# Patient Record
Sex: MALE | Race: WHITE | Employment: FULL TIME | URBAN - METROPOLITAN AREA
[De-identification: names, ages, dates, MRNs, and addresses within clinical notes are randomized per-mention and may not be internally consistent; named-entity substitution may affect disease eponyms.]

---

## 2018-07-26 ENCOUNTER — APPOINTMENT (OUTPATIENT)
Dept: CT IMAGING | Age: 48
End: 2018-07-26
Payer: COMMERCIAL

## 2018-07-26 ENCOUNTER — HOSPITAL ENCOUNTER (EMERGENCY)
Age: 48
Discharge: HOME | End: 2018-07-26
Attending: FAMILY MEDICINE
Payer: COMMERCIAL

## 2018-07-26 VITALS
SYSTOLIC BLOOD PRESSURE: 100 MMHG | HEART RATE: 65 BPM | DIASTOLIC BLOOD PRESSURE: 62 MMHG | OXYGEN SATURATION: 97 % | TEMPERATURE: 98 F | RESPIRATION RATE: 14 BRPM

## 2018-07-26 DIAGNOSIS — N20.0 KIDNEY STONE: Primary | ICD-10-CM

## 2018-07-26 LAB
ALBUMIN SERPL-MCNC: 4.5 G/DL (ref 3.5–5.2)
ALP BLD-CCNC: 69 U/L (ref 40–129)
ALT SERPL-CCNC: 6 U/L (ref 0–40)
ANION GAP SERPL CALCULATED.3IONS-SCNC: 17 MMOL/L (ref 7–16)
AST SERPL-CCNC: 16 U/L (ref 0–39)
BACTERIA: ABNORMAL /HPF
BASOPHILS ABSOLUTE: 0.06 E9/L (ref 0–0.2)
BASOPHILS RELATIVE PERCENT: 0.7 % (ref 0–2)
BILIRUB SERPL-MCNC: 1.5 MG/DL (ref 0–1.2)
BILIRUBIN URINE: NEGATIVE
BLOOD, URINE: ABNORMAL
BUN BLDV-MCNC: 20 MG/DL (ref 6–20)
CALCIUM SERPL-MCNC: 9.5 MG/DL (ref 8.6–10.2)
CHLORIDE BLD-SCNC: 106 MMOL/L (ref 98–107)
CLARITY: ABNORMAL
CO2: 18 MMOL/L (ref 22–29)
COLOR: ABNORMAL
CREAT SERPL-MCNC: 1.3 MG/DL (ref 0.7–1.2)
EOSINOPHILS ABSOLUTE: 0.15 E9/L (ref 0.05–0.5)
EOSINOPHILS RELATIVE PERCENT: 1.7 % (ref 0–6)
EPITHELIAL CELLS, UA: ABNORMAL /HPF
GFR AFRICAN AMERICAN: >60
GFR NON-AFRICAN AMERICAN: 59 ML/MIN/1.73
GLUCOSE BLD-MCNC: 145 MG/DL (ref 74–109)
GLUCOSE URINE: NEGATIVE MG/DL
HCT VFR BLD CALC: 43.8 % (ref 37–54)
HEMOGLOBIN: 15.4 G/DL (ref 12.5–16.5)
IMMATURE GRANULOCYTES #: 0.03 E9/L
IMMATURE GRANULOCYTES %: 0.3 % (ref 0–5)
KETONES, URINE: NEGATIVE MG/DL
LACTIC ACID: 2.6 MMOL/L (ref 0.5–2.2)
LEUKOCYTE ESTERASE, URINE: NEGATIVE
LIPASE: 40 U/L (ref 13–60)
LYMPHOCYTES ABSOLUTE: 2.55 E9/L (ref 1.5–4)
LYMPHOCYTES RELATIVE PERCENT: 28.2 % (ref 20–42)
MCH RBC QN AUTO: 30.9 PG (ref 26–35)
MCHC RBC AUTO-ENTMCNC: 35.2 % (ref 32–34.5)
MCV RBC AUTO: 88 FL (ref 80–99.9)
MONOCYTES ABSOLUTE: 0.8 E9/L (ref 0.1–0.95)
MONOCYTES RELATIVE PERCENT: 8.9 % (ref 2–12)
MUCUS: PRESENT
NEUTROPHILS ABSOLUTE: 5.44 E9/L (ref 1.8–7.3)
NEUTROPHILS RELATIVE PERCENT: 60.2 % (ref 43–80)
NITRITE, URINE: NEGATIVE
PDW BLD-RTO: 13 FL (ref 11.5–15)
PH UA: 6 (ref 5–9)
PLATELET # BLD: 222 E9/L (ref 130–450)
PMV BLD AUTO: 10.5 FL (ref 7–12)
POTASSIUM REFLEX MAGNESIUM: 4.3 MMOL/L (ref 3.5–5)
PROTEIN UA: ABNORMAL MG/DL
RBC # BLD: 4.98 E12/L (ref 3.8–5.8)
RBC UA: >20 /HPF (ref 0–2)
RENAL EPITHELIAL, UA: ABNORMAL /HPF
SODIUM BLD-SCNC: 141 MMOL/L (ref 132–146)
SPECIFIC GRAVITY UA: 1.02 (ref 1–1.03)
TOTAL PROTEIN: 7.2 G/DL (ref 6.4–8.3)
UROBILINOGEN, URINE: 0.2 E.U./DL
WBC # BLD: 9 E9/L (ref 4.5–11.5)
WBC UA: ABNORMAL /HPF (ref 0–5)

## 2018-07-26 PROCEDURE — 85025 COMPLETE CBC W/AUTO DIFF WBC: CPT

## 2018-07-26 PROCEDURE — 80053 COMPREHEN METABOLIC PANEL: CPT

## 2018-07-26 PROCEDURE — 83690 ASSAY OF LIPASE: CPT

## 2018-07-26 PROCEDURE — 81001 URINALYSIS AUTO W/SCOPE: CPT

## 2018-07-26 PROCEDURE — 96374 THER/PROPH/DIAG INJ IV PUSH: CPT

## 2018-07-26 PROCEDURE — 2580000003 HC RX 258: Performed by: FAMILY MEDICINE

## 2018-07-26 PROCEDURE — 99284 EMERGENCY DEPT VISIT MOD MDM: CPT

## 2018-07-26 PROCEDURE — 96375 TX/PRO/DX INJ NEW DRUG ADDON: CPT

## 2018-07-26 PROCEDURE — 36415 COLL VENOUS BLD VENIPUNCTURE: CPT

## 2018-07-26 PROCEDURE — 6360000002 HC RX W HCPCS: Performed by: FAMILY MEDICINE

## 2018-07-26 PROCEDURE — 74176 CT ABD & PELVIS W/O CONTRAST: CPT

## 2018-07-26 PROCEDURE — 83605 ASSAY OF LACTIC ACID: CPT

## 2018-07-26 RX ORDER — ONDANSETRON 4 MG/1
4 TABLET, ORALLY DISINTEGRATING ORAL EVERY 8 HOURS PRN
Qty: 10 TABLET | Refills: 0 | Status: SHIPPED | OUTPATIENT
Start: 2018-07-26 | End: 2019-07-26

## 2018-07-26 RX ORDER — 0.9 % SODIUM CHLORIDE 0.9 %
1000 INTRAVENOUS SOLUTION INTRAVENOUS ONCE
Status: COMPLETED | OUTPATIENT
Start: 2018-07-26 | End: 2018-07-26

## 2018-07-26 RX ORDER — KETOROLAC TROMETHAMINE 30 MG/ML
30 INJECTION, SOLUTION INTRAMUSCULAR; INTRAVENOUS ONCE
Status: COMPLETED | OUTPATIENT
Start: 2018-07-26 | End: 2018-07-26

## 2018-07-26 RX ORDER — KETOROLAC TROMETHAMINE 10 MG/1
10 TABLET, FILM COATED ORAL EVERY 6 HOURS PRN
Qty: 12 TABLET | Refills: 0 | Status: SHIPPED | OUTPATIENT
Start: 2018-07-26 | End: 2020-10-15

## 2018-07-26 RX ORDER — TAMSULOSIN HYDROCHLORIDE 0.4 MG/1
0.4 CAPSULE ORAL DAILY
Qty: 30 CAPSULE | Refills: 0 | Status: SHIPPED | OUTPATIENT
Start: 2018-07-26 | End: 2020-10-15

## 2018-07-26 RX ORDER — DIPHENHYDRAMINE HYDROCHLORIDE 50 MG/ML
25 INJECTION INTRAMUSCULAR; INTRAVENOUS ONCE
Status: COMPLETED | OUTPATIENT
Start: 2018-07-26 | End: 2018-07-26

## 2018-07-26 RX ADMIN — DIPHENHYDRAMINE HYDROCHLORIDE 25 MG: 50 INJECTION, SOLUTION INTRAMUSCULAR; INTRAVENOUS at 10:23

## 2018-07-26 RX ADMIN — SODIUM CHLORIDE 1000 ML: 9 INJECTION, SOLUTION INTRAVENOUS at 10:22

## 2018-07-26 RX ADMIN — KETOROLAC TROMETHAMINE 30 MG: 30 INJECTION, SOLUTION INTRAMUSCULAR at 10:22

## 2018-07-26 RX ADMIN — PROCHLORPERAZINE EDISYLATE 10 MG: 5 INJECTION INTRAMUSCULAR; INTRAVENOUS at 10:24

## 2018-07-26 ASSESSMENT — PAIN DESCRIPTION - PAIN TYPE
TYPE: ACUTE PAIN
TYPE: ACUTE PAIN

## 2018-07-26 ASSESSMENT — PAIN DESCRIPTION - LOCATION
LOCATION: ABDOMEN;FLANK
LOCATION: ABDOMEN;FLANK;GROIN

## 2018-07-26 ASSESSMENT — PAIN DESCRIPTION - PROGRESSION: CLINICAL_PROGRESSION: GRADUALLY IMPROVING

## 2018-07-26 ASSESSMENT — PAIN DESCRIPTION - ORIENTATION
ORIENTATION: RIGHT;LOWER
ORIENTATION: RIGHT;LOWER

## 2018-07-26 ASSESSMENT — PAIN DESCRIPTION - DESCRIPTORS
DESCRIPTORS: SORE;SHARP
DESCRIPTORS: SHARP

## 2018-07-26 ASSESSMENT — PAIN DESCRIPTION - FREQUENCY
FREQUENCY: CONTINUOUS
FREQUENCY: CONTINUOUS

## 2018-07-26 ASSESSMENT — PAIN SCALES - GENERAL
PAINLEVEL_OUTOF10: 10
PAINLEVEL_OUTOF10: 5
PAINLEVEL_OUTOF10: 10

## 2018-07-26 NOTE — ED PROVIDER NOTES
sounds. Distension:  None. Tenderness: mild tenderness is present in the RLQ. Liver: non-tender. Spleen:  non-tender. Pulsatile Mass: absent. Hernia:  no inguinal or femoral hernias noted. · Back: CVA Tenderness: No.  · Integument:  Normal turgor. Warm, dry, without visible rash, unless noted elsewhere. · Neurological:  Orientation age-appropriate. Motor functions intact.     Lab / Imaging Results   (All laboratory and radiology results have been personally reviewed by myself)  Labs:  Results for orders placed or performed during the hospital encounter of 07/26/18   CBC auto differential   Result Value Ref Range    WBC 9.0 4.5 - 11.5 E9/L    RBC 4.98 3.80 - 5.80 E12/L    Hemoglobin 15.4 12.5 - 16.5 g/dL    Hematocrit 43.8 37.0 - 54.0 %    MCV 88.0 80.0 - 99.9 fL    MCH 30.9 26.0 - 35.0 pg    MCHC 35.2 (H) 32.0 - 34.5 %    RDW 13.0 11.5 - 15.0 fL    Platelets 037 080 - 447 E9/L    MPV 10.5 7.0 - 12.0 fL    Neutrophils % 60.2 43.0 - 80.0 %    Immature Granulocytes % 0.3 0.0 - 5.0 %    Lymphocytes % 28.2 20.0 - 42.0 %    Monocytes % 8.9 2.0 - 12.0 %    Eosinophils % 1.7 0.0 - 6.0 %    Basophils % 0.7 0.0 - 2.0 %    Neutrophils # 5.44 1.80 - 7.30 E9/L    Immature Granulocytes # 0.03 E9/L    Lymphocytes # 2.55 1.50 - 4.00 E9/L    Monocytes # 0.80 0.10 - 0.95 E9/L    Eosinophils # 0.15 0.05 - 0.50 E9/L    Basophils # 0.06 0.00 - 0.20 E9/L   Comprehensive Metabolic Panel w/ Reflex to MG   Result Value Ref Range    Sodium 141 132 - 146 mmol/L    Potassium reflex Magnesium 4.3 3.5 - 5.0 mmol/L    Chloride 106 98 - 107 mmol/L    CO2 18 (L) 22 - 29 mmol/L    Anion Gap 17 (H) 7 - 16 mmol/L    Glucose 145 (H) 74 - 109 mg/dL    BUN 20 6 - 20 mg/dL    CREATININE 1.3 (H) 0.7 - 1.2 mg/dL    GFR Non-African American 59 >=60 mL/min/1.73    GFR African American >60     Calcium 9.5 8.6 - 10.2 mg/dL    Total Protein 7.2 6.4 - 8.3 g/dL    Alb 4.5 3.5 - 5.2 g/dL    Total Bilirubin 1.5 (H) 0.0 - 1.2 mg/dL    Alkaline Phosphatase 69 40 - 129 U/L    ALT 6 0 - 40 U/L    AST 16 0 - 39 U/L   Lipase   Result Value Ref Range    Lipase 40 13 - 60 U/L   Lactic acid, plasma   Result Value Ref Range    Lactic Acid 2.6 (H) 0.5 - 2.2 mmol/L     Imaging: All Radiology results interpreted by Radiologist unless otherwise noted. CT ABDOMEN PELVIS WO CONTRAST Additional Contrast? None   Final Result      1. No inflammatory changes in the abdomen or pelvis. 2. Tiny recently passed stone in the urinary bladder lumen adjacent to   right UVJ. 3. several stones in the right renal lower pole. 4. Moderate to severe right hydronephrosis and extrarenal pelves with   suggestion of UPJ narrowing or obstruction, unchanged. 5. sigmoid diverticulosis. 6. No bowel obstruction or ileus. ED Course / Medical Decision Making     Medications   0.9 % sodium chloride bolus (1,000 mLs Intravenous New Bag 7/26/18 1022)   ketorolac (TORADOL) injection 30 mg (30 mg Intravenous Given 7/26/18 1022)   diphenhydrAMINE (BENADRYL) injection 25 mg (25 mg Intravenous Given 7/26/18 1023)   prochlorperazine (COMPAZINE) injection 10 mg (10 mg Intravenous Given 7/26/18 1024)        Re-Evaluations:  7/26/18      Time: 11:55    Patients symptoms are improving. Possible passing of the stone as noted in the urine    Consultations:             None    Procedures:   none    MDM:  Patient much improved may have passed a stone as noted on the urine cup he did receive a liter of fluid. Counseling:   I have spoken with the patient and discussed todays results, in addition to providing specific details for the plan of care and counseling regarding the diagnosis and prognosis and are agreeable with the plan. Assessment      1.  Kidney stone      This patient's ED course included: a personal history and physicial examination, re-evaluation prior to disposition, multiple bedside re-evaluations, IV

## 2019-08-12 ENCOUNTER — HOSPITAL ENCOUNTER (OUTPATIENT)
Age: 49
Discharge: HOME | End: 2019-08-14

## 2019-08-12 PROCEDURE — 88342 IMHCHEM/IMCYTCHM 1ST ANTB: CPT

## 2019-08-12 PROCEDURE — 88305 TISSUE EXAM BY PATHOLOGIST: CPT

## 2020-02-17 ENCOUNTER — HOSPITAL ENCOUNTER (EMERGENCY)
Age: 50
Discharge: HOME | End: 2020-02-17
Payer: COMMERCIAL

## 2020-02-17 ENCOUNTER — APPOINTMENT (OUTPATIENT)
Dept: CT IMAGING | Age: 50
End: 2020-02-17
Payer: COMMERCIAL

## 2020-02-17 VITALS
SYSTOLIC BLOOD PRESSURE: 126 MMHG | HEIGHT: 76 IN | BODY MASS INDEX: 29.83 KG/M2 | OXYGEN SATURATION: 97 % | HEART RATE: 55 BPM | WEIGHT: 245 LBS | DIASTOLIC BLOOD PRESSURE: 80 MMHG | TEMPERATURE: 98.4 F | RESPIRATION RATE: 18 BRPM

## 2020-02-17 LAB
ALBUMIN SERPL-MCNC: 4.6 G/DL (ref 3.5–5.2)
ALP BLD-CCNC: 65 U/L (ref 40–129)
ALT SERPL-CCNC: 18 U/L (ref 0–40)
ANION GAP SERPL CALCULATED.3IONS-SCNC: 12 MMOL/L (ref 7–16)
AST SERPL-CCNC: 17 U/L (ref 0–39)
BASOPHILS ABSOLUTE: 0.04 E9/L (ref 0–0.2)
BASOPHILS RELATIVE PERCENT: 0.5 % (ref 0–2)
BILIRUB SERPL-MCNC: 1.3 MG/DL (ref 0–1.2)
BILIRUBIN URINE: NEGATIVE
BLOOD, URINE: NEGATIVE
BUN BLDV-MCNC: 11 MG/DL (ref 6–20)
CALCIUM SERPL-MCNC: 9.7 MG/DL (ref 8.6–10.2)
CHLORIDE BLD-SCNC: 102 MMOL/L (ref 98–107)
CLARITY: CLEAR
CO2: 24 MMOL/L (ref 22–29)
COLOR: YELLOW
CREAT SERPL-MCNC: 1.2 MG/DL (ref 0.7–1.2)
EOSINOPHILS ABSOLUTE: 0.17 E9/L (ref 0.05–0.5)
EOSINOPHILS RELATIVE PERCENT: 1.9 % (ref 0–6)
GFR AFRICAN AMERICAN: >60
GFR NON-AFRICAN AMERICAN: >60 ML/MIN/1.73
GLUCOSE BLD-MCNC: 100 MG/DL (ref 74–99)
GLUCOSE URINE: NEGATIVE MG/DL
HCT VFR BLD CALC: 49.5 % (ref 37–54)
HEMOGLOBIN: 16.4 G/DL (ref 12.5–16.5)
IMMATURE GRANULOCYTES #: 0.04 E9/L
IMMATURE GRANULOCYTES %: 0.5 % (ref 0–5)
KETONES, URINE: NEGATIVE MG/DL
LACTIC ACID: 0.9 MMOL/L (ref 0.5–2.2)
LEUKOCYTE ESTERASE, URINE: NEGATIVE
LIPASE: 235 U/L (ref 13–60)
LYMPHOCYTES ABSOLUTE: 2 E9/L (ref 1.5–4)
LYMPHOCYTES RELATIVE PERCENT: 22.7 % (ref 20–42)
MAGNESIUM: 2.2 MG/DL (ref 1.6–2.6)
MCH RBC QN AUTO: 30.4 PG (ref 26–35)
MCHC RBC AUTO-ENTMCNC: 33.1 % (ref 32–34.5)
MCV RBC AUTO: 91.7 FL (ref 80–99.9)
MONOCYTES ABSOLUTE: 0.69 E9/L (ref 0.1–0.95)
MONOCYTES RELATIVE PERCENT: 7.8 % (ref 2–12)
NEUTROPHILS ABSOLUTE: 5.88 E9/L (ref 1.8–7.3)
NEUTROPHILS RELATIVE PERCENT: 66.6 % (ref 43–80)
NITRITE, URINE: NEGATIVE
PDW BLD-RTO: 13.1 FL (ref 11.5–15)
PH UA: 6 (ref 5–9)
PLATELET # BLD: 223 E9/L (ref 130–450)
PMV BLD AUTO: 10.4 FL (ref 7–12)
POTASSIUM REFLEX MAGNESIUM: 3.5 MMOL/L (ref 3.5–5)
PROTEIN UA: NEGATIVE MG/DL
RBC # BLD: 5.4 E12/L (ref 3.8–5.8)
SODIUM BLD-SCNC: 138 MMOL/L (ref 132–146)
SPECIFIC GRAVITY UA: 1.02 (ref 1–1.03)
TOTAL PROTEIN: 6.8 G/DL (ref 6.4–8.3)
UROBILINOGEN, URINE: 0.2 E.U./DL
WBC # BLD: 8.8 E9/L (ref 4.5–11.5)

## 2020-02-17 PROCEDURE — 96374 THER/PROPH/DIAG INJ IV PUSH: CPT

## 2020-02-17 PROCEDURE — 96375 TX/PRO/DX INJ NEW DRUG ADDON: CPT

## 2020-02-17 PROCEDURE — 6360000002 HC RX W HCPCS: Performed by: EMERGENCY MEDICINE

## 2020-02-17 PROCEDURE — 85025 COMPLETE CBC W/AUTO DIFF WBC: CPT

## 2020-02-17 PROCEDURE — 2580000003 HC RX 258: Performed by: RADIOLOGY

## 2020-02-17 PROCEDURE — 83735 ASSAY OF MAGNESIUM: CPT

## 2020-02-17 PROCEDURE — 81003 URINALYSIS AUTO W/O SCOPE: CPT

## 2020-02-17 PROCEDURE — 6360000002 HC RX W HCPCS: Performed by: PHYSICIAN ASSISTANT

## 2020-02-17 PROCEDURE — 6360000004 HC RX CONTRAST MEDICATION: Performed by: RADIOLOGY

## 2020-02-17 PROCEDURE — 80053 COMPREHEN METABOLIC PANEL: CPT

## 2020-02-17 PROCEDURE — 83605 ASSAY OF LACTIC ACID: CPT

## 2020-02-17 PROCEDURE — 2580000003 HC RX 258: Performed by: EMERGENCY MEDICINE

## 2020-02-17 PROCEDURE — 99284 EMERGENCY DEPT VISIT MOD MDM: CPT

## 2020-02-17 PROCEDURE — 74177 CT ABD & PELVIS W/CONTRAST: CPT

## 2020-02-17 PROCEDURE — C9113 INJ PANTOPRAZOLE SODIUM, VIA: HCPCS | Performed by: EMERGENCY MEDICINE

## 2020-02-17 PROCEDURE — 83690 ASSAY OF LIPASE: CPT

## 2020-02-17 RX ORDER — SODIUM CHLORIDE 0.9 % (FLUSH) 0.9 %
10 SYRINGE (ML) INJECTION PRN
Status: COMPLETED | OUTPATIENT
Start: 2020-02-17 | End: 2020-02-17

## 2020-02-17 RX ORDER — PANTOPRAZOLE SODIUM 40 MG/10ML
40 INJECTION, POWDER, LYOPHILIZED, FOR SOLUTION INTRAVENOUS ONCE
Status: COMPLETED | OUTPATIENT
Start: 2020-02-17 | End: 2020-02-17

## 2020-02-17 RX ORDER — METOCLOPRAMIDE HYDROCHLORIDE 5 MG/ML
10 INJECTION INTRAMUSCULAR; INTRAVENOUS ONCE
Status: COMPLETED | OUTPATIENT
Start: 2020-02-17 | End: 2020-02-17

## 2020-02-17 RX ORDER — DICYCLOMINE HYDROCHLORIDE 10 MG/1
10 CAPSULE ORAL 4 TIMES DAILY
Qty: 20 CAPSULE | Refills: 0 | Status: SHIPPED | OUTPATIENT
Start: 2020-02-17 | End: 2020-10-15

## 2020-02-17 RX ORDER — METOCLOPRAMIDE 10 MG/1
10 TABLET ORAL 4 TIMES DAILY
Qty: 30 TABLET | Refills: 0 | Status: SHIPPED | OUTPATIENT
Start: 2020-02-17 | End: 2020-10-15

## 2020-02-17 RX ORDER — 0.9 % SODIUM CHLORIDE 0.9 %
1000 INTRAVENOUS SOLUTION INTRAVENOUS ONCE
Status: COMPLETED | OUTPATIENT
Start: 2020-02-17 | End: 2020-02-17

## 2020-02-17 RX ORDER — ONDANSETRON 2 MG/ML
4 INJECTION INTRAMUSCULAR; INTRAVENOUS ONCE
Status: COMPLETED | OUTPATIENT
Start: 2020-02-17 | End: 2020-02-17

## 2020-02-17 RX ADMIN — METOCLOPRAMIDE 10 MG: 5 INJECTION, SOLUTION INTRAMUSCULAR; INTRAVENOUS at 21:29

## 2020-02-17 RX ADMIN — ONDANSETRON 4 MG: 2 INJECTION INTRAMUSCULAR; INTRAVENOUS at 19:21

## 2020-02-17 RX ADMIN — IOPAMIDOL 110 ML: 755 INJECTION, SOLUTION INTRAVENOUS at 20:33

## 2020-02-17 RX ADMIN — Medication 10 ML: at 20:32

## 2020-02-17 RX ADMIN — PANTOPRAZOLE SODIUM 40 MG: 40 INJECTION, POWDER, FOR SOLUTION INTRAVENOUS at 19:21

## 2020-02-17 RX ADMIN — SODIUM CHLORIDE 1000 ML: 9 INJECTION, SOLUTION INTRAVENOUS at 19:21

## 2020-02-17 ASSESSMENT — PAIN SCALES - GENERAL: PAINLEVEL_OUTOF10: 8

## 2020-02-17 ASSESSMENT — PAIN DESCRIPTION - LOCATION: LOCATION: ABDOMEN

## 2020-02-17 ASSESSMENT — PAIN DESCRIPTION - PAIN TYPE: TYPE: ACUTE PAIN

## 2020-02-18 NOTE — ED PROVIDER NOTES
Immature Granulocytes % 0.5 0.0 - 5.0 %    Lymphocytes % 22.7 20.0 - 42.0 %    Monocytes % 7.8 2.0 - 12.0 %    Eosinophils % 1.9 0.0 - 6.0 %    Basophils % 0.5 0.0 - 2.0 %    Neutrophils Absolute 5.88 1.80 - 7.30 E9/L    Immature Granulocytes # 0.04 E9/L    Lymphocytes Absolute 2.00 1.50 - 4.00 E9/L    Monocytes Absolute 0.69 0.10 - 0.95 E9/L    Eosinophils Absolute 0.17 0.05 - 0.50 E9/L    Basophils Absolute 0.04 0.00 - 0.20 E9/L   Comprehensive Metabolic Panel w/ Reflex to MG   Result Value Ref Range    Sodium 138 132 - 146 mmol/L    Potassium reflex Magnesium 3.5 3.5 - 5.0 mmol/L    Chloride 102 98 - 107 mmol/L    CO2 24 22 - 29 mmol/L    Anion Gap 12 7 - 16 mmol/L    Glucose 100 (H) 74 - 99 mg/dL    BUN 11 6 - 20 mg/dL    CREATININE 1.2 0.7 - 1.2 mg/dL    GFR Non-African American >60 >=60 mL/min/1.73    GFR African American >60     Calcium 9.7 8.6 - 10.2 mg/dL    Total Protein 6.8 6.4 - 8.3 g/dL    Alb 4.6 3.5 - 5.2 g/dL    Total Bilirubin 1.3 (H) 0.0 - 1.2 mg/dL    Alkaline Phosphatase 65 40 - 129 U/L    ALT 18 0 - 40 U/L    AST 17 0 - 39 U/L   Lipase   Result Value Ref Range    Lipase 235 (H) 13 - 60 U/L   Lactic Acid, Plasma   Result Value Ref Range    Lactic Acid 0.9 0.5 - 2.2 mmol/L   Urinalysis, reflex to microscopic   Result Value Ref Range    Color, UA Yellow Straw/Yellow    Clarity, UA Clear Clear    Glucose, Ur Negative Negative mg/dL    Bilirubin Urine Negative Negative    Ketones, Urine Negative Negative mg/dL    Specific Gravity, UA 1.025 1.005 - 1.030    Blood, Urine Negative Negative    pH, UA 6.0 5.0 - 9.0    Protein, UA Negative Negative mg/dL    Urobilinogen, Urine 0.2 <2.0 E.U./dL    Nitrite, Urine Negative Negative    Leukocyte Esterase, Urine Negative Negative   Magnesium   Result Value Ref Range    Magnesium 2.2 1.6 - 2.6 mg/dL       RADIOLOGY:  Interpreted by Radiologist.  CT ABDOMEN PELVIS W IV CONTRAST Additional Contrast? None   Final Result      No evidence for acute process on CT of abdomen and pelvis. Diverticulosis coli. No evidence of diverticulitis. Nephrolithiasis without evidence of obstructive uropathy. Appendix not visualized. No secondary signs of appendicitis. 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 ------------------------- NURSING NOTES AND VITALS REVIEWED ---------------------------   The nursing notes within the ED encounter and vital signs as below have been reviewed. /80   Pulse 55   Temp 98.4 °F (36.9 °C) (Oral)   Resp 18   Ht 6' 4\" (1.93 m)   Wt 245 lb (111.1 kg)   SpO2 97%   BMI 29.82 kg/m²   Oxygen Saturation Interpretation: Normal      ---------------------------------------------------PHYSICAL EXAM--------------------------------------      Constitutional/General: Alert and oriented x3, well appearing, non toxic in NAD  Head: Normocephalic and atraumatic  Eyes: PERRL, EOMI  Mouth: Oropharynx clear, handling secretions, no trismus  Neck: Supple, full ROM,   Pulmonary: Lungs clear to auscultation bilaterally, no wheezes, rales, or rhonchi. Not in respiratory distress  Cardiovascular:  Regular rate and rhythm, no murmurs, gallops, or rubs. 2+ distal pulses  Abdomen: Soft,  non distended, RUQ TTP  Extremities: Moves all extremities x 4.  Warm and well perfused  Skin: warm and dry without rash  Neurologic: GCS 15,  Psych: Normal Affect      ------------------------------ ED COURSE/MEDICAL DECISION MAKING----------------------  Medications   metoclopramide (REGLAN) injection 10 mg (has no administration in time range)   0.9 % sodium chloride bolus (0 mLs Intravenous Stopped 2/17/20 2018)   ondansetron (ZOFRAN) injection 4 mg (4 mg Intravenous Given 2/17/20 1921)   pantoprazole (PROTONIX) injection 40 mg (40 mg Intravenous Given 2/17/20 1921)   iopamidol (ISOVUE-370) 76 % injection 110 mL (110 mLs Intravenous Given 2/17/20 2033)   sodium chloride flush 0.9 % injection 10 mL (10 mLs Intravenous Given 2/17/20

## 2020-10-15 ENCOUNTER — HOSPITAL ENCOUNTER (EMERGENCY)
Age: 50
Discharge: HOME | End: 2020-10-15
Attending: FAMILY MEDICINE
Payer: COMMERCIAL

## 2020-10-15 ENCOUNTER — APPOINTMENT (OUTPATIENT)
Dept: GENERAL RADIOLOGY | Age: 50
End: 2020-10-15
Payer: COMMERCIAL

## 2020-10-15 VITALS
OXYGEN SATURATION: 98 % | TEMPERATURE: 98.3 F | HEART RATE: 70 BPM | BODY MASS INDEX: 28.62 KG/M2 | HEIGHT: 76 IN | RESPIRATION RATE: 16 BRPM | WEIGHT: 235 LBS | DIASTOLIC BLOOD PRESSURE: 78 MMHG | SYSTOLIC BLOOD PRESSURE: 134 MMHG

## 2020-10-15 PROCEDURE — 99283 EMERGENCY DEPT VISIT LOW MDM: CPT

## 2020-10-15 PROCEDURE — 6370000000 HC RX 637 (ALT 250 FOR IP): Performed by: FAMILY MEDICINE

## 2020-10-15 PROCEDURE — 73560 X-RAY EXAM OF KNEE 1 OR 2: CPT

## 2020-10-15 PROCEDURE — 99284 EMERGENCY DEPT VISIT MOD MDM: CPT

## 2020-10-15 RX ORDER — IBUPROFEN 400 MG/1
400 TABLET ORAL EVERY 8 HOURS PRN
Qty: 12 TABLET | Refills: 0 | Status: SHIPPED | OUTPATIENT
Start: 2020-10-15 | End: 2022-04-04 | Stop reason: ALTCHOICE

## 2020-10-15 RX ORDER — ACETAMINOPHEN 500 MG
1000 TABLET ORAL EVERY 8 HOURS PRN
Qty: 50 TABLET | Refills: 0 | Status: SHIPPED | OUTPATIENT
Start: 2020-10-15

## 2020-10-15 RX ORDER — OMEPRAZOLE MAGNESIUM 10 MG/1
GRANULE, DELAYED RELEASE ORAL
COMMUNITY
End: 2022-04-04 | Stop reason: ALTCHOICE

## 2020-10-15 RX ORDER — ACETAMINOPHEN 500 MG
1000 TABLET ORAL ONCE
Status: COMPLETED | OUTPATIENT
Start: 2020-10-15 | End: 2020-10-15

## 2020-10-15 RX ORDER — IBUPROFEN 600 MG/1
600 TABLET ORAL ONCE
Status: COMPLETED | OUTPATIENT
Start: 2020-10-15 | End: 2020-10-15

## 2020-10-15 RX ADMIN — IBUPROFEN 600 MG: 600 TABLET, FILM COATED ORAL at 09:16

## 2020-10-15 RX ADMIN — ACETAMINOPHEN 1000 MG: 500 TABLET ORAL at 09:16

## 2020-10-15 ASSESSMENT — PAIN DESCRIPTION - FREQUENCY: FREQUENCY: CONTINUOUS

## 2020-10-15 ASSESSMENT — PAIN DESCRIPTION - ONSET: ONSET: SUDDEN

## 2020-10-15 ASSESSMENT — PAIN DESCRIPTION - DESCRIPTORS: DESCRIPTORS: RADIATING;SHARP

## 2020-10-15 ASSESSMENT — PAIN SCALES - GENERAL
PAINLEVEL_OUTOF10: 8
PAINLEVEL_OUTOF10: 8

## 2020-10-15 ASSESSMENT — PAIN DESCRIPTION - LOCATION: LOCATION: LEG;KNEE

## 2020-10-15 ASSESSMENT — PAIN DESCRIPTION - ORIENTATION: ORIENTATION: RIGHT

## 2020-10-15 ASSESSMENT — PAIN DESCRIPTION - PAIN TYPE: TYPE: ACUTE PAIN

## 2020-10-15 ASSESSMENT — PAIN DESCRIPTION - PROGRESSION: CLINICAL_PROGRESSION: NOT CHANGED

## 2020-10-15 NOTE — ED PROVIDER NOTES
Department of Emergency Medicine   ED  Provider Note  Admit Date/RoomTime: 10/15/2020  8:10 AM  ED Room: 12/12  Chief Complaint   Leg Pain (While at work, pushing a barrel, floor uneven, barrel tipped and he twisted injuring right leg/knee pain.  )    History of Present Illness   Source of history provided by:  patient. History/Exam Limitations: none. Gildardo Cedeño is a 52 y.o. old male who has a past medical history of:   Past Medical History:   Diagnosis Date    Acid reflux     presents to the emergency department by ambulance where the patient received see Ambulance Run Sheet prior to arrival., for pain located anterior to right knee  which occured 1 hour(s) prior to arrival.  The complaint occurred as a result of injury twisting  while at work. There has been a history of previous surgery of arthroscopic right knee many years ago. Since onset the symptoms have been moderate in degree. His pain is aggraveated by movement, use and palpation and relieved by ice and rest. Tetanus Status: within past 5 years. His weight bearing ability:  weak. ROS    Pertinent positives and negatives are stated within HPI, all other systems reviewed and are negative. Past Surgical History:   Procedure Laterality Date    APPENDECTOMY      CHOLECYSTECTOMY      ELBOW SURGERY     Social History:  reports that he has quit smoking. He has never used smokeless tobacco. He reports current alcohol use. He reports that he does not use drugs. Family History: family history is not on file. Allergies: Patient has no known allergies. Physical Exam          ED Triage Vitals [10/15/20 0801]   BP Temp Temp Source Pulse Resp SpO2 Height Weight   134/78 98.3 °F (36.8 °C) Tympanic 70 16 98 % 6' 4\" (1.93 m) 235 lb (106.6 kg)       Oxygen Saturation Interpretation: Normal.    Constitutional:  Alert, development consistent with age. Neck:  Normal ROM. Supple.   Knee:  Right medial, lateral, anterior:             Tenderness: todays results, in addition to providing specific details for the plan of care and counseling regarding the diagnosis and prognosis. Questions are answered at this time and they are agreeable with the plan. Assessment      1. Sprain of right knee, unspecified ligament, initial encounter      Plan   Discharge to home  Patient condition is good    New Medications     New Prescriptions    ACETAMINOPHEN (TYLENOL) 500 MG TABLET    Take 2 tablets by mouth every 8 hours as needed for Pain    IBUPROFEN (IBU) 400 MG TABLET    Take 1 tablet by mouth every 8 hours as needed for Pain Take with full glass of water     Electronically signed by Paige Nam MD   DD: 10/15/20  **This report was transcribed using voice recognition software. Every effort was made to ensure accuracy; however, inadvertent computerized transcription errors may be present.   END OF ED PROVIDER NOTE        Paige Nam MD  10/15/20 3478

## 2020-11-02 ENCOUNTER — HOSPITAL ENCOUNTER (OUTPATIENT)
Dept: PHYSICAL THERAPY | Age: 50
Setting detail: THERAPIES SERIES
Discharge: HOME | End: 2020-11-02
Payer: COMMERCIAL

## 2020-11-02 PROCEDURE — 97161 PT EVAL LOW COMPLEX 20 MIN: CPT

## 2020-11-02 NOTE — PROGRESS NOTES
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 In the course of his normal job duties client was puhing a 55 gallon drum of oil on a 4 wheeled cartt. A wheel of the cart caught in a rut causing the liquid to shift. Client attempted to stop the drum from falling resulting in a twist injury of the right knee PMHX no prior hx of right knee injury Has had xrays whcih were negative  Family / Caregiver Present: No  Referring Practitioner:  [employer]  Referral Date : 10/29/20  Diagnosis: Right knee Injury  PT Visit Information  Onset Date: 10/15/20  PT Insurance Information: Industrial  Total # of Visits Approved: 12  Plan of Care/Certification Expiration Date: 20  Subjective  Subjective: Client reports symptomatic improvement over time Reductions in pain and edema Still limits weight bearing Working light duty       Vision/Hearing  Vision  Vision: Within Functional Limits  Hearing  Hearing: Within functional limits    Orientation  Orientation  Overall Orientation Status: Within Functional Limits    Social/Functional History  Social/Functional History  Lives With: Family  Type of Home: House  Home Layout: Multi-level; Work area in [employer] From: [employer] Responsibilities: Yes  Active : Yes  Mode of Transportation: Car  Occupation: Full time employment; Workers comp    Objective     Observation/Palpation  Posture: Fair  Palpation: Pain wiht palpation lateral joint limt Right knee Postero-lateral joint region rightknee  Observation: No acute pain behavior or guarding noted  Edema: Modest right knee    AROM RLE (degrees)  RLE AROM: WFL  AROM LLE (degrees)  LLE AROM : WFL    Strength RLE  Comment: 4- to 4/5  Strength LLE  Comment: 4/5  Tone RLE  RLE Tone: Normotonic  Tone LLE  LLE Tone: Normotonic  Motor Control  Gross Motor?: WFL  Additional Measures  Special Tests: Anterior drawer negating Varus/Valgus stress Negative for instability     Bed mobility  Bridging: Independent  Rolling to Left: Independent  Rolling to Right: Independent  Supine to Sit: Independent  Sit to Supine: Independent  Transfers  Sit to Stand: Independent  Stand to sit: Independent       Ambulation  Ambulation?: Yes  Ambulation 1  Surface: level tile  Device: No Device  Assistance: Independent  Gait Deviations: None                            Assessment   Conditions Requiring Skilled Therapeutic Intervention  Body structures, Functions, Activity limitations: Decreased functional mobility ; Increased pain;Decreased strength;Decreased endurance  Prognosis: Good  Decision Making: Low Complexity  REQUIRES PT FOLLOW UP: Yes         Plan   Plan  Times per week: 2-3  Plan weeks: 4-5  Current Treatment Recommendations: Strengthening, Home Exercise Program, ROM, Endurance Training, Patient/Caregiver Education & Training, Functional Mobility Training, Modalities    G-Code       OutComes Score                                                  AM-PAC Score             Goals          Therapy Time   Individual Concurrent Group Co-treatment   Time In 1500         Time Out 1535         Minutes 35         Timed Code Treatment Minutes: 27 Minutes       Florentino Turk, PT

## 2020-11-04 ENCOUNTER — HOSPITAL ENCOUNTER (OUTPATIENT)
Dept: PHYSICAL THERAPY | Age: 50
Setting detail: THERAPIES SERIES
Discharge: HOME | End: 2020-11-04
Payer: COMMERCIAL

## 2020-11-04 PROCEDURE — 97110 THERAPEUTIC EXERCISES: CPT

## 2020-11-04 PROCEDURE — G0283 ELEC STIM OTHER THAN WOUND: HCPCS

## 2020-11-04 NOTE — PROGRESS NOTES
[facility]  Phone: [phone] Fax: [phone]       Physical Therapy Daily Treatment Note  Date:  2020    Patient Name:  Feliz Fernandes    :  1970  MRN: [id number]    Restrictions/Precautions:    Diagnosis:  Right Knee Injury   Treatment Diagnosis:    Insurance/Certification information:  [employer] 12 session thru 2020  Referring Physician:  Dr Katy Palmer of care signed (Y/N):    Visit# / total visits:  /  Pain level: /10  Time In: 1525       Time Out:   1610       Subjective:      Exercises:  Exercise/Equipment Resistance/Repetitions Other comments     Bike 10 min              Standing Calf str 10 reps       Hip flex/HS Str at step  Right LE 10 reps                                                                                                                   Other Therapeutic Activities:      Home Exercise Program:      Manual Treatments:      Modalities:  Interferential Estim Right lateral knee 20 min with ice    Timed Code Treatment Minutes:  30    Total Treatment Minutes:  40    Treatment/Activity Tolerance:  [x] Patient tolerated treatment well [] Patient limited by fatigue  [] Patient limited by pain  [] Patient limited by other medical complications  [] Other:     Plan:   [x] Continue per plan of care [] Alter current plan (see comments)  [] Plan of care initiated [] Hold pending MD visit [] Discharge  Plan for Next Session:         Treatment Charges: Mins Units   Initial Evaluation     Re-Evaluation     Ther Exercise         TE 15 1   Manual Therapy     MT     Ther Activities        TA     Gait Training          GT     Neuro Re-education NR     Modalities 20 2   Non-Billable Service Time     Other     Total Time/Units 35 2     Electronically signed by:  Jo Patel, [address] [place] [address]

## 2020-11-09 ENCOUNTER — HOSPITAL ENCOUNTER (OUTPATIENT)
Dept: PHYSICAL THERAPY | Age: 50
Setting detail: THERAPIES SERIES
Discharge: HOME | End: 2020-11-09
Payer: COMMERCIAL

## 2020-11-09 PROCEDURE — G0283 ELEC STIM OTHER THAN WOUND: HCPCS

## 2020-11-09 PROCEDURE — 97110 THERAPEUTIC EXERCISES: CPT

## 2020-11-09 NOTE — PROGRESS NOTES
[facility]  Phone: [phone] Fax: [phone]       Physical Therapy Daily Treatment Note  Date:  2020    Patient Name:  Arthur Young    :  1970  MRN: [id number]    Restrictions/Precautions:    Diagnosis:  Right Knee Injury   Treatment Diagnosis:    Insurance/Certification information:  [employer] 12 session thru 2020  Referring Physician:  Dr Rita Antoine of care signed (Y/N):    Visit# / total visits:  3/12  Pain level: /10  Time In: 1525       Time Out:   1610       Subjective:  Client reports some modest soreness after initial exercise session and that he is sore after working today     Exercises:  Exercise/Equipment Resistance/Repetitions Other comments     Bike 10 min              Standing Calf str 10 reps       Hip flex/HS Str at step  Right LE 10 reps                                                                                                                   Other Therapeutic Activities:      Home Exercise Program:      Manual Treatments:      Modalities:  Interferential Estim Right lateral knee 20 min with ice    Timed Code Treatment Minutes:  30    Total Treatment Minutes:  40    Treatment/Activity Tolerance:  [x] Patient tolerated treatment well [] Patient limited by fatigue  [] Patient limited by pain  [] Patient limited by other medical complications  [] Other:     Plan:   [x] Continue per plan of care [] Alter current plan (see comments)  [] Plan of care initiated [] Hold pending MD visit [] Discharge  Plan for Next Session:         Treatment Charges: Mins Units   Initial Evaluation     Re-Evaluation     Ther Exercise         TE 15 1   Manual Therapy     MT     Ther Activities        TA     Gait Training          GT     Neuro Re-education NR     Modalities 20 2   Non-Billable Service Time     Other     Total Time/Units 35 2     Electronically signed by:  [employer], [address] [facility]

## 2020-11-11 ENCOUNTER — HOSPITAL ENCOUNTER (OUTPATIENT)
Dept: PHYSICAL THERAPY | Age: 50
Setting detail: THERAPIES SERIES
Discharge: HOME | End: 2020-11-11
Payer: COMMERCIAL

## 2020-11-11 PROCEDURE — G0283 ELEC STIM OTHER THAN WOUND: HCPCS

## 2020-11-11 PROCEDURE — 97110 THERAPEUTIC EXERCISES: CPT

## 2020-11-11 NOTE — PROGRESS NOTES
[facility]  Phone: [phone] Fax: [phone]       Physical Therapy Daily Treatment Note  Date:  2020    Patient Name:  Vincenzo Jalloh    :  1970  MRN: [id number]    Restrictions/Precautions:    Diagnosis:  Right Knee Injury   Treatment Diagnosis:    Insurance/Certification information:  [employer] 12 session thru 2020  Referring Physician:  Dr Nandini Gibbs of care signed (Y/N):    Visit# / total visits:    Pain level: /10  Time In: 1525       Time Out:   1610       Subjective:      Exercises:  Exercise/Equipment Resistance/Repetitions Other comments     Bike 10 min              Standing Calf str 10 reps       Hip flex/HS Str at step  Right LE 10 reps                                                                                                                   Other Therapeutic Activities:      Home Exercise Program:      Manual Treatments:      Modalities:  Interferential Estim Right lateral knee 20 min with ice    Timed Code Treatment Minutes:  30    Total Treatment Minutes:  40    Treatment/Activity Tolerance:  [x] Patient tolerated treatment well [] Patient limited by fatigue  [] Patient limited by pain  [] Patient limited by other medical complications  [] Other:     Plan:   [x] Continue per plan of care [] Alter current plan (see comments)  [] Plan of care initiated [] Hold pending MD visit [] Discharge  Plan for Next Session:         Treatment Charges: Mins Units   Initial Evaluation     Re-Evaluation     Ther Exercise         TE 15 1   Manual Therapy     MT     Ther Activities        TA     Gait Training          GT     Neuro Re-education NR     Modalities 20 2   Non-Billable Service Time     Other     Total Time/Units 35 2     Electronically signed by:  Kiran Winn, [address] [facility]

## 2020-11-12 NOTE — FLOWSHEET NOTE
[facility]  Phone: [phone] Fax: [phone]     Industrial Rehabilitation Initial Evaluation      Client Name:Andrew Enciso Number:NA  Evaluation date:11/02/2020                                     Job Title : Oiler/Jeffrey   Diagnosis:Right Knee Injury S83.401A                         Normal Work Hrs:  40/wk  Referring Physician: Dr Winsome Toney                                     Present work status:   ________  First date of Lost time:10/15/2020                          * Not working  ___  Date of Injury: 10/15/2020                                       Georga Bright Duty    X  Employer:[employer]                                     * Reg.  Duty     ____    Authorized Services:   __X_ Physical Therapy Exercises  ___ Work Conditioning  ___ Aquatics  X___ Manual therapy  __X_ Electrical Stimulation, Traction, Ultrasound  _X__ Education/Body mechanics  ___ Ergonomic Assessment/FCE    Authorized Visits: ________  Styles ___12____Visits  By 11/30/2020   From ___11/02/2020__ to_11/30/2020    Vocational Status:  Employer: [employer]  Job Title: Oiler/Jeffrey    Rehabilitation Problems/Impairments:  [x]Pain  3-4/10 Medial aspect Right knee  []Decreased ROM:________________________________________  [x]Joint Hypomobility:_Right knee   []Joint Hypermobility:______________________________________  []Muscle Spasms:_________________________________________  []Gait: __________________________________________________  [x]Decreased strength:Right Quad/HS  []Unsafe body mechanics related to work/home activities  [x]Subjective reports of pain limiting work/home activities  []Inability to control onset symptoms  []Load handling strength levels below employable levels  []Work endurances less than required for employment levels  []Other:__________________________      Short Term Rehabilitation Goals:                    [x] Decrease pain _310:or less                     [] Increase Physician: ____________________  Therapist: Ricardo Mason, [address]  : ____________________

## 2022-04-04 ENCOUNTER — APPOINTMENT (OUTPATIENT)
Dept: GENERAL RADIOLOGY | Age: 52
End: 2022-04-04
Payer: COMMERCIAL

## 2022-04-04 ENCOUNTER — HOSPITAL ENCOUNTER (EMERGENCY)
Age: 52
Discharge: HOME | End: 2022-04-04
Attending: EMERGENCY MEDICINE
Payer: COMMERCIAL

## 2022-04-04 VITALS
RESPIRATION RATE: 18 BRPM | OXYGEN SATURATION: 96 % | TEMPERATURE: 97.3 F | DIASTOLIC BLOOD PRESSURE: 70 MMHG | SYSTOLIC BLOOD PRESSURE: 120 MMHG | BODY MASS INDEX: 25.68 KG/M2 | WEIGHT: 211 LBS | HEART RATE: 60 BPM

## 2022-04-04 DIAGNOSIS — R07.9 CHEST PAIN, UNSPECIFIED TYPE: Primary | ICD-10-CM

## 2022-04-04 LAB
ALBUMIN SERPL-MCNC: 4.2 G/DL (ref 3.5–5.2)
ALP BLD-CCNC: 80 U/L (ref 40–129)
ALT SERPL-CCNC: 21 U/L (ref 0–40)
ANION GAP SERPL CALCULATED.3IONS-SCNC: 10 MMOL/L (ref 7–16)
AST SERPL-CCNC: 19 U/L (ref 0–39)
BASOPHILS ABSOLUTE: 0.05 E9/L (ref 0–0.2)
BASOPHILS RELATIVE PERCENT: 0.7 % (ref 0–2)
BILIRUB SERPL-MCNC: 0.5 MG/DL (ref 0–1.2)
BUN BLDV-MCNC: 10 MG/DL (ref 6–20)
CALCIUM SERPL-MCNC: 9 MG/DL (ref 8.6–10.2)
CHLORIDE BLD-SCNC: 104 MMOL/L (ref 98–107)
CO2: 24 MMOL/L (ref 22–29)
CREAT SERPL-MCNC: 1.1 MG/DL (ref 0.7–1.2)
D DIMER: <200 NG/ML DDU
EOSINOPHILS ABSOLUTE: 0.13 E9/L (ref 0.05–0.5)
EOSINOPHILS RELATIVE PERCENT: 1.8 % (ref 0–6)
GFR AFRICAN AMERICAN: >60
GFR NON-AFRICAN AMERICAN: >60 ML/MIN/1.73
GLUCOSE BLD-MCNC: 113 MG/DL (ref 74–99)
HCT VFR BLD CALC: 42.7 % (ref 37–54)
HEMOGLOBIN: 14.5 G/DL (ref 12.5–16.5)
IMMATURE GRANULOCYTES #: 0.03 E9/L
IMMATURE GRANULOCYTES %: 0.4 % (ref 0–5)
LYMPHOCYTES ABSOLUTE: 1.11 E9/L (ref 1.5–4)
LYMPHOCYTES RELATIVE PERCENT: 15.3 % (ref 20–42)
MCH RBC QN AUTO: 30.9 PG (ref 26–35)
MCHC RBC AUTO-ENTMCNC: 34 % (ref 32–34.5)
MCV RBC AUTO: 90.9 FL (ref 80–99.9)
MONOCYTES ABSOLUTE: 0.5 E9/L (ref 0.1–0.95)
MONOCYTES RELATIVE PERCENT: 6.9 % (ref 2–12)
NEUTROPHILS ABSOLUTE: 5.45 E9/L (ref 1.8–7.3)
NEUTROPHILS RELATIVE PERCENT: 74.9 % (ref 43–80)
PDW BLD-RTO: 12.8 FL (ref 11.5–15)
PLATELET # BLD: 217 E9/L (ref 130–450)
PMV BLD AUTO: 10.4 FL (ref 7–12)
POTASSIUM SERPL-SCNC: 3.5 MMOL/L (ref 3.5–5)
PRO-BNP: 32 PG/ML (ref 0–125)
RBC # BLD: 4.7 E12/L (ref 3.8–5.8)
SARS-COV-2, NAAT: NOT DETECTED
SODIUM BLD-SCNC: 138 MMOL/L (ref 132–146)
TOTAL PROTEIN: 6.4 G/DL (ref 6.4–8.3)
TROPONIN, HIGH SENSITIVITY: 8 NG/L (ref 0–11)
TROPONIN, HIGH SENSITIVITY: 8 NG/L (ref 0–11)
WBC # BLD: 7.3 E9/L (ref 4.5–11.5)

## 2022-04-04 PROCEDURE — 71045 X-RAY EXAM CHEST 1 VIEW: CPT

## 2022-04-04 PROCEDURE — 85025 COMPLETE CBC W/AUTO DIFF WBC: CPT

## 2022-04-04 PROCEDURE — 84484 ASSAY OF TROPONIN QUANT: CPT

## 2022-04-04 PROCEDURE — 87635 SARS-COV-2 COVID-19 AMP PRB: CPT

## 2022-04-04 PROCEDURE — 80053 COMPREHEN METABOLIC PANEL: CPT

## 2022-04-04 PROCEDURE — 85378 FIBRIN DEGRADE SEMIQUANT: CPT

## 2022-04-04 PROCEDURE — 83880 ASSAY OF NATRIURETIC PEPTIDE: CPT

## 2022-04-04 PROCEDURE — 99285 EMERGENCY DEPT VISIT HI MDM: CPT

## 2022-04-04 PROCEDURE — 93005 ELECTROCARDIOGRAM TRACING: CPT | Performed by: EMERGENCY MEDICINE

## 2022-04-04 PROCEDURE — 6370000000 HC RX 637 (ALT 250 FOR IP): Performed by: EMERGENCY MEDICINE

## 2022-04-04 RX ORDER — IBUPROFEN 200 MG
200 TABLET ORAL EVERY 6 HOURS PRN
COMMUNITY

## 2022-04-04 RX ORDER — CALCIUM CARBONATE 200(500)MG
1500 TABLET,CHEWABLE ORAL
COMMUNITY

## 2022-04-04 RX ORDER — ASPIRIN 81 MG/1
324 TABLET, CHEWABLE ORAL ONCE
Status: COMPLETED | OUTPATIENT
Start: 2022-04-04 | End: 2022-04-04

## 2022-04-04 RX ADMIN — ASPIRIN 324 MG: 81 TABLET, CHEWABLE ORAL at 09:16

## 2022-04-04 NOTE — ED PROVIDER NOTES
Department of Emergency Medicine   ED  Provider Note  Admit Date/RoomTime: 4/4/2022  7:52 AM  ED Room: 26/26          History of Present Illness:  4/4/22, Time: 8:03 AM EDT  Chief Complaint   Patient presents with    Chest Pain     see assessment as documented by this rn                 Salena Spear is a 54 y.o. male presenting to the ED for chest pain and sob, beginning 45 min PTA. The complaint has been persistent, moderate in severity, and worsened by nothing. Patient presents with chest pain shortness of breath. Works night shift, got home from work and approximate 45 minutes ago had pain between her shoulder blades midsternal chest pain as well. States it did not radiate anywhere besides his back. Denies nausea vomiting diaphoresis. He is a cigarette smoker but denies drug or alcohol use. He is unsure of family history as he is adopted. Not had pain like this before. Denies any recent traumas falls or injuries,    Review of Systems:   Pertinent positives and negatives are stated within HPI, all other systems reviewed and are negative.        --------------------------------------------- PAST HISTORY ---------------------------------------------  Past Medical History:  has a past medical history of Acid reflux. Past Surgical History:  has a past surgical history that includes Appendectomy; Cholecystectomy; and Elbow surgery. Social History:  reports that he has quit smoking. He has never used smokeless tobacco. He reports current alcohol use. He reports that he does not use drugs. Family History: family history is not on file. . Unless otherwise noted, family history is non contributory    The patients home medications have been reviewed. Allergies: Patient has no known allergies.         ---------------------------------------------------PHYSICAL EXAM--------------------------------------    Constitutional/General: Alert and oriented x3  Head: Normocephalic and atraumatic  Eyes: PERRL, EOMI, sclera non icteric  Mouth: Oropharynx clear, handling secretions, no trismus, no asymmetry of the posterior oropharynx or uvular edema  Neck: Supple, full ROM, no stridor, no meningeal signs  Respiratory: Lungs clear to auscultation bilaterally,Not in respiratory distress  Cardiovascular:  Regular rate. Regular rhythm. 2+ distal pulses. Equal extremity pulses. Chest: No chest wall tenderness  GI:  Abdomen Soft, Non tender, Non distended. No rebound, guarding, or rigidity. Musculoskeletal: Moves all extremities x 4. Warm and well perfused, no clubbing, cyanosis, or edema. Capillary refill <3 seconds  Integument: skin warm and dry. No rashes. Neurologic: GCS 15, no focal deficits, symmetric strength 5/5 in the upper and lower extremities bilaterally  Psychiatric: Normal Affect      EKG: Interpreted by emergency department physician, Dr. Gardenia Vega   This EKG is signed and interpreted by me. Rate: 67  Rhythm: Sinus  Interpretation: Sinus rhythm, normal axis, OH is 180, QRS is 94, QTc is 412, no other acute findings no prior for comparison  Comparison: no previous EKG available      -------------------------------------------------- RESULTS -------------------------------------------------  I have personally reviewed all laboratory and imaging results for this patient. Results are listed below.      LABS: (Lab results interpreted by me)  Results for orders placed or performed during the hospital encounter of 04/04/22   COVID-19, Rapid    Specimen: Nares   Result Value Ref Range    SARS-CoV-2, NAAT Not Detected Not Detected   Troponin   Result Value Ref Range    Troponin, High Sensitivity 8 0 - 11 ng/L   CBC with Auto Differential   Result Value Ref Range    WBC 7.3 4.5 - 11.5 E9/L    RBC 4.70 3.80 - 5.80 E12/L    Hemoglobin 14.5 12.5 - 16.5 g/dL    Hematocrit 42.7 37.0 - 54.0 %    MCV 90.9 80.0 - 99.9 fL    MCH 30.9 26.0 - 35.0 pg    MCHC 34.0 32.0 - 34.5 %    RDW 12.8 11.5 - 15.0 fL    Platelets 217 130 - 450 E9/L    MPV 10.4 7.0 - 12.0 fL    Neutrophils % 74.9 43.0 - 80.0 %    Immature Granulocytes % 0.4 0.0 - 5.0 %    Lymphocytes % 15.3 (L) 20.0 - 42.0 %    Monocytes % 6.9 2.0 - 12.0 %    Eosinophils % 1.8 0.0 - 6.0 %    Basophils % 0.7 0.0 - 2.0 %    Neutrophils Absolute 5.45 1.80 - 7.30 E9/L    Immature Granulocytes # 0.03 E9/L    Lymphocytes Absolute 1.11 (L) 1.50 - 4.00 E9/L    Monocytes Absolute 0.50 0.10 - 0.95 E9/L    Eosinophils Absolute 0.13 0.05 - 0.50 E9/L    Basophils Absolute 0.05 0.00 - 0.20 E9/L   Comprehensive Metabolic Panel   Result Value Ref Range    Sodium 138 132 - 146 mmol/L    Potassium 3.5 3.5 - 5.0 mmol/L    Chloride 104 98 - 107 mmol/L    CO2 24 22 - 29 mmol/L    Anion Gap 10 7 - 16 mmol/L    Glucose 113 (H) 74 - 99 mg/dL    BUN 10 6 - 20 mg/dL    CREATININE 1.1 0.7 - 1.2 mg/dL    GFR Non-African American >60 >=60 mL/min/1.73    GFR African American >60     Calcium 9.0 8.6 - 10.2 mg/dL    Total Protein 6.4 6.4 - 8.3 g/dL    Albumin 4.2 3.5 - 5.2 g/dL    Total Bilirubin 0.5 0.0 - 1.2 mg/dL    Alkaline Phosphatase 80 40 - 129 U/L    ALT 21 0 - 40 U/L    AST 19 0 - 39 U/L   Brain Natriuretic Peptide   Result Value Ref Range    Pro-BNP 32 0 - 125 pg/mL   D-Dimer, Quantitative   Result Value Ref Range    D-Dimer, Quant <200 ng/mL DDU   Troponin   Result Value Ref Range    Troponin, High Sensitivity 8 0 - 11 ng/L   EKG 12 Lead   Result Value Ref Range    Ventricular Rate 67 BPM    Atrial Rate 67 BPM    P-R Interval 180 ms    QRS Duration 94 ms    Q-T Interval 390 ms    QTc Calculation (Bazett) 412 ms    P Axis 68 degrees    R Axis 73 degrees    T Axis 56 degrees   ,       RADIOLOGY:  Interpreted by Radiologist unless otherwise specified  XR CHEST PORTABLE   Final Result   No acute cardiopulmonary process.                           ------------------------- NURSING NOTES AND VITALS REVIEWED ---------------------------   The nursing notes within the ED encounter and vital signs as below have been reviewed by myself  /60   Pulse 61   Temp 97.3 °F (36.3 °C)   Resp 18   Wt 211 lb (95.7 kg)   SpO2 96%   BMI 25.68 kg/m²     Oxygen Saturation Interpretation: Normal    The cardiac monitor revealed NSR with a heart rate in the 70s as interpreted by me. The cardiac monitor was ordered secondary to the patient's heart rate and to monitor the patient for dysrhythmia. CPT 59037    The patients available past medical records and past encounters were reviewed. ------------------------------ ED COURSE/MEDICAL DECISION MAKING----------------------  Medications   aspirin chewable tablet 324 mg (324 mg Oral Given 4/4/22 0916)                    Medical Decision Making:     I, Dr. Bhanu Gresham am the primary provider of record    Chest Pain, resolved upon arrival here, work-up undertaken without acute process identified, he was held for delta troponin remains unchanged. Spoke about the importance of pot follow-up with primary care, as he has an unknown family history was referred to cardiology may need outpatient stress test.  Will discharge home with outpatient follow-up and return for worsening signs or symptoms. Re-Evaluations:          Re-evaluation. Patients symptoms are improving  Repeat physical examination is improved        This patient's ED course included: a personal history and physicial examination, re-evaluation prior to disposition, IV medications, cardiac monitoring, continuous pulse oximetry and complex medical decision making and emergency management    This patient has remained hemodynamically stable during their ED course. Counseling: The emergency provider has spoken with the patient and discussed todays results, in addition to providing specific details for the plan of care and counseling regarding the diagnosis and prognosis.   Questions are answered at this time and they are agreeable with the plan.       --------------------------------- IMPRESSION AND DISPOSITION ---------------------------------    IMPRESSION  1. Chest pain, unspecified type        DISPOSITION  Disposition: Discharge to home  Patient condition is stable        NOTE: This report was transcribed using voice recognition software.  Every effort was made to ensure accuracy; however, inadvertent computerized transcription errors may be present       Munira Chester DO  04/04/22 1131

## 2022-04-06 LAB
EKG ATRIAL RATE: 67 BPM
EKG P AXIS: 68 DEGREES
EKG P-R INTERVAL: 180 MS
EKG Q-T INTERVAL: 390 MS
EKG QRS DURATION: 94 MS
EKG QTC CALCULATION (BAZETT): 412 MS
EKG R AXIS: 73 DEGREES
EKG T AXIS: 56 DEGREES
EKG VENTRICULAR RATE: 67 BPM

## 2022-04-06 PROCEDURE — 93010 ELECTROCARDIOGRAM REPORT: CPT | Performed by: INTERNAL MEDICINE

## 2022-04-08 ENCOUNTER — OFFICE VISIT (OUTPATIENT)
Dept: FAMILY MEDICINE CLINIC | Age: 52
End: 2022-04-08
Payer: COMMERCIAL

## 2022-04-08 VITALS
HEART RATE: 54 BPM | WEIGHT: 233 LBS | TEMPERATURE: 97 F | OXYGEN SATURATION: 97 % | HEIGHT: 76 IN | SYSTOLIC BLOOD PRESSURE: 130 MMHG | BODY MASS INDEX: 28.37 KG/M2 | DIASTOLIC BLOOD PRESSURE: 80 MMHG

## 2022-04-08 DIAGNOSIS — S69.92XA INJURY OF LEFT WRIST, INITIAL ENCOUNTER: Primary | ICD-10-CM

## 2022-04-08 PROCEDURE — 99203 OFFICE O/P NEW LOW 30 MIN: CPT | Performed by: PHYSICIAN ASSISTANT

## 2022-04-08 NOTE — PROGRESS NOTES
22  Debi Martinez : 1970 Sex: male  Age 54 y.o. Subjective:  Chief Complaint   Patient presents with    Fall     Started few days ago.  Wrist Pain     Left wrist. Painful. Ibuprofen little relief         HPI:   Debi Martinez , 54 y.o. male presents to [facility] care for evaluation of left wrist pain    HPI  22-year-old male presents to [facility] for evaluation of left wrist pain. The patient started with this left wrist pain couple of days ago after falling. The patient fell on outstretched left hand. The patient is noted to the medial aspect of his right elbow but really not having much pain to this area. The patient is mostly having pain to the ulnar aspect of the left elbow. He is right-hand dominant. He did not hit his head. This was a mechanical fall. ROS:   Unless otherwise stated in this report the patient's positive and negative responses for review of systems for constitutional, eyes, ENT, cardiovascular, respiratory, gastrointestinal, neurological, , musculoskeletal, and integument systems and related systems to the presenting problem are either stated in the history of present illness or were not pertinent or were negative for the symptoms and/or complaints related to the presenting medical problem. Positives and pertinent negatives as per HPI. All others reviewed and are negative. PMH:     Past Medical History:   Diagnosis Date    Acid reflux        Past Surgical History:   Procedure Laterality Date    APPENDECTOMY      CHOLECYSTECTOMY      ELBOW SURGERY         History reviewed. No pertinent family history.     Medications:     Current Outpatient Medications:     ibuprofen (ADVIL;MOTRIN) 200 MG tablet, Take 200 mg by mouth every 6 hours as needed for Pain, Disp: , Rfl:     Omeprazole 20 MG TBDD, Take 20 mg by mouth daily, Disp: , Rfl:     calcium carbonate (TUMS) 500 MG chewable tablet, Take 1,500 mg by mouth every 4-6 hours as needed for Heartburn, Disp: , Rfl:     acetaminophen (TYLENOL) 500 MG tablet, Take 2 tablets by mouth every 8 hours as needed for Pain, Disp: 50 tablet, Rfl: 0    Allergies:   No Known Allergies    Social History:     Social History     Tobacco Use    Smoking status: Former Smoker    Smokeless tobacco: Never Used    Tobacco comment: quit smoking cigarettes in 2017   Substance Use Topics    Alcohol use: Yes    Drug use: No       Patient lives at home. Physical Exam:     Vitals:    04/08/22 1517   BP: 130/80   Pulse: 54   Temp: 97 °F (36.1 °C)   SpO2: 97%   Weight: 233 lb (105.7 kg)   Height: 6' 4\" (1.93 m)       Exam:  Physical Exam  Vital signs reviewed and nurse's notes. The patient is not hypoxic. General: Alert, no acute distress, patient resting comfortably   Skin: warm, intact, no pallor noted   Head: Normocephalic, atraumatic   Eye: Normal conjunctiva   Respiratory: No acute distress   Musculoskeletal: No obvious deformity noted to the left wrist or to the left upper extremity. The patient has diffuse tenderness noted to the ulnar aspect of the wrist.  The patient does have pain with pronation and supination. The patient had no pain noted to the left elbow or the left shoulder. Pulses intact at radius 2+. Normal equal  strength. The patient had no significant swelling to the hand. No ecchymosis noted. Neurological: alert and orient x4, normal sensory and motor observed. Psychiatric: Cooperative      Testing:           Medical Decision Making:     Vital signs reviewed    Past medical history reviewed. Allergies reviewed. Medications reviewed. Patient on arrival does not appear to be in any apparent distress or discomfort. The patient has been seen and evaluated. The patient does not appear to be toxic or lethargic. The patient did not want evaluation of the right elbow the left wrist was the only thing that seemed to bother him.   The patient was sent for an x-ray of the left wrist.  X-rays were reviewed and did not see any evidence of acute fracture, dislocation. The patient was placed in a Velcro wrist point. The patient did not want any further medications for home. The patient is neurovascular intact. The patient is to ice. If not improving would recommend further imaging for occult fracture. The patient is to return to express care or go directly to the emergency department should any of the signs or symptoms worsen. The patient is to followup with primary care physician in 2-3 days for repeat evaluation. The patient has no other questions or concerns at this time the patient will be discharged home. Clinical Impression:   Jaden Hester was seen today for fall and wrist pain. Diagnoses and all orders for this visit:    Injury of left wrist, initial encounter  -     XR WRIST LEFT (MIN 3 VIEWS); Future        The patient is to call for any concerns or return if any of the signs or symptoms worsen. The patient is to follow-up with PCP in the next 2-3 days for repeat evaluation repeat assessment or go directly to the emergency department.      SIGNATURE: Jeremías Chacon III, PA-C

## 2022-05-16 ENCOUNTER — HOSPITAL ENCOUNTER (OUTPATIENT)
Dept: NEUROLOGY | Age: 52
Discharge: HOME | End: 2022-05-16
Payer: COMMERCIAL

## 2022-05-16 VITALS — HEIGHT: 76 IN | BODY MASS INDEX: 28.01 KG/M2 | WEIGHT: 230 LBS

## 2022-05-16 PROCEDURE — 95911 NRV CNDJ TEST 9-10 STUDIES: CPT

## 2022-05-16 PROCEDURE — 95886 MUSC TEST DONE W/N TEST COMP: CPT | Performed by: PSYCHIATRY & NEUROLOGY

## 2022-05-16 PROCEDURE — 95909 NRV CNDJ TST 5-6 STUDIES: CPT | Performed by: PSYCHIATRY & NEUROLOGY

## 2022-05-16 PROCEDURE — 95886 MUSC TEST DONE W/N TEST COMP: CPT

## 2022-05-16 NOTE — PROCEDURES
[facility]   Electrodiagnostic Laboratory  Jann        Full Name: Riya Beavers Gender: Male  MRN: [id number] YOB: 1970  Location[de-identified] Outpt. Visit Date: 5/16/2022 10:51  Age: 54 Years 6 Months Old  Examining Physician: Dr. Devin Winn  Referring Physician: Dr. Elmer Mccormick  Technician: Eleno Mcgarry   Height: 6 feet 4 inch  Weight: 230 lbs  BMI: 28  Notes: Paresthesia Left upper limb R20.2        Motor NCS      Nerve / Sites Lat. Amplitude Distance Lat Diff Velocity Temp. Amp. 1-2    ms mV cm ms m/s °C %   L Median - APB      Wrist 3.65 11.8 8   32 100      Elbow 7.81 10.8 21 4.17 50 32 91.7   L Ulnar - ADM      Wrist 2.76 8.2 8   32.2 100      B. Elbow 6.56 7.7 21 3.80 55 32.2 94.4      A. Elbow 8.18 7.5 10 1.61 62 32.2 91.9       Sensory NCS      Nerve / Sites Onset Lat Peak Lat PP Amp Distance Velocity Temp.    ms ms µV cm m/s °C   L Median - Digit II (Antidromic)      Mid Palm 1.30 1.88 62.1 7 54 32      Wrist 2.71 3.39 47.3 14 52 32   L Ulnar - Digit V (Antidromic)      Wrist 2.60 3.28 26.8 14 54 32   L Radial - Anatomical snuff box (Forearm)      Forearm 1.67 2.29 26.2 10 60 32.2       Combined Sensory Index      Nerve / Sites Rec. Site Peak Lat NP Amp PP Amp Segments Peak Diff Temp. ms µV µV  ms °C   L Median - CSI      Median Thumb 3.28 11.2 18.6 Median - Radial 0.94 32      Radial Thumb 2.34 9.5 12.7 Median - Ulnar 0.36 32      Median Ring 3.49 16.4 26.3 Median palm - Ulnar palm 0.05 32.1      Ulnar Ring 3.13 7.2 11.3         Median palm Wrist 1.82 48.0 50.7         Ulnar palm Wrist 1.77 22.4 19.6         CSI     CSI 1.35          F  Wave      Nerve F Lat M Lat F-M Lat    ms ms ms   L Median - APB 29.2 4.0 25.3   L Ulnar - ADM 29.9 2.3 27.7         EMG         EMG Summary Table     Spontaneous MUAP Recruitment   Muscle IA Fib PSW Fasc H.F. Amp Dur. PPP Pattern   L. First dorsal interosseous N None None None None N N N N   L.  Abductor pollicis brevis N None None None None N N N N   L. Flexor pollicis longus N None None None None N N N N   L. Extensor indicis proprius N None None None None N N N N   L. Flexor digitorum profundus (Ulnar) N None None None None N N N N   L. Brachioradialis N None None None None N N N N   L. Biceps brachii N None None None None N N N N   L. Triceps brachii N None None None None N N N N   L. Deltoid N None None None None N N N N   L. Cervical paraspinals (low) N None None None None N N N N   L. Cervical paraspinals (mid) N None None None None N N N N         Nerve conduction studies in the left arm minimally increased CSI values in the median nerve. These findings were compared to the referential values in this laboratory, available upon request.    Monopolar needle examination of the left arm and paraspinals was unremarkable. Electrodiagnostic examination of the left arm disclosed evidence diagnostic of a left median neuropathy at/or distal to the wrist, of minimal severity. These findings were consistent with carpal tunnel syndrome    There were no other peripheral neuropathies. There were no motor radiculopathies or intracanalicular lesions. Sensory radiculopathies cannot be evaluated electrodiagnostic    Clinically, the patient recently fell, injuring his left wrist.  On brief neurological examination, there was tenderness about the left wrist, but no Tinel's sign or motor or sensory compromise. His difficulties are related to the soft tissue injury of the wrist.  His carpal tunnel syndrome was minimal, but may be contributing to contributing to his discomforts. Clinical correlation was highly advised.

## 2022-11-26 ENCOUNTER — HOSPITAL ENCOUNTER (EMERGENCY)
Age: 52
Discharge: HOME | End: 2022-11-26
Payer: COMMERCIAL

## 2022-11-26 ENCOUNTER — APPOINTMENT (OUTPATIENT)
Dept: CT IMAGING | Age: 52
End: 2022-11-26
Payer: COMMERCIAL

## 2022-11-26 VITALS
OXYGEN SATURATION: 100 % | WEIGHT: 225 LBS | DIASTOLIC BLOOD PRESSURE: 91 MMHG | HEART RATE: 58 BPM | TEMPERATURE: 97.4 F | RESPIRATION RATE: 16 BRPM | HEIGHT: 75 IN | BODY MASS INDEX: 27.98 KG/M2 | SYSTOLIC BLOOD PRESSURE: 152 MMHG

## 2022-11-26 DIAGNOSIS — J34.0 NASAL ABSCESS: ICD-10-CM

## 2022-11-26 DIAGNOSIS — J34.89 NASAL PAIN: Primary | ICD-10-CM

## 2022-11-26 PROCEDURE — 99284 EMERGENCY DEPT VISIT MOD MDM: CPT

## 2022-11-26 PROCEDURE — 70486 CT MAXILLOFACIAL W/O DYE: CPT

## 2022-11-26 RX ORDER — AMOXICILLIN AND CLAVULANATE POTASSIUM 875; 125 MG/1; MG/1
1 TABLET, FILM COATED ORAL 2 TIMES DAILY
Qty: 20 TABLET | Refills: 0 | Status: SHIPPED | OUTPATIENT
Start: 2022-11-26 | End: 2022-12-06
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
## 2022-11-27 NOTE — ED NOTES
Department of Emergency Medicine  FIRST PROVIDER TRIAGE NOTE             Independent MLP           11/26/22  8:30 PM EST    Date of Encounter: 11/26/22   MRN: [id number]      HPI: Dilma Rubio is a 50 y.o. male who presents to the ED for No chief complaint on file. Pt presenting with nose pain x 1 month     ROS: Negative for cp or sob. PE: Gen Appearance/Constitutional: alert  HEENT: NC/NT. PERRLA,  Airway patent. Initial Plan of Care: All treatment areas with department are currently occupied. Plan to order/Initiate the following while awaiting opening in ED: imaging studies.   Initiate Treatment-Testing, Proceed toTreatment Area When Bed Available for ED Attending/MLP to Continue Care    Electronically signed by Mark Fritz PA-C   DD: 11/26/22      Mark Fritz PA-C  11/26/22 2030

## 2023-01-13 ENCOUNTER — HOSPITAL ENCOUNTER (OUTPATIENT)
Dept: CT IMAGING | Age: 53
Discharge: HOME | End: 2023-01-13
Payer: COMMERCIAL

## 2023-01-13 DIAGNOSIS — J34.0 CARBUNCLE OF NOSE: ICD-10-CM

## 2023-01-13 DIAGNOSIS — R42 DIZZINESS AND GIDDINESS: ICD-10-CM

## 2023-01-13 PROCEDURE — 2580000003 HC RX 258: Performed by: RADIOLOGY

## 2023-01-13 PROCEDURE — 70470 CT HEAD/BRAIN W/O & W/DYE: CPT

## 2023-01-13 PROCEDURE — 6360000004 HC RX CONTRAST MEDICATION: Performed by: RADIOLOGY

## 2023-01-13 RX ORDER — SODIUM CHLORIDE 0.9 % (FLUSH) 0.9 %
10 SYRINGE (ML) INJECTION PRN
Status: DISCONTINUED | OUTPATIENT
Start: 2023-01-13 | End: 2023-01-16 | Stop reason: HOSPADM

## 2023-01-13 RX ADMIN — SODIUM CHLORIDE, PRESERVATIVE FREE 10 ML: 5 INJECTION INTRAVENOUS at 10:30

## 2023-01-13 RX ADMIN — IOPAMIDOL 50 ML: 755 INJECTION, SOLUTION INTRAVENOUS at 10:31

## 2023-02-20 ENCOUNTER — HOSPITAL ENCOUNTER (INPATIENT)
Age: 53
LOS: 4 days | Discharge: HOME | DRG: 155 | End: 2023-02-24
Attending: EMERGENCY MEDICINE | Admitting: INTERNAL MEDICINE
Payer: COMMERCIAL

## 2023-02-20 ENCOUNTER — APPOINTMENT (OUTPATIENT)
Dept: CT IMAGING | Age: 53
DRG: 155 | End: 2023-02-20
Payer: COMMERCIAL

## 2023-02-20 DIAGNOSIS — S00.31XA: Primary | ICD-10-CM

## 2023-02-20 DIAGNOSIS — L08.9: Primary | ICD-10-CM

## 2023-02-20 PROBLEM — S01.20XA: Status: ACTIVE | Noted: 2023-02-20

## 2023-02-20 LAB
ALBUMIN SERPL-MCNC: 4.4 G/DL (ref 3.5–5.2)
ALP BLD-CCNC: 99 U/L (ref 40–129)
ALT SERPL-CCNC: 14 U/L (ref 0–40)
ANION GAP SERPL CALCULATED.3IONS-SCNC: 10 MMOL/L (ref 7–16)
AST SERPL-CCNC: 13 U/L (ref 0–39)
BASOPHILS ABSOLUTE: 0.05 E9/L (ref 0–0.2)
BASOPHILS RELATIVE PERCENT: 0.8 % (ref 0–2)
BILIRUB SERPL-MCNC: 0.8 MG/DL (ref 0–1.2)
BUN BLDV-MCNC: 15 MG/DL (ref 6–20)
C-REACTIVE PROTEIN: <0.3 MG/DL (ref 0–0.4)
CALCIUM SERPL-MCNC: 9.8 MG/DL (ref 8.6–10.2)
CHLORIDE BLD-SCNC: 101 MMOL/L (ref 98–107)
CO2: 27 MMOL/L (ref 22–29)
CREAT SERPL-MCNC: 1.1 MG/DL (ref 0.7–1.2)
EOSINOPHILS ABSOLUTE: 0.19 E9/L (ref 0.05–0.5)
EOSINOPHILS RELATIVE PERCENT: 2.9 % (ref 0–6)
GFR SERPL CREATININE-BSD FRML MDRD: >60 ML/MIN/1.73
GLUCOSE BLD-MCNC: 82 MG/DL (ref 74–99)
HCT VFR BLD CALC: 48.1 % (ref 37–54)
HEMOGLOBIN: 16.2 G/DL (ref 12.5–16.5)
IMMATURE GRANULOCYTES #: 0.02 E9/L
IMMATURE GRANULOCYTES %: 0.3 % (ref 0–5)
LACTIC ACID: 0.8 MMOL/L (ref 0.5–2.2)
LYMPHOCYTES ABSOLUTE: 1.31 E9/L (ref 1.5–4)
LYMPHOCYTES RELATIVE PERCENT: 19.9 % (ref 20–42)
MCH RBC QN AUTO: 30.5 PG (ref 26–35)
MCHC RBC AUTO-ENTMCNC: 33.7 % (ref 32–34.5)
MCV RBC AUTO: 90.6 FL (ref 80–99.9)
MONOCYTES ABSOLUTE: 0.48 E9/L (ref 0.1–0.95)
MONOCYTES RELATIVE PERCENT: 7.3 % (ref 2–12)
NEUTROPHILS ABSOLUTE: 4.52 E9/L (ref 1.8–7.3)
NEUTROPHILS RELATIVE PERCENT: 68.8 % (ref 43–80)
PDW BLD-RTO: 12.9 FL (ref 11.5–15)
PLATELET # BLD: 227 E9/L (ref 130–450)
PMV BLD AUTO: 10.3 FL (ref 7–12)
POTASSIUM SERPL-SCNC: 4 MMOL/L (ref 3.5–5)
PROCALCITONIN: 0.04 NG/ML (ref 0–0.08)
RBC # BLD: 5.31 E12/L (ref 3.8–5.8)
SEDIMENTATION RATE, ERYTHROCYTE: 13 MM/HR (ref 0–15)
SODIUM BLD-SCNC: 138 MMOL/L (ref 132–146)
TOTAL PROTEIN: 7.7 G/DL (ref 6.4–8.3)
WBC # BLD: 6.6 E9/L (ref 4.5–11.5)

## 2023-02-20 PROCEDURE — 36415 COLL VENOUS BLD VENIPUNCTURE: CPT

## 2023-02-20 PROCEDURE — 87186 SC STD MICRODIL/AGAR DIL: CPT

## 2023-02-20 PROCEDURE — 85651 RBC SED RATE NONAUTOMATED: CPT

## 2023-02-20 PROCEDURE — 83605 ASSAY OF LACTIC ACID: CPT

## 2023-02-20 PROCEDURE — 2580000003 HC RX 258: Performed by: NURSE PRACTITIONER

## 2023-02-20 PROCEDURE — 80053 COMPREHEN METABOLIC PANEL: CPT

## 2023-02-20 PROCEDURE — 6360000002 HC RX W HCPCS: Performed by: EMERGENCY MEDICINE

## 2023-02-20 PROCEDURE — 87075 CULTR BACTERIA EXCEPT BLOOD: CPT

## 2023-02-20 PROCEDURE — 86140 C-REACTIVE PROTEIN: CPT

## 2023-02-20 PROCEDURE — 87205 SMEAR GRAM STAIN: CPT

## 2023-02-20 PROCEDURE — 87040 BLOOD CULTURE FOR BACTERIA: CPT

## 2023-02-20 PROCEDURE — 1200000000 HC SEMI PRIVATE

## 2023-02-20 PROCEDURE — 6370000000 HC RX 637 (ALT 250 FOR IP): Performed by: NURSE PRACTITIONER

## 2023-02-20 PROCEDURE — 84145 PROCALCITONIN (PCT): CPT

## 2023-02-20 PROCEDURE — 85025 COMPLETE CBC W/AUTO DIFF WBC: CPT

## 2023-02-20 PROCEDURE — 2580000003 HC RX 258: Performed by: EMERGENCY MEDICINE

## 2023-02-20 PROCEDURE — 70491 CT SOFT TISSUE NECK W/DYE: CPT

## 2023-02-20 PROCEDURE — 6370000000 HC RX 637 (ALT 250 FOR IP): Performed by: EMERGENCY MEDICINE

## 2023-02-20 PROCEDURE — 87070 CULTURE OTHR SPECIMN AEROBIC: CPT

## 2023-02-20 PROCEDURE — 87185 SC STD ENZYME DETCJ PER NZM: CPT

## 2023-02-20 PROCEDURE — 87077 CULTURE AEROBIC IDENTIFY: CPT

## 2023-02-20 PROCEDURE — 87102 FUNGUS ISOLATION CULTURE: CPT

## 2023-02-20 PROCEDURE — 99285 EMERGENCY DEPT VISIT HI MDM: CPT

## 2023-02-20 PROCEDURE — 099M7ZZ DRAINAGE OF NASAL SEPTUM, VIA NATURAL OR ARTIFICIAL OPENING: ICD-10-PCS | Performed by: RADIOLOGY

## 2023-02-20 PROCEDURE — 6360000004 HC RX CONTRAST MEDICATION: Performed by: RADIOLOGY

## 2023-02-20 PROCEDURE — 87076 CULTURE ANAEROBE IDENT EACH: CPT

## 2023-02-20 RX ORDER — ENOXAPARIN SODIUM 100 MG/ML
40 INJECTION SUBCUTANEOUS DAILY
Status: DISCONTINUED | OUTPATIENT
Start: 2023-02-20 | End: 2023-02-24 | Stop reason: HOSPADM

## 2023-02-20 RX ORDER — SODIUM CHLORIDE 9 MG/ML
INJECTION, SOLUTION INTRAVENOUS PRN
Status: DISCONTINUED | OUTPATIENT
Start: 2023-02-20 | End: 2023-02-24 | Stop reason: HOSPADM

## 2023-02-20 RX ORDER — SODIUM CHLORIDE 0.9 % (FLUSH) 0.9 %
5-40 SYRINGE (ML) INJECTION PRN
Status: DISCONTINUED | OUTPATIENT
Start: 2023-02-20 | End: 2023-02-24 | Stop reason: HOSPADM

## 2023-02-20 RX ORDER — ACETAMINOPHEN 650 MG/1
650 SUPPOSITORY RECTAL EVERY 6 HOURS PRN
Status: DISCONTINUED | OUTPATIENT
Start: 2023-02-20 | End: 2023-02-24 | Stop reason: HOSPADM

## 2023-02-20 RX ORDER — HYDROCODONE BITARTRATE AND ACETAMINOPHEN 5; 325 MG/1; MG/1
1 TABLET ORAL EVERY 6 HOURS PRN
Status: DISCONTINUED | OUTPATIENT
Start: 2023-02-20 | End: 2023-02-24 | Stop reason: HOSPADM

## 2023-02-20 RX ORDER — ACETAMINOPHEN 325 MG/1
650 TABLET ORAL EVERY 6 HOURS PRN
Status: DISCONTINUED | OUTPATIENT
Start: 2023-02-20 | End: 2023-02-24 | Stop reason: HOSPADM

## 2023-02-20 RX ORDER — HYDROCODONE BITARTRATE AND ACETAMINOPHEN 5; 325 MG/1; MG/1
1 TABLET ORAL ONCE
Status: COMPLETED | OUTPATIENT
Start: 2023-02-20 | End: 2023-02-20

## 2023-02-20 RX ORDER — POLYETHYLENE GLYCOL 3350 17 G/17G
17 POWDER, FOR SOLUTION ORAL DAILY PRN
Status: DISCONTINUED | OUTPATIENT
Start: 2023-02-20 | End: 2023-02-24 | Stop reason: HOSPADM

## 2023-02-20 RX ORDER — SODIUM CHLORIDE 0.9 % (FLUSH) 0.9 %
5-40 SYRINGE (ML) INJECTION EVERY 12 HOURS SCHEDULED
Status: DISCONTINUED | OUTPATIENT
Start: 2023-02-20 | End: 2023-02-24 | Stop reason: HOSPADM

## 2023-02-20 RX ADMIN — Medication 10 ML: at 21:07

## 2023-02-20 RX ADMIN — HYDROCODONE BITARTRATE AND ACETAMINOPHEN 1 TABLET: 5; 325 TABLET ORAL at 21:06

## 2023-02-20 RX ADMIN — HYDROCODONE BITARTRATE AND ACETAMINOPHEN 1 TABLET: 5; 325 TABLET ORAL at 17:01

## 2023-02-20 RX ADMIN — VANCOMYCIN HYDROCHLORIDE 2000 MG: 10 INJECTION, POWDER, LYOPHILIZED, FOR SOLUTION INTRAVENOUS at 19:29

## 2023-02-20 RX ADMIN — IOPAMIDOL 75 ML: 755 INJECTION, SOLUTION INTRAVENOUS at 14:59

## 2023-02-20 ASSESSMENT — PAIN DESCRIPTION - DESCRIPTORS
DESCRIPTORS: STABBING
DESCRIPTORS: SHARP;SHOOTING
DESCRIPTORS: STABBING

## 2023-02-20 ASSESSMENT — PAIN SCALES - GENERAL
PAINLEVEL_OUTOF10: 10
PAINLEVEL_OUTOF10: 7
PAINLEVEL_OUTOF10: 9

## 2023-02-20 ASSESSMENT — ENCOUNTER SYMPTOMS
SINUS PAIN: 0
SINUS PRESSURE: 0
CHOKING: 0
WHEEZING: 0
RHINORRHEA: 0
GASTROINTESTINAL NEGATIVE: 1
COUGH: 0
STRIDOR: 0
TROUBLE SWALLOWING: 0
COLOR CHANGE: 0
VOICE CHANGE: 0
SORE THROAT: 0

## 2023-02-20 ASSESSMENT — PAIN DESCRIPTION - ORIENTATION
ORIENTATION: LEFT

## 2023-02-20 ASSESSMENT — PAIN DESCRIPTION - LOCATION
LOCATION: NOSE

## 2023-02-20 ASSESSMENT — PAIN DESCRIPTION - PAIN TYPE: TYPE: ACUTE PAIN

## 2023-02-20 ASSESSMENT — PAIN - FUNCTIONAL ASSESSMENT
PAIN_FUNCTIONAL_ASSESSMENT: 0-10
PAIN_FUNCTIONAL_ASSESSMENT: 0-10
PAIN_FUNCTIONAL_ASSESSMENT: ACTIVITIES ARE NOT PREVENTED

## 2023-02-20 ASSESSMENT — PAIN DESCRIPTION - ONSET: ONSET: ON-GOING

## 2023-02-20 ASSESSMENT — PAIN DESCRIPTION - FREQUENCY: FREQUENCY: CONTINUOUS

## 2023-02-20 NOTE — PROGRESS NOTES
Anticipated admission: Database initiated pharmacy and medications verified with the patient. He is A&O independent from home with wife. States he uses no assistive devices and is RA at baseline.

## 2023-02-20 NOTE — ED NOTES
Went to see Dr. Gavin Rebolledo today and he sent this patient to the ED.      Estiven Santos RN  02/20/23 9204

## 2023-02-20 NOTE — LETTER
[facility]  [facility] [address] MED SURG  [address] [facility] [address]  Dept: [phone]  Loc: [phone]    Edwina wiley [place] [address]  2/24/2023    To whom it may concern:    Cameron Nation was admitted to [facility] on 2/20/2023 and discharged on 2/24/2023. He has a peripherally inserted central catheter and is not able to lift more than ten pounds. He will follow up with his doctor in one week and at that time will get instructions as to when he will be able to return to work.

## 2023-02-20 NOTE — CONSULTS
OTOLARYNGOLOGY  CONSULT NOTE  2/20/2023    Physician Consulted: Dr. Rishabh Arellano  Reason for Consult: Nasal lesion  Referring Physician: Dr. Gabby Chavez is a 50 y.o. male who ENT was consulted for evaluation of nasal lesion. Pt reports lesion for the last almost 6 months. Associated with pain and nasal drainage. Has been on 4 rounds of oral antibiotics without relief. Saw Dr. Marilynn Doyle with ENT who recommended bactroban ointment as well as oral abx. Lesion persisted, saw infectious disease today who recommended visit to ED for further imaging. CT scan performed in ED was unremarkable. WBC WNL, afebrile. Review of Systems   Constitutional:  Negative for activity change, fatigue and fever. HENT:  Negative for congestion, ear discharge, ear pain, hearing loss, nosebleeds, postnasal drip, rhinorrhea, sinus pressure, sinus pain, sore throat, tinnitus, trouble swallowing and voice change. Nasal lesion   Respiratory:  Negative for cough, choking, wheezing and stridor. Cardiovascular:  Negative for chest pain. Gastrointestinal: Negative. Genitourinary: Negative. Skin:  Negative for color change and rash. Neurological:  Negative for speech difficulty, light-headedness, numbness and headaches. Hematological:  Negative for adenopathy. Psychiatric/Behavioral:  Negative for behavioral problems. Past Medical History:   Diagnosis Date    Acid reflux        Past Surgical History:   Procedure Laterality Date    APPENDECTOMY      CHOLECYSTECTOMY      ELBOW SURGERY         Medications Prior to Admission:    Prior to Admission medications    Medication Sig Start Date End Date Taking?  Authorizing Provider   ibuprofen (ADVIL;MOTRIN) 200 MG tablet Take 200 mg by mouth every 6 hours as needed for Pain    Historical Provider, MD   Omeprazole 20 MG TBDD Take 20 mg by mouth daily    Historical Provider, MD   calcium carbonate (TUMS) 500 MG chewable tablet Take 1,500 mg by mouth every 4-6 hours as needed for Heartburn    Historical Provider, MD   acetaminophen (TYLENOL) 500 MG tablet Take 2 tablets by mouth every 8 hours as needed for Pain 10/15/20   Cosme Alcantar MD       No Known Allergies    History reviewed. No pertinent family history. Social History     Tobacco Use    Smoking status: Former    Smokeless tobacco: Never    Tobacco comments:     quit smoking cigarettes in 2017   Vaping Use    Vaping Use: Never used   Substance Use Topics    Alcohol use: Yes    Drug use: No           PHYSICAL EXAM:    Vitals:    02/20/23 1402   BP:    Pulse: 59   Resp:    Temp:    SpO2:        General Appearance:  Laying in bed, awake, alert, no apparent distress  Head/face:  NC/AT  Eyes: PERRL, EOMI  ENT: Bilateral external ears WNL, Aprroximately 1.5 cm lesion to dorsum of nose midline over lower cartilage, fluctuant, tender to palpation. Appears to be fistulous connection between lesion and left nasal passage as serous fluid appreciated coming from nose when lesion is compressed. Left internal nose with significant mucosal crusting and ulceration to left anterior septum. Neck:Supple, no adenopathy  Lungs:  Non-labored, good respiratory effort, no stridor  Heart:  RR  Neuro: Facial nerve symmetric and intact. House Brackmann 1/6, bilaterally. LABS:  CBC  Recent Labs     02/20/23  1404   WBC 6.6   HGB 16.2   HCT 48.1          RADIOLOGY  Impression   No acute abnormality of the soft tissue structures of the neck. Chronic sinusitis involving the left sphenoid sinus. Procedure:   Pt verbally consented, the area was injected with 2cc of 2% lidocaine with epinephrine and appropriate time was given to achieve anesthesia and hemostasis. Using an 11 blade the lesion was incised and serous fluid was appreciated. Cultures were taken. Using a sharp scissors the lesion was explored more. A 2x2 dressing was placed over the nose.  Patient tolerated the procedure well.    ASSESSMENT:  50 y.o. male with nasal lesion of unknown behavior with fistulous tract into left nasal passage     PLAN:  Pt seen and examined, available imaging reviewed. I&D performed at bedside revealing serous fluid. Cultures obtained and pending. Antibiotics/Dispo per medicine and ID. Recommend nasal saline rinses with bactroban ointment daily. Bacitracin to lesion itself BID. No further acute ENT surgical intervention. Patient seen and examined by resident, discussed with attending on call Dr. Bren Holland.     Olivier Thomas DO,  Resident Physician, PGY-2  Department of Otolaryngology, [facility])         Electronically signed by Olivier Thomas DO on 2/20/23 at 1:49 PM EST

## 2023-02-20 NOTE — ED NOTES
Has seen an ear, nose and throat doctor about 1 month ago: diagnosed with staph infection. Took a full course of antibiotics from that doctor. Currently has a \"boil-looking like\" lump on top of nose: more on the left. Painful. Patient has drained it for thin yellow drainage and then it refills. Patient decided to come to ED today because while attempting to drain the \"boil\" the drainage began coming out of both nares: same type of drainage.      Luh Farris RN  02/20/23 5302

## 2023-02-20 NOTE — ED PROVIDER NOTES
This is a 61-year-old male with no significant past medical history who presents to the ED for evaluation of a wound. Patient states that several weeks prior he was scratched on his nose. Patient states he has been having some irritation of wound of his nose for several weeks. Patient notes that it did seem to improve but now seems to be getting worse. He states there has been some intermittent drainage from it. Patient states there is pain whenever he presses or moves his nose. There are no reported fevers or chills. Patient has been on several antibiotics was seen by infectious disease and sent over for further evaluation and monitoring. No other reported mitigating or exacerbating factors. The history is provided by the patient. Review of Systems   Constitutional:  Negative for fever. HENT:  Negative for congestion. Eyes:  Negative for visual disturbance. Respiratory:  Negative for cough. Cardiovascular:  Negative for chest pain. Gastrointestinal:  Negative for abdominal pain. Endocrine: Negative for polyuria. Genitourinary:  Negative for dysuria. Musculoskeletal:  Negative for back pain. Skin:  Positive for wound. Allergic/Immunologic: Negative for immunocompromised state. Neurological:  Negative for headaches. Hematological:  Does not bruise/bleed easily. Psychiatric/Behavioral:  Negative for confusion. Physical Exam  Vitals and nursing note reviewed. Constitutional:       General: He is not in acute distress. Appearance: He is well-developed. HENT:      Head: Normocephalic and atraumatic. Nose:      Comments: Wound to skin of nose, no active drainage, tender to touch     Mouth/Throat:      Mouth: Mucous membranes are moist.   Eyes:      Extraocular Movements: Extraocular movements intact. Pupils: Pupils are equal, round, and reactive to light. Neck:      Vascular: No JVD.    Cardiovascular:      Rate and Rhythm: Normal rate and regular rhythm. Heart sounds: No murmur heard. Pulmonary:      Effort: Pulmonary effort is normal.      Breath sounds: No wheezing, rhonchi or rales. Chest:      Chest wall: No tenderness. Abdominal:      General: There is no distension. Palpations: Abdomen is soft. Tenderness: There is no abdominal tenderness. There is no guarding or rebound. Hernia: No hernia is present. Musculoskeletal:      Cervical back: Normal range of motion and neck supple. Right lower leg: No edema. Left lower leg: No edema. Skin:     General: Skin is warm and dry. Capillary Refill: Capillary refill takes less than 2 seconds. Neurological:      General: No focal deficit present. Mental Status: He is alert and oriented to person, place, and time. Mental status is at baseline. Cranial Nerves: No cranial nerve deficit. Psychiatric:         Mood and Affect: Mood normal.         Behavior: Behavior normal.        Procedures     MDM  Number of Diagnoses or Management Options  Nose abrasion, infected, initial encounter  Diagnosis management comments: Patient is a pleasant 61-year-old male who was sent over by his ID specialist for evaluation of an ongoing wound to his nose. Patient was nontoxic no distress had stable vital signs had a broad differential listed below but was not limited to such. ENT was called to bedside after imaging not review any concerning findings of infectious cartilage or bone spoke with him at length they did send cultures off I also spoke with the ID team who recommended admission and starting the patient on IV vancomycin.   She was agreeable plan was admitted to [facility]         Differential diagnosis: Abscess, Osteomyelitis, Cellulitis  Review of ED/ Outpatient Records: Dr. Ramona Alford note from today  Historians that case was discussed with: None  My EKG interpretation: None  Imaging interpretation by myself: None  Independent Interpretation of labs: No elevation in WBC  Discussed with other providers: Dr. Torrey Quintanilla  Tests Considered but not ordered: None  Decision making tools/risks stratification: None  Disposition decision making/shared decision making: Shared  Chronic Conditions affecting care: None  Social Determinants of health impacting treatment or disposition: None  CODE status and Discussions: Full                      --------------------------------------------- PAST HISTORY ---------------------------------------------  Past Medical History:  has a past medical history of Acid reflux. Past Surgical History:  has a past surgical history that includes Appendectomy; Cholecystectomy; and Elbow surgery. Social History:  reports that he has quit smoking. He has never used smokeless tobacco. He reports current alcohol use. He reports that he does not use drugs. Family History: family history is not on file. The patients home medications have been reviewed. Allergies: Patient has no known allergies.     -------------------------------------------------- RESULTS -------------------------------------------------    LABS:  Results for orders placed or performed during the hospital encounter of 02/20/23   CMP   Result Value Ref Range    Sodium 138 132 - 146 mmol/L    Potassium 4.0 3.5 - 5.0 mmol/L    Chloride 101 98 - 107 mmol/L    CO2 27 22 - 29 mmol/L    Anion Gap 10 7 - 16 mmol/L    Glucose 82 74 - 99 mg/dL    BUN 15 6 - 20 mg/dL    Creatinine 1.1 0.7 - 1.2 mg/dL    Est, Glom Filt Rate >60 >=60 mL/min/1.73    Calcium 9.8 8.6 - 10.2 mg/dL    Total Protein 7.7 6.4 - 8.3 g/dL    Albumin 4.4 3.5 - 5.2 g/dL    Total Bilirubin 0.8 0.0 - 1.2 mg/dL    Alkaline Phosphatase 99 40 - 129 U/L    ALT 14 0 - 40 U/L    AST 13 0 - 39 U/L   CBC with Auto Differential   Result Value Ref Range    WBC 6.6 4.5 - 11.5 E9/L    RBC 5.31 3.80 - 5.80 E12/L    Hemoglobin 16.2 12.5 - 16.5 g/dL    Hematocrit 48.1 37.0 - 54.0 %    MCV 90.6 80.0 - 99.9 fL    MCH 30.5 26.0 - 35.0 pg    MCHC 33.7 32.0 - 34.5 %    RDW 12.9 11.5 - 15.0 fL    Platelets 676 318 - 748 E9/L    MPV 10.3 7.0 - 12.0 fL    Neutrophils % 68.8 43.0 - 80.0 %    Immature Granulocytes % 0.3 0.0 - 5.0 %    Lymphocytes % 19.9 (L) 20.0 - 42.0 %    Monocytes % 7.3 2.0 - 12.0 %    Eosinophils % 2.9 0.0 - 6.0 %    Basophils % 0.8 0.0 - 2.0 %    Neutrophils Absolute 4.52 1.80 - 7.30 E9/L    Immature Granulocytes # 0.02 E9/L    Lymphocytes Absolute 1.31 (L) 1.50 - 4.00 E9/L    Monocytes Absolute 0.48 0.10 - 0.95 E9/L    Eosinophils Absolute 0.19 0.05 - 0.50 E9/L    Basophils Absolute 0.05 0.00 - 0.20 E9/L   Lactic Acid   Result Value Ref Range    Lactic Acid 0.8 0.5 - 2.2 mmol/L   Sedimentation Rate   Result Value Ref Range    Sed Rate 13 0 - 15 mm/Hr   C-Reactive Protein   Result Value Ref Range    CRP <0.3 0.0 - 0.4 mg/dL   Procalcitonin   Result Value Ref Range    Procalcitonin 0.04 0.00 - 0.08 ng/mL   Basic Metabolic Panel w/ Reflex to MG   Result Value Ref Range    Sodium 136 132 - 146 mmol/L    Potassium reflex Magnesium 3.9 3.5 - 5.0 mmol/L    Chloride 102 98 - 107 mmol/L    CO2 26 22 - 29 mmol/L    Anion Gap 8 7 - 16 mmol/L    Glucose 98 74 - 99 mg/dL    BUN 16 6 - 20 mg/dL    Creatinine 1.1 0.7 - 1.2 mg/dL    Est, Glom Filt Rate >60 >=60 mL/min/1.73    Calcium 8.7 8.6 - 10.2 mg/dL   CBC with Auto Differential   Result Value Ref Range    WBC 7.3 4.5 - 11.5 E9/L    RBC 4.63 3.80 - 5.80 E12/L    Hemoglobin 14.1 12.5 - 16.5 g/dL    Hematocrit 41.9 37.0 - 54.0 %    MCV 90.5 80.0 - 99.9 fL    MCH 30.5 26.0 - 35.0 pg    MCHC 33.7 32.0 - 34.5 %    RDW 13.2 11.5 - 15.0 fL    Platelets 396 830 - 077 E9/L    MPV 10.4 7.0 - 12.0 fL    Neutrophils % 76.9 43.0 - 80.0 %    Immature Granulocytes % 0.4 0.0 - 5.0 %    Lymphocytes % 13.0 (L) 20.0 - 42.0 %    Monocytes % 6.0 2.0 - 12.0 %    Eosinophils % 2.9 0.0 - 6.0 %    Basophils % 0.8 0.0 - 2.0 %    Neutrophils Absolute 5.60 1.80 - 7.30 E9/L    Immature Granulocytes # 0.03 E9/L    Lymphocytes Absolute 0.95 (L) 1.50 - 4.00 E9/L    Monocytes Absolute 0.44 0.10 - 0.95 E9/L    Eosinophils Absolute 0.21 0.05 - 0.50 E9/L    Basophils Absolute 0.06 0.00 - 0.20 E9/L   URINE DRUG SCREEN   Result Value Ref Range    Amphetamine Screen, Urine NOT DETECTED Negative <1000 ng/mL    Barbiturate Screen, Ur NOT DETECTED Negative < 200 ng/mL    Benzodiazepine Screen, Urine NOT DETECTED Negative < 200 ng/mL    Cannabinoid Scrn, Ur NOT DETECTED Negative < 50ng/mL    Cocaine Metabolite Screen, Urine NOT DETECTED Negative < 300 ng/mL    Opiate Scrn, Ur NOT DETECTED Negative < 300ng/mL    PCP Screen, Urine NOT DETECTED Negative < 25 ng/mL    Methadone Screen, Urine NOT DETECTED Negative <300 ng/mL    Oxycodone Urine NOT DETECTED Negative <100 ng/mL    FENTANYL SCREEN, URINE NOT DETECTED Negative <1 ng/mL    Drug Screen Comment: see below        RADIOLOGY:  CT SOFT TISSUE NECK W CONTRAST   Final Result   No acute abnormality of the soft tissue structures of the neck. Chronic sinusitis involving the left sphenoid sinus.                   ------------------------- NURSING NOTES AND VITALS REVIEWED ---------------------------  Date / Time Roomed:  2/20/2023  1:32 PM  ED Bed Assignment:  [id number]    The nursing notes within the ED encounter and vital signs as below have been reviewed.      Patient Vitals for the past 24 hrs:   BP Temp Temp src Pulse Resp SpO2   02/21/23 0730 121/70 97.8 °F (36.6 °C) Oral 58 18 96 %   02/20/23 2136 -- -- -- -- 18 --   02/20/23 2000 114/69 97.8 °F (36.6 °C) Oral 58 18 98 %   02/20/23 1932 123/65 -- -- -- -- --   02/20/23 1923 123/65 97.7 °F (36.5 °C) Oral 56 18 97 %       Oxygen Saturation Interpretation: Normal    ------------------------------------------ PROGRESS NOTES ------------------------------------------  Re-evaluation(s):  Time: 6002  Patients symptoms show no change  Repeat physical examination is not changed    Counseling:  I have spoken with the patient and discussed todays results, in addition to providing specific details for the plan of care and counseling regarding the diagnosis and prognosis. Their questions are answered at this time and they are agreeable with the plan of admission.    --------------------------------- ADDITIONAL PROVIDER NOTES ---------------------------------  Consultations:   Spoke with Alejandrail Discussed case. They will admit the patient. This patient's ED course included: a personal history and physicial examination, re-evaluation prior to disposition, multiple bedside re-evaluations, IV medications, cardiac monitoring, continuous pulse oximetry, and complex medical decision making and emergency management    This patient has remained hemodynamically stable during their ED course. Diagnosis:  1. Nose abrasion, infected, initial encounter        Disposition:  Patient's disposition: Admit to med/surg floor  Patient's condition is stable.           Dave Perez DO  02/21/23 1976

## 2023-02-20 NOTE — ED NOTES
Pain in carbunkle of nose is a 7/10 at rest and 9/10 with tactile stimulation. No other changes. Will continue to assess and call light within reach.      Ghanshyam Peterson RN  02/20/23 5308

## 2023-02-21 LAB
AMPHETAMINE SCREEN, URINE: NOT DETECTED
ANION GAP SERPL CALCULATED.3IONS-SCNC: 8 MMOL/L (ref 7–16)
BARBITURATE SCREEN URINE: NOT DETECTED
BASOPHILS ABSOLUTE: 0.06 E9/L (ref 0–0.2)
BASOPHILS RELATIVE PERCENT: 0.8 % (ref 0–2)
BENZODIAZEPINE SCREEN, URINE: NOT DETECTED
BUN BLDV-MCNC: 16 MG/DL (ref 6–20)
CALCIUM SERPL-MCNC: 8.7 MG/DL (ref 8.6–10.2)
CANNABINOID SCREEN URINE: NOT DETECTED
CHLORIDE BLD-SCNC: 102 MMOL/L (ref 98–107)
CO2: 26 MMOL/L (ref 22–29)
COCAINE METABOLITE SCREEN URINE: NOT DETECTED
CREAT SERPL-MCNC: 1.1 MG/DL (ref 0.7–1.2)
EOSINOPHILS ABSOLUTE: 0.21 E9/L (ref 0.05–0.5)
EOSINOPHILS RELATIVE PERCENT: 2.9 % (ref 0–6)
FENTANYL SCREEN, URINE: NOT DETECTED
GFR SERPL CREATININE-BSD FRML MDRD: >60 ML/MIN/1.73
GLUCOSE BLD-MCNC: 98 MG/DL (ref 74–99)
HCT VFR BLD CALC: 41.9 % (ref 37–54)
HEMOGLOBIN: 14.1 G/DL (ref 12.5–16.5)
IMMATURE GRANULOCYTES #: 0.03 E9/L
IMMATURE GRANULOCYTES %: 0.4 % (ref 0–5)
LYMPHOCYTES ABSOLUTE: 0.95 E9/L (ref 1.5–4)
LYMPHOCYTES RELATIVE PERCENT: 13 % (ref 20–42)
Lab: NORMAL
MCH RBC QN AUTO: 30.5 PG (ref 26–35)
MCHC RBC AUTO-ENTMCNC: 33.7 % (ref 32–34.5)
MCV RBC AUTO: 90.5 FL (ref 80–99.9)
METHADONE SCREEN, URINE: NOT DETECTED
MONOCYTES ABSOLUTE: 0.44 E9/L (ref 0.1–0.95)
MONOCYTES RELATIVE PERCENT: 6 % (ref 2–12)
NEUTROPHILS ABSOLUTE: 5.6 E9/L (ref 1.8–7.3)
NEUTROPHILS RELATIVE PERCENT: 76.9 % (ref 43–80)
OPIATE SCREEN URINE: NOT DETECTED
OXYCODONE URINE: NOT DETECTED
PDW BLD-RTO: 13.2 FL (ref 11.5–15)
PHENCYCLIDINE SCREEN URINE: NOT DETECTED
PLATELET # BLD: 199 E9/L (ref 130–450)
PMV BLD AUTO: 10.4 FL (ref 7–12)
POTASSIUM REFLEX MAGNESIUM: 3.9 MMOL/L (ref 3.5–5)
RBC # BLD: 4.63 E12/L (ref 3.8–5.8)
SODIUM BLD-SCNC: 136 MMOL/L (ref 132–146)
WBC # BLD: 7.3 E9/L (ref 4.5–11.5)

## 2023-02-21 PROCEDURE — 6370000000 HC RX 637 (ALT 250 FOR IP): Performed by: NURSE PRACTITIONER

## 2023-02-21 PROCEDURE — 88341 IMHCHEM/IMCYTCHM EA ADD ANTB: CPT

## 2023-02-21 PROCEDURE — 80307 DRUG TEST PRSMV CHEM ANLYZR: CPT

## 2023-02-21 PROCEDURE — 1200000000 HC SEMI PRIVATE

## 2023-02-21 PROCEDURE — 88305 TISSUE EXAM BY PATHOLOGIST: CPT

## 2023-02-21 PROCEDURE — 85025 COMPLETE CBC W/AUTO DIFF WBC: CPT

## 2023-02-21 PROCEDURE — 88342 IMHCHEM/IMCYTCHM 1ST ANTB: CPT

## 2023-02-21 PROCEDURE — 2580000003 HC RX 258: Performed by: NURSE PRACTITIONER

## 2023-02-21 PROCEDURE — 6360000002 HC RX W HCPCS: Performed by: NURSE PRACTITIONER

## 2023-02-21 PROCEDURE — 2500000003 HC RX 250 WO HCPCS

## 2023-02-21 PROCEDURE — 80048 BASIC METABOLIC PNL TOTAL CA: CPT

## 2023-02-21 PROCEDURE — 36415 COLL VENOUS BLD VENIPUNCTURE: CPT

## 2023-02-21 PROCEDURE — 6370000000 HC RX 637 (ALT 250 FOR IP)

## 2023-02-21 PROCEDURE — 09BM8ZX EXCISION OF NASAL SEPTUM, VIA NATURAL OR ARTIFICIAL OPENING ENDOSCOPIC, DIAGNOSTIC: ICD-10-PCS

## 2023-02-21 RX ORDER — BACITRACIN ZINC 500 [USP'U]/G
OINTMENT TOPICAL 3 TIMES DAILY
Status: DISCONTINUED | OUTPATIENT
Start: 2023-02-21 | End: 2023-02-24 | Stop reason: HOSPADM

## 2023-02-21 RX ORDER — LIDOCAINE HYDROCHLORIDE AND EPINEPHRINE 10; 10 MG/ML; UG/ML
20 INJECTION, SOLUTION INFILTRATION; PERINEURAL ONCE
Status: COMPLETED | OUTPATIENT
Start: 2023-02-21 | End: 2023-02-21

## 2023-02-21 RX ADMIN — VANCOMYCIN HYDROCHLORIDE 1250 MG: 10 INJECTION, POWDER, LYOPHILIZED, FOR SOLUTION INTRAVENOUS at 19:44

## 2023-02-21 RX ADMIN — Medication 10 ML: at 11:39

## 2023-02-21 RX ADMIN — LIDOCAINE HYDROCHLORIDE AND EPINEPHRINE 20 ML: 10; 10 INJECTION, SOLUTION INFILTRATION; PERINEURAL at 13:51

## 2023-02-21 RX ADMIN — HYDROCODONE BITARTRATE AND ACETAMINOPHEN 1 TABLET: 5; 325 TABLET ORAL at 17:36

## 2023-02-21 RX ADMIN — Medication: at 13:34

## 2023-02-21 RX ADMIN — Medication 10 ML: at 19:45

## 2023-02-21 RX ADMIN — Medication: at 20:58

## 2023-02-21 RX ADMIN — VANCOMYCIN HYDROCHLORIDE 1750 MG: 5 INJECTION, POWDER, LYOPHILIZED, FOR SOLUTION INTRAVENOUS at 07:03

## 2023-02-21 ASSESSMENT — ENCOUNTER SYMPTOMS
ABDOMINAL PAIN: 0
COUGH: 0
BACK PAIN: 0

## 2023-02-21 NOTE — PROGRESS NOTES
-Consulted to dose vancomycin for nasal infection.  -Patient received vancomycin 2000 mg IV x 1 on 2/20 and vancomycin 1750 mg IV x 1 on 2/21 (0703) as part of vancomycin regimen of 1750 mg IV q12h. -Will decrease dose to vancomycin 1250 mg IV q12h to begin on 2/21 (1900) for projected AUC/GUMARO of 544. -Vancomycin random level ordered with morning labs on 2/22 to determine optimal vancomycin dosing plan. -HOLD VANCOMYCIN IF VANCOMYCIN LEVEL IS GREATER THAN 20 MCG/ML.  -Will assess vancomycin level to determine further vancomycin dosing plans.

## 2023-02-21 NOTE — H&P
[facility]  History and Physical      CHIEF COMPLAINT:    Chief Complaint   Patient presents with    Other     Sent by Dr Jerald Mora for possible infection nose. Patient of More Valentine MD presents with:  Open wound of nasal cavity, initial encounter    History of Present Illness:   Patient is a 53-year-old male with a past medical history of acid reflux who presents emergency room for possible nose infection. Patient states that he has had a lesion on his nose for the past 6 months. He has been following with infectious disease as an outpatient and has had been on 4 different rounds of oral antibiotics without any relief. Patient was sent in by Dr. Jerald Mora for further evaluation. On arrival patient had a CT of the neck which revealed no acute issues. Lab work was also completely unremarkable. ENT was consulted in the emergency room for evaluation and patient was admitted to [place] [address] for further work-up and treatment. On assessment today patient just underwent an I&D with ENT and culture sent. Patient denies any pain fevers or chills. REVIEW OF SYSTEMS:  Pertinent negatives are above in HPI. 10 point ROS otherwise negative. Past Medical History:   Diagnosis Date    Acid reflux          Past Surgical History:   Procedure Laterality Date    APPENDECTOMY      CHOLECYSTECTOMY      ELBOW SURGERY         Medications Prior to Admission:    Medications Prior to Admission: ibuprofen (ADVIL;MOTRIN) 200 MG tablet, Take 200 mg by mouth every 6 hours as needed for Pain  calcium carbonate (TUMS) 500 MG chewable tablet, Take 1,500 mg by mouth every 4-6 hours as needed for Heartburn  acetaminophen (TYLENOL) 500 MG tablet, Take 2 tablets by mouth every 8 hours as needed for Pain    Note that the patient's home medications were reviewed and the above list is accurate to the best of my knowledge at the time of the exam.    Allergies:    Patient has no known allergies.     Social History: reports that he has quit smoking. He has never used smokeless tobacco. He reports current alcohol use. He reports that he does not use drugs. Family History:   family history is not on file. PHYSICAL EXAM:    Vitals:  /70   Pulse 58   Temp 97.8 °F (36.6 °C) (Oral)   Resp 18   Ht 6' 3\" (1.905 m)   Wt 220 lb (99.8 kg)   SpO2 96%   BMI 27.50 kg/m²       General appearance: NAD, conversant  Eyes: Sclerae anicteric, PERRLA  HEENT: AT/NC, MMM  Neck: FROM, supple, no thyromegaly  Lymph: No cervical / supraclavicular lymphadenopathy  Lungs: Clear to auscultation, WOB normal  CV: RRR, no MRGs, no lower extremity edema  Abdomen: Soft, non-tender; no masses or HSM, +BS  Extremities: FROM without synovitis. No clubbing or cyanosis of the hands. Skin:     Psych: Calm and cooperative. Normal judgement and insight. Normal mood and affect. Neuro: Alert and interactive, face symmetric, speech fluent. LABS:  All labs reviewed.   Of note:  CBC:   Lab Results   Component Value Date/Time    WBC 7.3 02/21/2023 02:00 AM    RBC 4.63 02/21/2023 02:00 AM    HGB 14.1 02/21/2023 02:00 AM    HCT 41.9 02/21/2023 02:00 AM    MCV 90.5 02/21/2023 02:00 AM    MCH 30.5 02/21/2023 02:00 AM    MCHC 33.7 02/21/2023 02:00 AM    RDW 13.2 02/21/2023 02:00 AM     02/21/2023 02:00 AM    MPV 10.4 02/21/2023 02:00 AM     CMP:    Lab Results   Component Value Date/Time     02/21/2023 02:00 AM    K 3.9 02/21/2023 02:00 AM     02/21/2023 02:00 AM    CO2 26 02/21/2023 02:00 AM    BUN 16 02/21/2023 02:00 AM    CREATININE 1.1 02/21/2023 02:00 AM    GFRAA >60 04/04/2022 08:17 AM    LABGLOM >60 02/21/2023 02:00 AM    GLUCOSE 98 02/21/2023 02:00 AM    PROT 7.7 02/20/2023 02:04 PM    LABALBU 4.4 02/20/2023 02:04 PM    CALCIUM 8.7 02/21/2023 02:00 AM    BILITOT 0.8 02/20/2023 02:04 PM    ALKPHOS 99 02/20/2023 02:04 PM    AST 13 02/20/2023 02:04 PM    ALT 14 02/20/2023 02:04 PM       Imaging:  CT Soft tissue neck: negative EKG:  No EKG obtained      Telemetry:  Not on telemetry     ASSESSMENT/PLAN:  Principal Problem:    Open wound of nasal cavity, initial encounter  Resolved Problems:    * No resolved hospital problems. *      Patient is a 50year old male admitted to med/surg for   Open wound of the nasal cavity-concern for fistula creation  -Monitor labs  -s/p bedside I&D by ENT today  -cultures pending  -ID and ENT following  -IV vancomycin pending pan culture results, MRSA noted on outpatient basis, treated with Bactroban and had courses of 3 or 4 different antibiotics-as outpatient  -She will likely require long-term antibiotic therapy  -No systemic signs or symptoms  -Labs and vitals stable      Medication for other comorbidities continue as appropriate dose adjustment as necessary. DVT prophylaxis Lovenox   PT OT  Discharge planning    Case discussed with attending and agreed upon plan of care. Code status: Full  Requires Inpatient level of care  Suzette Gowers, APRN - CNP    11:33 AM  2/21/2023     Above note edited to reflect my thoughts     I personally saw, examined and provided care for the patient. Radiographs, labs and medication list were reviewed by me independently. The case was discussed in detail and plans for care were established. Review of Suzette Gowers, APRN-CNP   , documentation was conducted and revisions were made as appropriate directly by me. I agree with the above documented exam, problem list, and plan of care.      Yvrose Massey MD  3:07 PM  2/21/2023

## 2023-02-21 NOTE — PROGRESS NOTES
Occupational Therapy  OT consult received to eval/treat and chart review complete. Patient reports he is functioning at baseline level of independent with no functional concerns at this time. Patient politely declines need for OT evaluation. OT evaluation not complete per patient preference.      Jesenia Maddox, OTR/L  License Number: [id number]

## 2023-02-21 NOTE — PROGRESS NOTES
Physical Therapy  Facility/Department: [facility] SURG    Name: Niki Norwood  : 1970  MRN: [id number]  Date of Service: 2023    PT whitley was attempted this am and after speaking with pt he is Independent with functional mobility and has no skilled PT needs at this time and will be discharged from our service. Iwona Shelton., P.T.   License Number: [id number]

## 2023-02-21 NOTE — CONSULTS
[address] [place] [address] [facility]  Consultation Note     Admit Date: 2/20/2023  1:32 PM    Reason for Consult:   Nose wound    Attending Physician:  Tana Hernandez MD    HISTORY OF PRESENT ILLNESS:             The history is obtained from extensive review of available past medical records. The patient is a 50 y.o. male who is not known to the ID service. The patient was accidentally hit by his dog's head on the left side of his nose around April 2022. It bled through both nostrils and eventually improved. He then developed swelling over the bridge of his nose. He saw Dr. Thu Kerr around November 2022 because he had a lesion on the inside of his nose. He was told he had a staphylococcal infection. He had been treated with 4 rounds of different antibiotics. He was treated with 10 days of Bactrim in December 2022, followed by 2 rounds of Doxycycline in early January 2023. This was followed by Bactrim DS. He was also treated with topical Bactroban. He noticed that he had a lesion on the bridge of his nose that he would squeeze fluid out of and it would fill in within the hour. At 1 point he squeezed it and noticed drainage coming out of both his nostrils. This caused concern and he brought it to the attention of his PCP. Because of this persistent he was seen in the office by Dr. Joe Soto on 2/20/2023. It was recommended he go to the ED for evaluation by ENT and ID for possible osteomyelitis. A CT did not show osteomyelitis. He was seen by ENT. They performed I&D at bedside revealing serous fluid. Cultures were obtained. The patient noticed that when he was injected with an topical anesthetic on the bridge of his nose, it began draining down the back of his throat. I recommended Vancomycin to be started and admission. He is tolerating antibiotic well.     Past Medical History:        Diagnosis Date    Acid reflux      Past Surgical History:        Procedure Laterality Date APPENDECTOMY      CHOLECYSTECTOMY      ELBOW SURGERY       Current Medications:   Scheduled Meds:   lidocaine-EPINEPHrine  20 mL IntraDERmal Once    vancomycin  1,250 mg IntraVENous Q12H    sodium chloride flush  5-40 mL IntraVENous 2 times per day    enoxaparin  40 mg SubCUTAneous Daily     Continuous Infusions:   sodium chloride       PRN Meds:sodium chloride flush, sodium chloride, polyethylene glycol, acetaminophen **OR** acetaminophen, HYDROcodone 5 mg - acetaminophen    Allergies:  Patient has no known allergies. Social History:   Social History     Socioeconomic History    Marital status:      Spouse name: None    Number of children: None    Years of education: None    Highest education level: None   Tobacco Use    Smoking status: Former    Smokeless tobacco: Never    Tobacco comments:     quit smoking cigarettes in 2017   Vaping Use    Vaping Use: Never used   Substance and Sexual Activity    Alcohol use: Yes    Drug use: No      Pets: Dog and cat  Travel: No  The patient works in a [address] [place] [address] Ext. He is  and lives at home with his wife and daughter. Family History:   History reviewed. No pertinent family history. . Otherwise non-pertinent to the chief complaint. REVIEW OF SYSTEMS:    Constitutional: Negative for fevers, chills, diaphoresis  Neurologic: Negative for headache. Denies diplopia. At 1 point he developed blurred vision in his left eye, followed by blurred vision in the right eye. This has resolved  Psychiatric: Negative  Rheumatologic: Negative   Endocrine: Negative  Hematologic: Negative  Immunologic: Negative  ENT: Negative  Respiratory: Negative   Cardiovascular: Negative  GI: Negative  : Negative  Musculoskeletal: Negative  Skin: No rashes.   No skin soft tissue infections in the past.    PHYSICAL EXAM:    Vitals:   /70   Pulse 58   Temp 97.8 °F (36.6 °C) (Oral)   Resp 18   Ht 6' 3\" (1.905 m)   Wt 220 lb (99.8 kg)   SpO2 96%   BMI 27.50 kg/m² Constitutional: The patient is awake, alert, and oriented. Sitting up in bed and eating lunch. No distress. Skin: Warm and dry. No rashes were noted. HEENT: Eyes show round, and reactive pupils. No jaundice. Moist mucous membranes, no ulcerations, no thrush. There is a raised lesion on the bridge of the nose. It is slightly fluctuant. No drainage. Neck: Supple to movements. No lymphadenopathy. Chest: No use of accessory muscles to breathe. Symmetrical expansion. Auscultation reveals no wheezing, crackles, or rhonchi. Cardiovascular: S1 and S2 are rhythmic and regular. No murmurs appreciated. Abdomen: Positive bowel sounds to auscultation. Benign to palpation. No masses felt. No hepatosplenomegaly. Extremities: No clubbing, no cyanosis, no edema.   Lines: Peripheral.      CBC+dif:  Recent Labs     02/20/23  1404 02/21/23  0200   WBC 6.6 7.3   HGB 16.2 14.1   HCT 48.1 41.9   MCV 90.6 90.5    199   NEUTROABS 4.52 5.60     Lab Results   Component Value Date    CRP <0.3 02/20/2023      No results found for: CRPHS  Lab Results   Component Value Date    SEDRATE 13 02/20/2023     Lab Results   Component Value Date    ALT 14 02/20/2023    AST 13 02/20/2023    ALKPHOS 99 02/20/2023    BILITOT 0.8 02/20/2023     Lab Results   Component Value Date/Time     02/21/2023 02:00 AM    K 3.9 02/21/2023 02:00 AM     02/21/2023 02:00 AM    CO2 26 02/21/2023 02:00 AM    BUN 16 02/21/2023 02:00 AM    CREATININE 1.1 02/21/2023 02:00 AM    GFRAA >60 04/04/2022 08:17 AM    LABGLOM >60 02/21/2023 02:00 AM    GLUCOSE 98 02/21/2023 02:00 AM    PROT 7.7 02/20/2023 02:04 PM    LABALBU 4.4 02/20/2023 02:04 PM    CALCIUM 8.7 02/21/2023 02:00 AM    BILITOT 0.8 02/20/2023 02:04 PM    ALKPHOS 99 02/20/2023 02:04 PM    AST 13 02/20/2023 02:04 PM    ALT 14 02/20/2023 02:04 PM       No results found for: PROTIME, INR    No results found for: TSH    Lab Results   Component Value Date/Time    COLORU Yellow 02/17/2020 07:21 PM    PHUR 6.0 02/17/2020 07:21 PM    WBCUA 10-20 07/26/2018 12:05 PM    RBCUA >20 07/26/2018 12:05 PM    MUCUS Present 07/26/2018 12:05 PM    BACTERIA MODERATE 07/26/2018 12:05 PM    CLARITYU Clear 02/17/2020 07:21 PM    SPECGRAV 1.025 02/17/2020 07:21 PM    LEUKOCYTESUR Negative 02/17/2020 07:21 PM    UROBILINOGEN 0.2 02/17/2020 07:21 PM    BILIRUBINUR Negative 02/17/2020 07:21 PM    BLOODU Negative 02/17/2020 07:21 PM    GLUCOSEU Negative 02/17/2020 07:21 PM       No results found for: HCO3, BE, O2SAT, PH, THGB, PCO2, PO2, TCO2  Radiology:  CT reviewed      Microbiology:  Pending  No results for input(s): BC in the last 72 hours. No results for input(s): ORG in the last 72 hours. No results for input(s): Christean Barbosa in the last 72 hours. No results for input(s): STREPNEUMAGU in the last 72 hours. No results for input(s): LP1UAG in the last 72 hours. No results for input(s): ASO in the last 72 hours. No results for input(s): CULTRESP in the last 72 hours. Recent Labs     02/20/23  0000   PROCAL 0.04       Assessment:  Chronic nasal chondritis  Possible osteomyelitis of the nose    Plan:    Continue Vancomycin  Check cultures  I asked microbiology to add a fungal culture to the specimen taken yesterday  The patient may need long-term IV antibiotics  Will follow with you    Thank you for having us see this patient in consultation. I will be discussing this case with the treating physicians. Case discussed with ED provider.     Savana rFy MD  11:27 AM  2/21/2023

## 2023-02-21 NOTE — CARE COORDINATION
Introduced my self and provided explanation of CM role to patient. Patient is awake, alert, and aware of current diagnosis and treatment plan including ID and ENT consults, IV atbs. He voices he resides at home with his spouse and completes his adl's with independence. Patient is established with a pcp and denies any issue with retail pharmaceutical coverage. He acknowledges he may need IV atb therapy post discharge but requests additional opportunity to discuss same with ID and ENT prior to deciding on [facility] vs infusion center utilization (if applicable). Should patient discharge on po therapy,  no home going needs are anticipated. Will follow along with  and assist with discharge planning as necessary. Case Management Assessment  Initial Evaluation    Date/Time of Evaluation: 2/21/2023 2:54 PM  Assessment Completed by: Krish Chavez RN    If patient is discharged prior to next notation, then this note serves as note for discharge by case management. Patient Name: Jorge Luis Ballard                   YOB: 1970  Diagnosis: Open wound of nasal cavity, initial encounter [S01.20XA]                   Date / Time: 2/20/2023  1:32 PM    Patient Admission Status: Inpatient   Readmission Risk (Low < 19, Mod (19-27), High > 27): Readmission Risk Score: 4    Current PCP: More Valentine MD  PCP verified by CM? Yes    Chart Reviewed: Yes      History Provided by: Patient  Patient Orientation: Alert and Oriented, Person, Place, Situation, Self    Patient Cognition: Alert    Hospitalization in the last 30 days (Readmission):  No    If yes, Readmission Assessment in CM Navigator will be completed.     Advance Directives:      Code Status: Full Code   Patient's Primary Decision Maker is: Legal Next of Kin      Discharge Planning:    Patient lives with: Spouse/Significant Other Type of Home: House  Primary Care Giver: Self  Patient Support Systems include: Spouse/Significant Other   Current Financial resources:    Current community resources:    Current services prior to admission: None            Current DME:              Type of Home Care services:  None    ADLS  Prior functional level: Independent in ADLs/IADLs  Current functional level: Independent in ADLs/IADLs    PT AM-PAC:   /24  OT AM-PAC:   /24    Family can provide assistance at DC: Yes  Would you like Case Management to discuss the discharge plan with any other family members/significant others, and if so, who? No  Plans to Return to Present Housing: Yes  Other Identified Issues/Barriers to RETURNING to current housing: IV atb  Potential Assistance needed at discharge: N/A            Potential DME:    Patient expects to discharge to: [address] [place] [address] for transportation at discharge:      Financial    Payor: Kassy Min / Plan: [address] [place] [address] PPO / Product Type: *No Product type* /     Does insurance require precert for SNF: Yes    Potential assistance Purchasing Medications: No  Meds-to-Beds request: Yes      [id number] [id number] [place] [address]  Phone: [phone] Fax: [phone]      Notes:    Factors facilitating achievement of predicted outcomes: Family support and Cooperative    Barriers to discharge: Medication managment    Additional Case Management Notes: see above    The Plan for Transition of Care is related to the following treatment goals of Open wound of nasal cavity, initial encounter [A93.95QA]    IF APPLICABLE: The Patient and/or patient representative Bob Sheffield and his family were provided with a choice of provider and agrees with the discharge plan.  Freedom of choice list with basic dialogue that supports the patient's individualized plan of care/goals and shares the quality data associated with the providers was provided to:     Patient Representative Name:       The Patient and/or Patient Representative Agree with the Discharge Plan?       Evonne Kuo RN  Case Management Department  Ph: [phone] Fax: [phone]

## 2023-02-21 NOTE — PROGRESS NOTES
OTOLARYNGOLOGY  DAILY PROGRESS NOTE  2023    Subjective:     Pt doing well, no acute events overnight. Objective:     /70   Pulse 58   Temp 97.8 °F (36.6 °C) (Oral)   Resp 18   Ht 6' 3\" (1.905 m)   Wt 220 lb (99.8 kg)   SpO2 96%   BMI 27.50 kg/m²   PULSE OXIMETRY RANGE: SpO2  Av.8 %  Min: 96 %  Max: 100 %  I/O last 3 completed shifts: In: 180 [P.O.:180]  Out: -     GENERAL:  Awake, alert, cooperative, no apparent distress  HENT: Lesion to caudal dorsum of nose s/p I&D, tender to palpation   EYES: No sclera icterus, pupils equal  LUNGS:  No increased work of breathing, no stridor  CARDIOVASCULAR:  RR      Assessment/Plan:     50 y.o. male lesion to caudal dorsum of nose of unknown behavior    - cultures pending  - continue empiric therapy per ID  - ENT will follow    Patient seen and examined by resident, discussed with attending on call Dr. Beverly Lanes.     Electronically signed by Melanie Nixon DO on 2023 at 8:41 AM

## 2023-02-21 NOTE — PROGRESS NOTES
The patient was verbally consented, the area around the nasal lesion was injected with 3 cc of lidocaine with 1-100,000 epinephrine. The lesion and surrounding areas was then cleaned with a Betadine stick. Once the appropriate amount of time was waited for anesthesia to take effect, using a #15 blade a shave biopsy was taken of the lesion. Immediately once unroofing the lesion there was malodorous smell and serous fluid appreciated. There appeared to be a cavity communicating with the cartilaginous septum or nasal passage. The biopsy was placed in a formalin specimen cup and sent for surgical pathology. Hemostasis was achieved using silver nitrate sticks. Orders were placed for bacitracin and 2 x 2 dressing, to be changed twice daily. The pt tolerated the procedure well. Patient seen and examined by resident, discussed with attending on call.     Olivier Thomas DO,  Resident Physician, PGY-2  Department of Otolaryngology, [facility])

## 2023-02-21 NOTE — PROGRESS NOTES
[facility] Quality Flow/Interdisciplinary Rounds Progress Note        Quality Flow Rounds held on February 21, 2023    Disciplines Attending:  Bedside Nurse, , , and Nursing Unit Leadership    Odalys Hunter was admitted on 2/20/2023  1:32 PM    Anticipated Discharge Date:       Disposition:    Faustino Score:  Faustino Scale Score: 22    Readmission Risk              Risk of Unplanned Readmission:  5           Discussed patient goal for the day, patient clinical progression, and barriers to discharge.   The following Goal(s) of the Day/Commitment(s) have been identified:  Diagnostics - Report Results and Labs - Report Results      Saran Jose RN  February 21, 2023

## 2023-02-22 LAB
VANCOMYCIN RANDOM: 13.1 MCG/ML (ref 5–40)
VANCOMYCIN RANDOM: 16.2 MCG/ML (ref 5–40)

## 2023-02-22 PROCEDURE — 2580000003 HC RX 258: Performed by: NURSE PRACTITIONER

## 2023-02-22 PROCEDURE — 6370000000 HC RX 637 (ALT 250 FOR IP): Performed by: NURSE PRACTITIONER

## 2023-02-22 PROCEDURE — 1200000000 HC SEMI PRIVATE

## 2023-02-22 PROCEDURE — 36415 COLL VENOUS BLD VENIPUNCTURE: CPT

## 2023-02-22 PROCEDURE — 80202 ASSAY OF VANCOMYCIN: CPT

## 2023-02-22 PROCEDURE — 6360000002 HC RX W HCPCS: Performed by: NURSE PRACTITIONER

## 2023-02-22 RX ADMIN — VANCOMYCIN HYDROCHLORIDE 1250 MG: 10 INJECTION, POWDER, LYOPHILIZED, FOR SOLUTION INTRAVENOUS at 18:31

## 2023-02-22 RX ADMIN — Medication 10 ML: at 20:08

## 2023-02-22 RX ADMIN — Medication: at 08:21

## 2023-02-22 RX ADMIN — Medication 10 ML: at 08:20

## 2023-02-22 RX ADMIN — Medication: at 20:08

## 2023-02-22 RX ADMIN — HYDROCODONE BITARTRATE AND ACETAMINOPHEN 1 TABLET: 5; 325 TABLET ORAL at 14:18

## 2023-02-22 RX ADMIN — VANCOMYCIN HYDROCHLORIDE 1250 MG: 10 INJECTION, POWDER, LYOPHILIZED, FOR SOLUTION INTRAVENOUS at 07:14

## 2023-02-22 RX ADMIN — Medication: at 12:11

## 2023-02-22 ASSESSMENT — PAIN - FUNCTIONAL ASSESSMENT: PAIN_FUNCTIONAL_ASSESSMENT: ACTIVITIES ARE NOT PREVENTED

## 2023-02-22 ASSESSMENT — PAIN DESCRIPTION - DESCRIPTORS: DESCRIPTORS: ACHING

## 2023-02-22 ASSESSMENT — PAIN DESCRIPTION - ORIENTATION: ORIENTATION: MID

## 2023-02-22 ASSESSMENT — PAIN DESCRIPTION - LOCATION: LOCATION: NOSE

## 2023-02-22 ASSESSMENT — PAIN SCALES - GENERAL: PAINLEVEL_OUTOF10: 7

## 2023-02-22 NOTE — PROGRESS NOTES
[facility]                                Progress note    Subjective: The patient is awake and alert. Resting comfortably in bed   No acute issues or events overnight    Objective:    BP (!) 125/59   Pulse 58   Temp 97.8 °F (36.6 °C) (Oral)   Resp 18   Ht 6' 3\" (1.905 m)   Wt 214 lb (97.1 kg)   SpO2 95%   BMI 26.75 kg/m²     In: 540 [P.O.:540]  Out: -   In: 540   Out: -     General appearance: NAD, conversant  HEENT: AT/NC, MMM  Neck: FROM, supple  Lungs: Clear to auscultation  CV: RRR, no MRGs  Vasc: Radial pulses 2+  Abdomen: Soft, non-tender; no masses or HSM  Extremities: No peripheral edema or digital cyanosis  Skin:     Psych: Alert and oriented to person, place and time  Neuro: Alert and interactive     Recent Labs     02/20/23  1404 02/21/23  0200   WBC 6.6 7.3   HGB 16.2 14.1   HCT 48.1 41.9    199       Recent Labs     02/20/23  1404 02/21/23  0200    136   K 4.0 3.9    102   CO2 27 26   BUN 15 16   CREATININE 1.1 1.1   CALCIUM 9.8 8.7       Assessment:    Principal Problem:    Open wound of nasal cavity, initial encounter  Resolved Problems:    * No resolved hospital problems.  *      Plan:  Patient is a 50year old male admitted to med/surg for   Open wound of the nasal cavity-concern for fistula creation  -Monitor labs  -s/p bedside I&D by ENT today  -cultures pending  -ID and ENT following  -IV vancomycin pending pan culture results, MRSA noted on outpatient basis, treated with Bactroban and had courses of 3 or 4 different antibiotics-as outpatient  -She will likely require long-term antibiotic therapy  -No systemic signs or symptoms  -Labs and vitals stable     2/22/23:  -Await final abx plans from ID  - Final cultures still pending   -Vitals stable  -continue IV vanco   -Vitals and labs unremarkable      Code status: Full  Consultants: ENT, ID     DVT Prophylaxis Lovenox   PT/OT  Discharge planning       OLYA Tirado - CNP  10:56 AM  2/22/2023     Above note edited to reflect my thoughts     I personally saw, examined and provided care for the patient. Radiographs, labs and medication list were reviewed by me independently. The case was discussed in detail and plans for care were established. Review of JUAN Florian   , documentation was conducted and revisions were made as appropriate directly by me. I agree with the above documented exam, problem list, and plan of care.      Suly Hickman MD  4:49 PM  2/22/2023

## 2023-02-22 NOTE — PATIENT CARE CONFERENCE
P Quality Flow/Interdisciplinary Rounds Progress Note        Quality Flow Rounds held on February 22, 2023    Disciplines Attending:  Bedside Nurse, , , and Nursing Unit Leadership    Dung Somers was admitted on 2/20/2023  1:32 PM    Anticipated Discharge Date:       Disposition:    Faustino Score:  Faustino Scale Score: 22    Readmission Risk              Risk of Unplanned Readmission:  5           Discussed patient goal for the day, patient clinical progression, and barriers to discharge.   The following Goal(s) of the Day/Commitment(s) have been identified:  Diagnostics - Report Results      Mary Lou Hinkle RN  February 22, 2023

## 2023-02-22 NOTE — PROGRESS NOTES
OTOLARYNGOLOGY  DAILY PROGRESS NOTE  2023    Subjective:     Pt doing well, no acute events overnight. With some drainage from dorsum and intranasally     Objective:     BP (!) 125/59   Pulse 58   Temp 97.8 °F (36.6 °C) (Oral)   Resp 18   Ht 6' 3\" (1.905 m)   Wt 214 lb (97.1 kg)   SpO2 95%   BMI 26.75 kg/m²   PULSE OXIMETRY RANGE: SpO2  Av %  Min: 95 %  Max: 97 %  I/O last 3 completed shifts: In: 5 [P.O.:720]  Out: -     GENERAL:  Awake, alert, cooperative, no apparent distress  HENT: Lesion to caudal dorsum of nose s/p I&D, tender to palpation, minimal purulent drainage, no surrounding erythema or edema.  Intranasal patent    EYES: No sclera icterus, pupils equal  LUNGS:  No increased work of breathing, no stridor  CARDIOVASCULAR:  RR      Assessment/Plan:     50 y.o. male lesion to caudal dorsum of nose of unknown behavior s/p biopsy    - cultures pending  - antibiotics per ID  - topical wound care, wash with warm soap and water, apply bacitracin   - ENT will follow    Patient seen and examined by resident, discussed with attending     Electronically signed by Heriberto Esparza DO on 2023 at 7:30 AM <<-----Click on this checkbox to enter Procedure FAVIOLA, using green light laser  01/08/2019    Active  AMISHAIL

## 2023-02-22 NOTE — PLAN OF CARE
Problem: Discharge Planning  Goal: Discharge to home or other facility with appropriate resources  2/22/2023 0440 by Errol Teixeira RN  Outcome: Progressing  2/21/2023 1710 by Gage Bergman RN  Outcome: Progressing     Problem: Pain  Goal: Verbalizes/displays adequate comfort level or baseline comfort level  2/22/2023 0440 by Errol Teixeira RN  Outcome: Progressing  2/21/2023 1710 by Gage Bergman RN  Outcome: Progressing

## 2023-02-22 NOTE — PROGRESS NOTES
Pharmacy Consultation Note  (Antibiotic Dosing and Monitoring)    Initial consult date: 2/20/2023  Consulting physician/provider: JUAN Lauren  Drug: Vancomycin  Indication: Infected wound of nasal cavity    Age/  Gender Height Weight IBW  Allergy Information   52 y.o./male 6' 3\" (190.5 cm) 220 lb (99.8 kg)     Ideal body weight: 84.5 kg (186 lb 4.6 oz)  Adjusted ideal body weight: 89.5 kg (197 lb 6 oz)   Patient has no known allergies. Renal Function:  Recent Labs     02/20/23  1404 02/21/23  0200   BUN 15 16   CREATININE 1.1 1.1       Intake/Output Summary (Last 24 hours) at 2/22/2023 1311  Last data filed at 2/22/2023 1218  Gross per 24 hour   Intake 600 ml   Output --   Net 600 ml       Vancomycin Monitoring:  Trough:  No results for input(s): VANCOTROUGH in the last 72 hours. Random:    Recent Labs     02/22/23  0259 02/22/23  0606   VANCORANDOM 16.2 13.1       Recent Labs     02/20/23  1404   BLOODCULT2 24 Hours no growth        Historical Cultures:  Organism   Date Value Ref Range Status   11/23/2015 Staphylococcus aureus (A)  Final     Recent Labs     02/20/23  1404   BC 24 Hours no growth       Vancomycin Administration Times:  Recent vancomycin administrations                     vancomycin (VANCOCIN) 1,250 mg in sodium chloride 0.9 % 250 mL IVPB (mg) 1,250 mg New Bag 02/22/23 0714     1,250 mg New Bag 02/21/23 1944    vancomycin (VANCOCIN) 1,750 mg in sodium chloride 0.9 % 500 mL IVPB (mg) 1,750 mg New Bag 02/21/23 0703    vancomycin (VANCOCIN) 2000 mg in 0.9% sodium chloride 500 mL IVPB (mg) 2,000 mg New Bag 02/20/23 1929                    Assessment:  Patient is a 50 y.o. male who has been initiated on vancomycin  Estimated Creatinine Clearance: 96 mL/min (based on SCr of 1.1 mg/dL). To dose vancomycin, pharmacy will be utilizing [employer] calculation software for goal AUC/GUMARO 400-600 mg/L-hr    Plan:   Will continue vancomycin 1250 mg IV every 12 hours  Will check vancomycin levels when appropriate  Will continue to monitor renal function   Pharmacy to follow    Gifty Palma PharmD, [facility]  2/22/2023  1:12 PM

## 2023-02-22 NOTE — PROGRESS NOTES
[address] [place] [address] [facility]  Progress Note    SUBJECTIVE:  Chief Complaint   Patient presents with    Other     Sent by Dr Kishan Maher for possible infection nose. Patient is tolerating medications. No reported adverse drug reactions. No nausea, vomiting, diarrhea. In no distress  Still reporting nasal drainage  No fevers     Review of systems:  As stated above in the chief complaint, otherwise negative. Medications:  Scheduled Meds:   vancomycin  1,250 mg IntraVENous Q12H    bacitracin zinc   Topical TID    sodium chloride flush  5-40 mL IntraVENous 2 times per day    enoxaparin  40 mg SubCUTAneous Daily     Continuous Infusions:   sodium chloride       PRN Meds:sodium chloride flush, sodium chloride, polyethylene glycol, acetaminophen **OR** acetaminophen, HYDROcodone 5 mg - acetaminophen    OBJECTIVE:  BP (!) 125/59   Pulse 58   Temp 97.8 °F (36.6 °C) (Oral)   Resp 18   Ht 6' 3\" (1.905 m)   Wt 214 lb (97.1 kg)   SpO2 95%   BMI 26.75 kg/m²   Temp  Av.7 °F (36.5 °C)  Min: 97.5 °F (36.4 °C)  Max: 97.8 °F (36.6 °C)  Constitutional: The patient is awake, alert, and oriented. Sitting up in bed in no distress. Skin: Warm and dry. No rashes were noted. HEENT: Round and reactive pupils. Moist mucous membranes. No ulcerations or thrush. Nose is dressed. There is some edema to the forehead  Neck: Supple to movements. Chest: No use of accessory muscles to breathe. Symmetrical expansion. No wheezing, crackles or rhonchi. Cardiovascular: S1 and S2 are rhythmic and regular. No murmurs appreciated. Abdomen: Positive bowel sounds to auscultation. Benign to palpation. No masses felt. Extremities: No edema.   Lines: Peripheral.    Laboratory and Tests Review:  Lab Results   Component Value Date    WBC 7.3 2023    WBC 6.6 2023    WBC 7.3 2022    HGB 14.1 2023    HCT 41.9 2023    MCV 90.5 2023     2023     Lab Results   Component Value Date    NEUTROABS 5.60 02/21/2023    NEUTROABS 4.52 02/20/2023    NEUTROABS 5.45 04/04/2022     No results found for: CRPHS  Lab Results   Component Value Date    ALT 14 02/20/2023    AST 13 02/20/2023    ALKPHOS 99 02/20/2023    BILITOT 0.8 02/20/2023     Lab Results   Component Value Date/Time     02/21/2023 02:00 AM    K 3.9 02/21/2023 02:00 AM     02/21/2023 02:00 AM    CO2 26 02/21/2023 02:00 AM    BUN 16 02/21/2023 02:00 AM    CREATININE 1.1 02/21/2023 02:00 AM    CREATININE 1.1 02/20/2023 02:04 PM    CREATININE 1.1 04/04/2022 08:17 AM    GFRAA >60 04/04/2022 08:17 AM    LABGLOM >60 02/21/2023 02:00 AM    GLUCOSE 98 02/21/2023 02:00 AM    PROT 7.7 02/20/2023 02:04 PM    LABALBU 4.4 02/20/2023 02:04 PM    CALCIUM 8.7 02/21/2023 02:00 AM    BILITOT 0.8 02/20/2023 02:04 PM    ALKPHOS 99 02/20/2023 02:04 PM    AST 13 02/20/2023 02:04 PM    ALT 14 02/20/2023 02:04 PM     Lab Results   Component Value Date    CRP <0.3 02/20/2023     Lab Results   Component Value Date    SEDRATE 13 02/20/2023     Radiology:  Reviewed     Microbiology:   Blood cultures: negative so far  Wound culture nose: pending   Fungal culture: pending     Recent Labs     02/20/23  0000   PROCAL 0.04       ASSESSMENT:  Chronic nasal chondritis  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 I had a face to face encounter with the patient. Agree with exam.  Assessment and plan as outlined above and directed by me. Addition and corrections were done as deemed appropriate. My exam and plan include: No new complaints. He is still having drainage there is seeping into his nose and having postnasal drainage. We will continue Vancomycin. I spoke with microbiology. There are 2 different CoNS and possibly a corynebacterium. I asked him to add a fungal culture yesterday.   This has not been done yet. Hopefully can still be done from the swab. I also asked him to work-up these organisms.     Oumou Lopes MD  2/22/2023  12:38 PM

## 2023-02-23 PROCEDURE — 2580000003 HC RX 258: Performed by: SPECIALIST

## 2023-02-23 PROCEDURE — 1200000000 HC SEMI PRIVATE

## 2023-02-23 PROCEDURE — 76937 US GUIDE VASCULAR ACCESS: CPT

## 2023-02-23 PROCEDURE — 02HV33Z INSERTION OF INFUSION DEVICE INTO SUPERIOR VENA CAVA, PERCUTANEOUS APPROACH: ICD-10-PCS | Performed by: INTERNAL MEDICINE

## 2023-02-23 PROCEDURE — 2580000003 HC RX 258: Performed by: NURSE PRACTITIONER

## 2023-02-23 PROCEDURE — 2500000003 HC RX 250 WO HCPCS: Performed by: SPECIALIST

## 2023-02-23 PROCEDURE — 36569 INSJ PICC 5 YR+ W/O IMAGING: CPT

## 2023-02-23 PROCEDURE — 6360000002 HC RX W HCPCS: Performed by: NURSE PRACTITIONER

## 2023-02-23 PROCEDURE — 6370000000 HC RX 637 (ALT 250 FOR IP): Performed by: NURSE PRACTITIONER

## 2023-02-23 PROCEDURE — 6360000002 HC RX W HCPCS: Performed by: SPECIALIST

## 2023-02-23 PROCEDURE — C1751 CATH, INF, PER/CENT/MIDLINE: HCPCS

## 2023-02-23 RX ORDER — SODIUM CHLORIDE 9 MG/ML
INJECTION, SOLUTION INTRAVENOUS PRN
Status: DISCONTINUED | OUTPATIENT
Start: 2023-02-23 | End: 2023-02-24 | Stop reason: HOSPADM

## 2023-02-23 RX ORDER — SODIUM CHLORIDE 0.9 % (FLUSH) 0.9 %
5-40 SYRINGE (ML) INJECTION EVERY 12 HOURS SCHEDULED
Status: DISCONTINUED | OUTPATIENT
Start: 2023-02-23 | End: 2023-02-24 | Stop reason: HOSPADM

## 2023-02-23 RX ORDER — LIDOCAINE HYDROCHLORIDE 10 MG/ML
5 INJECTION, SOLUTION EPIDURAL; INFILTRATION; INTRACAUDAL; PERINEURAL ONCE
Status: COMPLETED | OUTPATIENT
Start: 2023-02-23 | End: 2023-02-23

## 2023-02-23 RX ORDER — HEPARIN SODIUM (PORCINE) LOCK FLUSH IV SOLN 100 UNIT/ML 100 UNIT/ML
3 SOLUTION INTRAVENOUS EVERY 12 HOURS SCHEDULED
Status: DISCONTINUED | OUTPATIENT
Start: 2023-02-23 | End: 2023-02-24 | Stop reason: HOSPADM

## 2023-02-23 RX ORDER — SODIUM CHLORIDE 0.9 % (FLUSH) 0.9 %
5-40 SYRINGE (ML) INJECTION PRN
Status: DISCONTINUED | OUTPATIENT
Start: 2023-02-23 | End: 2023-02-24 | Stop reason: HOSPADM

## 2023-02-23 RX ORDER — HEPARIN SODIUM (PORCINE) LOCK FLUSH IV SOLN 100 UNIT/ML 100 UNIT/ML
3 SOLUTION INTRAVENOUS PRN
Status: DISCONTINUED | OUTPATIENT
Start: 2023-02-23 | End: 2023-02-24 | Stop reason: HOSPADM

## 2023-02-23 RX ADMIN — Medication 10 ML: at 08:50

## 2023-02-23 RX ADMIN — Medication 300 UNITS: at 21:05

## 2023-02-23 RX ADMIN — Medication: at 14:17

## 2023-02-23 RX ADMIN — LIDOCAINE HYDROCHLORIDE 0.75 ML: 10 SOLUTION INTRAVENOUS at 16:40

## 2023-02-23 RX ADMIN — VANCOMYCIN HYDROCHLORIDE 1250 MG: 10 INJECTION, POWDER, LYOPHILIZED, FOR SOLUTION INTRAVENOUS at 18:45

## 2023-02-23 RX ADMIN — Medication: at 08:49

## 2023-02-23 RX ADMIN — VANCOMYCIN HYDROCHLORIDE 1250 MG: 10 INJECTION, POWDER, LYOPHILIZED, FOR SOLUTION INTRAVENOUS at 06:34

## 2023-02-23 RX ADMIN — SODIUM CHLORIDE, PRESERVATIVE FREE 10 ML: 5 INJECTION INTRAVENOUS at 21:05

## 2023-02-23 RX ADMIN — Medication: at 21:06

## 2023-02-23 RX ADMIN — HYDROCODONE BITARTRATE AND ACETAMINOPHEN 1 TABLET: 5; 325 TABLET ORAL at 08:48

## 2023-02-23 ASSESSMENT — PAIN DESCRIPTION - DESCRIPTORS: DESCRIPTORS: SORE;THROBBING

## 2023-02-23 ASSESSMENT — PAIN DESCRIPTION - ORIENTATION: ORIENTATION: RIGHT;LEFT

## 2023-02-23 ASSESSMENT — PAIN DESCRIPTION - LOCATION: LOCATION: NOSE

## 2023-02-23 ASSESSMENT — PAIN SCALES - GENERAL: PAINLEVEL_OUTOF10: 7

## 2023-02-23 NOTE — PLAN OF CARE
Problem: Discharge Planning  Goal: Discharge to home or other facility with appropriate resources  2/22/2023 2249 by Babita Quiñones  Outcome: Progressing  2/22/2023 2231 by Yoana Shepherd  Outcome: Progressing     Problem: Pain  Goal: Verbalizes/displays adequate comfort level or baseline comfort level  2/22/2023 2249 by Babita Quiñones  Outcome: Progressing  2/22/2023 2231 by Yoana Shepherd  Outcome: Progressing     Problem: Safety - Adult  Goal: Free from fall injury  2/22/2023 2249 by Babita Quiñones  Outcome: Progressing  2/22/2023 2231 by Yoana Shepherd  Outcome: Progressing

## 2023-02-23 NOTE — PLAN OF CARE
Problem: Discharge Planning  Goal: Discharge to home or other facility with appropriate resources  2/23/2023 0945 by Zacarias Ghosh RN  Outcome: Progressing  2/22/2023 2249 by Thea Hassan  Outcome: Progressing  2/22/2023 2231 by Ambika Montoya  Outcome: Progressing     Problem: Pain  Goal: Verbalizes/displays adequate comfort level or baseline comfort level  2/23/2023 0945 by Zacarias Ghosh RN  Outcome: Progressing  2/22/2023 2249 by Thea Hassan  Outcome: Progressing  2/22/2023 2231 by Ambika Montoya  Outcome: Progressing     Problem: Safety - Adult  Goal: Free from fall injury  2/23/2023 0945 by Zacarias Ghosh RN  Outcome: Progressing  2/22/2023 2249 by Thea Hassan  Outcome: Progressing  2/22/2023 2231 by Ambika Montoya  Outcome: Progressing

## 2023-02-23 NOTE — PROGRESS NOTES
Pharmacy Consultation Note  (Antibiotic Dosing and Monitoring)    Initial consult date: 2/20/2023  Consulting physician/provider: JUAN Lauren  Drug: Vancomycin  Indication: Infected wound of nasal cavity    Age/  Gender Height Weight IBW  Allergy Information   52 y.o./male 6' 3\" (190.5 cm) 220 lb (99.8 kg)     Ideal body weight: 84.5 kg (186 lb 4.6 oz)  Adjusted ideal body weight: 89.9 kg (198 lb 2.8 oz)   Patient has no known allergies. Renal Function:  Recent Labs     02/20/23  1404 02/21/23  0200   BUN 15 16   CREATININE 1.1 1.1         Intake/Output Summary (Last 24 hours) at 2/23/2023 1126  Last data filed at 2/22/2023 1220  Gross per 24 hour   Intake 600 ml   Output --   Net 600 ml         Vancomycin Monitoring:  Trough:  No results for input(s): VANCOTROUGH in the last 72 hours. Random:    Recent Labs     02/22/23  0259 02/22/23  0606   VANCORANDOM 16.2 13.1         Recent Labs     02/20/23  1404   BLOODCULT2 24 Hours no growth          Historical Cultures:  Organism   Date Value Ref Range Status   11/23/2015 Staphylococcus aureus (A)  Final     Recent Labs     02/20/23  1404   BC 24 Hours no growth         Vancomycin Administration Times:  Recent vancomycin administrations                     vancomycin (VANCOCIN) 1,250 mg in sodium chloride 0.9 % 250 mL IVPB (mg) 1,250 mg New Bag 02/23/23 0634     1,250 mg New Bag 02/22/23 1831     1,250 mg New Bag  0714     1,250 mg New Bag 02/21/23 1944    vancomycin (VANCOCIN) 1,750 mg in sodium chloride 0.9 % 500 mL IVPB (mg) 1,750 mg New Bag 02/21/23 0703    vancomycin (VANCOCIN) 2000 mg in 0.9% sodium chloride 500 mL IVPB (mg) 2,000 mg New Bag 02/20/23 1929                  Assessment:  Patient is a 50 y.o. male who has been initiated on vancomycin  Estimated Creatinine Clearance: 96 mL/min (based on SCr of 1.1 mg/dL).   To dose vancomycin, pharmacy will be utilizing [employer] calculation software for goal AUC/GUMARO 400-600 mg/L-hr    Plan:   Will continue vancomycin 1250 mg IV every 12 hours  Will check vancomycin levels when appropriate  Will continue to monitor renal function   Pharmacy to follow    Tanja Blackmon PharmD, [facility]  2/23/2023  11:26 AM

## 2023-02-23 NOTE — PROCEDURES
PICC    Catheter insertion date: 2/23/2023     Product Number:  [id number]   Lot No: [id number]  EXP. DATE: 10/31/23   Gauge: 17   Lumen: single, 4.5 Fr   R Basilic    Vein Diameter: 0.46 cm   Arm circumference at insertion site: 34 cm   Catheter Length: 50 cm (5 cm cut from 55 cm)   Internal Length: 48 cm   External Catheter Length: 2 cm   Ultrasound Used: yes  VPS [place] confirms PICC tip is placed in the lower 1/3 of the SVC or at the Cavoatrial junction. Floor nurse notified PICC is okay to use.    : Alisa Cortez RN [facility]  ASSISTANT: Dc Alvarado RN

## 2023-02-23 NOTE — PROGRESS NOTES
OTOLARYNGOLOGY  DAILY PROGRESS NOTE  2023    Subjective:     Pt doing well, no acute events overnight. Looks improved today    Objective:     /74   Pulse 60   Temp 98 °F (36.7 °C) (Oral)   Resp 16   Ht 6' 3\" (1.905 m)   Wt 216 lb (98 kg)   SpO2 98%   BMI 27.00 kg/m²   PULSE OXIMETRY RANGE: SpO2  Av %  Min: 98 %  Max: 98 %  I/O last 3 completed shifts: In: 80 [P.O.:780]  Out: -     GENERAL:  Awake, alert, cooperative, no apparent distress  HENT: Lesion to caudal dorsum of nose s/p I&D, tender to palpation, minimal purulent drainage, no surrounding erythema or edema.  Intranasal patent    EYES: No sclera icterus, pupils equal  LUNGS:  No increased work of breathing, no stridor  CARDIOVASCULAR:  RR      Assessment/Plan:     50 y.o. male lesion to caudal dorsum of nose of unknown behavior s/p biopsy    - cultures pending  - antibiotics per ID  - topical wound care, wash with warm soap and water, apply bacitracin   - ENT will follow    Patient seen and examined by resident, discussed with attending     Electronically signed by Nicki Alonzo DO on 2023 at 7:07 AM

## 2023-02-23 NOTE — PROGRESS NOTES
[address] [place] [address] [facility]  Progress Note    SUBJECTIVE:  Chief Complaint   Patient presents with    Other     Sent by Dr Evon Ruiz for possible infection nose. Patient is tolerating medications. No reported adverse drug reactions. No nausea, vomiting, diarrhea. In no distress  Still reporting nasal drainage but decreased  No fevers     Review of systems:  As stated above in the chief complaint, otherwise negative. Medications:  Scheduled Meds:   vancomycin  1,250 mg IntraVENous Q12H    bacitracin zinc   Topical TID    sodium chloride flush  5-40 mL IntraVENous 2 times per day    enoxaparin  40 mg SubCUTAneous Daily     Continuous Infusions:   sodium chloride       PRN Meds:sodium chloride flush, sodium chloride, polyethylene glycol, acetaminophen **OR** acetaminophen, HYDROcodone 5 mg - acetaminophen    OBJECTIVE:  /77   Pulse 57   Temp 98.2 °F (36.8 °C) (Oral)   Resp 16   Ht 6' 3\" (1.905 m)   Wt 216 lb (98 kg)   SpO2 96%   BMI 27.00 kg/m²   Temp  Av.1 °F (36.7 °C)  Min: 98 °F (36.7 °C)  Max: 98.2 °F (36.8 °C)  Constitutional: The patient is awake, alert, and oriented. Sitting up in bed in no distress. Skin: Warm and dry. No rashes were noted. HEENT: Round and reactive pupils. Moist mucous membranes. No ulcerations or thrush. Nose is dressed. There is some edema to the forehead  Neck: Supple to movements. Chest: No use of accessory muscles to breathe. Symmetrical expansion. No wheezing, crackles or rhonchi. Cardiovascular: S1 and S2 are rhythmic and regular. No murmurs appreciated. Abdomen: Positive bowel sounds to auscultation. Benign to palpation. No masses felt. Extremities: No edema.   Lines: Peripheral.    Laboratory and Tests Review:  Lab Results   Component Value Date    WBC 7.3 2023    WBC 6.6 2023    WBC 7.3 2022    HGB 14.1 2023    HCT 41.9 2023    MCV 90.5 2023     2023     Lab Results Component Value Date    NEUTROABS 5.60 02/21/2023    NEUTROABS 4.52 02/20/2023    NEUTROABS 5.45 04/04/2022     No results found for: CRPHS  Lab Results   Component Value Date    ALT 14 02/20/2023    AST 13 02/20/2023    ALKPHOS 99 02/20/2023    BILITOT 0.8 02/20/2023     Lab Results   Component Value Date/Time     02/21/2023 02:00 AM    K 3.9 02/21/2023 02:00 AM     02/21/2023 02:00 AM    CO2 26 02/21/2023 02:00 AM    BUN 16 02/21/2023 02:00 AM    CREATININE 1.1 02/21/2023 02:00 AM    CREATININE 1.1 02/20/2023 02:04 PM    CREATININE 1.1 04/04/2022 08:17 AM    GFRAA >60 04/04/2022 08:17 AM    LABGLOM >60 02/21/2023 02:00 AM    GLUCOSE 98 02/21/2023 02:00 AM    PROT 7.7 02/20/2023 02:04 PM    LABALBU 4.4 02/20/2023 02:04 PM    CALCIUM 8.7 02/21/2023 02:00 AM    BILITOT 0.8 02/20/2023 02:04 PM    ALKPHOS 99 02/20/2023 02:04 PM    AST 13 02/20/2023 02:04 PM    ALT 14 02/20/2023 02:04 PM     Lab Results   Component Value Date    CRP <0.3 02/20/2023     Lab Results   Component Value Date    SEDRATE 13 02/20/2023     Radiology:  Reviewed     Microbiology:   Blood cultures: negative so far  Wound culture nose:  2 different CoNS and possibly a corynebacterium  Fungal culture: pending     No results for input(s): PROCAL in the last 72 hours. 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 I had a face to face encounter with the patient. Agree with exam.  Assessment and plan as outlined above and directed by me. Addition and corrections were done as deemed appropriate. My exam and plan include: The patient is tolerating antibiotic. There is less swelling on the bridge of the nose. He would prefer to come to the infusion center for once daily antibiotic dosing. We will ask case management if this is possible.   If it is we will change Vancomycin over to Daptomycin. He is agreeable to have a PICC. Spoke with nursing. Informed Consent for PICC:  I explained the risks, benefits, alternative options, and potential complications associated with insertion of Peripherally Inserted Central Catheter (PICC) with the patient prior to the procedure.     Electronically signed by Santo Juarez MD on 2/23/2023 at 12:57 PM     Santo Juarez MD  2/23/2023  12:56 PM

## 2023-02-23 NOTE — DISCHARGE INSTRUCTIONS
[address] [place] [address] [facility]  ([address])  [address] [facility]  [facility]  [place], [address]  Phone [phone]   Fax [phone]    Simon Esqueda. Judge Vee MD, MD Mayco Martinez, MD Evon Jones, MD Carlos Sy, MD Son Griggs, MD Tara Romero, CNS   Ping Guillory, APRN, CNS  Elida Short, APRN-CNP    Jenna Kirby, APRN, CNS  Sabrina Howard, APRN-CNP   Mehreen Johnson MD               STANDING ORDERS (ID Protocol)     Visiting nurses are to write the Primary Care Physician and their own call back number on all laboratory requisition forms. Abnormal lab values are called to the physician by the nurse and NOT by the laboratory. Fax all labs to the office in a timely manner, during office hours. All faxes should include nurses name and call back number. Vascular Access Devices or VADs (TLC, PICC, Midline, etc) will be replaced as necessary. Draw all blood work from VADs, except for drug levels. If unable to access a VAD, insert a peripheral catheter temporarily. Contact the Primary Care Physician or [facility] office for surgical referral.  Use tPA ([place]) as per agency protocol to restore patency of VAD. Saline flush 10ml or heparin flush 10U/cc IV daily and as needed to maintain line patency. Remove VAD upon completion of IV antibiotics, unless otherwise specified by the ordering physician. If VAD cannot be removed, schedule appointment at office for removal.  If VAD was placed by Radiology, schedule appointment for removal.  Notify ordering physician or office if patient requires admission to the hospital with reason for admission. Discontinue all blood work upon completion of IV antibiotics, unless otherwise specified by ordering physician. Notify ordering physician if the patient does not receive the scheduled antibiotic for 24 hours or more.   The Pharmacy and [address] [place] [address] may adjust the timing of the infusion and blood work to accommodate the patients home care conditions. When PICC or VAD is to be removed, documentation of length of inserted PICC. PICC or VAD length is to correlate with inserted length and sent to physician at the time of removal.  Give the patient a list of antibiotics being administered with:  Drug name  Route  Frequency  Start/Stop date      ROUTINE LABS TO BE DRAWN/ORDERED:  Twice weekly (preferably every Monday & Thursday):  CMP  Complete Blood Count with differential (CBC with dif)  Once weekly:  C-Reactive Protein (not high sensitivity CRP)  Erythrocyte/Westergren Sedimentation Rate (WSR or ESR)  Total CPK for patients on Daptomycin (Cubicin®). Obtain CPK more often if the patient is experiencing muscle weakness or myalgias. Clinical Pharmacist is to adjust Vancomycin and Aminoglycosides. Clinical pharmacist is  encouraged to follow: \"Therapeutic Monitoring of Vancomycin for Serious Methicillin-resistant Staphylococcus aureus Infections: A Revised Consensus Guideline and Review by the American Society of Health-system Pharmacists, the Infectious Diseases Society of [facility] Virgen Arthur, the Pediatric Infectious Diseases Society, and the Society of Infectious Diseases Pharmacists\" (https://doi.org/10.1093/aureliano/nuur947). If a clinical calculator is not available, clinical pharmacist is to follow the orders below:  Draw Vancomycin trough 30 minutes before the third dose  After starting drug, or   After the dosing or interval is changed. If the trough level is between 5 and 20 continue dose as ordered. Draw troughs twice weekly thereafter until troughs are stable (i.e. until trough is between 10 and 20 mcg/ml for two consecutive laboratory values). Once stable check troughs once weekly or every third dose. Please do not call physician unless the trough is < 5 or >20. If the trough is <20 continue dosing as ordered. If the trough is >20 call the office for further orders.   Do not hold the dose while waiting for the trough result. Amingoglycosides (e.g. Gentamicin, Tobramycin and Amikacin) peaks and troughs should be drawn twice weekly (preferably on Mondays and Thursdays) or every third dose. Aminoglycoside peaks are not to be drawn if patient on Once-Daily Dosing (ODD). Call physician or office if the trough is:     >1 for gentamicin,   >2 for tobramycin, or   >5 for amikacin  When clinically indicated obtain:  Urine culture. If the patient has a fever with purulent drainage from [place] or suprapubic catheter, or foul smelling urine. Do not irrigate a clogged [place] catheter. Replace it. Blood cultures and Wound Gram stain with culture & sensitivity. If the patient has a fever or increasing drainage or foul odor from a wound. Notify the treating physician in a timely manner  Stool specimen. If diarrhea occurs while on antibiotics, send stools for C. difficile and WBCs. When a drug is discontinued due to a low white blood cell count (WBCs) draw two consecutive CBC with differential and BUN, Ceatinine. ALLERGIC OR ADVERSE REACTIONS TO MEDICATIONS  Mild reaction: (itching, with or without rash):  Administer Benadryl 50mg po x 1, then 25mg po q6h prn. Notify office or physician in a timely matter. Moderate reaction (itching with or without rash and/or wheezing, dyspnea, itchy throat):  Administer Benadryl 50 mg IV push x 1. Notify office or physician in a timely manner. Severe reaction i.e. Anaphylaxis (wheezing or stidor, sudden rash, lightheadedness, hypotension):  Administer epinephrine subcutaneous 0.3mg (1:1000) x 1 dose. May repeat twice every 5 minutes if needed. Call EMS and notify office or physician immediately. For all above reactions: administer Solu-Cortef 100mg slow IV push x 1. VASCULAR ACCESS DRESSING CHANGE PROTOCOL  Cleanse insertion site with ChlorPrep® or equivalent three times. Secure catheter with Steri-Strips®, Bone®, or equivalent securing device.   Apply Opsite® 3000 or equivalent transparent dressing. Change dressing twice weekly, maintaining sterile technique. If there is a BioPatch®, SilverSite® or equivalent, change once weekly only or as needed. FOLLOW-UP VISITS  Nursing staff should call the office during business hours to schedule a follow-up appointment once the patient is admitted to the service or facility. Every effort should be made to have patient follow-up within 2 weeks of discharge. Exception is made for ventilator-dependent patients. Continue IV antibiotic therapy until patient is seen in the office or unless specific stop date is noted on the original order or unless otherwise ordered by physician. Call office to ensure stop date is correct before stopping antibiotics.         Radha Torrez MD

## 2023-02-23 NOTE — PATIENT CARE CONFERENCE
[facility] Quality Flow/Interdisciplinary Rounds Progress Note        Quality Flow Rounds held on February 23, 2023    Disciplines Attending:  Bedside Nurse, , , and Nursing Unit Leadership    Ruthy Tobias was admitted on 2/20/2023  1:32 PM    Anticipated Discharge Date:       Disposition:    Faustino Score:  Faustino Scale Score: 22    Readmission Risk              Risk of Unplanned Readmission:  5           Discussed patient goal for the day, patient clinical progression, and barriers to discharge.   The following Goal(s) of the Day/Commitment(s) have been identified:  Labs - Report Results      Beth Weathers RN  February 23, 2023

## 2023-02-23 NOTE — CARE COORDINATION
Notes reviewed. Call placed to [facility], left VM with preliminary referral information. Requested start of care 2/25/2023. Will await call back. Once script is written will fax to [phone]. Lizabeth Leon. XAVIER Krueger RN  [facility] Case Management  [phone]    Call back from infusion, patient placed on schedule for 2/25/23. Once script is written will need faxed to [address] [place] [address].  XAVIER Krueger RN  [facility] Case Management  [phone]

## 2023-02-23 NOTE — PROGRESS NOTES
[facility]                                Progress note    Subjective: The patient is awake and alert. Resting comfortably in bed   No acute issues or events overnight    Objective:    /77   Pulse 57   Temp 98.2 °F (36.8 °C) (Oral)   Resp 16   Ht 6' 3\" (1.905 m)   Wt 216 lb (98 kg)   SpO2 96%   BMI 27.00 kg/m²     In: 780 [P.O.:780]  Out: -   In: 780   Out: -     General appearance: NAD, conversant  HEENT: AT/NC, MMM  Neck: FROM, supple  Lungs: Clear to auscultation  CV: RRR, no MRGs  Vasc: Radial pulses 2+  Abdomen: Soft, non-tender; no masses or HSM  Extremities: No peripheral edema or digital cyanosis  Skin:     Psych: Alert and oriented to person, place and time  Neuro: Alert and interactive     Recent Labs     02/20/23  1404 02/21/23  0200   WBC 6.6 7.3   HGB 16.2 14.1   HCT 48.1 41.9    199         Recent Labs     02/20/23  1404 02/21/23  0200    136   K 4.0 3.9    102   CO2 27 26   BUN 15 16   CREATININE 1.1 1.1   CALCIUM 9.8 8.7         Assessment:    Principal Problem:    Open wound of nasal cavity, initial encounter  Resolved Problems:    * No resolved hospital problems.  *      Plan:  Patient is a 50year old male admitted to med/surg for   Open wound of the nasal cavity-concern for fistula creation  -Monitor labs  -s/p bedside I&D by ENT today  -cultures pending  -ID and ENT following  -IV vancomycin pending pan culture results, MRSA noted on outpatient basis, treated with Bactroban and had courses of 3 or 4 different antibiotics-as outpatient  -She will likely require long-term antibiotic therapy  -No systemic signs or symptoms  -Labs and vitals stable     2/22/23:  -Await final abx plans from ID  - Final cultures still pending   -Vitals stable  -continue IV vanco   -Vitals and labs unremarkable    2/23/23:  -Awaiting final cultures for narrowing of abx for discharge  -Continue with wound care  -Vitals remain stable    Code status: Full  Consultants: ENT, ID     DVT Prophylaxis Lovenox   PT/OT  Discharge planning       OLYA Paez CNP  10:52 AM  2/23/2023     Above note edited to reflect my thoughts     I personally saw, examined and provided care for the patient. Radiographs, labs and medication list were reviewed by me independently. The case was discussed in detail and plans for care were established. Review of JUAN Paez   , documentation was conducted and revisions were made as appropriate directly by me. I agree with the above documented exam, problem list, and plan of care.      Gómez Hickman MD  5:42 PM  2/23/2023

## 2023-02-24 VITALS
WEIGHT: 217.25 LBS | HEART RATE: 52 BPM | TEMPERATURE: 97.7 F | DIASTOLIC BLOOD PRESSURE: 82 MMHG | OXYGEN SATURATION: 95 % | RESPIRATION RATE: 16 BRPM | HEIGHT: 75 IN | BODY MASS INDEX: 27.01 KG/M2 | SYSTOLIC BLOOD PRESSURE: 130 MMHG

## 2023-02-24 LAB
ALBUMIN SERPL-MCNC: 3.7 G/DL (ref 3.5–5.2)
ALP BLD-CCNC: 87 U/L (ref 40–129)
ALT SERPL-CCNC: 15 U/L (ref 0–40)
ANION GAP SERPL CALCULATED.3IONS-SCNC: 8 MMOL/L (ref 7–16)
AST SERPL-CCNC: 12 U/L (ref 0–39)
BASOPHILS ABSOLUTE: 0.07 E9/L (ref 0–0.2)
BASOPHILS RELATIVE PERCENT: 0.9 % (ref 0–2)
BILIRUB SERPL-MCNC: 0.6 MG/DL (ref 0–1.2)
BUN BLDV-MCNC: 15 MG/DL (ref 6–20)
CALCIUM SERPL-MCNC: 8.9 MG/DL (ref 8.6–10.2)
CHLORIDE BLD-SCNC: 102 MMOL/L (ref 98–107)
CO2: 26 MMOL/L (ref 22–29)
CREAT SERPL-MCNC: 1.2 MG/DL (ref 0.7–1.2)
EOSINOPHILS ABSOLUTE: 0.27 E9/L (ref 0.05–0.5)
EOSINOPHILS RELATIVE PERCENT: 3.5 % (ref 0–6)
FUNGUS STAIN: NORMAL
GFR SERPL CREATININE-BSD FRML MDRD: >60 ML/MIN/1.73
GLUCOSE BLD-MCNC: 104 MG/DL (ref 74–99)
HCT VFR BLD CALC: 44.2 % (ref 37–54)
HEMOGLOBIN: 14.8 G/DL (ref 12.5–16.5)
IMMATURE GRANULOCYTES #: 0.03 E9/L
IMMATURE GRANULOCYTES %: 0.4 % (ref 0–5)
LYMPHOCYTES ABSOLUTE: 1.73 E9/L (ref 1.5–4)
LYMPHOCYTES RELATIVE PERCENT: 22.7 % (ref 20–42)
MCH RBC QN AUTO: 30.7 PG (ref 26–35)
MCHC RBC AUTO-ENTMCNC: 33.5 % (ref 32–34.5)
MCV RBC AUTO: 91.7 FL (ref 80–99.9)
MONOCYTES ABSOLUTE: 0.59 E9/L (ref 0.1–0.95)
MONOCYTES RELATIVE PERCENT: 7.7 % (ref 2–12)
NEUTROPHILS ABSOLUTE: 4.93 E9/L (ref 1.8–7.3)
NEUTROPHILS RELATIVE PERCENT: 64.8 % (ref 43–80)
ORGANISM: ABNORMAL
PDW BLD-RTO: 12.8 FL (ref 11.5–15)
PLATELET # BLD: 221 E9/L (ref 130–450)
PMV BLD AUTO: 10 FL (ref 7–12)
POTASSIUM SERPL-SCNC: 3.8 MMOL/L (ref 3.5–5)
RBC # BLD: 4.82 E12/L (ref 3.8–5.8)
SODIUM BLD-SCNC: 136 MMOL/L (ref 132–146)
TOTAL PROTEIN: 6.6 G/DL (ref 6.4–8.3)
WBC # BLD: 7.6 E9/L (ref 4.5–11.5)
WOUND/ABSCESS: ABNORMAL

## 2023-02-24 PROCEDURE — 36415 COLL VENOUS BLD VENIPUNCTURE: CPT

## 2023-02-24 PROCEDURE — 6360000002 HC RX W HCPCS: Performed by: NURSE PRACTITIONER

## 2023-02-24 PROCEDURE — 80053 COMPREHEN METABOLIC PANEL: CPT

## 2023-02-24 PROCEDURE — A4216 STERILE WATER/SALINE, 10 ML: HCPCS | Performed by: REGISTERED NURSE

## 2023-02-24 PROCEDURE — 6370000000 HC RX 637 (ALT 250 FOR IP): Performed by: NURSE PRACTITIONER

## 2023-02-24 PROCEDURE — 2580000003 HC RX 258: Performed by: REGISTERED NURSE

## 2023-02-24 PROCEDURE — 2580000003 HC RX 258: Performed by: SPECIALIST

## 2023-02-24 PROCEDURE — 6360000002 HC RX W HCPCS: Performed by: REGISTERED NURSE

## 2023-02-24 PROCEDURE — 2580000003 HC RX 258: Performed by: NURSE PRACTITIONER

## 2023-02-24 PROCEDURE — 85025 COMPLETE CBC W/AUTO DIFF WBC: CPT

## 2023-02-24 PROCEDURE — 6360000002 HC RX W HCPCS: Performed by: SPECIALIST

## 2023-02-24 RX ORDER — BACITRACIN ZINC 500 [USP'U]/G
OINTMENT TOPICAL
Qty: 30 G | Refills: 1 | Status: SHIPPED | OUTPATIENT
Start: 2023-02-24 | End: 2023-03-06

## 2023-02-24 RX ORDER — DIPHENHYDRAMINE HCL 25 MG
50 TABLET ORAL
Status: CANCELLED
Start: 2023-02-25

## 2023-02-24 RX ORDER — EPINEPHRINE 1 MG/ML
0.3 INJECTION, SOLUTION, CONCENTRATE INTRAVENOUS PRN
Status: CANCELLED | OUTPATIENT
Start: 2023-02-25

## 2023-02-24 RX ORDER — HEPARIN SODIUM (PORCINE) LOCK FLUSH IV SOLN 100 UNIT/ML 100 UNIT/ML
300 SOLUTION INTRAVENOUS PRN
Status: CANCELLED | OUTPATIENT
Start: 2023-02-25

## 2023-02-24 RX ORDER — DIPHENHYDRAMINE HYDROCHLORIDE 50 MG/ML
50 INJECTION INTRAMUSCULAR; INTRAVENOUS
Status: CANCELLED | OUTPATIENT
Start: 2023-02-25

## 2023-02-24 RX ORDER — SODIUM CHLORIDE 0.9 % (FLUSH) 0.9 %
5-40 SYRINGE (ML) INJECTION PRN
Status: CANCELLED | OUTPATIENT
Start: 2023-02-25

## 2023-02-24 RX ADMIN — SODIUM CHLORIDE, PRESERVATIVE FREE 10 ML: 5 INJECTION INTRAVENOUS at 12:03

## 2023-02-24 RX ADMIN — VANCOMYCIN HYDROCHLORIDE 1250 MG: 10 INJECTION, POWDER, LYOPHILIZED, FOR SOLUTION INTRAVENOUS at 06:21

## 2023-02-24 RX ADMIN — SODIUM CHLORIDE, PRESERVATIVE FREE 1000 MG: 5 INJECTION INTRAVENOUS at 14:41

## 2023-02-24 RX ADMIN — Medication: at 14:42

## 2023-02-24 RX ADMIN — HYDROCODONE BITARTRATE AND ACETAMINOPHEN 1 TABLET: 5; 325 TABLET ORAL at 02:09

## 2023-02-24 RX ADMIN — Medication: at 08:53

## 2023-02-24 RX ADMIN — DAPTOMYCIN 600 MG: 500 INJECTION, POWDER, LYOPHILIZED, FOR SOLUTION INTRAVENOUS at 12:02

## 2023-02-24 RX ADMIN — Medication 300 UNITS: at 08:55

## 2023-02-24 RX ADMIN — SODIUM CHLORIDE, PRESERVATIVE FREE 10 ML: 5 INJECTION INTRAVENOUS at 08:55

## 2023-02-24 RX ADMIN — SODIUM CHLORIDE, PRESERVATIVE FREE 10 ML: 5 INJECTION INTRAVENOUS at 14:41

## 2023-02-24 ASSESSMENT — PAIN SCALES - GENERAL
PAINLEVEL_OUTOF10: 8
PAINLEVEL_OUTOF10: 0

## 2023-02-24 ASSESSMENT — PAIN DESCRIPTION - LOCATION: LOCATION: ARM;NOSE

## 2023-02-24 ASSESSMENT — PAIN DESCRIPTION - ORIENTATION: ORIENTATION: RIGHT

## 2023-02-24 ASSESSMENT — PAIN DESCRIPTION - DESCRIPTORS: DESCRIPTORS: SHARP

## 2023-02-24 NOTE — PROGRESS NOTES
Pharmacy Consultation Note  (Antibiotic Dosing and Monitoring)    Initial consult date: 2/20/2023  Consulting physician/provider: JUAN Lauren  Drug: Vancomycin  Indication: Infected wound of nasal cavity    Note vancomycin discontinued. Clinical pharmacy will sign-off. Please re-consult if we can be of further assistance.     Denice Rader [facility], PharmD, [facility]  2/24/2023  12:24 PM

## 2023-02-24 NOTE — PATIENT CARE CONFERENCE
P Quality Flow/Interdisciplinary Rounds Progress Note        Quality Flow Rounds held on February 24, 2023    Disciplines Attending:  Bedside Nurse, , , and Nursing Unit Leadership    Ruthy Tobias was admitted on 2/20/2023  1:32 PM    Anticipated Discharge Date:       Disposition:    Faustino Score:  Faustino Scale Score: 21    Readmission Risk              Risk of Unplanned Readmission:  6           Discussed patient goal for the day, patient clinical progression, and barriers to discharge.   The following Goal(s) of the Day/Commitment(s) have been identified:  Discharge - Obtain Order      Esvin Hanley RN  February 24, 2023

## 2023-02-24 NOTE — PROGRESS NOTES
OTOLARYNGOLOGY  DAILY PROGRESS NOTE  2023    Subjective:     Pt doing well, no acute events overnight. Looks improved today    Objective:     /67   Pulse 59   Temp 97.6 °F (36.4 °C) (Oral)   Resp 16   Ht 6' 3\" (1.905 m)   Wt 217 lb 4 oz (98.5 kg)   SpO2 96%   BMI 27.15 kg/m²   PULSE OXIMETRY RANGE: SpO2  Av %  Min: 96 %  Max: 96 %  No intake/output data recorded. GENERAL:  Awake, alert, cooperative, no apparent distress  HENT: Lesion to caudal dorsum of nose s/p I&D, tender to palpation, minimal purulent drainage, no surrounding erythema or edema. Intranasal patent    EYES: No sclera icterus, pupils equal  LUNGS:  No increased work of breathing, no stridor  CARDIOVASCULAR:  RR      Assessment/Plan:     50 y.o. male lesion to caudal dorsum of nose of unknown behavior s/p biopsy    - prelim cultures staph  - PICC line placed  - topical wound care, wash with warm soap and water, apply bacitracin   - follow up with Dr. Yokasta Curtis in 1 week. - anticipate discharge today. - ENT will sign off.      Patient seen and examined by resident, discussed with attending     Electronically signed by Gigi West DO on 2023 at 7:10 AM

## 2023-02-24 NOTE — DISCHARGE SUMMARY
[facility]   Discharge summary   Patient ID:  Louisa Beal  [id number]  50 y.o.  1970    Admit date: 2/20/2023    Discharge date and time: 2/24/2023    Admission Diagnoses:   Patient Active Problem List   Diagnosis    Carpal tunnel syndrome of left wrist    Open wound of nasal cavity, initial encounter       Discharge Diagnoses: Nasal lesion-chondritis and possible osteomyelitis, Staph epidermidis/Staph capitis    Consults: ENT and infectious disease    Procedures: Biopsy of lesion and drainage of lesion    Hospital Course:   Patient is a 50year old male admitted to med/surg for   Open wound of the nasal cavity-concern for fistula creation  -Monitor labs  -s/p bedside I&D by ENT today  -cultures pending  -ID and ENT following  -IV vancomycin pending pan culture results, MRSA noted on outpatient basis, treated with Bactroban and had courses of 3 or 4 different antibiotics-as outpatient  -She will likely require long-term antibiotic therapy  -No systemic signs or symptoms  -Labs and vitals stable      2/22/23:  -Await final abx plans from ID  - Final cultures still pending   -Vitals stable  -continue IV vanco   -Vitals and labs unremarkable     2/23/23:  -Awaiting final cultures for narrowing of abx for discharge  -Continue with wound care  -Vitals remain stable    2/24/2023:  PICC line in place, Staph epidermidis and Staph capitis  Antibiotics per infectious disease if deemed necessary  Follow instructions by ENT for care of lesions/nose  Okay for discharge from medicine standpoint  Follow-up with PCP in 1 week     Code status: Full  Consultants: ENT, ID     DVT Prophylaxis Lovenox   PT/OT  Discharge planning         OLYA Crandall      Recent Labs     02/24/23  0215   WBC 7.6   HGB 14.8   HCT 44.2          Recent Labs     02/24/23  0215      K 3.8      CO2 26   BUN 15   CREATININE 1.2   CALCIUM 8.9       CT SOFT TISSUE NECK W CONTRAST    Result Date: 2/20/2023  EXAMINATION: CT OF THE NECK SOFT TISSUE WITH CONTRAST  2/20/2023 TECHNIQUE: CT of the neck was performed with the administration of intravenous contrast. Multiplanar reformatted images are provided for review. Automated exposure control, iterative reconstruction, and/or weight based adjustment of the mA/kV was utilized to reduce the radiation dose to as low as reasonably achievable. COMPARISON: CT facial bones performed 11/26/2022. HISTORY: ORDERING SYSTEM PROVIDED HISTORY: Carbuncle on nares, redness, concern for destruction of cartilage TECHNOLOGIST PROVIDED HISTORY: Reason for exam:->Carbuncle on nares, redness, concern for destruction of cartilage Decision Support Exception - unselect if not a suspected or confirmed emergency medical condition->Emergency Medical Condition (MA) FINDINGS: PHARYNX/LARYNX:  The palatine tonsils are normal in appearance. The tongue is normal in appearance. The valleculae, epiglottis, aryepiglottic folds and pyriform sinuses appear unremarkable. The true and false vocal cords are normal in appearance. No mass or abscess is seen. SALIVARY GLANDS/THYROID:  The parotid and submandibular glands appear unremarkable. The thyroid gland appears unremarkable. LYMPH NODES:  No cervical or supraclavicular lymphadenopathy is seen. SOFT TISSUES:  No appreciable soft tissue swelling or mass is seen. BRAIN/ORBITS/SINUSES:  The visualized portion of the intracranial contents appear unremarkable. The visualized portion of the orbits and mastoid air cells demonstrate no acute abnormality. There is mucosal thickening left sphenoid sinus. LUNG APICES/SUPERIOR MEDIASTINUM:  No focal consolidation is seen within the visualized lung apices. No superior mediastinal lymphadenopathy or mass. The visualized portion of the trachea appears unremarkable. BONES:  No aggressive appearing lytic or blastic bony lesion. No acute abnormality of the soft tissue structures of the neck.  Chronic sinusitis involving the left sphenoid sinus. Discharge Exam:    HEENT: NCAT,  PERRLA, No JVD-nasal packing in place  Heart:  RRR, no murmurs, gallops, or rubs. Lungs:  CTA bilaterally, no wheeze, rales or rhonchi  Abd: bowel sounds present, nontender, nondistended, no masses  Extrem:  No clubbing, cyanosis, or edema    Disposition: home     Patient Condition at Discharge: Stable    Patient Instructions:      Medication List        START taking these medications      bacitracin zinc 500 UNIT/GM ointment  Apply topically 2 times daily. CONTINUE taking these medications      acetaminophen 500 MG tablet  Commonly known as: TYLENOL  Take 2 tablets by mouth every 8 hours as needed for Pain     calcium carbonate 500 MG chewable tablet  Commonly known as: TUMS     ibuprofen 200 MG tablet  Commonly known as: ADVIL;MOTRIN            STOP taking these medications      Omeprazole 20 MG Tbdd               Where to Get Your Medications        These medications were sent to [facility], [address] [place] [address]      Phone: [phone]   bacitracin zinc 500 UNIT/GM ointment       Activity: activity as tolerated  Diet: regular diet    Pt has been advised to: Follow-up with Lloyd Puckett MD in 1 week.   Follow-up with consultants as recommended by them    Note that over 30 minutes was spent in preparing discharge papers, discussing discharge with patient, medication review, etc.    Signed:  Dustin Chen MD  2/24/2023  11:19 AM

## 2023-02-24 NOTE — CARE COORDINATION
IV atb scripts faxed to  [facility] infusion center. Confirmed receipt with [facility]. Patient has been scheduled for 2/25/2023 at 0800, arrive 0745. AVS updated. Virginia Bailey.  Pati, MSN RN  [facility] Case Management  [phone]

## 2023-02-24 NOTE — PROGRESS NOTES
[address] [place] [address] [facility]  Progress Note    SUBJECTIVE:  Chief Complaint   Patient presents with    Other     Sent by Dr Kishan Maher for possible infection nose. Patient is tolerating medications. No reported adverse drug reactions. No nausea, vomiting, diarrhea. Sitting up in bed. Says PICC was sore last night but better today   Drainage is slowing down in nose, some tan drainage on dressing     Review of systems:  As stated above in the chief complaint, otherwise negative. Medications:  Scheduled Meds:   daptomycin (CUBICIN) in NS IV syringe  6 mg/kg IntraVENous Q24H    sodium chloride flush  5-40 mL IntraVENous 2 times per day    heparin flush  3 mL IntraVENous 2 times per day    bacitracin zinc   Topical TID    sodium chloride flush  5-40 mL IntraVENous 2 times per day    enoxaparin  40 mg SubCUTAneous Daily     Continuous Infusions:   sodium chloride      sodium chloride       PRN Meds:sodium chloride flush, sodium chloride, heparin flush, sodium chloride flush, sodium chloride, polyethylene glycol, acetaminophen **OR** acetaminophen, HYDROcodone 5 mg - acetaminophen    OBJECTIVE:  /82   Pulse 52   Temp 97.7 °F (36.5 °C) (Oral)   Resp 16   Ht 6' 3\" (1.905 m)   Wt 217 lb 4 oz (98.5 kg)   SpO2 95%   BMI 27.15 kg/m²   Temp  Av.7 °F (36.5 °C)  Min: 97.6 °F (36.4 °C)  Max: 97.7 °F (36.5 °C)  Constitutional: The patient is awake, alert, and oriented. Sitting up in bed in no distress. wife present   Skin: Warm and dry. No rashes were noted. HEENT: Round and reactive pupils. Moist mucous membranes. No ulcerations or thrush. Nose is dressed. There is some edema to the forehead  Neck: Supple to movements. Chest: No use of accessory muscles to breathe. Symmetrical expansion. No wheezing, crackles or rhonchi. Cardiovascular: S1 and S2 are rhythmic and regular. No murmurs appreciated. Abdomen: Positive bowel sounds to auscultation. Benign to palpation.  No masses felt.  Extremities: No edema. Lines: PICC line right arm 2/23/23 50cm    Laboratory and Tests Review:  Lab Results   Component Value Date    WBC 7.6 02/24/2023    WBC 7.3 02/21/2023    WBC 6.6 02/20/2023    HGB 14.8 02/24/2023    HCT 44.2 02/24/2023    MCV 91.7 02/24/2023     02/24/2023     Lab Results   Component Value Date    NEUTROABS 4.93 02/24/2023    NEUTROABS 5.60 02/21/2023    NEUTROABS 4.52 02/20/2023     No results found for: CRPHS  Lab Results   Component Value Date    ALT 15 02/24/2023    AST 12 02/24/2023    ALKPHOS 87 02/24/2023    BILITOT 0.6 02/24/2023     Lab Results   Component Value Date/Time     02/24/2023 02:15 AM    K 3.8 02/24/2023 02:15 AM    K 3.9 02/21/2023 02:00 AM     02/24/2023 02:15 AM    CO2 26 02/24/2023 02:15 AM    BUN 15 02/24/2023 02:15 AM    CREATININE 1.2 02/24/2023 02:15 AM    CREATININE 1.1 02/21/2023 02:00 AM    CREATININE 1.1 02/20/2023 02:04 PM    GFRAA >60 04/04/2022 08:17 AM    LABGLOM >60 02/24/2023 02:15 AM    GLUCOSE 104 02/24/2023 02:15 AM    PROT 6.6 02/24/2023 02:15 AM    LABALBU 3.7 02/24/2023 02:15 AM    CALCIUM 8.9 02/24/2023 02:15 AM    BILITOT 0.6 02/24/2023 02:15 AM    ALKPHOS 87 02/24/2023 02:15 AM    AST 12 02/24/2023 02:15 AM    ALT 15 02/24/2023 02:15 AM     Lab Results   Component Value Date    CRP <0.3 02/20/2023     Lab Results   Component Value Date    SEDRATE 13 02/20/2023     Radiology:  Reviewed     Microbiology:   Blood cultures: negative so far  Wound culture nose Staphylococcus capitis, Staph epi x 2 morphologies   Anaerobic culture is +   Fungal culture: pending     No results for input(s): PROCAL in the last 72 hours.     ASSESSMENT:  Chronic nasal chondritis  Possible osteomyelitis of the nose    PLAN:  Change antibiotics to Daptomycin so patient can utilize the infusion center on discharge   Will positive anaerobic culture will add Ertapenem until cultures are finalized   Will need at least 4 weeks of antibiotics   Check final cultures  Labs reviewed  Ok to discharge from ID standpoint - follow up in our office     OLYA Dalton CNP  12:18 PM  2/24/2023     Patient seen and examined. I had a face to face encounter with the patient. Agree with exam.  Assessment and plan as outlined above and directed by me. Addition and corrections were done as deemed appropriate. My exam and plan include: Patient is tolerating antibiotics. Surprisingly, cultures growing in the anaerobic plates. Corynebacterium can do this. We will add Ertapenem for now. If anaerobes are not recovered, ertapenem can be discontinued. He can be discharged from my standpoint. Instructed to call the office and make a follow-up appointment. Spoke with wife.     Vijaya Salazar MD  2/24/2023  1:13 PM

## 2023-02-25 ENCOUNTER — HOSPITAL ENCOUNTER (OUTPATIENT)
Dept: INFUSION THERAPY | Age: 53
Setting detail: INFUSION SERIES
Discharge: HOME | End: 2023-02-25
Payer: COMMERCIAL

## 2023-02-25 VITALS
SYSTOLIC BLOOD PRESSURE: 125 MMHG | OXYGEN SATURATION: 98 % | TEMPERATURE: 97.5 F | DIASTOLIC BLOOD PRESSURE: 73 MMHG | HEART RATE: 53 BPM | RESPIRATION RATE: 16 BRPM

## 2023-02-25 DIAGNOSIS — S01.20XA OPEN WOUND OF NASAL CAVITY, INITIAL ENCOUNTER: Primary | ICD-10-CM

## 2023-02-25 LAB
BLOOD CULTURE, ROUTINE: NORMAL
CULTURE, BLOOD 2: NORMAL

## 2023-02-25 PROCEDURE — A4216 STERILE WATER/SALINE, 10 ML: HCPCS | Performed by: REGISTERED NURSE

## 2023-02-25 PROCEDURE — 96375 TX/PRO/DX INJ NEW DRUG ADDON: CPT

## 2023-02-25 PROCEDURE — 96374 THER/PROPH/DIAG INJ IV PUSH: CPT

## 2023-02-25 PROCEDURE — 6360000002 HC RX W HCPCS: Performed by: REGISTERED NURSE

## 2023-02-25 PROCEDURE — 2580000003 HC RX 258: Performed by: REGISTERED NURSE

## 2023-02-25 RX ORDER — DIPHENHYDRAMINE HYDROCHLORIDE 50 MG/ML
50 INJECTION INTRAMUSCULAR; INTRAVENOUS
Status: CANCELLED | OUTPATIENT
Start: 2023-02-26

## 2023-02-25 RX ORDER — EPINEPHRINE 1 MG/ML
0.3 INJECTION, SOLUTION, CONCENTRATE INTRAVENOUS PRN
Status: CANCELLED | OUTPATIENT
Start: 2023-02-26

## 2023-02-25 RX ORDER — HEPARIN SODIUM (PORCINE) LOCK FLUSH IV SOLN 100 UNIT/ML 100 UNIT/ML
300 SOLUTION INTRAVENOUS PRN
Status: DISCONTINUED | OUTPATIENT
Start: 2023-02-25 | End: 2023-02-26 | Stop reason: HOSPADM

## 2023-02-25 RX ORDER — SODIUM CHLORIDE 0.9 % (FLUSH) 0.9 %
5-40 SYRINGE (ML) INJECTION PRN
Status: DISCONTINUED | OUTPATIENT
Start: 2023-02-25 | End: 2023-02-26 | Stop reason: HOSPADM

## 2023-02-25 RX ORDER — DIPHENHYDRAMINE HCL 25 MG
50 TABLET ORAL
Status: CANCELLED
Start: 2023-02-26

## 2023-02-25 RX ADMIN — SODIUM CHLORIDE, PRESERVATIVE FREE 10 ML: 5 INJECTION INTRAVENOUS at 08:20

## 2023-02-25 RX ADMIN — SODIUM CHLORIDE 600 MG: 9 INJECTION, SOLUTION INTRAMUSCULAR; INTRAVENOUS; SUBCUTANEOUS at 08:13

## 2023-02-25 RX ADMIN — ERTAPENEM SODIUM 1000 MG: 1 INJECTION, POWDER, LYOPHILIZED, FOR SOLUTION INTRAMUSCULAR; INTRAVENOUS at 08:21

## 2023-02-25 RX ADMIN — Medication 300 UNITS: at 08:29

## 2023-02-25 RX ADMIN — SODIUM CHLORIDE, PRESERVATIVE FREE 10 ML: 5 INJECTION INTRAVENOUS at 08:29

## 2023-02-25 ASSESSMENT — PAIN DESCRIPTION - LOCATION: LOCATION: ARM;NOSE

## 2023-02-25 ASSESSMENT — PAIN DESCRIPTION - DESCRIPTORS: DESCRIPTORS: SHARP

## 2023-02-25 ASSESSMENT — PAIN DESCRIPTION - PAIN TYPE: TYPE: ACUTE PAIN

## 2023-02-25 NOTE — PROGRESS NOTES
Tolerated infusion  IV PUSH MED  X 2  well. Reviewed therapy plan, offered education material and/or discharge material, reviewed medication information and signs and symptoms  and educated on possible side effects, verbalizes good knowledge of current plan patient verbalizes understanding, and has no signs or symptoms to report at this time. Patient discharged. Patient alert and oriented x3. No distress noted. Vital signs stable. Patient denies any new or worsening pain. Patient denies any needs. All questions answered. Next appointment scheduled. DECLINEScopy of AVS. Patient instructed on s/s infection/complication with midline to report and instructed on keeping midline dressing clean, dry and intact patient verbalizes understanding.

## 2023-02-26 ENCOUNTER — HOSPITAL ENCOUNTER (OUTPATIENT)
Dept: INFUSION THERAPY | Age: 53
Setting detail: INFUSION SERIES
Discharge: HOME | End: 2023-02-26
Payer: COMMERCIAL

## 2023-02-26 ENCOUNTER — HOSPITAL ENCOUNTER (OUTPATIENT)
Dept: INFUSION THERAPY | Age: 53
End: 2023-02-26

## 2023-02-26 VITALS
SYSTOLIC BLOOD PRESSURE: 134 MMHG | WEIGHT: 217 LBS | HEIGHT: 75 IN | TEMPERATURE: 96.5 F | DIASTOLIC BLOOD PRESSURE: 83 MMHG | HEART RATE: 81 BPM | RESPIRATION RATE: 16 BRPM | OXYGEN SATURATION: 100 % | BODY MASS INDEX: 26.98 KG/M2

## 2023-02-26 DIAGNOSIS — S01.20XA OPEN WOUND OF NASAL CAVITY, INITIAL ENCOUNTER: Primary | ICD-10-CM

## 2023-02-26 LAB
ANAEROBIC CULTURE: ABNORMAL
ANAEROBIC CULTURE: ABNORMAL
Lab: NORMAL
ORGANISM: ABNORMAL
THIS TEST SENT TO: NORMAL

## 2023-02-26 PROCEDURE — 96375 TX/PRO/DX INJ NEW DRUG ADDON: CPT

## 2023-02-26 PROCEDURE — 96374 THER/PROPH/DIAG INJ IV PUSH: CPT

## 2023-02-26 PROCEDURE — 2580000003 HC RX 258: Performed by: REGISTERED NURSE

## 2023-02-26 PROCEDURE — 6360000002 HC RX W HCPCS: Performed by: REGISTERED NURSE

## 2023-02-26 PROCEDURE — A4216 STERILE WATER/SALINE, 10 ML: HCPCS | Performed by: REGISTERED NURSE

## 2023-02-26 RX ORDER — HEPARIN SODIUM (PORCINE) LOCK FLUSH IV SOLN 100 UNIT/ML 100 UNIT/ML
300 SOLUTION INTRAVENOUS PRN
Status: CANCELLED | OUTPATIENT
Start: 2023-02-27

## 2023-02-26 RX ORDER — DIPHENHYDRAMINE HYDROCHLORIDE 50 MG/ML
50 INJECTION INTRAMUSCULAR; INTRAVENOUS
Status: CANCELLED | OUTPATIENT
Start: 2023-02-27

## 2023-02-26 RX ORDER — EPINEPHRINE 1 MG/ML
0.3 INJECTION, SOLUTION, CONCENTRATE INTRAVENOUS PRN
Status: CANCELLED | OUTPATIENT
Start: 2023-02-27

## 2023-02-26 RX ORDER — SODIUM CHLORIDE 0.9 % (FLUSH) 0.9 %
5-40 SYRINGE (ML) INJECTION PRN
Status: CANCELLED | OUTPATIENT
Start: 2023-02-27

## 2023-02-26 RX ORDER — SODIUM CHLORIDE 0.9 % (FLUSH) 0.9 %
5-40 SYRINGE (ML) INJECTION PRN
Status: DISCONTINUED | OUTPATIENT
Start: 2023-02-26 | End: 2023-02-27 | Stop reason: HOSPADM

## 2023-02-26 RX ORDER — DIPHENHYDRAMINE HCL 25 MG
50 TABLET ORAL
Status: CANCELLED
Start: 2023-02-27

## 2023-02-26 RX ORDER — HEPARIN SODIUM (PORCINE) LOCK FLUSH IV SOLN 100 UNIT/ML 100 UNIT/ML
300 SOLUTION INTRAVENOUS PRN
Status: ACTIVE | OUTPATIENT
Start: 2023-02-26 | End: 2023-02-27

## 2023-02-26 RX ORDER — SODIUM CHLORIDE 0.9 % (FLUSH) 0.9 %
5-40 SYRINGE (ML) INJECTION PRN
Status: ACTIVE | OUTPATIENT
Start: 2023-02-26 | End: 2023-02-27

## 2023-02-26 RX ORDER — HEPARIN SODIUM (PORCINE) LOCK FLUSH IV SOLN 100 UNIT/ML 100 UNIT/ML
300 SOLUTION INTRAVENOUS PRN
Status: DISCONTINUED | OUTPATIENT
Start: 2023-02-26 | End: 2023-02-27 | Stop reason: HOSPADM

## 2023-02-26 RX ADMIN — ERTAPENEM SODIUM 1000 MG: 1 INJECTION, POWDER, LYOPHILIZED, FOR SOLUTION INTRAMUSCULAR; INTRAVENOUS at 08:17

## 2023-02-26 RX ADMIN — SODIUM CHLORIDE 600 MG: 9 INJECTION, SOLUTION INTRAMUSCULAR; INTRAVENOUS; SUBCUTANEOUS at 08:11

## 2023-02-26 RX ADMIN — SODIUM CHLORIDE, PRESERVATIVE FREE 10 ML: 5 INJECTION INTRAVENOUS at 08:16

## 2023-02-26 RX ADMIN — Medication 300 UNITS: at 08:23

## 2023-02-26 RX ADMIN — SODIUM CHLORIDE, PRESERVATIVE FREE 10 ML: 5 INJECTION INTRAVENOUS at 08:10

## 2023-02-26 RX ADMIN — SODIUM CHLORIDE, PRESERVATIVE FREE 10 ML: 5 INJECTION INTRAVENOUS at 08:22

## 2023-02-26 ASSESSMENT — PAIN DESCRIPTION - DESCRIPTORS: DESCRIPTORS: ACHING

## 2023-02-26 ASSESSMENT — PAIN DESCRIPTION - LOCATION: LOCATION: NOSE

## 2023-02-26 ASSESSMENT — PAIN SCALES - GENERAL: PAINLEVEL_OUTOF10: 4

## 2023-02-26 ASSESSMENT — PAIN DESCRIPTION - FREQUENCY: FREQUENCY: CONTINUOUS

## 2023-02-27 ENCOUNTER — HOSPITAL ENCOUNTER (OUTPATIENT)
Dept: INFUSION THERAPY | Age: 53
Setting detail: INFUSION SERIES
Discharge: HOME | End: 2023-02-27
Payer: COMMERCIAL

## 2023-02-27 VITALS
TEMPERATURE: 97.6 F | SYSTOLIC BLOOD PRESSURE: 120 MMHG | HEIGHT: 75 IN | HEART RATE: 53 BPM | BODY MASS INDEX: 26.98 KG/M2 | WEIGHT: 217 LBS | OXYGEN SATURATION: 98 % | DIASTOLIC BLOOD PRESSURE: 73 MMHG | RESPIRATION RATE: 16 BRPM

## 2023-02-27 DIAGNOSIS — S01.20XA OPEN WOUND OF NASAL CAVITY, INITIAL ENCOUNTER: Primary | ICD-10-CM

## 2023-02-27 LAB
ALBUMIN SERPL-MCNC: 4.1 G/DL (ref 3.5–5.2)
ALP BLD-CCNC: 103 U/L (ref 40–129)
ALT SERPL-CCNC: 21 U/L (ref 0–40)
ANION GAP SERPL CALCULATED.3IONS-SCNC: 8 MMOL/L (ref 7–16)
AST SERPL-CCNC: 16 U/L (ref 0–39)
BASOPHILS ABSOLUTE: 0.08 E9/L (ref 0–0.2)
BASOPHILS RELATIVE PERCENT: 1.1 % (ref 0–2)
BILIRUB SERPL-MCNC: 0.7 MG/DL (ref 0–1.2)
BUN BLDV-MCNC: 19 MG/DL (ref 6–20)
C-REACTIVE PROTEIN: 0.3 MG/DL (ref 0–0.4)
CALCIUM SERPL-MCNC: 9.7 MG/DL (ref 8.6–10.2)
CHLORIDE BLD-SCNC: 102 MMOL/L (ref 98–107)
CO2: 28 MMOL/L (ref 22–29)
CREAT SERPL-MCNC: 1.2 MG/DL (ref 0.7–1.2)
EOSINOPHILS ABSOLUTE: 0.28 E9/L (ref 0.05–0.5)
EOSINOPHILS RELATIVE PERCENT: 3.8 % (ref 0–6)
GFR SERPL CREATININE-BSD FRML MDRD: >60 ML/MIN/1.73
GLUCOSE BLD-MCNC: 93 MG/DL (ref 74–99)
HCT VFR BLD CALC: 46 % (ref 37–54)
HEMOGLOBIN: 15.4 G/DL (ref 12.5–16.5)
IMMATURE GRANULOCYTES #: 0.03 E9/L
IMMATURE GRANULOCYTES %: 0.4 % (ref 0–5)
LYMPHOCYTES ABSOLUTE: 1.35 E9/L (ref 1.5–4)
LYMPHOCYTES RELATIVE PERCENT: 18.2 % (ref 20–42)
MCH RBC QN AUTO: 30.5 PG (ref 26–35)
MCHC RBC AUTO-ENTMCNC: 33.5 % (ref 32–34.5)
MCV RBC AUTO: 91.1 FL (ref 80–99.9)
MONOCYTES ABSOLUTE: 0.56 E9/L (ref 0.1–0.95)
MONOCYTES RELATIVE PERCENT: 7.5 % (ref 2–12)
NEUTROPHILS ABSOLUTE: 5.12 E9/L (ref 1.8–7.3)
NEUTROPHILS RELATIVE PERCENT: 69 % (ref 43–80)
PDW BLD-RTO: 13.1 FL (ref 11.5–15)
PLATELET # BLD: 234 E9/L (ref 130–450)
PMV BLD AUTO: 10.3 FL (ref 7–12)
POTASSIUM SERPL-SCNC: 4 MMOL/L (ref 3.5–5)
RBC # BLD: 5.05 E12/L (ref 3.8–5.8)
SEDIMENTATION RATE, ERYTHROCYTE: 14 MM/HR (ref 0–15)
SODIUM BLD-SCNC: 138 MMOL/L (ref 132–146)
TOTAL CK: 130 U/L (ref 20–200)
TOTAL PROTEIN: 7.1 G/DL (ref 6.4–8.3)
WBC # BLD: 7.4 E9/L (ref 4.5–11.5)

## 2023-02-27 PROCEDURE — A4216 STERILE WATER/SALINE, 10 ML: HCPCS | Performed by: REGISTERED NURSE

## 2023-02-27 PROCEDURE — 86140 C-REACTIVE PROTEIN: CPT

## 2023-02-27 PROCEDURE — 85651 RBC SED RATE NONAUTOMATED: CPT

## 2023-02-27 PROCEDURE — 82550 ASSAY OF CK (CPK): CPT

## 2023-02-27 PROCEDURE — 96374 THER/PROPH/DIAG INJ IV PUSH: CPT

## 2023-02-27 PROCEDURE — 96375 TX/PRO/DX INJ NEW DRUG ADDON: CPT

## 2023-02-27 PROCEDURE — 85025 COMPLETE CBC W/AUTO DIFF WBC: CPT

## 2023-02-27 PROCEDURE — 2580000003 HC RX 258: Performed by: REGISTERED NURSE

## 2023-02-27 PROCEDURE — 6360000002 HC RX W HCPCS: Performed by: REGISTERED NURSE

## 2023-02-27 PROCEDURE — 80053 COMPREHEN METABOLIC PANEL: CPT

## 2023-02-27 PROCEDURE — 36415 COLL VENOUS BLD VENIPUNCTURE: CPT

## 2023-02-27 RX ORDER — DIPHENHYDRAMINE HCL 25 MG
50 TABLET ORAL
Status: CANCELLED
Start: 2023-02-28

## 2023-02-27 RX ORDER — DIPHENHYDRAMINE HYDROCHLORIDE 50 MG/ML
50 INJECTION INTRAMUSCULAR; INTRAVENOUS
Status: CANCELLED | OUTPATIENT
Start: 2023-02-28

## 2023-02-27 RX ORDER — EPINEPHRINE 1 MG/ML
0.3 INJECTION, SOLUTION, CONCENTRATE INTRAVENOUS PRN
Status: CANCELLED | OUTPATIENT
Start: 2023-02-28

## 2023-02-27 RX ORDER — SODIUM CHLORIDE 0.9 % (FLUSH) 0.9 %
5-40 SYRINGE (ML) INJECTION PRN
Status: CANCELLED | OUTPATIENT
Start: 2023-02-28

## 2023-02-27 RX ORDER — SODIUM CHLORIDE 0.9 % (FLUSH) 0.9 %
5-40 SYRINGE (ML) INJECTION PRN
Status: DISCONTINUED | OUTPATIENT
Start: 2023-02-27 | End: 2023-02-28 | Stop reason: HOSPADM

## 2023-02-27 RX ORDER — HEPARIN SODIUM (PORCINE) LOCK FLUSH IV SOLN 100 UNIT/ML 100 UNIT/ML
300 SOLUTION INTRAVENOUS PRN
Status: DISCONTINUED | OUTPATIENT
Start: 2023-02-27 | End: 2023-02-28 | Stop reason: HOSPADM

## 2023-02-27 RX ORDER — HEPARIN SODIUM (PORCINE) LOCK FLUSH IV SOLN 100 UNIT/ML 100 UNIT/ML
300 SOLUTION INTRAVENOUS PRN
Status: CANCELLED | OUTPATIENT
Start: 2023-02-28

## 2023-02-27 RX ADMIN — SODIUM CHLORIDE, PRESERVATIVE FREE 10 ML: 5 INJECTION INTRAVENOUS at 08:20

## 2023-02-27 RX ADMIN — SODIUM CHLORIDE, PRESERVATIVE FREE 10 ML: 5 INJECTION INTRAVENOUS at 08:12

## 2023-02-27 RX ADMIN — SODIUM CHLORIDE 600 MG: 9 INJECTION, SOLUTION INTRAMUSCULAR; INTRAVENOUS; SUBCUTANEOUS at 08:21

## 2023-02-27 RX ADMIN — SODIUM CHLORIDE, PRESERVATIVE FREE 10 ML: 5 INJECTION INTRAVENOUS at 08:10

## 2023-02-27 RX ADMIN — Medication 300 UNITS: at 08:27

## 2023-02-27 RX ADMIN — SODIUM CHLORIDE, PRESERVATIVE FREE 10 ML: 5 INJECTION INTRAVENOUS at 08:26

## 2023-02-27 RX ADMIN — ERTAPENEM SODIUM 1000 MG: 1 INJECTION, POWDER, LYOPHILIZED, FOR SOLUTION INTRAMUSCULAR; INTRAVENOUS at 08:13

## 2023-02-27 NOTE — PROGRESS NOTES
Patient instructed on s/s infection/complication with PICC line to report and instructed on keeping PICC dressing clean, dry and intact patient verbalizes understanding. Tolerated iv pushes  well. Reviewed therapy plan, offered education material and/or discharge material, reviewed medication information and signs and symptoms  and educated on possible side effects, verbalizes good knowledge of current plan patient verbalizes understanding, and has no signs or symptoms to report at this time. Patient discharged. Patient alert and oriented x3. No distress noted. Vital signs stable. Patient denies any new or worsening pain. Patient denies any needs. All questions answered. Next appointment scheduled.  Declines copy of AVS.

## 2023-02-28 ENCOUNTER — HOSPITAL ENCOUNTER (OUTPATIENT)
Dept: INFUSION THERAPY | Age: 53
Setting detail: INFUSION SERIES
End: 2023-02-28
Payer: COMMERCIAL

## 2023-02-28 ENCOUNTER — HOSPITAL ENCOUNTER (OUTPATIENT)
Dept: INFUSION THERAPY | Age: 53
Setting detail: INFUSION SERIES
Discharge: HOME | End: 2023-02-28
Payer: COMMERCIAL

## 2023-02-28 VITALS
RESPIRATION RATE: 16 BRPM | SYSTOLIC BLOOD PRESSURE: 134 MMHG | HEART RATE: 70 BPM | HEIGHT: 75 IN | WEIGHT: 217 LBS | OXYGEN SATURATION: 100 % | TEMPERATURE: 96.2 F | BODY MASS INDEX: 26.98 KG/M2 | DIASTOLIC BLOOD PRESSURE: 82 MMHG

## 2023-02-28 DIAGNOSIS — S01.20XA OPEN WOUND OF NASAL CAVITY, INITIAL ENCOUNTER: Primary | ICD-10-CM

## 2023-02-28 PROCEDURE — 96375 TX/PRO/DX INJ NEW DRUG ADDON: CPT

## 2023-02-28 PROCEDURE — 6360000002 HC RX W HCPCS: Performed by: REGISTERED NURSE

## 2023-02-28 PROCEDURE — 2580000003 HC RX 258: Performed by: REGISTERED NURSE

## 2023-02-28 PROCEDURE — A4216 STERILE WATER/SALINE, 10 ML: HCPCS | Performed by: REGISTERED NURSE

## 2023-02-28 PROCEDURE — 96374 THER/PROPH/DIAG INJ IV PUSH: CPT

## 2023-02-28 RX ORDER — SODIUM CHLORIDE 0.9 % (FLUSH) 0.9 %
5-40 SYRINGE (ML) INJECTION PRN
Status: DISCONTINUED | OUTPATIENT
Start: 2023-02-28 | End: 2023-03-01 | Stop reason: HOSPADM

## 2023-02-28 RX ORDER — SODIUM CHLORIDE 0.9 % (FLUSH) 0.9 %
5-40 SYRINGE (ML) INJECTION PRN
Status: CANCELLED | OUTPATIENT
Start: 2023-03-01

## 2023-02-28 RX ORDER — DIPHENHYDRAMINE HYDROCHLORIDE 50 MG/ML
50 INJECTION INTRAMUSCULAR; INTRAVENOUS
Status: CANCELLED | OUTPATIENT
Start: 2023-03-01

## 2023-02-28 RX ORDER — EPINEPHRINE 1 MG/ML
0.3 INJECTION, SOLUTION, CONCENTRATE INTRAVENOUS PRN
Status: CANCELLED | OUTPATIENT
Start: 2023-03-01

## 2023-02-28 RX ORDER — HEPARIN SODIUM (PORCINE) LOCK FLUSH IV SOLN 100 UNIT/ML 100 UNIT/ML
300 SOLUTION INTRAVENOUS PRN
Status: CANCELLED | OUTPATIENT
Start: 2023-03-01

## 2023-02-28 RX ORDER — HEPARIN SODIUM (PORCINE) LOCK FLUSH IV SOLN 100 UNIT/ML 100 UNIT/ML
300 SOLUTION INTRAVENOUS PRN
Status: DISCONTINUED | OUTPATIENT
Start: 2023-02-28 | End: 2023-03-01 | Stop reason: HOSPADM

## 2023-02-28 RX ORDER — DIPHENHYDRAMINE HCL 25 MG
50 TABLET ORAL
Status: CANCELLED
Start: 2023-03-01

## 2023-02-28 RX ADMIN — ERTAPENEM SODIUM 1000 MG: 1 INJECTION, POWDER, LYOPHILIZED, FOR SOLUTION INTRAMUSCULAR; INTRAVENOUS at 08:11

## 2023-02-28 RX ADMIN — SODIUM CHLORIDE, PRESERVATIVE FREE 10 ML: 5 INJECTION INTRAVENOUS at 08:21

## 2023-02-28 RX ADMIN — SODIUM CHLORIDE 600 MG: 9 INJECTION, SOLUTION INTRAMUSCULAR; INTRAVENOUS; SUBCUTANEOUS at 08:16

## 2023-02-28 RX ADMIN — SODIUM CHLORIDE, PRESERVATIVE FREE 10 ML: 5 INJECTION INTRAVENOUS at 08:16

## 2023-02-28 RX ADMIN — SODIUM CHLORIDE, PRESERVATIVE FREE 10 ML: 5 INJECTION INTRAVENOUS at 08:11

## 2023-02-28 RX ADMIN — Medication 300 UNITS: at 08:21

## 2023-02-28 ASSESSMENT — PAIN SCALES - GENERAL: PAINLEVEL_OUTOF10: 0

## 2023-03-01 ENCOUNTER — APPOINTMENT (OUTPATIENT)
Dept: INFUSION THERAPY | Age: 53
End: 2023-03-01
Payer: COMMERCIAL

## 2023-03-01 ENCOUNTER — HOSPITAL ENCOUNTER (OUTPATIENT)
Dept: INFUSION THERAPY | Age: 53
Setting detail: INFUSION SERIES
Discharge: HOME | End: 2023-03-01
Payer: COMMERCIAL

## 2023-03-01 VITALS
TEMPERATURE: 97.9 F | DIASTOLIC BLOOD PRESSURE: 71 MMHG | SYSTOLIC BLOOD PRESSURE: 130 MMHG | HEART RATE: 63 BPM | BODY MASS INDEX: 26.98 KG/M2 | WEIGHT: 217 LBS | HEIGHT: 75 IN | OXYGEN SATURATION: 99 % | RESPIRATION RATE: 16 BRPM

## 2023-03-01 DIAGNOSIS — S01.20XA OPEN WOUND OF NASAL CAVITY, INITIAL ENCOUNTER: Primary | ICD-10-CM

## 2023-03-01 PROCEDURE — 96375 TX/PRO/DX INJ NEW DRUG ADDON: CPT

## 2023-03-01 PROCEDURE — 6360000002 HC RX W HCPCS: Performed by: REGISTERED NURSE

## 2023-03-01 PROCEDURE — A4216 STERILE WATER/SALINE, 10 ML: HCPCS | Performed by: REGISTERED NURSE

## 2023-03-01 PROCEDURE — 2580000003 HC RX 258: Performed by: REGISTERED NURSE

## 2023-03-01 PROCEDURE — 96374 THER/PROPH/DIAG INJ IV PUSH: CPT

## 2023-03-01 RX ORDER — SODIUM CHLORIDE 0.9 % (FLUSH) 0.9 %
5-40 SYRINGE (ML) INJECTION PRN
Status: DISCONTINUED | OUTPATIENT
Start: 2023-03-01 | End: 2023-03-02 | Stop reason: HOSPADM

## 2023-03-01 RX ORDER — HEPARIN SODIUM (PORCINE) LOCK FLUSH IV SOLN 100 UNIT/ML 100 UNIT/ML
300 SOLUTION INTRAVENOUS PRN
Status: CANCELLED | OUTPATIENT
Start: 2023-03-02

## 2023-03-01 RX ORDER — DIPHENHYDRAMINE HCL 25 MG
50 TABLET ORAL
Status: CANCELLED
Start: 2023-03-02

## 2023-03-01 RX ORDER — EPINEPHRINE 1 MG/ML
0.3 INJECTION, SOLUTION, CONCENTRATE INTRAVENOUS PRN
Status: CANCELLED | OUTPATIENT
Start: 2023-03-02

## 2023-03-01 RX ORDER — SODIUM CHLORIDE 0.9 % (FLUSH) 0.9 %
5-40 SYRINGE (ML) INJECTION PRN
Status: CANCELLED | OUTPATIENT
Start: 2023-03-02

## 2023-03-01 RX ORDER — HEPARIN SODIUM (PORCINE) LOCK FLUSH IV SOLN 100 UNIT/ML 100 UNIT/ML
300 SOLUTION INTRAVENOUS PRN
Status: DISCONTINUED | OUTPATIENT
Start: 2023-03-01 | End: 2023-03-02 | Stop reason: HOSPADM

## 2023-03-01 RX ORDER — DIPHENHYDRAMINE HYDROCHLORIDE 50 MG/ML
50 INJECTION INTRAMUSCULAR; INTRAVENOUS
Status: CANCELLED | OUTPATIENT
Start: 2023-03-02

## 2023-03-01 RX ADMIN — SODIUM CHLORIDE, PRESERVATIVE FREE 10 ML: 5 INJECTION INTRAVENOUS at 08:25

## 2023-03-01 RX ADMIN — Medication 300 UNITS: at 08:25

## 2023-03-01 RX ADMIN — SODIUM CHLORIDE 600 MG: 9 INJECTION, SOLUTION INTRAMUSCULAR; INTRAVENOUS; SUBCUTANEOUS at 08:14

## 2023-03-01 RX ADMIN — SODIUM CHLORIDE, PRESERVATIVE FREE 10 ML: 5 INJECTION INTRAVENOUS at 08:13

## 2023-03-01 RX ADMIN — ERTAPENEM SODIUM 1000 MG: 1 INJECTION, POWDER, LYOPHILIZED, FOR SOLUTION INTRAMUSCULAR; INTRAVENOUS at 08:20

## 2023-03-01 RX ADMIN — SODIUM CHLORIDE, PRESERVATIVE FREE 10 ML: 5 INJECTION INTRAVENOUS at 08:19

## 2023-03-01 ASSESSMENT — PAIN SCALES - GENERAL: PAINLEVEL_OUTOF10: 5

## 2023-03-01 ASSESSMENT — PAIN DESCRIPTION - PAIN TYPE: TYPE: CHRONIC PAIN

## 2023-03-01 ASSESSMENT — PAIN DESCRIPTION - LOCATION: LOCATION: NOSE

## 2023-03-01 ASSESSMENT — PAIN DESCRIPTION - FREQUENCY: FREQUENCY: CONTINUOUS

## 2023-03-01 ASSESSMENT — PAIN DESCRIPTION - DESCRIPTORS: DESCRIPTORS: ACHING

## 2023-03-01 NOTE — PROGRESS NOTES
Patient here for scheduled appointment said last night he did try to call to Dr. Veronica John office but the answering service did not give him a doctor said there was not one on call. He said last night he started with pain and pressure in the nose and sinus area , the tip of his nose is reddened and swelled and when he pushes a certain area it drains. I called to office and spoke with Kamille Zazueta she will get a message sent left infusion number for the return call.

## 2023-03-02 ENCOUNTER — HOSPITAL ENCOUNTER (OUTPATIENT)
Dept: INFUSION THERAPY | Age: 53
Setting detail: INFUSION SERIES
Discharge: HOME | End: 2023-03-02
Payer: COMMERCIAL

## 2023-03-02 ENCOUNTER — APPOINTMENT (OUTPATIENT)
Dept: INFUSION THERAPY | Age: 53
End: 2023-03-02
Payer: COMMERCIAL

## 2023-03-02 VITALS
TEMPERATURE: 96.9 F | OXYGEN SATURATION: 98 % | HEART RATE: 70 BPM | DIASTOLIC BLOOD PRESSURE: 76 MMHG | RESPIRATION RATE: 16 BRPM | WEIGHT: 217 LBS | SYSTOLIC BLOOD PRESSURE: 117 MMHG | HEIGHT: 75 IN | BODY MASS INDEX: 26.98 KG/M2

## 2023-03-02 DIAGNOSIS — S01.20XA OPEN WOUND OF NASAL CAVITY, INITIAL ENCOUNTER: Primary | ICD-10-CM

## 2023-03-02 LAB
ALBUMIN SERPL-MCNC: 4.3 G/DL (ref 3.5–5.2)
ALP BLD-CCNC: 111 U/L (ref 40–129)
ALT SERPL-CCNC: 36 U/L (ref 0–40)
ANION GAP SERPL CALCULATED.3IONS-SCNC: 9 MMOL/L (ref 7–16)
AST SERPL-CCNC: 20 U/L (ref 0–39)
BASOPHILS ABSOLUTE: 0.08 E9/L (ref 0–0.2)
BASOPHILS RELATIVE PERCENT: 1 % (ref 0–2)
BILIRUB SERPL-MCNC: 0.9 MG/DL (ref 0–1.2)
BUN BLDV-MCNC: 16 MG/DL (ref 6–20)
CALCIUM SERPL-MCNC: 10 MG/DL (ref 8.6–10.2)
CHLORIDE BLD-SCNC: 103 MMOL/L (ref 98–107)
CO2: 26 MMOL/L (ref 22–29)
CREAT SERPL-MCNC: 1.2 MG/DL (ref 0.7–1.2)
EOSINOPHILS ABSOLUTE: 0.33 E9/L (ref 0.05–0.5)
EOSINOPHILS RELATIVE PERCENT: 4.2 % (ref 0–6)
GFR SERPL CREATININE-BSD FRML MDRD: >60 ML/MIN/1.73
GLUCOSE BLD-MCNC: 120 MG/DL (ref 74–99)
HCT VFR BLD CALC: 46.9 % (ref 37–54)
HEMOGLOBIN: 15.6 G/DL (ref 12.5–16.5)
IMMATURE GRANULOCYTES #: 0.04 E9/L
IMMATURE GRANULOCYTES %: 0.5 % (ref 0–5)
LYMPHOCYTES ABSOLUTE: 1.48 E9/L (ref 1.5–4)
LYMPHOCYTES RELATIVE PERCENT: 19 % (ref 20–42)
MCH RBC QN AUTO: 30.3 PG (ref 26–35)
MCHC RBC AUTO-ENTMCNC: 33.3 % (ref 32–34.5)
MCV RBC AUTO: 91.1 FL (ref 80–99.9)
MONOCYTES ABSOLUTE: 0.58 E9/L (ref 0.1–0.95)
MONOCYTES RELATIVE PERCENT: 7.4 % (ref 2–12)
NEUTROPHILS ABSOLUTE: 5.29 E9/L (ref 1.8–7.3)
NEUTROPHILS RELATIVE PERCENT: 67.9 % (ref 43–80)
PDW BLD-RTO: 12.9 FL (ref 11.5–15)
PLATELET # BLD: 236 E9/L (ref 130–450)
PMV BLD AUTO: 10.3 FL (ref 7–12)
POTASSIUM SERPL-SCNC: 4 MMOL/L (ref 3.5–5)
RBC # BLD: 5.15 E12/L (ref 3.8–5.8)
SODIUM BLD-SCNC: 138 MMOL/L (ref 132–146)
TOTAL PROTEIN: 7.2 G/DL (ref 6.4–8.3)
WBC # BLD: 7.8 E9/L (ref 4.5–11.5)

## 2023-03-02 PROCEDURE — 2580000003 HC RX 258: Performed by: REGISTERED NURSE

## 2023-03-02 PROCEDURE — 96374 THER/PROPH/DIAG INJ IV PUSH: CPT

## 2023-03-02 PROCEDURE — 85025 COMPLETE CBC W/AUTO DIFF WBC: CPT

## 2023-03-02 PROCEDURE — 6360000002 HC RX W HCPCS: Performed by: REGISTERED NURSE

## 2023-03-02 PROCEDURE — 80053 COMPREHEN METABOLIC PANEL: CPT

## 2023-03-02 PROCEDURE — A4216 STERILE WATER/SALINE, 10 ML: HCPCS | Performed by: REGISTERED NURSE

## 2023-03-02 PROCEDURE — 96375 TX/PRO/DX INJ NEW DRUG ADDON: CPT

## 2023-03-02 RX ORDER — SODIUM CHLORIDE 0.9 % (FLUSH) 0.9 %
5-40 SYRINGE (ML) INJECTION PRN
Status: DISCONTINUED | OUTPATIENT
Start: 2023-03-02 | End: 2023-03-03 | Stop reason: HOSPADM

## 2023-03-02 RX ORDER — EPINEPHRINE 1 MG/ML
0.3 INJECTION, SOLUTION, CONCENTRATE INTRAVENOUS PRN
Status: CANCELLED | OUTPATIENT
Start: 2023-03-03

## 2023-03-02 RX ORDER — SODIUM CHLORIDE 0.9 % (FLUSH) 0.9 %
5-40 SYRINGE (ML) INJECTION PRN
Status: CANCELLED | OUTPATIENT
Start: 2023-03-03

## 2023-03-02 RX ORDER — HEPARIN SODIUM (PORCINE) LOCK FLUSH IV SOLN 100 UNIT/ML 100 UNIT/ML
300 SOLUTION INTRAVENOUS PRN
Status: DISCONTINUED | OUTPATIENT
Start: 2023-03-02 | End: 2023-03-03 | Stop reason: HOSPADM

## 2023-03-02 RX ORDER — DIPHENHYDRAMINE HCL 25 MG
50 TABLET ORAL
Status: CANCELLED
Start: 2023-03-03

## 2023-03-02 RX ORDER — DIPHENHYDRAMINE HYDROCHLORIDE 50 MG/ML
50 INJECTION INTRAMUSCULAR; INTRAVENOUS
Status: CANCELLED | OUTPATIENT
Start: 2023-03-03

## 2023-03-02 RX ORDER — HEPARIN SODIUM (PORCINE) LOCK FLUSH IV SOLN 100 UNIT/ML 100 UNIT/ML
300 SOLUTION INTRAVENOUS PRN
Status: CANCELLED | OUTPATIENT
Start: 2023-03-03

## 2023-03-02 RX ADMIN — SODIUM CHLORIDE, PRESERVATIVE FREE 10 ML: 5 INJECTION INTRAVENOUS at 07:23

## 2023-03-02 RX ADMIN — SODIUM CHLORIDE 600 MG: 9 INJECTION, SOLUTION INTRAMUSCULAR; INTRAVENOUS; SUBCUTANEOUS at 07:17

## 2023-03-02 RX ADMIN — SODIUM CHLORIDE, PRESERVATIVE FREE 10 ML: 5 INJECTION INTRAVENOUS at 07:08

## 2023-03-02 RX ADMIN — ERTAPENEM SODIUM 1000 MG: 1 INJECTION, POWDER, LYOPHILIZED, FOR SOLUTION INTRAMUSCULAR; INTRAVENOUS at 07:11

## 2023-03-02 RX ADMIN — Medication 300 UNITS: at 07:23

## 2023-03-02 RX ADMIN — SODIUM CHLORIDE, PRESERVATIVE FREE 10 ML: 5 INJECTION INTRAVENOUS at 07:10

## 2023-03-02 ASSESSMENT — PAIN DESCRIPTION - ONSET: ONSET: ON-GOING

## 2023-03-02 ASSESSMENT — PAIN DESCRIPTION - FREQUENCY: FREQUENCY: CONTINUOUS

## 2023-03-02 ASSESSMENT — PAIN SCALES - GENERAL: PAINLEVEL_OUTOF10: 6

## 2023-03-02 ASSESSMENT — PAIN DESCRIPTION - DESCRIPTORS: DESCRIPTORS: ACHING;DISCOMFORT

## 2023-03-02 ASSESSMENT — PAIN DESCRIPTION - LOCATION: LOCATION: NOSE

## 2023-03-02 ASSESSMENT — PAIN DESCRIPTION - ORIENTATION: ORIENTATION: RIGHT;LEFT

## 2023-03-02 ASSESSMENT — PAIN DESCRIPTION - PAIN TYPE: TYPE: ACUTE PAIN

## 2023-03-02 ASSESSMENT — PAIN - FUNCTIONAL ASSESSMENT: PAIN_FUNCTIONAL_ASSESSMENT: PREVENTS OR INTERFERES SOME ACTIVE ACTIVITIES AND ADLS

## 2023-03-03 ENCOUNTER — APPOINTMENT (OUTPATIENT)
Dept: INFUSION THERAPY | Age: 53
End: 2023-03-03
Payer: COMMERCIAL

## 2023-03-03 ENCOUNTER — HOSPITAL ENCOUNTER (OUTPATIENT)
Dept: INFUSION THERAPY | Age: 53
Setting detail: INFUSION SERIES
Discharge: HOME | End: 2023-03-03
Payer: COMMERCIAL

## 2023-03-03 VITALS
HEART RATE: 62 BPM | DIASTOLIC BLOOD PRESSURE: 91 MMHG | RESPIRATION RATE: 16 BRPM | SYSTOLIC BLOOD PRESSURE: 129 MMHG | OXYGEN SATURATION: 100 % | TEMPERATURE: 97.9 F

## 2023-03-03 DIAGNOSIS — S01.20XA OPEN WOUND OF NASAL CAVITY, INITIAL ENCOUNTER: Primary | ICD-10-CM

## 2023-03-03 PROCEDURE — 96374 THER/PROPH/DIAG INJ IV PUSH: CPT

## 2023-03-03 PROCEDURE — 96375 TX/PRO/DX INJ NEW DRUG ADDON: CPT

## 2023-03-03 PROCEDURE — A4216 STERILE WATER/SALINE, 10 ML: HCPCS | Performed by: REGISTERED NURSE

## 2023-03-03 PROCEDURE — 2580000003 HC RX 258: Performed by: REGISTERED NURSE

## 2023-03-03 PROCEDURE — 6360000002 HC RX W HCPCS: Performed by: REGISTERED NURSE

## 2023-03-03 RX ORDER — HEPARIN SODIUM (PORCINE) LOCK FLUSH IV SOLN 100 UNIT/ML 100 UNIT/ML
300 SOLUTION INTRAVENOUS PRN
Status: CANCELLED | OUTPATIENT
Start: 2023-03-04

## 2023-03-03 RX ORDER — SODIUM CHLORIDE 0.9 % (FLUSH) 0.9 %
5-40 SYRINGE (ML) INJECTION PRN
Status: CANCELLED | OUTPATIENT
Start: 2023-03-04

## 2023-03-03 RX ORDER — DIPHENHYDRAMINE HCL 25 MG
50 TABLET ORAL
Status: CANCELLED
Start: 2023-03-04

## 2023-03-03 RX ORDER — EPINEPHRINE 1 MG/ML
0.3 INJECTION, SOLUTION, CONCENTRATE INTRAVENOUS PRN
Status: CANCELLED | OUTPATIENT
Start: 2023-03-04

## 2023-03-03 RX ORDER — DIPHENHYDRAMINE HYDROCHLORIDE 50 MG/ML
50 INJECTION INTRAMUSCULAR; INTRAVENOUS
Status: CANCELLED | OUTPATIENT
Start: 2023-03-04

## 2023-03-03 RX ORDER — SODIUM CHLORIDE 0.9 % (FLUSH) 0.9 %
5-40 SYRINGE (ML) INJECTION PRN
Status: DISCONTINUED | OUTPATIENT
Start: 2023-03-03 | End: 2023-03-04 | Stop reason: HOSPADM

## 2023-03-03 RX ORDER — HEPARIN SODIUM (PORCINE) LOCK FLUSH IV SOLN 100 UNIT/ML 100 UNIT/ML
300 SOLUTION INTRAVENOUS PRN
Status: DISCONTINUED | OUTPATIENT
Start: 2023-03-03 | End: 2023-03-04 | Stop reason: HOSPADM

## 2023-03-03 RX ADMIN — SODIUM CHLORIDE, PRESERVATIVE FREE 10 ML: 5 INJECTION INTRAVENOUS at 08:04

## 2023-03-03 RX ADMIN — SODIUM CHLORIDE 600 MG: 9 INJECTION, SOLUTION INTRAMUSCULAR; INTRAVENOUS; SUBCUTANEOUS at 08:06

## 2023-03-03 RX ADMIN — SODIUM CHLORIDE, PRESERVATIVE FREE 10 ML: 5 INJECTION INTRAVENOUS at 08:17

## 2023-03-03 RX ADMIN — ERTAPENEM SODIUM 1000 MG: 1 INJECTION, POWDER, LYOPHILIZED, FOR SOLUTION INTRAMUSCULAR; INTRAVENOUS at 08:12

## 2023-03-03 RX ADMIN — SODIUM CHLORIDE, PRESERVATIVE FREE 10 ML: 5 INJECTION INTRAVENOUS at 08:11

## 2023-03-03 RX ADMIN — Medication 300 UNITS: at 08:20

## 2023-03-03 ASSESSMENT — PAIN DESCRIPTION - DESCRIPTORS: DESCRIPTORS: ACHING;DISCOMFORT

## 2023-03-03 ASSESSMENT — PAIN DESCRIPTION - PAIN TYPE: TYPE: ACUTE PAIN;CHRONIC PAIN

## 2023-03-03 ASSESSMENT — PAIN DESCRIPTION - LOCATION: LOCATION: NOSE

## 2023-03-03 ASSESSMENT — PAIN DESCRIPTION - FREQUENCY: FREQUENCY: INTERMITTENT

## 2023-03-03 ASSESSMENT — PAIN DESCRIPTION - ONSET: ONSET: ON-GOING

## 2023-03-03 NOTE — PROGRESS NOTES
Tolerated infusion  IV PUSH  OF  TWO MEDS well. Reviewed therapy plan, offered education material and/or discharge material, reviewed medication information and signs and symptoms  and educated on possible side effects, verbalizes good knowledge of current plan patient verbalizes understanding, and has no signs or symptoms to report at this time. Patient discharged. Patient alert and oriented x3. No distress noted. Vital signs stable. Patient denies any new or worsening pain. Patient denies any needs. All questions answered. Next appointment scheduled. DECLINES copy of AVS. Patient instructed on s/s infection/complication with midline to report and instructed on keeping midline dressing clean, dry and intact patient verbalizes understanding.

## 2023-03-04 ENCOUNTER — HOSPITAL ENCOUNTER (OUTPATIENT)
Dept: INFUSION THERAPY | Age: 53
Setting detail: INFUSION SERIES
Discharge: HOME | End: 2023-03-04
Payer: COMMERCIAL

## 2023-03-04 ENCOUNTER — APPOINTMENT (OUTPATIENT)
Dept: INFUSION THERAPY | Age: 53
End: 2023-03-04
Payer: COMMERCIAL

## 2023-03-04 VITALS
TEMPERATURE: 98 F | HEART RATE: 71 BPM | SYSTOLIC BLOOD PRESSURE: 119 MMHG | RESPIRATION RATE: 16 BRPM | DIASTOLIC BLOOD PRESSURE: 73 MMHG | OXYGEN SATURATION: 98 %

## 2023-03-04 DIAGNOSIS — S01.20XA OPEN WOUND OF NASAL CAVITY, INITIAL ENCOUNTER: Primary | ICD-10-CM

## 2023-03-04 PROCEDURE — 2580000003 HC RX 258: Performed by: REGISTERED NURSE

## 2023-03-04 PROCEDURE — 96374 THER/PROPH/DIAG INJ IV PUSH: CPT

## 2023-03-04 PROCEDURE — 96375 TX/PRO/DX INJ NEW DRUG ADDON: CPT

## 2023-03-04 PROCEDURE — 6360000002 HC RX W HCPCS: Performed by: REGISTERED NURSE

## 2023-03-04 PROCEDURE — A4216 STERILE WATER/SALINE, 10 ML: HCPCS | Performed by: REGISTERED NURSE

## 2023-03-04 RX ORDER — SODIUM CHLORIDE 0.9 % (FLUSH) 0.9 %
5-40 SYRINGE (ML) INJECTION PRN
Status: CANCELLED | OUTPATIENT
Start: 2023-03-05

## 2023-03-04 RX ORDER — SODIUM CHLORIDE 0.9 % (FLUSH) 0.9 %
5-40 SYRINGE (ML) INJECTION PRN
Status: DISCONTINUED | OUTPATIENT
Start: 2023-03-04 | End: 2023-03-05 | Stop reason: HOSPADM

## 2023-03-04 RX ORDER — HEPARIN SODIUM (PORCINE) LOCK FLUSH IV SOLN 100 UNIT/ML 100 UNIT/ML
300 SOLUTION INTRAVENOUS PRN
Status: CANCELLED | OUTPATIENT
Start: 2023-03-05

## 2023-03-04 RX ORDER — DIPHENHYDRAMINE HYDROCHLORIDE 50 MG/ML
50 INJECTION INTRAMUSCULAR; INTRAVENOUS
Status: CANCELLED | OUTPATIENT
Start: 2023-03-05

## 2023-03-04 RX ORDER — HEPARIN SODIUM (PORCINE) LOCK FLUSH IV SOLN 100 UNIT/ML 100 UNIT/ML
300 SOLUTION INTRAVENOUS PRN
Status: DISCONTINUED | OUTPATIENT
Start: 2023-03-04 | End: 2023-03-05 | Stop reason: HOSPADM

## 2023-03-04 RX ORDER — EPINEPHRINE 1 MG/ML
0.3 INJECTION, SOLUTION, CONCENTRATE INTRAVENOUS PRN
Status: CANCELLED | OUTPATIENT
Start: 2023-03-05

## 2023-03-04 RX ORDER — DIPHENHYDRAMINE HCL 25 MG
50 TABLET ORAL
Status: CANCELLED
Start: 2023-03-05

## 2023-03-04 RX ADMIN — SODIUM CHLORIDE, PRESERVATIVE FREE 10 ML: 5 INJECTION INTRAVENOUS at 08:37

## 2023-03-04 RX ADMIN — SODIUM CHLORIDE 600 MG: 9 INJECTION, SOLUTION INTRAMUSCULAR; INTRAVENOUS; SUBCUTANEOUS at 08:39

## 2023-03-04 RX ADMIN — SODIUM CHLORIDE, PRESERVATIVE FREE 10 ML: 5 INJECTION INTRAVENOUS at 08:46

## 2023-03-04 RX ADMIN — SODIUM CHLORIDE, PRESERVATIVE FREE 10 ML: 5 INJECTION INTRAVENOUS at 08:53

## 2023-03-04 RX ADMIN — ERTAPENEM SODIUM 1000 MG: 1 INJECTION, POWDER, LYOPHILIZED, FOR SOLUTION INTRAMUSCULAR; INTRAVENOUS at 08:47

## 2023-03-04 RX ADMIN — Medication 300 UNITS: at 08:54

## 2023-03-04 ASSESSMENT — PAIN SCALES - GENERAL: PAINLEVEL_OUTOF10: 0

## 2023-03-05 ENCOUNTER — APPOINTMENT (OUTPATIENT)
Dept: INFUSION THERAPY | Age: 53
End: 2023-03-05
Payer: COMMERCIAL

## 2023-03-05 ENCOUNTER — HOSPITAL ENCOUNTER (OUTPATIENT)
Dept: INFUSION THERAPY | Age: 53
Setting detail: INFUSION SERIES
Discharge: HOME | End: 2023-03-05
Payer: COMMERCIAL

## 2023-03-05 VITALS
TEMPERATURE: 97.2 F | HEART RATE: 64 BPM | RESPIRATION RATE: 16 BRPM | DIASTOLIC BLOOD PRESSURE: 73 MMHG | SYSTOLIC BLOOD PRESSURE: 111 MMHG | OXYGEN SATURATION: 99 %

## 2023-03-05 DIAGNOSIS — S01.20XA OPEN WOUND OF NASAL CAVITY, INITIAL ENCOUNTER: Primary | ICD-10-CM

## 2023-03-05 PROCEDURE — 96375 TX/PRO/DX INJ NEW DRUG ADDON: CPT

## 2023-03-05 PROCEDURE — 96374 THER/PROPH/DIAG INJ IV PUSH: CPT

## 2023-03-05 PROCEDURE — 2580000003 HC RX 258: Performed by: REGISTERED NURSE

## 2023-03-05 PROCEDURE — 6360000002 HC RX W HCPCS: Performed by: REGISTERED NURSE

## 2023-03-05 PROCEDURE — A4216 STERILE WATER/SALINE, 10 ML: HCPCS | Performed by: REGISTERED NURSE

## 2023-03-05 RX ORDER — SODIUM CHLORIDE 0.9 % (FLUSH) 0.9 %
5-40 SYRINGE (ML) INJECTION PRN
Status: DISCONTINUED | OUTPATIENT
Start: 2023-03-05 | End: 2023-03-06 | Stop reason: HOSPADM

## 2023-03-05 RX ORDER — DIPHENHYDRAMINE HYDROCHLORIDE 50 MG/ML
50 INJECTION INTRAMUSCULAR; INTRAVENOUS
Status: CANCELLED | OUTPATIENT
Start: 2023-03-06

## 2023-03-05 RX ORDER — HEPARIN SODIUM (PORCINE) LOCK FLUSH IV SOLN 100 UNIT/ML 100 UNIT/ML
300 SOLUTION INTRAVENOUS PRN
Status: DISCONTINUED | OUTPATIENT
Start: 2023-03-05 | End: 2023-03-06 | Stop reason: HOSPADM

## 2023-03-05 RX ORDER — DIPHENHYDRAMINE HCL 25 MG
50 TABLET ORAL
Status: CANCELLED
Start: 2023-03-06

## 2023-03-05 RX ORDER — HEPARIN SODIUM (PORCINE) LOCK FLUSH IV SOLN 100 UNIT/ML 100 UNIT/ML
300 SOLUTION INTRAVENOUS PRN
Status: CANCELLED | OUTPATIENT
Start: 2023-03-06

## 2023-03-05 RX ORDER — EPINEPHRINE 1 MG/ML
0.3 INJECTION, SOLUTION, CONCENTRATE INTRAVENOUS PRN
Status: CANCELLED | OUTPATIENT
Start: 2023-03-06

## 2023-03-05 RX ORDER — SODIUM CHLORIDE 0.9 % (FLUSH) 0.9 %
5-40 SYRINGE (ML) INJECTION PRN
Status: CANCELLED | OUTPATIENT
Start: 2023-03-06

## 2023-03-05 RX ADMIN — ERTAPENEM SODIUM 1000 MG: 1 INJECTION, POWDER, LYOPHILIZED, FOR SOLUTION INTRAMUSCULAR; INTRAVENOUS at 08:43

## 2023-03-05 RX ADMIN — SODIUM CHLORIDE, PRESERVATIVE FREE 10 ML: 5 INJECTION INTRAVENOUS at 08:42

## 2023-03-05 RX ADMIN — SODIUM CHLORIDE 600 MG: 9 INJECTION, SOLUTION INTRAMUSCULAR; INTRAVENOUS; SUBCUTANEOUS at 08:36

## 2023-03-05 RX ADMIN — SODIUM CHLORIDE, PRESERVATIVE FREE 10 ML: 5 INJECTION INTRAVENOUS at 08:35

## 2023-03-05 RX ADMIN — SODIUM CHLORIDE, PRESERVATIVE FREE 10 ML: 5 INJECTION INTRAVENOUS at 08:49

## 2023-03-05 RX ADMIN — Medication 300 UNITS: at 08:49

## 2023-03-05 ASSESSMENT — PAIN SCALES - GENERAL: PAINLEVEL_OUTOF10: 3

## 2023-03-05 ASSESSMENT — PAIN - FUNCTIONAL ASSESSMENT: PAIN_FUNCTIONAL_ASSESSMENT: ACTIVITIES ARE NOT PREVENTED

## 2023-03-05 ASSESSMENT — PAIN DESCRIPTION - ONSET: ONSET: ON-GOING

## 2023-03-05 ASSESSMENT — PAIN DESCRIPTION - FREQUENCY: FREQUENCY: INTERMITTENT

## 2023-03-05 ASSESSMENT — PAIN DESCRIPTION - LOCATION: LOCATION: NOSE

## 2023-03-05 ASSESSMENT — PAIN DESCRIPTION - DESCRIPTORS: DESCRIPTORS: ACHING

## 2023-03-05 ASSESSMENT — PAIN DESCRIPTION - PAIN TYPE: TYPE: CHRONIC PAIN

## 2023-03-06 ENCOUNTER — HOSPITAL ENCOUNTER (OUTPATIENT)
Dept: INFUSION THERAPY | Age: 53
Setting detail: INFUSION SERIES
Discharge: HOME | End: 2023-03-06
Payer: COMMERCIAL

## 2023-03-06 ENCOUNTER — APPOINTMENT (OUTPATIENT)
Dept: INFUSION THERAPY | Age: 53
End: 2023-03-06
Payer: COMMERCIAL

## 2023-03-06 VITALS
OXYGEN SATURATION: 99 % | DIASTOLIC BLOOD PRESSURE: 77 MMHG | BODY MASS INDEX: 26.98 KG/M2 | TEMPERATURE: 96.2 F | SYSTOLIC BLOOD PRESSURE: 127 MMHG | WEIGHT: 217 LBS | HEART RATE: 64 BPM | HEIGHT: 75 IN | RESPIRATION RATE: 16 BRPM

## 2023-03-06 DIAGNOSIS — S01.20XA OPEN WOUND OF NASAL CAVITY, INITIAL ENCOUNTER: Primary | ICD-10-CM

## 2023-03-06 LAB
ALBUMIN SERPL-MCNC: 4 G/DL (ref 3.5–5.2)
ALP BLD-CCNC: 110 U/L (ref 40–129)
ALT SERPL-CCNC: 30 U/L (ref 0–40)
ANION GAP SERPL CALCULATED.3IONS-SCNC: 13 MMOL/L (ref 7–16)
AST SERPL-CCNC: 15 U/L (ref 0–39)
BASOPHILS ABSOLUTE: 0.09 E9/L (ref 0–0.2)
BASOPHILS RELATIVE PERCENT: 1.1 % (ref 0–2)
BILIRUB SERPL-MCNC: 0.9 MG/DL (ref 0–1.2)
BUN BLDV-MCNC: 12 MG/DL (ref 6–20)
C-REACTIVE PROTEIN: 0.4 MG/DL (ref 0–0.4)
CALCIUM SERPL-MCNC: 10 MG/DL (ref 8.6–10.2)
CHLORIDE BLD-SCNC: 102 MMOL/L (ref 98–107)
CO2: 25 MMOL/L (ref 22–29)
CREAT SERPL-MCNC: 1.2 MG/DL (ref 0.7–1.2)
EOSINOPHILS ABSOLUTE: 0.28 E9/L (ref 0.05–0.5)
EOSINOPHILS RELATIVE PERCENT: 3.4 % (ref 0–6)
GFR SERPL CREATININE-BSD FRML MDRD: >60 ML/MIN/1.73
GLUCOSE BLD-MCNC: 89 MG/DL (ref 74–99)
HCT VFR BLD CALC: 47.2 % (ref 37–54)
HEMOGLOBIN: 15.5 G/DL (ref 12.5–16.5)
IMMATURE GRANULOCYTES #: 0.03 E9/L
IMMATURE GRANULOCYTES %: 0.4 % (ref 0–5)
LYMPHOCYTES ABSOLUTE: 1.66 E9/L (ref 1.5–4)
LYMPHOCYTES RELATIVE PERCENT: 20.1 % (ref 20–42)
Lab: NORMAL
MCH RBC QN AUTO: 30.4 PG (ref 26–35)
MCHC RBC AUTO-ENTMCNC: 32.8 % (ref 32–34.5)
MCV RBC AUTO: 92.5 FL (ref 80–99.9)
MONOCYTES ABSOLUTE: 0.6 E9/L (ref 0.1–0.95)
MONOCYTES RELATIVE PERCENT: 7.3 % (ref 2–12)
NEUTROPHILS ABSOLUTE: 5.59 E9/L (ref 1.8–7.3)
NEUTROPHILS RELATIVE PERCENT: 67.7 % (ref 43–80)
PDW BLD-RTO: 12.7 FL (ref 11.5–15)
PLATELET # BLD: 233 E9/L (ref 130–450)
PMV BLD AUTO: 10.5 FL (ref 7–12)
POTASSIUM SERPL-SCNC: 3.9 MMOL/L (ref 3.5–5)
RBC # BLD: 5.1 E12/L (ref 3.8–5.8)
REPORT: NORMAL
SEDIMENTATION RATE, ERYTHROCYTE: 6 MM/HR (ref 0–15)
SODIUM BLD-SCNC: 140 MMOL/L (ref 132–146)
THIS TEST SENT TO: NORMAL
TOTAL CK: 107 U/L (ref 20–200)
TOTAL PROTEIN: 7.2 G/DL (ref 6.4–8.3)
WBC # BLD: 7.9 E9/L (ref 4.5–11.5)

## 2023-03-06 PROCEDURE — 6360000002 HC RX W HCPCS: Performed by: REGISTERED NURSE

## 2023-03-06 PROCEDURE — 96374 THER/PROPH/DIAG INJ IV PUSH: CPT

## 2023-03-06 PROCEDURE — A4216 STERILE WATER/SALINE, 10 ML: HCPCS | Performed by: REGISTERED NURSE

## 2023-03-06 PROCEDURE — 96375 TX/PRO/DX INJ NEW DRUG ADDON: CPT

## 2023-03-06 PROCEDURE — 86140 C-REACTIVE PROTEIN: CPT

## 2023-03-06 PROCEDURE — 80053 COMPREHEN METABOLIC PANEL: CPT

## 2023-03-06 PROCEDURE — 85025 COMPLETE CBC W/AUTO DIFF WBC: CPT

## 2023-03-06 PROCEDURE — 82550 ASSAY OF CK (CPK): CPT

## 2023-03-06 PROCEDURE — 85651 RBC SED RATE NONAUTOMATED: CPT

## 2023-03-06 PROCEDURE — 2580000003 HC RX 258: Performed by: REGISTERED NURSE

## 2023-03-06 PROCEDURE — 36415 COLL VENOUS BLD VENIPUNCTURE: CPT

## 2023-03-06 RX ORDER — SODIUM CHLORIDE 0.9 % (FLUSH) 0.9 %
5-40 SYRINGE (ML) INJECTION PRN
Status: CANCELLED | OUTPATIENT
Start: 2023-03-07

## 2023-03-06 RX ORDER — HEPARIN SODIUM (PORCINE) LOCK FLUSH IV SOLN 100 UNIT/ML 100 UNIT/ML
300 SOLUTION INTRAVENOUS PRN
Status: CANCELLED | OUTPATIENT
Start: 2023-03-07

## 2023-03-06 RX ORDER — SODIUM CHLORIDE 0.9 % (FLUSH) 0.9 %
5-40 SYRINGE (ML) INJECTION PRN
Status: DISCONTINUED | OUTPATIENT
Start: 2023-03-06 | End: 2023-03-07 | Stop reason: HOSPADM

## 2023-03-06 RX ORDER — DIPHENHYDRAMINE HYDROCHLORIDE 50 MG/ML
50 INJECTION INTRAMUSCULAR; INTRAVENOUS
Status: CANCELLED | OUTPATIENT
Start: 2023-03-07

## 2023-03-06 RX ORDER — EPINEPHRINE 1 MG/ML
0.3 INJECTION, SOLUTION, CONCENTRATE INTRAVENOUS PRN
Status: CANCELLED | OUTPATIENT
Start: 2023-03-07

## 2023-03-06 RX ORDER — DIPHENHYDRAMINE HCL 25 MG
50 TABLET ORAL
Status: CANCELLED
Start: 2023-03-07

## 2023-03-06 RX ORDER — HEPARIN SODIUM (PORCINE) LOCK FLUSH IV SOLN 100 UNIT/ML 100 UNIT/ML
300 SOLUTION INTRAVENOUS PRN
Status: DISCONTINUED | OUTPATIENT
Start: 2023-03-06 | End: 2023-03-07 | Stop reason: HOSPADM

## 2023-03-06 RX ADMIN — SODIUM CHLORIDE 600 MG: 9 INJECTION, SOLUTION INTRAMUSCULAR; INTRAVENOUS; SUBCUTANEOUS at 07:20

## 2023-03-06 RX ADMIN — ERTAPENEM SODIUM 1000 MG: 1 INJECTION, POWDER, LYOPHILIZED, FOR SOLUTION INTRAMUSCULAR; INTRAVENOUS at 07:14

## 2023-03-06 RX ADMIN — SODIUM CHLORIDE, PRESERVATIVE FREE 10 ML: 5 INJECTION INTRAVENOUS at 07:11

## 2023-03-06 RX ADMIN — SODIUM CHLORIDE, PRESERVATIVE FREE 10 ML: 5 INJECTION INTRAVENOUS at 07:12

## 2023-03-06 RX ADMIN — SODIUM CHLORIDE, PRESERVATIVE FREE 10 ML: 5 INJECTION INTRAVENOUS at 07:25

## 2023-03-06 RX ADMIN — SODIUM CHLORIDE, PRESERVATIVE FREE 10 ML: 5 INJECTION INTRAVENOUS at 07:19

## 2023-03-07 ENCOUNTER — HOSPITAL ENCOUNTER (OUTPATIENT)
Dept: INFUSION THERAPY | Age: 53
Setting detail: INFUSION SERIES
Discharge: HOME | End: 2023-03-07
Payer: COMMERCIAL

## 2023-03-07 ENCOUNTER — APPOINTMENT (OUTPATIENT)
Dept: INFUSION THERAPY | Age: 53
End: 2023-03-07
Payer: COMMERCIAL

## 2023-03-07 VITALS
RESPIRATION RATE: 16 BRPM | TEMPERATURE: 96.5 F | WEIGHT: 217 LBS | HEIGHT: 75 IN | OXYGEN SATURATION: 97 % | SYSTOLIC BLOOD PRESSURE: 123 MMHG | DIASTOLIC BLOOD PRESSURE: 80 MMHG | HEART RATE: 81 BPM | BODY MASS INDEX: 26.98 KG/M2

## 2023-03-07 DIAGNOSIS — S01.20XA OPEN WOUND OF NASAL CAVITY, INITIAL ENCOUNTER: Primary | ICD-10-CM

## 2023-03-07 PROCEDURE — 96375 TX/PRO/DX INJ NEW DRUG ADDON: CPT

## 2023-03-07 PROCEDURE — 96374 THER/PROPH/DIAG INJ IV PUSH: CPT

## 2023-03-07 PROCEDURE — A4216 STERILE WATER/SALINE, 10 ML: HCPCS | Performed by: REGISTERED NURSE

## 2023-03-07 PROCEDURE — 2580000003 HC RX 258: Performed by: REGISTERED NURSE

## 2023-03-07 PROCEDURE — 6360000002 HC RX W HCPCS: Performed by: REGISTERED NURSE

## 2023-03-07 RX ORDER — SODIUM CHLORIDE 0.9 % (FLUSH) 0.9 %
5-40 SYRINGE (ML) INJECTION PRN
Status: CANCELLED | OUTPATIENT
Start: 2023-03-08

## 2023-03-07 RX ORDER — DIPHENHYDRAMINE HYDROCHLORIDE 50 MG/ML
50 INJECTION INTRAMUSCULAR; INTRAVENOUS
Status: CANCELLED | OUTPATIENT
Start: 2023-03-08

## 2023-03-07 RX ORDER — HEPARIN SODIUM (PORCINE) LOCK FLUSH IV SOLN 100 UNIT/ML 100 UNIT/ML
300 SOLUTION INTRAVENOUS PRN
Status: DISCONTINUED | OUTPATIENT
Start: 2023-03-07 | End: 2023-03-08 | Stop reason: HOSPADM

## 2023-03-07 RX ORDER — DIPHENHYDRAMINE HCL 25 MG
50 TABLET ORAL
Status: CANCELLED
Start: 2023-03-08

## 2023-03-07 RX ORDER — SODIUM CHLORIDE 0.9 % (FLUSH) 0.9 %
5-40 SYRINGE (ML) INJECTION PRN
Status: DISCONTINUED | OUTPATIENT
Start: 2023-03-07 | End: 2023-03-08 | Stop reason: HOSPADM

## 2023-03-07 RX ORDER — EPINEPHRINE 1 MG/ML
0.3 INJECTION, SOLUTION, CONCENTRATE INTRAVENOUS PRN
Status: CANCELLED | OUTPATIENT
Start: 2023-03-08

## 2023-03-07 RX ORDER — HEPARIN SODIUM (PORCINE) LOCK FLUSH IV SOLN 100 UNIT/ML 100 UNIT/ML
300 SOLUTION INTRAVENOUS PRN
Status: CANCELLED | OUTPATIENT
Start: 2023-03-08

## 2023-03-07 RX ADMIN — SODIUM CHLORIDE, PRESERVATIVE FREE 10 ML: 5 INJECTION INTRAVENOUS at 07:20

## 2023-03-07 RX ADMIN — SODIUM CHLORIDE, PRESERVATIVE FREE 10 ML: 5 INJECTION INTRAVENOUS at 07:34

## 2023-03-07 RX ADMIN — SODIUM CHLORIDE 600 MG: 9 INJECTION, SOLUTION INTRAMUSCULAR; INTRAVENOUS; SUBCUTANEOUS at 07:23

## 2023-03-07 RX ADMIN — SODIUM CHLORIDE, PRESERVATIVE FREE 10 ML: 5 INJECTION INTRAVENOUS at 07:28

## 2023-03-07 RX ADMIN — Medication 300 UNITS: at 07:35

## 2023-03-07 RX ADMIN — ERTAPENEM SODIUM 1000 MG: 1 INJECTION, POWDER, LYOPHILIZED, FOR SOLUTION INTRAMUSCULAR; INTRAVENOUS at 07:29

## 2023-03-08 ENCOUNTER — APPOINTMENT (OUTPATIENT)
Dept: INFUSION THERAPY | Age: 53
End: 2023-03-08
Payer: COMMERCIAL

## 2023-03-08 ENCOUNTER — HOSPITAL ENCOUNTER (OUTPATIENT)
Dept: INFUSION THERAPY | Age: 53
Setting detail: INFUSION SERIES
Discharge: HOME | End: 2023-03-08
Payer: COMMERCIAL

## 2023-03-08 VITALS
HEART RATE: 65 BPM | HEIGHT: 75 IN | OXYGEN SATURATION: 99 % | RESPIRATION RATE: 16 BRPM | SYSTOLIC BLOOD PRESSURE: 118 MMHG | DIASTOLIC BLOOD PRESSURE: 79 MMHG | WEIGHT: 217 LBS | BODY MASS INDEX: 26.98 KG/M2 | TEMPERATURE: 97 F

## 2023-03-08 DIAGNOSIS — S01.20XA OPEN WOUND OF NASAL CAVITY, INITIAL ENCOUNTER: Primary | ICD-10-CM

## 2023-03-08 PROCEDURE — 2580000003 HC RX 258: Performed by: REGISTERED NURSE

## 2023-03-08 PROCEDURE — A4216 STERILE WATER/SALINE, 10 ML: HCPCS | Performed by: REGISTERED NURSE

## 2023-03-08 PROCEDURE — 96375 TX/PRO/DX INJ NEW DRUG ADDON: CPT

## 2023-03-08 PROCEDURE — 96374 THER/PROPH/DIAG INJ IV PUSH: CPT

## 2023-03-08 PROCEDURE — 6360000002 HC RX W HCPCS: Performed by: REGISTERED NURSE

## 2023-03-08 RX ORDER — HEPARIN SODIUM (PORCINE) LOCK FLUSH IV SOLN 100 UNIT/ML 100 UNIT/ML
300 SOLUTION INTRAVENOUS PRN
Status: DISCONTINUED | OUTPATIENT
Start: 2023-03-08 | End: 2023-03-09 | Stop reason: HOSPADM

## 2023-03-08 RX ORDER — SODIUM CHLORIDE 0.9 % (FLUSH) 0.9 %
5-40 SYRINGE (ML) INJECTION PRN
Status: DISCONTINUED | OUTPATIENT
Start: 2023-03-08 | End: 2023-03-09 | Stop reason: HOSPADM

## 2023-03-08 RX ORDER — DIPHENHYDRAMINE HCL 25 MG
50 TABLET ORAL
Status: CANCELLED
Start: 2023-03-09

## 2023-03-08 RX ORDER — HEPARIN SODIUM (PORCINE) LOCK FLUSH IV SOLN 100 UNIT/ML 100 UNIT/ML
300 SOLUTION INTRAVENOUS PRN
Status: CANCELLED | OUTPATIENT
Start: 2023-03-09

## 2023-03-08 RX ORDER — EPINEPHRINE 1 MG/ML
0.3 INJECTION, SOLUTION, CONCENTRATE INTRAVENOUS PRN
Status: CANCELLED | OUTPATIENT
Start: 2023-03-09

## 2023-03-08 RX ORDER — SODIUM CHLORIDE 0.9 % (FLUSH) 0.9 %
5-40 SYRINGE (ML) INJECTION PRN
Status: CANCELLED | OUTPATIENT
Start: 2023-03-09

## 2023-03-08 RX ORDER — DIPHENHYDRAMINE HYDROCHLORIDE 50 MG/ML
50 INJECTION INTRAMUSCULAR; INTRAVENOUS
Status: CANCELLED | OUTPATIENT
Start: 2023-03-09

## 2023-03-08 RX ADMIN — SODIUM CHLORIDE, PRESERVATIVE FREE 10 ML: 5 INJECTION INTRAVENOUS at 07:31

## 2023-03-08 RX ADMIN — SODIUM CHLORIDE, PRESERVATIVE FREE 10 ML: 5 INJECTION INTRAVENOUS at 07:20

## 2023-03-08 RX ADMIN — Medication 300 UNITS: at 07:32

## 2023-03-08 RX ADMIN — ERTAPENEM SODIUM 1000 MG: 1 INJECTION, POWDER, LYOPHILIZED, FOR SOLUTION INTRAMUSCULAR; INTRAVENOUS at 07:20

## 2023-03-08 RX ADMIN — SODIUM CHLORIDE, PRESERVATIVE FREE 10 ML: 5 INJECTION INTRAVENOUS at 07:25

## 2023-03-08 RX ADMIN — SODIUM CHLORIDE 600 MG: 9 INJECTION, SOLUTION INTRAMUSCULAR; INTRAVENOUS; SUBCUTANEOUS at 07:26

## 2023-03-08 NOTE — PROGRESS NOTES
Patient instructed on s/s infection/complication with PICC line to report and instructed on keeping PICC dressing clean, dry and intact patient verbalizes understanding. Tolerated iv push  well. Reviewed therapy plan, offered education material and/or discharge material, reviewed medication information and signs and symptoms  and educated on possible side effects, verbalizes good knowledge of current plan patient verbalizes understanding, and has no signs or symptoms to report at this time. Patient discharged. Patient alert and oriented x3. No distress noted. Vital signs stable. Patient denies any new or worsening pain. Patient denies any needs. All questions answered. Next appointment scheduled.  Declines copy of AVS.

## 2023-03-09 ENCOUNTER — HOSPITAL ENCOUNTER (OUTPATIENT)
Dept: INFUSION THERAPY | Age: 53
Setting detail: INFUSION SERIES
Discharge: HOME | End: 2023-03-09
Payer: COMMERCIAL

## 2023-03-09 ENCOUNTER — APPOINTMENT (OUTPATIENT)
Dept: INFUSION THERAPY | Age: 53
End: 2023-03-09
Payer: COMMERCIAL

## 2023-03-09 VITALS
SYSTOLIC BLOOD PRESSURE: 124 MMHG | DIASTOLIC BLOOD PRESSURE: 75 MMHG | HEART RATE: 64 BPM | OXYGEN SATURATION: 98 % | TEMPERATURE: 97.3 F | RESPIRATION RATE: 16 BRPM

## 2023-03-09 DIAGNOSIS — S01.20XA OPEN WOUND OF NASAL CAVITY, INITIAL ENCOUNTER: Primary | ICD-10-CM

## 2023-03-09 LAB
ALBUMIN SERPL-MCNC: 4.2 G/DL (ref 3.5–5.2)
ALP BLD-CCNC: 110 U/L (ref 40–129)
ALT SERPL-CCNC: 25 U/L (ref 0–40)
ANION GAP SERPL CALCULATED.3IONS-SCNC: 10 MMOL/L (ref 7–16)
AST SERPL-CCNC: 16 U/L (ref 0–39)
BASOPHILS ABSOLUTE: 0.08 E9/L (ref 0–0.2)
BASOPHILS RELATIVE PERCENT: 1 % (ref 0–2)
BILIRUB SERPL-MCNC: 0.8 MG/DL (ref 0–1.2)
BUN BLDV-MCNC: 21 MG/DL (ref 6–20)
CALCIUM SERPL-MCNC: 9.4 MG/DL (ref 8.6–10.2)
CHLORIDE BLD-SCNC: 98 MMOL/L (ref 98–107)
CO2: 26 MMOL/L (ref 22–29)
CREAT SERPL-MCNC: 1.2 MG/DL (ref 0.7–1.2)
EOSINOPHILS ABSOLUTE: 0.26 E9/L (ref 0.05–0.5)
EOSINOPHILS RELATIVE PERCENT: 3.3 % (ref 0–6)
GFR SERPL CREATININE-BSD FRML MDRD: >60 ML/MIN/1.73
GLUCOSE BLD-MCNC: 90 MG/DL (ref 74–99)
HCT VFR BLD CALC: 45.9 % (ref 37–54)
HEMOGLOBIN: 15.2 G/DL (ref 12.5–16.5)
IMMATURE GRANULOCYTES #: 0.04 E9/L
IMMATURE GRANULOCYTES %: 0.5 % (ref 0–5)
LYMPHOCYTES ABSOLUTE: 1.55 E9/L (ref 1.5–4)
LYMPHOCYTES RELATIVE PERCENT: 19.6 % (ref 20–42)
MCH RBC QN AUTO: 30.2 PG (ref 26–35)
MCHC RBC AUTO-ENTMCNC: 33.1 % (ref 32–34.5)
MCV RBC AUTO: 91.3 FL (ref 80–99.9)
MONOCYTES ABSOLUTE: 0.58 E9/L (ref 0.1–0.95)
MONOCYTES RELATIVE PERCENT: 7.4 % (ref 2–12)
NEUTROPHILS ABSOLUTE: 5.38 E9/L (ref 1.8–7.3)
NEUTROPHILS RELATIVE PERCENT: 68.2 % (ref 43–80)
PDW BLD-RTO: 12.7 FL (ref 11.5–15)
PLATELET # BLD: 232 E9/L (ref 130–450)
PMV BLD AUTO: 10.5 FL (ref 7–12)
POTASSIUM SERPL-SCNC: 3.8 MMOL/L (ref 3.5–5)
RBC # BLD: 5.03 E12/L (ref 3.8–5.8)
SODIUM BLD-SCNC: 134 MMOL/L (ref 132–146)
TOTAL PROTEIN: 7.1 G/DL (ref 6.4–8.3)
WBC # BLD: 7.9 E9/L (ref 4.5–11.5)

## 2023-03-09 PROCEDURE — 96374 THER/PROPH/DIAG INJ IV PUSH: CPT

## 2023-03-09 PROCEDURE — 96375 TX/PRO/DX INJ NEW DRUG ADDON: CPT

## 2023-03-09 PROCEDURE — 2580000003 HC RX 258: Performed by: REGISTERED NURSE

## 2023-03-09 PROCEDURE — A4216 STERILE WATER/SALINE, 10 ML: HCPCS | Performed by: REGISTERED NURSE

## 2023-03-09 PROCEDURE — 80053 COMPREHEN METABOLIC PANEL: CPT

## 2023-03-09 PROCEDURE — 85025 COMPLETE CBC W/AUTO DIFF WBC: CPT

## 2023-03-09 PROCEDURE — 36415 COLL VENOUS BLD VENIPUNCTURE: CPT

## 2023-03-09 PROCEDURE — 6360000002 HC RX W HCPCS: Performed by: REGISTERED NURSE

## 2023-03-09 RX ORDER — DIPHENHYDRAMINE HYDROCHLORIDE 50 MG/ML
50 INJECTION INTRAMUSCULAR; INTRAVENOUS
Status: CANCELLED | OUTPATIENT
Start: 2023-03-10

## 2023-03-09 RX ORDER — EPINEPHRINE 1 MG/ML
0.3 INJECTION, SOLUTION, CONCENTRATE INTRAVENOUS PRN
Status: CANCELLED | OUTPATIENT
Start: 2023-03-10

## 2023-03-09 RX ORDER — HEPARIN SODIUM (PORCINE) LOCK FLUSH IV SOLN 100 UNIT/ML 100 UNIT/ML
300 SOLUTION INTRAVENOUS PRN
Status: CANCELLED | OUTPATIENT
Start: 2023-03-10

## 2023-03-09 RX ORDER — SODIUM CHLORIDE 0.9 % (FLUSH) 0.9 %
5-40 SYRINGE (ML) INJECTION PRN
Status: DISCONTINUED | OUTPATIENT
Start: 2023-03-09 | End: 2023-03-10 | Stop reason: HOSPADM

## 2023-03-09 RX ORDER — HEPARIN SODIUM (PORCINE) LOCK FLUSH IV SOLN 100 UNIT/ML 100 UNIT/ML
300 SOLUTION INTRAVENOUS PRN
Status: DISCONTINUED | OUTPATIENT
Start: 2023-03-09 | End: 2023-03-10 | Stop reason: HOSPADM

## 2023-03-09 RX ORDER — DIPHENHYDRAMINE HCL 25 MG
50 TABLET ORAL
Status: CANCELLED
Start: 2023-03-10

## 2023-03-09 RX ORDER — SODIUM CHLORIDE 0.9 % (FLUSH) 0.9 %
5-40 SYRINGE (ML) INJECTION PRN
Status: CANCELLED | OUTPATIENT
Start: 2023-03-10

## 2023-03-09 RX ADMIN — SODIUM CHLORIDE, PRESERVATIVE FREE 10 ML: 5 INJECTION INTRAVENOUS at 07:44

## 2023-03-09 RX ADMIN — Medication 300 UNITS: at 07:50

## 2023-03-09 RX ADMIN — SODIUM CHLORIDE, PRESERVATIVE FREE 10 ML: 5 INJECTION INTRAVENOUS at 07:38

## 2023-03-09 RX ADMIN — ERTAPENEM SODIUM 1000 MG: 1 INJECTION, POWDER, LYOPHILIZED, FOR SOLUTION INTRAMUSCULAR; INTRAVENOUS at 07:45

## 2023-03-09 RX ADMIN — SODIUM CHLORIDE, PRESERVATIVE FREE 10 ML: 5 INJECTION INTRAVENOUS at 07:50

## 2023-03-09 RX ADMIN — SODIUM CHLORIDE, PRESERVATIVE FREE 10 ML: 5 INJECTION INTRAVENOUS at 07:39

## 2023-03-09 RX ADMIN — SODIUM CHLORIDE 600 MG: 9 INJECTION, SOLUTION INTRAMUSCULAR; INTRAVENOUS; SUBCUTANEOUS at 07:39

## 2023-03-10 ENCOUNTER — HOSPITAL ENCOUNTER (OUTPATIENT)
Dept: INFUSION THERAPY | Age: 53
Setting detail: INFUSION SERIES
Discharge: HOME | End: 2023-03-10
Payer: COMMERCIAL

## 2023-03-10 ENCOUNTER — APPOINTMENT (OUTPATIENT)
Dept: INFUSION THERAPY | Age: 53
End: 2023-03-10
Payer: COMMERCIAL

## 2023-03-10 VITALS
SYSTOLIC BLOOD PRESSURE: 128 MMHG | DIASTOLIC BLOOD PRESSURE: 66 MMHG | HEART RATE: 63 BPM | WEIGHT: 217 LBS | RESPIRATION RATE: 16 BRPM | OXYGEN SATURATION: 99 % | TEMPERATURE: 96.4 F | BODY MASS INDEX: 26.98 KG/M2 | HEIGHT: 75 IN

## 2023-03-10 PROCEDURE — 99211 OFF/OP EST MAY X REQ PHY/QHP: CPT

## 2023-03-10 ASSESSMENT — PAIN DESCRIPTION - ORIENTATION: ORIENTATION: RIGHT;LEFT

## 2023-03-10 ASSESSMENT — PAIN SCALES - GENERAL: PAINLEVEL_OUTOF10: 3

## 2023-03-10 ASSESSMENT — PAIN DESCRIPTION - ONSET: ONSET: ON-GOING

## 2023-03-10 ASSESSMENT — PAIN DESCRIPTION - DESCRIPTORS: DESCRIPTORS: ACHING

## 2023-03-10 ASSESSMENT — PAIN DESCRIPTION - LOCATION: LOCATION: NOSE

## 2023-03-10 ASSESSMENT — PAIN DESCRIPTION - PAIN TYPE: TYPE: CHRONIC PAIN

## 2023-03-10 ASSESSMENT — PAIN DESCRIPTION - FREQUENCY: FREQUENCY: INTERMITTENT

## 2023-03-10 NOTE — PROGRESS NOTES
Pt came in today to have his PICC line removed pt tolerated well. Dressing applied. Therapy complete.  Declines AVS

## 2023-03-13 LAB
FUNGUS (MYCOLOGY) CULTURE: NORMAL
FUNGUS STAIN: NORMAL

## 2023-03-16 LAB
CREATININE (MG/DL) IN SER/PLAS: 1.17 MG/DL (ref 0.5–1.3)
GFR MALE: 76 ML/MIN/1.73M2
UREA NITROGEN (MG/DL) IN SER/PLAS: 14 MG/DL (ref 6–23)

## 2023-03-20 LAB
FUNGUS SPEC CULT: NORMAL
LOEFFLER MB STN SPEC: NORMAL

## 2023-03-27 LAB
FUNGUS SPEC CULT: NORMAL
LOEFFLER MB STN SPEC: NORMAL

## 2023-05-02 LAB — SARS-COV-2 RESULT: NOT DETECTED

## 2023-05-05 ENCOUNTER — HOSPITAL ENCOUNTER (OUTPATIENT)
Dept: DATA CONVERSION | Facility: HOSPITAL | Age: 53
End: 2023-05-06
Attending: OTOLARYNGOLOGY | Admitting: OTOLARYNGOLOGY

## 2023-05-05 DIAGNOSIS — M19.90 UNSPECIFIED OSTEOARTHRITIS, UNSPECIFIED SITE: ICD-10-CM

## 2023-05-05 DIAGNOSIS — Z87.891 PERSONAL HISTORY OF NICOTINE DEPENDENCE: ICD-10-CM

## 2023-05-05 DIAGNOSIS — C44.321 SQUAMOUS CELL CARCINOMA OF SKIN OF NOSE: ICD-10-CM

## 2023-05-05 DIAGNOSIS — H91.90 UNSPECIFIED HEARING LOSS, UNSPECIFIED EAR: ICD-10-CM

## 2023-05-05 DIAGNOSIS — N20.0 CALCULUS OF KIDNEY: ICD-10-CM

## 2023-05-05 DIAGNOSIS — K21.9 GASTRO-ESOPHAGEAL REFLUX DISEASE WITHOUT ESOPHAGITIS: ICD-10-CM

## 2023-05-06 LAB
ALBUMIN (G/DL) IN SER/PLAS: 3.9 G/DL (ref 3.4–5)
ANION GAP IN SER/PLAS: 15 MMOL/L (ref 10–20)
CALCIUM (MG/DL) IN SER/PLAS: 8.8 MG/DL (ref 8.6–10.6)
CARBON DIOXIDE, TOTAL (MMOL/L) IN SER/PLAS: 24 MMOL/L (ref 21–32)
CHLORIDE (MMOL/L) IN SER/PLAS: 102 MMOL/L (ref 98–107)
CREATININE (MG/DL) IN SER/PLAS: 1.19 MG/DL (ref 0.5–1.3)
ERYTHROCYTE DISTRIBUTION WIDTH (RATIO) BY AUTOMATED COUNT: 13.4 % (ref 11.5–14.5)
ERYTHROCYTE MEAN CORPUSCULAR HEMOGLOBIN CONCENTRATION (G/DL) BY AUTOMATED: 32.8 G/DL (ref 32–36)
ERYTHROCYTE MEAN CORPUSCULAR VOLUME (FL) BY AUTOMATED COUNT: 92 FL (ref 80–100)
ERYTHROCYTES (10*6/UL) IN BLOOD BY AUTOMATED COUNT: 4.6 X10E12/L (ref 4.5–5.9)
GFR MALE: 74 ML/MIN/1.73M2
GLUCOSE (MG/DL) IN SER/PLAS: 113 MG/DL (ref 74–99)
HEMATOCRIT (%) IN BLOOD BY AUTOMATED COUNT: 42.1 % (ref 41–52)
HEMOGLOBIN (G/DL) IN BLOOD: 13.8 G/DL (ref 13.5–17.5)
LEUKOCYTES (10*3/UL) IN BLOOD BY AUTOMATED COUNT: 7.8 X10E9/L (ref 4.4–11.3)
MAGNESIUM (MG/DL) IN SER/PLAS: 1.92 MG/DL (ref 1.6–2.4)
NRBC (PER 100 WBCS) BY AUTOMATED COUNT: 0 /100 WBC (ref 0–0)
PHOSPHATE (MG/DL) IN SER/PLAS: 4.4 MG/DL (ref 2.5–4.9)
PLATELETS (10*3/UL) IN BLOOD AUTOMATED COUNT: 201 X10E9/L (ref 150–450)
POTASSIUM (MMOL/L) IN SER/PLAS: 4.2 MMOL/L (ref 3.5–5.3)
SODIUM (MMOL/L) IN SER/PLAS: 137 MMOL/L (ref 136–145)
UREA NITROGEN (MG/DL) IN SER/PLAS: 18 MG/DL (ref 6–23)

## 2023-05-25 ENCOUNTER — HOSPITAL ENCOUNTER (OUTPATIENT)
Dept: RADIATION ONCOLOGY | Age: 53
Discharge: HOME | End: 2023-05-25
Payer: COMMERCIAL

## 2023-05-25 VITALS
DIASTOLIC BLOOD PRESSURE: 78 MMHG | SYSTOLIC BLOOD PRESSURE: 134 MMHG | HEART RATE: 86 BPM | TEMPERATURE: 97.8 F | RESPIRATION RATE: 18 BRPM | OXYGEN SATURATION: 97 % | WEIGHT: 224.2 LBS | BODY MASS INDEX: 28.02 KG/M2

## 2023-05-25 DIAGNOSIS — C44.321 SQUAMOUS CELL CARCINOMA OF TIP OF NOSE: Primary | ICD-10-CM

## 2023-05-25 PROCEDURE — 99205 OFFICE O/P NEW HI 60 MIN: CPT

## 2023-05-25 PROCEDURE — 99205 OFFICE O/P NEW HI 60 MIN: CPT | Performed by: SPECIALIST

## 2023-05-25 NOTE — PROGRESS NOTES
Radha Perkins  5/25/2023  Wt Readings from Last 10 Encounters:   05/25/23 224 lb 3.2 oz (101.7 kg)   03/10/23 217 lb (98.4 kg)   03/08/23 217 lb (98.4 kg)   03/07/23 217 lb (98.4 kg)   03/06/23 217 lb (98.4 kg)   03/02/23 217 lb (98.4 kg)   03/01/23 217 lb (98.4 kg)   02/28/23 217 lb (98.4 kg)   02/27/23 217 lb (98.4 kg)   02/26/23 217 lb (98.4 kg)     Ht Readings from Last 1 Encounters:   03/10/23 6' 3\" (1.905 m)     Body mass index is 28.02 kg/m². Assessment: Met w/ pt and wife, Aida, for introduction during initial consult for SCC of nose/nasal septum. Pt s/p partial rhinectomy 5/5/23. Pt reports that his appetite has been improving over the last 1-2 weeks and he has returned to consuming 3 meals/day w/ minimal snacking, not consuming any ONS products at this time. Pt reports wt loss prior to surgery, has recently regained ~7#. Pt reports he had been experiencing some odynophagia post op, however this has resolved following drain removal. Discussed role of oncology dietitian in care. Educated pt on possible nutrition related side effects of treatment and ultimate goal of wt maintenance. Pt denies any nutrition concerns at this time. Encouraged to contact this clinician as needed.     Weight change: subjective wt flux  Appetite: good, improving  Nutritional Side Effects: odynophagia resolved  Calculated Needs: 25-28 kcal/kg CBW =  kcal, 1.3-1.5 gm/kg IBW = 115-130 gm pro, 1 ml/kcal =  ml fluids  Malnutrition Status: None noted    Pre-Hab Eligible?: Yes    Recommendations: pt to continue w/ current eating pattern as tolerated, may benefit from daily protein supplement to promote wound healing    Sung Mckeon MS, RD, LD
Comment:    The tumor cells are immunoreactive with pankeratin and p40. The malignant cells are negative for SOX10.     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 DISCUSSION/PLAN:     Today I had the pleasure of meeting Makenzie Shah and his wife Aida to discuss the current state of his squamous cell carcinoma of the nose based on the current clinical data. Unfortunately at the time of our consultation his final pathology was unavailable but I have had an opportunity to discuss his case with his ear nose and throat surgeon Dr. Bg Epps. Makenzie Shah underwent a total rhinectomy with septectomy for a's invasive squamous cell carcinoma. I understand intraoperatively the tumor appears to have grown superiorly to the glabella and inferiorly towards the maxillary crest without invasion of the nearby sinuses. Based on the clinical and surgical description I believe he is likely would require at minimum radiation therapy but potentially sensitizing systemic therapy. I have therefore taken the liberty of referring him to [facility]. I did describe the role of radiation therapy in the management of tumors of the nasal cavity and its appendages. I am recommending a 6 to 6-1/2-week course of treatment. Given the location of this lesion and the involvement/perforation of the skin I think it is reasonable to also include zones IA/B and subjacent regions of the neck and our radiation treatment plan. We will discuss his case in our multidisciplinary tumor board with final recommendations to follow. Our encounter today which lasted 70 minutes the vast majority of which was reviewing the imaging preop as well as his intraoperative findings and the plan for treatment going ahead.       NCCN guidelines, version
present for nutrition risk identification. Total the numbers entered and assign a risk score. Follow the appropriate action for total score listed below. Assessment   Date  5/25/2023     Have you lost weight without trying? 2- Unsure     Have you been eating poorly because of a decreased appetite? 0- No   3. Do you have a diagnosis of head and neck cancer? 2- Yes                                                                                    TOTAL 4          Score of 0-1: No action  Score 2 or greater:   For Non-Diabetic Patient: Recommend adding Ensure Complete 2 x daily and provide patient with Ensure wellness bag with coupons  For Diabetic Patient: Recommend adding Glucerna Shake 2 x daily and provide patient with Glucerna Wellness bag with coupons  Route to the dietitian via [address]    Are you having difficulty performing daily routine tasks due to fatigue or weakness (ie: bathing/showering, dressing, housework, meal prep, work, , etc): No     Do you have any arm flexibility/ROM restrictions, swelling or pain that limit activity: No     Any changes in memory, attention/focus that impact daily activities: No     Do you avoid participation in leisure/social activity due to weakness, fatigue or pain: No     ARE ANY OF THE ABOVE ARE ANSWERED YES: No          PT ASSESSMENT FOR REFERRAL    Have you had any recent falls in the past 2 months: No     Do you have difficulty going up/down stairs: No     Are you having difficulty walking: No     Do you often hold onto furniture/environmental supports or feel off balance when you are walking: No     Do you need to take rest breaks when you are walking: No     Any pain on a scale of 1-10 that limits your mobility: No 0/10    ARE ANY OF THE ABOVE ARE ANSWERED YES: No           LYMPHEDEMA SCREENING ASSESSMENT FOR PATIENTS WITH BREAST CANCER    The patient reports the following signs/symptoms of lymphedema:

## 2023-05-30 LAB
COMPLETE PATHOLOGY REPORT: NORMAL
CONVERTED CLINICAL DIAGNOSIS-HISTORY: NORMAL
CONVERTED FINAL DIAGNOSIS: NORMAL
CONVERTED FINAL REPORT PDF LINK TO COPY AND PASTE: NORMAL
CONVERTED GROSS DESCRIPTION: NORMAL
CONVERTED INTRAOPERATIVE DIAGNOSIS: NORMAL

## 2023-06-06 ENCOUNTER — OFFICE VISIT (OUTPATIENT)
Dept: ONCOLOGY | Age: 53
End: 2023-06-06
Payer: COMMERCIAL

## 2023-06-06 ENCOUNTER — HOSPITAL ENCOUNTER (OUTPATIENT)
Dept: INFUSION THERAPY | Age: 53
Discharge: HOME | End: 2023-06-06

## 2023-06-06 ENCOUNTER — TELEPHONE (OUTPATIENT)
Dept: CASE MANAGEMENT | Age: 53
End: 2023-06-06

## 2023-06-06 ENCOUNTER — TELEPHONE (OUTPATIENT)
Dept: ONCOLOGY | Age: 53
End: 2023-06-06

## 2023-06-06 VITALS
SYSTOLIC BLOOD PRESSURE: 126 MMHG | HEART RATE: 60 BPM | OXYGEN SATURATION: 98 % | BODY MASS INDEX: 28.16 KG/M2 | HEIGHT: 75 IN | DIASTOLIC BLOOD PRESSURE: 82 MMHG | TEMPERATURE: 97.6 F | WEIGHT: 226.5 LBS

## 2023-06-06 DIAGNOSIS — C76.0 MALIGNANT NEOPLASM OF HEAD, FACE AND NECK (HCC): Primary | ICD-10-CM

## 2023-06-06 DIAGNOSIS — R91.8 OTHER NONSPECIFIC ABNORMAL FINDING OF LUNG FIELD: ICD-10-CM

## 2023-06-06 PROCEDURE — 99205 OFFICE O/P NEW HI 60 MIN: CPT | Performed by: INTERNAL MEDICINE

## 2023-06-06 RX ORDER — SULFAMETHOXAZOLE AND TRIMETHOPRIM 800; 160 MG/1; MG/1
1 TABLET ORAL 2 TIMES DAILY
COMMUNITY

## 2023-06-06 NOTE — TELEPHONE ENCOUNTER
Met with patient and his wife, Yariel Nelson, during his  initial consultation with Dr. Daina Dela Cruz  for his  recent head and neck cancer diagnosis. Introduced myself and explained my role with patients receiving treatment at our center. Patient was friendly and receptive. Instructed on next steps including referral to [facility] pulmonologist for lung nodule, audiology consult and probable cisplatin RT treatment per Dr. Ronald House's recommendations and follow up care. Patient states that he as dental appointment tomorrow to have 2 teeth extracted and 1 tooth filled. Provided patient with transportation resource list and literature on [address] support services pamphlet, [employer] Cisplatin handout and Patient Resource Head and Neck Cancer booklet. Reviewed resources available to him  such as Social Work and Dietitian. Patient is meeting with both today but denies any needs at this time. New patient nursing assessment, medical history, surgical history, family history completed. Medication list reviewed and updated. Provided with my contact information and instructed patient to call me with questions or concerns. Verbalizes understanding. Patient appreciative of visit. Will continue to follow.

## 2023-06-06 NOTE — TELEPHONE ENCOUNTER
Pelon Barton and left a message regarding status of Dental Clearance. Received a message from [facility] with the Dental Clinic. Patient is scheduled for teeth extractions on 6/7. Will check for clearance after extractions are completed.

## 2023-06-06 NOTE — PROGRESS NOTES
Cong Melgar  1970 50 y.o. Referring Physician: Dr. Carolina Zuleta    PCP: Lisa Benitez MD    Vitals:    06/06/23 1438   BP: 126/82   Pulse: 60   Temp: 97.6 °F (36.4 °C)   SpO2: 98%        Wt Readings from Last 3 Encounters:   06/06/23 226 lb 8 oz (102.7 kg)   05/25/23 224 lb 3.2 oz (101.7 kg)   03/10/23 217 lb (98.4 kg)        Body mass index is 28.31 kg/m². Chief Complaint:   Chief Complaint   Patient presents with    Cancer     SCC tip of nose    New Patient          Cancer Staging   No matching staging information was found for the patient. Prior Radiation Therapy? NO    Concurrent Chemo/radiation? NO    Prior Chemotherapy? NO    Prior Hormonal Therapy? NO    Head and Neck Cancer? Yes      Current Outpatient Medications:     sulfamethoxazole-trimethoprim (BACTRIM DS;SEPTRA DS) 800-160 MG per tablet, Take 1 tablet by mouth 2 times daily, Disp: , Rfl:     ibuprofen (ADVIL;MOTRIN) 200 MG tablet, Take 4 tablets by mouth every 12 hours as needed for Pain, Disp: , Rfl:     calcium carbonate (TUMS) 500 MG chewable tablet, Take 1,500 mg by mouth every 4-6 hours as needed for Heartburn (Patient not taking: Reported on 6/6/2023), Disp: , Rfl:     acetaminophen (TYLENOL) 500 MG tablet, Take 2 tablets by mouth every 8 hours as needed for Pain (Patient not taking: Reported on 6/6/2023), Disp: 50 tablet, Rfl: 0       Past Medical History:   Diagnosis Date    Acid reflux        Past Surgical History:   Procedure Laterality Date    APPENDECTOMY      CHOLECYSTECTOMY      ELBOW SURGERY      INSERT PICC LINE Right 02/23/2023       History reviewed. No pertinent family history.     Social History     Socioeconomic History    Marital status:      Spouse name: Not on file    Number of children: Not on file    Years of education: Not on file    Highest education level: Not on file   Occupational History    Not on file   Tobacco Use    Smoking status: Former    Smokeless tobacco: Never    Tobacco comments:
Patient provided with discharge instructions, patient has [address] All questions answered. Patient understands follow up plan of care.
Wt Readings from Last 3 Encounters:   06/06/23 226 lb 8 oz (102.7 kg)   05/25/23 224 lb 3.2 oz (101.7 kg)   03/10/23 217 lb (98.4 kg)     Briefly met w/ pt and wife, Aida, for check in during initial consult for SCC of tip of nose. Previously met w/ pt and Aida during radiation oncology consult. Pt pleased to report continued good appetite, has regained 2# over last 2 weeks. He denies any specific nutrition concerns at this time. Again reviewed role of oncology dietitian in pt's care. Encouraged to contact this clinician as needed.   Electronically signed by Elyssa Truong MS, RD, LD on 6/6/2023 at 3:51 PM
Alcohol use: Yes    Drug use: No    Sexual activity: Not on file   Other Topics Concern    Not on file   Social History Narrative    Not on file     Social Determinants of Health     Financial Resource Strain: Not on file   Food Insecurity: Not on file   Transportation Needs: Not on file   Physical Activity: Not on file   Stress: Not on file   Social Connections: Not on file   Intimate Partner Violence: Not on file   Housing Stability: Not on file       Allergies:  No Known Allergies    Physical Exam:  /82   Pulse 60   Temp 97.6 °F (36.4 °C)   Ht 6' 3\" (1.905 m)   Wt 226 lb 8 oz (102.7 kg)   SpO2 98%   BMI 28.31 kg/m²   GENERAL: Alert, oriented x 3, not in acute distress. HEENT: PERRLA; EOMI. S/p rhinectomy  NECK: Supple. No palpable cervical or supraclavicular lymphadenopathy. LUNGS: Good air entry bilaterally. No wheezing, crackles or rhonchi. CARDIOVASCULAR: Regular rate. No murmurs, rubs or gallops. ABDOMEN: Soft. Non-tender, non-distended. Positive bowel sounds. EXTREMITIES: Without clubbing, cyanosis, or edema. NEUROLOGIC: No focal deficits. ECOG PS 0      Impression/Plan:    Mr. 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   The patient was hit by his dog's head on the left side of his nose in April 2022, in November 2022 he had noticed a lesion inside his nose, and she was diagnosed with staph infection, was treated with antibiotics, he then developed a pimple on his nose and eventually developed a hole in his nose, he was seen by ID, he was referred to the hospital due to concern about osteomyelitis, he had a CT scan done revealing chronic sinusitis involving the left sphenoid sinus, he had a biopsy done on 2/21/2023, revealing invasive poorly differentiated squamous cell carcinoma, grade 3, the patient was referred Dr. Denny Arnett, he underwent a total rhinectomy on 5/5/2023, the patient was found to

## 2023-06-07 NOTE — TELEPHONE ENCOUNTER
Met with pt in conjunction with medical oncology consultation as introduction to oncology social work. Pt is 16-year-old male being managed for squamous cell carcinoma of the nose who recently underwent a rhinectomy. Pt's mood appeared euthymic with full affect, he appeared A&Ox4, and he was willing/able to participate in session. He appeared appropriately dressed/groomed and was able to ambulate/transfer without assistance. Pt discussed recent cancer dx and establishing care with providers in order to initiate treatment. He noted that overall he has been managing well and has no immediate psychosocial concerns. Pt reported that he is employed at a [address] [place] [address] [employer] and is currently on 6 month STD (initiated in February); he noted that he is working with  and believes that he will be able to get an extension on this benefit. He did report that he does not believe that he has any outstanding medical bills at this time but he expressed interest in financial assistance if additional bills are received. No additional needs identified at this time. Reviewed role of oncology SW and encouraged pt to notify this provider if additional needs arise.     Micahel Bella, MSW, VERÓNICA-S  Oncology Social Worker

## 2023-06-09 ENCOUNTER — PROCEDURE VISIT (OUTPATIENT)
Dept: AUDIOLOGY | Age: 53
End: 2023-06-09

## 2023-06-09 DIAGNOSIS — Z79.899 ENCOUNTER FOR MONITORING OTOTOXIC DRUG THERAPY: Primary | ICD-10-CM

## 2023-06-09 DIAGNOSIS — Z51.81 ENCOUNTER FOR MONITORING OTOTOXIC DRUG THERAPY: Primary | ICD-10-CM

## 2023-06-09 NOTE — PROGRESS NOTES
This patient was referred for Ototoxic Monitoring by Arlet Bonilla MD   due to upcoming treatment with ototoxic medications. Therefore, baselines were established for audiometric thresholds. In order to monitor hearing changes in a timely manner, testing included high frequency audiometry (through 20,000 Hz), per [employer] Guidelines. Testing was accomplished using a [employer] Audiostar Pro Clinical Audiometer and utilizing calibrated ear phones. Extra time to test high frequencies was 10 minutes. The patient reported that he has a history of occupational noise exposure and hearing loss; hearing loss perceived as worse in the right ear. He denied any dizziness, tinnitus, ear pain or ear drainage today. Audiometry using pure tone air and bone conduction testing revealed hearing sensitivity within normal limits through 3000 Hz in the right ear, and 2000 Hz in the left ear, sloping to a moderate sensorineural hearing loss through 8000 Hz, bilaterally. High frequency testing revealed thresholds in the moderate hearing loss range through 16K Hz in the right ear with no response at 18-20K Hz, at the limits of the audiometer. High frequency testing in the left ear revealed thresholds in the moderate to severe hearing loss range through 16K Hz rising to mild at 18K Hz, with no response obtained at 20K Hz, at the limits of the audiometer. Reliability was good. Speech reception thresholds were in good agreement with the pure tone averages, bilaterally. Speech discrimination scores were excellent, in the right ear, and good in the left ear. Slight asymmetry noted at 0201-1599 Hz, left ear worse. Distortion product otoacoustic emissions (DPOAE) testing was conducted from 500-10K Hz, bilaterally and revealed present responses at 8763-4875 Hz in the right ear, and 0416-1815 Hz, 3000 Hz, and 4000 Hz, in the left ear. Absent responses obtained at all other tested frequencies.       Tympanometry

## 2023-06-20 ENCOUNTER — HOSPITAL ENCOUNTER (OUTPATIENT)
Dept: RADIATION ONCOLOGY | Age: 53
Discharge: HOME | End: 2023-06-20
Payer: COMMERCIAL

## 2023-06-20 ENCOUNTER — HOSPITAL ENCOUNTER (OUTPATIENT)
Age: 53
Discharge: HOME | End: 2023-06-20
Payer: COMMERCIAL

## 2023-06-20 DIAGNOSIS — C44.321 SQUAMOUS CELL CARCINOMA OF TIP OF NOSE: ICD-10-CM

## 2023-06-20 LAB
BUN SERPL-MCNC: 13 MG/DL (ref 6–20)
CREAT SERPL-MCNC: 1.2 MG/DL (ref 0.7–1.2)

## 2023-06-20 PROCEDURE — 84520 ASSAY OF UREA NITROGEN: CPT

## 2023-06-20 PROCEDURE — 82565 ASSAY OF CREATININE: CPT

## 2023-06-20 PROCEDURE — 77334 RADIATION TREATMENT AID(S): CPT | Performed by: SPECIALIST

## 2023-06-20 PROCEDURE — 6360000004 HC RX CONTRAST MEDICATION: Performed by: SPECIALIST

## 2023-06-20 PROCEDURE — 36415 COLL VENOUS BLD VENIPUNCTURE: CPT

## 2023-06-20 RX ADMIN — IOPAMIDOL 100 ML: 755 INJECTION, SOLUTION INTRAVENOUS at 15:01

## 2023-06-21 ENCOUNTER — TELEPHONE (OUTPATIENT)
Dept: CASE MANAGEMENT | Age: 53
End: 2023-06-21

## 2023-06-21 NOTE — TELEPHONE ENCOUNTER
Call placed to Dr. Alexandra Del Angel office at [facility]. Message left requesting new tumor board recommendation if done. Awaiting return call. Call back number provided.

## 2023-06-21 NOTE — TELEPHONE ENCOUNTER
Patient had called main office with questions regarding treatment. Returned call to patient and patient had questions regarding possible treatment start. Patient had radiation SIM appointment yesterday and explained to patient that radiation treatment plan is a process and usually takes time to finalize, possibly a couple weeks, and that his chemotherapy would begin at that same time as his radiation. Patient states that he has an appointment tomorrow at [facility] with Dr. Boyd Holder pulmonologist.  Patient appreciative of information and update and has no further questions or issues at this time. Encouraged to call if any questions or issues arise.

## 2023-06-22 ENCOUNTER — TELEPHONE (OUTPATIENT)
Dept: CASE MANAGEMENT | Age: 53
End: 2023-06-22

## 2023-06-22 NOTE — TELEPHONE ENCOUNTER
Received message from Guillermina Dandy, nurse with Dr. Joan high's office. She states that last tumor board recommendation is for patient to received chemo/RT and that she will fax the tumor board recommendations to Dr. Conrad Leon office. Call placed back to Guillermina Dandy at number provided and gave Dr. Conrad Leon office fax number.

## 2023-06-30 ENCOUNTER — HOSPITAL ENCOUNTER (OUTPATIENT)
Dept: RADIATION ONCOLOGY | Age: 53
Discharge: HOME | End: 2023-06-30
Payer: COMMERCIAL

## 2023-07-10 ENCOUNTER — HOSPITAL ENCOUNTER (OUTPATIENT)
Dept: INFUSION THERAPY | Age: 53
Discharge: HOME | End: 2023-07-10
Payer: COMMERCIAL

## 2023-07-10 DIAGNOSIS — S01.20XA OPEN WOUND OF NASAL CAVITY, INITIAL ENCOUNTER: Primary | ICD-10-CM

## 2023-07-10 DIAGNOSIS — S01.20XA OPEN WOUND OF NASAL CAVITY, INITIAL ENCOUNTER: ICD-10-CM

## 2023-07-10 LAB
ALBUMIN SERPL-MCNC: 4.7 G/DL (ref 3.5–5.2)
ALP SERPL-CCNC: 92 U/L (ref 40–129)
ALT SERPL-CCNC: 19 U/L (ref 0–40)
ANION GAP SERPL CALCULATED.3IONS-SCNC: 11 MMOL/L (ref 7–16)
AST SERPL-CCNC: 14 U/L (ref 0–39)
BASOPHILS # BLD: 0.06 E9/L (ref 0–0.2)
BASOPHILS NFR BLD: 0.9 % (ref 0–2)
BILIRUB SERPL-MCNC: 0.7 MG/DL (ref 0–1.2)
BUN SERPL-MCNC: 15 MG/DL (ref 6–20)
CALCIUM SERPL-MCNC: 10.2 MG/DL (ref 8.6–10.2)
CHLORIDE SERPL-SCNC: 101 MMOL/L (ref 98–107)
CO2 SERPL-SCNC: 25 MMOL/L (ref 22–29)
CREAT SERPL-MCNC: 1.4 MG/DL (ref 0.7–1.2)
EOSINOPHIL # BLD: 0.2 E9/L (ref 0.05–0.5)
EOSINOPHIL NFR BLD: 3.1 % (ref 0–6)
ERYTHROCYTE [DISTWIDTH] IN BLOOD BY AUTOMATED COUNT: 13.5 FL (ref 11.5–15)
GLUCOSE SERPL-MCNC: 96 MG/DL (ref 74–99)
HCT VFR BLD AUTO: 46.2 % (ref 37–54)
HGB BLD-MCNC: 15.1 G/DL (ref 12.5–16.5)
IMM GRANULOCYTES # BLD: 0.03 E9/L
IMM GRANULOCYTES NFR BLD: 0.5 % (ref 0–5)
LYMPHOCYTES # BLD: 1.53 E9/L (ref 1.5–4)
LYMPHOCYTES NFR BLD: 23.6 % (ref 20–42)
MAGNESIUM SERPL-MCNC: 1.9 MG/DL (ref 1.6–2.6)
MCH RBC QN AUTO: 30.1 PG (ref 26–35)
MCHC RBC AUTO-ENTMCNC: 32.7 % (ref 32–34.5)
MCV RBC AUTO: 92 FL (ref 80–99.9)
MONOCYTES # BLD: 0.46 E9/L (ref 0.1–0.95)
MONOCYTES NFR BLD: 7.1 % (ref 2–12)
NEUTROPHILS # BLD: 4.21 E9/L (ref 1.8–7.3)
NEUTS SEG NFR BLD: 64.8 % (ref 43–80)
PLATELET # BLD AUTO: 201 E9/L (ref 130–450)
PMV BLD AUTO: 10.5 FL (ref 7–12)
POTASSIUM SERPL-SCNC: 4.1 MMOL/L (ref 3.5–5)
PROT SERPL-MCNC: 7.4 G/DL (ref 6.4–8.3)
RBC # BLD AUTO: 5.02 E12/L (ref 3.8–5.8)
SODIUM SERPL-SCNC: 137 MMOL/L (ref 132–146)
WBC # BLD: 6.5 E9/L (ref 4.5–11.5)

## 2023-07-10 PROCEDURE — 83735 ASSAY OF MAGNESIUM: CPT

## 2023-07-10 PROCEDURE — 80053 COMPREHEN METABOLIC PANEL: CPT

## 2023-07-10 PROCEDURE — 85025 COMPLETE CBC W/AUTO DIFF WBC: CPT

## 2023-07-10 PROCEDURE — 36415 COLL VENOUS BLD VENIPUNCTURE: CPT

## 2023-07-10 PROCEDURE — 99214 OFFICE O/P EST MOD 30 MIN: CPT

## 2023-07-10 RX ORDER — PROCHLORPERAZINE MALEATE 10 MG
10 TABLET ORAL EVERY 6 HOURS PRN
Qty: 30 TABLET | Refills: 2 | Status: SHIPPED | OUTPATIENT
Start: 2023-07-10

## 2023-07-10 RX ORDER — ONDANSETRON HYDROCHLORIDE 8 MG/1
8 TABLET, FILM COATED ORAL EVERY 8 HOURS PRN
Qty: 30 TABLET | Refills: 2 | Status: SHIPPED | OUTPATIENT
Start: 2023-07-10

## 2023-07-10 NOTE — PROGRESS NOTES
Patient was seen today for chemotherapy education for Cisplatin + RT for head and neck cancer. Patient was accompanied his wife. The schedule and mechanisms of action were discussed in detail. Some of the side effects discussed include, but are not limited to, bone marrow suppression, nausea/vomiting, fatigue, nephrotoxicity, electrolyte imbalance, and ototoxicity. Patient demonstrated comprehension and understanding throughout the education session. A packet containing the information discussed was provided to the patient. Prescriptions for Zofran and Compazine sent. All questions asked were answered or deferred to the oncology team. Patient encouraged to reach out to their treatment team with any other additional questions or concerns that they may have.     Matty Fernandez, PharmD, BCOP

## 2023-07-11 ENCOUNTER — TELEPHONE (OUTPATIENT)
Dept: ONCOLOGY | Age: 53
End: 2023-07-11

## 2023-07-11 ENCOUNTER — OFFICE VISIT (OUTPATIENT)
Dept: ONCOLOGY | Age: 53
End: 2023-07-11
Payer: COMMERCIAL

## 2023-07-11 ENCOUNTER — HOSPITAL ENCOUNTER (OUTPATIENT)
Dept: RADIATION ONCOLOGY | Age: 53
Discharge: HOME | End: 2023-07-11
Payer: COMMERCIAL

## 2023-07-11 ENCOUNTER — HOSPITAL ENCOUNTER (OUTPATIENT)
Dept: INFUSION THERAPY | Age: 53
Discharge: HOME | End: 2023-07-11
Payer: COMMERCIAL

## 2023-07-11 ENCOUNTER — TELEPHONE (OUTPATIENT)
Dept: CASE MANAGEMENT | Age: 53
End: 2023-07-11

## 2023-07-11 VITALS
SYSTOLIC BLOOD PRESSURE: 128 MMHG | TEMPERATURE: 97.4 F | WEIGHT: 235.4 LBS | HEIGHT: 75 IN | BODY MASS INDEX: 29.27 KG/M2 | OXYGEN SATURATION: 97 % | HEART RATE: 65 BPM | DIASTOLIC BLOOD PRESSURE: 78 MMHG

## 2023-07-11 VITALS
DIASTOLIC BLOOD PRESSURE: 74 MMHG | RESPIRATION RATE: 16 BRPM | HEART RATE: 78 BPM | TEMPERATURE: 97.3 F | OXYGEN SATURATION: 100 % | SYSTOLIC BLOOD PRESSURE: 121 MMHG

## 2023-07-11 DIAGNOSIS — C76.0 MALIGNANT NEOPLASM OF HEAD, FACE AND NECK (HCC): Primary | ICD-10-CM

## 2023-07-11 DIAGNOSIS — S01.20XA OPEN WOUND OF NASAL CAVITY, INITIAL ENCOUNTER: Primary | ICD-10-CM

## 2023-07-11 PROCEDURE — 2580000003 HC RX 258: Performed by: INTERNAL MEDICINE

## 2023-07-11 PROCEDURE — 96367 TX/PROPH/DG ADDL SEQ IV INF: CPT

## 2023-07-11 PROCEDURE — 96375 TX/PRO/DX INJ NEW DRUG ADDON: CPT

## 2023-07-11 PROCEDURE — 96415 CHEMO IV INFUSION ADDL HR: CPT

## 2023-07-11 PROCEDURE — 77386 HC NTSTY MODUL RAD TX DLVR CPLX: CPT | Performed by: SPECIALIST

## 2023-07-11 PROCEDURE — 77014 CHG CT GUIDANCE RADIATION THERAPY FLDS PLACEMENT: CPT | Performed by: SPECIALIST

## 2023-07-11 PROCEDURE — 6360000002 HC RX W HCPCS: Performed by: INTERNAL MEDICINE

## 2023-07-11 PROCEDURE — 99214 OFFICE O/P EST MOD 30 MIN: CPT | Performed by: INTERNAL MEDICINE

## 2023-07-11 PROCEDURE — 96413 CHEMO IV INFUSION 1 HR: CPT

## 2023-07-11 RX ORDER — DIPHENHYDRAMINE HYDROCHLORIDE 50 MG/ML
50 INJECTION INTRAMUSCULAR; INTRAVENOUS
Status: CANCELLED | OUTPATIENT
Start: 2023-07-11

## 2023-07-11 RX ORDER — SODIUM CHLORIDE 0.9 % (FLUSH) 0.9 %
5-40 SYRINGE (ML) INJECTION PRN
Status: CANCELLED | OUTPATIENT
Start: 2023-07-11

## 2023-07-11 RX ORDER — ALBUTEROL SULFATE 90 UG/1
4 AEROSOL, METERED RESPIRATORY (INHALATION) PRN
Status: CANCELLED | OUTPATIENT
Start: 2023-07-11

## 2023-07-11 RX ORDER — SODIUM CHLORIDE 9 MG/ML
5-250 INJECTION, SOLUTION INTRAVENOUS PRN
Status: CANCELLED | OUTPATIENT
Start: 2023-07-11

## 2023-07-11 RX ORDER — EPINEPHRINE 1 MG/ML
0.3 INJECTION, SOLUTION, CONCENTRATE INTRAVENOUS PRN
Status: CANCELLED | OUTPATIENT
Start: 2023-07-11

## 2023-07-11 RX ORDER — HEPARIN SODIUM (PORCINE) LOCK FLUSH IV SOLN 100 UNIT/ML 100 UNIT/ML
500 SOLUTION INTRAVENOUS PRN
Status: CANCELLED | OUTPATIENT
Start: 2023-07-11

## 2023-07-11 RX ORDER — 0.9 % SODIUM CHLORIDE 0.9 %
1000 INTRAVENOUS SOLUTION INTRAVENOUS ONCE
Status: COMPLETED | OUTPATIENT
Start: 2023-07-11 | End: 2023-07-11

## 2023-07-11 RX ORDER — FAMOTIDINE 10 MG/ML
20 INJECTION, SOLUTION INTRAVENOUS
Status: CANCELLED | OUTPATIENT
Start: 2023-07-11

## 2023-07-11 RX ORDER — SODIUM CHLORIDE 9 MG/ML
5-250 INJECTION, SOLUTION INTRAVENOUS PRN
Status: DISCONTINUED | OUTPATIENT
Start: 2023-07-11 | End: 2023-07-12 | Stop reason: HOSPADM

## 2023-07-11 RX ORDER — SODIUM CHLORIDE 9 MG/ML
INJECTION, SOLUTION INTRAVENOUS CONTINUOUS
Status: CANCELLED | OUTPATIENT
Start: 2023-07-11

## 2023-07-11 RX ORDER — PALONOSETRON HYDROCHLORIDE 0.05 MG/ML
0.25 INJECTION, SOLUTION INTRAVENOUS ONCE
Status: COMPLETED | OUTPATIENT
Start: 2023-07-11 | End: 2023-07-11

## 2023-07-11 RX ORDER — PALONOSETRON 0.05 MG/ML
0.25 INJECTION, SOLUTION INTRAVENOUS ONCE
Status: CANCELLED | OUTPATIENT
Start: 2023-07-11 | End: 2023-07-11

## 2023-07-11 RX ORDER — DEXAMETHASONE SODIUM PHOSPHATE 10 MG/ML
10 INJECTION, SOLUTION INTRAMUSCULAR; INTRAVENOUS ONCE
Status: COMPLETED | OUTPATIENT
Start: 2023-07-11 | End: 2023-07-11

## 2023-07-11 RX ORDER — MEPERIDINE HYDROCHLORIDE 50 MG/ML
12.5 INJECTION INTRAMUSCULAR; INTRAVENOUS; SUBCUTANEOUS PRN
Status: CANCELLED | OUTPATIENT
Start: 2023-07-11

## 2023-07-11 RX ORDER — ACETAMINOPHEN 325 MG/1
650 TABLET ORAL
Status: CANCELLED | OUTPATIENT
Start: 2023-07-11

## 2023-07-11 RX ORDER — ONDANSETRON 2 MG/ML
8 INJECTION INTRAMUSCULAR; INTRAVENOUS
Status: CANCELLED | OUTPATIENT
Start: 2023-07-11

## 2023-07-11 RX ADMIN — FOSAPREPITANT 150 MG: 150 INJECTION, POWDER, LYOPHILIZED, FOR SOLUTION INTRAVENOUS at 09:07

## 2023-07-11 RX ADMIN — PALONOSETRON 0.25 MG: 0.25 INJECTION, SOLUTION INTRAVENOUS at 08:54

## 2023-07-11 RX ADMIN — DEXAMETHASONE SODIUM PHOSPHATE 10 MG: 10 INJECTION, SOLUTION INTRAMUSCULAR; INTRAVENOUS at 08:55

## 2023-07-11 RX ADMIN — POTASSIUM CHLORIDE: 2 INJECTION, SOLUTION, CONCENTRATE INTRAVENOUS at 09:34

## 2023-07-11 RX ADMIN — SODIUM CHLORIDE 1000 ML: 9 INJECTION, SOLUTION INTRAVENOUS at 08:50

## 2023-07-11 NOTE — TELEPHONE ENCOUNTER
Met with patient during his infusion appointment today. Patient states that he is doing ok and is glad to be starting treatment. He has concern over wearing the radiation mask as he states that he had a panic attack in it yesterday due to it be too tight. He states that the radiation staff was to talk with doctor about it to come up with a plan to help. He denies any questions or other issues at this time. Encouraged patient to call if any arise.

## 2023-07-11 NOTE — PROGRESS NOTES
Wt Readings from Last 3 Encounters:   07/11/23 235 lb 6.4 oz (106.8 kg)   06/06/23 226 lb 8 oz (102.7 kg)   05/25/23 224 lb 3.2 oz (101.7 kg)     Met w/ pt during first infusion. He has gained 9# over last month. Reports good appetite, denies any issues w/ odynophagia/dysphagia. He continues w/o use of ONS at this time. He denies any specific nutrition concerns at this time. Reviewed role of oncology dietitian in care. Will continue to follow as needed.   Electronically signed by Rg Carney MS, RD, LD on 7/12/2023 at 2:07 PM

## 2023-07-11 NOTE — TELEPHONE ENCOUNTER
Patient had first chemo/radiation treatment today. Met with patient after Radiation and introduced myself and provided with my contact information. Reviewed that myself or [facility] are available for any resources or questions. Asked him how the first day went for him. He stated that treatment went ok but the mask is really tight because of the bridge that wasn't on before the mask was made. Provided encouragement. Will continue to follow.

## 2023-07-11 NOTE — TELEPHONE ENCOUNTER
Met with pt and pt's wife in conjunction with radiation oncology treatment. Pt is 51-year-old male being managed for squamous cell carcinoma of the nose who recently underwent a rhinectomy. Pt's mood appeared euthymic with full affect, he appeared A&Ox4, and he was willing/able to participate in session. He appeared appropriately dressed/groomed and was able to ambulate/transfer without assistance. Pt discussed initiation of treatment. He noted that he experienced severe anxiety with initial run on radiation tx due to pain and pressure on his face from mask which made it difficult for him to breath. Encouraged pt to communicate needs with radiation therapists to attempt to alleviate anxiety. Discussed use of various strategies for management of anxiety including mindfulness and visualization. Pt reported he believed it would be helpful if he had some type of sound or music throughout treatment, staff notified. Pt reported that he continue to be on disability. Stated that he will likely need additional documentation soon which will be faxed to clinic. No additional needs identified at this time. Reviewed role of oncology SW and encouraged pt to notify this provider if additional needs arise.     Amanda Hamilton, RICARDO, VERÓNICA-S  Oncology Social Worker

## 2023-07-11 NOTE — PROGRESS NOTES
[address] [facility] MED ONCOLOGY  [address] [facility] [address] [place] [id number]  Dept: [phone]  Loc: [phone]  Attending progress note      Reason for Visit: Squamous cell carcinoma of the nose/nasal septum. Referring Physician:  Ramses Cerna MD    PCP:  Evi Mott MD    History of Present Illness:      Mr. 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 The patient was hit by his dog's head on the left side of his nose in April 2022, in November 2022 he had noticed a lesion inside his nose, and she was diagnosed with staph infection, was treated with antibiotics, he then developed a pimple on his nose and eventually developed a hole in his nose, he was seen by ID, he was referred to the hospital due to concern about myelitis, he had a CT scan done revealing chronic sinusitis involving the left sphenoid sinus, he had a biopsy done on 2/21/2023, revealing invasive poorly differentiated squamous cell carcinoma, grade 3, the patient was referred Dr. Opal Grier, he underwent a total rhinectomy on 5/5/2023, the patient was found to have tumor growth to the glabella and the maxillary crest, path:   Case Summary Report   Procedure:    Rhinectomy, total   Tumor Site:   Nasal septal mucosa, cartilage, subcutaneous tissue, skin. Tumor Laterality: Left and Right. Left >> Right     Tumor Focality: Single focus     Tumor Size (Greatest dimension): At least 3.5 cm. Histologic Type: Squamous cell carcinoma, keratinizing     Histologic Grade (squamous cell carcinomas only): Poorly differentiated     Specimen Margins (Main specimen; part B)     _X_ Involved by invasive carcinoma        Specify margin(s), per orientation, if possible: Superior and inferior   septal margins, superior right and left skin margins.      _X_ Uninvolved by high-grade dysplasia/in situ disease     Separately

## 2023-07-11 NOTE — PROGRESS NOTES
Patient provided with discharge instructions, patient has [address] [facility]. All questions answered. Patient understands follow up plan of care.

## 2023-07-11 NOTE — PROGRESS NOTES
Pt tolerated first chemotherapy tx with no effects noted-Wife in and out t/o visit-Pt voiced no questions regarding his tx-Pt up ad karlene to/from bathroom on own-gait slow and steady-pt aware to increase oral intake and to utilize prn anti-emetics -Pt discharged to radiation ambulatory

## 2023-07-12 ENCOUNTER — HOSPITAL ENCOUNTER (OUTPATIENT)
Dept: RADIATION ONCOLOGY | Age: 53
Discharge: HOME | End: 2023-07-12
Payer: COMMERCIAL

## 2023-07-12 ENCOUNTER — HOSPITAL ENCOUNTER (OUTPATIENT)
Dept: INFUSION THERAPY | Age: 53
Discharge: HOME | End: 2023-07-12
Payer: COMMERCIAL

## 2023-07-12 DIAGNOSIS — S01.20XA OPEN WOUND OF NASAL CAVITY, INITIAL ENCOUNTER: ICD-10-CM

## 2023-07-12 LAB
ANION GAP SERPL CALCULATED.3IONS-SCNC: 11 MMOL/L (ref 7–16)
BUN SERPL-MCNC: 15 MG/DL (ref 6–20)
CALCIUM SERPL-MCNC: 9.8 MG/DL (ref 8.6–10.2)
CHLORIDE SERPL-SCNC: 104 MMOL/L (ref 98–107)
CO2 SERPL-SCNC: 22 MMOL/L (ref 22–29)
CREAT SERPL-MCNC: 1.1 MG/DL (ref 0.7–1.2)
GLUCOSE SERPL-MCNC: 127 MG/DL (ref 74–99)
POTASSIUM SERPL-SCNC: 4.3 MMOL/L (ref 3.5–5)
SODIUM SERPL-SCNC: 137 MMOL/L (ref 132–146)

## 2023-07-12 PROCEDURE — 80048 BASIC METABOLIC PNL TOTAL CA: CPT

## 2023-07-12 PROCEDURE — 36415 COLL VENOUS BLD VENIPUNCTURE: CPT

## 2023-07-12 PROCEDURE — 77014 CHG CT GUIDANCE RADIATION THERAPY FLDS PLACEMENT: CPT | Performed by: SPECIALIST

## 2023-07-12 PROCEDURE — 77386 HC NTSTY MODUL RAD TX DLVR CPLX: CPT | Performed by: SPECIALIST

## 2023-07-13 ENCOUNTER — HOSPITAL ENCOUNTER (OUTPATIENT)
Dept: RADIATION ONCOLOGY | Age: 53
Discharge: HOME | End: 2023-07-13
Payer: COMMERCIAL

## 2023-07-13 PROCEDURE — 77014 CHG CT GUIDANCE RADIATION THERAPY FLDS PLACEMENT: CPT | Performed by: SPECIALIST

## 2023-07-13 PROCEDURE — 77386 HC NTSTY MODUL RAD TX DLVR CPLX: CPT | Performed by: SPECIALIST

## 2023-07-13 NOTE — PROGRESS NOTES
DEPARTMENT OF RADIATION ONCOLOGY   ON TREATMENT VISIT       7/13/2023      NAME:  Josi Messer    YOB: 1970    DIAGNOSIS:  [id number] Squamous cell carcinoma of the Nose/Nasal Septum    SUBJECTIVE:   Josi Messer has now received 600 cGy in 3/32 fractions directed to the nasal cavity & bilateral saba-neck. Past medical, surgical, social and family histories reviewed and updated as indicated. PAIN: 0/10    ALLERGIES:  Patient has no known allergies. Current Outpatient Medications   Medication Sig Dispense Refill    ondansetron (ZOFRAN) 8 MG tablet Take 1 tablet by mouth every 8 hours as needed for Nausea or Vomiting 30 tablet 2    prochlorperazine (COMPAZINE) 10 MG tablet Take 1 tablet by mouth every 6 hours as needed (nausea) 30 tablet 2    ibuprofen (ADVIL;MOTRIN) 200 MG tablet Take 4 tablets by mouth every 12 hours as needed for Pain       No current facility-administered medications for this encounter. OBJECTIVE:  Alert and fully ambulatory. Pleasant and conversant. Physical Examination:General appearance - alert, well appearing, and in no distress and oriented to person, place, and time:  Constitutional: A well developed, well nourished 48 y.o. male who is alert, oriented, cooperative and in no apparent distress. HEENT:   Skin:  Warm and dry. No obvious rashes. There were no vitals filed for this visit. Wt Readings from Last 3 Encounters:   07/11/23 235 lb 6.4 oz (106.8 kg)   06/06/23 226 lb 8 oz (102.7 kg)   05/25/23 224 lb 3.2 oz (101.7 kg)       ASSESSMENT/PLAN:     Patient is tolerating treatments well without acute toxicities. Current and planned dose reviewed. Goals of treatment and potential side effects were reviewed with the patient. Treatment imaging has been personally reviewed for accuracy and precision. Questions answered to apparent satisfaction. Treatments will continue as planned.     Thank you for the opportunity to

## 2023-07-14 ENCOUNTER — HOSPITAL ENCOUNTER (OUTPATIENT)
Dept: RADIATION ONCOLOGY | Age: 53
Discharge: HOME | End: 2023-07-14
Payer: COMMERCIAL

## 2023-07-14 PROCEDURE — 77386 HC NTSTY MODUL RAD TX DLVR CPLX: CPT | Performed by: SPECIALIST

## 2023-07-14 PROCEDURE — 77427 RADIATION TX MANAGEMENT X5: CPT | Performed by: SPECIALIST

## 2023-07-17 ENCOUNTER — HOSPITAL ENCOUNTER (OUTPATIENT)
Dept: INFUSION THERAPY | Age: 53
Discharge: HOME | End: 2023-07-17
Payer: COMMERCIAL

## 2023-07-17 ENCOUNTER — HOSPITAL ENCOUNTER (OUTPATIENT)
Dept: RADIATION ONCOLOGY | Age: 53
Discharge: HOME | End: 2023-07-17
Payer: COMMERCIAL

## 2023-07-17 VITALS
TEMPERATURE: 98.2 F | RESPIRATION RATE: 18 BRPM | HEART RATE: 76 BPM | BODY MASS INDEX: 29.64 KG/M2 | OXYGEN SATURATION: 98 % | WEIGHT: 237.1 LBS | SYSTOLIC BLOOD PRESSURE: 138 MMHG | DIASTOLIC BLOOD PRESSURE: 78 MMHG

## 2023-07-17 DIAGNOSIS — C76.0 MALIGNANT NEOPLASM OF HEAD, FACE AND NECK (HCC): ICD-10-CM

## 2023-07-17 LAB
ALBUMIN SERPL-MCNC: 4.4 G/DL (ref 3.5–5.2)
ALP SERPL-CCNC: 85 U/L (ref 40–129)
ALT SERPL-CCNC: 18 U/L (ref 0–40)
ANION GAP SERPL CALCULATED.3IONS-SCNC: 19 MMOL/L (ref 7–16)
AST SERPL-CCNC: 12 U/L (ref 0–39)
BASOPHILS # BLD: 0.05 K/UL (ref 0–0.2)
BASOPHILS NFR BLD: 1 % (ref 0–2)
BILIRUB SERPL-MCNC: 0.6 MG/DL (ref 0–1.2)
BUN SERPL-MCNC: 16 MG/DL (ref 6–20)
CALCIUM SERPL-MCNC: 9.4 MG/DL (ref 8.6–10.2)
CHLORIDE SERPL-SCNC: 102 MMOL/L (ref 98–107)
CO2 SERPL-SCNC: 20 MMOL/L (ref 22–29)
CREAT SERPL-MCNC: 1.2 MG/DL (ref 0.7–1.2)
EOSINOPHIL # BLD: 0.27 K/UL (ref 0.05–0.5)
EOSINOPHILS RELATIVE PERCENT: 4 % (ref 0–6)
ERYTHROCYTE [DISTWIDTH] IN BLOOD BY AUTOMATED COUNT: 13.2 % (ref 11.5–15)
GFR SERPL CREATININE-BSD FRML MDRD: >60 ML/MIN/1.73M2
GLUCOSE SERPL-MCNC: 94 MG/DL (ref 74–99)
HCT VFR BLD AUTO: 42.4 % (ref 37–54)
HGB BLD-MCNC: 14.1 G/DL (ref 12.5–16.5)
IMM GRANULOCYTES # BLD AUTO: 0.05 K/UL (ref 0–0.58)
IMM GRANULOCYTES NFR BLD: 1 % (ref 0–5)
LYMPHOCYTES # BLD: 19 % (ref 20–42)
LYMPHOCYTES NFR BLD: 1.24 K/UL (ref 1.5–4)
MAGNESIUM SERPL-MCNC: 2.1 MG/DL (ref 1.6–2.6)
MCH RBC QN AUTO: 29.9 PG (ref 26–35)
MCHC RBC AUTO-ENTMCNC: 33.3 G/DL (ref 32–34.5)
MCV RBC AUTO: 90 FL (ref 80–99.9)
MONOCYTES NFR BLD: 0.51 K/UL (ref 0.1–0.95)
MONOCYTES NFR BLD: 8 % (ref 2–12)
NEUTROPHILS NFR BLD: 68 % (ref 43–80)
NEUTS SEG NFR BLD: 4.47 K/UL (ref 1.8–7.3)
PHOSPHATE SERPL-MCNC: 3.4 MG/DL (ref 2.5–4.5)
PLATELET # BLD AUTO: 195 K/UL (ref 130–450)
PMV BLD AUTO: 10.3 FL (ref 7–12)
POTASSIUM SERPL-SCNC: 4.6 MMOL/L (ref 3.5–5)
PROT SERPL-MCNC: 7 G/DL (ref 6.4–8.3)
RBC # BLD AUTO: 4.71 M/UL (ref 3.8–5.8)
SODIUM SERPL-SCNC: 141 MMOL/L (ref 132–146)
WBC OTHER # BLD: 6.6 K/UL (ref 4.5–11.5)

## 2023-07-17 PROCEDURE — 83735 ASSAY OF MAGNESIUM: CPT

## 2023-07-17 PROCEDURE — 85027 COMPLETE CBC AUTOMATED: CPT

## 2023-07-17 PROCEDURE — 77386 HC NTSTY MODUL RAD TX DLVR CPLX: CPT | Performed by: SPECIALIST

## 2023-07-17 PROCEDURE — 80053 COMPREHEN METABOLIC PANEL: CPT

## 2023-07-17 PROCEDURE — 84100 ASSAY OF PHOSPHORUS: CPT

## 2023-07-17 PROCEDURE — 77336 RADIATION PHYSICS CONSULT: CPT | Performed by: SPECIALIST

## 2023-07-17 PROCEDURE — 77014 CHG CT GUIDANCE RADIATION THERAPY FLDS PLACEMENT: CPT | Performed by: SPECIALIST

## 2023-07-17 PROCEDURE — 36415 COLL VENOUS BLD VENIPUNCTURE: CPT

## 2023-07-17 ASSESSMENT — PAIN SCALES - GENERAL: PAINLEVEL_OUTOF10: 5

## 2023-07-17 NOTE — PROGRESS NOTES
Wt Readings from Last 3 Encounters:   07/17/23 237 lb 1.6 oz (107.5 kg)   07/11/23 235 lb 6.4 oz (106.8 kg)   06/06/23 226 lb 8 oz (102.7 kg)     Met w/ pt during weekly visit. He has gained 2# over last week. He denies any issues w/ dysphagia/odynophagia. He does note that he purchased some [employer] over the weekend incase he requires an ONS in the future. He reports continued good appetite, denies any nutritional concerns at this time. Will continue to follow.   Electronically signed by Nabil Clemens MS, RD, LD on 7/17/2023 at 2:45 PM

## 2023-07-18 ENCOUNTER — OFFICE VISIT (OUTPATIENT)
Dept: ONCOLOGY | Age: 53
End: 2023-07-18
Payer: COMMERCIAL

## 2023-07-18 ENCOUNTER — HOSPITAL ENCOUNTER (OUTPATIENT)
Dept: INFUSION THERAPY | Age: 53
Discharge: HOME | End: 2023-07-18
Payer: COMMERCIAL

## 2023-07-18 ENCOUNTER — HOSPITAL ENCOUNTER (OUTPATIENT)
Dept: RADIATION ONCOLOGY | Age: 53
Discharge: HOME | End: 2023-07-18
Payer: COMMERCIAL

## 2023-07-18 VITALS
SYSTOLIC BLOOD PRESSURE: 123 MMHG | HEIGHT: 75 IN | OXYGEN SATURATION: 97 % | DIASTOLIC BLOOD PRESSURE: 86 MMHG | TEMPERATURE: 97.4 F | WEIGHT: 236.1 LBS | HEART RATE: 61 BPM | BODY MASS INDEX: 29.36 KG/M2

## 2023-07-18 VITALS
TEMPERATURE: 98.1 F | SYSTOLIC BLOOD PRESSURE: 120 MMHG | RESPIRATION RATE: 16 BRPM | OXYGEN SATURATION: 100 % | HEART RATE: 59 BPM | DIASTOLIC BLOOD PRESSURE: 71 MMHG

## 2023-07-18 DIAGNOSIS — C76.0 MALIGNANT NEOPLASM OF HEAD, FACE AND NECK (HCC): Primary | ICD-10-CM

## 2023-07-18 PROCEDURE — 96367 TX/PROPH/DG ADDL SEQ IV INF: CPT

## 2023-07-18 PROCEDURE — 96375 TX/PRO/DX INJ NEW DRUG ADDON: CPT

## 2023-07-18 PROCEDURE — 77014 CHG CT GUIDANCE RADIATION THERAPY FLDS PLACEMENT: CPT | Performed by: SPECIALIST

## 2023-07-18 PROCEDURE — 99214 OFFICE O/P EST MOD 30 MIN: CPT | Performed by: INTERNAL MEDICINE

## 2023-07-18 PROCEDURE — 96417 CHEMO IV INFUS EACH ADDL SEQ: CPT

## 2023-07-18 PROCEDURE — 6360000002 HC RX W HCPCS: Performed by: INTERNAL MEDICINE

## 2023-07-18 PROCEDURE — 2580000003 HC RX 258: Performed by: INTERNAL MEDICINE

## 2023-07-18 PROCEDURE — 96409 CHEMO IV PUSH SNGL DRUG: CPT

## 2023-07-18 PROCEDURE — 96415 CHEMO IV INFUSION ADDL HR: CPT

## 2023-07-18 PROCEDURE — 77386 HC NTSTY MODUL RAD TX DLVR CPLX: CPT | Performed by: SPECIALIST

## 2023-07-18 RX ORDER — SODIUM CHLORIDE 9 MG/ML
5-250 INJECTION, SOLUTION INTRAVENOUS PRN
Status: DISCONTINUED | OUTPATIENT
Start: 2023-07-18 | End: 2023-07-19 | Stop reason: HOSPADM

## 2023-07-18 RX ORDER — HEPARIN SODIUM (PORCINE) LOCK FLUSH IV SOLN 100 UNIT/ML 100 UNIT/ML
500 SOLUTION INTRAVENOUS PRN
Status: CANCELLED | OUTPATIENT
Start: 2023-07-18

## 2023-07-18 RX ORDER — SODIUM CHLORIDE 0.9 % (FLUSH) 0.9 %
5-40 SYRINGE (ML) INJECTION PRN
Status: DISCONTINUED | OUTPATIENT
Start: 2023-07-18 | End: 2023-07-19 | Stop reason: HOSPADM

## 2023-07-18 RX ORDER — SODIUM CHLORIDE 9 MG/ML
5-250 INJECTION, SOLUTION INTRAVENOUS PRN
Status: CANCELLED | OUTPATIENT
Start: 2023-07-18

## 2023-07-18 RX ORDER — MEPERIDINE HYDROCHLORIDE 50 MG/ML
12.5 INJECTION INTRAMUSCULAR; INTRAVENOUS; SUBCUTANEOUS PRN
Status: CANCELLED | OUTPATIENT
Start: 2023-07-18

## 2023-07-18 RX ORDER — PALONOSETRON HYDROCHLORIDE 0.05 MG/ML
0.25 INJECTION, SOLUTION INTRAVENOUS ONCE
Status: COMPLETED | OUTPATIENT
Start: 2023-07-18 | End: 2023-07-18

## 2023-07-18 RX ORDER — ONDANSETRON 2 MG/ML
8 INJECTION INTRAMUSCULAR; INTRAVENOUS
Status: CANCELLED | OUTPATIENT
Start: 2023-07-18

## 2023-07-18 RX ORDER — FAMOTIDINE 10 MG/ML
20 INJECTION, SOLUTION INTRAVENOUS
Status: CANCELLED | OUTPATIENT
Start: 2023-07-18

## 2023-07-18 RX ORDER — OMEPRAZOLE MAGNESIUM 10 MG/1
GRANULE, DELAYED RELEASE ORAL
COMMUNITY

## 2023-07-18 RX ORDER — EPINEPHRINE 1 MG/ML
0.3 INJECTION, SOLUTION, CONCENTRATE INTRAVENOUS PRN
Status: CANCELLED | OUTPATIENT
Start: 2023-07-18

## 2023-07-18 RX ORDER — ALBUTEROL SULFATE 90 UG/1
4 AEROSOL, METERED RESPIRATORY (INHALATION) PRN
Status: CANCELLED | OUTPATIENT
Start: 2023-07-18

## 2023-07-18 RX ORDER — DIPHENHYDRAMINE HYDROCHLORIDE 50 MG/ML
50 INJECTION INTRAMUSCULAR; INTRAVENOUS
Status: CANCELLED | OUTPATIENT
Start: 2023-07-18

## 2023-07-18 RX ORDER — SODIUM CHLORIDE 9 MG/ML
INJECTION, SOLUTION INTRAVENOUS CONTINUOUS
Status: CANCELLED | OUTPATIENT
Start: 2023-07-18

## 2023-07-18 RX ORDER — ACETAMINOPHEN 325 MG/1
650 TABLET ORAL
Status: CANCELLED | OUTPATIENT
Start: 2023-07-18

## 2023-07-18 RX ORDER — SODIUM CHLORIDE 0.9 % (FLUSH) 0.9 %
5-40 SYRINGE (ML) INJECTION PRN
Status: CANCELLED | OUTPATIENT
Start: 2023-07-18

## 2023-07-18 RX ORDER — DEXAMETHASONE SODIUM PHOSPHATE 10 MG/ML
10 INJECTION, SOLUTION INTRAMUSCULAR; INTRAVENOUS ONCE
Status: COMPLETED | OUTPATIENT
Start: 2023-07-18 | End: 2023-07-18

## 2023-07-18 RX ORDER — PALONOSETRON 0.05 MG/ML
0.25 INJECTION, SOLUTION INTRAVENOUS ONCE
Status: CANCELLED | OUTPATIENT
Start: 2023-07-18 | End: 2023-07-18

## 2023-07-18 RX ADMIN — PALONOSETRON 0.25 MG: 0.25 INJECTION, SOLUTION INTRAVENOUS at 11:06

## 2023-07-18 RX ADMIN — POTASSIUM CHLORIDE: 2 INJECTION, SOLUTION, CONCENTRATE INTRAVENOUS at 11:40

## 2023-07-18 RX ADMIN — SODIUM CHLORIDE 50 ML/HR: 9 INJECTION, SOLUTION INTRAVENOUS at 11:04

## 2023-07-18 RX ADMIN — FOSAPREPITANT 150 MG: 150 INJECTION, POWDER, LYOPHILIZED, FOR SOLUTION INTRAVENOUS at 11:12

## 2023-07-18 RX ADMIN — DEXAMETHASONE SODIUM PHOSPHATE 10 MG: 10 INJECTION, SOLUTION INTRAMUSCULAR; INTRAVENOUS at 11:04

## 2023-07-18 NOTE — PROGRESS NOTES
[address] [facility] MED ONCOLOGY  [address] [facility] [address] [place] [id number]  Dept: [phone]  Loc: [phone]  Attending progress note      Reason for Visit: Squamous cell carcinoma of the nose/nasal septum. Referring Physician:  Mayra Caceres MD    PCP:  Geovany Briones MD    History of Present Illness:      Mr. 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 The patient was hit by his dog's head on the left side of his nose in April 2022, in November 2022 he had noticed a lesion inside his nose, and she was diagnosed with staph infection, was treated with antibiotics, he then developed a pimple on his nose and eventually developed a hole in his nose, he was seen by ID, he was referred to the hospital due to concern about myelitis, he had a CT scan done revealing chronic sinusitis involving the left sphenoid sinus, he had a biopsy done on 2/21/2023, revealing invasive poorly differentiated squamous cell carcinoma, grade 3, the patient was referred Dr. Ya Angel, he underwent a total rhinectomy on 5/5/2023, the patient was found to have tumor growth to the glabella and the maxillary crest, path:   Case Summary Report   Procedure:    Rhinectomy, total   Tumor Site:   Nasal septal mucosa, cartilage, subcutaneous tissue, skin. Tumor Laterality: Left and Right. Left >> Right     Tumor Focality: Single focus     Tumor Size (Greatest dimension): At least 3.5 cm. Histologic Type: Squamous cell carcinoma, keratinizing     Histologic Grade (squamous cell carcinomas only): Poorly differentiated     Specimen Margins (Main specimen; part B)     _X_ Involved by invasive carcinoma        Specify margin(s), per orientation, if possible: Superior and inferior   septal margins, superior right and left skin margins.      _X_ Uninvolved by high-grade dysplasia/in situ disease     Separately

## 2023-07-18 NOTE — PROGRESS NOTES
Patient tolerated treatment well without complications or complaints. Alert and oriented x3. Patient aware of potential side effects and has no questions regarding treatment. Vitals stable throughout treatment. Pain assessed, patient denies any new or worsening pain.  Patient left ambulatory with wife

## 2023-07-19 ENCOUNTER — HOSPITAL ENCOUNTER (OUTPATIENT)
Dept: RADIATION ONCOLOGY | Age: 53
Discharge: HOME | End: 2023-07-19
Payer: COMMERCIAL

## 2023-07-19 PROCEDURE — 77014 CHG CT GUIDANCE RADIATION THERAPY FLDS PLACEMENT: CPT | Performed by: SPECIALIST

## 2023-07-19 PROCEDURE — 77386 HC NTSTY MODUL RAD TX DLVR CPLX: CPT | Performed by: SPECIALIST

## 2023-07-20 ENCOUNTER — HOSPITAL ENCOUNTER (OUTPATIENT)
Dept: RADIATION ONCOLOGY | Age: 53
Discharge: HOME | End: 2023-07-20
Payer: COMMERCIAL

## 2023-07-20 PROCEDURE — 77386 HC NTSTY MODUL RAD TX DLVR CPLX: CPT | Performed by: SPECIALIST

## 2023-07-20 NOTE — PROGRESS NOTES
Patient did stop at check out window, ok'd to leave without AVS.  Patient has [facility].

## 2023-07-21 ENCOUNTER — HOSPITAL ENCOUNTER (OUTPATIENT)
Dept: RADIATION ONCOLOGY | Age: 53
Discharge: HOME | End: 2023-07-21
Payer: COMMERCIAL

## 2023-07-21 PROCEDURE — 77386 HC NTSTY MODUL RAD TX DLVR CPLX: CPT | Performed by: SPECIALIST

## 2023-07-24 ENCOUNTER — HOSPITAL ENCOUNTER (OUTPATIENT)
Dept: RADIATION ONCOLOGY | Age: 53
Discharge: HOME | End: 2023-07-24
Payer: COMMERCIAL

## 2023-07-24 ENCOUNTER — HOSPITAL ENCOUNTER (OUTPATIENT)
Dept: INFUSION THERAPY | Age: 53
Discharge: HOME | End: 2023-07-24
Payer: COMMERCIAL

## 2023-07-24 DIAGNOSIS — C76.0 MALIGNANT NEOPLASM OF HEAD, FACE AND NECK (HCC): ICD-10-CM

## 2023-07-24 LAB
ALBUMIN SERPL-MCNC: 4.2 G/DL (ref 3.5–5.2)
ALP SERPL-CCNC: 91 U/L (ref 40–129)
ALT SERPL-CCNC: 17 U/L (ref 0–40)
ANION GAP SERPL CALCULATED.3IONS-SCNC: 16 MMOL/L (ref 7–16)
AST SERPL-CCNC: 14 U/L (ref 0–39)
BASOPHILS # BLD: 0.06 K/UL (ref 0–0.2)
BASOPHILS NFR BLD: 1 % (ref 0–2)
BILIRUB SERPL-MCNC: 0.7 MG/DL (ref 0–1.2)
BUN SERPL-MCNC: 15 MG/DL (ref 6–20)
CALCIUM SERPL-MCNC: 9.4 MG/DL (ref 8.6–10.2)
CHLORIDE SERPL-SCNC: 101 MMOL/L (ref 98–107)
CO2 SERPL-SCNC: 22 MMOL/L (ref 22–29)
CREAT SERPL-MCNC: 1.2 MG/DL (ref 0.7–1.2)
EOSINOPHIL # BLD: 0.36 K/UL (ref 0.05–0.5)
EOSINOPHILS RELATIVE PERCENT: 4 % (ref 0–6)
ERYTHROCYTE [DISTWIDTH] IN BLOOD BY AUTOMATED COUNT: 13.4 % (ref 11.5–15)
GFR SERPL CREATININE-BSD FRML MDRD: >60 ML/MIN/1.73M2
GLUCOSE SERPL-MCNC: 93 MG/DL (ref 74–99)
HCT VFR BLD AUTO: 44.2 % (ref 37–54)
HGB BLD-MCNC: 14.4 G/DL (ref 12.5–16.5)
IMM GRANULOCYTES # BLD AUTO: 0.05 K/UL (ref 0–0.58)
IMM GRANULOCYTES NFR BLD: 1 % (ref 0–5)
LYMPHOCYTES NFR BLD: 0.83 K/UL (ref 1.5–4)
LYMPHOCYTES RELATIVE PERCENT: 9 % (ref 20–42)
MAGNESIUM SERPL-MCNC: 2.1 MG/DL (ref 1.6–2.6)
MCH RBC QN AUTO: 30.2 PG (ref 26–35)
MCHC RBC AUTO-ENTMCNC: 32.6 G/DL (ref 32–34.5)
MCV RBC AUTO: 92.7 FL (ref 80–99.9)
MONOCYTES NFR BLD: 0.54 K/UL (ref 0.1–0.95)
MONOCYTES NFR BLD: 6 % (ref 2–12)
NEUTROPHILS NFR BLD: 80 % (ref 43–80)
NEUTS SEG NFR BLD: 7.3 K/UL (ref 1.8–7.3)
PHOSPHATE SERPL-MCNC: 3.8 MG/DL (ref 2.5–4.5)
PLATELET # BLD AUTO: 205 K/UL (ref 130–450)
PMV BLD AUTO: 9.9 FL (ref 7–12)
POTASSIUM SERPL-SCNC: 4.2 MMOL/L (ref 3.5–5)
PROT SERPL-MCNC: 7 G/DL (ref 6.4–8.3)
RBC # BLD AUTO: 4.77 M/UL (ref 3.8–5.8)
SODIUM SERPL-SCNC: 139 MMOL/L (ref 132–146)
WBC OTHER # BLD: 9.1 K/UL (ref 4.5–11.5)

## 2023-07-24 PROCEDURE — 83735 ASSAY OF MAGNESIUM: CPT

## 2023-07-24 PROCEDURE — 77336 RADIATION PHYSICS CONSULT: CPT | Performed by: SPECIALIST

## 2023-07-24 PROCEDURE — 80053 COMPREHEN METABOLIC PANEL: CPT

## 2023-07-24 PROCEDURE — 77014 CHG CT GUIDANCE RADIATION THERAPY FLDS PLACEMENT: CPT | Performed by: SPECIALIST

## 2023-07-24 PROCEDURE — 77386 HC NTSTY MODUL RAD TX DLVR CPLX: CPT | Performed by: SPECIALIST

## 2023-07-24 PROCEDURE — 36415 COLL VENOUS BLD VENIPUNCTURE: CPT

## 2023-07-24 PROCEDURE — 85027 COMPLETE CBC AUTOMATED: CPT

## 2023-07-24 PROCEDURE — 84100 ASSAY OF PHOSPHORUS: CPT

## 2023-07-25 ENCOUNTER — HOSPITAL ENCOUNTER (OUTPATIENT)
Dept: RADIATION ONCOLOGY | Age: 53
Discharge: HOME | End: 2023-07-25
Payer: COMMERCIAL

## 2023-07-25 ENCOUNTER — TELEPHONE (OUTPATIENT)
Dept: ONCOLOGY | Age: 53
End: 2023-07-25

## 2023-07-25 ENCOUNTER — TELEPHONE (OUTPATIENT)
Dept: CASE MANAGEMENT | Age: 53
End: 2023-07-25

## 2023-07-25 ENCOUNTER — OFFICE VISIT (OUTPATIENT)
Dept: ONCOLOGY | Age: 53
End: 2023-07-25
Payer: COMMERCIAL

## 2023-07-25 ENCOUNTER — HOSPITAL ENCOUNTER (OUTPATIENT)
Dept: INFUSION THERAPY | Age: 53
Discharge: HOME | End: 2023-07-25
Payer: COMMERCIAL

## 2023-07-25 VITALS
SYSTOLIC BLOOD PRESSURE: 120 MMHG | TEMPERATURE: 97.3 F | DIASTOLIC BLOOD PRESSURE: 82 MMHG | HEART RATE: 80 BPM | BODY MASS INDEX: 28.6 KG/M2 | HEIGHT: 75 IN | OXYGEN SATURATION: 97 % | WEIGHT: 230 LBS

## 2023-07-25 VITALS
RESPIRATION RATE: 16 BRPM | HEART RATE: 80 BPM | OXYGEN SATURATION: 100 % | TEMPERATURE: 96.9 F | SYSTOLIC BLOOD PRESSURE: 127 MMHG | DIASTOLIC BLOOD PRESSURE: 85 MMHG

## 2023-07-25 DIAGNOSIS — C76.0 MALIGNANT NEOPLASM OF HEAD, FACE AND NECK (HCC): ICD-10-CM

## 2023-07-25 DIAGNOSIS — C76.0 MALIGNANT NEOPLASM OF HEAD, FACE AND NECK (HCC): Primary | ICD-10-CM

## 2023-07-25 PROCEDURE — 96375 TX/PRO/DX INJ NEW DRUG ADDON: CPT

## 2023-07-25 PROCEDURE — 77386 HC NTSTY MODUL RAD TX DLVR CPLX: CPT | Performed by: SPECIALIST

## 2023-07-25 PROCEDURE — 6360000002 HC RX W HCPCS: Performed by: INTERNAL MEDICINE

## 2023-07-25 PROCEDURE — 96367 TX/PROPH/DG ADDL SEQ IV INF: CPT

## 2023-07-25 PROCEDURE — 99214 OFFICE O/P EST MOD 30 MIN: CPT | Performed by: INTERNAL MEDICINE

## 2023-07-25 PROCEDURE — 2580000003 HC RX 258: Performed by: INTERNAL MEDICINE

## 2023-07-25 PROCEDURE — 96413 CHEMO IV INFUSION 1 HR: CPT

## 2023-07-25 PROCEDURE — 77014 CHG CT GUIDANCE RADIATION THERAPY FLDS PLACEMENT: CPT | Performed by: SPECIALIST

## 2023-07-25 PROCEDURE — 96415 CHEMO IV INFUSION ADDL HR: CPT

## 2023-07-25 RX ORDER — FAMOTIDINE 10 MG/ML
20 INJECTION, SOLUTION INTRAVENOUS
Status: CANCELLED | OUTPATIENT
Start: 2023-07-25

## 2023-07-25 RX ORDER — MEPERIDINE HYDROCHLORIDE 50 MG/ML
12.5 INJECTION INTRAMUSCULAR; INTRAVENOUS; SUBCUTANEOUS PRN
Status: CANCELLED | OUTPATIENT
Start: 2023-07-25

## 2023-07-25 RX ORDER — SODIUM CHLORIDE 9 MG/ML
5-250 INJECTION, SOLUTION INTRAVENOUS PRN
Status: CANCELLED | OUTPATIENT
Start: 2023-07-25

## 2023-07-25 RX ORDER — ONDANSETRON 2 MG/ML
8 INJECTION INTRAMUSCULAR; INTRAVENOUS
Status: CANCELLED | OUTPATIENT
Start: 2023-07-25

## 2023-07-25 RX ORDER — PALONOSETRON 0.05 MG/ML
0.25 INJECTION, SOLUTION INTRAVENOUS ONCE
Status: CANCELLED | OUTPATIENT
Start: 2023-07-25 | End: 2023-07-25

## 2023-07-25 RX ORDER — SODIUM CHLORIDE 9 MG/ML
5-250 INJECTION, SOLUTION INTRAVENOUS PRN
Status: DISCONTINUED | OUTPATIENT
Start: 2023-07-25 | End: 2023-07-26 | Stop reason: HOSPADM

## 2023-07-25 RX ORDER — ALBUTEROL SULFATE 90 UG/1
4 AEROSOL, METERED RESPIRATORY (INHALATION) PRN
Status: CANCELLED | OUTPATIENT
Start: 2023-07-25

## 2023-07-25 RX ORDER — HEPARIN SODIUM (PORCINE) LOCK FLUSH IV SOLN 100 UNIT/ML 100 UNIT/ML
500 SOLUTION INTRAVENOUS PRN
Status: CANCELLED | OUTPATIENT
Start: 2023-07-25

## 2023-07-25 RX ORDER — SODIUM CHLORIDE 0.9 % (FLUSH) 0.9 %
5-40 SYRINGE (ML) INJECTION PRN
Status: CANCELLED | OUTPATIENT
Start: 2023-07-25

## 2023-07-25 RX ORDER — EPINEPHRINE 1 MG/ML
0.3 INJECTION, SOLUTION, CONCENTRATE INTRAVENOUS PRN
Status: CANCELLED | OUTPATIENT
Start: 2023-07-25

## 2023-07-25 RX ORDER — SODIUM CHLORIDE 0.9 % (FLUSH) 0.9 %
5-40 SYRINGE (ML) INJECTION PRN
Status: DISCONTINUED | OUTPATIENT
Start: 2023-07-25 | End: 2023-07-26 | Stop reason: HOSPADM

## 2023-07-25 RX ORDER — DIPHENHYDRAMINE HYDROCHLORIDE AND LIDOCAINE HYDROCHLORIDE AND ALUMINUM HYDROXIDE AND MAGNESIUM HYDRO
15 KIT 4 TIMES DAILY PRN
Qty: 225 ML | Refills: 2 | Status: SHIPPED | OUTPATIENT
Start: 2023-07-25

## 2023-07-25 RX ORDER — PALONOSETRON HYDROCHLORIDE 0.05 MG/ML
0.25 INJECTION, SOLUTION INTRAVENOUS ONCE
Status: COMPLETED | OUTPATIENT
Start: 2023-07-25 | End: 2023-07-25

## 2023-07-25 RX ORDER — ACETAMINOPHEN 325 MG/1
650 TABLET ORAL
Status: CANCELLED | OUTPATIENT
Start: 2023-07-25

## 2023-07-25 RX ORDER — DIPHENHYDRAMINE HYDROCHLORIDE 50 MG/ML
50 INJECTION INTRAMUSCULAR; INTRAVENOUS
Status: CANCELLED | OUTPATIENT
Start: 2023-07-25

## 2023-07-25 RX ORDER — DEXAMETHASONE SODIUM PHOSPHATE 10 MG/ML
10 INJECTION, SOLUTION INTRAMUSCULAR; INTRAVENOUS ONCE
Status: COMPLETED | OUTPATIENT
Start: 2023-07-25 | End: 2023-07-25

## 2023-07-25 RX ORDER — SODIUM CHLORIDE 9 MG/ML
INJECTION, SOLUTION INTRAVENOUS CONTINUOUS
Status: CANCELLED | OUTPATIENT
Start: 2023-07-25

## 2023-07-25 RX ADMIN — PALONOSETRON 0.25 MG: 0.25 INJECTION, SOLUTION INTRAVENOUS at 10:27

## 2023-07-25 RX ADMIN — DEXAMETHASONE SODIUM PHOSPHATE 10 MG: 10 INJECTION, SOLUTION INTRAMUSCULAR; INTRAVENOUS at 10:22

## 2023-07-25 RX ADMIN — POTASSIUM CHLORIDE: 2 INJECTION, SOLUTION, CONCENTRATE INTRAVENOUS at 10:57

## 2023-07-25 RX ADMIN — FOSAPREPITANT 150 MG: 150 INJECTION, POWDER, LYOPHILIZED, FOR SOLUTION INTRAVENOUS at 10:32

## 2023-07-25 RX ADMIN — SODIUM CHLORIDE 100 ML/HR: 9 INJECTION, SOLUTION INTRAVENOUS at 10:18

## 2023-07-25 NOTE — TELEPHONE ENCOUNTER
Met with patient during his infusion appointment today. Patient states that he is now having a hard time eating and drinking due to pain and burning from radiation. Patient stated that he was ordered magic mouthwash and will see how this helps. He states that he is not having any fatigue and that he has had very little nausea since starting treatment. Dietitian to meet with patient today. Patient denies any questions or other issues at this time. Encouraged to call if any arise.

## 2023-07-25 NOTE — TELEPHONE ENCOUNTER
Met with pt in conjunction with infusion as social work follow up. Pt is 61-year-old male being managed for squamous cell carcinoma of the nose who recently underwent a rhinectomy. Pt's mood appeared euthymic with full affect, he appeared A&Ox4, and he was willing/able to participate in session. He appeared appropriately dressed/groomed and was able to ambulate/transfer without assistance. Pt discussed worsening symptoms associated with radiation treatment. Stated that he has been unable to eat which has resulted in significant weight loss noting that the possibility of a feeding tube has been brought up to him. He discussed concerns such as infection and complication risks. Explored possible social and emotional implications of ongoing inability to eat including anxiety surrounding intake, possible aversion to eating due to pain, depression surrounding loss of social functions of meals, etc; encouraged pt to keep weigh pros/cons and continue with ongoing discussion of needs with providers. Pt additionally indicated ongoing issues with sleep. He identified anxious thoughts and ongoing discomfort as primary contributors. Discussed body awareness and progressive muscle relaxation techniques to trial.  Pt to discuss issues related to sore throat and secretions with radiation oncology at upcoming appointment. Will follow up.     RICARDO Cummings, VERÓNICA-S  Oncology Social Worker

## 2023-07-25 NOTE — PROGRESS NOTES
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 Referring Physician:  Felipe Nielsen MD    PCP:  Greta Murphy MD    History of Present Illness:      Mr. 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 The patient was hit by his dog's head on the left side of his nose in April 2022, in November 2022 he had noticed a lesion inside his nose, and she was diagnosed with staph infection, was treated with antibiotics, he then developed a pimple on his nose and eventually developed a hole in his nose, he was seen by ID, he was referred to the hospital due to concern about myelitis, he had a CT scan done revealing chronic sinusitis involving the left sphenoid sinus, he had a biopsy done on 2/21/2023, revealing invasive poorly differentiated squamous cell carcinoma, grade 3, the patient was referred Dr. Mony Kay, he underwent a total rhinectomy on 5/5/2023, the patient was found to have tumor growth to the glabella and the maxillary crest, path:   Case Summary Report   Procedure:    Rhinectomy, total   Tumor Site:   Nasal septal mucosa, cartilage, subcutaneous tissue, skin. Tumor Laterality: Left and Right. Left >> Right     Tumor Focality: Single focus     Tumor Size (Greatest dimension): At least 3.5 cm. Histologic Type: Squamous cell carcinoma, keratinizing     Histologic Grade (squamous cell carcinomas only): Poorly differentiated     Specimen Margins (Main specimen; part B)     _X_ Involved by invasive carcinoma        Specify margin(s), per orientation, if possible: Superior and inferior   septal margins, superior right and left skin margins.      _X_ Uninvolved by high-grade dysplasia/in situ disease     Separately

## 2023-07-25 NOTE — PROGRESS NOTES
Patient tolerated treatment well without complications or complaints. Alert and oriented x3. Patient aware of potential side effects and has no questions regarding treatment. Vitals stable throughout treatment. Pain assessed, patient denies any new or worsening pain.  Patient left ambulatory

## 2023-07-26 ENCOUNTER — HOSPITAL ENCOUNTER (OUTPATIENT)
Dept: RADIATION ONCOLOGY | Age: 53
Discharge: HOME | End: 2023-07-26
Payer: COMMERCIAL

## 2023-07-26 ENCOUNTER — HOSPITAL ENCOUNTER (OUTPATIENT)
Dept: INFUSION THERAPY | Age: 53
Discharge: HOME | End: 2023-07-26
Payer: COMMERCIAL

## 2023-07-26 VITALS
RESPIRATION RATE: 18 BRPM | SYSTOLIC BLOOD PRESSURE: 135 MMHG | OXYGEN SATURATION: 99 % | HEART RATE: 63 BPM | DIASTOLIC BLOOD PRESSURE: 81 MMHG | TEMPERATURE: 97.9 F

## 2023-07-26 PROCEDURE — 96360 HYDRATION IV INFUSION INIT: CPT

## 2023-07-26 PROCEDURE — 77386 HC NTSTY MODUL RAD TX DLVR CPLX: CPT | Performed by: SPECIALIST

## 2023-07-26 PROCEDURE — 77014 CHG CT GUIDANCE RADIATION THERAPY FLDS PLACEMENT: CPT | Performed by: SPECIALIST

## 2023-07-26 PROCEDURE — 2580000003 HC RX 258: Performed by: INTERNAL MEDICINE

## 2023-07-26 RX ORDER — SODIUM CHLORIDE 0.9 % (FLUSH) 0.9 %
5-40 SYRINGE (ML) INJECTION PRN
Status: CANCELLED | OUTPATIENT
Start: 2023-07-26

## 2023-07-26 RX ORDER — SODIUM CHLORIDE 9 MG/ML
INJECTION, SOLUTION INTRAVENOUS ONCE
Status: COMPLETED | OUTPATIENT
Start: 2023-07-26 | End: 2023-07-26

## 2023-07-26 RX ORDER — 0.9 % SODIUM CHLORIDE 0.9 %
1000 INTRAVENOUS SOLUTION INTRAVENOUS ONCE
Status: CANCELLED | OUTPATIENT
Start: 2023-07-26 | End: 2023-07-26

## 2023-07-26 RX ORDER — SODIUM CHLORIDE 9 MG/ML
5-250 INJECTION, SOLUTION INTRAVENOUS PRN
Status: CANCELLED | OUTPATIENT
Start: 2023-07-26

## 2023-07-26 RX ORDER — HEPARIN 100 UNIT/ML
500 SYRINGE INTRAVENOUS PRN
Status: CANCELLED | OUTPATIENT
Start: 2023-07-26

## 2023-07-26 RX ADMIN — SODIUM CHLORIDE: 9 INJECTION, SOLUTION INTRAVENOUS at 09:18

## 2023-07-26 NOTE — PROGRESS NOTES
Patient tolerated hydration well without complications or complaints. Alert and oriented x3. Patient aware of potential side effects and has no questions regarding treatment. Vitals stable throughout treatment. Pain assessed, patient denies any new or worsening pain.  Patient left ambulatory

## 2023-07-26 NOTE — PROGRESS NOTES
Patient provided with discharge instructions, patient has [facility]. All questions answered. Patient understands follow up plan of care.

## 2023-07-27 ENCOUNTER — TELEPHONE (OUTPATIENT)
Dept: PALLATIVE CARE | Age: 53
End: 2023-07-27

## 2023-07-27 ENCOUNTER — HOSPITAL ENCOUNTER (OUTPATIENT)
Dept: INFUSION THERAPY | Age: 53
Discharge: HOME | End: 2023-07-27
Payer: COMMERCIAL

## 2023-07-27 ENCOUNTER — HOSPITAL ENCOUNTER (OUTPATIENT)
Dept: RADIATION ONCOLOGY | Age: 53
Discharge: HOME | End: 2023-07-27
Payer: COMMERCIAL

## 2023-07-27 VITALS
HEART RATE: 56 BPM | TEMPERATURE: 97.5 F | SYSTOLIC BLOOD PRESSURE: 113 MMHG | DIASTOLIC BLOOD PRESSURE: 74 MMHG | RESPIRATION RATE: 18 BRPM | OXYGEN SATURATION: 99 %

## 2023-07-27 VITALS
RESPIRATION RATE: 18 BRPM | OXYGEN SATURATION: 97 % | WEIGHT: 228.5 LBS | BODY MASS INDEX: 28.56 KG/M2 | DIASTOLIC BLOOD PRESSURE: 68 MMHG | TEMPERATURE: 98.4 F | SYSTOLIC BLOOD PRESSURE: 104 MMHG | HEART RATE: 64 BPM

## 2023-07-27 DIAGNOSIS — C76.0 MALIGNANT NEOPLASM OF HEAD, FACE AND NECK (HCC): Primary | ICD-10-CM

## 2023-07-27 PROCEDURE — 96360 HYDRATION IV INFUSION INIT: CPT

## 2023-07-27 PROCEDURE — 77386 HC NTSTY MODUL RAD TX DLVR CPLX: CPT | Performed by: SPECIALIST

## 2023-07-27 PROCEDURE — 77014 CHG CT GUIDANCE RADIATION THERAPY FLDS PLACEMENT: CPT | Performed by: SPECIALIST

## 2023-07-27 PROCEDURE — 2580000003 HC RX 258: Performed by: INTERNAL MEDICINE

## 2023-07-27 RX ORDER — SODIUM CHLORIDE 0.9 % (FLUSH) 0.9 %
5-40 SYRINGE (ML) INJECTION PRN
Status: DISCONTINUED | OUTPATIENT
Start: 2023-07-27 | End: 2023-07-28 | Stop reason: HOSPADM

## 2023-07-27 RX ORDER — SODIUM CHLORIDE 9 MG/ML
5-250 INJECTION, SOLUTION INTRAVENOUS PRN
Status: CANCELLED | OUTPATIENT
Start: 2023-07-27

## 2023-07-27 RX ORDER — SODIUM CHLORIDE 0.9 % (FLUSH) 0.9 %
5-40 SYRINGE (ML) INJECTION PRN
Status: CANCELLED | OUTPATIENT
Start: 2023-07-27

## 2023-07-27 RX ORDER — 0.9 % SODIUM CHLORIDE 0.9 %
1000 INTRAVENOUS SOLUTION INTRAVENOUS ONCE
Status: COMPLETED | OUTPATIENT
Start: 2023-07-27 | End: 2023-07-27

## 2023-07-27 RX ORDER — HEPARIN 100 UNIT/ML
500 SYRINGE INTRAVENOUS PRN
Status: CANCELLED | OUTPATIENT
Start: 2023-07-27

## 2023-07-27 RX ORDER — 0.9 % SODIUM CHLORIDE 0.9 %
1000 INTRAVENOUS SOLUTION INTRAVENOUS ONCE
Status: CANCELLED | OUTPATIENT
Start: 2023-07-27 | End: 2023-07-27

## 2023-07-27 RX ADMIN — SODIUM CHLORIDE 1000 ML: 9 INJECTION, SOLUTION INTRAVENOUS at 09:31

## 2023-07-27 ASSESSMENT — PAIN SCALES - GENERAL
PAINLEVEL_OUTOF10: 7
PAINLEVEL_OUTOF10: 8

## 2023-07-27 ASSESSMENT — PAIN DESCRIPTION - LOCATION
LOCATION: THROAT;MOUTH
LOCATION: MOUTH;THROAT

## 2023-07-27 NOTE — PROGRESS NOTES
Met w/ pt for follow up to 7/25 assessment. Pt has lost additional 1.5#. He reports he was able to drink a Boost shake this morning and is open to drinking ONS at this time. Provided pt w/ samples of Ensure Complete, Ensure Clear, and Boost VHC. Discussed pros and cons of each sample. Pt reports that the MMW was unhelpful, but he plans on starting peroxide rinses today in an effort to control mucous production. He would like to hold off on PEG decision at this time, but understands he can request one in the future if needed. Will continue to follow.   Electronically signed by Juany Peoples, MS, RD, LD on 7/27/2023 at 2:30 PM

## 2023-07-27 NOTE — TELEPHONE ENCOUNTER
Met Christiano at Oncology infusion center and explained Palliative Care and location of clinic. Gui set for 8/22/23. Cheri Villar states he is just having sore throat form drainage and doctor has prescribed a rinse.  Will place on wait list.

## 2023-07-27 NOTE — PROGRESS NOTES
Patient tolerated treatment well without complications or complaints. Alert and oriented x3. Patient aware of potential side effects and has no questions regarding treatment. Vitals stable throughout treatment. Pain assessed, patient denies any new or worsening pain. Patient left ambulatory.

## 2023-07-28 ENCOUNTER — TELEPHONE (OUTPATIENT)
Dept: SURGERY | Age: 53
End: 2023-07-28

## 2023-07-28 ENCOUNTER — PREP FOR PROCEDURE (OUTPATIENT)
Dept: SURGERY | Age: 53
End: 2023-07-28

## 2023-07-28 ENCOUNTER — HOSPITAL ENCOUNTER (OUTPATIENT)
Dept: RADIATION ONCOLOGY | Age: 53
Discharge: HOME | End: 2023-07-28
Payer: COMMERCIAL

## 2023-07-28 ENCOUNTER — HOSPITAL ENCOUNTER (OUTPATIENT)
Dept: INFUSION THERAPY | Age: 53
Discharge: HOME | End: 2023-07-28
Payer: COMMERCIAL

## 2023-07-28 VITALS
TEMPERATURE: 96.8 F | HEART RATE: 63 BPM | RESPIRATION RATE: 18 BRPM | SYSTOLIC BLOOD PRESSURE: 129 MMHG | DIASTOLIC BLOOD PRESSURE: 69 MMHG

## 2023-07-28 DIAGNOSIS — C76.0 MALIGNANT NEOPLASM OF HEAD, FACE AND NECK (HCC): Primary | ICD-10-CM

## 2023-07-28 PROCEDURE — 96375 TX/PRO/DX INJ NEW DRUG ADDON: CPT

## 2023-07-28 PROCEDURE — 2580000003 HC RX 258: Performed by: INTERNAL MEDICINE

## 2023-07-28 PROCEDURE — 96374 THER/PROPH/DIAG INJ IV PUSH: CPT

## 2023-07-28 PROCEDURE — 6360000002 HC RX W HCPCS: Performed by: INTERNAL MEDICINE

## 2023-07-28 PROCEDURE — 96360 HYDRATION IV INFUSION INIT: CPT

## 2023-07-28 PROCEDURE — 77386 HC NTSTY MODUL RAD TX DLVR CPLX: CPT | Performed by: SPECIALIST

## 2023-07-28 RX ORDER — SODIUM CHLORIDE/ALOE VERA
GEL (GRAM) NASAL DAILY PRN
COMMUNITY

## 2023-07-28 RX ORDER — SODIUM CHLORIDE 0.9 % (FLUSH) 0.9 %
5-40 SYRINGE (ML) INJECTION PRN
OUTPATIENT
Start: 2023-07-28

## 2023-07-28 RX ORDER — HEPARIN 100 UNIT/ML
500 SYRINGE INTRAVENOUS PRN
OUTPATIENT
Start: 2023-07-28

## 2023-07-28 RX ORDER — SODIUM CHLORIDE 9 MG/ML
5-250 INJECTION, SOLUTION INTRAVENOUS PRN
OUTPATIENT
Start: 2023-07-28

## 2023-07-28 RX ORDER — 0.9 % SODIUM CHLORIDE 0.9 %
1000 INTRAVENOUS SOLUTION INTRAVENOUS ONCE
Status: COMPLETED | OUTPATIENT
Start: 2023-07-28 | End: 2023-07-28

## 2023-07-28 RX ORDER — 0.9 % SODIUM CHLORIDE 0.9 %
1000 INTRAVENOUS SOLUTION INTRAVENOUS ONCE
OUTPATIENT
Start: 2023-07-28 | End: 2023-07-28

## 2023-07-28 RX ORDER — SODIUM CHLORIDE 0.9 % (FLUSH) 0.9 %
5-40 SYRINGE (ML) INJECTION PRN
Status: DISCONTINUED | OUTPATIENT
Start: 2023-07-28 | End: 2023-07-29 | Stop reason: HOSPADM

## 2023-07-28 RX ADMIN — SODIUM CHLORIDE 1000 ML: 9 INJECTION, SOLUTION INTRAVENOUS at 09:29

## 2023-07-28 RX ADMIN — HYDROMORPHONE HYDROCHLORIDE 1 MG: 1 INJECTION, SOLUTION INTRAMUSCULAR; INTRAVENOUS; SUBCUTANEOUS at 10:47

## 2023-07-28 ASSESSMENT — PAIN DESCRIPTION - LOCATION
LOCATION: THROAT
LOCATION: THROAT

## 2023-07-28 ASSESSMENT — PAIN SCALES - GENERAL
PAINLEVEL_OUTOF10: 6
PAINLEVEL_OUTOF10: 9

## 2023-07-28 NOTE — TELEPHONE ENCOUNTER
Scheduled pt for Mediport & PEG tube placement 7/31/23 at 10am. Pt [facility] at [address] [facility] at 8[facility]. Patient confirmed date and time, address and directions given via phone, patient understood.     Electronically signed by Treasure Ag MA on 7/28/2023 at 1:02 PM

## 2023-07-28 NOTE — TELEPHONE ENCOUNTER
Prior Authorization Form:      DEMOGRAPHICS:                     Patient Name:  Olivia Garza  Patient :  1970            Insurance:  Payor: Syeda Mis / Plan: Syeda Mis - [place] PPO / Product Type: *No Product type* /   Insurance ID Number:    Payer/Plan Subscr  Sex Relation Sub. Ins. ID Effective Group Num   1. GENERIC SELF-* KACY SALGADO* 1970 Male Self [id number] 10/15/20 [id number]                                   [address], [address] [place] [id number]   2.  Arielfurt* 1970 Male Self [id number] 1/1/15 [id number]                                   [address]         DIAGNOSIS & PROCEDURE:                       Procedure/Operation: PEG TUBE & MEDIPORT PLACEMENT           CPT Code: 15084, 03511    Diagnosis:  MALIGNANT NEOPLASM OF HEAD, FACE & NECK    ICD10 Code: C76.0    Location:  [address] [place] [address]    Surgeon:  Hetal Khalil    SCHEDULING INFORMATION:                          Date: 23    Time: 10AM              Anesthesia:  MAC/TIVA                                                       Status:  Outpatient        Special Comments:  N/A       Electronically signed by Darcella Gowers, MA on 2023 at 1:04 PM

## 2023-07-28 NOTE — PROGRESS NOTES
Pt reports that throat pain has decreased to 6/10 and he was able to sleep for at least a few minutes-Urgent referral for mediport placement placed-Pt requests to go home and rest-Pt tolerated IV hydration well-Pt discharged ambulatory with son

## 2023-07-28 NOTE — PROGRESS NOTES
[address] [place] [address]   PRE-ADMISSION TESTING GENERAL INSTRUCTIONS  [facility] Phone Number: [phone]      GENERAL INSTRUCTIONS:    [x] Antibacterial Soap Shower Night before and/or AM of surgery. [x] Do not wear makeup, lotions, powders, deodorant. [x] Nothing by mouth after midnight; including gum, candy, mints, or water. [x] You may brush your teeth, gargle, but do NOT swallow water. [x] No tobacco products, illegal drugs, marijuana or alcohol within 24 hours of your surgery. [x] Jewelry or valuables should not be brought to the hospital. All body and/or tongue piercing's must be removed prior to arriving to hospital. No contact lens or hair pins. [x] Arrange transportation with a responsible adult  to and from the hospital. Arrange for someone to be with you for the remainder of the day and for 24 hours after your procedure due to having had anesthesia. -Who will be your  for transportation? Aida, wife        -Who will be staying with you for 24 hrs after your procedure? Aida, wife  [x] Bring insurance card and photo ID. PARKING INSTRUCTIONS:     [x] ARRIVAL DATE  7/31 & TIME   8 am:   [x]Surgery time may change, if it does we will call you the business day before your scheduled surgery. [x] Enter into the [employer]. Two adults may accompany you. [x] Parking lot '\"I\"  is located on [facility] (the corner of [address] [place] [address] and [facility]). To enter the lot,you will need to get a parking ticket at the gate . To exit you will be given a free parking voucher. You will need both the ticket and parking voucher to exit the parking lot. One car per patient is allowed to park in this lot. Walk up the front walk to the [facility], the door will be locked an employee will greet you and let you in. EDUCATION INSTRUCTIONS:          [x] Pain: Post-op pain is normal and to be expected.  You will be asked to rate your pain from

## 2023-07-28 NOTE — PROGRESS NOTES
Met w/ pt during IV hydration. He attempted various samples provided by this clinician at yesterday's hydration visit. He was unable to swallow any options. He has not consumed any nutrition today and remarks that his odynophagia has continued to increase in severity over the past few days. Pt has now opted for PEG placement. Referral placed for [facility] Surgery. Will refer to [facility] for EN teaching as well as formula and supplies including 60cc piston syringes, split gauze, and paper tape. Pt will require EN for >90 days. He has lost 9# x1 week 2/2 inability to tolerate PO intake. Discussed w/ pt that he should proceed to ED if situation worsens. Needs assessed on 7/25. Calculated Needs: 25-28 kcal/kg CBW =  kcal, 1.3-1.5 gm/kg IBW = 110-125 gm pro, 1 ml/kcal =  ml fluids      To meet total needs, recommend TwoCal HN or equivalent, 6 cartons daily via bolus feeding to provide 2850 kcal, 119 gm pro, 996 ml free water. Pt to flush w/ 120 ml H2O before and after each bolus feeding + additional 120 ml H2O flush TID.     Electronically signed by Kimberly Mckeon, MS, RD, LD on 7/28/2023 at 10:34 AM

## 2023-07-29 ENCOUNTER — HOSPITAL ENCOUNTER (EMERGENCY)
Age: 53
Discharge: HOME | End: 2023-07-29
Payer: COMMERCIAL

## 2023-07-29 VITALS
OXYGEN SATURATION: 98 % | RESPIRATION RATE: 18 BRPM | SYSTOLIC BLOOD PRESSURE: 116 MMHG | DIASTOLIC BLOOD PRESSURE: 94 MMHG | HEART RATE: 76 BPM | TEMPERATURE: 97.9 F

## 2023-07-29 DIAGNOSIS — T66.XXXA ADVERSE EFFECT OF RADIATION, INITIAL ENCOUNTER: Primary | ICD-10-CM

## 2023-07-29 PROCEDURE — 96360 HYDRATION IV INFUSION INIT: CPT

## 2023-07-29 PROCEDURE — 6370000000 HC RX 637 (ALT 250 FOR IP): Performed by: PHYSICIAN ASSISTANT

## 2023-07-29 PROCEDURE — 2580000003 HC RX 258: Performed by: PHYSICIAN ASSISTANT

## 2023-07-29 PROCEDURE — 99284 EMERGENCY DEPT VISIT MOD MDM: CPT

## 2023-07-29 RX ORDER — LIDOCAINE HYDROCHLORIDE 20 MG/ML
15 SOLUTION OROPHARYNGEAL ONCE
Status: COMPLETED | OUTPATIENT
Start: 2023-07-29 | End: 2023-07-29

## 2023-07-29 RX ORDER — 0.9 % SODIUM CHLORIDE 0.9 %
2000 INTRAVENOUS SOLUTION INTRAVENOUS ONCE
Status: COMPLETED | OUTPATIENT
Start: 2023-07-29 | End: 2023-07-29

## 2023-07-29 RX ORDER — OXYCODONE HCL 5 MG/5 ML
5 SOLUTION, ORAL ORAL ONCE
Status: COMPLETED | OUTPATIENT
Start: 2023-07-29 | End: 2023-07-29

## 2023-07-29 RX ORDER — LIDOCAINE HYDROCHLORIDE 20 MG/ML
10 SOLUTION OROPHARYNGEAL PRN
Qty: 100 ML | Refills: 0 | Status: SHIPPED | OUTPATIENT
Start: 2023-07-29

## 2023-07-29 RX ORDER — OXYCODONE HYDROCHLORIDE 5 MG/1
5 TABLET ORAL EVERY 6 HOURS PRN
Qty: 20 TABLET | Refills: 0 | Status: ON HOLD | OUTPATIENT
Start: 2023-07-29 | End: 2023-08-04 | Stop reason: HOSPADM

## 2023-07-29 RX ORDER — OXYCODONE HYDROCHLORIDE AND ACETAMINOPHEN 5; 325 MG/1; MG/1
1 TABLET ORAL ONCE
Status: DISCONTINUED | OUTPATIENT
Start: 2023-07-29 | End: 2023-07-29

## 2023-07-29 RX ADMIN — SODIUM CHLORIDE 2000 ML: 9 INJECTION, SOLUTION INTRAVENOUS at 11:14

## 2023-07-29 RX ADMIN — Medication 15 ML: at 13:51

## 2023-07-29 RX ADMIN — OXYCODONE HYDROCHLORIDE 5 MG: 5 SOLUTION ORAL at 13:50

## 2023-07-29 ASSESSMENT — PAIN DESCRIPTION - DESCRIPTORS: DESCRIPTORS: SHARP

## 2023-07-29 ASSESSMENT — PAIN DESCRIPTION - LOCATION: LOCATION: THROAT

## 2023-07-29 ASSESSMENT — PAIN - FUNCTIONAL ASSESSMENT: PAIN_FUNCTIONAL_ASSESSMENT: ACTIVITIES ARE NOT PREVENTED

## 2023-07-29 ASSESSMENT — PAIN SCALES - GENERAL: PAINLEVEL_OUTOF10: 7

## 2023-07-30 ENCOUNTER — HOSPITAL ENCOUNTER (EMERGENCY)
Age: 53
Discharge: HOME | DRG: 982 | End: 2023-07-30
Attending: EMERGENCY MEDICINE
Payer: COMMERCIAL

## 2023-07-30 VITALS
BODY MASS INDEX: 27.75 KG/M2 | HEART RATE: 85 BPM | RESPIRATION RATE: 18 BRPM | OXYGEN SATURATION: 99 % | WEIGHT: 222 LBS | SYSTOLIC BLOOD PRESSURE: 125 MMHG | DIASTOLIC BLOOD PRESSURE: 75 MMHG | TEMPERATURE: 97.5 F

## 2023-07-30 DIAGNOSIS — E86.0 DEHYDRATION: Primary | ICD-10-CM

## 2023-07-30 LAB
ANION GAP SERPL CALCULATED.3IONS-SCNC: 17 MMOL/L (ref 7–16)
BASOPHILS # BLD: 0.06 K/UL (ref 0–0.2)
BASOPHILS NFR BLD: 1 % (ref 0–2)
BILIRUB UR QL STRIP: ABNORMAL
BUN SERPL-MCNC: 15 MG/DL (ref 6–20)
CALCIUM SERPL-MCNC: 9.7 MG/DL (ref 8.6–10.2)
CHLORIDE SERPL-SCNC: 101 MMOL/L (ref 98–107)
CLARITY UR: CLEAR
CO2 SERPL-SCNC: 21 MMOL/L (ref 22–29)
COLOR UR: YELLOW
CREAT SERPL-MCNC: 1 MG/DL (ref 0.7–1.2)
EOSINOPHIL # BLD: 0.24 K/UL (ref 0.05–0.5)
EOSINOPHILS RELATIVE PERCENT: 3 % (ref 0–6)
ERYTHROCYTE [DISTWIDTH] IN BLOOD BY AUTOMATED COUNT: 13.5 % (ref 11.5–15)
GFR SERPL CREATININE-BSD FRML MDRD: >60 ML/MIN/1.73M2
GLUCOSE SERPL-MCNC: 86 MG/DL (ref 74–99)
GLUCOSE UR STRIP-MCNC: NEGATIVE MG/DL
HCT VFR BLD AUTO: 46.3 % (ref 37–54)
HGB BLD-MCNC: 15.9 G/DL (ref 12.5–16.5)
HGB UR QL STRIP.AUTO: NEGATIVE
IMM GRANULOCYTES # BLD AUTO: <0.03 K/UL (ref 0–0.58)
IMM GRANULOCYTES NFR BLD: 0 % (ref 0–5)
KETONES UR STRIP-MCNC: >80 MG/DL
LEUKOCYTE ESTERASE UR QL STRIP: NEGATIVE
LYMPHOCYTES NFR BLD: 0.65 K/UL (ref 1.5–4)
LYMPHOCYTES RELATIVE PERCENT: 9 % (ref 20–42)
MCH RBC QN AUTO: 30.7 PG (ref 26–35)
MCHC RBC AUTO-ENTMCNC: 34.3 G/DL (ref 32–34.5)
MCV RBC AUTO: 89.4 FL (ref 80–99.9)
MONOCYTES NFR BLD: 0.6 K/UL (ref 0.1–0.95)
MONOCYTES NFR BLD: 8 % (ref 2–12)
MUCOUS THREADS URNS QL MICRO: PRESENT
NEUTROPHILS NFR BLD: 79 % (ref 43–80)
NEUTS SEG NFR BLD: 6.02 K/UL (ref 1.8–7.3)
NITRITE UR QL STRIP: NEGATIVE
PH UR STRIP: 6 [PH] (ref 5–9)
PLATELET # BLD AUTO: 243 K/UL (ref 130–450)
PMV BLD AUTO: 9.5 FL (ref 7–12)
POTASSIUM SERPL-SCNC: 3.9 MMOL/L (ref 3.5–5)
PROT UR STRIP-MCNC: 30 MG/DL
RBC # BLD AUTO: 5.18 M/UL (ref 3.8–5.8)
RBC #/AREA URNS HPF: ABNORMAL /HPF
SODIUM SERPL-SCNC: 139 MMOL/L (ref 132–146)
SP GR UR STRIP: 1.02 (ref 1–1.03)
UROBILINOGEN UR STRIP-ACNC: 1 EU/DL (ref 0–1)
WBC #/AREA URNS HPF: ABNORMAL /HPF
WBC OTHER # BLD: 7.6 K/UL (ref 4.5–11.5)

## 2023-07-30 PROCEDURE — C9113 INJ PANTOPRAZOLE SODIUM, VIA: HCPCS | Performed by: EMERGENCY MEDICINE

## 2023-07-30 PROCEDURE — 6360000002 HC RX W HCPCS: Performed by: EMERGENCY MEDICINE

## 2023-07-30 PROCEDURE — 81001 URINALYSIS AUTO W/SCOPE: CPT

## 2023-07-30 PROCEDURE — 85027 COMPLETE CBC AUTOMATED: CPT

## 2023-07-30 PROCEDURE — 2580000003 HC RX 258: Performed by: EMERGENCY MEDICINE

## 2023-07-30 PROCEDURE — 2580000003 HC RX 258

## 2023-07-30 PROCEDURE — 96365 THER/PROPH/DIAG IV INF INIT: CPT

## 2023-07-30 PROCEDURE — 96366 THER/PROPH/DIAG IV INF ADDON: CPT

## 2023-07-30 PROCEDURE — 80048 BASIC METABOLIC PNL TOTAL CA: CPT

## 2023-07-30 PROCEDURE — 99284 EMERGENCY DEPT VISIT MOD MDM: CPT

## 2023-07-30 RX ORDER — 0.9 % SODIUM CHLORIDE 0.9 %
1000 INTRAVENOUS SOLUTION INTRAVENOUS ONCE
Status: COMPLETED | OUTPATIENT
Start: 2023-07-30 | End: 2023-07-30

## 2023-07-30 RX ADMIN — SODIUM CHLORIDE 80 MG: 9 INJECTION, SOLUTION INTRAVENOUS at 15:41

## 2023-07-30 RX ADMIN — SODIUM CHLORIDE 1000 ML: 9 INJECTION, SOLUTION INTRAVENOUS at 15:39

## 2023-07-30 ASSESSMENT — LIFESTYLE VARIABLES
HOW OFTEN DO YOU HAVE A DRINK CONTAINING ALCOHOL: NEVER
HOW MANY STANDARD DRINKS CONTAINING ALCOHOL DO YOU HAVE ON A TYPICAL DAY: PATIENT DOES NOT DRINK

## 2023-07-30 ASSESSMENT — PAIN - FUNCTIONAL ASSESSMENT: PAIN_FUNCTIONAL_ASSESSMENT: NONE - DENIES PAIN

## 2023-07-30 NOTE — DISCHARGE INSTRUCTIONS
Please keep your appointment for your G-tube and port placement tomorrow. We recommend that you contact your oncologist and your radiation oncologist for further evaluation regarding your symptoms.

## 2023-07-30 NOTE — ED PROVIDER NOTES
Nitza        Pt Name: Luis Mera  MRN: [id number]  [address] 1970  Date of evaluation: 7/30/2023  Provider: Morena Dickerson MD  Attending Provider: No att. providers found  PCP: Evi Mott MD  Note Started: 2:49 PM EDT 7/30/23    CHIEF COMPLAINT       Chief Complaint   Patient presents with    Nausea     Received 2 bags of fluid here yesterday, still feels dehydrated     Dizziness    Otalgia     Right ear        HISTORY OF PRESENT ILLNESS: 1 or more Elements   History From: the patient        Luis Mera is a 48 y.o. male with a past medical history of malignant neoplasm of the nose, reflux, and surgical history of cholecystectomy and appendectomy presenting with nausea, dehydration, and ear pain. Patient is undergoing radiation treatments and follows with Dr. Minerva Motta for oncology. He states that he has had significant throat irritation and is unable to tolerate p.o. intake. He cannot drink fluids or tolerate oral medications. He is scheduled for a PEG tube placement on 7/31. He was in the emergency room on 7/29 for severe dehydration. His lack of oral intake has made him feel nausea without actual vomiting. Patient is also experiencing ear pain in his right ear. Nursing Notes were all reviewed and agreed with or any disagreements were addressed in the HPI. REVIEW OF EXTERNAL NOTE :       None    REVIEW OF SYSTEMS :           Positives and Pertinent negatives as per HPI.      SURGICAL HISTORY     Past Surgical History:   Procedure Laterality Date    APPENDECTOMY      CHOLECYSTECTOMY      ELBOW SURGERY      INSERT PICC LINE Right 02/23/2023    removed March 2023    RHINECTOMY  2023    UPPER GASTROINTESTINAL ENDOSCOPY         CURRENTMEDICATIONS       Discharge Medication List as of 7/30/2023  7:14 PM        CONTINUE these medications which have NOT CHANGED    Details   oxyCODONE

## 2023-07-31 ENCOUNTER — APPOINTMENT (OUTPATIENT)
Dept: RADIATION ONCOLOGY | Age: 53
End: 2023-07-31
Payer: COMMERCIAL

## 2023-07-31 ENCOUNTER — HOSPITAL ENCOUNTER (OUTPATIENT)
Dept: INFUSION THERAPY | Age: 53
End: 2023-07-31

## 2023-07-31 ENCOUNTER — ANESTHESIA (OUTPATIENT)
Dept: OPERATING ROOM | Age: 53
End: 2023-07-31
Payer: COMMERCIAL

## 2023-07-31 ENCOUNTER — HOSPITAL ENCOUNTER (OUTPATIENT)
Age: 53
Setting detail: OUTPATIENT SURGERY
Discharge: HOME | DRG: 982 | End: 2023-07-31
Attending: SURGERY | Admitting: SURGERY
Payer: COMMERCIAL

## 2023-07-31 ENCOUNTER — APPOINTMENT (OUTPATIENT)
Dept: GENERAL RADIOLOGY | Age: 53
DRG: 982 | End: 2023-07-31
Attending: SURGERY
Payer: COMMERCIAL

## 2023-07-31 ENCOUNTER — APPOINTMENT (OUTPATIENT)
Dept: GENERAL RADIOLOGY | Age: 53
DRG: 982 | End: 2023-07-31
Payer: COMMERCIAL

## 2023-07-31 ENCOUNTER — HOSPITAL ENCOUNTER (INPATIENT)
Age: 53
LOS: 3 days | Discharge: HOME HEALTH | DRG: 982 | End: 2023-08-04
Attending: EMERGENCY MEDICINE
Payer: COMMERCIAL

## 2023-07-31 ENCOUNTER — ANESTHESIA EVENT (OUTPATIENT)
Dept: OPERATING ROOM | Age: 53
End: 2023-07-31
Payer: COMMERCIAL

## 2023-07-31 ENCOUNTER — TELEPHONE (OUTPATIENT)
Dept: ONCOLOGY | Age: 53
End: 2023-07-31

## 2023-07-31 VITALS
BODY MASS INDEX: 27.03 KG/M2 | WEIGHT: 222 LBS | OXYGEN SATURATION: 100 % | RESPIRATION RATE: 19 BRPM | HEIGHT: 76 IN | TEMPERATURE: 97.1 F | DIASTOLIC BLOOD PRESSURE: 86 MMHG | HEART RATE: 62 BPM | SYSTOLIC BLOOD PRESSURE: 135 MMHG

## 2023-07-31 DIAGNOSIS — C76.0 MALIGNANT NEOPLASM OF HEAD, FACE AND NECK (HCC): ICD-10-CM

## 2023-07-31 DIAGNOSIS — G89.18 POSTOPERATIVE ABDOMINAL PAIN: ICD-10-CM

## 2023-07-31 DIAGNOSIS — R11.2 INTRACTABLE NAUSEA AND VOMITING: Primary | ICD-10-CM

## 2023-07-31 DIAGNOSIS — R10.9 POSTOPERATIVE ABDOMINAL PAIN: ICD-10-CM

## 2023-07-31 LAB
ALBUMIN SERPL-MCNC: 4.4 G/DL (ref 3.5–5.2)
ALP SERPL-CCNC: 90 U/L (ref 40–129)
ALT SERPL-CCNC: 17 U/L (ref 0–40)
ANION GAP SERPL CALCULATED.3IONS-SCNC: 15 MMOL/L (ref 7–16)
AST SERPL-CCNC: 19 U/L (ref 0–39)
BILIRUB SERPL-MCNC: 0.7 MG/DL (ref 0–1.2)
BNP SERPL-MCNC: <36 PG/ML (ref 0–125)
BUN SERPL-MCNC: 14 MG/DL (ref 6–20)
CALCIUM SERPL-MCNC: 9.5 MG/DL (ref 8.6–10.2)
CHLORIDE SERPL-SCNC: 105 MMOL/L (ref 98–107)
CO2 SERPL-SCNC: 22 MMOL/L (ref 22–29)
CREAT SERPL-MCNC: 0.9 MG/DL (ref 0.7–1.2)
GFR SERPL CREATININE-BSD FRML MDRD: >60 ML/MIN/1.73M2
GLUCOSE SERPL-MCNC: 91 MG/DL (ref 74–99)
LACTATE BLDV-SCNC: 1.3 MMOL/L (ref 0.5–2.2)
LIPASE SERPL-CCNC: 26 U/L (ref 13–60)
MAGNESIUM SERPL-MCNC: 1.8 MG/DL (ref 1.6–2.6)
POTASSIUM SERPL-SCNC: 4.1 MMOL/L (ref 3.5–5)
PROT SERPL-MCNC: 7.4 G/DL (ref 6.4–8.3)
SODIUM SERPL-SCNC: 142 MMOL/L (ref 132–146)
TROPONIN I SERPL HS-MCNC: 12 NG/L (ref 0–11)

## 2023-07-31 PROCEDURE — 3700000000 HC ANESTHESIA ATTENDED CARE: Performed by: SURGERY

## 2023-07-31 PROCEDURE — 96375 TX/PRO/DX INJ NEW DRUG ADDON: CPT

## 2023-07-31 PROCEDURE — 96374 THER/PROPH/DIAG INJ IV PUSH: CPT

## 2023-07-31 PROCEDURE — 2580000003 HC RX 258: Performed by: EMERGENCY MEDICINE

## 2023-07-31 PROCEDURE — 2709999900 HC NON-CHARGEABLE SUPPLY: Performed by: SURGERY

## 2023-07-31 PROCEDURE — 77001 FLUOROGUIDE FOR VEIN DEVICE: CPT | Performed by: SURGERY

## 2023-07-31 PROCEDURE — 71045 X-RAY EXAM CHEST 1 VIEW: CPT

## 2023-07-31 PROCEDURE — 93005 ELECTROCARDIOGRAM TRACING: CPT | Performed by: EMERGENCY MEDICINE

## 2023-07-31 PROCEDURE — 3700000001 HC ADD 15 MINUTES (ANESTHESIA): Performed by: SURGERY

## 2023-07-31 PROCEDURE — 80053 COMPREHEN METABOLIC PANEL: CPT

## 2023-07-31 PROCEDURE — C1788 PORT, INDWELLING, IMP: HCPCS | Performed by: SURGERY

## 2023-07-31 PROCEDURE — 7100000011 HC PHASE II RECOVERY - ADDTL 15 MIN: Performed by: SURGERY

## 2023-07-31 PROCEDURE — 3600007511: Performed by: SURGERY

## 2023-07-31 PROCEDURE — 3E0G76Z INTRODUCTION OF NUTRITIONAL SUBSTANCE INTO UPPER GI, VIA NATURAL OR ARTIFICIAL OPENING: ICD-10-PCS | Performed by: SURGERY

## 2023-07-31 PROCEDURE — 85027 COMPLETE CBC AUTOMATED: CPT

## 2023-07-31 PROCEDURE — 2580000003 HC RX 258: Performed by: SURGERY

## 2023-07-31 PROCEDURE — 99285 EMERGENCY DEPT VISIT HI MDM: CPT

## 2023-07-31 PROCEDURE — 6360000002 HC RX W HCPCS: Performed by: SURGERY

## 2023-07-31 PROCEDURE — 99204 OFFICE O/P NEW MOD 45 MIN: CPT | Performed by: SURGERY

## 2023-07-31 PROCEDURE — 0JH60WZ INSERTION OF TOTALLY IMPLANTABLE VASCULAR ACCESS DEVICE INTO CHEST SUBCUTANEOUS TISSUE AND FASCIA, OPEN APPROACH: ICD-10-PCS | Performed by: SURGERY

## 2023-07-31 PROCEDURE — 83735 ASSAY OF MAGNESIUM: CPT

## 2023-07-31 PROCEDURE — 6360000002 HC RX W HCPCS: Performed by: ANESTHESIOLOGIST ASSISTANT

## 2023-07-31 PROCEDURE — 43246 EGD PLACE GASTROSTOMY TUBE: CPT | Performed by: SURGERY

## 2023-07-31 PROCEDURE — 6360000002 HC RX W HCPCS: Performed by: ANESTHESIOLOGY

## 2023-07-31 PROCEDURE — 7100000010 HC PHASE II RECOVERY - FIRST 15 MIN: Performed by: SURGERY

## 2023-07-31 PROCEDURE — C9113 INJ PANTOPRAZOLE SODIUM, VIA: HCPCS | Performed by: EMERGENCY MEDICINE

## 2023-07-31 PROCEDURE — 0DH63UZ INSERTION OF FEEDING DEVICE INTO STOMACH, PERCUTANEOUS APPROACH: ICD-10-PCS | Performed by: SURGERY

## 2023-07-31 PROCEDURE — 83880 ASSAY OF NATRIURETIC PEPTIDE: CPT

## 2023-07-31 PROCEDURE — A4217 STERILE WATER/SALINE, 500 ML: HCPCS | Performed by: SURGERY

## 2023-07-31 PROCEDURE — 2500000003 HC RX 250 WO HCPCS: Performed by: SURGERY

## 2023-07-31 PROCEDURE — 36561 INSERT TUNNELED CV CATH: CPT | Performed by: SURGERY

## 2023-07-31 PROCEDURE — 83605 ASSAY OF LACTIC ACID: CPT

## 2023-07-31 PROCEDURE — 84484 ASSAY OF TROPONIN QUANT: CPT

## 2023-07-31 PROCEDURE — 6360000002 HC RX W HCPCS: Performed by: EMERGENCY MEDICINE

## 2023-07-31 PROCEDURE — 83690 ASSAY OF LIPASE: CPT

## 2023-07-31 PROCEDURE — 02HV33Z INSERTION OF INFUSION DEVICE INTO SUPERIOR VENA CAVA, PERCUTANEOUS APPROACH: ICD-10-PCS | Performed by: SURGERY

## 2023-07-31 PROCEDURE — C1713 ANCHOR/SCREW BN/BN,TIS/BN: HCPCS | Performed by: SURGERY

## 2023-07-31 PROCEDURE — 3600007501: Performed by: SURGERY

## 2023-07-31 DEVICE — PORT INFUS 8FR PWR INJ CT FOR VASC ACCS CATH: Type: IMPLANTABLE DEVICE | Site: CHEST  WALL | Status: FUNCTIONAL

## 2023-07-31 RX ORDER — SODIUM CHLORIDE 9 MG/ML
INJECTION, SOLUTION INTRAVENOUS CONTINUOUS
Status: DISCONTINUED | OUTPATIENT
Start: 2023-07-31 | End: 2023-07-31 | Stop reason: HOSPADM

## 2023-07-31 RX ORDER — PANTOPRAZOLE SODIUM 40 MG/10ML
40 INJECTION, POWDER, LYOPHILIZED, FOR SOLUTION INTRAVENOUS ONCE
Status: COMPLETED | OUTPATIENT
Start: 2023-07-31 | End: 2023-07-31

## 2023-07-31 RX ORDER — PROPOFOL 10 MG/ML
INJECTION, EMULSION INTRAVENOUS PRN
Status: DISCONTINUED | OUTPATIENT
Start: 2023-07-31 | End: 2023-07-31 | Stop reason: SDUPTHER

## 2023-07-31 RX ORDER — FENTANYL CITRATE 50 UG/ML
INJECTION, SOLUTION INTRAMUSCULAR; INTRAVENOUS
Status: DISCONTINUED
Start: 2023-07-31 | End: 2023-07-31 | Stop reason: HOSPADM

## 2023-07-31 RX ORDER — SODIUM CHLORIDE 9 MG/ML
INJECTION, SOLUTION INTRAVENOUS PRN
Status: DISCONTINUED | OUTPATIENT
Start: 2023-07-31 | End: 2023-07-31 | Stop reason: HOSPADM

## 2023-07-31 RX ORDER — MIDAZOLAM HYDROCHLORIDE 1 MG/ML
INJECTION INTRAMUSCULAR; INTRAVENOUS PRN
Status: DISCONTINUED | OUTPATIENT
Start: 2023-07-31 | End: 2023-07-31 | Stop reason: SDUPTHER

## 2023-07-31 RX ORDER — FENTANYL CITRATE 50 UG/ML
50 INJECTION, SOLUTION INTRAMUSCULAR; INTRAVENOUS EVERY 5 MIN PRN
Status: DISCONTINUED | OUTPATIENT
Start: 2023-07-31 | End: 2023-07-31 | Stop reason: HOSPADM

## 2023-07-31 RX ORDER — LIDOCAINE HYDROCHLORIDE 20 MG/ML
INJECTION, SOLUTION INTRAVENOUS PRN
Status: DISCONTINUED | OUTPATIENT
Start: 2023-07-31 | End: 2023-07-31 | Stop reason: SDUPTHER

## 2023-07-31 RX ORDER — PROPOFOL 10 MG/ML
INJECTION, EMULSION INTRAVENOUS CONTINUOUS PRN
Status: DISCONTINUED | OUTPATIENT
Start: 2023-07-31 | End: 2023-07-31 | Stop reason: SDUPTHER

## 2023-07-31 RX ORDER — LIDOCAINE HYDROCHLORIDE AND EPINEPHRINE 10; 10 MG/ML; UG/ML
INJECTION, SOLUTION INFILTRATION; PERINEURAL PRN
Status: DISCONTINUED | OUTPATIENT
Start: 2023-07-31 | End: 2023-07-31 | Stop reason: ALTCHOICE

## 2023-07-31 RX ORDER — SODIUM CHLORIDE, SODIUM LACTATE, POTASSIUM CHLORIDE, CALCIUM CHLORIDE 600; 310; 30; 20 MG/100ML; MG/100ML; MG/100ML; MG/100ML
INJECTION, SOLUTION INTRAVENOUS CONTINUOUS
Status: DISCONTINUED | OUTPATIENT
Start: 2023-07-31 | End: 2023-08-04 | Stop reason: HOSPADM

## 2023-07-31 RX ORDER — SODIUM CHLORIDE 0.9 % (FLUSH) 0.9 %
5-40 SYRINGE (ML) INJECTION EVERY 12 HOURS SCHEDULED
Status: DISCONTINUED | OUTPATIENT
Start: 2023-07-31 | End: 2023-07-31 | Stop reason: HOSPADM

## 2023-07-31 RX ORDER — ONDANSETRON 2 MG/ML
4 INJECTION INTRAMUSCULAR; INTRAVENOUS ONCE
Status: COMPLETED | OUTPATIENT
Start: 2023-07-31 | End: 2023-07-31

## 2023-07-31 RX ORDER — SODIUM CHLORIDE 0.9 % (FLUSH) 0.9 %
5-40 SYRINGE (ML) INJECTION PRN
Status: DISCONTINUED | OUTPATIENT
Start: 2023-07-31 | End: 2023-07-31 | Stop reason: HOSPADM

## 2023-07-31 RX ORDER — FENTANYL CITRATE 50 UG/ML
50 INJECTION, SOLUTION INTRAMUSCULAR; INTRAVENOUS
Status: DISCONTINUED | OUTPATIENT
Start: 2023-07-31 | End: 2023-08-01 | Stop reason: ALTCHOICE

## 2023-07-31 RX ORDER — HEPARIN 100 UNIT/ML
SYRINGE INTRAVENOUS PRN
Status: DISCONTINUED | OUTPATIENT
Start: 2023-07-31 | End: 2023-07-31 | Stop reason: ALTCHOICE

## 2023-07-31 RX ORDER — FENTANYL CITRATE 50 UG/ML
INJECTION, SOLUTION INTRAMUSCULAR; INTRAVENOUS PRN
Status: DISCONTINUED | OUTPATIENT
Start: 2023-07-31 | End: 2023-07-31 | Stop reason: SDUPTHER

## 2023-07-31 RX ADMIN — SODIUM CHLORIDE: 9 INJECTION, SOLUTION INTRAVENOUS at 11:53

## 2023-07-31 RX ADMIN — PROPOFOL 30 MG: 10 INJECTION, EMULSION INTRAVENOUS at 11:39

## 2023-07-31 RX ADMIN — PANTOPRAZOLE SODIUM 40 MG: 40 INJECTION, POWDER, FOR SOLUTION INTRAVENOUS at 22:39

## 2023-07-31 RX ADMIN — FENTANYL CITRATE 25 MCG: 50 INJECTION, SOLUTION INTRAMUSCULAR; INTRAVENOUS at 11:32

## 2023-07-31 RX ADMIN — SODIUM CHLORIDE, POTASSIUM CHLORIDE, SODIUM LACTATE AND CALCIUM CHLORIDE: 600; 310; 30; 20 INJECTION, SOLUTION INTRAVENOUS at 22:44

## 2023-07-31 RX ADMIN — FENTANYL CITRATE 50 MCG: 50 INJECTION INTRAMUSCULAR; INTRAVENOUS at 14:04

## 2023-07-31 RX ADMIN — FENTANYL CITRATE 25 MCG: 50 INJECTION, SOLUTION INTRAMUSCULAR; INTRAVENOUS at 11:10

## 2023-07-31 RX ADMIN — FENTANYL CITRATE 25 MCG: 50 INJECTION, SOLUTION INTRAMUSCULAR; INTRAVENOUS at 11:05

## 2023-07-31 RX ADMIN — FENTANYL CITRATE 25 MCG: 50 INJECTION, SOLUTION INTRAMUSCULAR; INTRAVENOUS at 10:50

## 2023-07-31 RX ADMIN — ONDANSETRON 4 MG: 2 INJECTION INTRAMUSCULAR; INTRAVENOUS at 22:39

## 2023-07-31 RX ADMIN — CEFAZOLIN 2000 MG: 2 INJECTION, POWDER, FOR SOLUTION INTRAMUSCULAR; INTRAVENOUS at 10:56

## 2023-07-31 RX ADMIN — SODIUM CHLORIDE: 9 INJECTION, SOLUTION INTRAVENOUS at 09:10

## 2023-07-31 RX ADMIN — LIDOCAINE HYDROCHLORIDE 60 MG: 20 INJECTION, SOLUTION INTRAVENOUS at 10:52

## 2023-07-31 RX ADMIN — PROPOFOL 90 MCG/KG/MIN: 10 INJECTION, EMULSION INTRAVENOUS at 10:55

## 2023-07-31 RX ADMIN — MIDAZOLAM 2 MG: 1 INJECTION INTRAMUSCULAR; INTRAVENOUS at 10:48

## 2023-07-31 RX ADMIN — PROPOFOL 30 MG: 10 INJECTION, EMULSION INTRAVENOUS at 10:52

## 2023-07-31 RX ADMIN — PROPOFOL 30 MG: 10 INJECTION, EMULSION INTRAVENOUS at 11:41

## 2023-07-31 ASSESSMENT — ENCOUNTER SYMPTOMS
BACK PAIN: 0
COUGH: 0
ABDOMINAL PAIN: 0
CHEST TIGHTNESS: 0
SHORTNESS OF BREATH: 0
NAUSEA: 1

## 2023-07-31 ASSESSMENT — PAIN DESCRIPTION - LOCATION
LOCATION: ABDOMEN
LOCATION: ABDOMEN

## 2023-07-31 ASSESSMENT — PAIN SCALES - GENERAL
PAINLEVEL_OUTOF10: 10
PAINLEVEL_OUTOF10: 0
PAINLEVEL_OUTOF10: 8
PAINLEVEL_OUTOF10: 0
PAINLEVEL_OUTOF10: 0

## 2023-07-31 ASSESSMENT — PAIN DESCRIPTION - ORIENTATION: ORIENTATION: LEFT

## 2023-07-31 ASSESSMENT — PAIN DESCRIPTION - FREQUENCY: FREQUENCY: CONTINUOUS

## 2023-07-31 ASSESSMENT — PAIN DESCRIPTION - ONSET: ONSET: ON-GOING

## 2023-07-31 ASSESSMENT — PAIN - FUNCTIONAL ASSESSMENT
PAIN_FUNCTIONAL_ASSESSMENT: 0-10
PAIN_FUNCTIONAL_ASSESSMENT: 0-10

## 2023-07-31 ASSESSMENT — PAIN DESCRIPTION - DESCRIPTORS
DESCRIPTORS: ACHING;DISCOMFORT
DESCRIPTORS: BURNING
DESCRIPTORS: ACHING;BURNING;CRAMPING

## 2023-07-31 ASSESSMENT — PAIN DESCRIPTION - PAIN TYPE: TYPE: SURGICAL PAIN

## 2023-07-31 NOTE — DISCHARGE INSTRUCTIONS
Home Care   Keep the incision area clean and dry   Okay to take a shower, do not bathe  Place ice on incisions to decrease the pain and bruising. Diet: through PEG as tolerated  Physical Activity   Walk frequently to prevent blood clots in the legs, but do not do anything that causes pain in the incisions. Breath deeply every hour to prevent pneumonia. No heavy lifting over 10lbs for 4-6wks. Work   Okay to return to work with light duty next week when your pain is controlled   Avoid Heavy lifting while at work   Await follow-up appointment in 2 weeks for full work clearance   Medications   Restart blood thinners in 24 hours if no sign of bleeding   Take Ibuprofen for moderate pain. Use the Oxycodone IR for more severe pain. Do not take Tylenol due to underlying liver disease   No driving while taking prescription pain medication   Follow-up   Schedule a follow-up appointment with Dr. Bonnielee Gitelman on Wednesday October 30th, call [phone] to schedule an appointment. Call Your Doctor If Any of the Following Occurs   Signs of infection, including fever and chills   Redness, swelling, increasing pain, excessive bleeding, or discharge at the incision site   Cough, shortness of breath, chest pain   Increased abdominal pain   Nausea and/or vomiting that does not resolve off narcotics. Pain, burning, urgency or frequency of urination, or blood in the urine   Pain and/or swelling in your feet, calves, or legs   Dark urine, light stools, or evidence of jaundice (yellowing of the skin or eyes). In case of an emergency, CALL [phone] immediately.

## 2023-07-31 NOTE — TELEPHONE ENCOUNTER
Received phone call from inpatient RD requesting update on pt's EN order. PACU had requested RD consult for EN eval. Spoke w/ PACU nurse, updated to pt's EN order through [facility]. Pt to utilize 6 cartons TwoCal HN daily. Confirmed w/ [facility] that pt is to be seen for TF teaching tomorrow, 8/1/23. Will continue to follow.   Electronically signed by Dilia Olivo MS, RD, LD on 7/31/2023 at 3:58 PM

## 2023-07-31 NOTE — H&P
GENERAL SURGERY  HISTORY AND PHYSICAL  7/31/2023      HPI  Reese Benson is a 48 y.o. male who presents for port and PEG placement s/p diagnosis squamous cell carcinoma of nose/nasal septum. He was found to have invasive poorly differentiated squamous cell carcinoma grade 3 and underwent total rhinectomy where growth was found on glabella and maxillary crest. He is currently receiving radiation and cisplatin. States he has been more fatigued than normal, has not been able to have PO intake for past 5 days, feeling nauseated and has had dry heaves. He has not been able to eat due to sores in his mouth. Medical history significant for GERD and cancer. Past surgical history of appendectomy, cholecystectomy. Denies daily anticoagulants. Past Medical History:   Diagnosis Date    Acid reflux     Cancer ([address]) 2023    SCC tip of nose       Past Surgical History:   Procedure Laterality Date    APPENDECTOMY      CHOLECYSTECTOMY      ELBOW SURGERY      INSERT PICC LINE Right 02/23/2023    removed March 2023    RHINECTOMY  2023    UPPER GASTROINTESTINAL ENDOSCOPY         Medications Prior to Admission:    Prior to Admission medications    Medication Sig Start Date End Date Taking? Authorizing Provider   saline nasal gel (AYR) GEL by Nasal route daily as needed for Congestion Apply to nares daily as needed   Yes Historical Provider, MD   oxyCODONE (ROXICODONE) 5 MG immediate release tablet Take 1 tablet by mouth every 6 hours as needed for Pain for up to 5 days.  Max Daily Amount: 20 mg 7/29/23 8/3/23  Kael Alert, PAFARTUN   lidocaine viscous hcl (XYLOCAINE) 2 % SOLN solution Take 10 mLs by mouth as needed for Irritation 7/29/23   Kael AlertBROOKLYN   DPH-Lido-AlHydr-MgHydr-Simeth (MAGIC MOUTHWASH) SUSP Swish and spit 15 mLs 4 times daily as needed for Irritation 7/25/23   Charles Tobias MD   Omeprazole Magnesium (PRILOSEC) 10 MG PACK Take 10 mg by mouth every morning    Historical Provider, MD   ondansetron (ZOFRAN) 8 BILITOT, BILIDIR, AST, ALT, ALKPHOS, PROT, LABALBU in the last 72 hours. No results for input(s): LACTATE in the last 72 hours. No results for input(s): INR, PTT in the last 72 hours. Invalid input(s): PT    RADIOLOGY    No results found.       ASSESSMENT:  48 y.o. 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 Cathy Stephens, DO  General Surgery PGY-1  7/31/23 at 9:04 AM EDT

## 2023-07-31 NOTE — PROGRESS NOTES
instructed patient and family on peg tube usage and instructions for tonight, home care will see patient tomm.

## 2023-07-31 NOTE — OP NOTE
Patient's Name/Date of Birth: Zita Merida / 1970 (18 y.o.)    Date: July 31, 2023     Pre-operative Diagnosis: squamous cell carcinoma of the nose, dysphagia from chemo-radiation  Post-operative Diagnosis: Same     Procedure:   1. Right subclavian mediport (8Fr Powerport)  2. Esophagogastroduodenoscopy with percutaneous endoscopic gastrostomy tube placement    Anesthesia: [facility] (refer to Anesthesia record)  Surgeon: Dr. Rey Santana  Assistant: Denton Orozco D.O    Estimated Blood Loss: 5 ml  Complications: none   Specimens: were not obtained     PROCEDURE: 2 grams of Ancef were given. The patient was placed in the supine position with a towel roll placed in between the patients scapulae. The right and left anterior chest wall and neck were appropriately prepped and draped with sterile OR towels and sterile OR drapes. Lise Knows was applied. A marking pen was used to krystyna the desired point of cannulation using the manubrium and the angle of the clavicle as my landmarks. The surrounding skin, periosteum and desired location of the port were anesthetized with 1% lidocaine with epinephrine. A cook needle was then used to cannulate the  right subclavian vein. Venous blood dayan back freely. A wire passed through the needle. It was noted to pass with ease and no ectopy was seen on the heart monitor. Fluoroscopy was then used to confirm the placement of the wire. It was noted to be in the superior vena cava. At this time, the wire was clamped to the side drape in order to secure it in place   A 15 blade was then used to make a transverse incision in the location of port placement on the anterior chest wall. This was located directly over our point of wire entrance into the skin. Electrocautery was then used to dissect down through the subcutaneous tissue. Blunt dissection was done in order to create a pocket under the skin, large enough for the Mediport.  The Mediport catheter was then cut the desired

## 2023-07-31 NOTE — ANESTHESIA POSTPROCEDURE EVALUATION
Department of Anesthesiology  Postprocedure Note    Patient: Lloyd Jeff  MRN: [id number]  YOB: 1970  Date of evaluation: 7/31/2023      Procedure Summary     Date: 07/31/23 Room / Location: [facility] OR 04 / [facility]    Anesthesia Start: 6335 Anesthesia Stop: 4605    Procedures:       PORT INSERTION      EGD PEG TUBE PLACEMENT Diagnosis:       Malignant neoplasm of head, face, and neck ([address])      (Malignant neoplasm of head, face, and neck ([address]) [C76.0])    Surgeons: Nando Olea MD Responsible Provider: Caridad Oconnor MD    Anesthesia Type: MAC ASA Status: 3          Anesthesia Type: No value filed.     Pauline Phase I: Pauline Score: 10    Pauline Phase II: Pauline Score: 10      Anesthesia Post Evaluation    Patient location during evaluation: PACU  Patient participation: complete - patient participated  Level of consciousness: awake and alert  Airway patency: patent  Nausea & Vomiting: no nausea and no vomiting  Complications: no  Cardiovascular status: blood pressure returned to baseline and hemodynamically stable  Respiratory status: acceptable and spontaneous ventilation  Hydration status: euvolemic  Multimodal analgesia pain management approach  Pain management: adequate

## 2023-08-01 ENCOUNTER — APPOINTMENT (OUTPATIENT)
Dept: CT IMAGING | Age: 53
DRG: 982 | End: 2023-08-01
Payer: COMMERCIAL

## 2023-08-01 ENCOUNTER — HOSPITAL ENCOUNTER (OUTPATIENT)
Dept: INFUSION THERAPY | Age: 53
Discharge: HOME | End: 2023-08-01

## 2023-08-01 DIAGNOSIS — C76.0 MALIGNANT NEOPLASM OF HEAD, FACE AND NECK (HCC): Primary | ICD-10-CM

## 2023-08-01 PROBLEM — R11.2 INTRACTABLE NAUSEA AND VOMITING: Status: ACTIVE | Noted: 2023-08-01

## 2023-08-01 LAB
BASOPHILS # BLD: 0.04 K/UL (ref 0–0.2)
BASOPHILS NFR BLD: 1 % (ref 0–2)
EKG ATRIAL RATE: 102 BPM
EKG P AXIS: 73 DEGREES
EKG P-R INTERVAL: 138 MS
EKG Q-T INTERVAL: 356 MS
EKG QRS DURATION: 82 MS
EKG QTC CALCULATION (BAZETT): 463 MS
EKG R AXIS: 65 DEGREES
EKG T AXIS: 42 DEGREES
EKG VENTRICULAR RATE: 102 BPM
EOSINOPHIL # BLD: 0.19 K/UL (ref 0.05–0.5)
EOSINOPHILS RELATIVE PERCENT: 3 % (ref 0–6)
ERYTHROCYTE [DISTWIDTH] IN BLOOD BY AUTOMATED COUNT: 13.9 % (ref 11.5–15)
HCT VFR BLD AUTO: 43.4 % (ref 37–54)
HGB BLD-MCNC: 14.8 G/DL (ref 12.5–16.5)
IMM GRANULOCYTES # BLD AUTO: 0.03 K/UL (ref 0–0.58)
IMM GRANULOCYTES NFR BLD: 1 % (ref 0–5)
LYMPHOCYTES NFR BLD: 0.45 K/UL (ref 1.5–4)
LYMPHOCYTES RELATIVE PERCENT: 8 % (ref 20–42)
MCH RBC QN AUTO: 30.8 PG (ref 26–35)
MCHC RBC AUTO-ENTMCNC: 34.1 G/DL (ref 32–34.5)
MCV RBC AUTO: 90.4 FL (ref 80–99.9)
MONOCYTES NFR BLD: 0.59 K/UL (ref 0.1–0.95)
MONOCYTES NFR BLD: 10 % (ref 2–12)
NEUTROPHILS NFR BLD: 78 % (ref 43–80)
NEUTS SEG NFR BLD: 4.7 K/UL (ref 1.8–7.3)
PLATELET # BLD AUTO: 221 K/UL (ref 130–450)
PMV BLD AUTO: 9.6 FL (ref 7–12)
RBC # BLD AUTO: 4.8 M/UL (ref 3.8–5.8)
RBC # BLD: ABNORMAL 10*6/UL
RBC # BLD: ABNORMAL 10*6/UL
TROPONIN I SERPL HS-MCNC: 10 NG/L (ref 0–11)
TROPONIN I SERPL HS-MCNC: 11 NG/L (ref 0–11)
WBC OTHER # BLD: 6 K/UL (ref 4.5–11.5)

## 2023-08-01 PROCEDURE — 74177 CT ABD & PELVIS W/CONTRAST: CPT

## 2023-08-01 PROCEDURE — 84484 ASSAY OF TROPONIN QUANT: CPT

## 2023-08-01 PROCEDURE — 2580000003 HC RX 258: Performed by: EMERGENCY MEDICINE

## 2023-08-01 PROCEDURE — G0378 HOSPITAL OBSERVATION PER HR: HCPCS

## 2023-08-01 PROCEDURE — 2500000003 HC RX 250 WO HCPCS: Performed by: INTERNAL MEDICINE

## 2023-08-01 PROCEDURE — 99222 1ST HOSP IP/OBS MODERATE 55: CPT | Performed by: SURGERY

## 2023-08-01 PROCEDURE — 96375 TX/PRO/DX INJ NEW DRUG ADDON: CPT

## 2023-08-01 PROCEDURE — 6360000002 HC RX W HCPCS: Performed by: NURSE PRACTITIONER

## 2023-08-01 PROCEDURE — 93010 ELECTROCARDIOGRAM REPORT: CPT | Performed by: INTERNAL MEDICINE

## 2023-08-01 PROCEDURE — 6360000004 HC RX CONTRAST MEDICATION: Performed by: RADIOLOGY

## 2023-08-01 PROCEDURE — 36415 COLL VENOUS BLD VENIPUNCTURE: CPT

## 2023-08-01 PROCEDURE — 6360000002 HC RX W HCPCS: Performed by: EMERGENCY MEDICINE

## 2023-08-01 PROCEDURE — 1200000000 HC SEMI PRIVATE

## 2023-08-01 PROCEDURE — 96372 THER/PROPH/DIAG INJ SC/IM: CPT

## 2023-08-01 RX ORDER — ACETAMINOPHEN 650 MG/1
650 SUPPOSITORY RECTAL EVERY 6 HOURS PRN
Status: DISCONTINUED | OUTPATIENT
Start: 2023-08-01 | End: 2023-08-04 | Stop reason: HOSPADM

## 2023-08-01 RX ORDER — SODIUM CHLORIDE 0.9 % (FLUSH) 0.9 %
5-40 SYRINGE (ML) INJECTION PRN
Status: CANCELLED | OUTPATIENT
Start: 2023-08-01

## 2023-08-01 RX ORDER — ONDANSETRON 2 MG/ML
4 INJECTION INTRAMUSCULAR; INTRAVENOUS EVERY 6 HOURS PRN
Status: DISCONTINUED | OUTPATIENT
Start: 2023-08-01 | End: 2023-08-04 | Stop reason: HOSPADM

## 2023-08-01 RX ORDER — SODIUM CHLORIDE 0.9 % (FLUSH) 0.9 %
5-40 SYRINGE (ML) INJECTION PRN
Status: DISCONTINUED | OUTPATIENT
Start: 2023-08-01 | End: 2023-08-02 | Stop reason: HOSPADM

## 2023-08-01 RX ORDER — HEPARIN 100 UNIT/ML
500 SYRINGE INTRAVENOUS PRN
Status: CANCELLED | OUTPATIENT
Start: 2023-08-01

## 2023-08-01 RX ORDER — BISACODYL 10 MG
10 SUPPOSITORY, RECTAL RECTAL DAILY PRN
Status: DISCONTINUED | OUTPATIENT
Start: 2023-08-01 | End: 2023-08-04 | Stop reason: HOSPADM

## 2023-08-01 RX ORDER — HYDROMORPHONE HYDROCHLORIDE 1 MG/ML
0.25 INJECTION, SOLUTION INTRAMUSCULAR; INTRAVENOUS; SUBCUTANEOUS EVERY 4 HOURS PRN
Status: DISCONTINUED | OUTPATIENT
Start: 2023-08-01 | End: 2023-08-02

## 2023-08-01 RX ORDER — SODIUM CHLORIDE 9 MG/ML
25 INJECTION, SOLUTION INTRAVENOUS PRN
OUTPATIENT
Start: 2023-08-01

## 2023-08-01 RX ORDER — ACETAMINOPHEN 325 MG/1
650 TABLET ORAL EVERY 6 HOURS PRN
Status: DISCONTINUED | OUTPATIENT
Start: 2023-08-01 | End: 2023-08-01 | Stop reason: SDUPTHER

## 2023-08-01 RX ORDER — SODIUM CHLORIDE 0.9 % (FLUSH) 0.9 %
5-40 SYRINGE (ML) INJECTION EVERY 12 HOURS SCHEDULED
Status: DISCONTINUED | OUTPATIENT
Start: 2023-08-01 | End: 2023-08-04 | Stop reason: HOSPADM

## 2023-08-01 RX ORDER — SODIUM CHLORIDE 9 MG/ML
25 INJECTION, SOLUTION INTRAVENOUS PRN
Status: CANCELLED | OUTPATIENT
Start: 2023-08-01

## 2023-08-01 RX ORDER — ONDANSETRON 4 MG/1
4 TABLET, ORALLY DISINTEGRATING ORAL EVERY 8 HOURS PRN
Status: DISCONTINUED | OUTPATIENT
Start: 2023-08-01 | End: 2023-08-04 | Stop reason: HOSPADM

## 2023-08-01 RX ORDER — DIPHENHYDRAMINE HYDROCHLORIDE 50 MG/ML
12.5 INJECTION INTRAMUSCULAR; INTRAVENOUS ONCE
Status: COMPLETED | OUTPATIENT
Start: 2023-08-01 | End: 2023-08-01

## 2023-08-01 RX ORDER — SODIUM CHLORIDE 0.9 % (FLUSH) 0.9 %
5-40 SYRINGE (ML) INJECTION PRN
Status: DISCONTINUED | OUTPATIENT
Start: 2023-08-01 | End: 2023-08-04 | Stop reason: HOSPADM

## 2023-08-01 RX ORDER — METOCLOPRAMIDE HYDROCHLORIDE 5 MG/ML
5 INJECTION INTRAMUSCULAR; INTRAVENOUS ONCE
Status: COMPLETED | OUTPATIENT
Start: 2023-08-01 | End: 2023-08-01

## 2023-08-01 RX ORDER — HEPARIN 100 UNIT/ML
500 SYRINGE INTRAVENOUS PRN
Status: DISCONTINUED | OUTPATIENT
Start: 2023-08-01 | End: 2023-08-02 | Stop reason: HOSPADM

## 2023-08-01 RX ORDER — ACETAMINOPHEN 160 MG/5ML
650 SOLUTION ORAL EVERY 6 HOURS PRN
Status: DISCONTINUED | OUTPATIENT
Start: 2023-08-01 | End: 2023-08-04 | Stop reason: HOSPADM

## 2023-08-01 RX ORDER — ENOXAPARIN SODIUM 100 MG/ML
30 INJECTION SUBCUTANEOUS 2 TIMES DAILY
Status: DISCONTINUED | OUTPATIENT
Start: 2023-08-01 | End: 2023-08-04 | Stop reason: HOSPADM

## 2023-08-01 RX ORDER — SODIUM CHLORIDE 9 MG/ML
INJECTION, SOLUTION INTRAVENOUS PRN
Status: DISCONTINUED | OUTPATIENT
Start: 2023-08-01 | End: 2023-08-04 | Stop reason: HOSPADM

## 2023-08-01 RX ADMIN — FENTANYL CITRATE 50 MCG: 50 INJECTION, SOLUTION INTRAMUSCULAR; INTRAVENOUS at 05:10

## 2023-08-01 RX ADMIN — METOCLOPRAMIDE 5 MG: 5 INJECTION, SOLUTION INTRAMUSCULAR; INTRAVENOUS at 05:10

## 2023-08-01 RX ADMIN — HYDROMORPHONE HYDROCHLORIDE 0.25 MG: 1 INJECTION, SOLUTION INTRAMUSCULAR; INTRAVENOUS; SUBCUTANEOUS at 22:08

## 2023-08-01 RX ADMIN — SODIUM CHLORIDE, POTASSIUM CHLORIDE, SODIUM LACTATE AND CALCIUM CHLORIDE: 600; 310; 30; 20 INJECTION, SOLUTION INTRAVENOUS at 09:27

## 2023-08-01 RX ADMIN — IOPAMIDOL 75 ML: 755 INJECTION, SOLUTION INTRAVENOUS at 01:45

## 2023-08-01 RX ADMIN — DIPHENHYDRAMINE HYDROCHLORIDE 12.5 MG: 50 INJECTION, SOLUTION INTRAMUSCULAR; INTRAVENOUS at 05:09

## 2023-08-01 RX ADMIN — SODIUM CHLORIDE, POTASSIUM CHLORIDE, SODIUM LACTATE AND CALCIUM CHLORIDE: 600; 310; 30; 20 INJECTION, SOLUTION INTRAVENOUS at 18:50

## 2023-08-01 RX ADMIN — ENOXAPARIN SODIUM 30 MG: 100 INJECTION SUBCUTANEOUS at 20:41

## 2023-08-01 ASSESSMENT — ENCOUNTER SYMPTOMS
ABDOMINAL PAIN: 0
BACK PAIN: 0
COUGH: 0
CHEST TIGHTNESS: 0
SHORTNESS OF BREATH: 0
VOMITING: 1
NAUSEA: 1

## 2023-08-01 ASSESSMENT — PAIN DESCRIPTION - LOCATION: LOCATION: ABDOMEN;OTHER (COMMENT)

## 2023-08-01 ASSESSMENT — PAIN SCALES - GENERAL: PAINLEVEL_OUTOF10: 8

## 2023-08-01 ASSESSMENT — PAIN DESCRIPTION - DESCRIPTORS: DESCRIPTORS: ACHING;DISCOMFORT

## 2023-08-01 NOTE — CONSULTS
GENERAL SURGERY  CONSULT NOTE  8/1/2023    Physician Consulted: Dr. Radha Broussard  Reason for Consult: s/p PEG and port placement      HPI  Deo Bowman is a 48 y.o. male who presents to the ED due to dehydration. He was seen in the OR yesterday with Dr. Radha Broussard for port and PEG placement s/p diagnosis squamous cell carcinoma of nose/nasal septum. He was found to have invasive poorly differentiated squamous cell carcinoma grade 3 and underwent total rhinectomy where growth was found on glabella and maxillary crest. He is currently receiving radiation and cisplatin. States he has been more fatigued than normal, has not been able to have PO intake for past 5 days, feeling nauseated and has had dry heaves. He has not been able to eat due to sores in his mouth. After surgery, dietary was consulted to instruct the patient and his family on how to use PEG. He was able to tolerate ensure. However, this morning he had more episodes of emesis. Medical history significant for GERD and cancer. Past surgical history of appendectomy, cholecystectomy, mediport placement, PEG. Denies daily anticoagulants. On exam, Mediport incision is C/D/I without erythema or swelling present. Abdomen is soft, nontender to palpation, PEG tube is present, site without erythema or induration. Past Medical History:   Diagnosis Date    Acid reflux     Cancer ([address]) 2023    SCC tip of nose       Past Surgical History:   Procedure Laterality Date    APPENDECTOMY      CHOLECYSTECTOMY      ELBOW SURGERY      INSERT PICC LINE Right 02/23/2023    removed March 2023    RHINECTOMY  2023    UPPER GASTROINTESTINAL ENDOSCOPY         Medications Prior to Admission:    Prior to Admission medications    Medication Sig Start Date End Date Taking? Authorizing Provider   oxyCODONE (ROXICODONE) 5 MG immediate release tablet Take 1 tablet by mouth every 6 hours as needed for Pain for up to 5 days.  Max Daily Amount: 20 mg 7/29/23 8/3/23  Columba Hilton PA-C

## 2023-08-01 NOTE — PROGRESS NOTES
Patient's wife stopped in clinic stating that patient is in ER and is admitted waiting on bed. Patient is vomiting and feels it is from mucous from radiation. Per Dr. Angeles Marin, wife to see if physician will consult Dr Angeles Marin, and see if radiation will still transport patient for [place].  Patient's wife states understanding

## 2023-08-01 NOTE — ED NOTES
Report called to floor and patient sent to room 8421b via transport     Kishan Haywood RN  08/01/23 7170

## 2023-08-02 ENCOUNTER — HOSPITAL ENCOUNTER (OUTPATIENT)
Dept: RADIATION ONCOLOGY | Age: 53
Discharge: HOME | End: 2023-08-02

## 2023-08-02 PROBLEM — E44.0 MODERATE PROTEIN-CALORIE MALNUTRITION (HCC): Chronic | Status: ACTIVE | Noted: 2023-08-02

## 2023-08-02 LAB
ANION GAP SERPL CALCULATED.3IONS-SCNC: 15 MMOL/L (ref 7–16)
BASOPHILS # BLD: 0.03 K/UL (ref 0–0.2)
BASOPHILS NFR BLD: 1 % (ref 0–2)
BUN SERPL-MCNC: 11 MG/DL (ref 6–20)
CALCIUM SERPL-MCNC: 8.6 MG/DL (ref 8.6–10.2)
CHLORIDE SERPL-SCNC: 105 MMOL/L (ref 98–107)
CO2 SERPL-SCNC: 22 MMOL/L (ref 22–29)
CREAT SERPL-MCNC: 0.9 MG/DL (ref 0.7–1.2)
EOSINOPHIL # BLD: 0.09 K/UL (ref 0.05–0.5)
EOSINOPHILS RELATIVE PERCENT: 3 % (ref 0–6)
ERYTHROCYTE [DISTWIDTH] IN BLOOD BY AUTOMATED COUNT: 13.8 % (ref 11.5–15)
GFR SERPL CREATININE-BSD FRML MDRD: >60 ML/MIN/1.73M2
GLUCOSE SERPL-MCNC: 79 MG/DL (ref 74–99)
HCT VFR BLD AUTO: 38.2 % (ref 37–54)
HGB BLD-MCNC: 12.5 G/DL (ref 12.5–16.5)
LYMPHOCYTES NFR BLD: 0.35 K/UL (ref 1.5–4)
LYMPHOCYTES RELATIVE PERCENT: 10 % (ref 20–42)
MCH RBC QN AUTO: 30 PG (ref 26–35)
MCHC RBC AUTO-ENTMCNC: 32.7 G/DL (ref 32–34.5)
MCV RBC AUTO: 91.8 FL (ref 80–99.9)
MONOCYTES NFR BLD: 0.15 K/UL (ref 0.1–0.95)
MONOCYTES NFR BLD: 4 % (ref 2–12)
NEUTROPHILS NFR BLD: 82 % (ref 43–80)
NEUTS SEG NFR BLD: 2.78 K/UL (ref 1.8–7.3)
PLATELET # BLD AUTO: 186 K/UL (ref 130–450)
PMV BLD AUTO: 9.8 FL (ref 7–12)
POTASSIUM SERPL-SCNC: 3.7 MMOL/L (ref 3.5–5)
RBC # BLD AUTO: 4.16 M/UL (ref 3.8–5.8)
SODIUM SERPL-SCNC: 142 MMOL/L (ref 132–146)
WBC OTHER # BLD: 3.4 K/UL (ref 4.5–11.5)

## 2023-08-02 PROCEDURE — 1200000000 HC SEMI PRIVATE

## 2023-08-02 PROCEDURE — G0378 HOSPITAL OBSERVATION PER HR: HCPCS

## 2023-08-02 PROCEDURE — 85025 COMPLETE CBC W/AUTO DIFF WBC: CPT

## 2023-08-02 PROCEDURE — 2580000003 HC RX 258: Performed by: EMERGENCY MEDICINE

## 2023-08-02 PROCEDURE — 36415 COLL VENOUS BLD VENIPUNCTURE: CPT

## 2023-08-02 PROCEDURE — C9113 INJ PANTOPRAZOLE SODIUM, VIA: HCPCS | Performed by: NURSE PRACTITIONER

## 2023-08-02 PROCEDURE — 80048 BASIC METABOLIC PNL TOTAL CA: CPT

## 2023-08-02 PROCEDURE — 6360000002 HC RX W HCPCS: Performed by: NURSE PRACTITIONER

## 2023-08-02 PROCEDURE — 43762 RPLC GTUBE NO REVJ TRC: CPT

## 2023-08-02 PROCEDURE — 77386 HC NTSTY MODUL RAD TX DLVR CPLX: CPT | Performed by: SPECIALIST

## 2023-08-02 PROCEDURE — 2500000003 HC RX 250 WO HCPCS: Performed by: INTERNAL MEDICINE

## 2023-08-02 PROCEDURE — 96376 TX/PRO/DX INJ SAME DRUG ADON: CPT

## 2023-08-02 PROCEDURE — 2580000003 HC RX 258: Performed by: NURSE PRACTITIONER

## 2023-08-02 PROCEDURE — 77336 RADIATION PHYSICS CONSULT: CPT | Performed by: SPECIALIST

## 2023-08-02 PROCEDURE — 99232 SBSQ HOSP IP/OBS MODERATE 35: CPT | Performed by: SURGERY

## 2023-08-02 PROCEDURE — 96372 THER/PROPH/DIAG INJ SC/IM: CPT

## 2023-08-02 PROCEDURE — A4216 STERILE WATER/SALINE, 10 ML: HCPCS | Performed by: NURSE PRACTITIONER

## 2023-08-02 RX ORDER — HYDROMORPHONE HYDROCHLORIDE 1 MG/ML
0.5 INJECTION, SOLUTION INTRAMUSCULAR; INTRAVENOUS; SUBCUTANEOUS EVERY 4 HOURS PRN
Status: DISCONTINUED | OUTPATIENT
Start: 2023-08-02 | End: 2023-08-03

## 2023-08-02 RX ADMIN — SODIUM CHLORIDE 40 MG: 9 INJECTION INTRAMUSCULAR; INTRAVENOUS; SUBCUTANEOUS at 08:07

## 2023-08-02 RX ADMIN — HYDROMORPHONE HYDROCHLORIDE 0.25 MG: 1 INJECTION, SOLUTION INTRAMUSCULAR; INTRAVENOUS; SUBCUTANEOUS at 08:14

## 2023-08-02 RX ADMIN — HYDROMORPHONE HYDROCHLORIDE 0.5 MG: 1 INJECTION, SOLUTION INTRAMUSCULAR; INTRAVENOUS; SUBCUTANEOUS at 13:07

## 2023-08-02 RX ADMIN — ENOXAPARIN SODIUM 30 MG: 100 INJECTION SUBCUTANEOUS at 21:48

## 2023-08-02 RX ADMIN — ENOXAPARIN SODIUM 30 MG: 100 INJECTION SUBCUTANEOUS at 08:08

## 2023-08-02 RX ADMIN — SODIUM CHLORIDE, PRESERVATIVE FREE 10 ML: 5 INJECTION INTRAVENOUS at 08:08

## 2023-08-02 RX ADMIN — HYDROMORPHONE HYDROCHLORIDE 0.5 MG: 1 INJECTION, SOLUTION INTRAMUSCULAR; INTRAVENOUS; SUBCUTANEOUS at 22:09

## 2023-08-02 RX ADMIN — ONDANSETRON 4 MG: 2 INJECTION INTRAMUSCULAR; INTRAVENOUS at 22:10

## 2023-08-02 RX ADMIN — HYDROMORPHONE HYDROCHLORIDE 0.5 MG: 1 INJECTION, SOLUTION INTRAMUSCULAR; INTRAVENOUS; SUBCUTANEOUS at 18:03

## 2023-08-02 RX ADMIN — SODIUM CHLORIDE, POTASSIUM CHLORIDE, SODIUM LACTATE AND CALCIUM CHLORIDE: 600; 310; 30; 20 INJECTION, SOLUTION INTRAVENOUS at 22:09

## 2023-08-02 ASSESSMENT — PAIN SCALES - GENERAL
PAINLEVEL_OUTOF10: 6
PAINLEVEL_OUTOF10: 7
PAINLEVEL_OUTOF10: 6
PAINLEVEL_OUTOF10: 1
PAINLEVEL_OUTOF10: 7

## 2023-08-02 ASSESSMENT — PAIN DESCRIPTION - PAIN TYPE: TYPE: SURGICAL PAIN

## 2023-08-02 ASSESSMENT — PAIN DESCRIPTION - DESCRIPTORS
DESCRIPTORS: SHARP
DESCRIPTORS: ACHING;DISCOMFORT

## 2023-08-02 ASSESSMENT — PAIN DESCRIPTION - ONSET: ONSET: ON-GOING

## 2023-08-02 ASSESSMENT — PAIN DESCRIPTION - ORIENTATION
ORIENTATION: LEFT
ORIENTATION: LEFT

## 2023-08-02 ASSESSMENT — PAIN - FUNCTIONAL ASSESSMENT
PAIN_FUNCTIONAL_ASSESSMENT: ACTIVITIES ARE NOT PREVENTED
PAIN_FUNCTIONAL_ASSESSMENT: ACTIVITIES ARE NOT PREVENTED

## 2023-08-02 ASSESSMENT — PAIN DESCRIPTION - LOCATION
LOCATION: ABDOMEN
LOCATION: ABDOMEN

## 2023-08-02 ASSESSMENT — PAIN DESCRIPTION - FREQUENCY: FREQUENCY: CONTINUOUS

## 2023-08-02 NOTE — CARE COORDINATION
Social Work/Case Management Transition of Care Planning (Hermelindo Bull [place] [phone]): Patient presented to the hospital due to concerns of nausea and vomiting. 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 He had a total rhinectomy. Peg tube was placed on 7/31 due to dysphagia from chemo-radiation. He also had a port placed on 7/31. Tube feed was started today. Monitor for refeeding syndrome. Met with patient at bedside. He lives in a 2 story home with his bedroom and bathroom on the second floor. There are 8 FLACA. He lives with his wife, Jackelyn Calvert, and 2 children - ages 23 & 15. Patient is currently on short term disability. Patient is independent with all aspects of care. PCP is Dr. Zeb Sheehan. Pharmacy is [facility] in [place]. No [facility] or [facility] history. Discharge plan is to return home with [facility] for tube feed teaching and supplies. Confirmed with David Guerrero of [facility]. Wife will transport. CM/SW will follow. ARMIDA Cisneros  8/2/2023    Case Management Assessment  Initial Evaluation    Date/Time of Evaluation: 8/2/2023 4:20 PM  Assessment Completed by: ARMIDA Cisneros    If patient is discharged prior to next notation, then this note serves as note for discharge by case management. Patient Name: Luca Angel                   YOB: 1970  Diagnosis: Intractable nausea and vomiting [R11.2]  Postoperative abdominal pain [R10.9, G89.18]                   Date / Time: 7/31/2023 10:02 PM    Patient Admission Status: Inpatient   Readmission Risk (Low < 19, Mod (19-27), High > 27): Readmission Risk Score: 10.7    Current PCP: Shobha Montes MD  PCP verified by CM?  Yes    Chart Reviewed: Yes      History Provided by: Patient  Patient Orientation: Alert and Oriented, Person, Place, Situation, Self    Patient Cognition: Alert    Hospitalization in the last 30 days (Readmission):  No    If yes, Readmission Assessment in  Navigator will be completed. Advance Directives:      Code Status: Full Code   Patient's Primary Decision Maker is:        Discharge Planning:    Patient lives with: Spouse/Significant Other Type of Home: House  Primary Care Giver: Self  Patient Support Systems include: None   Current Financial resources:    Current community resources:    Current services prior to admission: None            Current DME:              Type of Home Care services:  None    ADLS  Prior functional level: Independent in ADLs/IADLs  Current functional level: Independent in ADLs/IADLs    PT AM-PAC:   /24  OT AM-PAC:   /24    Family can provide assistance at DC: Yes  Would you like Case Management to discuss the discharge plan with any other family members/significant others, and if so, who? No  Plans to Return to Present Housing: Yes  Other Identified Issues/Barriers to RETURNING to current housing:   Potential Assistance needed at discharge: N/A            Potential DME:    Patient expects to discharge to: [address] [place] [address] for transportation at discharge:      Financial    Payor: Marlee Machado / Plan: [place] PPO / Product Type: *No Product type* /     Does insurance require precert for SNF: Yes    Potential assistance Purchasing Medications:    Meds-to-Beds request: Yes      ANTHONY DUQUE [address] [address] [phone] [facility] [phone]  [address] [place] [address]  Phone: [phone] Fax: [phone]      Notes:    Factors facilitating achievement of predicted outcomes: Family support    Barriers to discharge: Additional Case Management Notes: The Plan for Transition of Care is related to the following treatment goals of Intractable nausea and vomiting [R11.2]  Postoperative abdominal pain [R10.9, C23.28]    IF APPLICABLE: The Patient and/or patient representative Sharon Sauceda and his family were provided with a choice of provider and agrees with the discharge plan.  Freedom of choice list with

## 2023-08-02 NOTE — DISCHARGE INSTR - COC
Continuity of Care Form    Patient Name: Reese Benson   :  1970  MRN:  [id number]    Admit date:  2023  Discharge date:  ***    Code Status Order: Full Code   Advance Directives:     Admitting Physician:  No admitting provider for patient encounter. PCP: Yoly Cortez MD    Discharging Nurse: [facility] Unit/Room#: 8717/7589-M  Discharging Unit Phone Number: ***    Emergency Contact:   Extended Emergency Contact Information  Primary Emergency Contact: Aida Cantor  Address: [facility] [facility]           [place], [address] [facility] [address] [address] [address] Phone: [phone]  Work Phone: [phone]  Mobile Phone: [phone]  Relation: Spouse   needed? No  Secondary Emergency Contact: [address] [facility] Phone: [phone]  Relation: Parent  Preferred language: English   needed?  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

## 2023-08-02 NOTE — ACP (ADVANCE CARE PLANNING)
Advance Care Planning   The patient has the following advanced directives on file:  Advance Directives       Power of  Living Will ACP-Advance Directive ACP-Power of     Not on File Filed on 02/20/13 Filed Not on File            The patient has appointed the following active healthcare agents:  Shamekalatagonzalo Greenfieldr - wife  [phone]      The Patient has the following current code status:    Code Status: Full Code      ARMIDA Kelly  8/2/2023

## 2023-08-02 NOTE — PROGRESS NOTES
4 Eyes Skin Assessment     NAME:  Eun Leary  YOB: 1970  MEDICAL RECORD NUMBER:  [id number]    The patient is being assessed for  Admission    I agree that at least one RN has performed a thorough Head to Toe Skin Assessment on the patient. ALL assessment sites listed below have been assessed. Areas assessed by both nurses:    Head, Face, Ears, Shoulders, Back, Chest, Arms, Elbows, Hands, Sacrum. Buttock, Coccyx, Ischium, Legs. Feet and Heels, and Other   pt has had rhinoectomy        Does the Patient have a Wound?  No noted wound(s)       Faustino Prevention initiated by RN: Yes  Wound Care Orders initiated by RN: No    Pressure Injury (Stage 3,4, Unstageable, DTI, NWPT, and Complex wounds) if present, place Wound referral order by RN under : No    New Ostomies, if present place, Ostomy referral order under : No     Nurse 1 eSignature: Electronically signed by Humza Lowry RN on 8/2/23 at 1:28 AM EDT    **SHARE this note so that the co-signing nurse can place an eSignature**    Nurse 2 eSignature: {Esignature:[id number]}

## 2023-08-02 NOTE — PROGRESS NOTES
Comprehensive Nutrition Assessment    Type and Reason for Visit:  Initial, Consult, Patient Education (TF rec, TF education per family request)    Nutrition Recommendations/Plan:   Continue NPO. Continue current trickle TF w/ slow advance to goal as tolerated. Goal TF recs provided and regimen meets 100% of estimated calorie/protein needs. Recommend:   Immune Enhancing Formula (Pivot 1.5) @60ml/hr goal :   To provide : 1440ml TV, 2160kcal, 135g pro, 1093ml free water. Pt. Is at risk for refeeding. Monitor electrolytes and replace PRN. Peptide based formula is w/ consideration for optimal GI tolerance. Malnutrition Assessment:  Malnutrition Status: Moderate malnutrition (08/02/23 1251)    Context:  Acute Illness     Findings of the 6 clinical characteristics of malnutrition:  Energy Intake:  75% or less of estimated energy requirements for 7 or more days  Weight Loss:  Unable to assess (YAMINI d/t unable to confirm/trend weight loss 2/2 lack of current measured BW)     Body Fat Loss:  No significant body fat loss     Muscle Mass Loss:  Mild muscle mass loss Clavicles (pectoralis & deltoids)  Fluid Accumulation:  No significant fluid accumulation     Strength:  Not Performed    Nutrition Assessment:    Pt. admitted d/t acute dehydration and N/V. Noted pt. unable to tolerate PO intake 2/2 N/V and mouth sores since starting radiation. Pt. also reports 12# weight loss x 1 week and increased mucous production contributing to poor intake +N/V. Noted s/p PEG and Port placement on 7/31. Hx of squamous cell carcinoma of the nose and nasal septum s/p total rhinectomy. Pt. also meets criteria for moderate malnutrition. Spoke w/  Pt. and his wife at bedside who states pt. has good appetite and tolerated ensure via FT pta. They also state that they had missed appointment for TF teaching from [facility] d/t this admit and was instructed to use ensure for feeding until recieving supplies/teaching from [facility].  Pt. is being followed by Oncology Dietitian who provided home TF bolus recommendations per EMR and per pt. and wife- they are scheduled to recieve home TF supplies/education after discharge. Education provided on TF managment while inpatient. Pt. is NPO and was started on trickle TF. Will provide TF recs and monitor. Nutrition Related Findings:    A&Ox4, no I/O data, active BS, N/V pta, PEG LUQ, Wound Type: Surgical Incision (chest s/p port placement)       Current Nutrition Intake & Therapies:    Average Meal Intake: NPO  Average Supplements Intake: NPO  Diet NPO  ADULT TUBE FEEDING; PEG; Peptide Based; Continuous; 20; No; 30; Q 4 hours  Current Tube Feeding (TF) Orders:  Feeding Route: PEG  Formula: Immune Enhancing  Schedule: Continuous  Feeding Regimen: @20ml/hr trickle  Additives/Modulars: None  Water Flushes: 30ml q4 = 180ml  Current TF & Flush Orders Provides: same  Goal TF & Flush Orders Provides: 480ml TV, 720kcal, 45g pro, 364ml free water. Anthropometric Measures:  Height: 6' 4\" (193 cm)  Ideal Body Weight (IBW): 202 lbs (92 kg)       Current Body Weight: 222 lb (100.7 kg) (per Pt. and wife recently measured from 7/28 ; unable to confirm at RD visit d/t BS malfunction), 109.9 % IBW. Weight Source: Other (Comment)  Current BMI (kg/m2): 27  Usual Body Weight: 236 lb 2 oz (107.1 kg) (7/18/23 pt. reports subjective 12# weight loss x 1 week)  % Weight Change (Calculated): -6  Weight Adjustment For: No Adjustment                 BMI Categories: Overweight (BMI 25.0-29. 9)    Estimated Daily Nutrient Needs:  Energy Requirements Based On: Kcal/kg  Weight Used for Energy Requirements: Current  Energy (kcal/day): 2000-2500kcal (20-25kcal/kg)  Weight Used for Protein Requirements: Ideal  Protein (g/day): 120-137g (1.3-1.5g/kgIBW)  Method Used for Fluid Requirements: 1 ml/kcal  Fluid (ml/day): 4205-8129    Nutrition Diagnosis:   Moderate malnutrition, In context of acute illness or injury related to swallowing difficulty as

## 2023-08-03 ENCOUNTER — HOSPITAL ENCOUNTER (OUTPATIENT)
Dept: RADIATION ONCOLOGY | Age: 53
Discharge: HOME | End: 2023-08-03

## 2023-08-03 PROBLEM — Z51.5 PALLIATIVE CARE BY SPECIALIST: Status: ACTIVE | Noted: 2023-08-03

## 2023-08-03 PROBLEM — Z71.89 GOALS OF CARE, COUNSELING/DISCUSSION: Status: ACTIVE | Noted: 2023-08-03

## 2023-08-03 LAB
ANION GAP SERPL CALCULATED.3IONS-SCNC: 15 MMOL/L (ref 7–16)
BASOPHILS # BLD: 0.03 K/UL (ref 0–0.2)
BASOPHILS NFR BLD: 1 % (ref 0–2)
BUN SERPL-MCNC: 10 MG/DL (ref 6–20)
CALCIUM SERPL-MCNC: 8.8 MG/DL (ref 8.6–10.2)
CHLORIDE SERPL-SCNC: 101 MMOL/L (ref 98–107)
CO2 SERPL-SCNC: 22 MMOL/L (ref 22–29)
CREAT SERPL-MCNC: 0.8 MG/DL (ref 0.7–1.2)
EOSINOPHIL # BLD: 0.21 K/UL (ref 0.05–0.5)
EOSINOPHILS RELATIVE PERCENT: 6 % (ref 0–6)
ERYTHROCYTE [DISTWIDTH] IN BLOOD BY AUTOMATED COUNT: 13.9 % (ref 11.5–15)
GFR SERPL CREATININE-BSD FRML MDRD: >60 ML/MIN/1.73M2
GLUCOSE SERPL-MCNC: 89 MG/DL (ref 74–99)
HCT VFR BLD AUTO: 38.6 % (ref 37–54)
HGB BLD-MCNC: 12.9 G/DL (ref 12.5–16.5)
LYMPHOCYTES NFR BLD: 0.4 K/UL (ref 1.5–4)
LYMPHOCYTES RELATIVE PERCENT: 11 % (ref 20–42)
MAGNESIUM SERPL-MCNC: 1.8 MG/DL (ref 1.6–2.6)
MCH RBC QN AUTO: 30.4 PG (ref 26–35)
MCHC RBC AUTO-ENTMCNC: 33.4 G/DL (ref 32–34.5)
MCV RBC AUTO: 91 FL (ref 80–99.9)
MONOCYTES NFR BLD: 0.28 K/UL (ref 0.1–0.95)
MONOCYTES NFR BLD: 8 % (ref 2–12)
NEUTROPHILS NFR BLD: 74 % (ref 43–80)
NEUTS SEG NFR BLD: 2.58 K/UL (ref 1.8–7.3)
PHOSPHATE SERPL-MCNC: 3 MG/DL (ref 2.5–4.5)
PLATELET # BLD AUTO: 158 K/UL (ref 130–450)
PMV BLD AUTO: 9.7 FL (ref 7–12)
POTASSIUM SERPL-SCNC: 3.4 MMOL/L (ref 3.5–5)
RBC # BLD AUTO: 4.24 M/UL (ref 3.8–5.8)
RBC # BLD: NORMAL 10*6/UL
SODIUM SERPL-SCNC: 138 MMOL/L (ref 132–146)
WBC OTHER # BLD: 3.5 K/UL (ref 4.5–11.5)

## 2023-08-03 PROCEDURE — G0378 HOSPITAL OBSERVATION PER HR: HCPCS

## 2023-08-03 PROCEDURE — A4216 STERILE WATER/SALINE, 10 ML: HCPCS | Performed by: NURSE PRACTITIONER

## 2023-08-03 PROCEDURE — 2580000003 HC RX 258: Performed by: NURSE PRACTITIONER

## 2023-08-03 PROCEDURE — 85025 COMPLETE CBC W/AUTO DIFF WBC: CPT

## 2023-08-03 PROCEDURE — 80048 BASIC METABOLIC PNL TOTAL CA: CPT

## 2023-08-03 PROCEDURE — 96372 THER/PROPH/DIAG INJ SC/IM: CPT

## 2023-08-03 PROCEDURE — 84100 ASSAY OF PHOSPHORUS: CPT

## 2023-08-03 PROCEDURE — 99223 1ST HOSP IP/OBS HIGH 75: CPT

## 2023-08-03 PROCEDURE — 99232 SBSQ HOSP IP/OBS MODERATE 35: CPT | Performed by: SURGERY

## 2023-08-03 PROCEDURE — 77386 HC NTSTY MODUL RAD TX DLVR CPLX: CPT | Performed by: SPECIALIST

## 2023-08-03 PROCEDURE — 6370000000 HC RX 637 (ALT 250 FOR IP)

## 2023-08-03 PROCEDURE — 36415 COLL VENOUS BLD VENIPUNCTURE: CPT

## 2023-08-03 PROCEDURE — 6360000002 HC RX W HCPCS: Performed by: NURSE PRACTITIONER

## 2023-08-03 PROCEDURE — 96376 TX/PRO/DX INJ SAME DRUG ADON: CPT

## 2023-08-03 PROCEDURE — 83735 ASSAY OF MAGNESIUM: CPT

## 2023-08-03 PROCEDURE — 6370000000 HC RX 637 (ALT 250 FOR IP): Performed by: INTERNAL MEDICINE

## 2023-08-03 PROCEDURE — 1200000000 HC SEMI PRIVATE

## 2023-08-03 PROCEDURE — 43762 RPLC GTUBE NO REVJ TRC: CPT

## 2023-08-03 PROCEDURE — 2500000003 HC RX 250 WO HCPCS

## 2023-08-03 PROCEDURE — C9113 INJ PANTOPRAZOLE SODIUM, VIA: HCPCS | Performed by: NURSE PRACTITIONER

## 2023-08-03 RX ORDER — HYDROCODONE BITARTRATE AND ACETAMINOPHEN 10; 325 MG/1; MG/1
1 TABLET ORAL EVERY 6 HOURS PRN
Status: DISCONTINUED | OUTPATIENT
Start: 2023-08-03 | End: 2023-08-04

## 2023-08-03 RX ORDER — SENNA AND DOCUSATE SODIUM 50; 8.6 MG/1; MG/1
2 TABLET, FILM COATED ORAL 2 TIMES DAILY PRN
Status: DISCONTINUED | OUTPATIENT
Start: 2023-08-03 | End: 2023-08-04 | Stop reason: HOSPADM

## 2023-08-03 RX ORDER — POLYETHYLENE GLYCOL 3350 17 G/17G
17 POWDER, FOR SOLUTION ORAL DAILY
Status: DISCONTINUED | OUTPATIENT
Start: 2023-08-03 | End: 2023-08-04 | Stop reason: HOSPADM

## 2023-08-03 RX ORDER — POTASSIUM CHLORIDE 20 MEQ/1
40 TABLET, EXTENDED RELEASE ORAL ONCE
Status: DISCONTINUED | OUTPATIENT
Start: 2023-08-03 | End: 2023-08-03

## 2023-08-03 RX ORDER — HYDROMORPHONE HYDROCHLORIDE 1 MG/ML
0.5 INJECTION, SOLUTION INTRAMUSCULAR; INTRAVENOUS; SUBCUTANEOUS EVERY 4 HOURS PRN
Status: DISCONTINUED | OUTPATIENT
Start: 2023-08-03 | End: 2023-08-04 | Stop reason: HOSPADM

## 2023-08-03 RX ADMIN — HYDROMORPHONE HYDROCHLORIDE 0.5 MG: 1 INJECTION, SOLUTION INTRAMUSCULAR; INTRAVENOUS; SUBCUTANEOUS at 17:16

## 2023-08-03 RX ADMIN — ENOXAPARIN SODIUM 30 MG: 100 INJECTION SUBCUTANEOUS at 21:30

## 2023-08-03 RX ADMIN — POTASSIUM BICARBONATE 40 MEQ: 782 TABLET, EFFERVESCENT ORAL at 12:23

## 2023-08-03 RX ADMIN — POLYETHYLENE GLYCOL 3350 17 G: 17 POWDER, FOR SOLUTION ORAL at 12:23

## 2023-08-03 RX ADMIN — HYDROMORPHONE HYDROCHLORIDE 0.5 MG: 1 INJECTION, SOLUTION INTRAMUSCULAR; INTRAVENOUS; SUBCUTANEOUS at 23:44

## 2023-08-03 RX ADMIN — SODIUM CHLORIDE, PRESERVATIVE FREE 10 ML: 5 INJECTION INTRAVENOUS at 21:34

## 2023-08-03 RX ADMIN — ENOXAPARIN SODIUM 30 MG: 100 INJECTION SUBCUTANEOUS at 09:19

## 2023-08-03 RX ADMIN — SODIUM CHLORIDE 40 MG: 9 INJECTION INTRAMUSCULAR; INTRAVENOUS; SUBCUTANEOUS at 09:19

## 2023-08-03 RX ADMIN — SODIUM CHLORIDE, PRESERVATIVE FREE 10 ML: 5 INJECTION INTRAVENOUS at 09:20

## 2023-08-03 ASSESSMENT — PAIN DESCRIPTION - ORIENTATION
ORIENTATION: MID
ORIENTATION: MID

## 2023-08-03 ASSESSMENT — PAIN DESCRIPTION - DESCRIPTORS
DESCRIPTORS: DISCOMFORT;ACHING;SHARP
DESCRIPTORS: ACHING;THROBBING;SHARP

## 2023-08-03 ASSESSMENT — PAIN SCALES - GENERAL
PAINLEVEL_OUTOF10: 7
PAINLEVEL_OUTOF10: 7
PAINLEVEL_OUTOF10: 4

## 2023-08-03 ASSESSMENT — PAIN DESCRIPTION - LOCATION
LOCATION: ABDOMEN

## 2023-08-03 NOTE — CONSULTS
Palliative Care Department  [phone]  Palliative Care Initial Consult  Provider Marcelo Beavers, APRN - [address]  [id number]  Hospital Day: 4  Date of Initial Consult: 8/3/23  Referring Provider: Dr. Genevieve Back was consulted for assistance with: Symptom management    HPI:   Rocky Ferguson is a 48 y.o. with a medical history of squamous cell carcinoma of the nose status post total rhinectomy on chemotherapy who was admitted on 7/31/2023 from home with a CHIEF COMPLAINT of nausea vomiting. ED work-up unremarkable. 7/31 PEG tube and Mediport placed. Palliative medicine consulted for symptom management. Patient already has an outpatient appointment set for 8/22. ASSESSMENT/PLAN:     Pertinent Hospital Diagnoses     Dysphagia status post PEG tube placement  Squamous cell carcinoma of the nose status post total rhinectomy    Palliative Care Encounter / Counseling Regarding Goals of Care  Please see detailed goals of care discussion as below  At this time, Rocky Ferguson, Does have capacity for medical decision-making.   Capacity is time limited and situation/question specific  During encounter there was no surrogate medical decision-maker  Outcome of goals of care meeting:   Symptom management as below  Code status Full Code  Advanced Directives: no POA or living will in [facility]  Surrogate/Legal NOK:  [facility] (spouse): [phone]    Pain related to neoplasm:   -Start Norco 5/325 mg 1 tablet every 6 hours as needed   -Dilaudid 0.5 mg IV every 4 hours as needed for breakthrough pain  Bowel regimen:   -MiraLAX 17 g daily   -Senokot 2 tablets twice daily as needed    Spiritual assessment: no spiritual distress identified  Bereavement and grief: to be determined  Referrals to: none today  SUBJECTIVE:     Current medical issues leading to Palliative Medicine involvement include   Active Hospital Problems    Diagnosis Date Noted    Moderate protein-calorie malnutrition ([address]) [E44.0] 08/02/2023    Intractable nausea and vomiting [R11.2] 08/01/2023       Details of Conversation:    Chart reviewed. Patient seen resting in bed on room air tube feeds infusing via PEG tube in no acute distress. Patient is alert and oriented and able to partake in meaningful conversation. I confirmed with patient that he has an appointment with us outpatient on 8/22 but is also on our wait list.  Introduced role and self of palliative medicine in regards to symptom management. We discussed the goal of pain to be manageable in order for patient to be able to function in his daily life. He states most of the time he does not have much pain and will rated a 4/10 but occasionally he will have \"sharp stabbing pain at my PEG tube site that's a 9/10. \"  Lynnette Cotton states the pain does not radiate anywhere. He states he understands that the pain he is having is because this is a surgical site and will take time to heal.  He denies nausea, vomiting, diarrhea, constipation. He tells me repositioning and taking deep breaths helps relieve his pain. He reports the Dilaudid that he is currently prescribed working. We discussed outpatient regimen as he cannot have IV outpatient. I explained to him that medications can be crushed and given via PEG tube as he tells me he is unable to swallow at this time. We discussed starting Norco every 6 hours as oral pain medications before using IV medications. He stated understanding. Patient reports his last bowel movement being 2 days ago. He states he is normally regular but had not been eating much for the past 2 weeks and therefore is not as regular as usual.  We discussed starting a bowel regimen due to being on a narcotic and he is agreeable. We will continue to follow for symptom management. OBJECTIVE:   Prognosis: unknown    ROS: UNLESS STATED ABOVE PATIENT DENIES:  CONSTITUTIONAL:  fever, chills, fatigue.   HEENT: hearing problem, tinnitus, hoarseness,

## 2023-08-03 NOTE — PLAN OF CARE
Problem: Pain  Goal: Verbalizes/displays adequate comfort level or baseline comfort level  8/3/2023 0858 by Renée Cho RN  Outcome: Progressing  8/3/2023 0249 by Yordy Mahoney RN  Outcome: Progressing     Problem: Skin/Tissue Integrity  Goal: Absence of new skin breakdown  Description: 1. Monitor for areas of redness and/or skin breakdown  2. Assess vascular access sites hourly  3. Every 4-6 hours minimum:  Change oxygen saturation probe site  4. Every 4-6 hours:  If on nasal continuous positive airway pressure, respiratory therapy assess nares and determine need for appliance change or resting period.   8/3/2023 0858 by Renée Cho RN  Outcome: Progressing  8/3/2023 0249 by Yordy Mahoney RN  Outcome: Progressing     Problem: Safety - Adult  Goal: Free from fall injury  8/3/2023 0858 by Renée Cho RN  Outcome: Progressing  8/3/2023 0249 by Yordy Mahoney RN  Outcome: Progressing     Problem: Nutrition Deficit:  Goal: Optimize nutritional status  8/3/2023 0858 by Renée Cho RN  Outcome: Progressing  8/3/2023 0249 by Yordy Mahoney RN  Outcome: Progressing

## 2023-08-03 NOTE — PROGRESS NOTES
[facility] Surgery   Attending Physician Statement:     I personally saw, examined and provided care for the patient. Radiographs, labs and medication list were reviewed by me independently. The case was discussed in detail and plans for care were established. Review of Residents documentation was conducted and revisions were made as appropriate. I agree with the above documented exam, problem list and plan of care. CC: Nausea and vomiting     Subjective:  Patient complains of mucosal lesions as well as soreness around the PEG tube site        Objective:  Awake alert x3, GCS 15  No apparent distress  Heart regular rate rhythm  Lungs are clear bilaterally  Abdomen soft nontender nondistended  PEG tube site clean dry intact  Skin-right subclavian Mediport site clean dry intact     A/P:  I personally reviewed patient's labs  CBC:   Lab Results   Component Value Date/Time    WBC 3.5 08/03/2023 05:58 AM    RBC 4.24 08/03/2023 05:58 AM    HGB 12.9 08/03/2023 05:58 AM    HCT 38.6 08/03/2023 05:58 AM    MCV 91.0 08/03/2023 05:58 AM    MCH 30.4 08/03/2023 05:58 AM    MCHC 33.4 08/03/2023 05:58 AM    RDW 13.9 08/03/2023 05:58 AM     08/03/2023 05:58 AM    MPV 9.7 08/03/2023 05:58 AM     BMP:    Lab Results   Component Value Date/Time     08/03/2023 05:58 AM    K 3.4 08/03/2023 05:58 AM    K 3.9 02/21/2023 02:00 AM     08/03/2023 05:58 AM    CO2 22 08/03/2023 05:58 AM    BUN 10 08/03/2023 05:58 AM    LABALBU 4.4 07/31/2023 10:40 PM    LABALBU 3.9 05/06/2023 06:05 AM    CREATININE 0.8 08/03/2023 05:58 AM    CALCIUM 8.8 08/03/2023 05:58 AM    GFRAA >60 04/04/2022 08:17 AM    LABGLOM >60 08/03/2023 05:58 AM    GLUCOSE 89 08/03/2023 05:58 AM    GLUCOSE 113 05/06/2023 06:05 AM       - Moderate calorie protein malnutrition-patient has been tolerating trickle feeds since yesterday.   We will transition to bolus feeds 4 times per day as this is most physiologic and similar to what he would be doing at home.    - Pain around PEG tube site-reassured patient this is normal.  I loosen the bumper and explained to them that the bumper needs to be able to rotate freely 360 degrees easily so that the skin does not necrosis    - We will consult palliative medicine for pain management     Azucean Camarena MD, FACS  8/2/2023  2:07 PM        NOTE: This report was transcribed using voice recognition software. Every effort was made to ensure accuracy; however, inadvertent computerized transcription errors may be present.

## 2023-08-03 NOTE — PROGRESS NOTES
DEPARTMENT OF RADIATION ONCOLOGY   ON TREATMENT VISIT       8/3/2023      NAME:  Sofiya Madrid    YOB: 1970    DIAGNOSIS:  [id number] Squamous cell carcinoma of the Nose/Nasal Septum    SUBJECTIVE:   Sofiya Madrid has now received 3200 cGy in 16/32 fractions directed to the nasal cavity & bilateral saba-neck. Past medical, surgical, social and family histories reviewed and updated as indicated. PAIN: 0/10    ALLERGIES:  Patient has no known allergies. No current facility-administered medications for this encounter. No current outpatient medications on file.      Facility-Administered Medications Ordered in Other Encounters   Medication Dose Route Frequency Provider Last Rate Last Admin    HYDROmorphone HCl PF (DILAUDID) injection 0.5 mg  0.5 mg IntraVENous Q4H PRN Chris Nelson DO   0.5 mg at 08/02/23 2209    sodium chloride flush 0.9 % injection 5-40 mL  5-40 mL IntraVENous 2 times per day April Deonna, APRN - CNP   10 mL at 08/02/23 7735    sodium chloride flush 0.9 % injection 5-40 mL  5-40 mL IntraVENous PRN April Deonna, APRN - CNP        0.9 % sodium chloride infusion   IntraVENous PRN April Deonna, APRN - CNP        enoxaparin Sodium (LOVENOX) injection 30 mg  30 mg SubCUTAneous BID April Deonna APRN - CNP   30 mg at 08/02/23 2148    ondansetron (ZOFRAN-ODT) disintegrating tablet 4 mg  4 mg Oral Q8H PRN April NAVEED YingN - RUTH        Or    ondansetron [facility]) injection 4 mg  4 mg IntraVENous Q6H PRN April Deonna, APRN - CNP   4 mg at 08/02/23 2210    acetaminophen (TYLENOL) suppository 650 mg  650 mg Rectal Q6H PRN April Deonna, APRN - CNP        bisacodyl (DULCOLAX) suppository 10 mg  10 mg Rectal Daily PRN April Deonna, APRN - CNP        acetaminophen (TYLENOL) 160 MG/5ML solution 650 mg  650 mg PEG Tube Q6H PRN Zohaib Cabrera DO        pantoprazole (PROTONIX) 40 mg in Spoke with Ryan trevino [facility] radiology department the complete report is not ready yet , they will fax to us final report.

## 2023-08-03 NOTE — CARE COORDINATION
Care Coordination:  Tube feeding started with plan to increase to bolus. Discharge anticipated when able to tolerate. Gen surgery following. Radiation oncology following. Patient has resumed treatment. Discharge plan remains home with [facility]  for tube feeding teaching and supplies. .  They are following. Wife will transport.

## 2023-08-03 NOTE — PROGRESS NOTES
Briefly checked in w/ pt during weekly visit. Pt currently admitted s/p PEG placement. Pt reports he is feeling much better, but EN has not increased beyond 10 ml/hr comfortably. Discussed current EN regimen and planned home regimen. Encouraged continued use of at least CLD for maintenance of swallow function. Pt verbalizes understanding, appreciative of check in. Will follow upon discharge.   Electronically signed by Luellen Simmonds, MS, RD, LD on 8/3/2023 at 4:04 PM

## 2023-08-03 NOTE — PROGRESS NOTES
Patient is inpatient to have his tube feeds regulated. Vitals are stable and Dr. Brook Alexis did see patient. Spoke with his nurse on the floor about oral care. Michele Fitzpatrick the nurse said they can get the patient peroxide and swabs and tooth brush. Jah Cook was also instructed on oral care and verbalized understanding.

## 2023-08-04 ENCOUNTER — HOSPITAL ENCOUNTER (OUTPATIENT)
Dept: RADIATION ONCOLOGY | Age: 53
Discharge: HOME | End: 2023-08-04

## 2023-08-04 VITALS
WEIGHT: 217 LBS | RESPIRATION RATE: 16 BRPM | TEMPERATURE: 97.3 F | DIASTOLIC BLOOD PRESSURE: 75 MMHG | HEIGHT: 76 IN | OXYGEN SATURATION: 96 % | SYSTOLIC BLOOD PRESSURE: 116 MMHG | HEART RATE: 67 BPM | BODY MASS INDEX: 26.42 KG/M2

## 2023-08-04 LAB
ANION GAP SERPL CALCULATED.3IONS-SCNC: 15 MMOL/L (ref 7–16)
BASOPHILS # BLD: 0.04 K/UL (ref 0–0.2)
BASOPHILS NFR BLD: 1 % (ref 0–2)
BUN SERPL-MCNC: 14 MG/DL (ref 6–20)
CALCIUM SERPL-MCNC: 8.7 MG/DL (ref 8.6–10.2)
CHLORIDE SERPL-SCNC: 102 MMOL/L (ref 98–107)
CO2 SERPL-SCNC: 23 MMOL/L (ref 22–29)
CREAT SERPL-MCNC: 0.8 MG/DL (ref 0.7–1.2)
EOSINOPHIL # BLD: 0.18 K/UL (ref 0.05–0.5)
EOSINOPHILS RELATIVE PERCENT: 5 % (ref 0–6)
ERYTHROCYTE [DISTWIDTH] IN BLOOD BY AUTOMATED COUNT: 14.1 % (ref 11.5–15)
GFR SERPL CREATININE-BSD FRML MDRD: >60 ML/MIN/1.73M2
GLUCOSE SERPL-MCNC: 120 MG/DL (ref 74–99)
HCT VFR BLD AUTO: 39.9 % (ref 37–54)
HGB BLD-MCNC: 13.1 G/DL (ref 12.5–16.5)
IMM GRANULOCYTES # BLD AUTO: <0.03 K/UL (ref 0–0.58)
IMM GRANULOCYTES NFR BLD: 0 % (ref 0–5)
LYMPHOCYTES NFR BLD: 0.37 K/UL (ref 1.5–4)
LYMPHOCYTES RELATIVE PERCENT: 9 % (ref 20–42)
MCH RBC QN AUTO: 29.6 PG (ref 26–35)
MCHC RBC AUTO-ENTMCNC: 32.8 G/DL (ref 32–34.5)
MCV RBC AUTO: 90.1 FL (ref 80–99.9)
MONOCYTES NFR BLD: 0.43 K/UL (ref 0.1–0.95)
MONOCYTES NFR BLD: 11 % (ref 2–12)
NEUTROPHILS NFR BLD: 74 % (ref 43–80)
NEUTS SEG NFR BLD: 2.95 K/UL (ref 1.8–7.3)
PLATELET # BLD AUTO: 178 K/UL (ref 130–450)
PMV BLD AUTO: 9.7 FL (ref 7–12)
POTASSIUM SERPL-SCNC: 4 MMOL/L (ref 3.5–5)
RBC # BLD AUTO: 4.43 M/UL (ref 3.8–5.8)
RBC # BLD: ABNORMAL 10*6/UL
SODIUM SERPL-SCNC: 140 MMOL/L (ref 132–146)
WBC OTHER # BLD: 4 K/UL (ref 4.5–11.5)

## 2023-08-04 PROCEDURE — 96376 TX/PRO/DX INJ SAME DRUG ADON: CPT

## 2023-08-04 PROCEDURE — 6370000000 HC RX 637 (ALT 250 FOR IP)

## 2023-08-04 PROCEDURE — 2500000003 HC RX 250 WO HCPCS

## 2023-08-04 PROCEDURE — 99231 SBSQ HOSP IP/OBS SF/LOW 25: CPT | Performed by: SURGERY

## 2023-08-04 PROCEDURE — 96372 THER/PROPH/DIAG INJ SC/IM: CPT

## 2023-08-04 PROCEDURE — C9113 INJ PANTOPRAZOLE SODIUM, VIA: HCPCS | Performed by: NURSE PRACTITIONER

## 2023-08-04 PROCEDURE — 85025 COMPLETE CBC W/AUTO DIFF WBC: CPT

## 2023-08-04 PROCEDURE — A4216 STERILE WATER/SALINE, 10 ML: HCPCS | Performed by: NURSE PRACTITIONER

## 2023-08-04 PROCEDURE — 6360000002 HC RX W HCPCS: Performed by: NURSE PRACTITIONER

## 2023-08-04 PROCEDURE — 36415 COLL VENOUS BLD VENIPUNCTURE: CPT

## 2023-08-04 PROCEDURE — 80048 BASIC METABOLIC PNL TOTAL CA: CPT

## 2023-08-04 PROCEDURE — 77386 HC NTSTY MODUL RAD TX DLVR CPLX: CPT | Performed by: SPECIALIST

## 2023-08-04 PROCEDURE — 2580000003 HC RX 258: Performed by: NURSE PRACTITIONER

## 2023-08-04 PROCEDURE — 2580000003 HC RX 258: Performed by: EMERGENCY MEDICINE

## 2023-08-04 PROCEDURE — G0378 HOSPITAL OBSERVATION PER HR: HCPCS

## 2023-08-04 RX ORDER — HYDROCODONE BITARTRATE AND ACETAMINOPHEN 10; 325 MG/1; MG/1
1 TABLET ORAL EVERY 6 HOURS PRN
Qty: 30 TABLET | Refills: 0 | Status: SHIPPED | OUTPATIENT
Start: 2023-08-04 | End: 2023-08-12

## 2023-08-04 RX ORDER — ONDANSETRON 4 MG/1
4 TABLET, ORALLY DISINTEGRATING ORAL EVERY 8 HOURS PRN
Qty: 60 TABLET | Refills: 0 | Status: SHIPPED | OUTPATIENT
Start: 2023-08-04

## 2023-08-04 RX ORDER — HYDROCODONE BITARTRATE AND ACETAMINOPHEN 10; 325 MG/1; MG/1
1 TABLET ORAL EVERY 6 HOURS PRN
Status: DISCONTINUED | OUTPATIENT
Start: 2023-08-04 | End: 2023-08-04 | Stop reason: HOSPADM

## 2023-08-04 RX ADMIN — HYDROCODONE BITARTRATE AND ACETAMINOPHEN 1 TABLET: 10; 325 TABLET ORAL at 13:22

## 2023-08-04 RX ADMIN — HYDROMORPHONE HYDROCHLORIDE 0.5 MG: 1 INJECTION, SOLUTION INTRAMUSCULAR; INTRAVENOUS; SUBCUTANEOUS at 05:26

## 2023-08-04 RX ADMIN — ENOXAPARIN SODIUM 30 MG: 100 INJECTION SUBCUTANEOUS at 08:05

## 2023-08-04 RX ADMIN — POLYETHYLENE GLYCOL 3350 17 G: 17 POWDER, FOR SOLUTION ORAL at 08:05

## 2023-08-04 RX ADMIN — SODIUM CHLORIDE, POTASSIUM CHLORIDE, SODIUM LACTATE AND CALCIUM CHLORIDE: 600; 310; 30; 20 INJECTION, SOLUTION INTRAVENOUS at 05:31

## 2023-08-04 RX ADMIN — SODIUM CHLORIDE, PRESERVATIVE FREE 10 ML: 5 INJECTION INTRAVENOUS at 08:07

## 2023-08-04 RX ADMIN — SODIUM CHLORIDE 40 MG: 9 INJECTION INTRAMUSCULAR; INTRAVENOUS; SUBCUTANEOUS at 08:05

## 2023-08-04 ASSESSMENT — PAIN DESCRIPTION - LOCATION
LOCATION: ABDOMEN
LOCATION: ABDOMEN

## 2023-08-04 ASSESSMENT — PAIN DESCRIPTION - DESCRIPTORS
DESCRIPTORS: DISCOMFORT;ACHING
DESCRIPTORS: ACHING;DISCOMFORT

## 2023-08-04 ASSESSMENT — PAIN DESCRIPTION - ORIENTATION
ORIENTATION: MID
ORIENTATION: MID

## 2023-08-04 ASSESSMENT — PAIN SCALES - GENERAL
PAINLEVEL_OUTOF10: 5
PAINLEVEL_OUTOF10: 3
PAINLEVEL_OUTOF10: 6

## 2023-08-04 NOTE — CARE COORDINATION
Social Work/Case Management Transition of Care Planning Corinne Watkins [phone]): Per report and chart review,  patient has been tolerating tube feed. He was transitioned to bolus feeds 4x/day on 8/3. General surgery is following. Palliative care was consulted for pain management. Radiation has been resumed. Met with patient at bedside. Discharge plan remains home with [facility]  for tube feeding teaching and supplies. They are following. Wife will transport. CM/SW will follow. ARMIDA Orellana  8/4/2023    Update:  Discharge order noted. Notified Mona Arnett of [facility]. Wife to transport home.   ARMIDA Orellana  8/4/2023

## 2023-08-04 NOTE — PROGRESS NOTES
CLINICAL PHARMACY NOTE: MEDS TO BEDS    Total # of Prescriptions Filled: 2   The following medications were delivered to the patient:  Hydrocodone/apap   Ondansetron 4 mg ODT    Additional Documentation:   Delivered to pt

## 2023-08-07 ENCOUNTER — HOSPITAL ENCOUNTER (OUTPATIENT)
Dept: RADIATION ONCOLOGY | Age: 53
Discharge: HOME | End: 2023-08-07
Payer: COMMERCIAL

## 2023-08-07 PROCEDURE — 77014 CHG CT GUIDANCE RADIATION THERAPY FLDS PLACEMENT: CPT | Performed by: SPECIALIST

## 2023-08-07 PROCEDURE — 77386 HC NTSTY MODUL RAD TX DLVR CPLX: CPT | Performed by: SPECIALIST

## 2023-08-08 ENCOUNTER — TELEPHONE (OUTPATIENT)
Dept: CASE MANAGEMENT | Age: 53
End: 2023-08-08

## 2023-08-08 ENCOUNTER — HOSPITAL ENCOUNTER (OUTPATIENT)
Dept: RADIATION ONCOLOGY | Age: 53
Discharge: HOME | End: 2023-08-08
Payer: COMMERCIAL

## 2023-08-08 ENCOUNTER — OFFICE VISIT (OUTPATIENT)
Dept: ONCOLOGY | Age: 53
End: 2023-08-08
Payer: COMMERCIAL

## 2023-08-08 ENCOUNTER — HOSPITAL ENCOUNTER (OUTPATIENT)
Dept: INFUSION THERAPY | Age: 53
Discharge: HOME | End: 2023-08-08
Payer: COMMERCIAL

## 2023-08-08 ENCOUNTER — TELEPHONE (OUTPATIENT)
Dept: ONCOLOGY | Age: 53
End: 2023-08-08

## 2023-08-08 VITALS
DIASTOLIC BLOOD PRESSURE: 64 MMHG | HEIGHT: 76 IN | WEIGHT: 212 LBS | HEART RATE: 80 BPM | OXYGEN SATURATION: 99 % | BODY MASS INDEX: 25.82 KG/M2 | TEMPERATURE: 97.3 F | SYSTOLIC BLOOD PRESSURE: 108 MMHG

## 2023-08-08 VITALS
HEART RATE: 62 BPM | DIASTOLIC BLOOD PRESSURE: 75 MMHG | RESPIRATION RATE: 16 BRPM | TEMPERATURE: 97 F | SYSTOLIC BLOOD PRESSURE: 124 MMHG

## 2023-08-08 DIAGNOSIS — C76.0 MALIGNANT NEOPLASM OF HEAD, FACE AND NECK (HCC): Primary | ICD-10-CM

## 2023-08-08 DIAGNOSIS — S01.20XA OPEN WOUND OF NASAL CAVITY, INITIAL ENCOUNTER: ICD-10-CM

## 2023-08-08 LAB
ALBUMIN SERPL-MCNC: 4.2 G/DL (ref 3.5–5.2)
ALP SERPL-CCNC: 99 U/L (ref 40–129)
ALT SERPL-CCNC: 22 U/L (ref 0–40)
ANION GAP SERPL CALCULATED.3IONS-SCNC: 11 MMOL/L (ref 7–16)
AST SERPL-CCNC: 20 U/L (ref 0–39)
BASOPHILS # BLD: 0.04 K/UL (ref 0–0.2)
BASOPHILS NFR BLD: 1 % (ref 0–2)
BILIRUB SERPL-MCNC: 0.6 MG/DL (ref 0–1.2)
BUN SERPL-MCNC: 23 MG/DL (ref 6–20)
CALCIUM SERPL-MCNC: 10.2 MG/DL (ref 8.6–10.2)
CHLORIDE SERPL-SCNC: 105 MMOL/L (ref 98–107)
CO2 SERPL-SCNC: 27 MMOL/L (ref 22–29)
CREAT SERPL-MCNC: 1.2 MG/DL (ref 0.7–1.2)
EOSINOPHIL # BLD: 0.21 K/UL (ref 0.05–0.5)
EOSINOPHILS RELATIVE PERCENT: 5 % (ref 0–6)
ERYTHROCYTE [DISTWIDTH] IN BLOOD BY AUTOMATED COUNT: 14.8 % (ref 11.5–15)
GFR SERPL CREATININE-BSD FRML MDRD: >60 ML/MIN/1.73M2
GLUCOSE SERPL-MCNC: 132 MG/DL (ref 74–99)
HCT VFR BLD AUTO: 43.2 % (ref 37–54)
HGB BLD-MCNC: 14.1 G/DL (ref 12.5–16.5)
IMM GRANULOCYTES # BLD AUTO: <0.03 K/UL (ref 0–0.58)
IMM GRANULOCYTES NFR BLD: 0 % (ref 0–5)
LYMPHOCYTES NFR BLD: 0.38 K/UL (ref 1.5–4)
LYMPHOCYTES RELATIVE PERCENT: 9 % (ref 20–42)
MAGNESIUM SERPL-MCNC: 2.3 MG/DL (ref 1.6–2.6)
MCH RBC QN AUTO: 30.2 PG (ref 26–35)
MCHC RBC AUTO-ENTMCNC: 32.6 G/DL (ref 32–34.5)
MCV RBC AUTO: 92.5 FL (ref 80–99.9)
MONOCYTES NFR BLD: 0.42 K/UL (ref 0.1–0.95)
MONOCYTES NFR BLD: 10 % (ref 2–12)
NEUTROPHILS NFR BLD: 74 % (ref 43–80)
NEUTS SEG NFR BLD: 2.97 K/UL (ref 1.8–7.3)
PLATELET # BLD AUTO: 174 K/UL (ref 130–450)
PMV BLD AUTO: 10 FL (ref 7–12)
POTASSIUM SERPL-SCNC: 4.2 MMOL/L (ref 3.5–5)
PROT SERPL-MCNC: 7.2 G/DL (ref 6.4–8.3)
RBC # BLD AUTO: 4.67 M/UL (ref 3.8–5.8)
SODIUM SERPL-SCNC: 143 MMOL/L (ref 132–146)
WBC OTHER # BLD: 4 K/UL (ref 4.5–11.5)

## 2023-08-08 PROCEDURE — 96367 TX/PROPH/DG ADDL SEQ IV INF: CPT

## 2023-08-08 PROCEDURE — 96415 CHEMO IV INFUSION ADDL HR: CPT

## 2023-08-08 PROCEDURE — 77386 HC NTSTY MODUL RAD TX DLVR CPLX: CPT | Performed by: SPECIALIST

## 2023-08-08 PROCEDURE — 77014 CHG CT GUIDANCE RADIATION THERAPY FLDS PLACEMENT: CPT | Performed by: SPECIALIST

## 2023-08-08 PROCEDURE — 6360000002 HC RX W HCPCS: Performed by: CLINICAL NURSE SPECIALIST

## 2023-08-08 PROCEDURE — 6360000002 HC RX W HCPCS: Performed by: INTERNAL MEDICINE

## 2023-08-08 PROCEDURE — 85025 COMPLETE CBC W/AUTO DIFF WBC: CPT

## 2023-08-08 PROCEDURE — 99213 OFFICE O/P EST LOW 20 MIN: CPT | Performed by: CLINICAL NURSE SPECIALIST

## 2023-08-08 PROCEDURE — 2580000003 HC RX 258: Performed by: CLINICAL NURSE SPECIALIST

## 2023-08-08 PROCEDURE — 96413 CHEMO IV INFUSION 1 HR: CPT

## 2023-08-08 PROCEDURE — 83735 ASSAY OF MAGNESIUM: CPT

## 2023-08-08 PROCEDURE — 80053 COMPREHEN METABOLIC PANEL: CPT

## 2023-08-08 PROCEDURE — 96375 TX/PRO/DX INJ NEW DRUG ADDON: CPT

## 2023-08-08 PROCEDURE — 2580000003 HC RX 258: Performed by: INTERNAL MEDICINE

## 2023-08-08 RX ORDER — SODIUM CHLORIDE 0.9 % (FLUSH) 0.9 %
5-40 SYRINGE (ML) INJECTION PRN
Status: DISCONTINUED | OUTPATIENT
Start: 2023-08-08 | End: 2023-08-09 | Stop reason: HOSPADM

## 2023-08-08 RX ORDER — SODIUM CHLORIDE 0.9 % (FLUSH) 0.9 %
5-40 SYRINGE (ML) INJECTION PRN
Status: CANCELLED | OUTPATIENT
Start: 2023-08-08

## 2023-08-08 RX ORDER — SODIUM CHLORIDE 9 MG/ML
5-250 INJECTION, SOLUTION INTRAVENOUS PRN
Status: CANCELLED | OUTPATIENT
Start: 2023-08-08

## 2023-08-08 RX ORDER — FAMOTIDINE 10 MG/ML
20 INJECTION, SOLUTION INTRAVENOUS
Status: CANCELLED | OUTPATIENT
Start: 2023-08-08

## 2023-08-08 RX ORDER — ONDANSETRON 2 MG/ML
8 INJECTION INTRAMUSCULAR; INTRAVENOUS
Status: CANCELLED | OUTPATIENT
Start: 2023-08-08

## 2023-08-08 RX ORDER — HEPARIN 100 UNIT/ML
500 SYRINGE INTRAVENOUS PRN
Status: DISCONTINUED | OUTPATIENT
Start: 2023-08-08 | End: 2023-08-09 | Stop reason: HOSPADM

## 2023-08-08 RX ORDER — SODIUM CHLORIDE 9 MG/ML
INJECTION, SOLUTION INTRAVENOUS CONTINUOUS
Status: CANCELLED | OUTPATIENT
Start: 2023-08-08

## 2023-08-08 RX ORDER — DIPHENHYDRAMINE HYDROCHLORIDE 50 MG/ML
50 INJECTION INTRAMUSCULAR; INTRAVENOUS
Status: CANCELLED | OUTPATIENT
Start: 2023-08-08

## 2023-08-08 RX ORDER — PALONOSETRON 0.05 MG/ML
0.25 INJECTION, SOLUTION INTRAVENOUS ONCE
Status: CANCELLED | OUTPATIENT
Start: 2023-08-08 | End: 2023-08-08

## 2023-08-08 RX ORDER — ALBUTEROL SULFATE 90 UG/1
4 AEROSOL, METERED RESPIRATORY (INHALATION) PRN
Status: CANCELLED | OUTPATIENT
Start: 2023-08-08

## 2023-08-08 RX ORDER — HEPARIN SODIUM (PORCINE) LOCK FLUSH IV SOLN 100 UNIT/ML 100 UNIT/ML
500 SOLUTION INTRAVENOUS PRN
Status: CANCELLED | OUTPATIENT
Start: 2023-08-08

## 2023-08-08 RX ORDER — SODIUM CHLORIDE 9 MG/ML
25 INJECTION, SOLUTION INTRAVENOUS PRN
OUTPATIENT
Start: 2023-08-08

## 2023-08-08 RX ORDER — ACETAMINOPHEN 325 MG/1
650 TABLET ORAL
Status: CANCELLED | OUTPATIENT
Start: 2023-08-08

## 2023-08-08 RX ORDER — SODIUM CHLORIDE 9 MG/ML
5-250 INJECTION, SOLUTION INTRAVENOUS PRN
Status: DISCONTINUED | OUTPATIENT
Start: 2023-08-08 | End: 2023-08-09 | Stop reason: HOSPADM

## 2023-08-08 RX ORDER — HEPARIN 100 UNIT/ML
500 SYRINGE INTRAVENOUS PRN
Status: CANCELLED | OUTPATIENT
Start: 2023-08-08

## 2023-08-08 RX ORDER — EPINEPHRINE 1 MG/ML
0.3 INJECTION, SOLUTION, CONCENTRATE INTRAVENOUS PRN
Status: CANCELLED | OUTPATIENT
Start: 2023-08-08

## 2023-08-08 RX ORDER — MEPERIDINE HYDROCHLORIDE 50 MG/ML
12.5 INJECTION INTRAMUSCULAR; INTRAVENOUS; SUBCUTANEOUS PRN
Status: CANCELLED | OUTPATIENT
Start: 2023-08-08

## 2023-08-08 RX ORDER — DEXAMETHASONE SODIUM PHOSPHATE 10 MG/ML
10 INJECTION, SOLUTION INTRAMUSCULAR; INTRAVENOUS ONCE
Status: COMPLETED | OUTPATIENT
Start: 2023-08-08 | End: 2023-08-08

## 2023-08-08 RX ORDER — PALONOSETRON HYDROCHLORIDE 0.05 MG/ML
0.25 INJECTION, SOLUTION INTRAVENOUS ONCE
Status: COMPLETED | OUTPATIENT
Start: 2023-08-08 | End: 2023-08-08

## 2023-08-08 RX ADMIN — SODIUM CHLORIDE 100 ML/HR: 9 INJECTION, SOLUTION INTRAVENOUS at 12:23

## 2023-08-08 RX ADMIN — HEPARIN 500 UNITS: 100 SYRINGE at 09:11

## 2023-08-08 RX ADMIN — POTASSIUM CHLORIDE: 2 INJECTION, SOLUTION, CONCENTRATE INTRAVENOUS at 12:55

## 2023-08-08 RX ADMIN — SODIUM CHLORIDE, PRESERVATIVE FREE 10 ML: 5 INJECTION INTRAVENOUS at 12:23

## 2023-08-08 RX ADMIN — PALONOSETRON 0.25 MG: 0.25 INJECTION, SOLUTION INTRAVENOUS at 12:28

## 2023-08-08 RX ADMIN — HEPARIN 500 UNITS: 100 SYRINGE at 14:57

## 2023-08-08 RX ADMIN — DEXAMETHASONE SODIUM PHOSPHATE 10 MG: 10 INJECTION, SOLUTION INTRAMUSCULAR; INTRAVENOUS at 12:23

## 2023-08-08 RX ADMIN — FOSAPREPITANT 150 MG: 150 INJECTION, POWDER, LYOPHILIZED, FOR SOLUTION INTRAVENOUS at 12:30

## 2023-08-08 RX ADMIN — SODIUM CHLORIDE, PRESERVATIVE FREE 10 ML: 5 INJECTION INTRAVENOUS at 14:56

## 2023-08-08 RX ADMIN — SODIUM CHLORIDE, PRESERVATIVE FREE 10 ML: 5 INJECTION INTRAVENOUS at 09:11

## 2023-08-08 NOTE — TELEPHONE ENCOUNTER
Met with pt and pt's wife in conjunction with medical oncology visit as social work follow up. Pt is 51-year-old male being managed for squamous cell carcinoma of the nose who recently underwent a rhinectomy. Pt's mood appeared euthymic with full affect, he appeared A&Ox4, and he was willing/able to participate in session. He appeared appropriately dressed/groomed and was able to ambulate/transfer without assistance. Pt discussed recent admission and ongoing cancer care. He identified some difficulty coping with ongoing symptoms, particularly in regards to eating. Provided support and, in conjunction with dietitian, explored options for consuming food/drink for pleasure. Discussed shifting of mindset regarding oral intake to view as for pleasure rather than an obligation. Will remain available.     RICARDO Du, VERÓNICA-S  Oncology Social Worker

## 2023-08-08 NOTE — TELEPHONE ENCOUNTER
Met with patient during his infusion appointment today. Patient states doing ok. He got PEG tube and states he was doing well with it while in hospital but product he was using at home he states that he did not do well with . He states that he met with dietitian today to adjust plan. He's encouraged to be close the end of treatment that the beginning. Encouragement and support given. Patient denies any questions or issues at this time. Encouraged to call if any arise.

## 2023-08-08 NOTE — PROGRESS NOTES
Crescencio Logan  8/8/2023  Ht Readings from Last 1 Encounters:   08/08/23 6' 4\" (1.93 m)     Wt Readings from Last 10 Encounters:   08/08/23 212 lb (96.2 kg)   08/04/23 217 lb (98.4 kg)   07/31/23 222 lb (100.7 kg)   07/30/23 222 lb (100.7 kg)   07/27/23 228 lb 8 oz (103.6 kg)   07/25/23 230 lb (104.3 kg)   07/18/23 236 lb 1.6 oz (107.1 kg)   07/17/23 237 lb 1.6 oz (107.5 kg)   07/11/23 235 lb 6.4 oz (106.8 kg)   06/06/23 226 lb 8 oz (102.7 kg)     BMI: 25.81    Assessment: Met w/ pt and wife, Aida, during infusion. Pt s/p PEG placement 7/31 w/ subsequent admission. Pt started on peptide-based formula while inpatient, tolerated bolus feeding w/ Pivot 1.5 well. Pt discharged on 8/4, was provided w/ Pivot 1.5 upon discharge despite home orders for TwoCal HN. He continued to tolerate Pivot 1.5 on 8/4-8/6 while at home. He transitioned to [facility] 8/7 w/ immediate emesis. He has attempted this multiple times w/ intolerance. Pt requires peptide based formula for optimal GI tolerance. Pt has lost 18# x2 weeks (7.8%). He has been unable to tolerate any PO intake d/t significant odynophagia. Discussed formula change w/ pt and Aida, both in agreement. Provided pt w/ comfort G-tube belt. Pt appreciative of assistance. Will continue to follow. Weight change: -7.8% x2 weeks  Appetite: NA, EN dependent  Nutritional Side Effects: odynophagia, xerostomia  Calculated Needs: 28-30 kcal/kg CBW =  kcal, 1.3-1.5 gm/kg IBW = 120-140 gm pro, 1 ml/kcal =  ml fluids  Malnutrition Status: Severe  Nutrition Diagnosis: Severe malnutrition r/t H&N CA AEB 7.8% wt loss x <1 month, <75% nutrient intake x >1 month, moderate fat and muscle wasting. Recommendations: To meet >75% needs recommend 7 cartons Pivot 1.5 via bolus feed daily to provide 2485 kcal, 155 gm pro, 1246 ml free water. Recommend pt to start w/ 4 cartons daily to meet ~50% estimated needs as he remains at risk for refeeding syndrome.  Pt to increase volume

## 2023-08-09 ENCOUNTER — HOSPITAL ENCOUNTER (OUTPATIENT)
Dept: RADIATION ONCOLOGY | Age: 53
Discharge: HOME | End: 2023-08-09
Payer: COMMERCIAL

## 2023-08-09 PROCEDURE — 77014 CHG CT GUIDANCE RADIATION THERAPY FLDS PLACEMENT: CPT | Performed by: SPECIALIST

## 2023-08-09 PROCEDURE — 77427 RADIATION TX MANAGEMENT X5: CPT | Performed by: SPECIALIST

## 2023-08-09 PROCEDURE — 77386 HC NTSTY MODUL RAD TX DLVR CPLX: CPT | Performed by: SPECIALIST

## 2023-08-09 PROCEDURE — 77336 RADIATION PHYSICS CONSULT: CPT | Performed by: SPECIALIST

## 2023-08-10 ENCOUNTER — HOSPITAL ENCOUNTER (OUTPATIENT)
Dept: RADIATION ONCOLOGY | Age: 53
Discharge: HOME | End: 2023-08-10
Payer: COMMERCIAL

## 2023-08-10 VITALS
DIASTOLIC BLOOD PRESSURE: 84 MMHG | WEIGHT: 211 LBS | SYSTOLIC BLOOD PRESSURE: 128 MMHG | OXYGEN SATURATION: 96 % | HEART RATE: 97 BPM | TEMPERATURE: 97.1 F | BODY MASS INDEX: 25.68 KG/M2 | RESPIRATION RATE: 18 BRPM

## 2023-08-10 PROCEDURE — 77014 CHG CT GUIDANCE RADIATION THERAPY FLDS PLACEMENT: CPT | Performed by: SPECIALIST

## 2023-08-10 PROCEDURE — 77386 HC NTSTY MODUL RAD TX DLVR CPLX: CPT | Performed by: SPECIALIST

## 2023-08-11 ENCOUNTER — HOSPITAL ENCOUNTER (OUTPATIENT)
Dept: RADIATION ONCOLOGY | Age: 53
Discharge: HOME | End: 2023-08-11
Payer: COMMERCIAL

## 2023-08-11 PROCEDURE — 77386 HC NTSTY MODUL RAD TX DLVR CPLX: CPT | Performed by: SPECIALIST

## 2023-08-14 ENCOUNTER — TELEPHONE (OUTPATIENT)
Dept: RADIATION ONCOLOGY | Age: 53
End: 2023-08-14

## 2023-08-14 ENCOUNTER — HOSPITAL ENCOUNTER (OUTPATIENT)
Dept: RADIATION ONCOLOGY | Age: 53
End: 2023-08-14
Payer: COMMERCIAL

## 2023-08-14 ENCOUNTER — HOSPITAL ENCOUNTER (OUTPATIENT)
Dept: INFUSION THERAPY | Age: 53
Discharge: HOME | End: 2023-08-14

## 2023-08-14 ENCOUNTER — HOSPITAL ENCOUNTER (INPATIENT)
Age: 53
LOS: 4 days | Discharge: HOME | End: 2023-08-18
Attending: EMERGENCY MEDICINE | Admitting: INTERNAL MEDICINE
Payer: COMMERCIAL

## 2023-08-14 ENCOUNTER — APPOINTMENT (OUTPATIENT)
Dept: CT IMAGING | Age: 53
End: 2023-08-14
Payer: COMMERCIAL

## 2023-08-14 ENCOUNTER — APPOINTMENT (OUTPATIENT)
Dept: GENERAL RADIOLOGY | Age: 53
End: 2023-08-14
Payer: COMMERCIAL

## 2023-08-14 DIAGNOSIS — R11.2 CHEMOTHERAPY INDUCED NAUSEA AND VOMITING: Primary | ICD-10-CM

## 2023-08-14 DIAGNOSIS — T45.1X5A CHEMOTHERAPY INDUCED NAUSEA AND VOMITING: Primary | ICD-10-CM

## 2023-08-14 DIAGNOSIS — C76.0 MALIGNANT NEOPLASM OF HEAD, FACE AND NECK (HCC): Primary | ICD-10-CM

## 2023-08-14 PROBLEM — N17.9 AKI (ACUTE KIDNEY INJURY) (HCC): Status: ACTIVE | Noted: 2023-08-14

## 2023-08-14 LAB
ANION GAP SERPL CALCULATED.3IONS-SCNC: 22 MMOL/L (ref 7–16)
B PARAP IS1001 DNA NPH QL NAA+NON-PROBE: NOT DETECTED
B PERT DNA SPEC QL NAA+PROBE: NOT DETECTED
BASOPHILS # BLD: 0.03 K/UL (ref 0–0.2)
BASOPHILS NFR BLD: 1 % (ref 0–2)
BUN SERPL-MCNC: 44 MG/DL (ref 6–20)
C PNEUM DNA NPH QL NAA+NON-PROBE: NOT DETECTED
CALCIUM SERPL-MCNC: 10.6 MG/DL (ref 8.6–10.2)
CHLORIDE SERPL-SCNC: 106 MMOL/L (ref 98–107)
CO2 SERPL-SCNC: 19 MMOL/L (ref 22–29)
CREAT SERPL-MCNC: 1.5 MG/DL (ref 0.7–1.2)
EKG ATRIAL RATE: 107 BPM
EKG P AXIS: 68 DEGREES
EKG P-R INTERVAL: 132 MS
EKG Q-T INTERVAL: 334 MS
EKG QRS DURATION: 78 MS
EKG QTC CALCULATION (BAZETT): 445 MS
EKG R AXIS: 65 DEGREES
EKG T AXIS: 50 DEGREES
EKG VENTRICULAR RATE: 107 BPM
EOSINOPHIL # BLD: 0.02 K/UL (ref 0.05–0.5)
EOSINOPHILS RELATIVE PERCENT: 0 % (ref 0–6)
ERYTHROCYTE [DISTWIDTH] IN BLOOD BY AUTOMATED COUNT: 14.5 % (ref 11.5–15)
FLUAV RNA NPH QL NAA+NON-PROBE: NOT DETECTED
FLUBV RNA NPH QL NAA+NON-PROBE: NOT DETECTED
GFR SERPL CREATININE-BSD FRML MDRD: 54 ML/MIN/1.73M2
GLUCOSE SERPL-MCNC: 121 MG/DL (ref 74–99)
HADV DNA NPH QL NAA+NON-PROBE: NOT DETECTED
HCOV 229E RNA NPH QL NAA+NON-PROBE: NOT DETECTED
HCOV HKU1 RNA NPH QL NAA+NON-PROBE: NOT DETECTED
HCOV NL63 RNA NPH QL NAA+NON-PROBE: NOT DETECTED
HCOV OC43 RNA NPH QL NAA+NON-PROBE: NOT DETECTED
HCT VFR BLD AUTO: 46.7 % (ref 37–54)
HGB BLD-MCNC: 15.5 G/DL (ref 12.5–16.5)
HMPV RNA NPH QL NAA+NON-PROBE: NOT DETECTED
HPIV1 RNA NPH QL NAA+NON-PROBE: NOT DETECTED
HPIV2 RNA NPH QL NAA+NON-PROBE: NOT DETECTED
HPIV3 RNA NPH QL NAA+NON-PROBE: NOT DETECTED
HPIV4 RNA NPH QL NAA+NON-PROBE: NOT DETECTED
IMM GRANULOCYTES # BLD AUTO: 0.04 K/UL (ref 0–0.58)
IMM GRANULOCYTES NFR BLD: 1 % (ref 0–5)
LYMPHOCYTES NFR BLD: 0.4 K/UL (ref 1.5–4)
LYMPHOCYTES RELATIVE PERCENT: 6 % (ref 20–42)
M PNEUMO DNA NPH QL NAA+NON-PROBE: NOT DETECTED
MCH RBC QN AUTO: 29.8 PG (ref 26–35)
MCHC RBC AUTO-ENTMCNC: 33.2 G/DL (ref 32–34.5)
MCV RBC AUTO: 89.8 FL (ref 80–99.9)
MONOCYTES NFR BLD: 0.46 K/UL (ref 0.1–0.95)
MONOCYTES NFR BLD: 7 % (ref 2–12)
NEUTROPHILS NFR BLD: 85 % (ref 43–80)
NEUTS SEG NFR BLD: 5.55 K/UL (ref 1.8–7.3)
PLATELET # BLD AUTO: 238 K/UL (ref 130–450)
PMV BLD AUTO: 10 FL (ref 7–12)
POTASSIUM SERPL-SCNC: 4.4 MMOL/L (ref 3.5–5)
RBC # BLD AUTO: 5.2 M/UL (ref 3.8–5.8)
RBC # BLD: NORMAL 10*6/UL
RSV RNA NPH QL NAA+NON-PROBE: NOT DETECTED
RV+EV RNA NPH QL NAA+NON-PROBE: NOT DETECTED
SARS-COV-2 RNA NPH QL NAA+NON-PROBE: NOT DETECTED
SODIUM SERPL-SCNC: 147 MMOL/L (ref 132–146)
SPECIMEN DESCRIPTION: NORMAL
TROPONIN I SERPL HS-MCNC: 10 NG/L (ref 0–11)
TROPONIN I SERPL HS-MCNC: 12 NG/L (ref 0–11)
WBC OTHER # BLD: 6.5 K/UL (ref 4.5–11.5)

## 2023-08-14 PROCEDURE — 93005 ELECTROCARDIOGRAM TRACING: CPT

## 2023-08-14 PROCEDURE — 6360000004 HC RX CONTRAST MEDICATION: Performed by: RADIOLOGY

## 2023-08-14 PROCEDURE — 2060000000 HC ICU INTERMEDIATE R&B

## 2023-08-14 PROCEDURE — 6360000002 HC RX W HCPCS: Performed by: FAMILY MEDICINE

## 2023-08-14 PROCEDURE — 2580000003 HC RX 258: Performed by: FAMILY MEDICINE

## 2023-08-14 PROCEDURE — 96361 HYDRATE IV INFUSION ADD-ON: CPT

## 2023-08-14 PROCEDURE — 2580000003 HC RX 258: Performed by: NURSE PRACTITIONER

## 2023-08-14 PROCEDURE — 71045 X-RAY EXAM CHEST 1 VIEW: CPT

## 2023-08-14 PROCEDURE — 2580000003 HC RX 258

## 2023-08-14 PROCEDURE — 84484 ASSAY OF TROPONIN QUANT: CPT

## 2023-08-14 PROCEDURE — 80048 BASIC METABOLIC PNL TOTAL CA: CPT

## 2023-08-14 PROCEDURE — 2500000003 HC RX 250 WO HCPCS: Performed by: NURSE PRACTITIONER

## 2023-08-14 PROCEDURE — 6360000002 HC RX W HCPCS

## 2023-08-14 PROCEDURE — 99285 EMERGENCY DEPT VISIT HI MDM: CPT

## 2023-08-14 PROCEDURE — 0202U NFCT DS 22 TRGT SARS-COV-2: CPT

## 2023-08-14 PROCEDURE — 85025 COMPLETE CBC W/AUTO DIFF WBC: CPT

## 2023-08-14 PROCEDURE — 96374 THER/PROPH/DIAG INJ IV PUSH: CPT

## 2023-08-14 PROCEDURE — 93010 ELECTROCARDIOGRAM REPORT: CPT | Performed by: INTERNAL MEDICINE

## 2023-08-14 PROCEDURE — A4216 STERILE WATER/SALINE, 10 ML: HCPCS | Performed by: NURSE PRACTITIONER

## 2023-08-14 PROCEDURE — 71275 CT ANGIOGRAPHY CHEST: CPT

## 2023-08-14 RX ORDER — ONDANSETRON 2 MG/ML
4 INJECTION INTRAMUSCULAR; INTRAVENOUS EVERY 6 HOURS PRN
Status: DISCONTINUED | OUTPATIENT
Start: 2023-08-14 | End: 2023-08-15

## 2023-08-14 RX ORDER — ACETAMINOPHEN 325 MG/1
650 TABLET ORAL EVERY 6 HOURS PRN
Status: DISCONTINUED | OUTPATIENT
Start: 2023-08-14 | End: 2023-08-15 | Stop reason: SDUPTHER

## 2023-08-14 RX ORDER — HEPARIN 100 UNIT/ML
500 SYRINGE INTRAVENOUS PRN
Status: CANCELLED | OUTPATIENT
Start: 2023-08-14

## 2023-08-14 RX ORDER — SODIUM CHLORIDE 0.9 % (FLUSH) 0.9 %
10 SYRINGE (ML) INJECTION PRN
Status: DISCONTINUED | OUTPATIENT
Start: 2023-08-14 | End: 2023-08-18 | Stop reason: HOSPADM

## 2023-08-14 RX ORDER — SODIUM CHLORIDE, SODIUM LACTATE, POTASSIUM CHLORIDE, CALCIUM CHLORIDE 600; 310; 30; 20 MG/100ML; MG/100ML; MG/100ML; MG/100ML
INJECTION, SOLUTION INTRAVENOUS CONTINUOUS
Status: DISCONTINUED | OUTPATIENT
Start: 2023-08-14 | End: 2023-08-16

## 2023-08-14 RX ORDER — 0.9 % SODIUM CHLORIDE 0.9 %
1000 INTRAVENOUS SOLUTION INTRAVENOUS ONCE
Status: COMPLETED | OUTPATIENT
Start: 2023-08-14 | End: 2023-08-14

## 2023-08-14 RX ORDER — SODIUM CHLORIDE 0.9 % (FLUSH) 0.9 %
5-40 SYRINGE (ML) INJECTION PRN
Status: CANCELLED | OUTPATIENT
Start: 2023-08-14

## 2023-08-14 RX ORDER — ONDANSETRON 2 MG/ML
4 INJECTION INTRAMUSCULAR; INTRAVENOUS ONCE
Status: DISCONTINUED | OUTPATIENT
Start: 2023-08-14 | End: 2023-08-14 | Stop reason: SDUPTHER

## 2023-08-14 RX ORDER — LIDOCAINE HYDROCHLORIDE 20 MG/ML
10 SOLUTION OROPHARYNGEAL PRN
Status: DISCONTINUED | OUTPATIENT
Start: 2023-08-14 | End: 2023-08-18 | Stop reason: HOSPADM

## 2023-08-14 RX ORDER — SODIUM CHLORIDE 9 MG/ML
INJECTION, SOLUTION INTRAVENOUS PRN
Status: DISCONTINUED | OUTPATIENT
Start: 2023-08-14 | End: 2023-08-18 | Stop reason: HOSPADM

## 2023-08-14 RX ORDER — ONDANSETRON 4 MG/1
4 TABLET, ORALLY DISINTEGRATING ORAL EVERY 8 HOURS PRN
Status: DISCONTINUED | OUTPATIENT
Start: 2023-08-14 | End: 2023-08-15

## 2023-08-14 RX ORDER — POTASSIUM CHLORIDE 20 MEQ/1
40 TABLET, EXTENDED RELEASE ORAL PRN
Status: DISCONTINUED | OUTPATIENT
Start: 2023-08-14 | End: 2023-08-15

## 2023-08-14 RX ORDER — ONDANSETRON 4 MG/1
8 TABLET, FILM COATED ORAL EVERY 8 HOURS PRN
Status: DISCONTINUED | OUTPATIENT
Start: 2023-08-14 | End: 2023-08-14 | Stop reason: SDUPTHER

## 2023-08-14 RX ORDER — ONDANSETRON 2 MG/ML
4 INJECTION INTRAMUSCULAR; INTRAVENOUS ONCE
Status: COMPLETED | OUTPATIENT
Start: 2023-08-14 | End: 2023-08-14

## 2023-08-14 RX ORDER — SODIUM CHLORIDE 0.9 % (FLUSH) 0.9 %
5-40 SYRINGE (ML) INJECTION PRN
Status: DISCONTINUED | OUTPATIENT
Start: 2023-08-14 | End: 2023-08-14 | Stop reason: SDUPTHER

## 2023-08-14 RX ORDER — ACETAMINOPHEN 650 MG/1
650 SUPPOSITORY RECTAL EVERY 6 HOURS PRN
Status: DISCONTINUED | OUTPATIENT
Start: 2023-08-14 | End: 2023-08-15 | Stop reason: SDUPTHER

## 2023-08-14 RX ORDER — HEPARIN 100 UNIT/ML
500 SYRINGE INTRAVENOUS PRN
Status: DISCONTINUED | OUTPATIENT
Start: 2023-08-14 | End: 2023-08-15 | Stop reason: HOSPADM

## 2023-08-14 RX ORDER — SODIUM CHLORIDE 9 MG/ML
25 INJECTION, SOLUTION INTRAVENOUS PRN
Status: CANCELLED | OUTPATIENT
Start: 2023-08-14

## 2023-08-14 RX ORDER — ENOXAPARIN SODIUM 100 MG/ML
40 INJECTION SUBCUTANEOUS DAILY
Status: DISCONTINUED | OUTPATIENT
Start: 2023-08-14 | End: 2023-08-18 | Stop reason: HOSPADM

## 2023-08-14 RX ORDER — SODIUM CHLORIDE 0.9 % (FLUSH) 0.9 %
5-40 SYRINGE (ML) INJECTION EVERY 12 HOURS SCHEDULED
Status: DISCONTINUED | OUTPATIENT
Start: 2023-08-14 | End: 2023-08-18 | Stop reason: HOSPADM

## 2023-08-14 RX ORDER — POTASSIUM CHLORIDE 7.45 MG/ML
10 INJECTION INTRAVENOUS PRN
Status: DISCONTINUED | OUTPATIENT
Start: 2023-08-14 | End: 2023-08-15

## 2023-08-14 RX ORDER — POLYETHYLENE GLYCOL 3350 17 G/17G
17 POWDER, FOR SOLUTION ORAL DAILY PRN
Status: DISCONTINUED | OUTPATIENT
Start: 2023-08-14 | End: 2023-08-18 | Stop reason: HOSPADM

## 2023-08-14 RX ADMIN — FAMOTIDINE 20 MG: 10 INJECTION, SOLUTION INTRAVENOUS at 19:46

## 2023-08-14 RX ADMIN — SODIUM CHLORIDE 1000 ML: 9 INJECTION, SOLUTION INTRAVENOUS at 09:48

## 2023-08-14 RX ADMIN — SODIUM CHLORIDE, POTASSIUM CHLORIDE, SODIUM LACTATE AND CALCIUM CHLORIDE: 600; 310; 30; 20 INJECTION, SOLUTION INTRAVENOUS at 20:09

## 2023-08-14 RX ADMIN — ONDANSETRON 4 MG: 2 INJECTION INTRAMUSCULAR; INTRAVENOUS at 09:50

## 2023-08-14 RX ADMIN — ONDANSETRON 4 MG: 2 INJECTION INTRAMUSCULAR; INTRAVENOUS at 19:49

## 2023-08-14 RX ADMIN — SODIUM CHLORIDE 1000 ML: 9 INJECTION, SOLUTION INTRAVENOUS at 11:25

## 2023-08-14 RX ADMIN — ENOXAPARIN SODIUM 40 MG: 100 INJECTION SUBCUTANEOUS at 20:09

## 2023-08-14 RX ADMIN — IOPAMIDOL 75 ML: 755 INJECTION, SOLUTION INTRAVENOUS at 13:50

## 2023-08-14 ASSESSMENT — PAIN - FUNCTIONAL ASSESSMENT: PAIN_FUNCTIONAL_ASSESSMENT: NONE - DENIES PAIN

## 2023-08-14 NOTE — TELEPHONE ENCOUNTER
Pt arrived at 830 for his treatment and was unable to walk up the ramp, he sat down on the sidewalk. [facility] ,went out with a wheel chair and brought patient in that stated he wanted to go to the ED because he felt he was dying. I took patient to the ED with his wife. An ED nurse proceeded to move forward for patient being seen.

## 2023-08-15 ENCOUNTER — HOSPITAL ENCOUNTER (OUTPATIENT)
Dept: RADIATION ONCOLOGY | Age: 53
Discharge: HOME | End: 2023-08-15
Payer: COMMERCIAL

## 2023-08-15 ENCOUNTER — HOSPITAL ENCOUNTER (OUTPATIENT)
Dept: INFUSION THERAPY | Age: 53
End: 2023-08-15

## 2023-08-15 PROBLEM — E44.0 MODERATE PROTEIN-CALORIE MALNUTRITION (HCC): Chronic | Status: ACTIVE | Noted: 2023-08-15

## 2023-08-15 LAB
ALBUMIN SERPL-MCNC: 4.1 G/DL (ref 3.5–5.2)
ALP SERPL-CCNC: 89 U/L (ref 40–129)
ALT SERPL-CCNC: 44 U/L (ref 0–40)
ANION GAP SERPL CALCULATED.3IONS-SCNC: 13 MMOL/L (ref 7–16)
AST SERPL-CCNC: 21 U/L (ref 0–39)
BASOPHILS # BLD: 0.03 K/UL (ref 0–0.2)
BASOPHILS NFR BLD: 1 % (ref 0–2)
BILIRUB SERPL-MCNC: 0.6 MG/DL (ref 0–1.2)
BUN SERPL-MCNC: 34 MG/DL (ref 6–20)
CALCIUM SERPL-MCNC: 9.5 MG/DL (ref 8.6–10.2)
CHLORIDE SERPL-SCNC: 112 MMOL/L (ref 98–107)
CO2 SERPL-SCNC: 24 MMOL/L (ref 22–29)
CREAT SERPL-MCNC: 1.3 MG/DL (ref 0.7–1.2)
EOSINOPHIL # BLD: 0.05 K/UL (ref 0.05–0.5)
EOSINOPHILS RELATIVE PERCENT: 1 % (ref 0–6)
ERYTHROCYTE [DISTWIDTH] IN BLOOD BY AUTOMATED COUNT: 14.6 % (ref 11.5–15)
GFR SERPL CREATININE-BSD FRML MDRD: >60 ML/MIN/1.73M2
GLUCOSE BLD-MCNC: 88 MG/DL (ref 74–99)
GLUCOSE SERPL-MCNC: 90 MG/DL (ref 74–99)
HCT VFR BLD AUTO: 40.6 % (ref 37–54)
HGB BLD-MCNC: 13.5 G/DL (ref 12.5–16.5)
IMM GRANULOCYTES # BLD AUTO: 0.03 K/UL (ref 0–0.58)
IMM GRANULOCYTES NFR BLD: 1 % (ref 0–5)
LYMPHOCYTES NFR BLD: 0.39 K/UL (ref 1.5–4)
LYMPHOCYTES RELATIVE PERCENT: 8 % (ref 20–42)
MCH RBC QN AUTO: 30.5 PG (ref 26–35)
MCHC RBC AUTO-ENTMCNC: 33.3 G/DL (ref 32–34.5)
MCV RBC AUTO: 91.9 FL (ref 80–99.9)
MONOCYTES NFR BLD: 0.57 K/UL (ref 0.1–0.95)
MONOCYTES NFR BLD: 11 % (ref 2–12)
NEUTROPHILS NFR BLD: 79 % (ref 43–80)
NEUTS SEG NFR BLD: 4.12 K/UL (ref 1.8–7.3)
PLATELET # BLD AUTO: 189 K/UL (ref 130–450)
PMV BLD AUTO: 10.3 FL (ref 7–12)
POTASSIUM SERPL-SCNC: 4.1 MMOL/L (ref 3.5–5)
PROT SERPL-MCNC: 6.7 G/DL (ref 6.4–8.3)
RBC # BLD AUTO: 4.42 M/UL (ref 3.8–5.8)
RBC # BLD: ABNORMAL 10*6/UL
SODIUM SERPL-SCNC: 149 MMOL/L (ref 132–146)
WBC OTHER # BLD: 5.2 K/UL (ref 4.5–11.5)

## 2023-08-15 PROCEDURE — 2060000000 HC ICU INTERMEDIATE R&B

## 2023-08-15 PROCEDURE — 6360000002 HC RX W HCPCS: Performed by: FAMILY MEDICINE

## 2023-08-15 PROCEDURE — 6370000000 HC RX 637 (ALT 250 FOR IP): Performed by: INTERNAL MEDICINE

## 2023-08-15 PROCEDURE — 85025 COMPLETE CBC W/AUTO DIFF WBC: CPT

## 2023-08-15 PROCEDURE — 36415 COLL VENOUS BLD VENIPUNCTURE: CPT

## 2023-08-15 PROCEDURE — 6370000000 HC RX 637 (ALT 250 FOR IP): Performed by: FAMILY MEDICINE

## 2023-08-15 PROCEDURE — 6360000002 HC RX W HCPCS: Performed by: INTERNAL MEDICINE

## 2023-08-15 PROCEDURE — 77014 CHG CT GUIDANCE RADIATION THERAPY FLDS PLACEMENT: CPT | Performed by: SPECIALIST

## 2023-08-15 PROCEDURE — 82962 GLUCOSE BLOOD TEST: CPT

## 2023-08-15 PROCEDURE — 97165 OT EVAL LOW COMPLEX 30 MIN: CPT

## 2023-08-15 PROCEDURE — 99223 1ST HOSP IP/OBS HIGH 75: CPT | Performed by: INTERNAL MEDICINE

## 2023-08-15 PROCEDURE — 77386 HC NTSTY MODUL RAD TX DLVR CPLX: CPT | Performed by: SPECIALIST

## 2023-08-15 PROCEDURE — 97530 THERAPEUTIC ACTIVITIES: CPT

## 2023-08-15 PROCEDURE — 80053 COMPREHEN METABOLIC PANEL: CPT

## 2023-08-15 PROCEDURE — 6370000000 HC RX 637 (ALT 250 FOR IP)

## 2023-08-15 PROCEDURE — 2580000003 HC RX 258: Performed by: FAMILY MEDICINE

## 2023-08-15 RX ORDER — ONDANSETRON 2 MG/ML
8 INJECTION INTRAMUSCULAR; INTRAVENOUS EVERY 8 HOURS PRN
Status: DISCONTINUED | OUTPATIENT
Start: 2023-08-15 | End: 2023-08-18 | Stop reason: HOSPADM

## 2023-08-15 RX ORDER — ACETAMINOPHEN 650 MG/1
650 SUPPOSITORY RECTAL EVERY 6 HOURS PRN
Status: DISCONTINUED | OUTPATIENT
Start: 2023-08-15 | End: 2023-08-18 | Stop reason: HOSPADM

## 2023-08-15 RX ORDER — SCOLOPAMINE TRANSDERMAL SYSTEM 1 MG/1
1 PATCH, EXTENDED RELEASE TRANSDERMAL
Status: DISCONTINUED | OUTPATIENT
Start: 2023-08-15 | End: 2023-08-18 | Stop reason: HOSPADM

## 2023-08-15 RX ORDER — PROMETHAZINE HYDROCHLORIDE 25 MG/ML
6.25 INJECTION, SOLUTION INTRAMUSCULAR; INTRAVENOUS EVERY 4 HOURS PRN
Status: DISCONTINUED | OUTPATIENT
Start: 2023-08-15 | End: 2023-08-18 | Stop reason: HOSPADM

## 2023-08-15 RX ORDER — DOCUSATE SODIUM 50 MG/5ML
100 LIQUID ORAL DAILY
Status: DISCONTINUED | OUTPATIENT
Start: 2023-08-16 | End: 2023-08-15

## 2023-08-15 RX ORDER — DOCUSATE SODIUM 100 MG/1
100 CAPSULE, LIQUID FILLED ORAL NIGHTLY
Status: DISCONTINUED | OUTPATIENT
Start: 2023-08-15 | End: 2023-08-15

## 2023-08-15 RX ORDER — DEXAMETHASONE SODIUM PHOSPHATE 4 MG/ML
4 INJECTION, SOLUTION INTRA-ARTICULAR; INTRALESIONAL; INTRAMUSCULAR; INTRAVENOUS; SOFT TISSUE EVERY 12 HOURS
Status: COMPLETED | OUTPATIENT
Start: 2023-08-15 | End: 2023-08-18

## 2023-08-15 RX ORDER — DOCUSATE SODIUM 50 MG/5ML
100 LIQUID ORAL DAILY
Status: DISCONTINUED | OUTPATIENT
Start: 2023-08-15 | End: 2023-08-18 | Stop reason: HOSPADM

## 2023-08-15 RX ORDER — ACETAMINOPHEN 160 MG/5ML
650 SUSPENSION ORAL EVERY 6 HOURS PRN
Status: DISCONTINUED | OUTPATIENT
Start: 2023-08-15 | End: 2023-08-15 | Stop reason: SDUPTHER

## 2023-08-15 RX ORDER — ACETAMINOPHEN 650 MG/20.3ML
650 SOLUTION ORAL EVERY 6 HOURS PRN
Status: DISCONTINUED | OUTPATIENT
Start: 2023-08-15 | End: 2023-08-18 | Stop reason: HOSPADM

## 2023-08-15 RX ADMIN — Medication 15 ML: at 04:11

## 2023-08-15 RX ADMIN — DEXAMETHASONE SODIUM PHOSPHATE 4 MG: 4 INJECTION, SOLUTION INTRAMUSCULAR; INTRAVENOUS at 21:41

## 2023-08-15 RX ADMIN — ENOXAPARIN SODIUM 40 MG: 100 INJECTION SUBCUTANEOUS at 09:32

## 2023-08-15 RX ADMIN — ACETAMINOPHEN 650 MG: 650 SOLUTION ORAL at 15:00

## 2023-08-15 RX ADMIN — Medication 10 ML: at 21:57

## 2023-08-15 RX ADMIN — ONDANSETRON 8 MG: 2 INJECTION INTRAMUSCULAR; INTRAVENOUS at 21:41

## 2023-08-15 RX ADMIN — ONDANSETRON 4 MG: 2 INJECTION INTRAMUSCULAR; INTRAVENOUS at 06:26

## 2023-08-15 RX ADMIN — Medication 10 ML: at 09:33

## 2023-08-15 RX ADMIN — SODIUM CHLORIDE, POTASSIUM CHLORIDE, SODIUM LACTATE AND CALCIUM CHLORIDE: 600; 310; 30; 20 INJECTION, SOLUTION INTRAVENOUS at 09:33

## 2023-08-15 RX ADMIN — DOCUSATE SODIUM LIQUID 100 MG: 100 LIQUID ORAL at 21:40

## 2023-08-15 NOTE — PROGRESS NOTES
Comprehensive Nutrition Assessment    Type and Reason for Visit:  Initial, Consult    Nutrition Recommendations/Plan:   Tube feeding recommendation per physician consult. Recommend Immune Enhancing (Pivot 1.5) @60ml/hr to provide 1440ml, 2160 calories, 135g protein, 1093ml water. Recommend starting at half rate and increase slowly to goal rate as tolerated. Pivot 1.5 is a peptide based formula used to help promote GI tolerance. Malnutrition Assessment:  Malnutrition Status:   Moderate malnutrition (08/15/23 1330)    Context:  Chronic Illness     Findings of the 6 clinical characteristics of malnutrition:  Energy Intake:  75% or less estimated energy requirements for 1 month or longer  Weight Loss:  Unable to assess (d/t lack of long term weight history from previous encounters)     Body Fat Loss:   (moderate) Orbital, Triceps   Muscle Mass Loss:   (moderate) Clavicles (pectoralis & deltoids), Temples (temporalis)  Fluid Accumulation:  No significant fluid accumulation     Strength:  Not Performed    Nutrition Assessment:    Patient is currently NPO ; hx of home tube feedings ; s/p PEG and port placement on 7/31 ; adm w/ fatigue and weakness ; noted PERLA ; 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) ; hx of rhinectomy ; recently d/c from the hospital on 8/4 ; hx of mouth sores and odynophagia  ; noted N/V and poor po intake PTA ; pt states that everything that goes in his PEG tube he will immediately vomit and also he is getting a lot of phlegm and mucus in his throat causing him to gag and subsequently vomit ;  pt has been unable to keep down much solids or liquids this past week and states that he has had decreased bowel movements ; hx of recent moderate malnutrition ; pt does meet criteria for moderate malnutrition ; pt receiving Pivot 1.5 tube feedings at home PTA (pt was recently unable to tolerate TwoCal HN formula) ; will provide TF Intake Outcomes: Enteral Nutrition Intake/Tolerance  Physical Signs/Symptoms Outcomes: Biochemical Data, GI Status, Fluid Status or Edema, Hemodynamic Status, Nutrition Focused Physical Findings, Skin, Weight, Nausea or Vomiting    Discharge Planning:     Too soon to determine     Alexsandra Morris RD, LD  Contact: [phone]

## 2023-08-15 NOTE — H&P
Hospital Medicine History & Physical      PCP: Betsy Romero MD    Date of Admission: 8/14/2023    Date of Service: . AUGUST 15, 2023    Chief Complaint:   NAUSEA AND VOMITING       History Of Present Illness:     48 y.o. male presented with NAUSEA AND VOMITING. PT WAS DIAGNOSED WITH February OF THIS YEAR WITH SQUAMOUS CELL CARCINOMA OF HIS NOSE,  HE HAD A TOTAL RHINECTOMY. AND  HAD A PEG INSERTED  DURING HIS LAST SEVERAL WEEKS AGO. Past Medical History:          Diagnosis Date    Acid reflux     Cancer ([address]) 2023    SCC tip of nose       Past Surgical History:          Procedure Laterality Date    APPENDECTOMY      CHOLECYSTECTOMY      ELBOW SURGERY      GASTROSTOMY TUBE PLACEMENT N/A 7/31/2023    EGD PEG TUBE PLACEMENT performed by Nancy Randall MD at [facility] PICC LINE Right 02/23/2023    removed March 2023    PORT SURGERY N/A 7/31/2023    PORT INSERTION performed by Nancy Randall MD at [address] [facility]  2023    UPPER GASTROINTESTINAL ENDOSCOPY         Medications Prior to Admission:      Prior to Admission medications    Medication Sig Start Date End Date Taking?  Authorizing Provider   ondansetron (ZOFRAN-ODT) 4 MG disintegrating tablet Take 1 tablet by mouth every 8 hours as needed for Nausea or Vomiting 8/4/23   Jen Amado MD   lidocaine viscous hcl (XYLOCAINE) 2 % SOLN solution Take 10 mLs by mouth as needed for Irritation 7/29/23   Amari Oswald PA-C   saline nasal gel (AYR) GEL by Nasal route daily as needed for Congestion Apply to nares daily as needed    Historical Provider, MD   DPH-Lido-AlHydr-MgHydr-Simeth (MAGIC MOUTHWASH) SUSP Swish and spit 15 mLs 4 times daily as needed for Irritation 7/25/23   Columba Smith MD   Omeprazole Magnesium (PRILOSEC) 10 MG PACK Take 10 mg by mouth every morning    Historical Provider, MD   ondansetron (ZOFRAN) 8 MG NEGATIVE 07/30/2023 03:34 PM    WBCUA 0 TO 5 07/30/2023 03:34 PM    BACTERIA MODERATE 07/26/2018 12:05 PM    RBCUA 0 TO 2 07/30/2023 03:34 PM    BLOODU Negative 02/17/2020 07:21 PM    SPECGRAV 1.025 07/30/2023 03:34 PM    GLUCOSEU NEGATIVE 07/30/2023 03:34 PM       Radiology:           CTA PULMONARY W CONTRAST   Final Result   1. No PE, pneumonia, or acute chest disease identified. 2.  Coronary artery calcifications. XR CHEST PORTABLE   Final Result   No radiographic evidence of acute cardiopulmonary disease. ASSESSMENT:    Active Hospital Problems    Diagnosis Date Noted    Moderate protein-calorie malnutrition ([address]) [E44.0] 08/15/2023    PERLA (acute kidney injury) ([address]) [N17.9] 08/14/2023   S.P RHINECTOMY  SQUAMOUS CELL CARCINOMA OF THE NOSE  S/P PEG      PLAN:  SUPPORTIVE CARE        DVT Prophylaxis: LOVENOX  Diet: Diet NPO  ADULT TUBE FEEDING; PEG; Peptide Based; Continuous; 10; Yes; 10; Q 4 hours; 60; 30; Q 4 hours  Code Status: Full Code    PT/OT Eval Status: ORDERED    Dispo -  HOME     Electronically signed by Marycruz Morgan DO on 8/15/2023 at 3:57 PM Padmini Garcia       Thank you yBron Ramirez MD for the opportunity to be involved in this patient's care.  If you have any questions or concerns please feel free to contact me at [phone]

## 2023-08-15 NOTE — PROGRESS NOTES
4 Eyes Skin Assessment     NAME:  Paco Bacon  YOB: 1970  MEDICAL RECORD NUMBER:  [id number]    The patient is being assessed for  Admission    I agree that at least one RN has performed a thorough Head to Toe Skin Assessment on the patient. ALL assessment sites listed below have been assessed. Areas assessed by both nurses:    Head, Face, Ears, Shoulders, Back, Chest, Arms, Elbows, Hands, Sacrum. Buttock, Coccyx, Ischium, Legs. Feet and Heels, and Under Medical Devices         Does the Patient have a Wound?  No noted wound(s)       Faustino Prevention initiated by RN: Yes  Wound Care Orders initiated by RN: No    Pressure Injury (Stage 3,4, Unstageable, DTI, NWPT, and Complex wounds) if present, place Wound referral order by RN under : No    New Ostomies, if present place, Ostomy referral order under : No     Nurse 1 eSignature: Electronically signed by Zenaida Green RN on 8/15/23 at 7:45 AM EDT    **SHARE this note so that the co-signing nurse can place an eSignature**    Nurse 2 eSignature: {Esignature:[id number]}

## 2023-08-15 NOTE — CONSULTS
[address] [facility]  [facility]  [address] [address] [facility] [address] [place] [address]  Dept: [phone]  Loc: [phone]  Attending Consult Note      Reason for Visit:   Patient with Squamous cell carcinoma of the nose/nasal septum, admitted with nausea and vomiting. Referring Physician:  Petrona Jin DO    PCP:  Yoly Cortez MD    History of Present Illness:  Mr. Kylah Lobo is a pleasant 82-year-old gentleman, fairly healthy, who was diagnosed with squamous cell carcinoma of the nose/nasal septum. The patient was hit by his dog's head on the left side of his nose in April 2022, in November 2022 he had noticed a lesion inside his nose, and she was diagnosed with staph infection, was treated with antibiotics, he then developed a pimple on his nose and eventually developed a hole in his nose, he was seen by ID, he was referred to the hospital due to concern about myelitis, he had a CT scan done revealing chronic sinusitis involving the left sphenoid sinus, he had a biopsy done on 2/21/2023, revealing invasive poorly differentiated squamous cell carcinoma, grade 3, the patient was referred Dr. Ricardo Hobson, he underwent a total rhinectomy on 5/5/2023, the patient was found to have tumor growth to the glabella and the maxillary crest, path:   Case Summary Report   Procedure:    Rhinectomy, total   Tumor Site:   Nasal septal mucosa, cartilage, subcutaneous tissue, skin. Tumor Laterality: Left and Right. Left >> Right     Tumor Focality: Single focus     Tumor Size (Greatest dimension): At least 3.5 cm. Histologic Type: Squamous cell carcinoma, keratinizing     Histologic Grade (squamous cell carcinomas only): Poorly differentiated     Specimen Margins (Main specimen; part B)     _X_ Involved by invasive carcinoma        Specify margin(s), per orientation, if possible: Superior and inferior   septal margins, superior right and left skin margins.      _X_ Uninvolved by high-grade dysplasia/in situ disease positive for op mucositis. NECK: Supple. Positive for radiation dermatitis  LUNGS: Good air entry bilaterally. No wheezing, crackles or rhonchi. CARDIOVASCULAR: Regular rate. No murmurs, rubs or gallops. ABDOMEN: s/p PEG placement, abdomen soft, non tender positive bowel sounds. EXTREMITIES: Without clubbing, cyanosis, or edema. NEUROLOGIC: No focal deficits. ECOG PS 1         Impression/Plan:     Mr. Gordon Ayala is a pleasant 21-year-old gentleman, fairly healthy, who was diagnosed with squamous cell carcinoma of the nose/nasal septum. The patient was hit by his dog's head on the left side of his nose in April 2022, in November 2022 he had noticed a lesion inside his nose, and she was diagnosed with staph infection, was treated with antibiotics, he then developed a pimple on his nose and eventually developed a hole in his nose, he was seen by ID, he was referred to the hospital due to concern about myelitis, he had a CT scan done revealing chronic sinusitis involving the left sphenoid sinus, he had a biopsy done on 2/21/2023, revealing invasive poorly differentiated squamous cell carcinoma, grade 3, the patient was referred Dr. Rene Ramirez, he underwent a total rhinectomy on 5/5/2023, the patient was found to have tumor growth to the glabella and the maxillary crest, path:   Case Summary Report   Procedure:    Rhinectomy, total   Tumor Site:   Nasal septal mucosa, cartilage, subcutaneous tissue, skin. Tumor Laterality: Left and Right. Left >> Right     Tumor Focality: Single focus     Tumor Size (Greatest dimension): At least 3.5 cm. Histologic Type: Squamous cell carcinoma, keratinizing     Histologic Grade (squamous cell carcinomas only): Poorly differentiated     Specimen Margins (Main specimen; part B)     _X_ Involved by invasive carcinoma        Specify margin(s), per orientation, if possible: Superior and inferior   septal margins, superior right and left skin margins.      _X_ Uninvolved by high-grade dysplasia/in situ disease     Separately submitted margins:   Mucosa at Superior septum re-resection (Part M)  is involved by carcinoma. Bone   and cartilage are negative. Re-resection of superior skin margins are  negative (Parts C,F,G,H and L)     Lymphovascular Invasion: Present. Perineural Invasion: Not identified     Cartilage invasion: Present     Regional Lymph Nodes: N/A     Provisional Pathologic Stage (pTNM, AJCC 8th Edition), See Note Below:     TNM Descriptors (if applicable)   __ m (multiple primary tumors)   __ r (recurrent)   __ y (post treatment)     pT4a   pNx      On 7/11/2023 the patient was started on adjuvant concurrent chemo/RT using weekly cisplatin. He received cisplatin, cycle #4 on 8/8/2023. Patient had presented to the ED on 8/14/2023 with nausea and vomiting, has been having a lot of phlegm and mucus in his throat causing him to gag and subsequently vomit. His CTA was negative for PE or infection. BUN 44, creatinine 1.5, calcium 10.6, count 6.5, hemoglobin 15.5, hematocrit 46.7, platelet count 271W. The patient has nausea and vomiting, treatment related. -Supportive therapy, continue Zofran and Phenergan, will add scopolamine patch.  -Will order Decadron for 3 days.  -Consult Pall Med.  -PERLA secondary to dehydration, continue IVF, creatinine improving, 1.3 today. Continue to monitor his labs. -Hold Chemotherapy for now.  -continue TF. Thank you for allowing us to participate in the care of Mr. Norma Walton.     Sasha Pederson MD   HEMATOLOGY/MEDICAL ONCOLOGY  [facility]WE IMCU  [address] [facility] [address]  Dept: [phone]: [phone]

## 2023-08-15 NOTE — CARE COORDINATION
WILLY transition of care. Pt admitted from home with PERLA. Recently d/c from the hospital on 8/4. PMH of squamous cell carcinoma of the nose/nasal septum. Oncology on consult. Met with pt. He is from home with his wife and children is independent but reports being weaker. He is active with [facility] for home tube feeding. He reports he has not had any since Friday d/t the n/v.  Last chemo was 2 weeks ago and last radiation was 8/10. Left VM with Julio Espinoza from [facility] to check on status. Pt plans to return home at d/c. His wife will pick him up on day of d/c.  CM/SW to follow. Sadaf Hoskins [phone]. Case Management Assessment  Initial Evaluation    Date/Time of Evaluation: 8/15/2023 11:18 AM  Assessment Completed by: Curt Rose RN    If patient is discharged prior to next notation, then this note serves as note for discharge by case management. Patient Name: Gorge Ross                   YOB: 1970  Diagnosis: PERLA (acute kidney injury) ([address]) [N17.9]  Chemotherapy induced nausea and vomiting [R11.2, T45.1X5A]                   Date / Time: 8/14/2023  9:02 AM    Patient Admission Status: Inpatient   Readmission Risk (Low < 19, Mod (19-27), High > 27): Readmission Risk Score: 17.1    Current PCP: Geovany Briones MD  PCP verified by CM? Yes    Chart Reviewed: Yes      History Provided by: Patient  Patient Orientation: Alert and Oriented    Patient Cognition: Alert    Hospitalization in the last 30 days (Readmission):  Yes    If yes, Readmission Assessment in  Navigator will be completed.     Readmission Assessment  Number of Days since last admission?: 8-30 days  Previous Disposition: Home with [facility])  Who is being Interviewed: Patient  What was the patient's/caregiver's perception as to why they think they needed to return back to the hospital?: Other (Comment) (N/V unable to tolerate tube feeding)  Did you visit your Primary Care Physician after you

## 2023-08-15 NOTE — PROGRESS NOTES
[address]        TQMV:                                                  Patient Name: Fe Cardenas    MRN: [id number]    : 1970    Room: [address] [place] [address]-A          Evaluating OT: Broderick Sánchez OTR/L; [id number]       Referring Provider: OLYA Terry CNP    Specific Provider Orders/Date: OT Eval and Treat 23      Diagnosis: PERLA (acute kidney injury)      Surgery: None this admission     Pertinent Medical History:  has a past medical history of Acid reflux and Cancer ([address]).      Recommended Adaptive Equipment: Extended tub bench     Precautions:  Fall Risk, NPO, PEG, active w/ chemo & radiation, h/o rhinectomy     Assessment of current deficits    [x] Functional mobility  [x]ADLs  [x] Strength               []Cognition    [x] Functional transfers   [x] IADLs         [x] Safety Awareness   [x]Endurance    [x] Fine Coordination              [x] Balance      [] Vision/perception   []Sensation     []Gross Motor Coordination  [] ROM  [] Delirium                   [] Motor Control     OT PLAN OF CARE   OT POC based on physician orders, patient diagnosis and results of clinical assessment    Frequency/Duration 1-3 days/wk for 2 weeks PRN   Specific OT Treatment Interventions to include:   * Instruction/training on adapted ADL techniques and AE recommendations to increase functional independence within precautions       * Training on energy conservation strategies, correct breathing pattern and techniques to improve independence/tolerance for self-care routine  * Functional transfer/mobility training/DME recommendations for increased independence, safety, and fall prevention  * Patient/Family education to increase follow through with safety techniques and functional independence  * Recommendation of environmental modifications for increased safety with functional transfers/mobility and ADLs  * Therapeutic exercise to improve motor endurance, ROM, and functional strength for ADLs/functional transfers  * Therapeutic activities to facilitate/challenge dynamic balance, stand tolerance for increased safety and independence with ADLs  * Positioning to improve skin integrity, interaction with environment and functional independence    Home Living: Pt lives w/ family in a bi-level home w/ 4-5 steps & handrails to enter. Bed & bath on top floor w/ 8 steps & 2 handrails. Bathroom setup: [facility] owned: None    Prior Level of Function: IND with ADLs , IND with IADLs; engaged in functional mobility with use of  no AD  Occupation: None reported    Pain Level: Pt w/ no c/o pain during session    Cognition: A&O: 4/4; Follows multi step directions   Memory:  Good   Sequencing:  Good   Problem solving:  Good   Judgement/safety:  Fair+     Functional Assessment:  AM-PAC Daily Activity Raw Score: 18/24   Initial Eval Status  Date: 8/15/23 Treatment Status  Date: STGs = LTGs  Time frame: 10-14 days   Feeding NPO/PEG   Independent when appropriate   Grooming Supervision   Independent    UB Dressing Supervision   Independent    LB Dressing Supervision   Independent    Bathing Stand by Assist  Independent    Toileting Stand by Assist   Independent    Bed Mobility  Supine to sit: Independent   Sit to supine: NT   Supine to sit: Independent   Sit to supine: Independent    Functional Transfers Sit to stand:Supervision    Stand to sit:Supervision   Stand pivot: Supervision   Commode: NT  Sit to stand:Independent    Stand to sit: Independent   Stand pivot: Independent   Commode: Independent     Functional Mobility Supervision   No use of AD to<>from room doorway.   Independent    Balance Sitting:     Static - Independent     Dynamic - Independent  Standing: Supervision w/o AD  Standing: Independent    Activity Tolerance Fair  Good   Visual/  Perceptual Appears [facility]

## 2023-08-15 NOTE — ACP (ADVANCE CARE PLANNING)
Advance Care Planning   Healthcare Decision Maker:    Primary Decision Maker: Santhosh  - [phone]    Click here to complete Healthcare Decision Makers including selection of the Healthcare Decision Maker Relationship (ie \"Primary\"). Today we documented Decision Maker(s) consistent with Legal Next of Kin hierarchy.        If the relationship to the patient does NOT follow our state's Next of Kin hierarchy, the patient MUST complete an ACP Document to allow him/her to act on the patient's behalf. :

## 2023-08-15 NOTE — ED NOTES
Nurse to nurse report given to RN on 7400. All questions answered.  Patient placed in transport      [place], [place]  08/15/23 0480

## 2023-08-16 ENCOUNTER — TELEPHONE (OUTPATIENT)
Dept: ONCOLOGY | Age: 53
End: 2023-08-16

## 2023-08-16 ENCOUNTER — APPOINTMENT (OUTPATIENT)
Dept: RADIATION ONCOLOGY | Age: 53
End: 2023-08-16
Payer: COMMERCIAL

## 2023-08-16 ENCOUNTER — HOSPITAL ENCOUNTER (OUTPATIENT)
Dept: RADIATION ONCOLOGY | Age: 53
Discharge: HOME | End: 2023-08-16
Payer: COMMERCIAL

## 2023-08-16 LAB
ALBUMIN SERPL-MCNC: 4.1 G/DL (ref 3.5–5.2)
ALP SERPL-CCNC: 91 U/L (ref 40–129)
ALT SERPL-CCNC: 35 U/L (ref 0–40)
ANION GAP SERPL CALCULATED.3IONS-SCNC: 14 MMOL/L (ref 7–16)
AST SERPL-CCNC: 19 U/L (ref 0–39)
BASOPHILS # BLD: 0.02 K/UL (ref 0–0.2)
BASOPHILS NFR BLD: 0 % (ref 0–2)
BILIRUB SERPL-MCNC: 0.5 MG/DL (ref 0–1.2)
BUN SERPL-MCNC: 32 MG/DL (ref 6–20)
CALCIUM SERPL-MCNC: 9.6 MG/DL (ref 8.6–10.2)
CHLORIDE SERPL-SCNC: 115 MMOL/L (ref 98–107)
CO2 SERPL-SCNC: 24 MMOL/L (ref 22–29)
CREAT SERPL-MCNC: 1.2 MG/DL (ref 0.7–1.2)
EOSINOPHIL # BLD: 0.01 K/UL (ref 0.05–0.5)
EOSINOPHILS RELATIVE PERCENT: 0 % (ref 0–6)
ERYTHROCYTE [DISTWIDTH] IN BLOOD BY AUTOMATED COUNT: 14.6 % (ref 11.5–15)
GFR SERPL CREATININE-BSD FRML MDRD: >60 ML/MIN/1.73M2
GLUCOSE SERPL-MCNC: 112 MG/DL (ref 74–99)
HCT VFR BLD AUTO: 42.3 % (ref 37–54)
HGB BLD-MCNC: 13.7 G/DL (ref 12.5–16.5)
IMM GRANULOCYTES # BLD AUTO: <0.03 K/UL (ref 0–0.58)
IMM GRANULOCYTES NFR BLD: 0 % (ref 0–5)
LYMPHOCYTES NFR BLD: 0.27 K/UL (ref 1.5–4)
LYMPHOCYTES RELATIVE PERCENT: 6 % (ref 20–42)
MCH RBC QN AUTO: 30.2 PG (ref 26–35)
MCHC RBC AUTO-ENTMCNC: 32.4 G/DL (ref 32–34.5)
MCV RBC AUTO: 93.4 FL (ref 80–99.9)
MONOCYTES NFR BLD: 0.39 K/UL (ref 0.1–0.95)
MONOCYTES NFR BLD: 8 % (ref 2–12)
NEUTROPHILS NFR BLD: 85 % (ref 43–80)
NEUTS SEG NFR BLD: 4.01 K/UL (ref 1.8–7.3)
PLATELET # BLD AUTO: 169 K/UL (ref 130–450)
PMV BLD AUTO: 10 FL (ref 7–12)
POTASSIUM SERPL-SCNC: 4.1 MMOL/L (ref 3.5–5)
PROT SERPL-MCNC: 6.8 G/DL (ref 6.4–8.3)
RBC # BLD AUTO: 4.53 M/UL (ref 3.8–5.8)
RBC # BLD: ABNORMAL 10*6/UL
SODIUM SERPL-SCNC: 153 MMOL/L (ref 132–146)
WBC OTHER # BLD: 4.7 K/UL (ref 4.5–11.5)

## 2023-08-16 PROCEDURE — 85025 COMPLETE CBC W/AUTO DIFF WBC: CPT

## 2023-08-16 PROCEDURE — 80053 COMPREHEN METABOLIC PANEL: CPT

## 2023-08-16 PROCEDURE — 36415 COLL VENOUS BLD VENIPUNCTURE: CPT

## 2023-08-16 PROCEDURE — 2060000000 HC ICU INTERMEDIATE R&B

## 2023-08-16 PROCEDURE — 6360000002 HC RX W HCPCS: Performed by: INTERNAL MEDICINE

## 2023-08-16 PROCEDURE — 99222 1ST HOSP IP/OBS MODERATE 55: CPT

## 2023-08-16 PROCEDURE — 2580000003 HC RX 258: Performed by: FAMILY MEDICINE

## 2023-08-16 PROCEDURE — 6370000000 HC RX 637 (ALT 250 FOR IP)

## 2023-08-16 PROCEDURE — 77386 HC NTSTY MODUL RAD TX DLVR CPLX: CPT | Performed by: SPECIALIST

## 2023-08-16 PROCEDURE — 6360000002 HC RX W HCPCS: Performed by: FAMILY MEDICINE

## 2023-08-16 PROCEDURE — 6360000002 HC RX W HCPCS

## 2023-08-16 RX ORDER — HALOPERIDOL 5 MG/ML
0.25 INJECTION INTRAMUSCULAR 2 TIMES DAILY
Status: DISCONTINUED | OUTPATIENT
Start: 2023-08-16 | End: 2023-08-18

## 2023-08-16 RX ADMIN — ONDANSETRON 8 MG: 2 INJECTION INTRAMUSCULAR; INTRAVENOUS at 14:05

## 2023-08-16 RX ADMIN — SODIUM CHLORIDE, PRESERVATIVE FREE 10 ML: 5 INJECTION INTRAVENOUS at 14:06

## 2023-08-16 RX ADMIN — DEXAMETHASONE SODIUM PHOSPHATE 4 MG: 4 INJECTION, SOLUTION INTRAMUSCULAR; INTRAVENOUS at 08:26

## 2023-08-16 RX ADMIN — ENOXAPARIN SODIUM 40 MG: 100 INJECTION SUBCUTANEOUS at 08:27

## 2023-08-16 RX ADMIN — Medication 10 ML: at 20:19

## 2023-08-16 RX ADMIN — ONDANSETRON 8 MG: 2 INJECTION INTRAMUSCULAR; INTRAVENOUS at 05:48

## 2023-08-16 RX ADMIN — DEXAMETHASONE SODIUM PHOSPHATE 4 MG: 4 INJECTION, SOLUTION INTRAMUSCULAR; INTRAVENOUS at 20:19

## 2023-08-16 RX ADMIN — SODIUM CHLORIDE, POTASSIUM CHLORIDE, SODIUM LACTATE AND CALCIUM CHLORIDE: 600; 310; 30; 20 INJECTION, SOLUTION INTRAVENOUS at 02:15

## 2023-08-16 RX ADMIN — HALOPERIDOL LACTATE 0.25 MG: 5 INJECTION, SOLUTION INTRAMUSCULAR at 14:05

## 2023-08-16 RX ADMIN — ONDANSETRON 8 MG: 2 INJECTION INTRAMUSCULAR; INTRAVENOUS at 22:12

## 2023-08-16 RX ADMIN — HALOPERIDOL LACTATE 0.25 MG: 5 INJECTION, SOLUTION INTRAMUSCULAR at 20:19

## 2023-08-16 ASSESSMENT — PAIN SCALES - GENERAL
PAINLEVEL_OUTOF10: 0
PAINLEVEL_OUTOF10: 0

## 2023-08-16 NOTE — PROGRESS NOTES
RN received call from Oncology social worker, Kenna Grier, regarding comments patient made to [address] at conclusion of radiation treatment today. Floor RN placed call to Denis Chamorro NP, regarding these concerns-Per phone conversation, Rupinder Gonsalez has requested that Palliative ask patient regarding these concerns brought forward by the Rad Onc team. Donovan Hughes NP for Palliative was then messaged via perfect serve.

## 2023-08-16 NOTE — PLAN OF CARE
Problem: Neurosensory - Adult  Goal: Achieves maximal functionality and self care  Outcome: Progressing     Problem: Skin/Tissue Integrity - Adult  Goal: Skin integrity remains intact  Outcome: Progressing     Problem: Skin/Tissue Integrity - Adult  Goal: Oral mucous membranes remain intact  Outcome: Progressing     Problem: Skin/Tissue Integrity - Adult  Goal: Oral mucous membranes remain intact  Outcome: Progressing     Problem: Musculoskeletal - Adult  Goal: Return mobility to safest level of function  Outcome: Progressing     Problem: Gastrointestinal - Adult  Goal: Minimal or absence of nausea and vomiting  Outcome: Progressing     Problem: Gastrointestinal - Adult  Goal: Maintains adequate nutritional intake  Outcome: Progressing     Problem: Metabolic/Fluid and Electrolytes - Adult  Goal: Electrolytes maintained within normal limits  Outcome: Progressing     Problem: Metabolic/Fluid and Electrolytes - Adult  Goal: Hemodynamic stability and optimal renal function maintained  Outcome: Progressing

## 2023-08-16 NOTE — PROGRESS NOTES
Call placed to Louisa Joya NP regarding patient's colace unable to be crushed and him receiving his meds through his peg tube.

## 2023-08-16 NOTE — PROGRESS NOTES
Patient came to radiation oncology today for his treatment. Rad onc nurse was talking to patient and asking how he was doing. Patient said no good and said all I want to die and held a gun formation with his hand to his head and pretended to pull the trigger. Patient then stated \"I will need two bullets to make it do it's job\". Rad onc nurse notified our .

## 2023-08-16 NOTE — ACP (ADVANCE CARE PLANNING)
Advance Care Planning     General Advance Care Planning (ACP) Conversation    Date of Conversation: 8/14/2023  Conducted with: Patient with Decision Making Capacity   Healthcare Decision Maker: Next of Kin by law (only applies in absence of above) (name) wife Aida    Healthcare Decision Maker:    Primary Decision Maker: Sydney Clements - Spouse - [phone]  Click here to complete Healthcare Decision Makers including selection of the Healthcare Decision Maker Relationship (ie \"Primary\").       Content/Action Overview:  Has NO ACP documents/care preferences - information provided, considering goals and options  Reviewed DNR/DNI and patient elects Full Code (Attempt Resuscitation)        Length of Voluntary ACP Conversation in minutes:  <16 minutes (Non-Billable)    Manuel Rubio, CAMELIAW

## 2023-08-16 NOTE — PROGRESS NOTES
ARMIDA met at bedside with pt and his wife in conjunction with Palliative Medicine NP visit. 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 He has a peg. Pt resides with is wife and children and is on short term disability. Pt is a/ox4. We discussed his comments made during radiation visit about wanting to die and  get a gun. Pt spoke of not wanting to be a burden to his family, his wife listened supportively and discussed that his condition is treatable, and reassured him that he is not a burden,to which he agrees. He expresses that he was frustrated, has some depression which he expects is normal. He does not want to harm himself or anyone else, he does not have any plans to do so. Np discussed ways to assist in dealing with these feelings. CAMELIAW offered validation of his feelings.

## 2023-08-16 NOTE — CARE COORDINATION
CM note. Oncology and palliative med following. Went to radiation yesterday and today. Dietician following. Was not tolerating tube feeds at home. Tolerating tube feedings now. Plan is home at d/c with [facility]. Per Braden Arce with [facility] pt is active and will need CHANEL orders at d/c.  CM/SW to follow. Flakita Arthur [phone].

## 2023-08-16 NOTE — PROGRESS NOTES
Patient's wife requested that patient not be wakened regarding third set of vitals today as he was finally in a quality sleep after somewhat questionable rest overnight.

## 2023-08-16 NOTE — TELEPHONE ENCOUNTER
Received notification from radiation oncology staff re: possible safety concerns. Pt currently admitted. Contacted floor RN, Cristina Peterson, to notify of concerns.       RICARDO Cummings, VERÓNICA-S  Oncology Social Worker

## 2023-08-17 ENCOUNTER — HOSPITAL ENCOUNTER (OUTPATIENT)
Dept: RADIATION ONCOLOGY | Age: 53
Discharge: HOME | End: 2023-08-17
Payer: COMMERCIAL

## 2023-08-17 PROBLEM — T45.1X5A CHEMOTHERAPY INDUCED NAUSEA AND VOMITING: Status: ACTIVE | Noted: 2023-08-01

## 2023-08-17 LAB
ALBUMIN SERPL-MCNC: 4.2 G/DL (ref 3.5–5.2)
ALP SERPL-CCNC: 92 U/L (ref 40–129)
ALT SERPL-CCNC: 31 U/L (ref 0–40)
ANION GAP SERPL CALCULATED.3IONS-SCNC: 16 MMOL/L (ref 7–16)
AST SERPL-CCNC: 18 U/L (ref 0–39)
BASOPHILS # BLD: 0.06 K/UL (ref 0–0.2)
BASOPHILS NFR BLD: 1 % (ref 0–2)
BILIRUB SERPL-MCNC: 0.4 MG/DL (ref 0–1.2)
BUN SERPL-MCNC: 34 MG/DL (ref 6–20)
CALCIUM SERPL-MCNC: 9.8 MG/DL (ref 8.6–10.2)
CHLORIDE SERPL-SCNC: 113 MMOL/L (ref 98–107)
CO2 SERPL-SCNC: 22 MMOL/L (ref 22–29)
CREAT SERPL-MCNC: 1.1 MG/DL (ref 0.7–1.2)
EOSINOPHIL # BLD: 0 K/UL (ref 0.05–0.5)
EOSINOPHILS RELATIVE PERCENT: 0 % (ref 0–6)
ERYTHROCYTE [DISTWIDTH] IN BLOOD BY AUTOMATED COUNT: 14.6 % (ref 11.5–15)
GFR SERPL CREATININE-BSD FRML MDRD: >60 ML/MIN/1.73M2
GLUCOSE SERPL-MCNC: 117 MG/DL (ref 74–99)
HCT VFR BLD AUTO: 42.2 % (ref 37–54)
HGB BLD-MCNC: 13.8 G/DL (ref 12.5–16.5)
LYMPHOCYTES NFR BLD: 0.3 K/UL (ref 1.5–4)
LYMPHOCYTES RELATIVE PERCENT: 4 % (ref 20–42)
MCH RBC QN AUTO: 30.2 PG (ref 26–35)
MCHC RBC AUTO-ENTMCNC: 32.7 G/DL (ref 32–34.5)
MCV RBC AUTO: 92.3 FL (ref 80–99.9)
MONOCYTES NFR BLD: 0.3 K/UL (ref 0.1–0.95)
MONOCYTES NFR BLD: 4 % (ref 2–12)
NEUTROPHILS NFR BLD: 90 % (ref 43–80)
NEUTS SEG NFR BLD: 6.15 K/UL (ref 1.8–7.3)
PLATELET # BLD AUTO: 207 K/UL (ref 130–450)
PMV BLD AUTO: 10.4 FL (ref 7–12)
POTASSIUM SERPL-SCNC: 4.2 MMOL/L (ref 3.5–5)
PROT SERPL-MCNC: 7.1 G/DL (ref 6.4–8.3)
RBC # BLD AUTO: 4.57 M/UL (ref 3.8–5.8)
SODIUM SERPL-SCNC: 151 MMOL/L (ref 132–146)
WBC OTHER # BLD: 6.8 K/UL (ref 4.5–11.5)

## 2023-08-17 PROCEDURE — 2060000000 HC ICU INTERMEDIATE R&B

## 2023-08-17 PROCEDURE — 99231 SBSQ HOSP IP/OBS SF/LOW 25: CPT

## 2023-08-17 PROCEDURE — 77386 HC NTSTY MODUL RAD TX DLVR CPLX: CPT | Performed by: SPECIALIST

## 2023-08-17 PROCEDURE — 6370000000 HC RX 637 (ALT 250 FOR IP)

## 2023-08-17 PROCEDURE — 2580000003 HC RX 258: Performed by: FAMILY MEDICINE

## 2023-08-17 PROCEDURE — 80053 COMPREHEN METABOLIC PANEL: CPT

## 2023-08-17 PROCEDURE — 6360000002 HC RX W HCPCS: Performed by: FAMILY MEDICINE

## 2023-08-17 PROCEDURE — 2500000003 HC RX 250 WO HCPCS: Performed by: NURSE PRACTITIONER

## 2023-08-17 PROCEDURE — 6360000002 HC RX W HCPCS: Performed by: INTERNAL MEDICINE

## 2023-08-17 PROCEDURE — 99232 SBSQ HOSP IP/OBS MODERATE 35: CPT | Performed by: STUDENT IN AN ORGANIZED HEALTH CARE EDUCATION/TRAINING PROGRAM

## 2023-08-17 PROCEDURE — 6360000002 HC RX W HCPCS

## 2023-08-17 PROCEDURE — 2580000003 HC RX 258: Performed by: NURSE PRACTITIONER

## 2023-08-17 PROCEDURE — 77336 RADIATION PHYSICS CONSULT: CPT | Performed by: SPECIALIST

## 2023-08-17 PROCEDURE — 85025 COMPLETE CBC W/AUTO DIFF WBC: CPT

## 2023-08-17 PROCEDURE — 36415 COLL VENOUS BLD VENIPUNCTURE: CPT

## 2023-08-17 PROCEDURE — A4216 STERILE WATER/SALINE, 10 ML: HCPCS | Performed by: NURSE PRACTITIONER

## 2023-08-17 RX ADMIN — Medication 10 ML: at 08:27

## 2023-08-17 RX ADMIN — Medication 10 ML: at 20:49

## 2023-08-17 RX ADMIN — FAMOTIDINE 20 MG: 10 INJECTION, SOLUTION INTRAVENOUS at 01:27

## 2023-08-17 RX ADMIN — ENOXAPARIN SODIUM 40 MG: 100 INJECTION SUBCUTANEOUS at 08:23

## 2023-08-17 RX ADMIN — DEXAMETHASONE SODIUM PHOSPHATE 4 MG: 4 INJECTION, SOLUTION INTRAMUSCULAR; INTRAVENOUS at 20:40

## 2023-08-17 RX ADMIN — HALOPERIDOL LACTATE 0.25 MG: 5 INJECTION, SOLUTION INTRAMUSCULAR at 20:40

## 2023-08-17 RX ADMIN — DEXAMETHASONE SODIUM PHOSPHATE 4 MG: 4 INJECTION, SOLUTION INTRAMUSCULAR; INTRAVENOUS at 08:23

## 2023-08-17 RX ADMIN — DOCUSATE SODIUM LIQUID 100 MG: 100 LIQUID ORAL at 08:22

## 2023-08-17 RX ADMIN — ONDANSETRON 8 MG: 2 INJECTION INTRAMUSCULAR; INTRAVENOUS at 20:40

## 2023-08-17 NOTE — PROGRESS NOTES
Reached out to Torin Ghosh, NP regarding retstarting patient's tube feed at 60ml/hr. Per Torin Ghosh, NP will restart tube feed at 40ml/hr and increase rate by 10ml Q4hrs until goal rate is met.

## 2023-08-17 NOTE — HOME CARE
THIS IS ALSO A NEW TUBE FEEDING PRODUCT - WE HAD HIM ON TWOCAL AT HOME. VITAL IS A SPECIALTY FORMULA, SO WE WILL NEED DOCUMENTATION FOR WHY HE NEEDS THAT PRODUCT INSTEAD OF A STANDARD ONE.     KACY SALGADO  Ordered therapy at time of quote: VITAL AF 1.2 CONCEPCION/ML @ 60 ML/HR  Deductible: $250  Coverage: 80%  Out-of-Pocket Max: $1250  Total pt responsibility (after deductible met): $6.40 PER DAY

## 2023-08-17 NOTE — PROGRESS NOTES
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 Past medical, surgical, social and family histories reviewed and updated as indicated. PAIN: 1/10    ALLERGIES:  Patient has no known allergies. No current facility-administered medications for this encounter. No current outpatient medications on file.      Facility-Administered Medications Ordered in Other Encounters   Medication Dose Route Frequency Provider Last Rate Last Admin    haloperidol lactate (HALDOL) injection 0.25 mg  0.25 mg IntraVENous BID Kerline Mcneil APRN - CNP   0.25 mg at 08/16/23 2019    acetaminophen (TYLENOL) 160 MG/5ML solution 650 mg  650 mg Oral Q6H PRN Fabiano Corcoran Learn APRN - CNP   650 mg at 08/15/23 1500    Or    acetaminophen (TYLENOL) suppository 650 mg  650 mg Rectal Q6H PRN [place] Corcorancosme Saxena APRN - CNP        promethazine (PHENERGAN) injection 6.25 mg  6.25 mg IntraMUSCular Q4H PRN Zohaib Pacheco DO        ondansetron [facility]) injection 8 mg  8 mg IntraVENous Q8H PRN Jose House MD   8 mg at 08/16/23 2212    scopolamine (TRANSDERM-SCOP) transdermal patch 1 patch  1 patch TransDERmal Q72H Vijaya House MD   1 patch at 08/15/23 2140    dexamethasone (DECADRON) injection 4 mg  4 mg IntraVENous Q12H Vijaya House MD   4 mg at 08/17/23 9770    docusate sodium (COLACE) 150 MG/15ML liquid 100 mg  100 mg Per G Tube Daily Boy Buckner APRN - CNP   100 mg at 08/17/23 6090    magic (miracle) mouthwash  15 mL Swish & Spit 4x Daily PRN [place] Corcorancosme Saxena APRN - CNP   15 mL at 08/15/23 0411    lidocaine viscous hcl (XYLOCAINE) 2 % solution 10 mL  10 mL Mouth/Throat PRN [place] Saxena APRN - CNP        sodium chloride

## 2023-08-17 NOTE — PROGRESS NOTES
Date: 2023       Patient Name: Everlina Apley  : 1970      MRN: [id number]    PT order received. Chart has been reviewed. PT evaluation will be on hold as pt off unit at this time. Will continue to follow and complete evaluation at later time.      Regina Sheffield, PT

## 2023-08-17 NOTE — PROGRESS NOTES
Patient is an inpatient an vitals are stable and he is doing ok per patient. They are adjusting his tube feedings and planning his discharge prep.

## 2023-08-17 NOTE — CARE COORDINATION
CM note. Met with pt. He reports feeling better today. He is wanting to have tube feeds at home that run on a pump and only run at night. Left VM message for dietician to see pt regarding this. Updated [facility]. They will have to see what the cost will be. Will need new Home care orders if there is a change in how he takes the tube feed. Plan is home with his family. CM/DIMITRI to follow. Tabitha Tate [phone].

## 2023-08-17 NOTE — PROGRESS NOTES
Message sent to Wallace Ratliff NP regarding patient complaining of persistent acid reflux. Per Wallace Ratliff NP 20mg of IV Famotidine ordered and tube feed to be paused for two hours.

## 2023-08-17 NOTE — PROGRESS NOTES
Palliative Care Department  [phone]  Palliative Care Progress Note  Provider OLYA Cooper CNP      PATIENT: Tanya Fernández  : 1970  MRN: [id number]  ADMISSION DATE: 2023  9:02 AM  Referring Provider: Kassy Corrales MD    Palliative Medicine was consulted on hospital day 3 for assistance with Symptom management     HPI:     Jean Marie Correa is a 48 y.o. y/o male with a history of PEG tube placement, squamous cell carcinoma of nose /nasal septum, diagnosed in May 2023, currently receiving adjuvant and concurrent chemo/radiation treatment weekly with Dr. Estefania Carpio, also underwent a total rhinectomy on 2023, who presented to [facility]) on 2023 with nausea and vomiting, triggered by phlegm and mucus in his throat. He was admitted for further medical management of PERLA secondary to dehydration. Palliative medicine consulted for symptom management. 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 Capacity is time limited and situation/question specific  Outcome of goals of care meeting:   Following for symptom management  Code status Full Code  Advanced Directives: no POA or living will in [facility]  Surrogate/Legal NOK:  Toro Adriana (Spouse) [phone]    Spiritual assessment: no spiritual distress identified  Bereavement and grief: to be

## 2023-08-18 ENCOUNTER — HOSPITAL ENCOUNTER (OUTPATIENT)
Dept: RADIATION ONCOLOGY | Age: 53
Discharge: HOME | End: 2023-08-18
Payer: COMMERCIAL

## 2023-08-18 VITALS
HEART RATE: 95 BPM | DIASTOLIC BLOOD PRESSURE: 80 MMHG | SYSTOLIC BLOOD PRESSURE: 137 MMHG | WEIGHT: 212 LBS | BODY MASS INDEX: 25.82 KG/M2 | RESPIRATION RATE: 18 BRPM | OXYGEN SATURATION: 95 % | TEMPERATURE: 97.4 F | HEIGHT: 76 IN

## 2023-08-18 PROCEDURE — 6360000002 HC RX W HCPCS: Performed by: INTERNAL MEDICINE

## 2023-08-18 PROCEDURE — 77386 HC NTSTY MODUL RAD TX DLVR CPLX: CPT | Performed by: SPECIALIST

## 2023-08-18 PROCEDURE — 2580000003 HC RX 258: Performed by: FAMILY MEDICINE

## 2023-08-18 PROCEDURE — 99231 SBSQ HOSP IP/OBS SF/LOW 25: CPT

## 2023-08-18 PROCEDURE — 6360000002 HC RX W HCPCS: Performed by: FAMILY MEDICINE

## 2023-08-18 PROCEDURE — 99232 SBSQ HOSP IP/OBS MODERATE 35: CPT | Performed by: INTERNAL MEDICINE

## 2023-08-18 PROCEDURE — 6370000000 HC RX 637 (ALT 250 FOR IP): Performed by: FAMILY MEDICINE

## 2023-08-18 PROCEDURE — 6370000000 HC RX 637 (ALT 250 FOR IP): Performed by: NURSE PRACTITIONER

## 2023-08-18 PROCEDURE — 6370000000 HC RX 637 (ALT 250 FOR IP)

## 2023-08-18 RX ORDER — HALOPERIDOL 0.5 MG/1
0.25 TABLET ORAL 2 TIMES DAILY
Status: DISCONTINUED | OUTPATIENT
Start: 2023-08-18 | End: 2023-08-18 | Stop reason: HOSPADM

## 2023-08-18 RX ORDER — LANOLIN ALCOHOL/MO/W.PET/CERES
3 CREAM (GRAM) TOPICAL ONCE
Status: COMPLETED | OUTPATIENT
Start: 2023-08-18 | End: 2023-08-18

## 2023-08-18 RX ORDER — HALOPERIDOL 0.5 MG/1
0.25 TABLET ORAL 2 TIMES DAILY
Qty: 5 TABLET | Refills: 0 | Status: SHIPPED | OUTPATIENT
Start: 2023-08-18 | End: 2023-08-21

## 2023-08-18 RX ORDER — ONDANSETRON 4 MG/1
4 TABLET, ORALLY DISINTEGRATING ORAL ONCE
Status: COMPLETED | OUTPATIENT
Start: 2023-08-18 | End: 2023-08-18

## 2023-08-18 RX ORDER — ONDANSETRON 2 MG/ML
4 INJECTION INTRAMUSCULAR; INTRAVENOUS ONCE
Status: DISCONTINUED | OUTPATIENT
Start: 2023-08-18 | End: 2023-08-18

## 2023-08-18 RX ORDER — SCOLOPAMINE TRANSDERMAL SYSTEM 1 MG/1
1 PATCH, EXTENDED RELEASE TRANSDERMAL
Qty: 10 PATCH | Refills: 0 | Status: SHIPPED | OUTPATIENT
Start: 2023-08-18

## 2023-08-18 RX ADMIN — ENOXAPARIN SODIUM 40 MG: 100 INJECTION SUBCUTANEOUS at 08:11

## 2023-08-18 RX ADMIN — DEXAMETHASONE SODIUM PHOSPHATE 4 MG: 4 INJECTION, SOLUTION INTRAMUSCULAR; INTRAVENOUS at 08:11

## 2023-08-18 RX ADMIN — MELATONIN 3 MG ORAL TABLET 3 MG: 3 TABLET ORAL at 01:51

## 2023-08-18 RX ADMIN — Medication 10 ML: at 08:11

## 2023-08-18 RX ADMIN — ONDANSETRON 4 MG: 4 TABLET, ORALLY DISINTEGRATING ORAL at 17:03

## 2023-08-18 RX ADMIN — ONDANSETRON 8 MG: 2 INJECTION INTRAMUSCULAR; INTRAVENOUS at 08:15

## 2023-08-18 RX ADMIN — DOCUSATE SODIUM LIQUID 100 MG: 100 LIQUID ORAL at 08:11

## 2023-08-18 NOTE — CARE COORDINATION
Patient admitted to hospital with PERLA. H/o squamous cell ca of nasal septum. On continuous  tube feeding at home and uses [facility]. Dietary saw and made recommendations regarding nocturnal feeds. [facility] notified of recommendations and orders. Will provide this CM with  the cost to patient. Met with patient at bedside and he is aware that [facility] will see tomorrow PM for new tube feeding orders. Cm/SW will continue to follow. Family to transport home when medically cleared. Addendum: [facility] can see tonite if discharge. Price of formula is 6 dollars and 40 cents/day. Await documentation by physician regarding need fro specialty formula. NP notified and charge nurse informed.

## 2023-08-18 NOTE — PROGRESS NOTES
[address] IMCU  [address] [address] [place] [address]  Dept: [address]: [phone]  Hematology/oncology inpatient follow-up:      Reason for Visit:   Patient with Squamous cell carcinoma of the nose/nasal septum, admitted with nausea and vomiting. Referring Physician:  Vanesa Cardenas DO    PCP:  Tanisha Solano MD    Subjective: The patient is doing better overall, was started on Haldol per palliative medicine, he gets nauseous when the tube feed rate is greater than 40 mL/h. Past Medical History:      Diagnosis Date    Acid reflux     Cancer ([address]) 2023    SCC tip of nose     Patient Active Problem List   Diagnosis    Carpal tunnel syndrome of left wrist    Open wound of nasal cavity, initial encounter    Head and neck cancer ([address])    Other nonspecific abnormal finding of lung field    Chemotherapy induced nausea and vomiting    Moderate protein-calorie malnutrition (HCC)    Goals of care, counseling/discussion    Palliative care by specialist    PERLA (acute kidney injury) [facility])        Past Surgical History:      Procedure Laterality Date    APPENDECTOMY      CHOLECYSTECTOMY      ELBOW SURGERY      GASTROSTOMY TUBE PLACEMENT N/A 7/31/2023    EGD PEG TUBE PLACEMENT performed by Edmund Redmond MD at [address] [facility] Right 02/23/2023    removed March 2023    PORT SURGERY N/A 7/31/2023    PORT INSERTION performed by Edmund Redmond MD at [address] [facility]  2023    UPPER GASTROINTESTINAL ENDOSCOPY         Family History:  Family History   Adopted: Yes       Medications:  Reviewed and reconciled.     Social History:  Social History     Socioeconomic History    Marital status:      Spouse name: Not on file    Number of children: Not on file    Years of education: Not on file    Highest education level: Not on file   Occupational History    Not on file   Tobacco Use    Smoking status: Former    Smokeless tobacco: Never    Tobacco comments:     quit 6.5, hemoglobin 15.5, hematocrit 46.7, platelet count 647H. The patient has nausea and vomiting, treatment related. -Supportive therapy, continue oral antiemetics, scopolamine patch was added during his hospitalization.  -He received Decadron.  Lenore Watts Med team is on board, was started on Haldol for anxiety. -PERLA secondary to dehydration, the patient received IVF, creatinine had normalized, 1.1 today. Continue to monitor his labs. -Will be receiving nocturnal TF.  -We will tentatively schedule for chemotherapy on 8/21, and 8/28, continue radiation therapy as planned.  -Okay for DC from hem/onc POV. Thank you for allowing us to participate in the care of Mr. Jaye Huddleston     Chula Vazquez MD   HEMATOLOGY/MEDICAL ONCOLOGY  [facility] 7WE IMCU  [address] [facility] [place] [address]  Dept: [address]: [phone]

## 2023-08-18 NOTE — PROGRESS NOTES
Discharge paperwork, meds and follow up appointments reviewed with pt and wife at this time. IV and tele removed at this time.

## 2023-08-18 NOTE — PROGRESS NOTES
Palliative Care Department  [phone]  Palliative Care Progress Note  Provider OLYA Ornelas - CNP      PATIENT: Everlina Apley  : 1970  MRN: [id number]  ADMISSION DATE: 2023  9:02 AM  Referring Provider: Yaw Godinez MD    Palliative Medicine was consulted on hospital day 4 for assistance with Symptom management     HPI:     Leslie Jones is a 48 y.o. y/o male with a history of PEG tube placement, squamous cell carcinoma of nose /nasal septum, diagnosed in May 2023, currently receiving adjuvant and concurrent chemo/radiation treatment weekly with Dr. Berkley Bradford, also underwent a total rhinectomy on 2023, who presented to [facility]) on 2023 with nausea and vomiting, triggered by phlegm and mucus in his throat. He was admitted for further medical management of PERLA secondary to dehydration. Palliative medicine consulted for symptom management. 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 Capacity is time limited and situation/question specific  Outcome of goals of care meeting:   Following for symptom management  Appointment already set up for 2023  Code status Full Code  Advanced Directives: no POA or living will in [facility]  Surrogate/Legal NOK:  Laureen Lee (Spouse) [phone]    Spiritual assessment: no spiritual

## 2023-08-18 NOTE — PLAN OF CARE
Problem: Discharge Planning  Goal: Discharge to home or other facility with appropriate resources  Outcome: Progressing     Problem: Safety - Adult  Goal: Free from fall injury  Outcome: Progressing     Problem: Skin/Tissue Integrity - Adult  Goal: Skin integrity remains intact  Outcome: Progressing     Problem: Skin/Tissue Integrity - Adult  Goal: Oral mucous membranes remain intact  Outcome: Progressing     Problem: Musculoskeletal - Adult  Goal: Return mobility to safest level of function  Outcome: Progressing     Problem: Gastrointestinal - Adult  Goal: Minimal or absence of nausea and vomiting  Outcome: Progressing     Problem: Gastrointestinal - Adult  Goal: Maintains adequate nutritional intake  Outcome: Progressing     Problem: Metabolic/Fluid and Electrolytes - Adult  Goal: Electrolytes maintained within normal limits  Outcome: Progressing     Problem: Metabolic/Fluid and Electrolytes - Adult  Goal: Hemodynamic stability and optimal renal function maintained  Outcome: Progressing

## 2023-08-18 NOTE — DISCHARGE SUMMARY
[facility]   Discharge summary   Patient ID:  Jayleen De Leon  [id number]  48 y.o.  1970    Admit date: 8/14/2023    Discharge date and time: 8/18/2023    Admission Diagnoses:   Patient Active Problem List   Diagnosis    Carpal tunnel syndrome of left wrist    Open wound of nasal cavity, initial encounter    Head and neck cancer ([address])    Other nonspecific abnormal finding of lung field    Chemotherapy induced nausea and vomiting    Moderate protein-calorie malnutrition (HCC)    Goals of care, counseling/discussion    Palliative care by specialist    PERLA (acute kidney injury) [facility])       Discharge Diagnoses: PERLA    Consults: hematology/oncology, palliative med    Procedures: None    Hospital Course: The patient is a 48 y.o. male of Katia Deal MD     53-year-old male with a history of nose cancer is admitted to telemetry unit with     PERLA  8/16/2023  PERLA resolved-BUN/creatinine 32/1.2  Discontinue IV fluid  Palliative med consulted-patient remains a full code  Nurse contacted myself about possible suicidal ideation. Went to revisit patient family at bedside wife and son. Patient denied any suicidal homicidal ideations. Patient just expressed that he is aggravated with his condition and he is likely getting depressed. Offered psych counseling patient declined.   Patient will likely discharge within the next 24 to 48 hours  Continue tube feeds hi this is can     8/17/2023  BUN/creatinine remain at normal 34/1.1  Still requiring intermittent Haldol for agitation  Undergoing tube feeds still-he is strictly n.p.o., everything goes through the PEG tube  Remains on ongoing chemoradiation therapy  Okay for discharge from medicine standpoint, once tube feed issues are addressed-awaiting dietitian's input regarding route of administration as patient wishes to have her come there once tube feeds him at nighttime  Discussed with RN  DC plan tomorrow from medicine standpoint    8/18/2023  Patient is scopolamine transdermal patch       Activity: activity as tolerated  Diet: tube feeds    Pt has been advised to: Follow-up with Delvis Bass MD in 1 week. Follow-up with consultants as recommended by them    Note that over 30 minutes was spent in preparing discharge papers, discussing discharge with patient, medication review, etc.    Signed:  OLYA Rodriguez CNP  8/18/2023  12:27 PM     Above note edited to reflect my thoughts     I personally saw, examined and provided care for the patient. Radiographs, labs and medication list were reviewed by me independently. The case was discussed in detail and plans for care were established. Review of Pretty DOBSON- CNP, documentation was conducted and revisions were made as appropriate directly by me. I agree with the above documented exam, problem list, and plan of care.      Racheal Loera MD

## 2023-08-18 NOTE — PROGRESS NOTES
Nutrition Note    Tube feeding recommendation per consult for nocturnal tube feedings. Recommend Immune Enhancing (Pivot 1.5) @75ml/hr x 8 hours (10pm to 6am) to provide 600ml, 900 calories, 56g protein, 455ml water. Monitor tolerance d/t patient having history of unable to tolerate tube feedings at home. Refer to oncology dietitian to adjust tube feedings as needed. See full nutrition assessment on 8/15 if needed.      Estimated Daily Nutrient Needs:  Energy Requirements Based On: Formula  Weight Used for Energy Requirements: Current  Energy (kcal/day): 3226-9235 (REE 1920 x 1.2 SF)  Weight Used for Protein Requirements: Ideal  Protein (g/day): 120-140 (1.3-1.5g/kg IBW)  Method Used for Fluid Requirements: 1 ml/kcal  Fluid (ml/day): per physician    Electronically signed by Shweta Zhang RD, LD on 8/18/23 at 10:52 AM EDT    Contact: [phone]

## 2023-08-21 ENCOUNTER — HOSPITAL ENCOUNTER (OUTPATIENT)
Dept: RADIATION ONCOLOGY | Age: 53
Discharge: HOME | End: 2023-08-21
Payer: COMMERCIAL

## 2023-08-21 ENCOUNTER — HOSPITAL ENCOUNTER (OUTPATIENT)
Dept: INFUSION THERAPY | Age: 53
Discharge: HOME | DRG: 683 | End: 2023-08-21
Payer: COMMERCIAL

## 2023-08-21 ENCOUNTER — TELEPHONE (OUTPATIENT)
Dept: INFUSION THERAPY | Age: 53
End: 2023-08-21

## 2023-08-21 ENCOUNTER — HOSPITAL ENCOUNTER (INPATIENT)
Age: 53
LOS: 3 days | Discharge: HOME | DRG: 683 | End: 2023-08-24
Attending: STUDENT IN AN ORGANIZED HEALTH CARE EDUCATION/TRAINING PROGRAM | Admitting: INTERNAL MEDICINE
Payer: COMMERCIAL

## 2023-08-21 ENCOUNTER — OFFICE VISIT (OUTPATIENT)
Dept: ONCOLOGY | Age: 53
End: 2023-08-21
Payer: COMMERCIAL

## 2023-08-21 VITALS
WEIGHT: 188 LBS | OXYGEN SATURATION: 98 % | HEIGHT: 76 IN | DIASTOLIC BLOOD PRESSURE: 66 MMHG | TEMPERATURE: 97.6 F | HEART RATE: 112 BPM | SYSTOLIC BLOOD PRESSURE: 100 MMHG | BODY MASS INDEX: 22.89 KG/M2

## 2023-08-21 DIAGNOSIS — E83.41 HYPERMAGNESEMIA: ICD-10-CM

## 2023-08-21 DIAGNOSIS — E87.8 HYPERCHLOREMIA: ICD-10-CM

## 2023-08-21 DIAGNOSIS — Z85.89 HISTORY OF SQUAMOUS CELL CARCINOMA: ICD-10-CM

## 2023-08-21 DIAGNOSIS — S01.20XA OPEN WOUND OF NASAL CAVITY, INITIAL ENCOUNTER: ICD-10-CM

## 2023-08-21 DIAGNOSIS — C76.0 HEAD AND NECK CANCER (HCC): Primary | ICD-10-CM

## 2023-08-21 DIAGNOSIS — C76.0 MALIGNANT NEOPLASM OF HEAD, FACE AND NECK (HCC): Primary | ICD-10-CM

## 2023-08-21 DIAGNOSIS — E87.0 HYPERNATREMIA: Primary | ICD-10-CM

## 2023-08-21 LAB
ALBUMIN SERPL-MCNC: 4.3 G/DL (ref 3.5–5.2)
ALBUMIN SERPL-MCNC: 4.3 G/DL (ref 3.5–5.2)
ALP SERPL-CCNC: 88 U/L (ref 40–129)
ALP SERPL-CCNC: 89 U/L (ref 40–129)
ALT SERPL-CCNC: 31 U/L (ref 0–40)
ALT SERPL-CCNC: 33 U/L (ref 0–40)
ANION GAP SERPL CALCULATED.3IONS-SCNC: 11 MMOL/L (ref 7–16)
ANION GAP SERPL CALCULATED.3IONS-SCNC: 11 MMOL/L (ref 7–16)
ANION GAP SERPL CALCULATED.3IONS-SCNC: 12 MMOL/L (ref 7–16)
AST SERPL-CCNC: 17 U/L (ref 0–39)
AST SERPL-CCNC: 18 U/L (ref 0–39)
BASOPHILS # BLD: 0 K/UL (ref 0–0.2)
BASOPHILS # BLD: 0.04 K/UL (ref 0–0.2)
BASOPHILS NFR BLD: 0 % (ref 0–2)
BASOPHILS NFR BLD: 1 % (ref 0–2)
BILIRUB SERPL-MCNC: 0.6 MG/DL (ref 0–1.2)
BILIRUB SERPL-MCNC: 0.7 MG/DL (ref 0–1.2)
BILIRUB UR QL STRIP: NEGATIVE
BUN SERPL-MCNC: 45 MG/DL (ref 6–20)
BUN SERPL-MCNC: 47 MG/DL (ref 6–20)
BUN SERPL-MCNC: 49 MG/DL (ref 6–20)
CALCIUM SERPL-MCNC: 10.1 MG/DL (ref 8.6–10.2)
CALCIUM SERPL-MCNC: 10.2 MG/DL (ref 8.6–10.2)
CALCIUM SERPL-MCNC: 9.8 MG/DL (ref 8.6–10.2)
CHLORIDE SERPL-SCNC: 125 MMOL/L (ref 98–107)
CHLORIDE SERPL-SCNC: 127 MMOL/L (ref 98–107)
CHLORIDE SERPL-SCNC: 128 MMOL/L (ref 98–107)
CLARITY UR: CLEAR
CO2 SERPL-SCNC: 25 MMOL/L (ref 22–29)
CO2 SERPL-SCNC: 27 MMOL/L (ref 22–29)
CO2 SERPL-SCNC: 27 MMOL/L (ref 22–29)
COLOR UR: YELLOW
CREAT SERPL-MCNC: 1.4 MG/DL (ref 0.7–1.2)
CREAT SERPL-MCNC: 1.5 MG/DL (ref 0.7–1.2)
CREAT SERPL-MCNC: 1.6 MG/DL (ref 0.7–1.2)
CREAT UR-MCNC: 254 MG/DL (ref 40–278)
EKG ATRIAL RATE: 90 BPM
EKG P AXIS: 80 DEGREES
EKG P-R INTERVAL: 156 MS
EKG Q-T INTERVAL: 350 MS
EKG QRS DURATION: 74 MS
EKG QTC CALCULATION (BAZETT): 428 MS
EKG R AXIS: 64 DEGREES
EKG T AXIS: 52 DEGREES
EKG VENTRICULAR RATE: 90 BPM
EOSINOPHIL # BLD: 0.12 K/UL (ref 0.05–0.5)
EOSINOPHIL # BLD: 0.2 K/UL (ref 0.05–0.5)
EOSINOPHILS RELATIVE PERCENT: 3 % (ref 0–6)
EOSINOPHILS RELATIVE PERCENT: 4 % (ref 0–6)
ERYTHROCYTE [DISTWIDTH] IN BLOOD BY AUTOMATED COUNT: 15.1 % (ref 11.5–15)
ERYTHROCYTE [DISTWIDTH] IN BLOOD BY AUTOMATED COUNT: 15.5 % (ref 11.5–15)
GFR SERPL CREATININE-BSD FRML MDRD: 52 ML/MIN/1.73M2
GFR SERPL CREATININE-BSD FRML MDRD: 56 ML/MIN/1.73M2
GFR SERPL CREATININE-BSD FRML MDRD: >60 ML/MIN/1.73M2
GLUCOSE SERPL-MCNC: 111 MG/DL (ref 74–99)
GLUCOSE SERPL-MCNC: 119 MG/DL (ref 74–99)
GLUCOSE SERPL-MCNC: 139 MG/DL (ref 74–99)
GLUCOSE UR STRIP-MCNC: NEGATIVE MG/DL
HCT VFR BLD AUTO: 48.5 % (ref 37–54)
HCT VFR BLD AUTO: 49 % (ref 37–54)
HGB BLD-MCNC: 15.6 G/DL (ref 12.5–16.5)
HGB BLD-MCNC: 15.6 G/DL (ref 12.5–16.5)
HGB UR QL STRIP.AUTO: NEGATIVE
KETONES UR STRIP-MCNC: NEGATIVE MG/DL
LEUKOCYTE ESTERASE UR QL STRIP: NEGATIVE
LYMPHOCYTES NFR BLD: 0.55 K/UL (ref 1.5–4)
LYMPHOCYTES NFR BLD: 0.7 K/UL (ref 1.5–4)
LYMPHOCYTES RELATIVE PERCENT: 12 % (ref 20–42)
LYMPHOCYTES RELATIVE PERCENT: 16 % (ref 20–42)
MAGNESIUM SERPL-MCNC: 2.8 MG/DL (ref 1.6–2.6)
MAGNESIUM SERPL-MCNC: 2.9 MG/DL (ref 1.6–2.6)
MCH RBC QN AUTO: 30.5 PG (ref 26–35)
MCH RBC QN AUTO: 30.8 PG (ref 26–35)
MCHC RBC AUTO-ENTMCNC: 31.8 G/DL (ref 32–34.5)
MCHC RBC AUTO-ENTMCNC: 32.2 G/DL (ref 32–34.5)
MCV RBC AUTO: 95.8 FL (ref 80–99.9)
MCV RBC AUTO: 95.9 FL (ref 80–99.9)
MONOCYTES NFR BLD: 0.35 K/UL (ref 0.1–0.95)
MONOCYTES NFR BLD: 0.47 K/UL (ref 0.1–0.95)
MONOCYTES NFR BLD: 10 % (ref 2–12)
MONOCYTES NFR BLD: 8 % (ref 2–12)
MUCOUS THREADS URNS QL MICRO: PRESENT
NEUTROPHILS NFR BLD: 73 % (ref 43–80)
NEUTROPHILS NFR BLD: 73 % (ref 43–80)
NEUTS SEG NFR BLD: 3.29 K/UL (ref 1.8–7.3)
NEUTS SEG NFR BLD: 3.29 K/UL (ref 1.8–7.3)
NITRITE UR QL STRIP: NEGATIVE
OSMOLALITY SERPL: 344 MOSM/KG (ref 285–310)
OSMOLALITY UR: 896 MOSM/KG (ref 300–900)
PH UR STRIP: 6 [PH] (ref 5–9)
PLATELET # BLD AUTO: 212 K/UL (ref 130–450)
PLATELET # BLD AUTO: 226 K/UL (ref 130–450)
PMV BLD AUTO: 10.4 FL (ref 7–12)
PMV BLD AUTO: 10.5 FL (ref 7–12)
POTASSIUM SERPL-SCNC: 4.2 MMOL/L (ref 3.5–5)
POTASSIUM SERPL-SCNC: 4.3 MMOL/L (ref 3.5–5)
POTASSIUM SERPL-SCNC: 4.3 MMOL/L (ref 3.5–5)
PROT SERPL-MCNC: 7.3 G/DL (ref 6.4–8.3)
PROT SERPL-MCNC: 7.4 G/DL (ref 6.4–8.3)
PROT UR STRIP-MCNC: 30 MG/DL
RBC # BLD AUTO: 5.06 M/UL (ref 3.8–5.8)
RBC # BLD AUTO: 5.11 M/UL (ref 3.8–5.8)
RBC # BLD: ABNORMAL 10*6/UL
RBC # BLD: NORMAL 10*6/UL
RBC #/AREA URNS HPF: ABNORMAL /HPF
SODIUM SERPL-SCNC: 161 MMOL/L (ref 132–146)
SODIUM SERPL-SCNC: 166 MMOL/L (ref 132–146)
SODIUM SERPL-SCNC: 166 MMOL/L (ref 132–146)
SODIUM UR-SCNC: 33 MMOL/L
SP GR UR STRIP: 1.02 (ref 1–1.03)
UROBILINOGEN UR STRIP-ACNC: 0.2 EU/DL (ref 0–1)
WBC #/AREA URNS HPF: ABNORMAL /HPF
WBC OTHER # BLD: 4.5 K/UL (ref 4.5–11.5)
WBC OTHER # BLD: 4.5 K/UL (ref 4.5–11.5)

## 2023-08-21 PROCEDURE — 2580000003 HC RX 258: Performed by: STUDENT IN AN ORGANIZED HEALTH CARE EDUCATION/TRAINING PROGRAM

## 2023-08-21 PROCEDURE — 80048 BASIC METABOLIC PNL TOTAL CA: CPT

## 2023-08-21 PROCEDURE — 99212 OFFICE O/P EST SF 10 MIN: CPT

## 2023-08-21 PROCEDURE — 2140000000 HC CCU INTERMEDIATE R&B

## 2023-08-21 PROCEDURE — 99285 EMERGENCY DEPT VISIT HI MDM: CPT

## 2023-08-21 PROCEDURE — 93010 ELECTROCARDIOGRAM REPORT: CPT | Performed by: INTERNAL MEDICINE

## 2023-08-21 PROCEDURE — 84300 ASSAY OF URINE SODIUM: CPT

## 2023-08-21 PROCEDURE — 2580000003 HC RX 258: Performed by: INTERNAL MEDICINE

## 2023-08-21 PROCEDURE — 81001 URINALYSIS AUTO W/SCOPE: CPT

## 2023-08-21 PROCEDURE — 77014 CHG CT GUIDANCE RADIATION THERAPY FLDS PLACEMENT: CPT | Performed by: SPECIALIST

## 2023-08-21 PROCEDURE — 82570 ASSAY OF URINE CREATININE: CPT

## 2023-08-21 PROCEDURE — 80053 COMPREHEN METABOLIC PANEL: CPT

## 2023-08-21 PROCEDURE — 83735 ASSAY OF MAGNESIUM: CPT

## 2023-08-21 PROCEDURE — 6370000000 HC RX 637 (ALT 250 FOR IP): Performed by: FAMILY MEDICINE

## 2023-08-21 PROCEDURE — 85025 COMPLETE CBC W/AUTO DIFF WBC: CPT

## 2023-08-21 PROCEDURE — 93005 ELECTROCARDIOGRAM TRACING: CPT | Performed by: STUDENT IN AN ORGANIZED HEALTH CARE EDUCATION/TRAINING PROGRAM

## 2023-08-21 PROCEDURE — 77386 HC NTSTY MODUL RAD TX DLVR CPLX: CPT | Performed by: SPECIALIST

## 2023-08-21 PROCEDURE — 83935 ASSAY OF URINE OSMOLALITY: CPT

## 2023-08-21 PROCEDURE — 99213 OFFICE O/P EST LOW 20 MIN: CPT | Performed by: CLINICAL NURSE SPECIALIST

## 2023-08-21 PROCEDURE — 83930 ASSAY OF BLOOD OSMOLALITY: CPT

## 2023-08-21 RX ORDER — ONDANSETRON 4 MG/1
4 TABLET, ORALLY DISINTEGRATING ORAL EVERY 8 HOURS PRN
Status: DISCONTINUED | OUTPATIENT
Start: 2023-08-21 | End: 2023-08-24 | Stop reason: HOSPADM

## 2023-08-21 RX ORDER — SODIUM CHLORIDE 450 MG/100ML
INJECTION, SOLUTION INTRAVENOUS CONTINUOUS
Status: DISCONTINUED | OUTPATIENT
Start: 2023-08-21 | End: 2023-08-23

## 2023-08-21 RX ORDER — SODIUM CHLORIDE 0.9 % (FLUSH) 0.9 %
5-40 SYRINGE (ML) INJECTION PRN
Status: DISCONTINUED | OUTPATIENT
Start: 2023-08-21 | End: 2023-08-22 | Stop reason: HOSPADM

## 2023-08-21 RX ORDER — HALOPERIDOL 0.5 MG/1
0.25 TABLET ORAL 2 TIMES DAILY
Status: ON HOLD | COMMUNITY
Start: 2023-08-18 | End: 2023-08-24 | Stop reason: HOSPADM

## 2023-08-21 RX ORDER — HEPARIN 100 UNIT/ML
500 SYRINGE INTRAVENOUS PRN
Status: CANCELLED | OUTPATIENT
Start: 2023-08-21

## 2023-08-21 RX ORDER — SODIUM CHLORIDE 9 MG/ML
25 INJECTION, SOLUTION INTRAVENOUS PRN
Status: CANCELLED | OUTPATIENT
Start: 2023-08-21

## 2023-08-21 RX ORDER — LANSOPRAZOLE 30 MG/1
30 TABLET, ORALLY DISINTEGRATING, DELAYED RELEASE ORAL DAILY
Status: DISCONTINUED | OUTPATIENT
Start: 2023-08-22 | End: 2023-08-24 | Stop reason: HOSPADM

## 2023-08-21 RX ORDER — SODIUM CHLORIDE 0.9 % (FLUSH) 0.9 %
5-40 SYRINGE (ML) INJECTION PRN
Status: CANCELLED | OUTPATIENT
Start: 2023-08-21

## 2023-08-21 RX ORDER — HEPARIN 100 UNIT/ML
500 SYRINGE INTRAVENOUS PRN
Status: DISCONTINUED | OUTPATIENT
Start: 2023-08-21 | End: 2023-08-22 | Stop reason: HOSPADM

## 2023-08-21 RX ORDER — ONDANSETRON 4 MG/1
8 TABLET, FILM COATED ORAL EVERY 8 HOURS PRN
Status: DISCONTINUED | OUTPATIENT
Start: 2023-08-21 | End: 2023-08-24 | Stop reason: HOSPADM

## 2023-08-21 RX ORDER — CISPLATIN 1 MG/ML
40 INJECTION INTRAVENOUS
COMMUNITY

## 2023-08-21 RX ORDER — SODIUM CHLORIDE 450 MG/100ML
INJECTION, SOLUTION INTRAVENOUS ONCE
Status: COMPLETED | OUTPATIENT
Start: 2023-08-21 | End: 2023-08-21

## 2023-08-21 RX ADMIN — SODIUM CHLORIDE: 4.5 INJECTION, SOLUTION INTRAVENOUS at 18:57

## 2023-08-21 RX ADMIN — SODIUM CHLORIDE: 4.5 INJECTION, SOLUTION INTRAVENOUS at 14:01

## 2023-08-21 RX ADMIN — SODIUM CHLORIDE, PRESERVATIVE FREE 10 ML: 5 INJECTION INTRAVENOUS at 09:16

## 2023-08-21 RX ADMIN — ONDANSETRON HYDROCHLORIDE 8 MG: 4 TABLET, FILM COATED ORAL at 23:15

## 2023-08-21 ASSESSMENT — PAIN SCALES - GENERAL: PAINLEVEL_OUTOF10: 7

## 2023-08-21 ASSESSMENT — PAIN DESCRIPTION - PAIN TYPE: TYPE: ACUTE PAIN

## 2023-08-21 ASSESSMENT — PAIN - FUNCTIONAL ASSESSMENT: PAIN_FUNCTIONAL_ASSESSMENT: 0-10

## 2023-08-21 ASSESSMENT — PAIN DESCRIPTION - DESCRIPTORS: DESCRIPTORS: ACHING

## 2023-08-21 ASSESSMENT — PAIN DESCRIPTION - LOCATION: LOCATION: THROAT

## 2023-08-21 NOTE — ED PROVIDER NOTES
Affect    -------------------------------------------------- RESULTS -------------------------------------------------  I have personally reviewed all laboratory and imaging results for this patient. Results are listed below.      LABS:  Results for orders placed or performed during the hospital encounter of 08/21/23   CBC with Auto Differential   Result Value Ref Range    WBC 4.5 4.5 - 11.5 k/uL    RBC 5.06 3.80 - 5.80 m/uL    Hemoglobin 15.6 12.5 - 16.5 g/dL    Hematocrit 48.5 37.0 - 54.0 %    MCV 95.8 80.0 - 99.9 fL    MCH 30.8 26.0 - 35.0 pg    MCHC 32.2 32.0 - 34.5 g/dL    RDW 15.5 (H) 11.5 - 15.0 %    Platelets 429 506 - 709 k/uL    MPV 10.5 7.0 - 12.0 fL    Neutrophils % 73 43.0 - 80.0 %    Lymphocytes % 16 (L) 20.0 - 42.0 %    Monocytes % 8 2.0 - 12.0 %    Eosinophils % 3 0 - 6 %    Basophils % 1 0.0 - 2.0 %    Neutrophils Absolute 3.29 1.80 - 7.30 k/uL    Lymphocytes Absolute 0.70 (L) 1.50 - 4.00 k/uL    Monocytes Absolute 0.35 0.10 - 0.95 k/uL    Eosinophils Absolute 0.12 0.05 - 0.50 k/uL    Basophils Absolute 0.04 0.00 - 0.20 k/uL    RBC Morphology Normal    CMP   Result Value Ref Range    Glucose 119 (H) 74 - 99 mg/dL    BUN 49 (H) 6 - 20 mg/dL    Creatinine 1.5 (H) 0.70 - 1.20 mg/dL    Est, Glom Filt Rate 56 (L) >60 mL/min/1.73m2    Calcium 10.2 8.6 - 10.2 mg/dL    Sodium 166 (HH) 132 - 146 mmol/L    Potassium 4.3 3.5 - 5.0 mmol/L    Chloride 128 (HH) 98 - 107 mmol/L    CO2 27 22 - 29 mmol/L    Anion Gap 11 7 - 16 mmol/L    Alkaline Phosphatase 88 40 - 129 U/L    ALT 33 0 - 40 U/L    AST 18 0 - 39 U/L    Total Bilirubin 0.6 0.0 - 1.2 mg/dL    Total Protein 7.3 6.4 - 8.3 g/dL    Albumin 4.3 3.5 - 5.2 g/dL   Urinalysis with Microscopic   Result Value Ref Range    Color, UA Yellow Yellow    Turbidity UA Clear Clear    Glucose, Ur NEGATIVE NEGATIVE mg/dL    Bilirubin Urine NEGATIVE NEGATIVE    Ketones, Urine NEGATIVE NEGATIVE mg/dL    Specific Gravity, UA 1.020 1.005 - 1.030    Urine Hgb NEGATIVE NEGATIVE

## 2023-08-21 NOTE — TELEPHONE ENCOUNTER
Patient remains c/o sore throat. Labs CR 1.6. . Patient to ER per Maya Adam NP.  Report called to [facility], ER

## 2023-08-21 NOTE — PROGRESS NOTES
Wt Readings from Last 3 Encounters:   08/21/23 188 lb (85.3 kg)   08/14/23 212 lb (96.2 kg)   08/10/23 211 lb (95.7 kg)     Briefly met w/ pt prior to transport to ED for hypernatremia. Pt understandably frustrated by weight loss. Of note, continued wt loss unsurprising given inability to tolerate EN infusion >50 ml/hr w/ transition to nocturnal feeding prior to discharge on 8/18. Per IP RD note, pt was recommended to infuse Pivot 1.5 @ 75 ml/hr x 8 hour despite intolerance to >50 ml/hr. Pt is not consuming anything by mouth d/t odynophagia and increased mucous production. If pt was following nocturnal regimen, he was meeting <50% estimated needs. Unable to discuss further d/t need for ED. Will follow as able. Electronically signed by Wilner Caputo MS, RD, LD on 8/21/2023 at 10:34 AM      Spoke w/ pt's wife, Aida, per request following pt's transfer to ED. Aida understandably upset regarding pt's current location. Educated on need for medical management of hypernatremia, current sodium level 161. Aida is concerned for pt's health and exposure to other patients in ED waiting room. Notified  RN to concerns, will discuss w/ NP. Aida would like clarification on home EN orders as when the [address] nurse came out over the weekend she noted that he was only to get ~2 cartons of feeding nocturnally. Discussed that this was the order upon discharge, but it would be changed once pt is evaluated in the outpatient setting. Aida appreciative of time spent. Will continue to follow.   Electronically signed by Wilnre Caputo MS, RD, LD on 8/21/2023 at 11:43 AM

## 2023-08-21 NOTE — PROGRESS NOTES
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 Referring Physician:  Carola Velázquez MD     PCP:  Julia Ma MD     History of Present Illness:       Mr. 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 The patient was hit by his dog's head on the left side of his nose in April 2022, in November 2022 he had noticed a lesion inside his nose, and she was diagnosed with staph infection, was treated with antibiotics, he then developed a pimple on his nose and eventually developed a hole in his nose, he was seen by ID, he was referred to the hospital due to concern about myelitis, he had a CT scan done revealing chronic sinusitis involving the left sphenoid sinus, he had a biopsy done on 2/21/2023, revealing invasive poorly differentiated squamous cell carcinoma, grade 3, the patient was referred Dr. Hang Qiu, he underwent a total rhinectomy on 5/5/2023, the patient was found to have tumor growth to the glabella and the maxillary crest, path:   Case Summary Report   Procedure:    Rhinectomy, total   Tumor Site:   Nasal septal mucosa, cartilage, subcutaneous tissue, skin. Tumor Laterality: Left and Right. Left >> Right     Tumor Focality: Single focus     Tumor Size (Greatest dimension): At least 3.5 cm. Histologic Type: Squamous cell carcinoma, keratinizing     Histologic Grade (squamous cell carcinomas only): Poorly differentiated     Specimen Margins (Main specimen; part B)     _X_ Involved by invasive carcinoma        Specify margin(s), per orientation, if possible: Superior and inferior   septal margins, superior right and left skin margins.      _X_ Uninvolved by high-grade dysplasia/in situ disease     Separately

## 2023-08-21 NOTE — PROGRESS NOTES
Lab contacted infusion stating patient has a critical sodium level 161, message relayed to Pelon Ríos MA in office.

## 2023-08-22 ENCOUNTER — HOSPITAL ENCOUNTER (OUTPATIENT)
Dept: RADIATION ONCOLOGY | Age: 53
Discharge: HOME | End: 2023-08-22
Payer: COMMERCIAL

## 2023-08-22 ENCOUNTER — TELEPHONE (OUTPATIENT)
Dept: PALLATIVE CARE | Age: 53
End: 2023-08-22

## 2023-08-22 PROBLEM — E44.0 MODERATE PROTEIN-CALORIE MALNUTRITION (HCC): Chronic | Status: ACTIVE | Noted: 2023-08-22

## 2023-08-22 LAB
ALBUMIN SERPL-MCNC: 3.9 G/DL (ref 3.5–5.2)
ANION GAP SERPL CALCULATED.3IONS-SCNC: 10 MMOL/L (ref 7–16)
ANION GAP SERPL CALCULATED.3IONS-SCNC: 12 MMOL/L (ref 7–16)
ANION GAP SERPL CALCULATED.3IONS-SCNC: 9 MMOL/L (ref 7–16)
ANION GAP SERPL CALCULATED.3IONS-SCNC: 9 MMOL/L (ref 7–16)
BASOPHILS # BLD: 0.03 K/UL (ref 0–0.2)
BASOPHILS NFR BLD: 1 % (ref 0–2)
BUN SERPL-MCNC: 38 MG/DL (ref 6–20)
BUN SERPL-MCNC: 38 MG/DL (ref 6–20)
BUN SERPL-MCNC: 39 MG/DL (ref 6–20)
BUN SERPL-MCNC: 40 MG/DL (ref 6–20)
CALCIUM SERPL-MCNC: 8.3 MG/DL (ref 8.6–10.2)
CALCIUM SERPL-MCNC: 9.2 MG/DL (ref 8.6–10.2)
CALCIUM SERPL-MCNC: 9.2 MG/DL (ref 8.6–10.2)
CALCIUM SERPL-MCNC: 9.3 MG/DL (ref 8.6–10.2)
CHLORIDE SERPL-SCNC: 116 MMOL/L (ref 98–107)
CHLORIDE SERPL-SCNC: 124 MMOL/L (ref 98–107)
CO2 SERPL-SCNC: 23 MMOL/L (ref 22–29)
CO2 SERPL-SCNC: 26 MMOL/L (ref 22–29)
CO2 SERPL-SCNC: 27 MMOL/L (ref 22–29)
CO2 SERPL-SCNC: 28 MMOL/L (ref 22–29)
CREAT SERPL-MCNC: 1.2 MG/DL (ref 0.7–1.2)
CREAT SERPL-MCNC: 1.4 MG/DL (ref 0.7–1.2)
EOSINOPHIL # BLD: 0.13 K/UL (ref 0.05–0.5)
EOSINOPHILS RELATIVE PERCENT: 4 % (ref 0–6)
ERYTHROCYTE [DISTWIDTH] IN BLOOD BY AUTOMATED COUNT: 15.2 % (ref 11.5–15)
GFR SERPL CREATININE-BSD FRML MDRD: 60 ML/MIN/1.73M2
GFR SERPL CREATININE-BSD FRML MDRD: >60 ML/MIN/1.73M2
GLUCOSE SERPL-MCNC: 108 MG/DL (ref 74–99)
GLUCOSE SERPL-MCNC: 108 MG/DL (ref 74–99)
GLUCOSE SERPL-MCNC: 96 MG/DL (ref 74–99)
GLUCOSE SERPL-MCNC: 98 MG/DL (ref 74–99)
HCT VFR BLD AUTO: 43.9 % (ref 37–54)
HGB BLD-MCNC: 13.9 G/DL (ref 12.5–16.5)
LYMPHOCYTES NFR BLD: 0.32 K/UL (ref 1.5–4)
LYMPHOCYTES RELATIVE PERCENT: 9 % (ref 20–42)
MAGNESIUM SERPL-MCNC: 2.6 MG/DL (ref 1.6–2.6)
MCH RBC QN AUTO: 30.5 PG (ref 26–35)
MCHC RBC AUTO-ENTMCNC: 31.7 G/DL (ref 32–34.5)
MCV RBC AUTO: 96.3 FL (ref 80–99.9)
MONOCYTES NFR BLD: 0.19 K/UL (ref 0.1–0.95)
MONOCYTES NFR BLD: 5 % (ref 2–12)
NEUTROPHILS NFR BLD: 82 % (ref 43–80)
NEUTS SEG NFR BLD: 3.02 K/UL (ref 1.8–7.3)
PHOSPHATE SERPL-MCNC: 3.6 MG/DL (ref 2.5–4.5)
PLATELET # BLD AUTO: 172 K/UL (ref 130–450)
PMV BLD AUTO: 10.4 FL (ref 7–12)
POTASSIUM SERPL-SCNC: 3.8 MMOL/L (ref 3.5–5)
POTASSIUM SERPL-SCNC: 4.1 MMOL/L (ref 3.5–5)
POTASSIUM SERPL-SCNC: 4.3 MMOL/L (ref 3.5–5)
POTASSIUM SERPL-SCNC: 4.4 MMOL/L (ref 3.5–5)
RBC # BLD AUTO: 4.56 M/UL (ref 3.8–5.8)
RBC # BLD: ABNORMAL 10*6/UL
SODIUM SERPL-SCNC: 151 MMOL/L (ref 132–146)
SODIUM SERPL-SCNC: 160 MMOL/L (ref 132–146)
SODIUM SERPL-SCNC: 160 MMOL/L (ref 132–146)
SODIUM SERPL-SCNC: 161 MMOL/L (ref 132–146)
SODIUM UR-SCNC: 34 MMOL/L
UUN UR-MCNC: 1941 MG/DL (ref 800–1666)
WBC OTHER # BLD: 3.7 K/UL (ref 4.5–11.5)

## 2023-08-22 PROCEDURE — 2580000003 HC RX 258: Performed by: STUDENT IN AN ORGANIZED HEALTH CARE EDUCATION/TRAINING PROGRAM

## 2023-08-22 PROCEDURE — 36415 COLL VENOUS BLD VENIPUNCTURE: CPT

## 2023-08-22 PROCEDURE — 80048 BASIC METABOLIC PNL TOTAL CA: CPT

## 2023-08-22 PROCEDURE — 2580000003 HC RX 258: Performed by: FAMILY MEDICINE

## 2023-08-22 PROCEDURE — 77014 CHG CT GUIDANCE RADIATION THERAPY FLDS PLACEMENT: CPT | Performed by: SPECIALIST

## 2023-08-22 PROCEDURE — 2060000000 HC ICU INTERMEDIATE R&B

## 2023-08-22 PROCEDURE — 84300 ASSAY OF URINE SODIUM: CPT

## 2023-08-22 PROCEDURE — 97161 PT EVAL LOW COMPLEX 20 MIN: CPT

## 2023-08-22 PROCEDURE — 6360000002 HC RX W HCPCS: Performed by: FAMILY MEDICINE

## 2023-08-22 PROCEDURE — 83735 ASSAY OF MAGNESIUM: CPT

## 2023-08-22 PROCEDURE — 77386 HC NTSTY MODUL RAD TX DLVR CPLX: CPT | Performed by: SPECIALIST

## 2023-08-22 PROCEDURE — 80069 RENAL FUNCTION PANEL: CPT

## 2023-08-22 PROCEDURE — 85025 COMPLETE CBC W/AUTO DIFF WBC: CPT

## 2023-08-22 PROCEDURE — 6370000000 HC RX 637 (ALT 250 FOR IP): Performed by: FAMILY MEDICINE

## 2023-08-22 PROCEDURE — 84540 ASSAY OF URINE/UREA-N: CPT

## 2023-08-22 RX ORDER — SCOLOPAMINE TRANSDERMAL SYSTEM 1 MG/1
1 PATCH, EXTENDED RELEASE TRANSDERMAL
Status: DISCONTINUED | OUTPATIENT
Start: 2023-08-22 | End: 2023-08-24 | Stop reason: HOSPADM

## 2023-08-22 RX ORDER — SODIUM CHLORIDE 9 MG/ML
INJECTION, SOLUTION INTRAVENOUS PRN
Status: DISCONTINUED | OUTPATIENT
Start: 2023-08-22 | End: 2023-08-24 | Stop reason: HOSPADM

## 2023-08-22 RX ORDER — SODIUM CHLORIDE 0.9 % (FLUSH) 0.9 %
5-40 SYRINGE (ML) INJECTION PRN
Status: DISCONTINUED | OUTPATIENT
Start: 2023-08-22 | End: 2023-08-24 | Stop reason: HOSPADM

## 2023-08-22 RX ORDER — ACETAMINOPHEN 325 MG/1
650 TABLET ORAL EVERY 6 HOURS PRN
Status: DISCONTINUED | OUTPATIENT
Start: 2023-08-22 | End: 2023-08-24 | Stop reason: HOSPADM

## 2023-08-22 RX ORDER — ACETAMINOPHEN 650 MG/1
650 SUPPOSITORY RECTAL EVERY 6 HOURS PRN
Status: DISCONTINUED | OUTPATIENT
Start: 2023-08-22 | End: 2023-08-24 | Stop reason: HOSPADM

## 2023-08-22 RX ORDER — SODIUM CHLORIDE 0.9 % (FLUSH) 0.9 %
5-40 SYRINGE (ML) INJECTION EVERY 12 HOURS SCHEDULED
Status: DISCONTINUED | OUTPATIENT
Start: 2023-08-22 | End: 2023-08-24 | Stop reason: HOSPADM

## 2023-08-22 RX ORDER — HALOPERIDOL 0.5 MG/1
0.25 TABLET ORAL 2 TIMES DAILY
Status: DISCONTINUED | OUTPATIENT
Start: 2023-08-22 | End: 2023-08-24 | Stop reason: HOSPADM

## 2023-08-22 RX ORDER — LIDOCAINE HYDROCHLORIDE 20 MG/ML
10 SOLUTION OROPHARYNGEAL PRN
Status: DISCONTINUED | OUTPATIENT
Start: 2023-08-22 | End: 2023-08-24 | Stop reason: HOSPADM

## 2023-08-22 RX ORDER — ENOXAPARIN SODIUM 100 MG/ML
40 INJECTION SUBCUTANEOUS DAILY
Status: DISCONTINUED | OUTPATIENT
Start: 2023-08-22 | End: 2023-08-24 | Stop reason: HOSPADM

## 2023-08-22 RX ADMIN — SODIUM CHLORIDE: 4.5 INJECTION, SOLUTION INTRAVENOUS at 09:27

## 2023-08-22 RX ADMIN — SODIUM CHLORIDE: 4.5 INJECTION, SOLUTION INTRAVENOUS at 15:16

## 2023-08-22 RX ADMIN — SODIUM CHLORIDE, PRESERVATIVE FREE 10 ML: 5 INJECTION INTRAVENOUS at 08:25

## 2023-08-22 RX ADMIN — ENOXAPARIN SODIUM 40 MG: 100 INJECTION SUBCUTANEOUS at 08:25

## 2023-08-22 RX ADMIN — SODIUM CHLORIDE: 4.5 INJECTION, SOLUTION INTRAVENOUS at 20:43

## 2023-08-22 RX ADMIN — LANSOPRAZOLE 30 MG: 30 TABLET, ORALLY DISINTEGRATING, DELAYED RELEASE ORAL at 05:58

## 2023-08-22 RX ADMIN — HALOPERIDOL 0.25 MG: 0.5 TABLET ORAL at 05:57

## 2023-08-22 ASSESSMENT — PAIN SCALES - GENERAL
PAINLEVEL_OUTOF10: 0

## 2023-08-22 NOTE — CARE COORDINATION
8/22:  Transition of care:  Pt presented to the ER for Ma level at 151 from home. Pt is on room air at 99%, Iv Fluids & Lovenox. Pt dx with squamous cell of the nose with recent total rhinectomy. Pt has a peg tube & port placed on 7/31. CM spoke with pt to discuss CM role & dc planning. Pt lives in 2 story house with his wife & 2 children ages 23 & 15 with 4 steps to enter. The bed/bathroom are on the 2nd floor with 8 steps to enter. Pt's PCP is Dr David Gasca uses [facility] in [place]. Pt was active with [facility] & has tube feeding equipment & supplies at home. Pt's dc plan is to return home & his wife or family can transport. Per [facility] pt will just need a CHANEL order - CM placed order. Sw/CM will continue to follow. Electronically signed by Nabil Bailey RN on 8/22/2023 at 10:12 AM    The Plan for Transition of Care is related to the following treatment goals: [address]    The Patient and/or patient representative  was provided with a choice of provider and agrees   with the discharge plan. [x] Yes [] No    Freedom of choice list was provided with basic dialogue that supports the patient's individualized plan of care/goals, treatment preferences and shares the quality data associated with the providers.  [x] Yes [] No

## 2023-08-22 NOTE — H&P
[facility]  History and Physical      CHIEF COMPLAINT:    Chief Complaint   Patient presents with    Other     Sent to ED for abnormal labs. Supposed to get chemo today but sent to ED for high sodium levels. Discharged three days ago after admission for dehydration. +PEG tube. Hx of squamous cell carcinoma. Patient of Tanisha Solano MD presents with:  PERLA (acute kidney injury) [facility])    History of Present Illness:   Patient is a 62-year-old male with a past medical history of acid reflux, squamous cell carcinoma of the nose status post rhinectomy and PEG tube placement currently undergoing chemotherapy who was sent to the emergency room for abnormal labs. Patient was supposed to undergo chemo yesterday when his labs came back and he was found to be hypernatremic at 166. Patient was sent to the emergency room for further evaluation and consultation with nephrology. On arrival to emergency patient was found to have a sodium level 166, chloride of 127, and PERLA 45/1.4, with leukopenia of 3.7. Nephrology was consulted and patient was started on half-normal saline and admitted to [facility] with telemetry. He is well-known to us from multiple previous admissions recently, most recent of which was last week  On my evaluation, he is not feeling well and indicates he was doing great when he was last discharged about a week and a half ago. Subsequently over the past day or so he became very weak fatigued and having copious drainage. REVIEW OF SYSTEMS:  Pertinent negatives are above in HPI. 10 point ROS otherwise negative.       Past Medical History:   Diagnosis Date    Acid reflux     Cancer ([address]) 2023    SCC tip of nose         Past Surgical History:   Procedure Laterality Date    APPENDECTOMY      CHOLECYSTECTOMY      ELBOW SURGERY      GASTROSTOMY TUBE PLACEMENT N/A 7/31/2023    EGD PEG TUBE PLACEMENT performed by Edmund Redmond MD at [address] [place] [address] [place] Right 02/23/2023

## 2023-08-22 NOTE — ACP (ADVANCE CARE PLANNING)
Advance Care Planning   Healthcare Decision Maker:    Primary Decision Maker: Rita Felton  [phone]    Click here to complete Healthcare Decision Makers including selection of the Healthcare Decision Maker Relationship (ie \"Primary\").          Electronically signed by Janay Munoz RN on 8/22/2023 at 10:39 AM

## 2023-08-22 NOTE — TELEPHONE ENCOUNTER
Called and spoke with Aida regarding Arer's need for symptom management from Palliative Care. Maico Osorio is currently inpatient. Encouraged Aida to inform the inpatient staff that they would like Palliative Care to consult. She Expressed understanding.

## 2023-08-23 ENCOUNTER — HOSPITAL ENCOUNTER (OUTPATIENT)
Dept: RADIATION ONCOLOGY | Age: 53
Discharge: HOME | End: 2023-08-23
Payer: COMMERCIAL

## 2023-08-23 LAB
ALBUMIN SERPL-MCNC: 3.2 G/DL (ref 3.5–5.2)
ANION GAP SERPL CALCULATED.3IONS-SCNC: 7 MMOL/L (ref 7–16)
ANION GAP SERPL CALCULATED.3IONS-SCNC: 8 MMOL/L (ref 7–16)
ANION GAP SERPL CALCULATED.3IONS-SCNC: 9 MMOL/L (ref 7–16)
B PARAP IS1001 DNA NPH QL NAA+NON-PROBE: NOT DETECTED
B PERT DNA SPEC QL NAA+PROBE: NOT DETECTED
BASOPHILS # BLD: 0 K/UL (ref 0–0.2)
BASOPHILS NFR BLD: 0 % (ref 0–2)
BUN SERPL-MCNC: 24 MG/DL (ref 6–20)
BUN SERPL-MCNC: 28 MG/DL (ref 6–20)
BUN SERPL-MCNC: 30 MG/DL (ref 6–20)
C PNEUM DNA NPH QL NAA+NON-PROBE: NOT DETECTED
CALCIUM SERPL-MCNC: 8 MG/DL (ref 8.6–10.2)
CALCIUM SERPL-MCNC: 8.6 MG/DL (ref 8.6–10.2)
CALCIUM SERPL-MCNC: 8.7 MG/DL (ref 8.6–10.2)
CHLORIDE SERPL-SCNC: 117 MMOL/L (ref 98–107)
CHLORIDE SERPL-SCNC: 117 MMOL/L (ref 98–107)
CHLORIDE SERPL-SCNC: 120 MMOL/L (ref 98–107)
CO2 SERPL-SCNC: 22 MMOL/L (ref 22–29)
CO2 SERPL-SCNC: 25 MMOL/L (ref 22–29)
CO2 SERPL-SCNC: 26 MMOL/L (ref 22–29)
CREAT SERPL-MCNC: 0.9 MG/DL (ref 0.7–1.2)
CREAT SERPL-MCNC: 1.1 MG/DL (ref 0.7–1.2)
CREAT SERPL-MCNC: 1.1 MG/DL (ref 0.7–1.2)
EOSINOPHIL # BLD: 0.11 K/UL (ref 0.05–0.5)
EOSINOPHILS RELATIVE PERCENT: 4 % (ref 0–6)
ERYTHROCYTE [DISTWIDTH] IN BLOOD BY AUTOMATED COUNT: 14.5 % (ref 11.5–15)
FLUAV RNA NPH QL NAA+NON-PROBE: NOT DETECTED
FLUBV RNA NPH QL NAA+NON-PROBE: NOT DETECTED
GFR SERPL CREATININE-BSD FRML MDRD: >60 ML/MIN/1.73M2
GLUCOSE SERPL-MCNC: 113 MG/DL (ref 74–99)
GLUCOSE SERPL-MCNC: 113 MG/DL (ref 74–99)
GLUCOSE SERPL-MCNC: 118 MG/DL (ref 74–99)
HADV DNA NPH QL NAA+NON-PROBE: NOT DETECTED
HCOV 229E RNA NPH QL NAA+NON-PROBE: NOT DETECTED
HCOV HKU1 RNA NPH QL NAA+NON-PROBE: NOT DETECTED
HCOV NL63 RNA NPH QL NAA+NON-PROBE: NOT DETECTED
HCOV OC43 RNA NPH QL NAA+NON-PROBE: NOT DETECTED
HCT VFR BLD AUTO: 37.9 % (ref 37–54)
HGB BLD-MCNC: 12.1 G/DL (ref 12.5–16.5)
HMPV RNA NPH QL NAA+NON-PROBE: NOT DETECTED
HPIV1 RNA NPH QL NAA+NON-PROBE: NOT DETECTED
HPIV2 RNA NPH QL NAA+NON-PROBE: NOT DETECTED
HPIV3 RNA NPH QL NAA+NON-PROBE: NOT DETECTED
HPIV4 RNA NPH QL NAA+NON-PROBE: NOT DETECTED
LYMPHOCYTES NFR BLD: 0.37 K/UL (ref 1.5–4)
LYMPHOCYTES RELATIVE PERCENT: 14 % (ref 20–42)
M PNEUMO DNA NPH QL NAA+NON-PROBE: NOT DETECTED
MCH RBC QN AUTO: 30.5 PG (ref 26–35)
MCHC RBC AUTO-ENTMCNC: 31.9 G/DL (ref 32–34.5)
MCV RBC AUTO: 95.5 FL (ref 80–99.9)
MONOCYTES NFR BLD: 0.24 K/UL (ref 0.1–0.95)
MONOCYTES NFR BLD: 9 % (ref 2–12)
NEUTROPHILS NFR BLD: 73 % (ref 43–80)
NEUTS SEG NFR BLD: 1.89 K/UL (ref 1.8–7.3)
PHOSPHATE SERPL-MCNC: 2.6 MG/DL (ref 2.5–4.5)
PLATELET # BLD AUTO: 147 K/UL (ref 130–450)
PMV BLD AUTO: 10.8 FL (ref 7–12)
POTASSIUM SERPL-SCNC: 3.6 MMOL/L (ref 3.5–5)
POTASSIUM SERPL-SCNC: 3.8 MMOL/L (ref 3.5–5)
POTASSIUM SERPL-SCNC: 4 MMOL/L (ref 3.5–5)
RBC # BLD AUTO: 3.97 M/UL (ref 3.8–5.8)
RSV RNA NPH QL NAA+NON-PROBE: NOT DETECTED
RV+EV RNA NPH QL NAA+NON-PROBE: NOT DETECTED
SARS-COV-2 RNA NPH QL NAA+NON-PROBE: NOT DETECTED
SODIUM SERPL-SCNC: 148 MMOL/L (ref 132–146)
SODIUM SERPL-SCNC: 150 MMOL/L (ref 132–146)
SODIUM SERPL-SCNC: 153 MMOL/L (ref 132–146)
SPECIMEN DESCRIPTION: NORMAL
WBC OTHER # BLD: 2.6 K/UL (ref 4.5–11.5)

## 2023-08-23 PROCEDURE — 6360000002 HC RX W HCPCS: Performed by: FAMILY MEDICINE

## 2023-08-23 PROCEDURE — 6370000000 HC RX 637 (ALT 250 FOR IP): Performed by: NURSE PRACTITIONER

## 2023-08-23 PROCEDURE — 6370000000 HC RX 637 (ALT 250 FOR IP): Performed by: FAMILY MEDICINE

## 2023-08-23 PROCEDURE — 2580000003 HC RX 258: Performed by: STUDENT IN AN ORGANIZED HEALTH CARE EDUCATION/TRAINING PROGRAM

## 2023-08-23 PROCEDURE — 2500000003 HC RX 250 WO HCPCS: Performed by: NURSE PRACTITIONER

## 2023-08-23 PROCEDURE — 2060000000 HC ICU INTERMEDIATE R&B

## 2023-08-23 PROCEDURE — 77014 CHG CT GUIDANCE RADIATION THERAPY FLDS PLACEMENT: CPT | Performed by: SPECIALIST

## 2023-08-23 PROCEDURE — 2580000003 HC RX 258: Performed by: FAMILY MEDICINE

## 2023-08-23 PROCEDURE — 85025 COMPLETE CBC W/AUTO DIFF WBC: CPT

## 2023-08-23 PROCEDURE — 99223 1ST HOSP IP/OBS HIGH 75: CPT | Performed by: NURSE PRACTITIONER

## 2023-08-23 PROCEDURE — 80069 RENAL FUNCTION PANEL: CPT

## 2023-08-23 PROCEDURE — 77386 HC NTSTY MODUL RAD TX DLVR CPLX: CPT | Performed by: SPECIALIST

## 2023-08-23 PROCEDURE — 80048 BASIC METABOLIC PNL TOTAL CA: CPT

## 2023-08-23 PROCEDURE — 0202U NFCT DS 22 TRGT SARS-COV-2: CPT

## 2023-08-23 RX ORDER — GUAIFENESIN 200 MG/10ML
400 LIQUID ORAL 2 TIMES DAILY
Status: DISCONTINUED | OUTPATIENT
Start: 2023-08-23 | End: 2023-08-24 | Stop reason: HOSPADM

## 2023-08-23 RX ORDER — GUAIFENESIN 400 MG/1
400 TABLET ORAL 2 TIMES DAILY
Status: DISCONTINUED | OUTPATIENT
Start: 2023-08-23 | End: 2023-08-23 | Stop reason: SDUPTHER

## 2023-08-23 RX ADMIN — ENOXAPARIN SODIUM 40 MG: 100 INJECTION SUBCUTANEOUS at 09:06

## 2023-08-23 RX ADMIN — HALOPERIDOL 0.25 MG: 0.5 TABLET ORAL at 09:06

## 2023-08-23 RX ADMIN — GUAIFENESIN 400 MG: 400 TABLET ORAL at 13:19

## 2023-08-23 RX ADMIN — LANSOPRAZOLE 30 MG: 30 TABLET, ORALLY DISINTEGRATING, DELAYED RELEASE ORAL at 09:06

## 2023-08-23 RX ADMIN — GUAIFENESIN 400 MG: 200 SOLUTION ORAL at 20:47

## 2023-08-23 RX ADMIN — SODIUM CHLORIDE, PRESERVATIVE FREE 10 ML: 5 INJECTION INTRAVENOUS at 09:06

## 2023-08-23 RX ADMIN — HALOPERIDOL 0.25 MG: 0.5 TABLET ORAL at 20:47

## 2023-08-23 RX ADMIN — SODIUM CHLORIDE: 4.5 INJECTION, SOLUTION INTRAVENOUS at 11:23

## 2023-08-23 RX ADMIN — SODIUM CHLORIDE, PRESERVATIVE FREE 10 ML: 5 INJECTION INTRAVENOUS at 20:47

## 2023-08-23 NOTE — CONSULTS
Palliative Care Department  [phone]  Palliative Care Initial Consult  Provider OLYA Borges - [address]  [id number]  Hospital Day: 3  Date of Initial Consult: 8/22/2023  Referring Provider: Eldridge Burkitt, APRN - CNP  Palliative Medicine was consulted for assistance with: Symptom management    HPI:   Joe Hoffmann is a 48 y.o. with a medical history of GERD, squamous cell carcinoma of the nose s/p rhinectomy and PEG tube placement who was admitted on 8/21/2023 from home with a CHIEF COMPLAINT of abnormal labs. Patient was supposed to undergo chemo yesterday when his labs came back and he was found to be hypernatremic at 166. Patient was sent to the emergency room for further evaluation and consultation with nephrology. On arrival to emergency patient was found to have a sodium level 166, chloride of 127, and PERLA 45/1.4, with leukopenia of 3.7. Nephrology was consulted. Palliative medicine consulted for symptom management. ASSESSMENT/PLAN:     Pertinent Hospital Diagnoses     Hypernatremia  PERLA  Squamous cell carcinoma of nose      Palliative Care Encounter / Counseling Regarding Goals of Care  Please see detailed goals of care discussion as below  At this time, Joe Hoffmann, Does have capacity for medical decision-making.   Capacity is time limited and situation/question specific  During encounter there was no surrogate medical decision-maker  Outcome of goals of care meeting:   Continue current medical management  Symptom management  Code status Full Code  Advanced Directives: no POA or living will in Psychiatric  Surrogate/Legal NOK:  Filiberto Duarte (spouse) [phone]  Dora Almonteegkellee (parent) [phone]      Dry mouth/mucositis:   -Oasis PRN   -Menthol lozenge Q2H PRN   -lidocaine viscous PRN   -Magic mouthwash 4 times daily   Increased secretions:   - guaifensin 400 mg BID    Nausea:   -  Zofran   -  Haldol 0.25 mg BID   - Scopolamine patch     Spiritual assessment: no
Several right-sided renal calculi including 1 within the  dilated renal pelvis. IMPRESSION/RECOMMENDATIONS:      Briefly Mr. 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 Today patient was found to have a elevated sodium level of 161 as an outpatient and she was advised to presented to the ER for admission. In the ER his repeated blood work showed a sodium of 166 with a creatinine 1.5 mg/dL, reason for this consultation. Patient does not take anything by mouth and he endorsed having nausea and vomiting. His baseline creatinine 0.8-1 mg/dL. PERLA stage I, volume responsive prerenal PERLA 2/2 poor oral intake, nausea and vomiting. Estimated free water deficit 9.5 liters. To start 0.45 % sodium chloride at 200 cc/hour. 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 Plan:    Start 0.45% sodium chloride at 200 cc/hour  Monitors sodium level  Monitor renal function  Monitor magnesium levels    Thank you very much to Dr. Mari Cedeño, for allowing us to participate in the care of Mr. Renetta Reed.

## 2023-08-23 NOTE — CARE COORDINATION
Received case in transfer, pt from home with family(spouse and kids) and active with [facility]. Pt has tube feeds and supplies through [facility]. PT 19/24 100 ft. Renal on board, IVF d/c. Palliative care consulted. Attempted to meet with pt, who was asleep, spoke with wife via phone, plan remains home at discharge, family to transport. Lacy Pina, MSW, LSW

## 2023-08-23 NOTE — PROGRESS NOTES
Physician Progress Note      PATIENT:               Bob Quach  CSN #:                  [id number]  :                       1970  ADMIT DATE:       2023 9:02 AM  DISCH DATE:        2023 6:00 PM  RESPONDING  PROVIDER #:        Clarice Mckeon MD          QUERY TEXT:    Patient noted to have Sodium levels 147-153. If possible, please document in   progress notes and discharge summary if you are evaluating and/or treating any   of the following: The medical record reflects the following:  Risk Factors: dehydration, lucia, [place]  Clinical Indicators: Sodium levels: 147, 149, 153, 151  Treatment: labs    Thank you,  Joss Bauman, RN, BSN, CRCR, Clinical Documentation Improvement  Options provided:  -- Hypernatremia  -- Sodium level not clinically significant  -- Other - I will add my own diagnosis  -- Disagree - Not applicable / Not valid  -- Disagree - Clinically unable to determine / Unknown  -- Refer to Clinical Documentation Reviewer    PROVIDER RESPONSE TEXT:    This patient has Hypernatremia.     Query created by: Joss Bauman on 2023 1:58 PM      Electronically signed by:  Clarice Mckeon MD 2023 5:08 PM

## 2023-08-24 ENCOUNTER — HOSPITAL ENCOUNTER (OUTPATIENT)
Dept: RADIATION ONCOLOGY | Age: 53
Discharge: HOME | End: 2023-08-24
Payer: COMMERCIAL

## 2023-08-24 VITALS
DIASTOLIC BLOOD PRESSURE: 81 MMHG | OXYGEN SATURATION: 100 % | TEMPERATURE: 98.7 F | BODY MASS INDEX: 22.89 KG/M2 | RESPIRATION RATE: 19 BRPM | SYSTOLIC BLOOD PRESSURE: 127 MMHG | WEIGHT: 188 LBS | HEART RATE: 73 BPM | HEIGHT: 76 IN

## 2023-08-24 LAB
ALBUMIN SERPL-MCNC: 3.3 G/DL (ref 3.5–5.2)
ANION GAP SERPL CALCULATED.3IONS-SCNC: 10 MMOL/L (ref 7–16)
BASOPHILS # BLD: 0.05 K/UL (ref 0–0.2)
BASOPHILS NFR BLD: 3 % (ref 0–2)
BUN SERPL-MCNC: 19 MG/DL (ref 6–20)
CALCIUM SERPL-MCNC: 8.5 MG/DL (ref 8.6–10.2)
CHLORIDE SERPL-SCNC: 111 MMOL/L (ref 98–107)
CO2 SERPL-SCNC: 23 MMOL/L (ref 22–29)
CREAT SERPL-MCNC: 0.9 MG/DL (ref 0.7–1.2)
EOSINOPHIL # BLD: 0.09 K/UL (ref 0.05–0.5)
EOSINOPHILS RELATIVE PERCENT: 4 % (ref 0–6)
ERYTHROCYTE [DISTWIDTH] IN BLOOD BY AUTOMATED COUNT: 14.6 % (ref 11.5–15)
GFR SERPL CREATININE-BSD FRML MDRD: >60 ML/MIN/1.73M2
GLUCOSE SERPL-MCNC: 121 MG/DL (ref 74–99)
HCT VFR BLD AUTO: 37 % (ref 37–54)
HGB BLD-MCNC: 12 G/DL (ref 12.5–16.5)
LYMPHOCYTES NFR BLD: 0.35 K/UL (ref 1.5–4)
LYMPHOCYTES RELATIVE PERCENT: 18 % (ref 20–42)
MCH RBC QN AUTO: 30.4 PG (ref 26–35)
MCHC RBC AUTO-ENTMCNC: 32.4 G/DL (ref 32–34.5)
MCV RBC AUTO: 93.7 FL (ref 80–99.9)
MONOCYTES NFR BLD: 0.23 K/UL (ref 0.1–0.95)
MONOCYTES NFR BLD: 11 % (ref 2–12)
NEUTROPHILS NFR BLD: 64 % (ref 43–80)
NEUTS SEG NFR BLD: 1.28 K/UL (ref 1.8–7.3)
NUCLEATED RED BLOOD CELLS: 1 PER 100 WBC
PHOSPHATE SERPL-MCNC: 2.4 MG/DL (ref 2.5–4.5)
PLATELET # BLD AUTO: 137 K/UL (ref 130–450)
PMV BLD AUTO: 10.8 FL (ref 7–12)
POTASSIUM SERPL-SCNC: 3.6 MMOL/L (ref 3.5–5)
RBC # BLD AUTO: 3.95 M/UL (ref 3.8–5.8)
RBC # BLD: NORMAL 10*6/UL
SODIUM SERPL-SCNC: 144 MMOL/L (ref 132–146)
WBC OTHER # BLD: 2 K/UL (ref 4.5–11.5)

## 2023-08-24 PROCEDURE — 2500000003 HC RX 250 WO HCPCS

## 2023-08-24 PROCEDURE — 85025 COMPLETE CBC W/AUTO DIFF WBC: CPT

## 2023-08-24 PROCEDURE — 77386 HC NTSTY MODUL RAD TX DLVR CPLX: CPT | Performed by: SPECIALIST

## 2023-08-24 PROCEDURE — 2500000003 HC RX 250 WO HCPCS: Performed by: NURSE PRACTITIONER

## 2023-08-24 PROCEDURE — 97165 OT EVAL LOW COMPLEX 30 MIN: CPT

## 2023-08-24 PROCEDURE — 2580000003 HC RX 258: Performed by: FAMILY MEDICINE

## 2023-08-24 PROCEDURE — 77336 RADIATION PHYSICS CONSULT: CPT | Performed by: SPECIALIST

## 2023-08-24 PROCEDURE — 2580000003 HC RX 258

## 2023-08-24 PROCEDURE — 6370000000 HC RX 637 (ALT 250 FOR IP): Performed by: FAMILY MEDICINE

## 2023-08-24 PROCEDURE — 6360000002 HC RX W HCPCS: Performed by: FAMILY MEDICINE

## 2023-08-24 PROCEDURE — 80069 RENAL FUNCTION PANEL: CPT

## 2023-08-24 RX ADMIN — ENOXAPARIN SODIUM 40 MG: 100 INJECTION SUBCUTANEOUS at 10:29

## 2023-08-24 RX ADMIN — SODIUM CHLORIDE, PRESERVATIVE FREE 10 ML: 5 INJECTION INTRAVENOUS at 10:29

## 2023-08-24 RX ADMIN — GUAIFENESIN 400 MG: 200 SOLUTION ORAL at 10:29

## 2023-08-24 RX ADMIN — POTASSIUM PHOSPHATE, MONOBASIC AND POTASSIUM PHOSPHATE, DIBASIC 10 MMOL: 224; 236 INJECTION, SOLUTION, CONCENTRATE INTRAVENOUS at 15:38

## 2023-08-24 RX ADMIN — ONDANSETRON 4 MG: 4 TABLET, ORALLY DISINTEGRATING ORAL at 00:27

## 2023-08-24 RX ADMIN — LANSOPRAZOLE 30 MG: 30 TABLET, ORALLY DISINTEGRATING, DELAYED RELEASE ORAL at 10:29

## 2023-08-24 RX ADMIN — HALOPERIDOL 0.25 MG: 0.5 TABLET ORAL at 10:29

## 2023-08-24 NOTE — PROGRESS NOTES
Patient is still in patient and stable but being managed for care on [phone]. Dr. Julio Jin saw patient and he is stable but a weight loss . All questions were answered from a nursing perspective and expressed understanding.

## 2023-08-24 NOTE — CARE COORDINATION
Reviewed chart, anticipate PT IFC plan  discharge home today. Plan is to [address] [place] [address]. Pt is active with [facility], notified [facility] that pt will be discharging per attending. Spouse to transport home. Rose Stanford MSW, LSW         .

## 2023-08-24 NOTE — PROGRESS NOTES
DEPARTMENT OF RADIATION ONCOLOGY   ON TREATMENT VISIT       8/24/2023      NAME:  Marjorie Fields    YOB: 1970    DIAGNOSIS:  [id number] Squamous cell carcinoma of the Nose/Nasal Septum    SUBJECTIVE:   Marjorie Fields has now received 6000 cGy in 30/32 fractions directed to the nasal cavity & bilateral saba-neck. Past medical, surgical, social and family histories reviewed and updated as indicated. PAIN: 3/10    ALLERGIES:  Patient has no known allergies. No current facility-administered medications for this encounter. No current outpatient medications on file.      Facility-Administered Medications Ordered in Other Encounters   Medication Dose Route Frequency Provider Last Rate Last Admin    menthol lozenge 7.5 mg  1 lozenge Oral Q2H PRN Robyn Daubs, APRN - CNP        glycerin moisturizing mouth spray (OASIS) 35 %   Oral PRN Robyn Daubs, APRN - CNP        guaiFENesin (ROBITUSSIN) 100 MG/5ML liquid 400 mg  400 mg Oral BID Robyn Daubs, APRN - CNP   400 mg at 08/23/23 2047    magic (miracle) mouthwash  15 mL Swish & Spit 4x Daily PRN Cy Brodie Learn, APRN - CNP        haloperidol (HALDOL) tablet 0.25 mg  0.25 mg Oral BID Cy Brodie Learn, APRN - CNP   0.25 mg at 08/23/23 2047    lidocaine viscous hcl (XYLOCAINE) 2 % solution 10 mL  10 mL Mouth/Throat PRN Cy Brodie Learn, APRN - CNP        sodium chloride (OCEAN, BABY AYR) 0.65 % nasal spray   Nasal Daily PRN Cy Brodie Learn, APRN - CNP        scopolamine (TRANSDERM-SCOP) transdermal patch 1 patch  1 patch TransDERmal Q72H Cy Brodie Learn, APRN - CNP        sodium chloride flush 0.9 % injection 5-40 mL  5-40 mL IntraVENous 2 times per day Cy Brodie Learn, APRN - CNP   10 mL at 08/23/23 2047    sodium chloride flush 0.9 % injection 5-40 mL  5-40 mL IntraVENous PRN Cy Brodie Learn, APRN - CNP        0.9 % sodium chloride infusion   IntraVENous PRN Cy Brodie Learn, APRN - CNP        enoxaparin ([address] [place] [address])

## 2023-08-24 NOTE — DISCHARGE INSTRUCTIONS
Please instruct patient that he will need to program his pump at home so that he is receiving 80 cc of water an hour.

## 2023-08-24 NOTE — DISCHARGE SUMMARY
08/22/23  0600 08/23/23  0600 08/24/23  0600   WBC 3.7* 2.6* 2.0*   HGB 13.9 12.1* 12.0*   HCT 43.9 37.9 37.0    147 137       Recent Labs     08/23/23  0600 08/23/23  1305 08/24/23  0600   * 148* 144   K 4.0 3.6 3.6   * 117* 111*   CO2 25 22 23   BUN 28* 24* 19   CREATININE 1.1 0.9 0.9   CALCIUM 8.6 8.0* 8.5*       No results found. Discharge Exam:    HEENT: NCAT,  PERRLA, No JVD  Heart:  RRR, no murmurs, gallops, or rubs. Lungs:  CTA bilaterally, no wheeze, rales or rhonchi  Abd: bowel sounds present, nontender, nondistended, no masses  Extrem:  No clubbing, cyanosis, or edema    Disposition: home     Patient Condition at Discharge: Stable     Patient Instructions:      Medication List        CONTINUE taking these medications      CISplatin 50 MG/50ML chemo injection  Commonly known as: PLATINOL     lidocaine viscous hcl 2 % Soln solution  Commonly known as: XYLOCAINE  Take 10 mLs by mouth as needed for Irritation     magic mouthwash Susp  Swish and spit 15 mLs 4 times daily as needed for Irritation     ondansetron 4 MG disintegrating tablet  Commonly known as: ZOFRAN-ODT  Take 1 tablet by mouth every 8 hours as needed for Nausea or Vomiting     scopolamine transdermal patch  Commonly known as: TRANSDERM-SCOP  Place 1 patch onto the skin every 72 hours            STOP taking these medications      haloperidol 0.5 MG tablet  Commonly known as: HALDOL            Activity: activity as tolerated  Diet:  NPO, TF     Pt has been advised to: Follow-up with Byron Ramirez MD in 1 week.   Follow-up with consultants as recommended by them    Note that over 30 minutes was spent in preparing discharge papers, discussing discharge with patient, medication review, etc.    Signed:

## 2023-08-24 NOTE — PLAN OF CARE
Problem: Discharge Planning  Goal: Discharge to home or other facility with appropriate resources  Outcome: Progressing     Problem: Nutrition Deficit:  Goal: Optimize nutritional status  Outcome: Progressing     Problem: Safety - Adult  Goal: Free from fall injury  Outcome: Progressing

## 2023-08-25 ENCOUNTER — TELEPHONE (OUTPATIENT)
Dept: ONCOLOGY | Age: 53
End: 2023-08-25

## 2023-08-25 ENCOUNTER — HOSPITAL ENCOUNTER (OUTPATIENT)
Dept: RADIATION ONCOLOGY | Age: 53
Discharge: HOME | End: 2023-08-25
Payer: COMMERCIAL

## 2023-08-25 PROCEDURE — 77386 HC NTSTY MODUL RAD TX DLVR CPLX: CPT | Performed by: SPECIALIST

## 2023-08-25 NOTE — TELEPHONE ENCOUNTER
Attempted to contact pt's wife, Naina Hebert, regarding EN order following recent hospital discharge. Left voicemail w/ contact information. Of note, pt was scheduled for port draw today and did not come in. Relayed upcoming medical oncology visit on 8/28. Discussed EN order w/ Missael Turk [facility] Pharmacist, who confirms that current order is for Pivot 1.5 @ 60 ml/hr x 24 hr meeting 100% estimated needs. Will continue to follow.   Electronically signed by Richelle Davis MS, RD, LD on 8/25/2023 at 4:03 PM

## 2023-08-25 NOTE — TELEPHONE ENCOUNTER
Received return call from [address] regarding EN. Aida reports that they had been successful in increasing EN to rate of 55 ml/hr overnight and through most of today however she just got a message that pt vomited \"dark brown\". Instructed Aida to decrease rate back to 50 ml/hr as tolerated. If emesis persists, pt will need to return to the ED. Aida verbalizes understanding. Discussed overall nutrient needs, provided supportive listening. Again reviewed severity of side effects and expectations moving forward after upcoming final radiation treatment. Encouraged to contact this clinician as needed.   Electronically signed by Arsenio Wahl MS, RD, LD on 8/25/2023 at 4:46 PM

## 2023-08-28 ENCOUNTER — OFFICE VISIT (OUTPATIENT)
Dept: ONCOLOGY | Age: 53
End: 2023-08-28
Payer: COMMERCIAL

## 2023-08-28 ENCOUNTER — HOSPITAL ENCOUNTER (OUTPATIENT)
Dept: RADIATION ONCOLOGY | Age: 53
Discharge: HOME | End: 2023-08-28
Payer: COMMERCIAL

## 2023-08-28 ENCOUNTER — HOSPITAL ENCOUNTER (OUTPATIENT)
Dept: INFUSION THERAPY | Age: 53
Discharge: HOME | End: 2023-08-28
Payer: COMMERCIAL

## 2023-08-28 VITALS
SYSTOLIC BLOOD PRESSURE: 119 MMHG | RESPIRATION RATE: 18 BRPM | TEMPERATURE: 97.6 F | DIASTOLIC BLOOD PRESSURE: 76 MMHG | OXYGEN SATURATION: 97 % | HEART RATE: 87 BPM

## 2023-08-28 VITALS
HEART RATE: 119 BPM | TEMPERATURE: 97.2 F | DIASTOLIC BLOOD PRESSURE: 75 MMHG | OXYGEN SATURATION: 95 % | WEIGHT: 188.6 LBS | HEIGHT: 76 IN | SYSTOLIC BLOOD PRESSURE: 106 MMHG | BODY MASS INDEX: 22.97 KG/M2

## 2023-08-28 DIAGNOSIS — C76.0 HEAD AND NECK CANCER (HCC): Primary | ICD-10-CM

## 2023-08-28 DIAGNOSIS — S01.20XA OPEN WOUND OF NASAL CAVITY, INITIAL ENCOUNTER: Primary | ICD-10-CM

## 2023-08-28 DIAGNOSIS — C76.0 HEAD AND NECK CANCER (HCC): ICD-10-CM

## 2023-08-28 LAB
ALBUMIN SERPL-MCNC: 3.8 G/DL (ref 3.5–5.2)
ALP SERPL-CCNC: 108 U/L (ref 40–129)
ALT SERPL-CCNC: 67 U/L (ref 0–40)
ANION GAP SERPL CALCULATED.3IONS-SCNC: 15 MMOL/L (ref 7–16)
AST SERPL-CCNC: 32 U/L (ref 0–39)
BASOPHILS # BLD: 0 K/UL (ref 0–0.2)
BASOPHILS NFR BLD: 0 % (ref 0–2)
BILIRUB SERPL-MCNC: 0.5 MG/DL (ref 0–1.2)
BUN SERPL-MCNC: 28 MG/DL (ref 6–20)
CALCIUM SERPL-MCNC: 10.2 MG/DL (ref 8.6–10.2)
CHLORIDE SERPL-SCNC: 107 MMOL/L (ref 98–107)
CO2 SERPL-SCNC: 24 MMOL/L (ref 22–29)
CREAT SERPL-MCNC: 1.3 MG/DL (ref 0.7–1.2)
EOSINOPHIL # BLD: 0.06 K/UL (ref 0.05–0.5)
EOSINOPHILS RELATIVE PERCENT: 2 % (ref 0–6)
ERYTHROCYTE [DISTWIDTH] IN BLOOD BY AUTOMATED COUNT: 15.6 % (ref 11.5–15)
GFR SERPL CREATININE-BSD FRML MDRD: >60 ML/MIN/1.73M2
GLUCOSE SERPL-MCNC: 128 MG/DL (ref 74–99)
HCT VFR BLD AUTO: 44.5 % (ref 37–54)
HGB BLD-MCNC: 14.9 G/DL (ref 12.5–16.5)
LYMPHOCYTES NFR BLD: 0.44 K/UL (ref 1.5–4)
LYMPHOCYTES RELATIVE PERCENT: 13 % (ref 20–42)
MAGNESIUM SERPL-MCNC: 2.6 MG/DL (ref 1.6–2.6)
MCH RBC QN AUTO: 30.9 PG (ref 26–35)
MCHC RBC AUTO-ENTMCNC: 33.5 G/DL (ref 32–34.5)
MCV RBC AUTO: 92.3 FL (ref 80–99.9)
METAMYELOCYTES ABSOLUTE COUNT: 0.03 K/UL (ref 0–0.12)
METAMYELOCYTES: 1 % (ref 0–1)
MONOCYTES NFR BLD: 0.21 K/UL (ref 0.1–0.95)
MONOCYTES NFR BLD: 6 % (ref 2–12)
NEUTROPHILS NFR BLD: 78 % (ref 43–80)
NEUTS SEG NFR BLD: 2.66 K/UL (ref 1.8–7.3)
NUCLEATED RED BLOOD CELLS: 1 PER 100 WBC
PLATELET # BLD AUTO: 222 K/UL (ref 130–450)
PMV BLD AUTO: 10.9 FL (ref 7–12)
POTASSIUM SERPL-SCNC: 4.4 MMOL/L (ref 3.5–5)
PROT SERPL-MCNC: 7.1 G/DL (ref 6.4–8.3)
RBC # BLD AUTO: 4.82 M/UL (ref 3.8–5.8)
RBC # BLD: NORMAL 10*6/UL
SODIUM SERPL-SCNC: 146 MMOL/L (ref 132–146)
WBC OTHER # BLD: 3.4 K/UL (ref 4.5–11.5)

## 2023-08-28 PROCEDURE — 85025 COMPLETE CBC W/AUTO DIFF WBC: CPT

## 2023-08-28 PROCEDURE — 80053 COMPREHEN METABOLIC PANEL: CPT

## 2023-08-28 PROCEDURE — 83735 ASSAY OF MAGNESIUM: CPT

## 2023-08-28 PROCEDURE — 99213 OFFICE O/P EST LOW 20 MIN: CPT | Performed by: CLINICAL NURSE SPECIALIST

## 2023-08-28 PROCEDURE — 96367 TX/PROPH/DG ADDL SEQ IV INF: CPT

## 2023-08-28 PROCEDURE — 77386 HC NTSTY MODUL RAD TX DLVR CPLX: CPT | Performed by: SPECIALIST

## 2023-08-28 PROCEDURE — 2580000003 HC RX 258: Performed by: CLINICAL NURSE SPECIALIST

## 2023-08-28 PROCEDURE — 77014 CHG CT GUIDANCE RADIATION THERAPY FLDS PLACEMENT: CPT | Performed by: SPECIALIST

## 2023-08-28 PROCEDURE — 96413 CHEMO IV INFUSION 1 HR: CPT

## 2023-08-28 PROCEDURE — 96375 TX/PRO/DX INJ NEW DRUG ADDON: CPT

## 2023-08-28 PROCEDURE — 6360000002 HC RX W HCPCS: Performed by: CLINICAL NURSE SPECIALIST

## 2023-08-28 PROCEDURE — 96415 CHEMO IV INFUSION ADDL HR: CPT

## 2023-08-28 RX ORDER — ACETAMINOPHEN 325 MG/1
650 TABLET ORAL
Status: CANCELLED | OUTPATIENT
Start: 2023-08-28

## 2023-08-28 RX ORDER — SODIUM CHLORIDE 9 MG/ML
INJECTION, SOLUTION INTRAVENOUS CONTINUOUS
Status: CANCELLED | OUTPATIENT
Start: 2023-08-28

## 2023-08-28 RX ORDER — HEPARIN 100 UNIT/ML
500 SYRINGE INTRAVENOUS PRN
Status: DISCONTINUED | OUTPATIENT
Start: 2023-08-28 | End: 2023-08-29 | Stop reason: HOSPADM

## 2023-08-28 RX ORDER — DIPHENHYDRAMINE HYDROCHLORIDE 50 MG/ML
50 INJECTION INTRAMUSCULAR; INTRAVENOUS
Status: CANCELLED | OUTPATIENT
Start: 2023-08-28

## 2023-08-28 RX ORDER — HEPARIN SODIUM (PORCINE) LOCK FLUSH IV SOLN 100 UNIT/ML 100 UNIT/ML
500 SOLUTION INTRAVENOUS PRN
Status: CANCELLED | OUTPATIENT
Start: 2023-08-28

## 2023-08-28 RX ORDER — SODIUM CHLORIDE 9 MG/ML
5-250 INJECTION, SOLUTION INTRAVENOUS PRN
Status: CANCELLED | OUTPATIENT
Start: 2023-08-28

## 2023-08-28 RX ORDER — SODIUM CHLORIDE 9 MG/ML
5-250 INJECTION, SOLUTION INTRAVENOUS PRN
Status: DISCONTINUED | OUTPATIENT
Start: 2023-08-28 | End: 2023-08-29 | Stop reason: HOSPADM

## 2023-08-28 RX ORDER — PALONOSETRON 0.05 MG/ML
0.25 INJECTION, SOLUTION INTRAVENOUS ONCE
Status: CANCELLED | OUTPATIENT
Start: 2023-08-28 | End: 2023-08-28

## 2023-08-28 RX ORDER — PALONOSETRON HYDROCHLORIDE 0.05 MG/ML
0.25 INJECTION, SOLUTION INTRAVENOUS ONCE
Status: COMPLETED | OUTPATIENT
Start: 2023-08-28 | End: 2023-08-28

## 2023-08-28 RX ORDER — ONDANSETRON 2 MG/ML
8 INJECTION INTRAMUSCULAR; INTRAVENOUS
Status: CANCELLED | OUTPATIENT
Start: 2023-08-28

## 2023-08-28 RX ORDER — SODIUM CHLORIDE 0.9 % (FLUSH) 0.9 %
5-40 SYRINGE (ML) INJECTION PRN
Status: DISCONTINUED | OUTPATIENT
Start: 2023-08-28 | End: 2023-08-29 | Stop reason: HOSPADM

## 2023-08-28 RX ORDER — EPINEPHRINE 1 MG/ML
0.3 INJECTION, SOLUTION, CONCENTRATE INTRAVENOUS PRN
Status: CANCELLED | OUTPATIENT
Start: 2023-08-28

## 2023-08-28 RX ORDER — MEPERIDINE HYDROCHLORIDE 50 MG/ML
12.5 INJECTION INTRAMUSCULAR; INTRAVENOUS; SUBCUTANEOUS PRN
Status: CANCELLED | OUTPATIENT
Start: 2023-08-28

## 2023-08-28 RX ORDER — SODIUM CHLORIDE 0.9 % (FLUSH) 0.9 %
5-40 SYRINGE (ML) INJECTION PRN
Status: CANCELLED | OUTPATIENT
Start: 2023-08-28

## 2023-08-28 RX ORDER — DEXAMETHASONE SODIUM PHOSPHATE 10 MG/ML
10 INJECTION, SOLUTION INTRAMUSCULAR; INTRAVENOUS ONCE
Status: COMPLETED | OUTPATIENT
Start: 2023-08-28 | End: 2023-08-28

## 2023-08-28 RX ORDER — FAMOTIDINE 10 MG/ML
20 INJECTION, SOLUTION INTRAVENOUS
Status: CANCELLED | OUTPATIENT
Start: 2023-08-28

## 2023-08-28 RX ORDER — ALBUTEROL SULFATE 90 UG/1
4 AEROSOL, METERED RESPIRATORY (INHALATION) PRN
Status: CANCELLED | OUTPATIENT
Start: 2023-08-28

## 2023-08-28 RX ADMIN — Medication 500 UNITS: at 14:14

## 2023-08-28 RX ADMIN — DEXAMETHASONE SODIUM PHOSPHATE 10 MG: 10 INJECTION, SOLUTION INTRAMUSCULAR; INTRAVENOUS at 11:14

## 2023-08-28 RX ADMIN — SODIUM CHLORIDE, PRESERVATIVE FREE 10 ML: 5 INJECTION INTRAVENOUS at 11:14

## 2023-08-28 RX ADMIN — PALONOSETRON 0.25 MG: 0.25 INJECTION, SOLUTION INTRAVENOUS at 11:12

## 2023-08-28 RX ADMIN — FOSAPREPITANT 150 MG: 150 INJECTION, POWDER, LYOPHILIZED, FOR SOLUTION INTRAVENOUS at 11:34

## 2023-08-28 RX ADMIN — SODIUM CHLORIDE 200 ML/HR: 9 INJECTION, SOLUTION INTRAVENOUS at 11:11

## 2023-08-28 RX ADMIN — POTASSIUM CHLORIDE: 2 INJECTION, SOLUTION, CONCENTRATE INTRAVENOUS at 12:02

## 2023-08-28 RX ADMIN — SODIUM CHLORIDE, PRESERVATIVE FREE 10 ML: 5 INJECTION INTRAVENOUS at 14:14

## 2023-08-28 NOTE — PROGRESS NOTES
Patient did NOT stop at check out window, left without AVS.  Patient has [facility].
#3.  The patient has odynophagia and dysphagia, on Magic mouthwash, he will receive intravenous hydration daily, follow-up with RD     The patient has a 6 mm left lower lobe nodule,  the patient was seen by pulmonary at [facility], Dr. Veronika Chang, he was recommended follow-up chest CT after 3 months.     8/8-Recent 8/1-8/4 hospital admission for N/V, PEG tube and mediport placed 8/2. Using peg at home some issues with vomiting with TF sent to home did better when they added water to it , will have Soheila Rod RD see pt today . Peg site and Mediport look good. Labs reviewed ok for treatment today . 8/21 cisplatin today -recent hospital admit for dehydration , vomiting, PERLA , reports throat pain from radiation. HE would like PPI in liquid form for PEG tube , also still with vomiting but only occasionally reports he gags from throat secretions and usually just vomits mucous . Will see if PM can see him sooner than 9/5 for symptom management , has norco but hasn't used them but has 10/10 pain from throat area, scopolamine patch and haldol  was added during his hospitalization. . Tolerating nocturnal TF . Pt requesting liquid PPI. Labs reviewed hypernatremia 161, creatinine 1.6, BUN 47, pt stating he does not feel well overall , recommending hold treatment for today and send to ER for management of PERLA and dehydration . Loss of additional weight .      8/28/2023 no admission on 821 discharged on 8/24 free water feels better continuous tube feed, labs reviewed K for treatment has finished radiation therapy today, RTC in 1 week      Thank you for allowing us to participate in the care of Mr. Marilynn Madrid.      OLYA Villagran   HEMATOLOGY/MEDICAL [place]  [address] [facility] MED ONCOLOGY  [address] [place] [id number]  Dept: [address] [place] [address] Avenue: [phone]

## 2023-08-28 NOTE — PROGRESS NOTES
Marjorie Fields  8/28/2023  Ht Readings from Last 1 Encounters:   08/28/23 6' 4\" (1.93 m)     Wt Readings from Last 10 Encounters:   08/28/23 188 lb 9.6 oz (85.5 kg)   08/21/23 188 lb (85.3 kg)   08/21/23 188 lb (85.3 kg)   08/14/23 212 lb (96.2 kg)   08/10/23 211 lb (95.7 kg)   08/08/23 212 lb (96.2 kg)   08/04/23 217 lb (98.4 kg)   07/31/23 222 lb (100.7 kg)   07/30/23 222 lb (100.7 kg)   07/27/23 228 lb 8 oz (103.6 kg)     BMI: 22.96    Assessment: Met w/ pt and wife, Aida, during infusion. He has maintained wt over last week. Pt continues to be NPO, noting that he has tried drinking water but he is unable to swallow it. He is now infusing Pivot 1.5 via pump, attempting 24 hours though this is not always possible given frequent medical appointments. Pt reports that yesterday he was able to increase rate to 55 ml/hr providing 1320 ml tv, 1980 kcal, 124 gm pro, 990 ml free water if running continuously. Pt understands goal rate is 60 ml/hr and he is hopeful to be able to continue advancing rate as tolerated. Pt denies any additional significant N/V over the weekend. Pt has been directed by nephrology to flush w/ 80 ml H2O hourly to provide additional 1920 ml water, 2910 ml water total w/ flush + EN. Pt admits that he has not been able to consistently provide himself w/ hourly flushes. Contacted Alysa Soto, who will set up pt w/ kangaroo pump that will be able to infuse EN as well as water flushes so that pt will no longer need to do this manually. Pt continues w/ xerostomia as well as increased mucous production. He denies any other needs at this time, appreciative of assistance. Encouraged to contact this clinician as needed.     Weight change: stable x1 week, CBW reflects overall 36# wt loss since initial oncology nutrition evaluation 3 months ago (16%), of note pt gained wt prior to initiation of treatment reflecting overall loss of 45# x7 weeks  Appetite: N/A  Nutritional Side Effects: odynophagia, dysphagia,

## 2023-08-30 ENCOUNTER — TELEPHONE (OUTPATIENT)
Dept: ONCOLOGY | Age: 53
End: 2023-08-30

## 2023-08-30 NOTE — TELEPHONE ENCOUNTER
[facility] 8/29 THAT THEY HAVE RESUMED HOME CARE AND ANY QUESTIONS SHOULD BE DIRECTED -[phone].

## 2023-08-31 ENCOUNTER — HOSPITAL ENCOUNTER (OUTPATIENT)
Dept: RADIATION ONCOLOGY | Age: 53
Discharge: HOME | End: 2023-08-31
Payer: COMMERCIAL

## 2023-08-31 NOTE — PROGRESS NOTES
Radiation Treatment Summary    Patient Name:  Krystal Hays,  1970,  48 y.o., male       Referring Physician: Jin Dewey  [address]  [address] [place] [address]      PCP: Martha Sifuentes MD       Diagnosis: [id number] Squamous cell carcinoma of the Nose/Nasal Septum      Site Start TX Last [facility] Fractions Dose (cGy) Fx Dose (cGy) Technique   Nasal Cavity, bilateral Zone I-V 7/11/2023 8/17/2023 37 25 5000 200 VMAT   Nasal Cavity boost 8/18/2023 8/28/2023 10 7 1400 200 VMAT   TOTAL 7/11/2023 8/28/2023 48 32 6400         Response/Tolerance: Above is the treatment summary for antelmo Milian who completed a course of adjuvant therapy for his T4N0 squamous cell carcinoma of the nasal septum. His course was somewhat complicated by failure to thrive requiring hospitalization and nutritional support including the use of a PEG tube. He did experience grade 2 dysphagia, grade 2 dermatitis despite aggressive attempts at nutritional and skin care which the patient resisted resulting in the acute toxicity. He lost nearly 35 pounds over the course of treatment. We will do a interim check in 2 weeks and have requested an in clinic follow-up in approximately 30 days. Have also encouraged him to alert his team in [place] for follow-up with imaging pursuant thereafter. Follow-up:  30-day in the office with Radiation Oncology      Thank you for the opportunity to participate in multidisciplinary management of this remarkable and pleasant patient. Sarika Lugo MD, [address] [place] [address], Leilani Gosselin, [place]    Department of Radiation Oncology  [facility] [address]: [phone] (CLB: [phone])  [address]  [address]) [address]: [phone] (LLE: [phone])  [address] [facility]) [address]:  [phone] ([facility]:  [phone])    NOTE: This report was transcribed using voice recognition software.  Every effort was made to ensure accuracy; however, inadvertent computerized transcription errors may be

## 2023-09-01 ENCOUNTER — HOSPITAL ENCOUNTER (OUTPATIENT)
Dept: INFUSION THERAPY | Age: 53
Discharge: HOME | End: 2023-09-01
Payer: COMMERCIAL

## 2023-09-01 VITALS
OXYGEN SATURATION: 98 % | RESPIRATION RATE: 18 BRPM | TEMPERATURE: 98 F | HEART RATE: 101 BPM | DIASTOLIC BLOOD PRESSURE: 84 MMHG | SYSTOLIC BLOOD PRESSURE: 114 MMHG

## 2023-09-01 DIAGNOSIS — S01.20XA OPEN WOUND OF NASAL CAVITY, INITIAL ENCOUNTER: ICD-10-CM

## 2023-09-01 DIAGNOSIS — C76.0 HEAD AND NECK CANCER (HCC): Primary | ICD-10-CM

## 2023-09-01 LAB
ALBUMIN SERPL-MCNC: 4.1 G/DL (ref 3.5–5.2)
ALP SERPL-CCNC: 100 U/L (ref 40–129)
ALT SERPL-CCNC: 42 U/L (ref 0–40)
ANION GAP SERPL CALCULATED.3IONS-SCNC: 14 MMOL/L (ref 7–16)
AST SERPL-CCNC: 18 U/L (ref 0–39)
BASOPHILS # BLD: 0.02 K/UL (ref 0–0.2)
BASOPHILS NFR BLD: 0 % (ref 0–2)
BILIRUB SERPL-MCNC: 0.9 MG/DL (ref 0–1.2)
BUN SERPL-MCNC: 30 MG/DL (ref 6–20)
CALCIUM SERPL-MCNC: 9.7 MG/DL (ref 8.6–10.2)
CHLORIDE SERPL-SCNC: 105 MMOL/L (ref 98–107)
CO2 SERPL-SCNC: 26 MMOL/L (ref 22–29)
CREAT SERPL-MCNC: 1.1 MG/DL (ref 0.7–1.2)
EOSINOPHIL # BLD: 0.05 K/UL (ref 0.05–0.5)
EOSINOPHILS RELATIVE PERCENT: 1 % (ref 0–6)
ERYTHROCYTE [DISTWIDTH] IN BLOOD BY AUTOMATED COUNT: 15.1 % (ref 11.5–15)
GFR SERPL CREATININE-BSD FRML MDRD: >60 ML/MIN/1.73M2
GLUCOSE SERPL-MCNC: 125 MG/DL (ref 74–99)
HCT VFR BLD AUTO: 42.2 % (ref 37–54)
HGB BLD-MCNC: 14.2 G/DL (ref 12.5–16.5)
IMM GRANULOCYTES # BLD AUTO: <0.03 K/UL (ref 0–0.58)
IMM GRANULOCYTES NFR BLD: 0 % (ref 0–5)
LYMPHOCYTES NFR BLD: 0.45 K/UL (ref 1.5–4)
LYMPHOCYTES RELATIVE PERCENT: 8 % (ref 20–42)
MAGNESIUM SERPL-MCNC: 2.2 MG/DL (ref 1.6–2.6)
MCH RBC QN AUTO: 30.8 PG (ref 26–35)
MCHC RBC AUTO-ENTMCNC: 33.6 G/DL (ref 32–34.5)
MCV RBC AUTO: 91.5 FL (ref 80–99.9)
MONOCYTES NFR BLD: 0.4 K/UL (ref 0.1–0.95)
MONOCYTES NFR BLD: 7 % (ref 2–12)
NEUTROPHILS NFR BLD: 83 % (ref 43–80)
NEUTS SEG NFR BLD: 4.56 K/UL (ref 1.8–7.3)
PHOSPHATE SERPL-MCNC: 4.2 MG/DL (ref 2.5–4.5)
PLATELET # BLD AUTO: 236 K/UL (ref 130–450)
PMV BLD AUTO: 10.5 FL (ref 7–12)
POTASSIUM SERPL-SCNC: 4.4 MMOL/L (ref 3.5–5)
PROT SERPL-MCNC: 7.1 G/DL (ref 6.4–8.3)
RBC # BLD AUTO: 4.61 M/UL (ref 3.8–5.8)
SODIUM SERPL-SCNC: 145 MMOL/L (ref 132–146)
WBC OTHER # BLD: 5.5 K/UL (ref 4.5–11.5)

## 2023-09-01 PROCEDURE — 2580000003 HC RX 258: Performed by: INTERNAL MEDICINE

## 2023-09-01 PROCEDURE — 96375 TX/PRO/DX INJ NEW DRUG ADDON: CPT

## 2023-09-01 PROCEDURE — 83735 ASSAY OF MAGNESIUM: CPT

## 2023-09-01 PROCEDURE — 6360000002 HC RX W HCPCS: Performed by: INTERNAL MEDICINE

## 2023-09-01 PROCEDURE — 84100 ASSAY OF PHOSPHORUS: CPT

## 2023-09-01 PROCEDURE — 96374 THER/PROPH/DIAG INJ IV PUSH: CPT

## 2023-09-01 PROCEDURE — 80053 COMPREHEN METABOLIC PANEL: CPT

## 2023-09-01 PROCEDURE — 85025 COMPLETE CBC W/AUTO DIFF WBC: CPT

## 2023-09-01 PROCEDURE — 96360 HYDRATION IV INFUSION INIT: CPT

## 2023-09-01 RX ORDER — SODIUM CHLORIDE 0.9 % (FLUSH) 0.9 %
5-40 SYRINGE (ML) INJECTION PRN
Status: DISCONTINUED | OUTPATIENT
Start: 2023-09-01 | End: 2023-09-02 | Stop reason: HOSPADM

## 2023-09-01 RX ORDER — HEPARIN 100 UNIT/ML
500 SYRINGE INTRAVENOUS PRN
Status: DISCONTINUED | OUTPATIENT
Start: 2023-09-01 | End: 2023-09-02 | Stop reason: HOSPADM

## 2023-09-01 RX ORDER — ONDANSETRON 2 MG/ML
8 INJECTION INTRAMUSCULAR; INTRAVENOUS ONCE
Status: COMPLETED | OUTPATIENT
Start: 2023-09-01 | End: 2023-09-01

## 2023-09-01 RX ORDER — SODIUM CHLORIDE 9 MG/ML
5-250 INJECTION, SOLUTION INTRAVENOUS PRN
OUTPATIENT
Start: 2023-09-01

## 2023-09-01 RX ORDER — HEPARIN 100 UNIT/ML
500 SYRINGE INTRAVENOUS PRN
OUTPATIENT
Start: 2023-09-01

## 2023-09-01 RX ORDER — SODIUM CHLORIDE 0.9 % (FLUSH) 0.9 %
5-40 SYRINGE (ML) INJECTION PRN
OUTPATIENT
Start: 2023-09-01

## 2023-09-01 RX ORDER — 0.9 % SODIUM CHLORIDE 0.9 %
1000 INTRAVENOUS SOLUTION INTRAVENOUS ONCE
Status: COMPLETED | OUTPATIENT
Start: 2023-09-01 | End: 2023-09-01

## 2023-09-01 RX ORDER — SODIUM CHLORIDE 9 MG/ML
25 INJECTION, SOLUTION INTRAVENOUS PRN
Status: CANCELLED | OUTPATIENT
Start: 2023-09-01

## 2023-09-01 RX ORDER — SODIUM CHLORIDE 0.9 % (FLUSH) 0.9 %
5-40 SYRINGE (ML) INJECTION PRN
Status: CANCELLED | OUTPATIENT
Start: 2023-09-01

## 2023-09-01 RX ORDER — HEPARIN 100 UNIT/ML
500 SYRINGE INTRAVENOUS PRN
Status: CANCELLED | OUTPATIENT
Start: 2023-09-01

## 2023-09-01 RX ORDER — 0.9 % SODIUM CHLORIDE 0.9 %
1000 INTRAVENOUS SOLUTION INTRAVENOUS ONCE
Status: CANCELLED | OUTPATIENT
Start: 2023-09-01 | End: 2023-09-01

## 2023-09-01 RX ADMIN — SODIUM CHLORIDE, PRESERVATIVE FREE 10 ML: 5 INJECTION INTRAVENOUS at 09:58

## 2023-09-01 RX ADMIN — SODIUM CHLORIDE, PRESERVATIVE FREE 10 ML: 5 INJECTION INTRAVENOUS at 09:02

## 2023-09-01 RX ADMIN — ONDANSETRON 8 MG: 2 INJECTION INTRAMUSCULAR; INTRAVENOUS at 09:02

## 2023-09-01 RX ADMIN — SODIUM CHLORIDE 1000 ML: 9 INJECTION, SOLUTION INTRAVENOUS at 08:54

## 2023-09-01 RX ADMIN — SODIUM CHLORIDE, PRESERVATIVE FREE 10 ML: 5 INJECTION INTRAVENOUS at 08:37

## 2023-09-01 RX ADMIN — Medication 500 UNITS: at 08:37

## 2023-09-01 RX ADMIN — Medication 500 UNITS: at 09:58

## 2023-09-01 NOTE — PROGRESS NOTES
Met w/ pt during IV hydration. He has received new EN pump from [facility] to allow for easier water flushes. He is now running Pivot 1.5 @ 50 ml/hr x ~24 hours/day to provide 1800 kcal, 113 gm pro, 900 ml free water. His pump is set for 50 ml H2O flush every hour for additional 1200 ml water. He reports that he becomes nauseous w/ any higher volume water or EN infusion. Sodium 145 today. Pt frustrated over continued significant side effects. Reiterated expectation of lingering side effects for several weeks following treatment. Provided supportive listening. Encouraged to contact this clinician as needed.   Electronically signed by Harini Rodriguez MS, RD, LD on 9/1/2023 at 11:43 AM

## 2023-09-05 ENCOUNTER — OFFICE VISIT (OUTPATIENT)
Dept: PALLATIVE CARE | Age: 53
End: 2023-09-05
Payer: COMMERCIAL

## 2023-09-05 ENCOUNTER — OFFICE VISIT (OUTPATIENT)
Dept: ONCOLOGY | Age: 53
End: 2023-09-05
Payer: COMMERCIAL

## 2023-09-05 ENCOUNTER — HOSPITAL ENCOUNTER (OUTPATIENT)
Dept: INFUSION THERAPY | Age: 53
Discharge: HOME | End: 2023-09-05
Payer: COMMERCIAL

## 2023-09-05 VITALS
BODY MASS INDEX: 21.91 KG/M2 | WEIGHT: 180 LBS | OXYGEN SATURATION: 96 % | TEMPERATURE: 97.3 F | HEART RATE: 107 BPM | SYSTOLIC BLOOD PRESSURE: 109 MMHG | DIASTOLIC BLOOD PRESSURE: 75 MMHG

## 2023-09-05 VITALS
OXYGEN SATURATION: 97 % | HEART RATE: 90 BPM | RESPIRATION RATE: 18 BRPM | TEMPERATURE: 99.1 F | SYSTOLIC BLOOD PRESSURE: 115 MMHG | DIASTOLIC BLOOD PRESSURE: 66 MMHG

## 2023-09-05 VITALS
WEIGHT: 180 LBS | OXYGEN SATURATION: 96 % | DIASTOLIC BLOOD PRESSURE: 74 MMHG | TEMPERATURE: 97.3 F | HEIGHT: 76 IN | SYSTOLIC BLOOD PRESSURE: 108 MMHG | BODY MASS INDEX: 21.92 KG/M2 | HEART RATE: 107 BPM

## 2023-09-05 DIAGNOSIS — Z51.5 ENCOUNTER FOR PALLIATIVE CARE: Primary | ICD-10-CM

## 2023-09-05 DIAGNOSIS — C76.0 HEAD AND NECK CANCER (HCC): Primary | ICD-10-CM

## 2023-09-05 DIAGNOSIS — C76.0 MALIGNANT NEOPLASM OF HEAD, FACE AND NECK (HCC): Primary | ICD-10-CM

## 2023-09-05 DIAGNOSIS — R53.1 WEAKNESS: ICD-10-CM

## 2023-09-05 DIAGNOSIS — S01.20XA OPEN WOUND OF NASAL CAVITY, INITIAL ENCOUNTER: ICD-10-CM

## 2023-09-05 LAB
ALBUMIN SERPL-MCNC: 3.9 G/DL (ref 3.5–5.2)
ALP SERPL-CCNC: 99 U/L (ref 40–129)
ALT SERPL-CCNC: 33 U/L (ref 0–40)
ANION GAP SERPL CALCULATED.3IONS-SCNC: 14 MMOL/L (ref 7–16)
AST SERPL-CCNC: 17 U/L (ref 0–39)
BASOPHILS # BLD: 0.04 K/UL (ref 0–0.2)
BASOPHILS NFR BLD: 1 % (ref 0–2)
BILIRUB SERPL-MCNC: 0.5 MG/DL (ref 0–1.2)
BUN SERPL-MCNC: 29 MG/DL (ref 6–20)
CALCIUM SERPL-MCNC: 9.9 MG/DL (ref 8.6–10.2)
CHLORIDE SERPL-SCNC: 108 MMOL/L (ref 98–107)
CO2 SERPL-SCNC: 28 MMOL/L (ref 22–29)
CREAT SERPL-MCNC: 1.2 MG/DL (ref 0.7–1.2)
EOSINOPHIL # BLD: 0.09 K/UL (ref 0.05–0.5)
EOSINOPHILS RELATIVE PERCENT: 2 % (ref 0–6)
ERYTHROCYTE [DISTWIDTH] IN BLOOD BY AUTOMATED COUNT: 15.4 % (ref 11.5–15)
GFR SERPL CREATININE-BSD FRML MDRD: >60 ML/MIN/1.73M2
GLUCOSE SERPL-MCNC: 125 MG/DL (ref 74–99)
HCT VFR BLD AUTO: 42.5 % (ref 37–54)
HGB BLD-MCNC: 14.1 G/DL (ref 12.5–16.5)
IMM GRANULOCYTES # BLD AUTO: 0.05 K/UL (ref 0–0.58)
IMM GRANULOCYTES NFR BLD: 1 % (ref 0–5)
LYMPHOCYTES NFR BLD: 0.48 K/UL (ref 1.5–4)
LYMPHOCYTES RELATIVE PERCENT: 8 % (ref 20–42)
MAGNESIUM SERPL-MCNC: 2.4 MG/DL (ref 1.6–2.6)
MCH RBC QN AUTO: 30.7 PG (ref 26–35)
MCHC RBC AUTO-ENTMCNC: 33.2 G/DL (ref 32–34.5)
MCV RBC AUTO: 92.4 FL (ref 80–99.9)
MONOCYTES NFR BLD: 0.56 K/UL (ref 0.1–0.95)
MONOCYTES NFR BLD: 9 % (ref 2–12)
NEUTROPHILS NFR BLD: 80 % (ref 43–80)
NEUTS SEG NFR BLD: 4.95 K/UL (ref 1.8–7.3)
PLATELET # BLD AUTO: 281 K/UL (ref 130–450)
PMV BLD AUTO: 10.4 FL (ref 7–12)
POTASSIUM SERPL-SCNC: 4.1 MMOL/L (ref 3.5–5)
PROT SERPL-MCNC: 6.8 G/DL (ref 6.4–8.3)
RBC # BLD AUTO: 4.6 M/UL (ref 3.8–5.8)
RBC # BLD: ABNORMAL 10*6/UL
SODIUM SERPL-SCNC: 150 MMOL/L (ref 132–146)
WBC OTHER # BLD: 6.2 K/UL (ref 4.5–11.5)

## 2023-09-05 PROCEDURE — 36591 DRAW BLOOD OFF VENOUS DEVICE: CPT

## 2023-09-05 PROCEDURE — 99202 OFFICE O/P NEW SF 15 MIN: CPT | Performed by: NURSE PRACTITIONER

## 2023-09-05 PROCEDURE — 83735 ASSAY OF MAGNESIUM: CPT

## 2023-09-05 PROCEDURE — 80053 COMPREHEN METABOLIC PANEL: CPT

## 2023-09-05 PROCEDURE — 96360 HYDRATION IV INFUSION INIT: CPT

## 2023-09-05 PROCEDURE — 2580000003 HC RX 258: Performed by: INTERNAL MEDICINE

## 2023-09-05 PROCEDURE — 99214 OFFICE O/P EST MOD 30 MIN: CPT | Performed by: INTERNAL MEDICINE

## 2023-09-05 PROCEDURE — 6360000002 HC RX W HCPCS: Performed by: INTERNAL MEDICINE

## 2023-09-05 PROCEDURE — 85025 COMPLETE CBC W/AUTO DIFF WBC: CPT

## 2023-09-05 RX ORDER — SODIUM CHLORIDE 9 MG/ML
25 INJECTION, SOLUTION INTRAVENOUS PRN
Status: CANCELLED | OUTPATIENT
Start: 2023-09-05

## 2023-09-05 RX ORDER — SODIUM CHLORIDE 0.9 % (FLUSH) 0.9 %
5-40 SYRINGE (ML) INJECTION PRN
Status: CANCELLED | OUTPATIENT
Start: 2023-09-05

## 2023-09-05 RX ORDER — FLUCONAZOLE 10 MG/ML
100 POWDER, FOR SUSPENSION ORAL DAILY
Qty: 100 ML | Refills: 0 | Status: SHIPPED | OUTPATIENT
Start: 2023-09-05 | End: 2023-09-15

## 2023-09-05 RX ORDER — HALOPERIDOL 0.5 MG/1
0.5 TABLET ORAL 3 TIMES DAILY PRN
Qty: 45 TABLET | Refills: 3 | Status: CANCELLED | OUTPATIENT
Start: 2023-09-05 | End: 2023-11-04

## 2023-09-05 RX ORDER — SODIUM CHLORIDE 9 MG/ML
5-250 INJECTION, SOLUTION INTRAVENOUS PRN
OUTPATIENT
Start: 2023-09-05

## 2023-09-05 RX ORDER — HEPARIN 100 UNIT/ML
500 SYRINGE INTRAVENOUS PRN
Status: CANCELLED | OUTPATIENT
Start: 2023-09-05

## 2023-09-05 RX ORDER — 0.9 % SODIUM CHLORIDE 0.9 %
1000 INTRAVENOUS SOLUTION INTRAVENOUS ONCE
OUTPATIENT
Start: 2023-09-05 | End: 2023-09-05

## 2023-09-05 RX ORDER — HEPARIN 100 UNIT/ML
500 SYRINGE INTRAVENOUS PRN
Status: DISCONTINUED | OUTPATIENT
Start: 2023-09-05 | End: 2023-09-06 | Stop reason: HOSPADM

## 2023-09-05 RX ORDER — HEPARIN 100 UNIT/ML
500 SYRINGE INTRAVENOUS PRN
OUTPATIENT
Start: 2023-09-05

## 2023-09-05 RX ORDER — GUAIFENESIN 600 MG/1
600 TABLET, EXTENDED RELEASE ORAL 2 TIMES DAILY
Qty: 30 TABLET | Refills: 0 | Status: SHIPPED | OUTPATIENT
Start: 2023-09-05 | End: 2023-09-20

## 2023-09-05 RX ORDER — SODIUM CHLORIDE 0.9 % (FLUSH) 0.9 %
5-40 SYRINGE (ML) INJECTION PRN
OUTPATIENT
Start: 2023-09-05

## 2023-09-05 RX ORDER — 0.9 % SODIUM CHLORIDE 0.9 %
1000 INTRAVENOUS SOLUTION INTRAVENOUS ONCE
Status: COMPLETED | OUTPATIENT
Start: 2023-09-05 | End: 2023-09-05

## 2023-09-05 RX ORDER — SODIUM CHLORIDE 0.9 % (FLUSH) 0.9 %
5-40 SYRINGE (ML) INJECTION PRN
Status: DISCONTINUED | OUTPATIENT
Start: 2023-09-05 | End: 2023-09-06 | Stop reason: HOSPADM

## 2023-09-05 RX ADMIN — SODIUM CHLORIDE, PRESERVATIVE FREE 10 ML: 5 INJECTION INTRAVENOUS at 08:41

## 2023-09-05 RX ADMIN — Medication 500 UNITS: at 11:25

## 2023-09-05 RX ADMIN — SODIUM CHLORIDE, PRESERVATIVE FREE 10 ML: 5 INJECTION INTRAVENOUS at 11:25

## 2023-09-05 RX ADMIN — SODIUM CHLORIDE 1000 ML: 9 INJECTION, SOLUTION INTRAVENOUS at 10:11

## 2023-09-05 RX ADMIN — SODIUM CHLORIDE, PRESERVATIVE FREE 10 ML: 5 INJECTION INTRAVENOUS at 08:38

## 2023-09-05 RX ADMIN — SODIUM CHLORIDE, PRESERVATIVE FREE 10 ML: 5 INJECTION INTRAVENOUS at 10:09

## 2023-09-05 NOTE — PROGRESS NOTES
Wt Readings from Last 3 Encounters:   09/05/23 180 lb (81.6 kg)   09/05/23 180 lb (81.6 kg)   08/28/23 188 lb 9.6 oz (85.5 kg)     Met w/ pt and wife, Aida, following IV hydration. Pt has lost additional 8# over last week, unsurprising given ongoing underfeeding w/ PEG. Pt continues w/ use of Pivot 1.5 @ 50 ml/hr x 24 hour, though stops feeds for appointments. Current EN regimen provides 1800 kcal, 113 gm pro, 900 ml free water. He has decreased water flushes to 40 ml every hour for additional 960 ml water from flush, 1860 ml total water. Previously estimated needs of  kcal, 120-135 gm pro,  ml fluids. Current EN meets ~65% kcal, >75% pro needs. Pt w/ recurrent hypernatremia. Current EN regimen provides ~1750 mg sodium. Pt instructed to slowly increase water flushes back to original goal, starting w/ 50 ml/hr H2O flush. Pt reports his son provided him w/ Liquid IV over the weekend, had some on Sunday as well as Monday. Discussed rehydration beverages, like Liquid IV w/ current hypernatremia. Liquid IV contains ~500 mg sodium depending on flavor/type. Instructed pt to avoid rehydration beverages at this time in order to help w/ normalization of sodium levels. Pt verbalizes understanding. Will continue to follow.   Electronically signed by Parris Wallace, MS, RD, LD on 9/5/2023 at 1:32 PM

## 2023-09-05 NOTE — PROGRESS NOTES
. Dr Daphne Chun aware. Patient to follow up with Dr Stephan Parekh office. This nurse reached out to Dr Michael Sin office and patient had not scheduled. Phone # given to patient. Dr. Michael Sin office aware of NA level. Robinson Doss, aware and will follow up with NA level in tube feeding.

## 2023-09-05 NOTE — PROGRESS NOTES
Palliative Care Department  Provider: OLYA Rooney CNP    Location of Service:   [facility]    Chief Complaint: Evelyn Lagunas is a 48 y.o. male with chief complaint of thick secretions, oral and throat pain    Assessment/Plan      Nasal Squamous Cell Carcinoma:   -  known to Dr. Meron Gu and Dr. Judith Cantor   -  s/p rhinectomy   -  s/p adjuvant chemoradiation therapy    Nausea:   -  Zofran     Thrush/Increased Thick Secretions:   -  Guaifenesin   -  Nystatin   -  Diflucan    Follow Up:  2 weeks and No further need for follow up. They were encouraged to call with any questions, concerns, needs, or changes in symptoms. Subjective:     HPI:  Evelyn Lagunas is a 48 y.o. 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 2023 s/p total rhinectomy in May 2023 who was referred, following with Dr. Meron Gu and Dr. Judith Cantor, and started on adjuvant chemoradiation therapy in July 2023, ultimately requiring PEG placement, and was referred to [facility] for symptom support. As Per Med/Onc:  Mr. 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   The patient was hit by his dog's head on the left side of his nose in April 2022, in November 2022 he had noticed a lesion inside his nose, and she was diagnosed with staph infection, was treated with antibiotics, he then developed a pimple on his nose and eventually developed a hole in his nose, he was seen by ID, he was referred to the hospital due to concern about myelitis, he had a CT scan done revealing chronic sinusitis involving the left sphenoid sinus, he had a biopsy done on 2/21/2023, revealing invasive poorly differentiated squamous cell carcinoma, grade 3, the patient was referred Dr. Rajiv Rosenthal, he underwent a total rhinectomy on 5/5/2023, the patient was found to have tumor growth to the glabella

## 2023-09-05 NOTE — PROGRESS NOTES
Patient did stop at check out window, ok'd to leave without AVS.  Patient has [facility].
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 The patient was hit by his dog's head on the left side of his nose in April 2022, in November 2022 he had noticed a lesion inside his nose, and she was diagnosed with staph infection, was treated with antibiotics, he then developed a pimple on his nose and eventually developed a hole in his nose, he was seen by ID, he was referred to the hospital due to concern about osteomyelitis, he had a CT scan done revealing chronic sinusitis involving the left sphenoid sinus, he had a biopsy done on 2/21/2023, revealing invasive poorly differentiated squamous cell carcinoma, grade 3, the patient was referred Dr. Marci Molina, he underwent a total rhinectomy on 5/5/2023, the patient was found to have tumor growth to the glabella and the maxillary crest, path was consistent with poorly differentiated keratinizing squamous cell carcinoma, tumor site was the nasal septal mucosa, cartilage, subcutaneous tissue and skin, size at least 3.5 cm, with lymphovascular invasion, and cartilage invasion, no perineural invasion, final pathologic stage [id number]. 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 The patient has a T4 disease with high risk features, we should be aggressive with adjuvant therapy, case was discussed with Dr. Isamar Sy, I do recommend cisplatin concurrently with radiation therapy, the side effects and the schedule of the treatment were reviewed with the patient, I called head and neck oncology at [facility], they do not have any trials available for him, recommendation was for adjuvant chemo/RT followed by close surveillance. On 7/11/2023, the patient was started on weekly cisplatin, concurrently with radiation therapy.  The patient had difficult time with the treatment,

## 2023-09-07 VITALS — HEIGHT: 76 IN | WEIGHT: 227.96 LBS | BODY MASS INDEX: 27.76 KG/M2

## 2023-09-08 ENCOUNTER — TELEPHONE (OUTPATIENT)
Dept: ONCOLOGY | Age: 53
End: 2023-09-08

## 2023-09-08 DIAGNOSIS — C76.0 HEAD AND NECK CANCER (HCC): Primary | ICD-10-CM

## 2023-09-08 NOTE — TELEPHONE ENCOUNTER
DIMITRI attempted to contact pt at the request of cancer center staff. Pt's wife, Naina Hebert is main phone number on chart. Aida stated that pt needed a shower chair due to weakness at this time. DIMITRI spoke with physicians office and it was agreed that order would be placed. Aida was agreeable to order being sent out to [address]. Order to be sent after it is placed.         Micha Noonan MSW, VERÓNICA-S  Oncology Social Worker

## 2023-09-08 NOTE — TELEPHONE ENCOUNTER
Contacted Aida, pt's wife, for check in. Aida reports pt seems to be doing a little better, noting that he is more awake now than he was earlier in the week. He has continued w/ oral rinses as well as medications. She does admit that pt becomes confused over medication management when he is alone, but they have now labeled medications for him so that he can manage them on his own. He continues w/ swallowing difficulty. Pt continues w/ prior EN regimen. She denies any additional nutrition concerns at this time. Will follow PRN.   Electronically signed by Rhea Rivera MS, RD, LD on 9/8/2023 at 2:24 PM

## 2023-09-11 ENCOUNTER — HOSPITAL ENCOUNTER (OUTPATIENT)
Dept: INFUSION THERAPY | Age: 53
Discharge: HOME | End: 2023-09-11
Payer: COMMERCIAL

## 2023-09-11 VITALS — WEIGHT: 182.2 LBS | BODY MASS INDEX: 22.18 KG/M2

## 2023-09-11 DIAGNOSIS — S01.20XA OPEN WOUND OF NASAL CAVITY, INITIAL ENCOUNTER: ICD-10-CM

## 2023-09-11 DIAGNOSIS — C76.0 HEAD AND NECK CANCER (HCC): Primary | ICD-10-CM

## 2023-09-11 LAB
ALBUMIN SERPL-MCNC: 4 G/DL (ref 3.5–5.2)
ALP SERPL-CCNC: 102 U/L (ref 40–129)
ALT SERPL-CCNC: 42 U/L (ref 0–40)
ANION GAP SERPL CALCULATED.3IONS-SCNC: 11 MMOL/L (ref 7–16)
AST SERPL-CCNC: 22 U/L (ref 0–39)
BASOPHILS # BLD: 0.03 K/UL (ref 0–0.2)
BASOPHILS NFR BLD: 1 % (ref 0–2)
BILIRUB SERPL-MCNC: 0.8 MG/DL (ref 0–1.2)
BUN SERPL-MCNC: 24 MG/DL (ref 6–20)
CALCIUM SERPL-MCNC: 9.7 MG/DL (ref 8.6–10.2)
CHLORIDE SERPL-SCNC: 103 MMOL/L (ref 98–107)
CO2 SERPL-SCNC: 27 MMOL/L (ref 22–29)
CREAT SERPL-MCNC: 1 MG/DL (ref 0.7–1.2)
EOSINOPHIL # BLD: 0.13 K/UL (ref 0.05–0.5)
EOSINOPHILS RELATIVE PERCENT: 3 % (ref 0–6)
ERYTHROCYTE [DISTWIDTH] IN BLOOD BY AUTOMATED COUNT: 16.5 % (ref 11.5–15)
GFR SERPL CREATININE-BSD FRML MDRD: >60 ML/MIN/1.73M2
GLUCOSE SERPL-MCNC: 105 MG/DL (ref 74–99)
HCT VFR BLD AUTO: 40.2 % (ref 37–54)
HGB BLD-MCNC: 13.3 G/DL (ref 12.5–16.5)
IMM GRANULOCYTES # BLD AUTO: <0.03 K/UL (ref 0–0.58)
IMM GRANULOCYTES NFR BLD: 0 % (ref 0–5)
LYMPHOCYTES NFR BLD: 0.44 K/UL (ref 1.5–4)
LYMPHOCYTES RELATIVE PERCENT: 9 % (ref 20–42)
MAGNESIUM SERPL-MCNC: 2.3 MG/DL (ref 1.6–2.6)
MCH RBC QN AUTO: 31.1 PG (ref 26–35)
MCHC RBC AUTO-ENTMCNC: 33.1 G/DL (ref 32–34.5)
MCV RBC AUTO: 94.1 FL (ref 80–99.9)
MONOCYTES NFR BLD: 0.46 K/UL (ref 0.1–0.95)
MONOCYTES NFR BLD: 10 % (ref 2–12)
NEUTROPHILS NFR BLD: 77 % (ref 43–80)
NEUTS SEG NFR BLD: 3.59 K/UL (ref 1.8–7.3)
PLATELET # BLD AUTO: 230 K/UL (ref 130–450)
PMV BLD AUTO: 10 FL (ref 7–12)
POTASSIUM SERPL-SCNC: 4.3 MMOL/L (ref 3.5–5)
PROT SERPL-MCNC: 6.7 G/DL (ref 6.4–8.3)
RBC # BLD AUTO: 4.27 M/UL (ref 3.8–5.8)
RBC # BLD: ABNORMAL 10*6/UL
SODIUM SERPL-SCNC: 141 MMOL/L (ref 132–146)
WBC OTHER # BLD: 4.7 K/UL (ref 4.5–11.5)

## 2023-09-11 PROCEDURE — 6360000002 HC RX W HCPCS: Performed by: INTERNAL MEDICINE

## 2023-09-11 PROCEDURE — 83735 ASSAY OF MAGNESIUM: CPT

## 2023-09-11 PROCEDURE — 80053 COMPREHEN METABOLIC PANEL: CPT

## 2023-09-11 PROCEDURE — 36591 DRAW BLOOD OFF VENOUS DEVICE: CPT

## 2023-09-11 PROCEDURE — 85025 COMPLETE CBC W/AUTO DIFF WBC: CPT

## 2023-09-11 PROCEDURE — 2580000003 HC RX 258: Performed by: INTERNAL MEDICINE

## 2023-09-11 RX ORDER — SODIUM CHLORIDE 0.9 % (FLUSH) 0.9 %
5-40 SYRINGE (ML) INJECTION PRN
Status: DISCONTINUED | OUTPATIENT
Start: 2023-09-11 | End: 2023-09-12 | Stop reason: HOSPADM

## 2023-09-11 RX ORDER — SODIUM CHLORIDE 0.9 % (FLUSH) 0.9 %
5-40 SYRINGE (ML) INJECTION PRN
Status: CANCELLED | OUTPATIENT
Start: 2023-09-11

## 2023-09-11 RX ORDER — HEPARIN 100 UNIT/ML
500 SYRINGE INTRAVENOUS PRN
Status: DISCONTINUED | OUTPATIENT
Start: 2023-09-11 | End: 2023-09-12 | Stop reason: HOSPADM

## 2023-09-11 RX ORDER — SODIUM CHLORIDE 9 MG/ML
25 INJECTION, SOLUTION INTRAVENOUS PRN
OUTPATIENT
Start: 2023-09-11

## 2023-09-11 RX ORDER — HEPARIN 100 UNIT/ML
500 SYRINGE INTRAVENOUS PRN
Status: CANCELLED | OUTPATIENT
Start: 2023-09-11

## 2023-09-11 RX ADMIN — SODIUM CHLORIDE, PRESERVATIVE FREE 10 ML: 5 INJECTION INTRAVENOUS at 09:03

## 2023-09-11 RX ADMIN — Medication 500 UNITS: at 09:03

## 2023-09-14 ENCOUNTER — TELEPHONE (OUTPATIENT)
Dept: ONCOLOGY | Age: 53
End: 2023-09-14

## 2023-09-14 NOTE — TELEPHONE ENCOUNTER
09/14/23 A representative from [facility] called and left a vm stating that Junior Greenwood is being discharged from nursing today.

## 2023-09-14 NOTE — DISCHARGE SUMMARY
Send Summary:   Discharge Summary Providers:  Provider Role Provider Name   · Attending Dena Mei   · Referring Dena Mei   · Primary Kulwant Hurtado       Note Recipients: Kulwant Hurtado MD - [phone]  []  Dena Mei MD       Discharge:    Summary:   Admission Date: .05-May-2023 06:41:00   Discharge Date: 06-May-2023   Attending Physician at Discharge: Dena Mei   Admission Reason: SCC nose   Final Discharge Diagnoses: Squamous cell carcinoma  of nose  Dysphagia   Procedures: Date: 05-May-2023 14:53:00  Procedure Name: 1. Rhinectomy, total   2. Rigid nasal endoscopy, bilateral   Condition at Discharge: Satisfactory   Disposition at Discharge: .Home   Vital Signs:        T   P  R  BP   MAP  SpO2   Value  36.7  60  16  153/88      96%  Date/Time 5/6 8:57 5/6 8:57 5/6 8:57 5/6 8:57    5/6 8:57  Range  (36.3C - 36.7C )  (60 - 64 )  (16 - 18 )  (137 - 153 )/ (84 - 94 )    (96% - 96% )    Date:            Weight/Scale Type:  Height:   05-May-2023 17:07  103.4  kg / standing  192.8  cm  Physical Exam:    General: Alert, oriented, no acute distress  Resp: Breathing comfortably on room air, no stridor  Head: Atraumatic, normocephalic  Oral Cavity: MMM   Ears: External ears normal   Nose: Bilateral nasal trumpets and xeroform with gauze and tape in place.  Hospital Course:    Adarsh De La Vega is a 52 year old male with squamous cell carcinoma of the nasal tip, dorsum, and septum s/p partial rhinectomy on 5/5/23 with Dr. Mei. Patient  had an uncomplicated surgical course. Patient recovered in PACU and was transferred to [place] [address] for post-operative care. On day of discharge, post-operative pain was well controlled with enteral pain medication, breathing on room air, voiding spontaneously,  ambulating well, and was tolerating a diet. Patient instructed to follow up.      Discharge Information:    and Continuing Care:   Lab Results - Pending:    Surgical Pathology Drawn at 05-May-2023  11:08:00  Radiology Results - Pending: None   Discharge Instructions:    Activity:           activity as tolerated.          May shower..  Avoid water hitting the face directly          May not drive while taking narcotics.            No pushing, pulling, or lifting objects greater than 10 pounds.      Nutrition/Diet:           regular    Wound Care:           Wound Site:   Nose          Wound Type:   surgical incision          Change Dressing:   daily          Apply:   Xeroform          Cover With:   4x4 gauze          Tape With:   paper tape,  silk tape,  Any tape    Follow Up Appointments:    Follow-Up Appointment 01:           Physician/Dept/Service:   Dr. Mei          Call to Schedule in:   2 weeks          Location:   [facility] 1st Floor Desk A [address] [place] [address] [place] [address]          Phone Number:   [phone] with questions    Discharge Medications: Home Medication   Ocean 0.65% nasal spray - 2 spray(s) intranasally 4 times a day   acetaminophen 325 mg oral tablet - 2 tab(s) orally every 6 hours as needed for postoperative pain     PRN Medication   calcium carbonate 500 mg (200 mg elemental calcium) oral tablet, chewable - 1 tab(s) orally once a day, As Needed for indigestion   oxyCODONE 5 mg oral tablet - 1 tab(s) orally every 6 hours, As Needed  as needed for post-operative pain G89.18     DNR Status:   ·  Code Status Code Status order at time of discharge: Full Code     Attestation:   Note Completion:  I am a:  Resident/Fellow   Attending Attestation I saw and evaluated the patient.  I personally obtained the key and critical portions of the history and physical exam or was physically present for key and  critical portions performed by the resident/fellow. I reviewed the resident/fellow?s documentation and discussed the patient with the resident/fellow.  I agree with the resident/fellow?s medical decision making as documented in the note.     I personally evaluated the patient on  05-May-2023         Electronic Signatures:  Manjula Kay (Resident))  (Signed 06-May-2023 09:38)   Authored: Send Summary, Summary Content, Ongoing Care,  DNR Status, Note Completion  Dena Mei)  (Signed 11-May-2023 23:07)   Authored: Summary Content, Ongoing Care, Note Completion   Co-Signer: Send Summary, Summary Content, Ongoing Care, DNR Status, Note Completion      Last Updated: 11-May-2023 23:07 by Dena Mei)

## 2023-09-14 NOTE — H&P
History & Physical Reviewed:   I have reviewed the History and Physical dated:  19-Apr-2023   History and Physical reviewed and relevant findings noted. Patient examined to review pertinent physical  findings.: No significant changes   Home Medications Reviewed: no changes noted   Allergies Reviewed: no changes noted       ERAS (Enhanced Recovery After Surgery):  ·  ERAS Patient: no     Consent:   COVID-19 Consent:  ·  COVID-19 Risk Consent Surgeon has reviewed key risks related to the risk of felipe COVID-19 and if they contract COVID-19 what the risks are.     Attestation:   Note Completion:  I am a:  Resident/Fellow   Attending Attestation I saw and evaluated the patient.  I personally obtained the key and critical portions of the history and physical exam or was physically present for key and  critical portions performed by the resident/fellow. I reviewed the resident/fellow?s documentation and discussed the patient with the resident/fellow.  I agree with the resident/fellow?s medical decision making as documented in the note.     I personally evaluated the patient on 05-May-2023         Electronic Signatures:  Dena Mei)  (Signed 07-May-2023 07:53)   Authored: Note Completion   Co-Signer: History & Physical Reviewed, ERAS, Consent, Note Completion  Manfred Damon (Resident))  (Signed 05-May-2023 05:30)   Authored: History & Physical Reviewed, ERAS, Consent,  Note Completion      Last Updated: 07-May-2023 07:53 by Dena Mei)

## 2023-09-15 ENCOUNTER — TELEPHONE (OUTPATIENT)
Dept: SURGERY | Age: 53
End: 2023-09-15

## 2023-09-15 NOTE — TELEPHONE ENCOUNTER
MA received a voicemail from patients wife, Josefa Mendez, in regards to patient site bleeding around it. MA left message for patients wife for further information. Patient underwent PEG/Mediport placement on 07/31/2023 with Dr. Owen Way.   Electronically signed by Nandini Lazar MA on 9/15/23 at 1:33 PM EDT

## 2023-09-18 ENCOUNTER — HOSPITAL ENCOUNTER (OUTPATIENT)
Dept: INFUSION THERAPY | Age: 53
Discharge: HOME | End: 2023-09-18
Payer: COMMERCIAL

## 2023-09-18 ENCOUNTER — OFFICE VISIT (OUTPATIENT)
Dept: PALLATIVE CARE | Age: 53
End: 2023-09-18
Payer: COMMERCIAL

## 2023-09-18 VITALS
TEMPERATURE: 97.2 F | SYSTOLIC BLOOD PRESSURE: 108 MMHG | HEART RATE: 88 BPM | BODY MASS INDEX: 22.88 KG/M2 | DIASTOLIC BLOOD PRESSURE: 74 MMHG | WEIGHT: 188 LBS | OXYGEN SATURATION: 100 %

## 2023-09-18 DIAGNOSIS — C76.0 HEAD AND NECK CANCER (HCC): Primary | ICD-10-CM

## 2023-09-18 DIAGNOSIS — C76.0 HEAD AND NECK CANCER (HCC): ICD-10-CM

## 2023-09-18 DIAGNOSIS — S01.20XA OPEN WOUND OF NASAL CAVITY, INITIAL ENCOUNTER: ICD-10-CM

## 2023-09-18 DIAGNOSIS — Z51.5 ENCOUNTER FOR PALLIATIVE CARE: Primary | ICD-10-CM

## 2023-09-18 LAB
ALBUMIN SERPL-MCNC: 3.9 G/DL (ref 3.5–5.2)
ALP SERPL-CCNC: 100 U/L (ref 40–129)
ALT SERPL-CCNC: 27 U/L (ref 0–40)
ANION GAP SERPL CALCULATED.3IONS-SCNC: 11 MMOL/L (ref 7–16)
AST SERPL-CCNC: 20 U/L (ref 0–39)
BASOPHILS # BLD: 0.02 K/UL (ref 0–0.2)
BASOPHILS NFR BLD: 1 % (ref 0–2)
BILIRUB SERPL-MCNC: 0.9 MG/DL (ref 0–1.2)
BUN SERPL-MCNC: 17 MG/DL (ref 6–20)
CALCIUM SERPL-MCNC: 9.6 MG/DL (ref 8.6–10.2)
CHLORIDE SERPL-SCNC: 98 MMOL/L (ref 98–107)
CO2 SERPL-SCNC: 27 MMOL/L (ref 22–29)
CREAT SERPL-MCNC: 1.1 MG/DL (ref 0.7–1.2)
EOSINOPHIL # BLD: 0.1 K/UL (ref 0.05–0.5)
EOSINOPHILS RELATIVE PERCENT: 3 % (ref 0–6)
ERYTHROCYTE [DISTWIDTH] IN BLOOD BY AUTOMATED COUNT: 16.7 % (ref 11.5–15)
GFR SERPL CREATININE-BSD FRML MDRD: >60 ML/MIN/1.73M2
GLUCOSE SERPL-MCNC: 120 MG/DL (ref 74–99)
HCT VFR BLD AUTO: 37.4 % (ref 37–54)
HGB BLD-MCNC: 12.4 G/DL (ref 12.5–16.5)
LYMPHOCYTES NFR BLD: 0.33 K/UL (ref 1.5–4)
LYMPHOCYTES RELATIVE PERCENT: 9 % (ref 20–42)
MAGNESIUM SERPL-MCNC: 2.1 MG/DL (ref 1.6–2.6)
MCH RBC QN AUTO: 31.2 PG (ref 26–35)
MCHC RBC AUTO-ENTMCNC: 33.2 G/DL (ref 32–34.5)
MCV RBC AUTO: 94 FL (ref 80–99.9)
MONOCYTES NFR BLD: 0.36 K/UL (ref 0.1–0.95)
MONOCYTES NFR BLD: 10 % (ref 2–12)
NEUTROPHILS NFR BLD: 76 % (ref 43–80)
NEUTS SEG NFR BLD: 2.78 K/UL (ref 1.8–7.3)
PLATELET # BLD AUTO: 242 K/UL (ref 130–450)
PMV BLD AUTO: 9.9 FL (ref 7–12)
POTASSIUM SERPL-SCNC: 4.2 MMOL/L (ref 3.5–5)
PROT SERPL-MCNC: 6.9 G/DL (ref 6.4–8.3)
RBC # BLD AUTO: 3.98 M/UL (ref 3.8–5.8)
RBC # BLD: ABNORMAL 10*6/UL
RBC # BLD: ABNORMAL 10*6/UL
SODIUM SERPL-SCNC: 136 MMOL/L (ref 132–146)
WBC OTHER # BLD: 3.6 K/UL (ref 4.5–11.5)

## 2023-09-18 PROCEDURE — 6360000002 HC RX W HCPCS: Performed by: INTERNAL MEDICINE

## 2023-09-18 PROCEDURE — 85025 COMPLETE CBC W/AUTO DIFF WBC: CPT

## 2023-09-18 PROCEDURE — 99212 OFFICE O/P EST SF 10 MIN: CPT | Performed by: NURSE PRACTITIONER

## 2023-09-18 PROCEDURE — 83735 ASSAY OF MAGNESIUM: CPT

## 2023-09-18 PROCEDURE — 99211 OFF/OP EST MAY X REQ PHY/QHP: CPT | Performed by: NURSE PRACTITIONER

## 2023-09-18 PROCEDURE — 36591 DRAW BLOOD OFF VENOUS DEVICE: CPT

## 2023-09-18 PROCEDURE — 2580000003 HC RX 258: Performed by: INTERNAL MEDICINE

## 2023-09-18 PROCEDURE — 80053 COMPREHEN METABOLIC PANEL: CPT

## 2023-09-18 RX ORDER — SODIUM CHLORIDE 0.9 % (FLUSH) 0.9 %
5-40 SYRINGE (ML) INJECTION PRN
Status: CANCELLED | OUTPATIENT
Start: 2023-09-18

## 2023-09-18 RX ORDER — HEPARIN 100 UNIT/ML
500 SYRINGE INTRAVENOUS PRN
Status: DISCONTINUED | OUTPATIENT
Start: 2023-09-18 | End: 2023-09-19 | Stop reason: HOSPADM

## 2023-09-18 RX ORDER — HEPARIN 100 UNIT/ML
500 SYRINGE INTRAVENOUS PRN
Status: CANCELLED | OUTPATIENT
Start: 2023-09-18

## 2023-09-18 RX ORDER — SODIUM CHLORIDE 9 MG/ML
25 INJECTION, SOLUTION INTRAVENOUS PRN
OUTPATIENT
Start: 2023-09-18

## 2023-09-18 RX ORDER — SODIUM CHLORIDE 0.9 % (FLUSH) 0.9 %
5-40 SYRINGE (ML) INJECTION PRN
Status: DISCONTINUED | OUTPATIENT
Start: 2023-09-18 | End: 2023-09-19 | Stop reason: HOSPADM

## 2023-09-18 RX ADMIN — SODIUM CHLORIDE, PRESERVATIVE FREE 10 ML: 5 INJECTION INTRAVENOUS at 09:14

## 2023-09-18 RX ADMIN — HEPARIN 500 UNITS: 100 SYRINGE at 09:14

## 2023-09-18 NOTE — PROGRESS NOTES
Palliative Care Department  Provider: OLYA Naylor - CNP    Location of Service:   [facility]    Chief Complaint: Angela Jay is a 48 y.o. male with chief complaint of thick secretions, oral and throat pain    Assessment/Plan      Nasal Squamous Cell Carcinoma:   -  known to Dr. Deb Peterson and Dr. Rosa Sullivan   -  s/p rhinectomy   -  s/p adjuvant chemoradiation therapy    Nausea:   -  Zofran     Mild Mucositis/Increased Thick Secretions:   -  Guaifenesin PRN   -  increase oral fluids as able   -  Rinse with warm baking soda/salt water   -  Continue good oral hygiene   -  Thrush improved    Follow Up:  PRN. They were encouraged to call with any questions, concerns, needs, or changes in symptoms. Subjective:     HPI:  Angela Jay is a 48 y.o. 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 2023 s/p total rhinectomy in May 2023 who was referred, following with Dr. Deb Peterson and Dr. Rosa Sullivan, and started on adjuvant chemoradiation therapy in July 2023, ultimately requiring PEG placement, and was referred to [facility] for symptom support. As Per Med/Onc:  Mr. 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   The patient was hit by his dog's head on the left side of his nose in April 2022, in November 2022 he had noticed a lesion inside his nose, and she was diagnosed with staph infection, was treated with antibiotics, he then developed a pimple on his nose and eventually developed a hole in his nose, he was seen by ID, he was referred to the hospital due to concern about myelitis, he had a CT scan done revealing chronic sinusitis involving the left sphenoid sinus, he had a biopsy done on 2/21/2023, revealing invasive poorly differentiated squamous cell carcinoma, grade 3, the patient was referred Dr. Jose Dela Cruz, he underwent a total

## 2023-09-22 ENCOUNTER — HOSPITAL ENCOUNTER (OUTPATIENT)
Dept: RADIATION ONCOLOGY | Age: 53
Discharge: HOME | End: 2023-09-22

## 2023-09-22 VITALS
RESPIRATION RATE: 18 BRPM | WEIGHT: 191.8 LBS | TEMPERATURE: 97.6 F | BODY MASS INDEX: 23.35 KG/M2 | DIASTOLIC BLOOD PRESSURE: 68 MMHG | OXYGEN SATURATION: 98 % | HEART RATE: 68 BPM | SYSTOLIC BLOOD PRESSURE: 102 MMHG

## 2023-09-22 DIAGNOSIS — C30.0 SQUAMOUS CELL CARCINOMA OF NASAL CAVITY (HCC): Primary | ICD-10-CM

## 2023-09-22 PROCEDURE — NBSRV NON-BILLABLE SERVICE: Performed by: NURSE PRACTITIONER

## 2023-09-25 ENCOUNTER — TELEPHONE (OUTPATIENT)
Dept: ONCOLOGY | Age: 53
End: 2023-09-25

## 2023-09-25 ENCOUNTER — HOSPITAL ENCOUNTER (OUTPATIENT)
Dept: INFUSION THERAPY | Age: 53
Discharge: HOME | End: 2023-09-25
Payer: COMMERCIAL

## 2023-09-25 DIAGNOSIS — S01.20XA OPEN WOUND OF NASAL CAVITY, INITIAL ENCOUNTER: ICD-10-CM

## 2023-09-25 DIAGNOSIS — C76.0 HEAD AND NECK CANCER (HCC): Primary | ICD-10-CM

## 2023-09-25 PROBLEM — C30.0 SQUAMOUS CELL CARCINOMA OF NASAL CAVITY (HCC): Status: ACTIVE | Noted: 2023-03-28

## 2023-09-25 PROBLEM — C30.0 SQUAMOUS CELL CARCINOMA OF NASAL CAVITY (HCC): Status: ACTIVE | Noted: 2023-05-25

## 2023-09-25 LAB
ALBUMIN SERPL-MCNC: 4 G/DL (ref 3.5–5.2)
ALP SERPL-CCNC: 92 U/L (ref 40–129)
ALT SERPL-CCNC: 16 U/L (ref 0–40)
ANION GAP SERPL CALCULATED.3IONS-SCNC: 8 MMOL/L (ref 7–16)
AST SERPL-CCNC: 12 U/L (ref 0–39)
BASOPHILS # BLD: 0.05 K/UL (ref 0–0.2)
BASOPHILS NFR BLD: 1 % (ref 0–2)
BILIRUB SERPL-MCNC: 0.6 MG/DL (ref 0–1.2)
BUN SERPL-MCNC: 12 MG/DL (ref 6–20)
CALCIUM SERPL-MCNC: 9.5 MG/DL (ref 8.6–10.2)
CHLORIDE SERPL-SCNC: 97 MMOL/L (ref 98–107)
CO2 SERPL-SCNC: 30 MMOL/L (ref 22–29)
CREAT SERPL-MCNC: 0.9 MG/DL (ref 0.7–1.2)
EOSINOPHIL # BLD: 0.11 K/UL (ref 0.05–0.5)
EOSINOPHILS RELATIVE PERCENT: 2 % (ref 0–6)
ERYTHROCYTE [DISTWIDTH] IN BLOOD BY AUTOMATED COUNT: 17.1 % (ref 11.5–15)
GFR SERPL CREATININE-BSD FRML MDRD: >60 ML/MIN/1.73M2
GLUCOSE SERPL-MCNC: 107 MG/DL (ref 74–99)
HCT VFR BLD AUTO: 35 % (ref 37–54)
HGB BLD-MCNC: 11.7 G/DL (ref 12.5–16.5)
IMM GRANULOCYTES # BLD AUTO: 0.06 K/UL (ref 0–0.58)
IMM GRANULOCYTES NFR BLD: 1 % (ref 0–5)
LYMPHOCYTES NFR BLD: 0.44 K/UL (ref 1.5–4)
LYMPHOCYTES RELATIVE PERCENT: 7 % (ref 20–42)
MAGNESIUM SERPL-MCNC: 2 MG/DL (ref 1.6–2.6)
MCH RBC QN AUTO: 32 PG (ref 26–35)
MCHC RBC AUTO-ENTMCNC: 33.4 G/DL (ref 32–34.5)
MCV RBC AUTO: 95.6 FL (ref 80–99.9)
MONOCYTES NFR BLD: 0.62 K/UL (ref 0.1–0.95)
MONOCYTES NFR BLD: 9 % (ref 2–12)
NEUTROPHILS NFR BLD: 81 % (ref 43–80)
NEUTS SEG NFR BLD: 5.54 K/UL (ref 1.8–7.3)
PLATELET # BLD AUTO: 269 K/UL (ref 130–450)
PMV BLD AUTO: 9.5 FL (ref 7–12)
POTASSIUM SERPL-SCNC: 3.8 MMOL/L (ref 3.5–5)
PROT SERPL-MCNC: 6.7 G/DL (ref 6.4–8.3)
RBC # BLD AUTO: 3.66 M/UL (ref 3.8–5.8)
RBC # BLD: ABNORMAL 10*6/UL
SODIUM SERPL-SCNC: 135 MMOL/L (ref 132–146)
WBC OTHER # BLD: 6.8 K/UL (ref 4.5–11.5)

## 2023-09-25 PROCEDURE — 2580000003 HC RX 258: Performed by: INTERNAL MEDICINE

## 2023-09-25 PROCEDURE — 83735 ASSAY OF MAGNESIUM: CPT

## 2023-09-25 PROCEDURE — 85025 COMPLETE CBC W/AUTO DIFF WBC: CPT

## 2023-09-25 PROCEDURE — 80053 COMPREHEN METABOLIC PANEL: CPT

## 2023-09-25 PROCEDURE — 6360000002 HC RX W HCPCS: Performed by: INTERNAL MEDICINE

## 2023-09-25 PROCEDURE — 36591 DRAW BLOOD OFF VENOUS DEVICE: CPT

## 2023-09-25 RX ORDER — HEPARIN 100 UNIT/ML
500 SYRINGE INTRAVENOUS PRN
Status: CANCELLED | OUTPATIENT
Start: 2023-09-25

## 2023-09-25 RX ORDER — SODIUM CHLORIDE 0.9 % (FLUSH) 0.9 %
5-40 SYRINGE (ML) INJECTION PRN
Status: CANCELLED | OUTPATIENT
Start: 2023-09-25

## 2023-09-25 RX ORDER — SODIUM CHLORIDE 9 MG/ML
25 INJECTION, SOLUTION INTRAVENOUS PRN
OUTPATIENT
Start: 2023-09-25

## 2023-09-25 RX ORDER — HEPARIN 100 UNIT/ML
500 SYRINGE INTRAVENOUS PRN
Status: DISCONTINUED | OUTPATIENT
Start: 2023-09-25 | End: 2023-09-26 | Stop reason: HOSPADM

## 2023-09-25 RX ORDER — SODIUM CHLORIDE 0.9 % (FLUSH) 0.9 %
5-40 SYRINGE (ML) INJECTION PRN
Status: DISCONTINUED | OUTPATIENT
Start: 2023-09-25 | End: 2023-09-26 | Stop reason: HOSPADM

## 2023-09-25 RX ADMIN — HEPARIN 500 UNITS: 100 SYRINGE at 14:08

## 2023-09-25 RX ADMIN — SODIUM CHLORIDE, PRESERVATIVE FREE 10 ML: 5 INJECTION INTRAVENOUS at 14:09

## 2023-09-25 NOTE — TELEPHONE ENCOUNTER
Wt Readings from Last 3 Encounters:   09/22/23 191 lb 12.8 oz (87 kg)   09/18/23 188 lb (85.3 kg)   09/11/23 182 lb 3.2 oz (82.6 kg)     Received email from Sarah, pharmacist at [facility], who reports that pt's EN formula, Pivot 1.5, is on backorder and is not scheduled to be back in stock until November. Kofi Crockett, pt's wife, for update. Pt has successfully been gaining wt since completion of treatment, +9# x <2 weeks. He is now consuming some soft foods by mouth, noting that he had 1/2 container of macaroni and cheese from [address] [place] [address] for lunch and plans to finish this later today. He continues w/ EN, running Pivot 1.5 @ 50 ml/hr for \"as close to 24 hours as possible\" admitting that he typically uses ~4 cartons daily to provide 1420 kcal, 89 gm pro, 712 ml free water. He has 28 cartons of Pivot 1.5 left at home, ~1 week of formula left. Provided w/ options for EN management, including transition to a more standard formula, a peptide based formula from another , or transitioning to PO intake and decreasing EN use. Discussed pros and cons of each, though opted to explore options through other manufacturers given pt's h/o EN intolerance w/ standard formulas and current inability to tolerate adequate PO intake to maintain wt. Zain Hagan to inquire about availability of Impact Peptide 1.5. Await response. Will continue to follow as able. Electronically signed by Juany Peoples MS, RD, LD on 9/25/2023 at 1:33 PM    Per [employer] Snow is also out of stock. Vital AF & Vital 1.5 are both in stock. Presented options to Aida, who requests Vital 1.5. Will alert Sarah.   Electronically signed by Juany Peoples MS, RD, LD on 9/25/2023 at 3:16 PM

## 2023-10-02 NOTE — OP NOTE
PROCEDURE DETAILS    Preoperative Diagnosis:  Squamous cell carcinoma of the nose  Postoperative Diagnosis:  Squamous cell carcinoma of the nose  Surgeon: Dena Mei  Resident/Fellow/Other Assistant: Lorena Sales    Procedure:  1. Rhinectomy, total   2. Rigid nasal endoscopy, bilateral  Anesthesia: GETA  Estimated Blood Loss: 50  Findings: SCCa of nose extensively involving septum extending down to the maxillary crest and up near the anterior skull base.  Near total rhinectomy sparing lateral ala  Specimens(s) Collected: yes,  Rhinectomy, posterior septum, superior septum, maxillary crest, anterior skull base, superior skin of nasal bridge    Complications: None  Patient Returned To/Condition: PACU/Stable         Operative Report:   Indications:   The patient is a 52 year old male who presents with squamous cell carcinoma of the nasal tip, dorsum, and septum. The decision was made to proceed to the OR for the above listed procedure after reviewing the risks/benefits/alternatives with the patient.  Written informed consent was obtained and placed in the chart.    Operative Details:  The patient was met in the preoperative area and at that time any further questions were answered and consent was reviewed. The patient was escorted to the operating suite, transferred to the operating table in a supine position. A preoperative timeout  was performed by Dr. Mei. The patient was placed under general endotracheal intubation anesthesia. A preoperative time out was performed, verifying the correct patient, surgical site, and procedure. The bed was turned 90 degrees.    An incision was planned and marked with a skin marker, making a 1 cm margin around the visible portion of the skin mass on the external portion of the mass. The planned incision was injected with 2 cc of lidocaine with 1:100,000 epinephrine. The area  was prepped and draped in the usual sterile fashion. Corneal shields were  placed.    Nasal pledgets soaked in afrin were placed in the bilateral nares for nasal decongestion. These were then removed. A 0 degree nasal endoscope was used to visualize the nasal passages to determine the involvement of the nasal septum. Findings as noted  above. The scope was then removed.    A #15 blade was used to make the incision through the skin of the nose. The incision was completed and hemostasis was obtained using bovie electrocautery. Next, a #15 blade was used to make the incision through the columella. The incision was carried  down to the level of the septum and hemostasis was obtained using bovie electrocautery. Double action scissors were used to make a cut through the inferior portion of the septum. These same scissors were then used to make a cut through the superior portion  of the septum and then posteriorly to complete the excision of the septum. This specimen of the nose attached to the septum was sent for frozen and permanent pathology. Next, additional tissue was removed from the maxillary crest and posterior septum  using double action punch and bovie electrocautery; these were sent for frozen and permanent pathology. A curette was used to scrape bone from the maxillary crest and the anterior skull base, and these samples were went for permanent pathology. The superior  margin of the skin returned as positive for squamous cell carcinoma and therefore an additional margin was taken for permanent pathology. An additional margin at this site was then taken using iris scissors and sent for frozen pathology, which returned  as negative. Hemostasis was obtained using afrin soaked pledgets and bipolar electrocautery.     Bilateral nasal trumpets were placed. These were sutured together using 2-0 silk suture, and then secured to the right inferior nasal ala using 2-0 silk suture. For additional hemostasis, Kent[place] and hemopore were placed in the superior portion of the  nasal cavity. A xeroform  was placed over the soft tissue edges of the nose, and this was secured in place using gauze and tape. The corneal shields were removed.    All instruments were removed. The patient was turned over to anesthesia and extubated, having tolerated the procedure well. The patient was then  escorted to the post anesthesia care unit in stable condition. There were no complications. Dr. Mei was present for entirety of procedure.                        Attestation:   Note Completion:  Attending Attestation I was present for the entire procedure    I am a: Resident/Fellow         Electronic Signatures:  Dena Mei)  (Signed 11-May-2023 22:56)   Authored: Post-Operative Note, Chart Review, Note Completion   Co-Signer: Post-Operative Note, Chart Review, Note Completion  Lissette Harvey (Resident))  (Signed 05-May-2023 17:53)   Authored: Post-Operative Note, Chart Review, Note Completion      Last Updated: 11-May-2023 22:56 by Dena Mei)

## 2023-10-03 ENCOUNTER — OFFICE VISIT (OUTPATIENT)
Dept: ONCOLOGY | Age: 53
End: 2023-10-03
Payer: COMMERCIAL

## 2023-10-03 ENCOUNTER — TELEPHONE (OUTPATIENT)
Dept: ONCOLOGY | Age: 53
End: 2023-10-03

## 2023-10-03 ENCOUNTER — HOSPITAL ENCOUNTER (OUTPATIENT)
Dept: INFUSION THERAPY | Age: 53
Discharge: HOME | End: 2023-10-03
Payer: COMMERCIAL

## 2023-10-03 VITALS
HEART RATE: 79 BPM | HEIGHT: 76 IN | TEMPERATURE: 97.9 F | OXYGEN SATURATION: 100 % | SYSTOLIC BLOOD PRESSURE: 107 MMHG | BODY MASS INDEX: 23.75 KG/M2 | DIASTOLIC BLOOD PRESSURE: 66 MMHG | WEIGHT: 195 LBS

## 2023-10-03 DIAGNOSIS — C76.0 HEAD AND NECK CANCER (HCC): Primary | ICD-10-CM

## 2023-10-03 DIAGNOSIS — S01.20XA OPEN WOUND OF NASAL CAVITY, INITIAL ENCOUNTER: ICD-10-CM

## 2023-10-03 LAB
ALBUMIN SERPL-MCNC: 3.7 G/DL (ref 3.5–5.2)
ALP SERPL-CCNC: 80 U/L (ref 40–129)
ALT SERPL-CCNC: 10 U/L (ref 0–40)
ANION GAP SERPL CALCULATED.3IONS-SCNC: 10 MMOL/L (ref 7–16)
AST SERPL-CCNC: 15 U/L (ref 0–39)
BASOPHILS # BLD: 0.08 K/UL (ref 0–0.2)
BASOPHILS NFR BLD: 2 % (ref 0–2)
BILIRUB SERPL-MCNC: 0.7 MG/DL (ref 0–1.2)
BUN SERPL-MCNC: 10 MG/DL (ref 6–20)
CALCIUM SERPL-MCNC: 8.9 MG/DL (ref 8.6–10.2)
CHLORIDE SERPL-SCNC: 105 MMOL/L (ref 98–107)
CO2 SERPL-SCNC: 25 MMOL/L (ref 22–29)
CREAT SERPL-MCNC: 1 MG/DL (ref 0.7–1.2)
EOSINOPHIL # BLD: 0.08 K/UL (ref 0.05–0.5)
EOSINOPHILS RELATIVE PERCENT: 2 % (ref 0–6)
ERYTHROCYTE [DISTWIDTH] IN BLOOD BY AUTOMATED COUNT: 17 % (ref 11.5–15)
GFR SERPL CREATININE-BSD FRML MDRD: >60 ML/MIN/1.73M2
GLUCOSE SERPL-MCNC: 111 MG/DL (ref 74–99)
HCT VFR BLD AUTO: 33.5 % (ref 37–54)
HGB BLD-MCNC: 10.9 G/DL (ref 12.5–16.5)
LYMPHOCYTES NFR BLD: 0.16 K/UL (ref 1.5–4)
LYMPHOCYTES RELATIVE PERCENT: 4 % (ref 20–42)
MAGNESIUM SERPL-MCNC: 1.9 MG/DL (ref 1.6–2.6)
MCH RBC QN AUTO: 32.2 PG (ref 26–35)
MCHC RBC AUTO-ENTMCNC: 32.5 G/DL (ref 32–34.5)
MCV RBC AUTO: 99.1 FL (ref 80–99.9)
MONOCYTES NFR BLD: 0.35 K/UL (ref 0.1–0.95)
MONOCYTES NFR BLD: 8 % (ref 2–12)
NEUTROPHILS NFR BLD: 85 % (ref 43–80)
NEUTS SEG NFR BLD: 3.83 K/UL (ref 1.8–7.3)
NUCLEATED RED BLOOD CELLS: 1 PER 100 WBC
PLATELET # BLD AUTO: 273 K/UL (ref 130–450)
PMV BLD AUTO: 9.8 FL (ref 7–12)
POTASSIUM SERPL-SCNC: 4.3 MMOL/L (ref 3.5–5)
PROT SERPL-MCNC: 6.3 G/DL (ref 6.4–8.3)
RBC # BLD AUTO: 3.38 M/UL (ref 3.8–5.8)
RBC # BLD: ABNORMAL 10*6/UL
SODIUM SERPL-SCNC: 140 MMOL/L (ref 132–146)
WBC OTHER # BLD: 4.5 K/UL (ref 4.5–11.5)

## 2023-10-03 PROCEDURE — 80053 COMPREHEN METABOLIC PANEL: CPT

## 2023-10-03 PROCEDURE — 99214 OFFICE O/P EST MOD 30 MIN: CPT | Performed by: INTERNAL MEDICINE

## 2023-10-03 PROCEDURE — 36591 DRAW BLOOD OFF VENOUS DEVICE: CPT

## 2023-10-03 PROCEDURE — 99214 OFFICE O/P EST MOD 30 MIN: CPT

## 2023-10-03 PROCEDURE — 85025 COMPLETE CBC W/AUTO DIFF WBC: CPT

## 2023-10-03 PROCEDURE — 6360000002 HC RX W HCPCS: Performed by: INTERNAL MEDICINE

## 2023-10-03 PROCEDURE — 83735 ASSAY OF MAGNESIUM: CPT

## 2023-10-03 PROCEDURE — 2580000003 HC RX 258: Performed by: INTERNAL MEDICINE

## 2023-10-03 RX ORDER — HEPARIN 100 UNIT/ML
500 SYRINGE INTRAVENOUS PRN
OUTPATIENT
Start: 2023-10-03

## 2023-10-03 RX ORDER — HEPARIN 100 UNIT/ML
500 SYRINGE INTRAVENOUS PRN
Status: DISCONTINUED | OUTPATIENT
Start: 2023-10-03 | End: 2023-10-04 | Stop reason: HOSPADM

## 2023-10-03 RX ORDER — SODIUM CHLORIDE 0.9 % (FLUSH) 0.9 %
5-40 SYRINGE (ML) INJECTION PRN
Status: DISCONTINUED | OUTPATIENT
Start: 2023-10-03 | End: 2023-10-04 | Stop reason: HOSPADM

## 2023-10-03 RX ORDER — SODIUM CHLORIDE 0.9 % (FLUSH) 0.9 %
5-40 SYRINGE (ML) INJECTION PRN
OUTPATIENT
Start: 2023-10-03

## 2023-10-03 RX ORDER — SODIUM CHLORIDE 9 MG/ML
25 INJECTION, SOLUTION INTRAVENOUS PRN
OUTPATIENT
Start: 2023-10-03

## 2023-10-03 RX ADMIN — HEPARIN 500 UNITS: 100 SYRINGE at 08:36

## 2023-10-03 RX ADMIN — SODIUM CHLORIDE, PRESERVATIVE FREE 10 ML: 5 INJECTION INTRAVENOUS at 08:36

## 2023-10-03 NOTE — PROGRESS NOTES
Patient did stop at check out window, ok'd to leave without AVS.  Patient has [facility].

## 2023-10-03 NOTE — PROGRESS NOTES
patient had difficult time with the treatment, he was admitted to the hospital several times with PERLA secondary dehydration, hypernatremia, and mucositis, which led to interruptions from chemotherapy. He completed the treatment on 8/28/2023. The patient is doing much better, creatinine had normalized, as well as electrolytes, he has not been requiring intravenous hydration. He has residual anemia hemoglobin 10.9, hematocrit 33.2, will monitor his counts. The patient was seen by Dr. Huseyin Mi, he will return for a follow-up with her in November, he will have the PET scan done at [facility]. We will start surveillance. The patient has a 6 mm left lower lobe nodule,  the patient was seen by pulmonary at [facility], Dr. Maria Isabel Willis, he was recommended follow-up chest CT after 3 months. The patient to reschedule he was in the hospital, he will schedule an appointment with him as soon as he can. RTC in 3 months. Thank you for allowing us to participate in the care of Mr. Gigi Gilmore.      Amanda SHEN MD  HEMATOLOGY/MEDICAL [facility]  [address] [facility] MED ONCOLOGY  [address] [facility] [address] [facility] [address]  Dept: [address]: [phone]

## 2023-10-03 NOTE — TELEPHONE ENCOUNTER
Met with pt and pt's wife in conjunction with medical oncology visit. 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 Pt's mood appeared euthymic with full affect, he appeared A&Ox4, and he was willing/able to participate in session. He appeared appropriately dressed/groomed and was able to ambulate/transfer without assistance. Pt discussed recent hospitalization and ongoing recovery. He stated that he is starting to feel better but continuing to have difficulty with eating (pt evaluated by RD on this date). Additionally, he discussed plans for upcoming appointment to discuss reconstructive surgery. Provided support and pt denied any immediate psychosocial needs at this time. Will remain available as needed.       Marisol Ni, MSW, LISW-S  Oncology Social Worker

## 2023-10-03 NOTE — PROGRESS NOTES
Jeffry Esperanza  10/3/2023  Ht Readings from Last 1 Encounters:   10/03/23 6' 4\" (1.93 m)     Wt Readings from Last 10 Encounters:   10/03/23 195 lb (88.5 kg)   09/22/23 191 lb 12.8 oz (87 kg)   09/18/23 188 lb (85.3 kg)   09/11/23 182 lb 3.2 oz (82.6 kg)   09/05/23 180 lb (81.6 kg)   09/05/23 180 lb (81.6 kg)   08/28/23 188 lb 9.6 oz (85.5 kg)   08/21/23 188 lb (85.3 kg)   08/21/23 188 lb (85.3 kg)   08/14/23 212 lb (96.2 kg)     BMI: 23.74    Assessment: Met w/ pt and wife, Aida, during follow up visit. Pt has continued w/ wt gain, CBW reflects 15# wt gain since completion of treatment ~1 month ago. He has been attempting PO intake, noting that it has become easier over the last week. He has seen improvement in hypogeusia and odynophagia, though continues to limit to relatively bland soft foods. He has been infusing Pivot 1.5 @ 50 ml/hr for \"basically 24 hours\" each day, however he will start using Vital 1.5 tomorrow when he runs out of the Pivot as it continues to be on backorder. Educated on transition from EN back to PO intake. Provided w/ resources to support transition as well as samples of Ensure Complete and Boost Plus. He has been using [employer] 1-2x daily at home. Pt appreciative of assistance. Reviewed expectations moving forward, including self monitoring of weight and intake at home as he is not scheduled to return to clinic for 3 months. He is looking forward to possible upcoming reconstruction, will continue to contact this clinician as needed. Will follow as able.     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

## 2023-10-11 PROBLEM — R04.0 NASAL BLEEDING: Status: ACTIVE | Noted: 2023-10-11

## 2023-10-11 PROBLEM — C44.321 SQUAMOUS CELL CARCINOMA OF NOSE: Status: ACTIVE | Noted: 2023-10-11

## 2023-10-11 PROBLEM — J34.89 NASAL LESION: Status: ACTIVE | Noted: 2023-10-11

## 2023-10-11 PROBLEM — L92.9 GRANULATION TISSUE OF SITE OF GASTROSTOMY: Status: ACTIVE | Noted: 2023-10-11

## 2023-10-11 PROBLEM — N17.9 AKI (ACUTE KIDNEY INJURY) (CMS-HCC): Status: ACTIVE | Noted: 2023-10-11

## 2023-10-11 PROBLEM — E87.0 HYPERNATREMIA: Status: ACTIVE | Noted: 2023-10-11

## 2023-10-11 RX ORDER — MUPIROCIN 20 MG/G
OINTMENT TOPICAL
COMMUNITY
Start: 2022-12-06 | End: 2023-10-31 | Stop reason: HOSPADM

## 2023-10-11 RX ORDER — CHLORHEXIDINE GLUCONATE 40 MG/ML
SOLUTION TOPICAL
COMMUNITY
End: 2023-10-31 | Stop reason: HOSPADM

## 2023-10-12 ENCOUNTER — OFFICE VISIT (OUTPATIENT)
Dept: OTOLARYNGOLOGY | Facility: CLINIC | Age: 53
End: 2023-10-12
Payer: COMMERCIAL

## 2023-10-12 VITALS — WEIGHT: 196.5 LBS | HEIGHT: 76 IN | BODY MASS INDEX: 23.93 KG/M2

## 2023-10-12 DIAGNOSIS — C44.321 SQUAMOUS CELL CARCINOMA OF NOSE: ICD-10-CM

## 2023-10-12 DIAGNOSIS — M95.0 NASAL DEFECT: ICD-10-CM

## 2023-10-12 DIAGNOSIS — J34.89 NASAL LESION: Primary | ICD-10-CM

## 2023-10-12 PROBLEM — Q67.0 FACIAL ASYMMETRY: Status: ACTIVE | Noted: 2023-10-12

## 2023-10-12 PROCEDURE — 99213 OFFICE O/P EST LOW 20 MIN: CPT | Performed by: OTOLARYNGOLOGY

## 2023-10-12 ASSESSMENT — PATIENT HEALTH QUESTIONNAIRE - PHQ9
SUM OF ALL RESPONSES TO PHQ9 QUESTIONS 1 & 2: 0
2. FEELING DOWN, DEPRESSED OR HOPELESS: NOT AT ALL
1. LITTLE INTEREST OR PLEASURE IN DOING THINGS: NOT AT ALL

## 2023-10-12 NOTE — PROGRESS NOTES
FUV nasal scca s/p resection by Dr. Mei 9/2023.  Here today for surgical evaluation for reconstruction.     Previously seen May 2023; we discussed options for reconstruction including free tissue, locoregional flaps and grafting considerations. Option for nasal prosthetic was also discussed. May need to delay and stage after completing any necessary adjuvant therapy.     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 Surgery would be scheduled thereafter once cleared and wound has settled down from adjuvant RT.    Discussed osteocutaneous radial forearm free flap to reconstruct dorsal support, internal lining with forearm skin, external onlay of PMFF with structural cartilage grafting including autogenous rib/auricular cartilage. He is a candidate for nasal prosthetic as well. Pros and cons discussed in detail. He will consider but overall leaning towards surgical reconstruction

## 2023-10-12 NOTE — H&P (VIEW-ONLY)
FUV nasal scca s/p resection by Dr. Mei 9/2023.  Here today for surgical evaluation for reconstruction.     Previously seen May 2023; we discussed options for reconstruction including free tissue, locoregional flaps and grafting considerations. Option for nasal prosthetic was also discussed. May need to delay and stage after completing any necessary adjuvant therapy.     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 Surgery would be scheduled thereafter once cleared and wound has settled down from adjuvant RT.    Discussed osteocutaneous radial forearm free flap to reconstruct dorsal support, internal lining with forearm skin, external onlay of PMFF with structural cartilage grafting including autogenous rib/auricular cartilage. He is a candidate for nasal prosthetic as well. Pros and cons discussed in detail. He will consider but overall leaning towards surgical reconstruction   Patient Hmong speaking and comes to ED with slurred speech. Started at 1600 hours today. Glucose: 207. Language line used to facilitate triage. Patient c/o difficulty getting his words out, difficulty remembering, and bilateral leg weakness. MD called for neuro assessment.

## 2023-10-20 ENCOUNTER — TELEPHONE (OUTPATIENT)
Dept: OTOLARYNGOLOGY | Facility: CLINIC | Age: 53
End: 2023-10-20
Payer: COMMERCIAL

## 2023-10-20 DIAGNOSIS — C44.321 SQUAMOUS CELL CARCINOMA OF NOSE: ICD-10-CM

## 2023-10-20 NOTE — TELEPHONE ENCOUNTER
----- Message from Dena Mei MD sent at 10/20/2023  2:42 PM EDT -----  Regarding: RE: ct scan  CT neck wcontrast will include the wholehead  CT chest is needed at the end of treatment to make sure his chest is clear as well    So CT neck & chest please  ----- Message -----  From: Rita Shepherd RN  Sent: 10/20/2023   2:33 PM EDT  To: Dena Mei MD  Subject: FW: ct scan                                      Looks like he is due for imaging in the next month. Since he had a rhinectomy do you just want a head, or should I do neck as well? He's also had a chest in the past. Just wasn't sure if we still needed all 3  ----- Message -----  From: July Marquis  Sent: 10/20/2023   2:23 PM EDT  To: Rita Shepherd RN  Subject: ct scan                                          Patient would like a call back regarding getting his ct scan

## 2023-10-20 NOTE — TELEPHONE ENCOUNTER
Returned patient's call regarding getting scans. We have placed an order for a neck and chest CT as well as an order for blood work prior to scan. Patient aware he can call to schedule this. I offered him the number for scheduling, but he states he has it already. He will have blood drawn prior to scan.

## 2023-10-23 ENCOUNTER — TELEPHONE (OUTPATIENT)
Dept: INFUSION THERAPY | Age: 53
End: 2023-10-23

## 2023-10-23 NOTE — TELEPHONE ENCOUNTER
Patient has  questions regarding right side port and arm /shoulder discomfort. After reviewed with Naomi Dickson NP, patient to follow with Dr who placed port.  Patient states understanding

## 2023-10-24 ENCOUNTER — TELEPHONE (OUTPATIENT)
Dept: OTOLARYNGOLOGY | Facility: CLINIC | Age: 53
End: 2023-10-24
Payer: COMMERCIAL

## 2023-10-24 NOTE — TELEPHONE ENCOUNTER
Returned patients call regarding not being able to schedule CT scan. I let him know that I was able to get him in to [facility] on November 14th @ 1015. I also reminded him to get his labs drawn prior to the scan. Patient grateful for help.

## 2023-10-30 ENCOUNTER — ANESTHESIA EVENT (OUTPATIENT)
Dept: OPERATING ROOM | Facility: HOSPITAL | Age: 53
End: 2023-10-30
Payer: COMMERCIAL

## 2023-10-30 ENCOUNTER — PREP FOR PROCEDURE (OUTPATIENT)
Dept: OTOLARYNGOLOGY | Facility: CLINIC | Age: 53
End: 2023-10-30
Payer: COMMERCIAL

## 2023-10-30 DIAGNOSIS — C44.321 SQUAMOUS CELL SKIN CANCER, NASAL TIP: ICD-10-CM

## 2023-10-30 DIAGNOSIS — J34.89 NASAL LESION: ICD-10-CM

## 2023-10-30 DIAGNOSIS — M95.0 ACQUIRED DEFORMITY OF NOSE: ICD-10-CM

## 2023-10-31 ENCOUNTER — HOSPITAL ENCOUNTER (OUTPATIENT)
Facility: HOSPITAL | Age: 53
Setting detail: OUTPATIENT SURGERY
Discharge: HOME | End: 2023-10-31
Attending: OTOLARYNGOLOGY | Admitting: OTOLARYNGOLOGY
Payer: COMMERCIAL

## 2023-10-31 ENCOUNTER — ANESTHESIA (OUTPATIENT)
Dept: OPERATING ROOM | Facility: HOSPITAL | Age: 53
End: 2023-10-31
Payer: COMMERCIAL

## 2023-10-31 VITALS
HEIGHT: 72 IN | RESPIRATION RATE: 16 BRPM | TEMPERATURE: 96.8 F | BODY MASS INDEX: 25.77 KG/M2 | SYSTOLIC BLOOD PRESSURE: 109 MMHG | WEIGHT: 190.26 LBS | HEART RATE: 69 BPM | OXYGEN SATURATION: 100 % | DIASTOLIC BLOOD PRESSURE: 63 MMHG

## 2023-10-31 DIAGNOSIS — C44.321 SQUAMOUS CELL SKIN CANCER, NASAL TIP: ICD-10-CM

## 2023-10-31 DIAGNOSIS — G89.18 ACUTE POST-OPERATIVE PAIN: ICD-10-CM

## 2023-10-31 DIAGNOSIS — M95.0 ACQUIRED DEFORMITY OF NOSE: Primary | ICD-10-CM

## 2023-10-31 DIAGNOSIS — J34.89 NASAL LESION: ICD-10-CM

## 2023-10-31 PROBLEM — K21.9 GASTROESOPHAGEAL REFLUX DISEASE: Status: ACTIVE | Noted: 2023-10-31

## 2023-10-31 LAB
ALBUMIN SERPL BCP-MCNC: 4.1 G/DL (ref 3.4–5)
ANION GAP SERPL CALC-SCNC: 16 MMOL/L (ref 10–20)
BUN SERPL-MCNC: 11 MG/DL (ref 6–23)
CALCIUM SERPL-MCNC: 9.5 MG/DL (ref 8.6–10.6)
CHLORIDE SERPL-SCNC: 107 MMOL/L (ref 98–107)
CO2 SERPL-SCNC: 24 MMOL/L (ref 21–32)
CREAT SERPL-MCNC: 1.07 MG/DL (ref 0.5–1.3)
GFR SERPL CREATININE-BSD FRML MDRD: 84 ML/MIN/1.73M*2
GLUCOSE SERPL-MCNC: 112 MG/DL (ref 74–99)
PHOSPHATE SERPL-MCNC: 3.1 MG/DL (ref 2.5–4.9)
POTASSIUM SERPL-SCNC: 3.8 MMOL/L (ref 3.5–5.3)
SODIUM SERPL-SCNC: 143 MMOL/L (ref 136–145)

## 2023-10-31 PROCEDURE — 7100000002 HC RECOVERY ROOM TIME - EACH INCREMENTAL 1 MINUTE: Performed by: OTOLARYNGOLOGY

## 2023-10-31 PROCEDURE — 96372 THER/PROPH/DIAG INJ SC/IM: CPT | Performed by: OTOLARYNGOLOGY

## 2023-10-31 PROCEDURE — 7100000001 HC RECOVERY ROOM TIME - INITIAL BASE CHARGE: Performed by: OTOLARYNGOLOGY

## 2023-10-31 PROCEDURE — 2500000005 HC RX 250 GENERAL PHARMACY W/O HCPCS: Performed by: STUDENT IN AN ORGANIZED HEALTH CARE EDUCATION/TRAINING PROGRAM

## 2023-10-31 PROCEDURE — 36415 COLL VENOUS BLD VENIPUNCTURE: CPT | Performed by: OTOLARYNGOLOGY

## 2023-10-31 PROCEDURE — 3700000002 HC GENERAL ANESTHESIA TIME - EACH INCREMENTAL 1 MINUTE: Performed by: OTOLARYNGOLOGY

## 2023-10-31 PROCEDURE — A4217 STERILE WATER/SALINE, 500 ML: HCPCS | Performed by: OTOLARYNGOLOGY

## 2023-10-31 PROCEDURE — 2500000004 HC RX 250 GENERAL PHARMACY W/ HCPCS (ALT 636 FOR OP/ED): Performed by: STUDENT IN AN ORGANIZED HEALTH CARE EDUCATION/TRAINING PROGRAM

## 2023-10-31 PROCEDURE — 3600000004 HC OR TIME - INITIAL BASE CHARGE - PROCEDURE LEVEL FOUR: Performed by: OTOLARYNGOLOGY

## 2023-10-31 PROCEDURE — 84100 ASSAY OF PHOSPHORUS: CPT | Performed by: OTOLARYNGOLOGY

## 2023-10-31 PROCEDURE — 7100000010 HC PHASE TWO TIME - EACH INCREMENTAL 1 MINUTE: Performed by: OTOLARYNGOLOGY

## 2023-10-31 PROCEDURE — 2500000004 HC RX 250 GENERAL PHARMACY W/ HCPCS (ALT 636 FOR OP/ED): Performed by: OTOLARYNGOLOGY

## 2023-10-31 PROCEDURE — 7100000009 HC PHASE TWO TIME - INITIAL BASE CHARGE: Performed by: OTOLARYNGOLOGY

## 2023-10-31 PROCEDURE — 3600000009 HC OR TIME - EACH INCREMENTAL 1 MINUTE - PROCEDURE LEVEL FOUR: Performed by: OTOLARYNGOLOGY

## 2023-10-31 PROCEDURE — 3700000001 HC GENERAL ANESTHESIA TIME - INITIAL BASE CHARGE: Performed by: OTOLARYNGOLOGY

## 2023-10-31 PROCEDURE — 14021 TIS TRNFR S/A/L 10.1-30 SQCM: CPT | Performed by: OTOLARYNGOLOGY

## 2023-10-31 PROCEDURE — A15731 PR FOREHEAD FLAP W/VASC PEDICLE: Performed by: STUDENT IN AN ORGANIZED HEALTH CARE EDUCATION/TRAINING PROGRAM

## 2023-10-31 PROCEDURE — 2500000001 HC RX 250 WO HCPCS SELF ADMINISTERED DRUGS (ALT 637 FOR MEDICARE OP): Performed by: OTOLARYNGOLOGY

## 2023-10-31 PROCEDURE — 15241 FTH/GFT F/C/C/M/N/A/G/H/F EA: CPT | Performed by: OTOLARYNGOLOGY

## 2023-10-31 PROCEDURE — 15240 FTH/GFT F/C/C/M/N/AX/G/H/F20: CPT | Performed by: OTOLARYNGOLOGY

## 2023-10-31 PROCEDURE — 2500000005 HC RX 250 GENERAL PHARMACY W/O HCPCS: Performed by: OTOLARYNGOLOGY

## 2023-10-31 PROCEDURE — 15731 FOREHEAD FLAP W/VASC PEDICLE: CPT | Performed by: OTOLARYNGOLOGY

## 2023-10-31 RX ORDER — NORETHINDRONE AND ETHINYL ESTRADIOL 0.5-0.035
KIT ORAL AS NEEDED
Status: DISCONTINUED | OUTPATIENT
Start: 2023-10-31 | End: 2023-10-31

## 2023-10-31 RX ORDER — SODIUM CHLORIDE 0.9 G/100ML
IRRIGANT IRRIGATION AS NEEDED
Status: DISCONTINUED | OUTPATIENT
Start: 2023-10-31 | End: 2023-10-31 | Stop reason: HOSPADM

## 2023-10-31 RX ORDER — PHENYLEPHRINE HCL IN 0.9% NACL 0.4MG/10ML
SYRINGE (ML) INTRAVENOUS AS NEEDED
Status: DISCONTINUED | OUTPATIENT
Start: 2023-10-31 | End: 2023-10-31

## 2023-10-31 RX ORDER — CEFAZOLIN 1 G/1
INJECTION, POWDER, FOR SOLUTION INTRAVENOUS AS NEEDED
Status: DISCONTINUED | OUTPATIENT
Start: 2023-10-31 | End: 2023-10-31

## 2023-10-31 RX ORDER — LABETALOL HYDROCHLORIDE 5 MG/ML
5 INJECTION, SOLUTION INTRAVENOUS ONCE AS NEEDED
Status: DISCONTINUED | OUTPATIENT
Start: 2023-10-31 | End: 2023-10-31 | Stop reason: HOSPADM

## 2023-10-31 RX ORDER — OXYCODONE HYDROCHLORIDE 5 MG/1
10 TABLET ORAL EVERY 4 HOURS PRN
Status: DISCONTINUED | OUTPATIENT
Start: 2023-10-31 | End: 2023-10-31 | Stop reason: HOSPADM

## 2023-10-31 RX ORDER — TRAMADOL HYDROCHLORIDE 50 MG/1
50 TABLET ORAL EVERY 6 HOURS PRN
Qty: 12 TABLET | Refills: 0 | Status: SHIPPED | OUTPATIENT
Start: 2023-10-31 | End: 2023-11-03

## 2023-10-31 RX ORDER — SODIUM CHLORIDE, SODIUM LACTATE, POTASSIUM CHLORIDE, CALCIUM CHLORIDE 600; 310; 30; 20 MG/100ML; MG/100ML; MG/100ML; MG/100ML
INJECTION, SOLUTION INTRAVENOUS CONTINUOUS PRN
Status: DISCONTINUED | OUTPATIENT
Start: 2023-10-31 | End: 2023-10-31

## 2023-10-31 RX ORDER — ROCURONIUM BROMIDE 10 MG/ML
INJECTION, SOLUTION INTRAVENOUS AS NEEDED
Status: DISCONTINUED | OUTPATIENT
Start: 2023-10-31 | End: 2023-10-31

## 2023-10-31 RX ORDER — ONDANSETRON HYDROCHLORIDE 2 MG/ML
INJECTION, SOLUTION INTRAVENOUS AS NEEDED
Status: DISCONTINUED | OUTPATIENT
Start: 2023-10-31 | End: 2023-10-31

## 2023-10-31 RX ORDER — MIDAZOLAM HYDROCHLORIDE 1 MG/ML
INJECTION INTRAMUSCULAR; INTRAVENOUS AS NEEDED
Status: DISCONTINUED | OUTPATIENT
Start: 2023-10-31 | End: 2023-10-31

## 2023-10-31 RX ORDER — EPINEPHRINE 0.1 MG/ML
INJECTION INTRACARDIAC; INTRAVENOUS AS NEEDED
Status: DISCONTINUED | OUTPATIENT
Start: 2023-10-31 | End: 2023-10-31 | Stop reason: HOSPADM

## 2023-10-31 RX ORDER — ONDANSETRON HYDROCHLORIDE 2 MG/ML
4 INJECTION, SOLUTION INTRAVENOUS ONCE AS NEEDED
Status: DISCONTINUED | OUTPATIENT
Start: 2023-10-31 | End: 2023-10-31 | Stop reason: HOSPADM

## 2023-10-31 RX ORDER — NEOSTIGMINE METHYLSULFATE 1 MG/ML
INJECTION, SOLUTION INTRAVENOUS AS NEEDED
Status: DISCONTINUED | OUTPATIENT
Start: 2023-10-31 | End: 2023-10-31

## 2023-10-31 RX ORDER — SODIUM CHLORIDE, SODIUM LACTATE, POTASSIUM CHLORIDE, CALCIUM CHLORIDE 600; 310; 30; 20 MG/100ML; MG/100ML; MG/100ML; MG/100ML
100 INJECTION, SOLUTION INTRAVENOUS CONTINUOUS
Status: DISCONTINUED | OUTPATIENT
Start: 2023-10-31 | End: 2023-10-31 | Stop reason: HOSPADM

## 2023-10-31 RX ORDER — LIDOCAINE HCL/PF 100 MG/5ML
SYRINGE (ML) INTRAVENOUS AS NEEDED
Status: DISCONTINUED | OUTPATIENT
Start: 2023-10-31 | End: 2023-10-31

## 2023-10-31 RX ORDER — BACITRACIN 500 [USP'U]/G
OINTMENT TOPICAL AS NEEDED
Status: DISCONTINUED | OUTPATIENT
Start: 2023-10-31 | End: 2023-10-31 | Stop reason: HOSPADM

## 2023-10-31 RX ORDER — DEXAMETHASONE SODIUM PHOSPHATE 4 MG/ML
INJECTION, SOLUTION INTRA-ARTICULAR; INTRALESIONAL; INTRAMUSCULAR; INTRAVENOUS; SOFT TISSUE AS NEEDED
Status: DISCONTINUED | OUTPATIENT
Start: 2023-10-31 | End: 2023-10-31

## 2023-10-31 RX ORDER — GLYCOPYRROLATE 0.2 MG/ML
INJECTION INTRAMUSCULAR; INTRAVENOUS AS NEEDED
Status: DISCONTINUED | OUTPATIENT
Start: 2023-10-31 | End: 2023-10-31

## 2023-10-31 RX ORDER — TRANEXAMIC ACID 100 MG/ML
INJECTION, SOLUTION INTRAVENOUS AS NEEDED
Status: DISCONTINUED | OUTPATIENT
Start: 2023-10-31 | End: 2023-10-31 | Stop reason: HOSPADM

## 2023-10-31 RX ORDER — DROPERIDOL 2.5 MG/ML
0.62 INJECTION, SOLUTION INTRAMUSCULAR; INTRAVENOUS ONCE AS NEEDED
Status: DISCONTINUED | OUTPATIENT
Start: 2023-10-31 | End: 2023-10-31 | Stop reason: HOSPADM

## 2023-10-31 RX ORDER — ALBUTEROL SULFATE 0.83 MG/ML
2.5 SOLUTION RESPIRATORY (INHALATION) ONCE AS NEEDED
Status: DISCONTINUED | OUTPATIENT
Start: 2023-10-31 | End: 2023-10-31 | Stop reason: HOSPADM

## 2023-10-31 RX ORDER — OXYCODONE HYDROCHLORIDE 5 MG/1
5 TABLET ORAL EVERY 4 HOURS PRN
Status: DISCONTINUED | OUTPATIENT
Start: 2023-10-31 | End: 2023-10-31 | Stop reason: HOSPADM

## 2023-10-31 RX ORDER — PROPOFOL 10 MG/ML
INJECTION, EMULSION INTRAVENOUS AS NEEDED
Status: DISCONTINUED | OUTPATIENT
Start: 2023-10-31 | End: 2023-10-31

## 2023-10-31 RX ORDER — PHENYLEPHRINE 10 MG/250 ML(40 MCG/ML)IN 0.9 % SOD.CHLORIDE INTRAVENOUS
CONTINUOUS PRN
Status: DISCONTINUED | OUTPATIENT
Start: 2023-10-31 | End: 2023-10-31

## 2023-10-31 RX ORDER — SODIUM CHLORIDE 9 MG/ML
INJECTION INTRAMUSCULAR; INTRAVENOUS; SUBCUTANEOUS AS NEEDED
Status: DISCONTINUED | OUTPATIENT
Start: 2023-10-31 | End: 2023-10-31 | Stop reason: HOSPADM

## 2023-10-31 RX ORDER — OXYMETAZOLINE HCL 0.05 %
SPRAY, NON-AEROSOL (ML) NASAL AS NEEDED
Status: DISCONTINUED | OUTPATIENT
Start: 2023-10-31 | End: 2023-10-31 | Stop reason: HOSPADM

## 2023-10-31 RX ORDER — CEPHALEXIN 500 MG/1
500 CAPSULE ORAL 2 TIMES DAILY
Qty: 14 CAPSULE | Refills: 0 | Status: SHIPPED | OUTPATIENT
Start: 2023-10-31 | End: 2023-11-07

## 2023-10-31 RX ORDER — FENTANYL CITRATE 50 UG/ML
INJECTION, SOLUTION INTRAMUSCULAR; INTRAVENOUS AS NEEDED
Status: DISCONTINUED | OUTPATIENT
Start: 2023-10-31 | End: 2023-10-31

## 2023-10-31 RX ORDER — LIDOCAINE HYDROCHLORIDE AND EPINEPHRINE 10; 10 MG/ML; UG/ML
INJECTION, SOLUTION INFILTRATION; PERINEURAL AS NEEDED
Status: DISCONTINUED | OUTPATIENT
Start: 2023-10-31 | End: 2023-10-31 | Stop reason: HOSPADM

## 2023-10-31 RX ADMIN — FENTANYL CITRATE 50 MCG: 50 INJECTION, SOLUTION INTRAMUSCULAR; INTRAVENOUS at 08:56

## 2023-10-31 RX ADMIN — GLYCOPYRROLATE 0.2 MG: 0.2 INJECTION, SOLUTION INTRAMUSCULAR; INTRAVENOUS at 11:37

## 2023-10-31 RX ADMIN — Medication 80 MCG: at 09:09

## 2023-10-31 RX ADMIN — FENTANYL CITRATE 50 MCG: 50 INJECTION, SOLUTION INTRAMUSCULAR; INTRAVENOUS at 10:23

## 2023-10-31 RX ADMIN — SODIUM CHLORIDE, SODIUM LACTATE, POTASSIUM CHLORIDE, CALCIUM CHLORIDE: 600; 310; 30; 20 INJECTION, SOLUTION INTRAVENOUS at 09:01

## 2023-10-31 RX ADMIN — ONDANSETRON 4 MG: 2 INJECTION INTRAMUSCULAR; INTRAVENOUS at 11:57

## 2023-10-31 RX ADMIN — NEOSTIGMINE METHYLSULFATE 4 MG: 1 INJECTION INTRAVENOUS at 11:57

## 2023-10-31 RX ADMIN — GLYCOPYRROLATE 0.8 MG: 0.2 INJECTION, SOLUTION INTRAMUSCULAR; INTRAVENOUS at 11:57

## 2023-10-31 RX ADMIN — Medication 80 MCG: at 09:44

## 2023-10-31 RX ADMIN — Medication 80 MCG: at 09:27

## 2023-10-31 RX ADMIN — PROPOFOL 130 MG: 10 INJECTION, EMULSION INTRAVENOUS at 08:56

## 2023-10-31 RX ADMIN — Medication 80 MCG: at 09:15

## 2023-10-31 RX ADMIN — MIDAZOLAM HYDROCHLORIDE 2 MG: 1 INJECTION, SOLUTION INTRAMUSCULAR; INTRAVENOUS at 08:46

## 2023-10-31 RX ADMIN — LIDOCAINE HYDROCHLORIDE 100 MG: 20 INJECTION INTRAVENOUS at 08:56

## 2023-10-31 RX ADMIN — EPHEDRINE SULFATE 5 MG: 50 INJECTION, SOLUTION INTRAVENOUS at 10:36

## 2023-10-31 RX ADMIN — Medication 120 MCG: at 10:29

## 2023-10-31 RX ADMIN — CEFAZOLIN 2 G: 1 INJECTION, POWDER, FOR SOLUTION INTRAMUSCULAR; INTRAVENOUS at 09:08

## 2023-10-31 RX ADMIN — Medication 80 MCG: at 10:02

## 2023-10-31 RX ADMIN — Medication 0.2 MCG/KG/MIN: at 11:13

## 2023-10-31 RX ADMIN — SODIUM CHLORIDE, POTASSIUM CHLORIDE, SODIUM LACTATE AND CALCIUM CHLORIDE: 600; 310; 30; 20 INJECTION, SOLUTION INTRAVENOUS at 08:40

## 2023-10-31 RX ADMIN — EPHEDRINE SULFATE 5 MG: 50 INJECTION, SOLUTION INTRAVENOUS at 11:31

## 2023-10-31 RX ADMIN — SODIUM CHLORIDE 0.03 MCG/KG/MIN: 9 INJECTION, SOLUTION INTRAVENOUS at 10:10

## 2023-10-31 RX ADMIN — ROCURONIUM BROMIDE 10 MG: 10 INJECTION INTRAVENOUS at 09:48

## 2023-10-31 RX ADMIN — DEXAMETHASONE SODIUM PHOSPHATE 8 MG: 4 INJECTION, SOLUTION INTRA-ARTICULAR; INTRALESIONAL; INTRAMUSCULAR; INTRAVENOUS; SOFT TISSUE at 09:11

## 2023-10-31 RX ADMIN — CEFAZOLIN 2 G: 1 INJECTION, POWDER, FOR SOLUTION INTRAMUSCULAR; INTRAVENOUS at 10:19

## 2023-10-31 RX ADMIN — ROCURONIUM BROMIDE 40 MG: 10 INJECTION INTRAVENOUS at 08:59

## 2023-10-31 SDOH — HEALTH STABILITY: MENTAL HEALTH: CURRENT SMOKER: 0

## 2023-10-31 ASSESSMENT — PAIN SCALES - GENERAL
PAINLEVEL_OUTOF10: 0 - NO PAIN
PAIN_LEVEL: 0
PAINLEVEL_OUTOF10: 0 - NO PAIN

## 2023-10-31 ASSESSMENT — COLUMBIA-SUICIDE SEVERITY RATING SCALE - C-SSRS
6. HAVE YOU EVER DONE ANYTHING, STARTED TO DO ANYTHING, OR PREPARED TO DO ANYTHING TO END YOUR LIFE?: NO
2. HAVE YOU ACTUALLY HAD ANY THOUGHTS OF KILLING YOURSELF?: NO
1. IN THE PAST MONTH, HAVE YOU WISHED YOU WERE DEAD OR WISHED YOU COULD GO TO SLEEP AND NOT WAKE UP?: NO

## 2023-10-31 ASSESSMENT — PAIN - FUNCTIONAL ASSESSMENT: PAIN_FUNCTIONAL_ASSESSMENT: 0-10

## 2023-10-31 NOTE — DISCHARGE INSTRUCTIONS
You may shower and remove the dressing on your right arm in 2 days. Gently cleanse it with warm soapy water and apply vaseline.     Leave your forehead dressing on until it is removed by Dr. Mcclain. In the meantime, you can apply Vaseline on the dressing to keep your dressing moist.

## 2023-10-31 NOTE — BRIEF OP NOTE
Date: 10/31/2023  OR Location: [facility] OR    Name: Adarsh De La Vega, : 1970, Age: 53 y.o., MRN: [id number], Sex: male    Diagnosis  Pre-op Diagnosis     * Squamous cell skin cancer, nasal tip [C44.321]     * Acquired deformity of nose [M95.0]     * Nasal lesion [J34.89] Post-op Diagnosis     * Squamous cell skin cancer, nasal tip [C44.321]     * Acquired deformity of nose [M95.0]     * Nasal lesion [J34.89]     Procedures  Nasal reconstruction and skin graft; CASE LENGTH= 3 hours  80081 - NC FOREHEAD FLAP W/PRESERVATION VASCULAR PEDICLE    Excision Full Thickness Skin Graft Head/Neck  92945 - NC FTH/GFT FREE W/DIRECT CLOSURE N/E/E/L 20 SQ CM/<    Release Contracture Head/Neck  11385 - NC PREP SITE F/S/N/H/F/G/M/D GT 1ST 100 SQ CM/1PCT    Excision Split Thickness Skin Graft Head/Neck  68033 - NC SPLIT AGRFT F/S/N/H/F/G/M/D GT 1ST 100 CM/</1 %      Surgeons      * Sami Mcclain - Primary    Resident/Fellow/Other Assistant:  Surgeon(s) and Role:    Procedure Summary  Anesthesia: * No anesthesia type entered *  ASA: II  Anesthesia Staff: Anesthesiologist: Emery Tejeda DO  Anesthesia Resident: Casimiro Baeza MD  Estimated Blood Loss: 20mL  Intra-op Medications:   Medication Name Total Dose   lidocaine-epinephrine (Xylocaine W/EPI) 1 %-1:100,000 injection 10 mL   sodium chloride 0.9 % irrigation solution 1,000 mL   bacitracin ointment 1 Application   tranexamic acid (Cyklokapron) injection 1 g   sodium chloride bacteriostatic 0.9 % injection 10 mL   EPINEPHrine (Adrenalin) 1 mg/10mL injection 2 mg              Anesthesia Record               Intraprocedure I/O Totals          Intake    Remifentanil Drip 0.00 mL    The total shown is the total volume documented since Anesthesia Start was filed.    Propofol Drip 0.00 mL    The total shown is the total volume documented since Anesthesia Start was filed.    Phenylephrine Drip 0.00 mL    The total shown is the total volume documented since Anesthesia  Start was filed.    Total Intake 0 mL          Specimen: No specimens collected     Staff:   Circulator: Terri Pisano, RAY; Dimple Levy, RAY  Scrub Person: Nguyen Flowers; Belgica Coon RN          Findings: Total Rhinectomy defect s/p cancer resection and adjuvant radiation threrapy, with subsequent contraction of the midface    Complications:  None; patient tolerated the procedure well.     Disposition: PACU - hemodynamically stable.  Condition: stable  Specimens Collected: No specimens collected

## 2023-10-31 NOTE — ANESTHESIA POSTPROCEDURE EVALUATION
Patient: Adarsh De La Vega    Procedure Summary       Date: 10/31/23 Room / Location: [facility] OR 05 / [facility] OR    Anesthesia Start: 0844 Anesthesia Stop: 1224    Procedure: Nasal reconstruction and skin graft; CASE LENGTH= 3 hours Diagnosis:       Squamous cell skin cancer, nasal tip      Acquired deformity of nose      Nasal lesion      (Squamous cell skin cancer, nasal tip [C44.321])      (Acquired deformity of nose [M95.0])      (Nasal lesion [J34.89])    Surgeons: Sami Mcclain MD Responsible Provider: Emery Tejeda DO    Anesthesia Type: general ASA Status: 2            Anesthesia Type: general    Vitals Value Taken Time   /70 10/31/23 1220   Temp 36.0 10/31/23 1225   Pulse 64 10/31/23 1222   Resp 14 10/31/23 1222   SpO2 100 % 10/31/23 1222   Vitals shown include unvalidated device data.    Anesthesia Post Evaluation    Patient location during evaluation: PACU  Patient participation: complete - patient participated  Level of consciousness: sleepy but conscious  Pain score: 0  Pain management: adequate  Airway patency: patent  Cardiovascular status: acceptable  Respiratory status: acceptable  Hydration status: acceptable        No notable events documented.

## 2023-10-31 NOTE — ANESTHESIA PROCEDURE NOTES
Airway  Date/Time: 10/31/2023 8:59 AM  Urgency: elective    Airway not difficult    Staffing  Performed: resident   Authorized by: Emery Tejeda DO    Performed by: Casimiro Baeza MD  Patient location during procedure: OR    Indications and Patient Condition  Indications for airway management: anesthesia  Spontaneous Ventilation: absent  Sedation level: deep  Preoxygenated: yes  Patient position: sniffing  Mask difficulty assessment: 1 - vent by mask    Final Airway Details  Final airway type: endotracheal airway      Successful airway: ETT  Cuffed: yes   Successful intubation technique: direct laryngoscopy  Facilitating devices/methods: intubating stylet  Endotracheal tube insertion site: oral  Blade: Ryan  Blade size: #4  ETT size (mm): 7.0  Cormack-Lehane Classification: grade I - full view of glottis  Measured from: teeth  ETT to teeth (cm): 22  Number of attempts at approach: 1  Number of other approaches attempted: 0

## 2023-10-31 NOTE — ANESTHESIA PREPROCEDURE EVALUATION
Patient: Adarsh De La Vega    Procedure Information       Date/Time: 10/31/23 0830    Procedures:       Nasal reconstruction and skin graft; CASE LENGTH= 3 hours      Excision Full Thickness Skin Graft Head/Neck      Release Contracture Head/Neck      Excision Split Thickness Skin Graft Head/Neck    Location: [facility] OR 05 / [facility] OR    Surgeons: Sami Mcclain MD            Relevant Problems   Anesthesia (within normal limits)      Cardiovascular (within normal limits)      Endocrine (within normal limits)      GI   (+) Gastroesophageal reflux disease      /Renal (within normal limits)      Neuro/Psych (within normal limits)      Pulmonary (within normal limits)      GI/Hepatic (within normal limits)      Hematology (within normal limits)      Musculoskeletal (within normal limits)      Eyes, Ears, Nose, and Throat  Rhinectomy       Infectious Disease (within normal limits)      ENT   (+) Acquired deformity of nose      Hematology and Neoplasia   (+) Squamous cell skin cancer, nasal tip       Clinical information reviewed:   Tobacco  Allergies  Meds   Med Hx  Surg Hx   Fam Hx  Soc Hx        NPO Detail:  NPO/Void Status  Carbonhydrate Drink Given Prior to Surgery? : N  Date of Last Liquid: 10/30/23  Time of Last Liquid: 2100  Date of Last Solid: 10/30/23  Time of Last Solid: 2100  Last Intake Type: Clear fluids  Time of Last Void: 0817           Physical Exam    Airway  Mallampati: II  TM distance: >3 FB  Neck ROM: full     Cardiovascular   Rhythm: regular  Rate: normal     Dental        Pulmonary   Breath sounds clear to auscultation     Abdominal   Abdomen: soft             Anesthesia Plan    ASA 2     general     The patient is not a current smoker.    intravenous induction   Anesthetic plan and risks discussed with patient.  Use of blood products discussed with patient who.    Plan discussed with attending.

## 2023-10-31 NOTE — OP NOTE
Nasal reconstruction and skin graft; CASE LENGTH= 3 hours Operative Note     Date: 10/31/2023  OR Location: [facility] OR    Name: Adarsh De La Vega, : 1970, Age: 53 y.o., MRN: [id number], Sex: male    Diagnosis  Pre-op Diagnosis     * Squamous cell skin cancer, nasal tip [C44.321]     * Acquired deformity of nose [M95.0]     * Nasal lesion [J34.89] Post-op Diagnosis     * Squamous cell skin cancer, nasal tip [C44.321]     * Acquired deformity of nose [M95.0]     * Nasal lesion [J34.89]     Procedures    1st stage of subtotal nasal reconstruction  Elevation of forehead flap, prelamination of flap with full thickness skin graft from right arm    Surgeons      * Sami Mcclain - Primary    Resident/Fellow/Other Assistant:  Surgeon(s) and Role:    Procedure Summary  Anesthesia: * No anesthesia type entered *  ASA: II  Anesthesia Staff: Anesthesiologist: Emery Tejeda DO  Anesthesia Resident: Casimiro Baeza MD  Estimated Blood Loss: 10 mL  Intra-op Medications:   Medication Name Total Dose   lidocaine-epinephrine (Xylocaine W/EPI) 1 %-1:100,000 injection 10 mL   sodium chloride 0.9 % irrigation solution 1,000 mL   bacitracin ointment 1 Application   tranexamic acid (Cyklokapron) injection 1 g   sodium chloride bacteriostatic 0.9 % injection 10 mL   EPINEPHrine (Adrenalin) 1 mg/10mL injection 2 mg              Anesthesia Record               Intraprocedure I/O Totals          Intake    Remifentanil Drip 0.00 mL    The total shown is the total volume documented since Anesthesia Start was filed.    Propofol Drip 0.00 mL    The total shown is the total volume documented since Anesthesia Start was filed.    Phenylephrine Drip 0.00 mL    The total shown is the total volume documented since Anesthesia Start was filed.    lactated Ringer's infusion 1200.00 mL    Total Intake 1200 mL       Output    Est. Blood Loss 20 mL    Total Output 20 mL       Net    Net Volume 1180 mL          Specimen: No specimens  collected     Staff:   Circulator: Terri Pisano, RAY; Dimple Levy, RAY  Scrub Person: Nguyen Flowers; Belgica Coon, RAY         Drains and/or Catheters: * None in log *    Tourniquet Times:         Implants:     Findings: See op note    Indications: Adarsh De La Vega is an 53 y.o. male who is having surgery for Squamous cell skin cancer, nasal tip [C44.321]  Acquired deformity of nose [M95.0]  Nasal lesion [J34.89].     The patient was seen in the preoperative area. The risks, benefits, complications, treatment options, non-operative alternatives, expected recovery and outcomes were discussed with the patient. The possibilities of reaction to medication, pulmonary aspiration, injury to surrounding structures, bleeding, recurrent infection, the need for additional procedures, failure to diagnose a condition, and creating a complication requiring transfusion or operation were discussed with the patient. The patient concurred with the proposed plan, giving informed consent.  The site of surgery was properly noted/marked if necessary per policy. The patient has been actively warmed in preoperative area. Preoperative antibiotics have been ordered and given within 1 hours of incision. Venous thrombosis prophylaxis have been ordered including bilateral sequential compression devices    Procedure Details:     Patient has a history of nasal cancer with resulting defect of the nose involving through and through, bilateral ala, dorsum, tip, columella. I discussed with the patient, in detail, the complex nature of facial reconstruction.  The patient was informed of the detailed risks, benefits, limitations, and alternatives of surgery.  Risks discussed included but were not limited to scarring, disfigurement that is permanent, bleeding, infection, and the need for further surgery.  The patient expressed understanding of these issues and remained eager to proceed.     The patient was brought to the operating room and  placed in the supine position, then intubated under general anesthesia.  The defect was inspected and facial markings performed for the repair.  No local tissue rearrangement option was available and therefore, a right paramedian forehead flap was designed.  The location of the supratrochlear artery was marked out, approximately 2cm lateral from the midline. The forehead and nose was then injected with 1% lidocaine with epinephrine.  The face was then prepped and draped in the usual sterile fashion.     Taking care to observe the nasal subunit principles. An exact template of the defect was created and translated onto the right forehead, where a flap was designed maintaining a pedicle width of approximately 1.5cm.  Incisions were made with the scalpel and dissection was carried down to the subcutaneous layer.  The flap was elevated in a subcutaneous plane to plan the tip, ala, and soft tissue triangle. After obtaining adequate mobilization to place the skin graft, a 5 x 10 cm full thickness skin graft was obtained from the right lateral arm. This was thinned, sized and inset at the forehead flap with plain gut suture. The flap was then inset for a delayed elevation at the time of the forearm free flap next month.    We then turned our attention to the arm donor site.  An area of 12 square cm was widely undermined and bilateral flaps were advanced medially to allow for primary closure of the donor site.  The wound was closed in a multi-layered fashion, using monocryl and gut for the skin.  A piece of xeroform was placed over the forehead site.  Antibiotic ointment was applied to all of the incisions.  This concluded the goals of the procedure.  The patient was turned over to Anesthesia, extubated, and transferred to the PACU.    Complications:  None; patient tolerated the procedure well.    Disposition: PACU - hemodynamically stable.  Condition: stable         Additional Details: None    Attending Attestation: I was  present and scrubbed for the entire procedure.    Sami Mcclain  Phone Number: [phone]

## 2023-11-01 ASSESSMENT — PAIN SCALES - GENERAL: PAINLEVEL_OUTOF10: 5 - MODERATE PAIN

## 2023-11-13 ENCOUNTER — TELEPHONE (OUTPATIENT)
Dept: OPERATING ROOM | Facility: HOSPITAL | Age: 53
End: 2023-11-13

## 2023-11-13 ENCOUNTER — APPOINTMENT (OUTPATIENT)
Dept: OTOLARYNGOLOGY | Facility: CLINIC | Age: 53
End: 2023-11-13
Payer: COMMERCIAL

## 2023-11-13 NOTE — TELEPHONE ENCOUNTER
Returned patient's call regarding getting his PEG tube out. He says he has not been using it at all for a week and a half and is gaining weight. He has finished treatment several weeks ago. He is requesting that Dr. Mei remove the tube as he refuses to work with the MD who put it in originally. I spoke to Dr. Mei and she is willing to pull it in a couple of weeks. Patient will make an appointment.

## 2023-11-14 ENCOUNTER — TELEPHONE (OUTPATIENT)
Dept: OPERATING ROOM | Facility: HOSPITAL | Age: 53
End: 2023-11-14
Payer: COMMERCIAL

## 2023-11-14 ENCOUNTER — HOSPITAL ENCOUNTER (OUTPATIENT)
Dept: RADIOLOGY | Facility: HOSPITAL | Age: 53
Discharge: HOME | End: 2023-11-14
Payer: COMMERCIAL

## 2023-11-14 DIAGNOSIS — C44.321 SQUAMOUS CELL CARCINOMA OF NOSE: ICD-10-CM

## 2023-11-14 PROCEDURE — 71250 CT THORAX DX C-: CPT | Performed by: RADIOLOGY

## 2023-11-14 PROCEDURE — 71250 CT THORAX DX C-: CPT

## 2023-11-14 PROCEDURE — 70491 CT SOFT TISSUE NECK W/DYE: CPT | Performed by: RADIOLOGY

## 2023-11-14 PROCEDURE — 70491 CT SOFT TISSUE NECK W/DYE: CPT

## 2023-11-14 PROCEDURE — 2550000001 HC RX 255 CONTRASTS: Performed by: OTOLARYNGOLOGY

## 2023-11-14 RX ORDER — HEPARIN 100 UNIT/ML
100 SYRINGE INTRAVENOUS AS NEEDED
Status: DISCONTINUED | OUTPATIENT
Start: 2023-11-14 | End: 2023-11-15 | Stop reason: HOSPADM

## 2023-11-14 RX ORDER — SODIUM CHLORIDE 0.9 % (FLUSH) 0.9 %
10 SYRINGE (ML) INJECTION EVERY 8 HOURS SCHEDULED
Status: DISCONTINUED | OUTPATIENT
Start: 2023-11-14 | End: 2023-11-15 | Stop reason: HOSPADM

## 2023-11-14 RX ADMIN — IOHEXOL 75 ML: 350 INJECTION, SOLUTION INTRAVENOUS at 10:20

## 2023-11-14 NOTE — TELEPHONE ENCOUNTER
----- Message from July Marquis sent at 11/14/2023  1:29 PM EST -----  Regarding: call back  Patient would like a call back to see if he can have his peg tube removed when he come to his appointment on 11-

## 2023-11-14 NOTE — TELEPHONE ENCOUNTER
Spoke to patient and confirmed with him that Dr. Mei can take out his PEG tube on 11/27 when he sees her in office in [place].

## 2023-11-16 ENCOUNTER — OFFICE VISIT (OUTPATIENT)
Dept: OTOLARYNGOLOGY | Facility: CLINIC | Age: 53
End: 2023-11-16
Payer: COMMERCIAL

## 2023-11-16 VITALS — TEMPERATURE: 98 F | BODY MASS INDEX: 26.34 KG/M2 | WEIGHT: 194.5 LBS

## 2023-11-16 DIAGNOSIS — J34.89 NASAL LESION: ICD-10-CM

## 2023-11-16 DIAGNOSIS — M95.0 ACQUIRED DEFORMITY OF NOSE: ICD-10-CM

## 2023-11-16 DIAGNOSIS — M95.0 NASAL DEFECT: Primary | ICD-10-CM

## 2023-11-16 PROCEDURE — 99024 POSTOP FOLLOW-UP VISIT: CPT | Performed by: OTOLARYNGOLOGY

## 2023-11-16 RX ORDER — MUPIROCIN 20 MG/G
OINTMENT TOPICAL
Qty: 30 G | Refills: 3 | Status: SHIPPED | OUTPATIENT
Start: 2023-11-16 | End: 2024-01-03 | Stop reason: HOSPADM

## 2023-11-16 RX ORDER — SODIUM CHLORIDE 0.9 % (FLUSH) 0.9 %
10 SYRINGE (ML) INJECTION EVERY 8 HOURS SCHEDULED
Status: DISCONTINUED | OUTPATIENT
Start: 2023-11-16 | End: 2023-12-14

## 2023-11-16 RX ORDER — HEPARIN 100 UNIT/ML
100 SYRINGE INTRAVENOUS AS NEEDED
Status: DISCONTINUED | OUTPATIENT
Start: 2023-11-16 | End: 2023-12-14

## 2023-11-16 ASSESSMENT — PATIENT HEALTH QUESTIONNAIRE - PHQ9: 2. FEELING DOWN, DEPRESSED OR HOPELESS: NOT AT ALL

## 2023-11-16 NOTE — PROGRESS NOTES
POV s/p 10/31/23 1st stage of subtotal nasal reconstruction, Elevation of forehead flap, prelamination of flap with full thickness skin graft from right arm    Doing well, endorses improved swelling.   Continues daily washes and ointment to surgical sites  Right arm incision healing well with expected mild inflammatory changes, clean dry intact.  Forehead dressing removed revealing flap site well healing, good vascularization and appears without signs of infection or necrosis.     Continue ointment to surgical sites  RTC 4-6 months or PRN

## 2023-11-17 NOTE — PROGRESS NOTES
Chief Complaint   Patient presents with    Head And Neck Cancer     Follow up     HPI:  Adarsh De La Vega is a 53 year old  male following up with me today for his sinonasal SCCa s/p bilateral nasal endoscopy, total rhinectomy on 5/5/23. He completed adjuvant chemoradiation 8/28/23. Last seen 9/23.  As of 10/31/23 he is also s/p 1st stage of subtotal nasal reconstruction, Elevation of forehead flap, prelamination of flap with full thickness skin graft from right arm with Dr. Mcclain. His most recent CT of neck  from 10/2023 was negative, and CT of chest showed a stable lung nodule. He has not had to use his PEG tube in several weeks, and is gaining back some weight. Here today for requested PEG tube removal. He was able to eat Thanksgiving dinner. Did try some wine which burned his mouth significantly. He is scheduled for an osteocutaneous radial forearm free flap with Dr. Mcclain on 12/8/23.     History:  Dx: Squamous cell carcinoma sinonasal [id number]  ~ 6 months ago dog hit nose  1/23: Seen by an ENT who told him he had a nasal infection. developed a lump on the outside of his nose a few days following that appointment  2/21/23: Biopsy of nasal lesion. Path + for squamous cell carcinoma  2/27/23: seen by infectious disease and was in the hospital on IV antibiotics for about 1 week and discharged with a PICC line   3/17/23: CT neck and Chest- slightly enhancing nasal mass with involvement of the anterior nose & nasal septum. No pathologic lymphadenopathy. CT chest with 6mm pleural based nodule LLL.   3/20/23: First seen by me  5/5/23:S/p bilateral nasal endoscopy, total rhinectomy. Path +   5/17/23: Nasal trumpets removed  7/10/23: Started adjuvant chemoXRT  7/31/23: Admitted for dehydration/odynophagia and PEG tube placed  8/14/23: In ED, waiting to be admitted for IV hydration  8/28/23: Completed adjuvant chemoXRT   10/23: CT neck/chest complete response, stable lung nodule  10/31/23: 1st stage prelamination of  paramedian forehead flap  12/8/23: Scheduled for osteocutaneous RFFF     SH:  Tob: Current 1/2 ppd smoker. Former 1ppd since a teen  ETOH: Social  Here with wife    ROS:  Review of Systems   Constitutional:  Negative for appetite change, chills, fatigue, fever and unexpected weight change.   HENT:  Negative for dental problem, drooling, ear pain, facial swelling, hearing loss, mouth sores, sore throat, tinnitus, trouble swallowing and voice change.    Respiratory:  Negative for cough, shortness of breath and stridor.    Gastrointestinal:  Negative for nausea and vomiting.   Musculoskeletal:  Negative for neck pain.   Hematological:  Negative for adenopathy.   All other systems reviewed and are negative.       PE:  ENT Physical Exam  Constitutional  Appearance: patient appears well-developed, patient is cooperative;  Communication/Voice: communication appropriate for developmental age;  Head and Face  Appearance: head appears normal; facial scars present;  Ear  Auricles: right auricle normal; left auricle normal;  Ear Canals: right ear canal normal; left ear canal normal;  Tympanic Membranes: right tympanic membrane normal; left tympanic membrane normal;  Nose  External Nose: nares patent bilaterally; nasal deformity visible;  Nose comments: Missing anterior nasal skin with direct communication to the anterior nose, mild crusting, total anterior rhinectomy, total septectomy, nasal skin edges are well healed, no recurrent masses, no bleeding  Oral Cavity/Oropharynx  Lips: normal;  Gums: gingiva normal;  Tongue: normal;  Oral mucosa: normal;  Neck  Neck: neck normal;  Respiratory  Inspection: breathing unlabored;  Cardiovascular  Inspection: extremities are warm and well perfused;  Lymphatic  Palpation: no cervical adenopathy noted;  Neurovestibular  Mental Status: alert and oriented;  Psychiatric: mood normal; affect is appropriate;   Abd: PEG in place - removed +granulation tissue    Procedures   Procedure: Chemical  cauterization of granulation tissue  Preop dx: Excessive granulation tissue  Postop dx: Excessive granulation tissue  Indications: Same  Anesthesia: None  Procedure: After discussion of the risks, benefits and alternatives the patient elected to proceed. PEG tube had been removed and he was noted to have excess granulation tissue and mild bleeding. Silver nitrate sticks were applied to the excess granulation tissue which provided hemostasis and treated with granulation tissue. The patient tolerated this well. The procedure was complete. he was monitored and was without complication.    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 Mcclain as scheduled 12/8/23         Oropharyngeal dysphagia    Current Assessment & Plan     Significantly improved  Not using PEG  Weight stable  PEG removed          Dena Mei MD    Head & Neck Surgical Oncology & Reconstruction  Department of Otolaryngology - Head and Neck Surgery        By signing my name below, I, Esther Kumaribe, attest that this documentation has been prepared under the direction and in the presence of Dr. Dena Mei MD.     All medical record entries made by the Scribe were at my direction and personally dictated by me, Dr. Dean Mei. I have reviewed the chart and agree that the record accurately reflects my personal performance of the history, physical exam, discussion and plan.

## 2023-11-21 ENCOUNTER — PREP FOR PROCEDURE (OUTPATIENT)
Dept: OTOLARYNGOLOGY | Facility: CLINIC | Age: 53
End: 2023-11-21
Payer: COMMERCIAL

## 2023-11-21 DIAGNOSIS — M95.0 ACQUIRED DEFORMITY OF NOSE: ICD-10-CM

## 2023-11-21 DIAGNOSIS — C44.321 SQUAMOUS CELL CANCER OF SKIN OF NOSE: ICD-10-CM

## 2023-11-21 DIAGNOSIS — J34.89 NASAL LESION: ICD-10-CM

## 2023-11-27 ENCOUNTER — OFFICE VISIT (OUTPATIENT)
Dept: OTOLARYNGOLOGY | Facility: CLINIC | Age: 53
End: 2023-11-27
Payer: COMMERCIAL

## 2023-11-27 VITALS — WEIGHT: 193 LBS | BODY MASS INDEX: 26.14 KG/M2

## 2023-11-27 DIAGNOSIS — C44.321 SQUAMOUS CELL CARCINOMA OF NOSE: Primary | ICD-10-CM

## 2023-11-27 DIAGNOSIS — L92.9 GRANULATION TISSUE OF SITE OF GASTROSTOMY: ICD-10-CM

## 2023-11-27 DIAGNOSIS — M95.0 ACQUIRED DEFORMITY OF NOSE: ICD-10-CM

## 2023-11-27 DIAGNOSIS — R13.12 OROPHARYNGEAL DYSPHAGIA: ICD-10-CM

## 2023-11-27 PROCEDURE — 99214 OFFICE O/P EST MOD 30 MIN: CPT | Performed by: OTOLARYNGOLOGY

## 2023-11-27 PROCEDURE — 17250 CHEM CAUT OF GRANLTJ TISSUE: CPT | Performed by: OTOLARYNGOLOGY

## 2023-11-27 ASSESSMENT — ENCOUNTER SYMPTOMS
ADENOPATHY: 0
VOICE CHANGE: 0
TROUBLE SWALLOWING: 0
APPETITE CHANGE: 0
COUGH: 0
FEVER: 0
VOMITING: 0
FATIGUE: 0
NECK PAIN: 0
STRIDOR: 0
SHORTNESS OF BREATH: 0
CHILLS: 0
NAUSEA: 0
SORE THROAT: 0
UNEXPECTED WEIGHT CHANGE: 0
FACIAL SWELLING: 0

## 2023-11-27 ASSESSMENT — PATIENT HEALTH QUESTIONNAIRE - PHQ9: 2. FEELING DOWN, DEPRESSED OR HOPELESS: NOT AT ALL

## 2023-11-27 NOTE — ASSESSMENT & PLAN NOTE
No evidence of disease  Planning reconstruction for 12/8/23, I will be assisting  He is doing well overall  CT neck/chest shows complete response, monitoring stable lung nodule

## 2023-11-27 NOTE — PATIENT INSTRUCTIONS
Dr. Mei evaluated you today.    Your care plan is outlined below:  -- Follow up with Dr. Mei in 2-3 mo.  This appointment was scheduled at the end of your visit today.  If you need to reschedule, please call the office at [phone].  Please keep in mind that last minute cancellations often result in delayed follow-up appointments.     General appointment line please call [phone]  For general questions or scheduling issues please call [phone] option #2   For medical questions or surgery scheduling please call [phone] on Mondays, Wednesdays and Thursdays or [phone] on Tuesdays and Fridays. Please be sure to leave a voice mail or your call will not be able to be returned.     Dr. Mei makes every effort to run on time for your appointments.  Therefore, if you are more than 30 minutes late for your appointment, unrelated to a scan or another appointment such as chemotherapy or radiation, your appointment will need to be rescheduled to another day.  We appreciate your understanding.

## 2023-12-08 ENCOUNTER — ANESTHESIA (OUTPATIENT)
Dept: OPERATING ROOM | Facility: HOSPITAL | Age: 53
DRG: 141 | End: 2023-12-08
Payer: COMMERCIAL

## 2023-12-08 ENCOUNTER — ANESTHESIA EVENT (OUTPATIENT)
Dept: OPERATING ROOM | Facility: HOSPITAL | Age: 53
DRG: 141 | End: 2023-12-08
Payer: COMMERCIAL

## 2023-12-08 ENCOUNTER — HOSPITAL ENCOUNTER (INPATIENT)
Facility: HOSPITAL | Age: 53
LOS: 6 days | Discharge: HOME | DRG: 141 | End: 2023-12-14
Attending: OTOLARYNGOLOGY | Admitting: OTOLARYNGOLOGY
Payer: COMMERCIAL

## 2023-12-08 ENCOUNTER — APPOINTMENT (OUTPATIENT)
Dept: RADIOLOGY | Facility: HOSPITAL | Age: 53
DRG: 141 | End: 2023-12-08
Payer: COMMERCIAL

## 2023-12-08 DIAGNOSIS — M95.0 ACQUIRED DEFORMITY OF NOSE: ICD-10-CM

## 2023-12-08 DIAGNOSIS — G89.18 POST-OP PAIN: ICD-10-CM

## 2023-12-08 DIAGNOSIS — J34.89 NASAL LESION: ICD-10-CM

## 2023-12-08 DIAGNOSIS — M95.0 NASAL DEFORMITY: Primary | ICD-10-CM

## 2023-12-08 DIAGNOSIS — C44.321 SQUAMOUS CELL CANCER OF SKIN OF NOSE: ICD-10-CM

## 2023-12-08 LAB
ABO GROUP (TYPE) IN BLOOD: NORMAL
ANION GAP BLDA CALCULATED.4IONS-SCNC: 11 MMO/L (ref 10–25)
ANION GAP BLDA CALCULATED.4IONS-SCNC: 8 MMO/L (ref 10–25)
BASE EXCESS BLDA CALC-SCNC: -0.7 MMOL/L (ref -2–3)
BASE EXCESS BLDA CALC-SCNC: 0.5 MMOL/L (ref -2–3)
BODY TEMPERATURE: 37 DEGREES CELSIUS
BODY TEMPERATURE: 37 DEGREES CELSIUS
CA-I BLDA-SCNC: 1.2 MMOL/L (ref 1.1–1.33)
CA-I BLDA-SCNC: 1.2 MMOL/L (ref 1.1–1.33)
CHLORIDE BLDA-SCNC: 107 MMOL/L (ref 98–107)
CHLORIDE BLDA-SCNC: 107 MMOL/L (ref 98–107)
GLUCOSE BLDA-MCNC: 131 MG/DL (ref 74–99)
GLUCOSE BLDA-MCNC: 162 MG/DL (ref 74–99)
HCO3 BLDA-SCNC: 24.2 MMOL/L (ref 22–26)
HCO3 BLDA-SCNC: 25.4 MMOL/L (ref 22–26)
HCT VFR BLD EST: 35 % (ref 41–52)
HCT VFR BLD EST: 37 % (ref 41–52)
HGB BLDA-MCNC: 11.7 G/DL (ref 13.5–17.5)
HGB BLDA-MCNC: 12.2 G/DL (ref 13.5–17.5)
INHALED O2 CONCENTRATION: 50 %
INHALED O2 CONCENTRATION: 50 %
LACTATE BLDA-SCNC: 1.4 MMOL/L (ref 0.4–2)
LACTATE BLDA-SCNC: 1.8 MMOL/L (ref 0.4–2)
OXYHGB MFR BLDA: 97.3 % (ref 94–98)
OXYHGB MFR BLDA: 97.5 % (ref 94–98)
PCO2 BLDA: 40 MM HG (ref 38–42)
PCO2 BLDA: 41 MM HG (ref 38–42)
PH BLDA: 7.39 PH (ref 7.38–7.42)
PH BLDA: 7.4 PH (ref 7.38–7.42)
PO2 BLDA: 208 MM HG (ref 85–95)
PO2 BLDA: 213 MM HG (ref 85–95)
POTASSIUM BLDA-SCNC: 4.1 MMOL/L (ref 3.5–5.3)
POTASSIUM BLDA-SCNC: 4.6 MMOL/L (ref 3.5–5.3)
RH FACTOR (ANTIGEN D): NORMAL
SAO2 % BLDA: 100 % (ref 94–100)
SAO2 % BLDA: 99 % (ref 94–100)
SODIUM BLDA-SCNC: 136 MMOL/L (ref 136–145)
SODIUM BLDA-SCNC: 138 MMOL/L (ref 136–145)

## 2023-12-08 PROCEDURE — 2500000004 HC RX 250 GENERAL PHARMACY W/ HCPCS (ALT 636 FOR OP/ED): Performed by: STUDENT IN AN ORGANIZED HEALTH CARE EDUCATION/TRAINING PROGRAM

## 2023-12-08 PROCEDURE — 97605 NEG PRS WND THER DME<=50SQCM: CPT | Performed by: OTOLARYNGOLOGY

## 2023-12-08 PROCEDURE — 71045 X-RAY EXAM CHEST 1 VIEW: CPT | Mod: FY

## 2023-12-08 PROCEDURE — 2720000007 HC OR 272 NO HCPCS: Performed by: OTOLARYNGOLOGY

## 2023-12-08 PROCEDURE — 30450 REVISION OF NOSE: CPT | Performed by: OTOLARYNGOLOGY

## 2023-12-08 PROCEDURE — 0HBCXZZ EXCISION OF LEFT UPPER ARM SKIN, EXTERNAL APPROACH: ICD-10-PCS | Performed by: ORTHOPAEDIC SURGERY

## 2023-12-08 PROCEDURE — 84295 ASSAY OF SERUM SODIUM: CPT

## 2023-12-08 PROCEDURE — 7100000001 HC RECOVERY ROOM TIME - INITIAL BASE CHARGE: Performed by: OTOLARYNGOLOGY

## 2023-12-08 PROCEDURE — 88307 TISSUE EXAM BY PATHOLOGIST: CPT | Performed by: PATHOLOGY

## 2023-12-08 PROCEDURE — 30520 REPAIR OF NASAL SEPTUM: CPT | Performed by: OTOLARYNGOLOGY

## 2023-12-08 PROCEDURE — 15120 SPLT AGRFT F/S/N/H/F/G/M 1ST: CPT | Performed by: OTOLARYNGOLOGY

## 2023-12-08 PROCEDURE — 21230 RIB CARTILAGE GRAFT: CPT | Performed by: OTOLARYNGOLOGY

## 2023-12-08 PROCEDURE — 1170000001 HC PRIVATE ONCOLOGY ROOM DAILY

## 2023-12-08 PROCEDURE — 3700000002 HC GENERAL ANESTHESIA TIME - EACH INCREMENTAL 1 MINUTE: Performed by: OTOLARYNGOLOGY

## 2023-12-08 PROCEDURE — 2500000004 HC RX 250 GENERAL PHARMACY W/ HCPCS (ALT 636 FOR OP/ED)

## 2023-12-08 PROCEDURE — 7100000002 HC RECOVERY ROOM TIME - EACH INCREMENTAL 1 MINUTE: Performed by: OTOLARYNGOLOGY

## 2023-12-08 PROCEDURE — 2500000005 HC RX 250 GENERAL PHARMACY W/O HCPCS

## 2023-12-08 PROCEDURE — 30620 INTRANASAL RECONSTRUCTION: CPT | Performed by: OTOLARYNGOLOGY

## 2023-12-08 PROCEDURE — 3700000001 HC GENERAL ANESTHESIA TIME - INITIAL BASE CHARGE: Performed by: OTOLARYNGOLOGY

## 2023-12-08 PROCEDURE — P9045 ALBUMIN (HUMAN), 5%, 250 ML: HCPCS | Mod: JZ

## 2023-12-08 PROCEDURE — 29125 APPL SHORT ARM SPLINT STATIC: CPT | Performed by: OTOLARYNGOLOGY

## 2023-12-08 PROCEDURE — 09RK07Z REPLACEMENT OF NASAL MUCOSA AND SOFT TISSUE WITH AUTOLOGOUS TISSUE SUBSTITUTE, OPEN APPROACH: ICD-10-PCS | Performed by: ORTHOPAEDIC SURGERY

## 2023-12-08 PROCEDURE — 14021 TIS TRNFR S/A/L 10.1-30 SQCM: CPT | Performed by: OTOLARYNGOLOGY

## 2023-12-08 PROCEDURE — 2500000005 HC RX 250 GENERAL PHARMACY W/O HCPCS: Performed by: OTOLARYNGOLOGY

## 2023-12-08 PROCEDURE — 88305 TISSUE EXAM BY PATHOLOGIST: CPT | Performed by: PATHOLOGY

## 2023-12-08 PROCEDURE — 20969 BONE/SKIN GRAFT MICROVASC: CPT | Performed by: OTOLARYNGOLOGY

## 2023-12-08 PROCEDURE — 36620 INSERTION CATHETER ARTERY: CPT

## 2023-12-08 PROCEDURE — 0JX10ZB TRANSFER FACE SUBCUTANEOUS TISSUE AND FASCIA WITH SKIN AND SUBCUTANEOUS TISSUE, OPEN APPROACH: ICD-10-PCS | Performed by: ORTHOPAEDIC SURGERY

## 2023-12-08 PROCEDURE — 88305 TISSUE EXAM BY PATHOLOGIST: CPT | Mod: TC,SUR | Performed by: OTOLARYNGOLOGY

## 2023-12-08 PROCEDURE — 25490 PROPHYLACTIC TX RADIUS: CPT | Performed by: OTOLARYNGOLOGY

## 2023-12-08 PROCEDURE — 25490 PROPHYLACTIC TX RADIUS: CPT | Performed by: STUDENT IN AN ORGANIZED HEALTH CARE EDUCATION/TRAINING PROGRAM

## 2023-12-08 PROCEDURE — A4649 SURGICAL SUPPLIES: HCPCS | Performed by: OTOLARYNGOLOGY

## 2023-12-08 PROCEDURE — 0DP63UZ REMOVAL OF FEEDING DEVICE FROM STOMACH, PERCUTANEOUS APPROACH: ICD-10-PCS | Performed by: ORTHOPAEDIC SURGERY

## 2023-12-08 PROCEDURE — 0NUB07Z SUPPLEMENT NASAL BONE WITH AUTOLOGOUS TISSUE SUBSTITUTE, OPEN APPROACH: ICD-10-PCS | Performed by: ORTHOPAEDIC SURGERY

## 2023-12-08 PROCEDURE — 14301 TIS TRNFR ANY 30.1-60 SQ CM: CPT | Performed by: OTOLARYNGOLOGY

## 2023-12-08 PROCEDURE — 42440 EXCISE SUBMAXILLARY GLAND: CPT | Performed by: OTOLARYNGOLOGY

## 2023-12-08 PROCEDURE — 2500000001 HC RX 250 WO HCPCS SELF ADMINISTERED DRUGS (ALT 637 FOR MEDICARE OP): Performed by: OTOLARYNGOLOGY

## 2023-12-08 PROCEDURE — 3600000004 HC OR TIME - INITIAL BASE CHARGE - PROCEDURE LEVEL FOUR: Performed by: OTOLARYNGOLOGY

## 2023-12-08 PROCEDURE — 2500000004 HC RX 250 GENERAL PHARMACY W/ HCPCS (ALT 636 FOR OP/ED): Performed by: OTOLARYNGOLOGY

## 2023-12-08 PROCEDURE — 21026 EXCISION OF FACIAL BONE(S): CPT | Performed by: OTOLARYNGOLOGY

## 2023-12-08 PROCEDURE — 36415 COLL VENOUS BLD VENIPUNCTURE: CPT

## 2023-12-08 PROCEDURE — 20969 BONE/SKIN GRAFT MICROVASC: CPT | Performed by: STUDENT IN AN ORGANIZED HEALTH CARE EDUCATION/TRAINING PROGRAM

## 2023-12-08 PROCEDURE — 2780000003 HC OR 278 NO HCPCS: Performed by: OTOLARYNGOLOGY

## 2023-12-08 PROCEDURE — A4217 STERILE WATER/SALINE, 500 ML: HCPCS | Performed by: OTOLARYNGOLOGY

## 2023-12-08 PROCEDURE — 0HR1X73 REPLACEMENT OF FACE SKIN WITH AUTOLOGOUS TISSUE SUBSTITUTE, FULL THICKNESS, EXTERNAL APPROACH: ICD-10-PCS | Performed by: ORTHOPAEDIC SURGERY

## 2023-12-08 PROCEDURE — C1713 ANCHOR/SCREW BN/BN,TIS/BN: HCPCS | Performed by: OTOLARYNGOLOGY

## 2023-12-08 PROCEDURE — 71045 X-RAY EXAM CHEST 1 VIEW: CPT | Performed by: RADIOLOGY

## 2023-12-08 PROCEDURE — 3600000009 HC OR TIME - EACH INCREMENTAL 1 MINUTE - PROCEDURE LEVEL FOUR: Performed by: OTOLARYNGOLOGY

## 2023-12-08 PROCEDURE — 15731 FOREHEAD FLAP W/VASC PEDICLE: CPT | Performed by: OTOLARYNGOLOGY

## 2023-12-08 PROCEDURE — 15004 WOUND PREP F/N/HF/G: CPT | Performed by: OTOLARYNGOLOGY

## 2023-12-08 DEVICE — IMPLANTABLE DEVICE: Type: IMPLANTABLE DEVICE | Site: ARM | Status: FUNCTIONAL

## 2023-12-08 DEVICE — SCREW, CORTICAL, SELF-TAPPING, 3.5 X 18 MM, STAINLESS STEEL: Type: IMPLANTABLE DEVICE | Site: ARM | Status: FUNCTIONAL

## 2023-12-08 DEVICE — SCREW, CORTICAL, SELF-TAPPING, 3.5 X 20 MM, STAINLESS STEEL: Type: IMPLANTABLE DEVICE | Site: ARM | Status: FUNCTIONAL

## 2023-12-08 RX ORDER — REMIFENTANIL HYDROCHLORIDE 1 MG/ML
INJECTION, POWDER, LYOPHILIZED, FOR SOLUTION INTRAVENOUS CONTINUOUS PRN
Status: DISCONTINUED | OUTPATIENT
Start: 2023-12-08 | End: 2023-12-08

## 2023-12-08 RX ORDER — OXYCODONE HYDROCHLORIDE 5 MG/1
5 TABLET ORAL EVERY 4 HOURS PRN
Status: DISCONTINUED | OUTPATIENT
Start: 2023-12-08 | End: 2023-12-10

## 2023-12-08 RX ORDER — HYDROMORPHONE HCL/0.9% NACL/PF 15 MG/30ML
PATIENT CONTROLLED ANALGESIA SYRINGE INTRAVENOUS CONTINUOUS
Status: DISCONTINUED | OUTPATIENT
Start: 2023-12-08 | End: 2023-12-10

## 2023-12-08 RX ORDER — SODIUM CHLORIDE, SODIUM LACTATE, POTASSIUM CHLORIDE, CALCIUM CHLORIDE 600; 310; 30; 20 MG/100ML; MG/100ML; MG/100ML; MG/100ML
INJECTION, SOLUTION INTRAVENOUS CONTINUOUS PRN
Status: DISCONTINUED | OUTPATIENT
Start: 2023-12-08 | End: 2023-12-08

## 2023-12-08 RX ORDER — DEXAMETHASONE SODIUM PHOSPHATE 100 MG/10ML
INJECTION INTRAMUSCULAR; INTRAVENOUS AS NEEDED
Status: DISCONTINUED | OUTPATIENT
Start: 2023-12-08 | End: 2023-12-08

## 2023-12-08 RX ORDER — LIDOCAINE HCL/PF 100 MG/5ML
SYRINGE (ML) INTRAVENOUS AS NEEDED
Status: DISCONTINUED | OUTPATIENT
Start: 2023-12-08 | End: 2023-12-08

## 2023-12-08 RX ORDER — ALBUTEROL SULFATE 0.83 MG/ML
2.5 SOLUTION RESPIRATORY (INHALATION) ONCE AS NEEDED
Status: DISCONTINUED | OUTPATIENT
Start: 2023-12-08 | End: 2023-12-08 | Stop reason: HOSPADM

## 2023-12-08 RX ORDER — PETROLATUM 420 MG/G
1 OINTMENT TOPICAL 3 TIMES DAILY
Status: DISCONTINUED | OUTPATIENT
Start: 2023-12-10 | End: 2023-12-14 | Stop reason: HOSPADM

## 2023-12-08 RX ORDER — POLYETHYLENE GLYCOL 3350 17 G/17G
17 POWDER, FOR SOLUTION ORAL DAILY
Status: DISCONTINUED | OUTPATIENT
Start: 2023-12-09 | End: 2023-12-14 | Stop reason: HOSPADM

## 2023-12-08 RX ORDER — REMIFENTANIL HYDROCHLORIDE 1 MG/ML
INJECTION, POWDER, LYOPHILIZED, FOR SOLUTION INTRAVENOUS AS NEEDED
Status: DISCONTINUED | OUTPATIENT
Start: 2023-12-08 | End: 2023-12-08

## 2023-12-08 RX ORDER — GLYCOPYRROLATE 0.2 MG/ML
INJECTION INTRAMUSCULAR; INTRAVENOUS AS NEEDED
Status: DISCONTINUED | OUTPATIENT
Start: 2023-12-08 | End: 2023-12-08

## 2023-12-08 RX ORDER — ACETAMINOPHEN 325 MG/1
975 TABLET ORAL EVERY 8 HOURS SCHEDULED
Status: DISCONTINUED | OUTPATIENT
Start: 2023-12-08 | End: 2023-12-14 | Stop reason: HOSPADM

## 2023-12-08 RX ORDER — NALOXONE HYDROCHLORIDE 0.4 MG/ML
0.2 INJECTION, SOLUTION INTRAMUSCULAR; INTRAVENOUS; SUBCUTANEOUS AS NEEDED
Status: DISCONTINUED | OUTPATIENT
Start: 2023-12-08 | End: 2023-12-14 | Stop reason: HOSPADM

## 2023-12-08 RX ORDER — OXYCODONE HCL 5 MG/5 ML
5 SOLUTION, ORAL ORAL EVERY 4 HOURS PRN
Status: DISCONTINUED | OUTPATIENT
Start: 2023-12-08 | End: 2023-12-10

## 2023-12-08 RX ORDER — SODIUM CHLORIDE, SODIUM LACTATE, POTASSIUM CHLORIDE, CALCIUM CHLORIDE 600; 310; 30; 20 MG/100ML; MG/100ML; MG/100ML; MG/100ML
80 INJECTION, SOLUTION INTRAVENOUS CONTINUOUS
Status: DISCONTINUED | OUTPATIENT
Start: 2023-12-08 | End: 2023-12-11

## 2023-12-08 RX ORDER — OXYCODONE HYDROCHLORIDE 5 MG/1
10 TABLET ORAL EVERY 4 HOURS PRN
Status: DISCONTINUED | OUTPATIENT
Start: 2023-12-08 | End: 2023-12-08 | Stop reason: HOSPADM

## 2023-12-08 RX ORDER — SUCCINYLCHOLINE CHLORIDE 20 MG/ML
INJECTION INTRAMUSCULAR; INTRAVENOUS AS NEEDED
Status: DISCONTINUED | OUTPATIENT
Start: 2023-12-08 | End: 2023-12-08

## 2023-12-08 RX ORDER — ALBUMIN HUMAN 50 G/1000ML
SOLUTION INTRAVENOUS AS NEEDED
Status: DISCONTINUED | OUTPATIENT
Start: 2023-12-08 | End: 2023-12-08

## 2023-12-08 RX ORDER — AMPICILLIN AND SULBACTAM 2; 1 G/1; G/1
INJECTION, POWDER, FOR SOLUTION INTRAMUSCULAR; INTRAVENOUS AS NEEDED
Status: DISCONTINUED | OUTPATIENT
Start: 2023-12-08 | End: 2023-12-08

## 2023-12-08 RX ORDER — PHENYLEPHRINE HCL IN 0.9% NACL 0.4MG/10ML
SYRINGE (ML) INTRAVENOUS AS NEEDED
Status: DISCONTINUED | OUTPATIENT
Start: 2023-12-08 | End: 2023-12-08

## 2023-12-08 RX ORDER — ONDANSETRON HYDROCHLORIDE 2 MG/ML
INJECTION, SOLUTION INTRAVENOUS AS NEEDED
Status: DISCONTINUED | OUTPATIENT
Start: 2023-12-08 | End: 2023-12-08

## 2023-12-08 RX ORDER — ACETAMINOPHEN 160 MG/5ML
1000 SOLUTION ORAL EVERY 8 HOURS SCHEDULED
Status: DISCONTINUED | OUTPATIENT
Start: 2023-12-08 | End: 2023-12-14 | Stop reason: HOSPADM

## 2023-12-08 RX ORDER — ENOXAPARIN SODIUM 100 MG/ML
40 INJECTION SUBCUTANEOUS EVERY 24 HOURS
Status: DISCONTINUED | OUTPATIENT
Start: 2023-12-08 | End: 2023-12-14 | Stop reason: HOSPADM

## 2023-12-08 RX ORDER — NALOXONE HYDROCHLORIDE 0.4 MG/ML
0.2 INJECTION, SOLUTION INTRAMUSCULAR; INTRAVENOUS; SUBCUTANEOUS EVERY 5 MIN PRN
Status: DISCONTINUED | OUTPATIENT
Start: 2023-12-08 | End: 2023-12-14 | Stop reason: HOSPADM

## 2023-12-08 RX ORDER — PROPOFOL 10 MG/ML
INJECTION, EMULSION INTRAVENOUS AS NEEDED
Status: DISCONTINUED | OUTPATIENT
Start: 2023-12-08 | End: 2023-12-08

## 2023-12-08 RX ORDER — ASPIRIN 325 MG
325 TABLET ORAL DAILY
Status: DISCONTINUED | OUTPATIENT
Start: 2023-12-09 | End: 2023-12-14 | Stop reason: HOSPADM

## 2023-12-08 RX ORDER — HEPARIN SODIUM (PORCINE) LOCK FLUSH IV SOLN 100 UNIT/ML 100 UNIT/ML
SOLUTION INTRAVENOUS AS NEEDED
Status: DISCONTINUED | OUTPATIENT
Start: 2023-12-08 | End: 2023-12-08 | Stop reason: HOSPADM

## 2023-12-08 RX ORDER — BACITRACIN 500 [USP'U]/G
OINTMENT TOPICAL AS NEEDED
Status: DISCONTINUED | OUTPATIENT
Start: 2023-12-08 | End: 2023-12-08 | Stop reason: HOSPADM

## 2023-12-08 RX ORDER — BACITRACIN ZINC 500 UNIT/G
OINTMENT IN PACKET (EA) TOPICAL 3 TIMES DAILY
Status: DISPENSED | OUTPATIENT
Start: 2023-12-08 | End: 2023-12-10

## 2023-12-08 RX ORDER — SODIUM CHLORIDE 0.9 G/100ML
IRRIGANT IRRIGATION AS NEEDED
Status: DISCONTINUED | OUTPATIENT
Start: 2023-12-08 | End: 2023-12-08 | Stop reason: HOSPADM

## 2023-12-08 RX ORDER — ACETAMINOPHEN 325 MG/1
650 TABLET ORAL EVERY 4 HOURS PRN
Status: DISCONTINUED | OUTPATIENT
Start: 2023-12-08 | End: 2023-12-08 | Stop reason: HOSPADM

## 2023-12-08 RX ORDER — OXYCODONE HCL 5 MG/5 ML
5 SOLUTION, ORAL ORAL EVERY 6 HOURS PRN
Status: DISCONTINUED | OUTPATIENT
Start: 2023-12-08 | End: 2023-12-10

## 2023-12-08 RX ORDER — PHENYLEPHRINE 10 MG/250 ML(40 MCG/ML)IN 0.9 % SOD.CHLORIDE INTRAVENOUS
CONTINUOUS PRN
Status: DISCONTINUED | OUTPATIENT
Start: 2023-12-08 | End: 2023-12-08

## 2023-12-08 RX ORDER — PAPAVERINE HYDROCHLORIDE 30 MG/ML
INJECTION INTRAMUSCULAR; INTRAVENOUS AS NEEDED
Status: DISCONTINUED | OUTPATIENT
Start: 2023-12-08 | End: 2023-12-08 | Stop reason: HOSPADM

## 2023-12-08 RX ORDER — ROCURONIUM BROMIDE 10 MG/ML
INJECTION, SOLUTION INTRAVENOUS AS NEEDED
Status: DISCONTINUED | OUTPATIENT
Start: 2023-12-08 | End: 2023-12-08

## 2023-12-08 RX ORDER — ONDANSETRON HYDROCHLORIDE 2 MG/ML
4 INJECTION, SOLUTION INTRAVENOUS ONCE AS NEEDED
Status: DISCONTINUED | OUTPATIENT
Start: 2023-12-08 | End: 2023-12-08 | Stop reason: HOSPADM

## 2023-12-08 RX ORDER — LIDOCAINE HYDROCHLORIDE AND EPINEPHRINE 10; 10 MG/ML; UG/ML
INJECTION, SOLUTION INFILTRATION; PERINEURAL AS NEEDED
Status: DISCONTINUED | OUTPATIENT
Start: 2023-12-08 | End: 2023-12-08 | Stop reason: HOSPADM

## 2023-12-08 RX ORDER — OXYMETAZOLINE HCL 0.05 %
SPRAY, NON-AEROSOL (ML) NASAL AS NEEDED
Status: DISCONTINUED | OUTPATIENT
Start: 2023-12-08 | End: 2023-12-08 | Stop reason: HOSPADM

## 2023-12-08 RX ORDER — OXYCODONE HYDROCHLORIDE 5 MG/1
5 TABLET ORAL EVERY 4 HOURS PRN
Status: DISCONTINUED | OUTPATIENT
Start: 2023-12-08 | End: 2023-12-08 | Stop reason: HOSPADM

## 2023-12-08 RX ORDER — ONDANSETRON HYDROCHLORIDE 2 MG/ML
4 INJECTION, SOLUTION INTRAVENOUS EVERY 8 HOURS PRN
Status: DISCONTINUED | OUTPATIENT
Start: 2023-12-08 | End: 2023-12-14 | Stop reason: HOSPADM

## 2023-12-08 RX ORDER — SODIUM CHLORIDE, SODIUM LACTATE, POTASSIUM CHLORIDE, CALCIUM CHLORIDE 600; 310; 30; 20 MG/100ML; MG/100ML; MG/100ML; MG/100ML
100 INJECTION, SOLUTION INTRAVENOUS CONTINUOUS
Status: DISCONTINUED | OUTPATIENT
Start: 2023-12-08 | End: 2023-12-08 | Stop reason: HOSPADM

## 2023-12-08 RX ORDER — HYDROMORPHONE HYDROCHLORIDE 1 MG/ML
0.2 INJECTION, SOLUTION INTRAMUSCULAR; INTRAVENOUS; SUBCUTANEOUS EVERY 5 MIN PRN
Status: DISCONTINUED | OUTPATIENT
Start: 2023-12-08 | End: 2023-12-08 | Stop reason: HOSPADM

## 2023-12-08 RX ORDER — ASPIRIN 300 MG/1
SUPPOSITORY RECTAL AS NEEDED
Status: DISCONTINUED | OUTPATIENT
Start: 2023-12-08 | End: 2023-12-08 | Stop reason: HOSPADM

## 2023-12-08 RX ORDER — HYDROMORPHONE HYDROCHLORIDE 1 MG/ML
INJECTION, SOLUTION INTRAMUSCULAR; INTRAVENOUS; SUBCUTANEOUS AS NEEDED
Status: DISCONTINUED | OUTPATIENT
Start: 2023-12-08 | End: 2023-12-08

## 2023-12-08 RX ORDER — FAMOTIDINE 20 MG/1
20 TABLET, FILM COATED ORAL DAILY
Status: DISCONTINUED | OUTPATIENT
Start: 2023-12-09 | End: 2023-12-14 | Stop reason: HOSPADM

## 2023-12-08 RX ORDER — HYDROMORPHONE HYDROCHLORIDE 1 MG/ML
0.5 INJECTION, SOLUTION INTRAMUSCULAR; INTRAVENOUS; SUBCUTANEOUS EVERY 5 MIN PRN
Status: DISCONTINUED | OUTPATIENT
Start: 2023-12-08 | End: 2023-12-08 | Stop reason: HOSPADM

## 2023-12-08 RX ORDER — MIDAZOLAM HYDROCHLORIDE 1 MG/ML
INJECTION INTRAMUSCULAR; INTRAVENOUS AS NEEDED
Status: DISCONTINUED | OUTPATIENT
Start: 2023-12-08 | End: 2023-12-08

## 2023-12-08 RX ORDER — FENTANYL CITRATE 50 UG/ML
INJECTION, SOLUTION INTRAMUSCULAR; INTRAVENOUS AS NEEDED
Status: DISCONTINUED | OUTPATIENT
Start: 2023-12-08 | End: 2023-12-08

## 2023-12-08 RX ORDER — ONDANSETRON 4 MG/1
4 TABLET, ORALLY DISINTEGRATING ORAL EVERY 8 HOURS PRN
Status: DISCONTINUED | OUTPATIENT
Start: 2023-12-08 | End: 2023-12-14 | Stop reason: HOSPADM

## 2023-12-08 RX ADMIN — REMIFENTANIL HYDROCHLORIDE 20 MCG: 1 INJECTION, POWDER, LYOPHILIZED, FOR SOLUTION INTRAVENOUS at 17:21

## 2023-12-08 RX ADMIN — ROCURONIUM BROMIDE 10 MG: 10 INJECTION INTRAVENOUS at 16:55

## 2023-12-08 RX ADMIN — HYDROMORPHONE HYDROCHLORIDE 0.2 MG: 1 INJECTION, SOLUTION INTRAMUSCULAR; INTRAVENOUS; SUBCUTANEOUS at 18:40

## 2023-12-08 RX ADMIN — ROCURONIUM BROMIDE 10 MG: 10 INJECTION INTRAVENOUS at 14:53

## 2023-12-08 RX ADMIN — REMIFENTANIL HYDROCHLORIDE 10 MCG: 1 INJECTION, POWDER, LYOPHILIZED, FOR SOLUTION INTRAVENOUS at 15:11

## 2023-12-08 RX ADMIN — REMIFENTANIL HYDROCHLORIDE 20 MCG: 1 INJECTION, POWDER, LYOPHILIZED, FOR SOLUTION INTRAVENOUS at 15:31

## 2023-12-08 RX ADMIN — REMIFENTANIL HYDROCHLORIDE 10 MCG: 1 INJECTION, POWDER, LYOPHILIZED, FOR SOLUTION INTRAVENOUS at 13:15

## 2023-12-08 RX ADMIN — SUCCINYLCHOLINE CHLORIDE 120 MG: 20 INJECTION, SOLUTION INTRAMUSCULAR; INTRAVENOUS at 07:33

## 2023-12-08 RX ADMIN — HYDROMORPHONE HYDROCHLORIDE 0.5 MG: 1 INJECTION, SOLUTION INTRAMUSCULAR; INTRAVENOUS; SUBCUTANEOUS at 19:33

## 2023-12-08 RX ADMIN — SODIUM CHLORIDE, SODIUM LACTATE, POTASSIUM CHLORIDE, CALCIUM CHLORIDE: 600; 310; 30; 20 INJECTION, SOLUTION INTRAVENOUS at 07:05

## 2023-12-08 RX ADMIN — ROCURONIUM BROMIDE 10 MG: 10 INJECTION INTRAVENOUS at 09:01

## 2023-12-08 RX ADMIN — REMIFENTANIL HYDROCHLORIDE 0.05 MCG/KG/MIN: 1 INJECTION, POWDER, LYOPHILIZED, FOR SOLUTION INTRAVENOUS at 12:17

## 2023-12-08 RX ADMIN — HYDROMORPHONE HYDROCHLORIDE 0.5 MG: 1 INJECTION, SOLUTION INTRAMUSCULAR; INTRAVENOUS; SUBCUTANEOUS at 20:43

## 2023-12-08 RX ADMIN — HYDROMORPHONE HYDROCHLORIDE 0.2 MG: 1 INJECTION, SOLUTION INTRAMUSCULAR; INTRAVENOUS; SUBCUTANEOUS at 20:56

## 2023-12-08 RX ADMIN — ROCURONIUM BROMIDE 10 MG: 10 INJECTION INTRAVENOUS at 13:15

## 2023-12-08 RX ADMIN — Medication 80 MCG: at 10:14

## 2023-12-08 RX ADMIN — AMPICILLIN SODIUM AND SULBACTAM SODIUM 3 G: 2; 1 INJECTION, POWDER, FOR SOLUTION INTRAMUSCULAR; INTRAVENOUS at 08:10

## 2023-12-08 RX ADMIN — REMIFENTANIL HYDROCHLORIDE 10 MCG: 1 INJECTION, POWDER, LYOPHILIZED, FOR SOLUTION INTRAVENOUS at 14:49

## 2023-12-08 RX ADMIN — SUGAMMADEX 200 MG: 100 INJECTION, SOLUTION INTRAVENOUS at 18:52

## 2023-12-08 RX ADMIN — MIDAZOLAM HYDROCHLORIDE 2 MG: 1 INJECTION, SOLUTION INTRAMUSCULAR; INTRAVENOUS at 07:20

## 2023-12-08 RX ADMIN — SODIUM CHLORIDE, POTASSIUM CHLORIDE, SODIUM LACTATE AND CALCIUM CHLORIDE: 600; 310; 30; 20 INJECTION, SOLUTION INTRAVENOUS at 07:50

## 2023-12-08 RX ADMIN — HYDROMORPHONE HYDROCHLORIDE 0.5 MG: 1 INJECTION, SOLUTION INTRAMUSCULAR; INTRAVENOUS; SUBCUTANEOUS at 19:15

## 2023-12-08 RX ADMIN — ROCURONIUM BROMIDE 20 MG: 10 INJECTION INTRAVENOUS at 16:08

## 2023-12-08 RX ADMIN — ROCURONIUM BROMIDE 30 MG: 10 INJECTION INTRAVENOUS at 08:14

## 2023-12-08 RX ADMIN — REMIFENTANIL HYDROCHLORIDE 10 MCG: 1 INJECTION, POWDER, LYOPHILIZED, FOR SOLUTION INTRAVENOUS at 14:28

## 2023-12-08 RX ADMIN — Medication 120 MCG: at 09:58

## 2023-12-08 RX ADMIN — DEXAMETHASONE SODIUM PHOSPHATE 8 MG: 10 INJECTION INTRAMUSCULAR; INTRAVENOUS at 08:24

## 2023-12-08 RX ADMIN — ROCURONIUM BROMIDE 40 MG: 10 INJECTION INTRAVENOUS at 07:35

## 2023-12-08 RX ADMIN — PROPOFOL 160 MG: 10 INJECTION, EMULSION INTRAVENOUS at 07:32

## 2023-12-08 RX ADMIN — Medication: at 23:25

## 2023-12-08 RX ADMIN — ROCURONIUM BROMIDE 20 MG: 10 INJECTION INTRAVENOUS at 17:22

## 2023-12-08 RX ADMIN — Medication 0.3 MCG/KG/MIN: at 09:35

## 2023-12-08 RX ADMIN — Medication 80 MCG: at 10:58

## 2023-12-08 RX ADMIN — REMIFENTANIL HYDROCHLORIDE 40 MCG: 1 INJECTION, POWDER, LYOPHILIZED, FOR SOLUTION INTRAVENOUS at 15:32

## 2023-12-08 RX ADMIN — REMIFENTANIL HYDROCHLORIDE 20 MCG: 1 INJECTION, POWDER, LYOPHILIZED, FOR SOLUTION INTRAVENOUS at 12:22

## 2023-12-08 RX ADMIN — ALBUMIN HUMAN 250 ML: 0.05 INJECTION, SOLUTION INTRAVENOUS at 10:21

## 2023-12-08 RX ADMIN — HYDROMORPHONE HYDROCHLORIDE 0.2 MG: 1 INJECTION, SOLUTION INTRAMUSCULAR; INTRAVENOUS; SUBCUTANEOUS at 19:19

## 2023-12-08 RX ADMIN — GLYCOPYRROLATE 0.2 MG: 0.2 INJECTION, SOLUTION INTRAMUSCULAR; INTRAVENOUS at 12:43

## 2023-12-08 RX ADMIN — Medication 80 MCG: at 07:33

## 2023-12-08 RX ADMIN — Medication 80 MCG: at 11:54

## 2023-12-08 RX ADMIN — HYDROMORPHONE HYDROCHLORIDE 0.6 MG: 1 INJECTION, SOLUTION INTRAMUSCULAR; INTRAVENOUS; SUBCUTANEOUS at 18:17

## 2023-12-08 RX ADMIN — ALBUMIN HUMAN 250 ML: 0.05 INJECTION, SOLUTION INTRAVENOUS at 13:26

## 2023-12-08 RX ADMIN — FENTANYL CITRATE 50 MCG: 50 INJECTION, SOLUTION INTRAMUSCULAR; INTRAVENOUS at 08:51

## 2023-12-08 RX ADMIN — ROCURONIUM BROMIDE 10 MG: 10 INJECTION INTRAVENOUS at 10:27

## 2023-12-08 RX ADMIN — ROCURONIUM BROMIDE 20 MG: 10 INJECTION INTRAVENOUS at 11:24

## 2023-12-08 RX ADMIN — REMIFENTANIL HYDROCHLORIDE 20 MCG: 1 INJECTION, POWDER, LYOPHILIZED, FOR SOLUTION INTRAVENOUS at 15:36

## 2023-12-08 RX ADMIN — Medication 80 MCG: at 12:28

## 2023-12-08 RX ADMIN — REMIFENTANIL HYDROCHLORIDE 20 MCG: 1 INJECTION, POWDER, LYOPHILIZED, FOR SOLUTION INTRAVENOUS at 15:28

## 2023-12-08 RX ADMIN — HYDROMORPHONE HYDROCHLORIDE 0.2 MG: 1 INJECTION, SOLUTION INTRAMUSCULAR; INTRAVENOUS; SUBCUTANEOUS at 21:01

## 2023-12-08 RX ADMIN — LIDOCAINE HYDROCHLORIDE 80 MG: 20 INJECTION INTRAVENOUS at 07:32

## 2023-12-08 RX ADMIN — REMIFENTANIL HYDROCHLORIDE 20 MCG: 1 INJECTION, POWDER, LYOPHILIZED, FOR SOLUTION INTRAVENOUS at 12:58

## 2023-12-08 RX ADMIN — Medication 80 MCG: at 10:50

## 2023-12-08 RX ADMIN — HYDROMORPHONE HYDROCHLORIDE 0.5 MG: 1 INJECTION, SOLUTION INTRAMUSCULAR; INTRAVENOUS; SUBCUTANEOUS at 20:02

## 2023-12-08 RX ADMIN — REMIFENTANIL HYDROCHLORIDE 10 MCG: 1 INJECTION, POWDER, LYOPHILIZED, FOR SOLUTION INTRAVENOUS at 13:16

## 2023-12-08 RX ADMIN — Medication 80 MCG: at 09:11

## 2023-12-08 RX ADMIN — HYDROMORPHONE HYDROCHLORIDE 0.5 MG: 1 INJECTION, SOLUTION INTRAMUSCULAR; INTRAVENOUS; SUBCUTANEOUS at 19:49

## 2023-12-08 RX ADMIN — Medication 80 MCG: at 09:08

## 2023-12-08 RX ADMIN — SODIUM CHLORIDE, POTASSIUM CHLORIDE, SODIUM LACTATE AND CALCIUM CHLORIDE 80 ML/HR: 600; 310; 30; 20 INJECTION, SOLUTION INTRAVENOUS at 23:27

## 2023-12-08 RX ADMIN — FENTANYL CITRATE 50 MCG: 50 INJECTION, SOLUTION INTRAMUSCULAR; INTRAVENOUS at 08:29

## 2023-12-08 RX ADMIN — HYDROMORPHONE HYDROCHLORIDE 0.2 MG: 1 INJECTION, SOLUTION INTRAMUSCULAR; INTRAVENOUS; SUBCUTANEOUS at 20:51

## 2023-12-08 RX ADMIN — Medication 80 MCG: at 08:40

## 2023-12-08 RX ADMIN — REMIFENTANIL HYDROCHLORIDE 10 MCG: 1 INJECTION, POWDER, LYOPHILIZED, FOR SOLUTION INTRAVENOUS at 15:23

## 2023-12-08 RX ADMIN — ONDANSETRON 4 MG: 2 INJECTION, SOLUTION INTRAMUSCULAR; INTRAVENOUS at 18:28

## 2023-12-08 RX ADMIN — Medication 80 MCG: at 08:20

## 2023-12-08 RX ADMIN — Medication 80 MCG: at 12:55

## 2023-12-08 RX ADMIN — SODIUM CHLORIDE, SODIUM LACTATE, POTASSIUM CHLORIDE, CALCIUM CHLORIDE: 600; 310; 30; 20 INJECTION, SOLUTION INTRAVENOUS at 14:49

## 2023-12-08 RX ADMIN — Medication 80 MCG: at 10:54

## 2023-12-08 RX ADMIN — REMIFENTANIL HYDROCHLORIDE 40 MCG: 1 INJECTION, POWDER, LYOPHILIZED, FOR SOLUTION INTRAVENOUS at 15:37

## 2023-12-08 RX ADMIN — Medication 80 MCG: at 12:33

## 2023-12-08 RX ADMIN — AMPICILLIN SODIUM AND SULBACTAM SODIUM 3 G: 2; 1 INJECTION, POWDER, FOR SOLUTION INTRAMUSCULAR; INTRAVENOUS at 16:10

## 2023-12-08 RX ADMIN — ROCURONIUM BROMIDE 10 MG: 10 INJECTION INTRAVENOUS at 09:20

## 2023-12-08 RX ADMIN — PROPOFOL 40 MG: 10 INJECTION, EMULSION INTRAVENOUS at 08:13

## 2023-12-08 RX ADMIN — HYDROMORPHONE HYDROCHLORIDE 0.5 MG: 1 INJECTION, SOLUTION INTRAMUSCULAR; INTRAVENOUS; SUBCUTANEOUS at 20:12

## 2023-12-08 RX ADMIN — AMPICILLIN SODIUM AND SULBACTAM SODIUM 3 G: 2; 1 INJECTION, POWDER, FOR SOLUTION INTRAMUSCULAR; INTRAVENOUS at 12:10

## 2023-12-08 RX ADMIN — Medication 80 MCG: at 10:47

## 2023-12-08 SDOH — HEALTH STABILITY: MENTAL HEALTH: CURRENT SMOKER: 0

## 2023-12-08 ASSESSMENT — PAIN SCALES - GENERAL
PAINLEVEL_OUTOF10: 5 - MODERATE PAIN
PAIN_LEVEL: 2
PAINLEVEL_OUTOF10: 10 - WORST POSSIBLE PAIN
PAINLEVEL_OUTOF10: 3
PAINLEVEL_OUTOF10: 7
PAINLEVEL_OUTOF10: 10 - WORST POSSIBLE PAIN
PAINLEVEL_OUTOF10: 5 - MODERATE PAIN
PAINLEVEL_OUTOF10: 5 - MODERATE PAIN
PAINLEVEL_OUTOF10: 7

## 2023-12-08 NOTE — OP NOTE
Osteocutaneous radial forearm free flap, autogenous rib graft, nasal reconstruction with skin graft and auricular cartilage grafts; TOTAL CASE LENGTH= 8 hours, Excision Full Thickness Skin Graft Head/Neck, Rhinoplasty, Reconstruction Maxilla Operative Note     Date: 2023  OR Location: [facility] OR    Name: Adarsh De La Vega, : 1970, Age: 53 y.o., MRN: [id number], Sex: male    Diagnosis  Pre-op Diagnosis     * Squamous cell cancer of skin of nose [C44.321]     * Acquired deformity of nose [M95.0]     * Nasal lesion [J34.89] Post-op Diagnosis     * Squamous cell cancer of skin of nose [C44.321]     * Acquired deformity of nose [M95.0]     * Nasal lesion [J34.89]     Procedures  Prophylactic plating of the left radial shaft      Surgeons   Yoni marquez    Resident/Fellow/Other Assistant:  Surgeon(s) and Role:     * Dmitri Marquez MD - Assisting     * Randell Luis MD - Assisting     * Enid Rivera MD - Resident - Assisting     * Adarsh Harrison MD - Resident - Assisting     * Lorena Ward MD - Resident - Assisting     * Collin Enriquez MD    Procedure Summary  Anesthesia: * No anesthesia type entered *  ASA: III  Anesthesia Staff: Anesthesiologist: Emeyr Tejeda DO  CRNA: JENNI Stewart-CRNA  C-AA: BARBI Calixto; BARBI Kurtz  Estimated Blood Loss: 5 ccmL  Intra-op Medications:   Medication Name Total Dose   sodium chloride 0.9 % irrigation solution 2,000 mL   oxymetazoline (Afrin) 0.05 % nasal spray 3 spray              Anesthesia Record               Intraprocedure I/O Totals          Intake    Phenylephrine Drip 0.00 mL    The total shown is the total volume documented since Anesthesia Start was filed.    albumin human 5 % 250.00 mL    Total Intake 250 mL       Output    Urine 195 mL    Total Output 195 mL       Net    Net Volume 55 mL          Specimen: No specimens collected     Staff:   Circulator: Rohan Angel RN; Terri  RAY Pisano  Relief Scrub: Carson Pedraza RN  Scrub Person: Silvia Mccord RN; Esther Franco         Drains and/or Catheters:   Urethral Catheter Double-lumen 16 Fr. (Active)       Tourniquet Times:         Implants:  Implants       Type Name Action Serial No.      Screw PLATE, RADIUS DIST 9H LT VOLAR LCP LUCIO META - [id number] Implanted      Screw SCREW, CORTICAL, SELF-TAPPING, 3.5 X 18 MM, STAINLESS STEEL - [id number] Implanted      Screw SCREW, CORTICAL, SELF-TAPPING, 3.5 X 20 MM, STAINLESS STEEL - [id number] Implanted      Screw SCREW, LOCKING SELF TAP W/RECESS 2.4MM X 20MM, SS - [id number] Implanted      Screw SCREW, LOCKING SELF TAP W/RECESS 2.4MM X 20MM, SS - [id number] Implanted                   Indications: Adarsh De La Vega is an 53 y.o. 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  Patient is present for a ENT nasal reconstruction.  I was consulted because they took osteocutaneous from the radius.  I was asked to prophylactically plate this so that it did not fracture.    The patient was seen in the preoperative area. The risks, benefits, complications, treatment options, non-operative alternatives, expected recovery and outcomes were discussed with the patient. The possibilities of reaction to medication, pulmonary aspiration, injury to surrounding structures, bleeding, recurrent infection, the need for additional procedures, failure to diagnose a condition, and creating a complication requiring transfusion or operation were discussed with the patient. The patient concurred with the proposed plan, giving informed consent.  The site of surgery was properly noted/marked if necessary per policy. The patient has been actively warmed in preoperative area. Preoperative antibiotics have been ordered and given within 1 hours of incision. Venous thrombosis prophylaxis have been ordered including bilateral  sequential compression devices    Procedure Details:  the patient was prepped and draped in the usual sterile fashion and the ENT service did the exposure and entire os cutaneous flap dissection.  Once they were done then I was called into the room.  The radius proximally up to the pronator teres was completely exposed already.  I used a 9 hole Synthes diameter plate and placed this on the bone.  I released all the centimeter of the pronator teres so that I can get 3 screws above the osteocutaneous flap.  I pinned the plate onto the bone placed 4 locking screws distally and 3 cortical screws proximally.  This completed the prophylactic fixation of the radial shaft.    Postoperative plan patient be weight-bear as tolerated of the left upper extremity.  The ENT service will follow the patient postoperatively.    Attending Attestation: I was present for the entire procedure.

## 2023-12-08 NOTE — H&P (VIEW-ONLY)
HPI:  Adarsh De La Vega is a 53 year old  male following up with me today for his sinonasal SCCa s/p bilateral nasal endoscopy, total rhinectomy on 5/5/23. He completed adjuvant chemoradiation 8/28/23. Last seen 9/23.  As of 10/31/23 he is also s/p 1st stage of subtotal nasal reconstruction, Elevation of forehead flap, prelamination of flap with full thickness skin graft from right arm with Dr. Mcclain. His most recent CT of neck  from 10/2023 was negative, and CT of chest showed a stable lung nodule. He has not had to use his PEG tube in several weeks, and is gaining back some weight. Here today for requested PEG tube removal. He was able to eat Thanksgiving dinner. Did try some wine which burned his mouth significantly. He is scheduled for an osteocutaneous radial forearm free flap with Dr. Mcclain on 12/8/23.      History:  Dx: Squamous cell carcinoma sinonasal [id number]  ~ 6 months ago dog hit nose  1/23: Seen by an ENT who told him he had a nasal infection. developed a lump on the outside of his nose a few days following that appointment  2/21/23: Biopsy of nasal lesion. Path + for squamous cell carcinoma  2/27/23: seen by infectious disease and was in the hospital on IV antibiotics for about 1 week and discharged with a PICC line   3/17/23: CT neck and Chest- slightly enhancing nasal mass with involvement of the anterior nose & nasal septum. No pathologic lymphadenopathy. CT chest with 6mm pleural based nodule LLL.   3/20/23: First seen by me  5/5/23:S/p bilateral nasal endoscopy, total rhinectomy. Path +   5/17/23: Nasal trumpets removed  7/10/23: Started adjuvant chemoXRT  7/31/23: Admitted for dehydration/odynophagia and PEG tube placed  8/14/23: In ED, waiting to be admitted for IV hydration  8/28/23: Completed adjuvant chemoXRT   10/23: CT neck/chest complete response, stable lung nodule  10/31/23: 1st stage prelamination of paramedian forehead flap  12/8/23: Scheduled for osteocutaneous RFFF     SH:  Tob:  Current 1/2 ppd smoker. Former 1ppd since a teen  ETOH: Social  Here with wife     ROS:  Review of Systems   Constitutional:  Negative for appetite change, chills, fatigue, fever and unexpected weight change.   HENT:  Negative for dental problem, drooling, ear pain, facial swelling, hearing loss, mouth sores, sore throat, tinnitus, trouble swallowing and voice change.    Respiratory:  Negative for cough, shortness of breath and stridor.    Gastrointestinal:  Negative for nausea and vomiting.   Musculoskeletal:  Negative for neck pain.   Hematological:  Negative for adenopathy.   All other systems reviewed and are negative.        PE:  ENT Physical Exam  Constitutional  Appearance: patient appears well-developed, patient is cooperative;  Communication/Voice: communication appropriate for developmental age;  Head and Face  Appearance: head appears normal; facial scars present;  Ear  Auricles: right auricle normal; left auricle normal;  Ear Canals: right ear canal normal; left ear canal normal;  Tympanic Membranes: right tympanic membrane normal; left tympanic membrane normal;  Nose  External Nose: nares patent bilaterally; nasal deformity visible;  Nose comments: Missing anterior nasal skin with direct communication to the anterior nose, mild crusting, total anterior rhinectomy, total septectomy, nasal skin edges are well healed, no recurrent masses, no bleeding  Oral Cavity/Oropharynx  Lips: normal;  Gums: gingiva normal;  Tongue: normal;  Oral mucosa: normal;  Neck  Neck: neck normal;  Respiratory  Inspection: breathing unlabored;  Cardiovascular  Inspection: extremities are warm and well perfused;  Lymphatic  Palpation: no cervical adenopathy noted;  Neurovestibular  Mental Status: alert and oriented;  Psychiatric: mood normal; affect is appropriate;   Abd: PEG in place - removed +granulation tissue     Procedures   Procedure: Chemical cauterization of granulation tissue  Preop dx: Excessive granulation  tissue  Postop dx: Excessive granulation tissue  Indications: Same  Anesthesia: None  Procedure: After discussion of the risks, benefits and alternatives the patient elected to proceed. PEG tube had been removed and he was noted to have excess granulation tissue and mild bleeding. Silver nitrate sticks were applied to the excess granulation tissue which provided hemostasis and treated with granulation tissue. The patient tolerated this well. The procedure was complete. he was monitored and was without complication.     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 Mcclain as scheduled 12/8/23           Oropharyngeal dysphagia     Current Assessment & Plan       Significantly improved  Not using PEG  Weight stable  PEG removed           Adarsh De La Vega is a 53 year old  male following up with me today for his sinonasal SCCa s/p bilateral nasal endoscopy, total rhinectomy on 5/5/23. He completed adjuvant chemoradiation 8/28/23. Last seen 9/23.  As of 10/31/23 he is also s/p 1st stage of subtotal nasal reconstruction, Elevation of forehead flap, prelamination of flap with full thickness skin graft from right arm with Dr. Mcclain. He presents today for second stage reconstruction.

## 2023-12-08 NOTE — ANESTHESIA PREPROCEDURE EVALUATION
Patient: Adarsh De La Vega    Procedure Information       Anesthesia Start Date/Time: 12/08/23 0723    Procedures:       Osteocutaneous radial forearm free flap, autogenous rib graft, nasal reconstruction with skin graft and auricular cartilage grafts; TOTAL CASE LENGTH= 8 hours - **PLEASE SEND PATIENT PHOTOS TO INSURANCE!!**      Excision Full Thickness Skin Graft Head/Neck      Rhinoplasty      Reconstruction Maxilla    Location: [facility] OR 04 / Virtual [facility] OR    Surgeons: Sami Mcclain MD            Relevant Problems   Anesthesia (within normal limits)      Cardiovascular (within normal limits)      Endocrine (within normal limits)      GI   (+) Gastroesophageal reflux disease      /Renal (within normal limits)      Neuro/Psych (within normal limits)      Pulmonary (within normal limits)      GI/Hepatic (within normal limits)      Hematology (within normal limits)      Musculoskeletal (within normal limits)      Eyes, Ears, Nose, and Throat (within normal limits)      Infectious Disease (within normal limits)     Patient Active Problem List   Diagnosis    Granulation tissue of site of gastrostomy    Hypernatremia    Nasal lesion    Squamous cell carcinoma of nose    Nasal bleeding    Nasal defect    Facial asymmetry    Squamous cell skin cancer, nasal tip    Acquired deformity of nose    Gastroesophageal reflux disease    Squamous cell cancer of skin of nose    Oropharyngeal dysphagia      Vitals:    12/08/23 0621   BP: 130/83   Pulse: 68   Resp: 18   Temp: 36.3 °C (97.3 °F)   SpO2: 98%       Past Surgical History:   Procedure Laterality Date    AMPUTATION      APPENDECTOMY      CHOLECYSTECTOMY      SINUS SURGERY       Past Medical History:   Diagnosis Date    GERD (gastroesophageal reflux disease)     Head and neck cancer (CMS/HCC)      No current facility-administered medications for this encounter.    Facility-Administered Medications Ordered in Other Encounters:     ampicillin-sulbactam  (Unasyn) injection, , intravenous, PRN, Azam Erickson APRN-CRNA, 3 g at 23 0810    dexAMETHasone (Decadron) injection, , intravenous, PRN, Azamibrahima Erickson, APRN-CRNA, 8 mg at 23 0824    fentaNYL PF (Sublimaze) injection, , intravenous, PRN, Azam T [place], APRN-CRNA, 50 mcg at 23 0829    heparin flush 100 unit/mL syringe 100 Units, 100 Units, intra-catheter, PRN, Anushka Anthony, APRN-CNP    lactated Ringer's infusion, , intravenous, Continuous PRN, Azam Erickson, APRN-CRNA, New Bag at 23 0750    lactated Ringer's infusion, , intravenous, Continuous PRN, Azam Erickson, APRN-CRNA, New Bag at 23 0705    lidocaine (cardiac) (Xylocaine) injection, , intravenous, PRN, Azam Erickson, APRN-CRNA, 80 mg at 23 0732    midazolam (Versed) injection, , intravenous, PRN, Azam Erickson, APRN-CRNA, 2 mg at 23 0720    phenylephrine HCl in 0.9% NaCl syringe, , intravenous, PRN, Azam ROMERO [place], APRN-CRNA, 80 mcg at 23 0820    propofol (Diprivan) injection, , intravenous, PRN, Azam T [place], APRN-CRNA, 40 mg at 23 0813    rocuronium (ZeMuron) injection, , intravenous, PRN, Azam ROMERO [place], APRN-CRNA, 30 mg at 23 0814    sodium chloride 0.9% flush 10 mL, 10 mL, intravenous, q8h CESAR, Anushka Anthony APRN-CNP    succinylcholine (Anectine) injection, , intravenous, PRN, Azam T [place], APRN-CRNA, 120 mg at 23 0733  Prior to Admission medications    Medication Sig Start Date End Date Taking? Authorizing Provider   mupirocin (Bactroban) 2 % ointment Apply ointment liberally to surgical incision sites three times a day for 14 days 23   Zaheer Torres PA-C     No Known Allergies  Social History     Tobacco Use    Smoking status: Former     Packs/day: 1.50     Years: 30.00     Additional pack years: 0.00     Total pack years: 45.00     Types: Cigarettes     Quit date: 2023     Years since quittin.8    Smokeless tobacco: Never  "  Substance Use Topics    Alcohol use: Never         Chemistry    Lab Results   Component Value Date/Time     10/31/2023 1356    K 3.8 10/31/2023 1356     10/31/2023 1356    CO2 24 10/31/2023 1356    BUN 11 10/31/2023 1356    CREATININE 1.07 10/31/2023 1356    Lab Results   Component Value Date/Time    CALCIUM 9.5 10/31/2023 1356          Lab Results   Component Value Date/Time    WBC 7.8 05/06/2023 0605    HGB 13.8 05/06/2023 0605    HCT 42.1 05/06/2023 0605     05/06/2023 0605     No results found for: \"PROTIME\", \"PTT\", \"INR\"  No results found for this or any previous visit (from the past 4464 hour(s)).  No results found for this or any previous visit from the past 1095 days.     Clinical information reviewed:   Tobacco  Allergies  Meds   Med Hx  Surg Hx   Fam Hx  Soc Hx        NPO Detail:  No data recorded     Physical Exam    Airway  Mallampati: II  TM distance: >3 FB  Neck ROM: full     Cardiovascular   Rhythm: regular  Rate: normal     Dental    Pulmonary   Breath sounds clear to auscultation     Abdominal   Abdomen: soft             Anesthesia Plan    ASA 2     general     The patient is not a current smoker.  Patient did not smoke on day of procedure.    intravenous induction   Postoperative administration of opioids is intended.  Anesthetic plan and risks discussed with patient.  Use of blood products discussed with patient who consented to blood products.    Plan discussed with CRNA and attending.      "

## 2023-12-08 NOTE — ANESTHESIA PROCEDURE NOTES
Arterial Line:    Date/Time: 12/8/2023 7:44 AM    Staffing  Performed: CRNA   Authorized by: Emery Tejeda DO    Performed by: JENNI Stewart-CRNA    An arterial line was placed. in the OR for the following indication(s): continuous blood pressure monitoring and blood sampling needed.    A 20 gauge (size) (length), Angiocath and 1.88 IN (type) catheter was placed into the Right radial artery, secured by Tegaderm,   Seldinger technique not used.  Events:  patient tolerated procedure well with no complications.      Additional notes:  Placed without difficulty/atraumatic x 1 attempt.

## 2023-12-08 NOTE — H&P
HPI:  Adarsh De La Vega is a 53 year old  male following up with me today for his sinonasal SCCa s/p bilateral nasal endoscopy, total rhinectomy on 5/5/23. He completed adjuvant chemoradiation 8/28/23. Last seen 9/23.  As of 10/31/23 he is also s/p 1st stage of subtotal nasal reconstruction, Elevation of forehead flap, prelamination of flap with full thickness skin graft from right arm with Dr. Mcclain. His most recent CT of neck  from 10/2023 was negative, and CT of chest showed a stable lung nodule. He has not had to use his PEG tube in several weeks, and is gaining back some weight. Here today for requested PEG tube removal. He was able to eat Thanksgiving dinner. Did try some wine which burned his mouth significantly. He is scheduled for an osteocutaneous radial forearm free flap with Dr. Mcclain on 12/8/23.      History:  Dx: Squamous cell carcinoma sinonasal [id number]  ~ 6 months ago dog hit nose  1/23: Seen by an ENT who told him he had a nasal infection. developed a lump on the outside of his nose a few days following that appointment  2/21/23: Biopsy of nasal lesion. Path + for squamous cell carcinoma  2/27/23: seen by infectious disease and was in the hospital on IV antibiotics for about 1 week and discharged with a PICC line   3/17/23: CT neck and Chest- slightly enhancing nasal mass with involvement of the anterior nose & nasal septum. No pathologic lymphadenopathy. CT chest with 6mm pleural based nodule LLL.   3/20/23: First seen by me  5/5/23:S/p bilateral nasal endoscopy, total rhinectomy. Path +   5/17/23: Nasal trumpets removed  7/10/23: Started adjuvant chemoXRT  7/31/23: Admitted for dehydration/odynophagia and PEG tube placed  8/14/23: In ED, waiting to be admitted for IV hydration  8/28/23: Completed adjuvant chemoXRT   10/23: CT neck/chest complete response, stable lung nodule  10/31/23: 1st stage prelamination of paramedian forehead flap  12/8/23: Scheduled for osteocutaneous RFFF     SH:  Tob:  Current 1/2 ppd smoker. Former 1ppd since a teen  ETOH: Social  Here with wife     ROS:  Review of Systems   Constitutional:  Negative for appetite change, chills, fatigue, fever and unexpected weight change.   HENT:  Negative for dental problem, drooling, ear pain, facial swelling, hearing loss, mouth sores, sore throat, tinnitus, trouble swallowing and voice change.    Respiratory:  Negative for cough, shortness of breath and stridor.    Gastrointestinal:  Negative for nausea and vomiting.   Musculoskeletal:  Negative for neck pain.   Hematological:  Negative for adenopathy.   All other systems reviewed and are negative.        PE:  ENT Physical Exam  Constitutional  Appearance: patient appears well-developed, patient is cooperative;  Communication/Voice: communication appropriate for developmental age;  Head and Face  Appearance: head appears normal; facial scars present;  Ear  Auricles: right auricle normal; left auricle normal;  Ear Canals: right ear canal normal; left ear canal normal;  Tympanic Membranes: right tympanic membrane normal; left tympanic membrane normal;  Nose  External Nose: nares patent bilaterally; nasal deformity visible;  Nose comments: Missing anterior nasal skin with direct communication to the anterior nose, mild crusting, total anterior rhinectomy, total septectomy, nasal skin edges are well healed, no recurrent masses, no bleeding  Oral Cavity/Oropharynx  Lips: normal;  Gums: gingiva normal;  Tongue: normal;  Oral mucosa: normal;  Neck  Neck: neck normal;  Respiratory  Inspection: breathing unlabored;  Cardiovascular  Inspection: extremities are warm and well perfused;  Lymphatic  Palpation: no cervical adenopathy noted;  Neurovestibular  Mental Status: alert and oriented;  Psychiatric: mood normal; affect is appropriate;   Abd: PEG in place - removed +granulation tissue     Procedures   Procedure: Chemical cauterization of granulation tissue  Preop dx: Excessive granulation  tissue  Postop dx: Excessive granulation tissue  Indications: Same  Anesthesia: None  Procedure: After discussion of the risks, benefits and alternatives the patient elected to proceed. PEG tube had been removed and he was noted to have excess granulation tissue and mild bleeding. Silver nitrate sticks were applied to the excess granulation tissue which provided hemostasis and treated with granulation tissue. The patient tolerated this well. The procedure was complete. he was monitored and was without complication.     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 Mcclain as scheduled 12/8/23           Oropharyngeal dysphagia     Current Assessment & Plan       Significantly improved  Not using PEG  Weight stable  PEG removed           Adarsh De La Vega is a 53 year old  male following up with me today for his sinonasal SCCa s/p bilateral nasal endoscopy, total rhinectomy on 5/5/23. He completed adjuvant chemoradiation 8/28/23. Last seen 9/23.  As of 10/31/23 he is also s/p 1st stage of subtotal nasal reconstruction, Elevation of forehead flap, prelamination of flap with full thickness skin graft from right arm with Dr. cMclain. He presents today for second stage reconstruction.

## 2023-12-08 NOTE — ANESTHESIA PROCEDURE NOTES
Airway  Date/Time: 12/8/2023 7:34 AM  Urgency: elective    Airway not difficult    Staffing  Performed: CRNA   Authorized by: Emery Tejeda DO    Performed by: JENNI Stewart-GIULIANA  Patient location during procedure: OR    Indications and Patient Condition  Indications for airway management: anesthesia  Spontaneous Ventilation: absent  Sedation level: deep  Preoxygenated: yes  Patient position: sniffing  Mask difficulty assessment: 0 - not attempted  Planned trial extubation    Final Airway Details  Final airway type: endotracheal airway      Successful airway: ETT  Cuffed: yes   Successful intubation technique: direct laryngoscopy  Facilitating devices/methods: intubating stylet  Endotracheal tube insertion site: oral  Blade: Ryan  Blade size: #4  ETT size (mm): 7.5  Cormack-Lehane Classification: grade I - full view of glottis  Placement verified by: chest auscultation and capnometry   Measured from: lips  ETT to lips (cm): 23  Number of attempts at approach: 1  Number of other approaches attempted: 0    Additional Comments  ETT secured with suture by surgeon.  Lips and teeth in preanesthetic condition after intubation.

## 2023-12-08 NOTE — BRIEF OP NOTE
Date: 2023  OR Location: [facility] OR    Name: Adarsh De La Vega, : 1970, Age: 53 y.o., MRN: [id number], Sex: male    Diagnosis  Pre-op Diagnosis     * Squamous cell cancer of skin of nose [C44.321]     * Acquired deformity of nose [M95.0]     * Nasal lesion [J34.89] Post-op Diagnosis     * Squamous cell cancer of skin of nose [C44.321]     * Acquired deformity of nose [M95.0]     * Nasal lesion [J34.89]     Procedures  Prophylactic fixation of left distal radius    Surgeons   Dmitri Quintero MD    Resident/Fellow/Other Assistant:  Adarsh Harrison MD    Procedure Summary  Anesthesia: * No anesthesia type entered *  ASA: III  Anesthesia Staff: Anesthesiologist: Emery Tejeda DO  CRNA: JENNI Stewart-CRNA  C-AA: BARBI Calixto; BARBI Kurtz  Estimated Blood Loss: 5mL  Intra-op Medications:   Medication Name Total Dose   sodium chloride 0.9 % irrigation solution 2,000 mL   oxymetazoline (Afrin) 0.05 % nasal spray 3 spray              Anesthesia Record               Intraprocedure I/O Totals          Intake    Phenylephrine Drip 0.00 mL    The total shown is the total volume documented since Anesthesia Start was filed.    albumin human 5 % 250.00 mL    Total Intake 250 mL       Output    Urine 195 mL    Total Output 195 mL       Net    Net Volume 55 mL          Specimen: No specimens collected     Staff:   Circulator: Rohan Angel RN; Terri Pisano RN  Relief Scrub: Carson Pedraza RN  Scrub Person: Silvia Mccord RN; Esther Franco          Findings: See op report    Complications:  None; patient tolerated the procedure well.     Disposition:  Per primary team  Condition: stable  Specimens Collected: No specimens collected    Adarsh Harrison MD  Orthopedic Surgery PGY5

## 2023-12-09 LAB
ALBUMIN SERPL BCP-MCNC: 3.9 G/DL (ref 3.4–5)
ALBUMIN SERPL BCP-MCNC: 4.1 G/DL (ref 3.4–5)
ANION GAP SERPL CALC-SCNC: 11 MMOL/L (ref 10–20)
ANION GAP SERPL CALC-SCNC: 13 MMOL/L (ref 10–20)
BUN SERPL-MCNC: 15 MG/DL (ref 6–23)
BUN SERPL-MCNC: 16 MG/DL (ref 6–23)
CALCIUM SERPL-MCNC: 9.1 MG/DL (ref 8.6–10.6)
CALCIUM SERPL-MCNC: 9.1 MG/DL (ref 8.6–10.6)
CHLORIDE SERPL-SCNC: 105 MMOL/L (ref 98–107)
CHLORIDE SERPL-SCNC: 105 MMOL/L (ref 98–107)
CO2 SERPL-SCNC: 27 MMOL/L (ref 21–32)
CO2 SERPL-SCNC: 27 MMOL/L (ref 21–32)
CREAT SERPL-MCNC: 0.95 MG/DL (ref 0.5–1.3)
CREAT SERPL-MCNC: 0.97 MG/DL (ref 0.5–1.3)
ERYTHROCYTE [DISTWIDTH] IN BLOOD BY AUTOMATED COUNT: 12.4 % (ref 11.5–14.5)
ERYTHROCYTE [DISTWIDTH] IN BLOOD BY AUTOMATED COUNT: 12.5 % (ref 11.5–14.5)
GFR SERPL CREATININE-BSD FRML MDRD: >90 ML/MIN/1.73M*2
GFR SERPL CREATININE-BSD FRML MDRD: >90 ML/MIN/1.73M*2
GLUCOSE BLD MANUAL STRIP-MCNC: 102 MG/DL (ref 74–99)
GLUCOSE BLD MANUAL STRIP-MCNC: 96 MG/DL (ref 74–99)
GLUCOSE SERPL-MCNC: 131 MG/DL (ref 74–99)
GLUCOSE SERPL-MCNC: 141 MG/DL (ref 74–99)
HCT VFR BLD AUTO: 32.7 % (ref 41–52)
HCT VFR BLD AUTO: 33.5 % (ref 41–52)
HGB BLD-MCNC: 10.7 G/DL (ref 13.5–17.5)
HGB BLD-MCNC: 10.9 G/DL (ref 13.5–17.5)
MAGNESIUM SERPL-MCNC: 1.73 MG/DL (ref 1.6–2.4)
MAGNESIUM SERPL-MCNC: 1.76 MG/DL (ref 1.6–2.4)
MCH RBC QN AUTO: 30.7 PG (ref 26–34)
MCH RBC QN AUTO: 30.8 PG (ref 26–34)
MCHC RBC AUTO-ENTMCNC: 32.5 G/DL (ref 32–36)
MCHC RBC AUTO-ENTMCNC: 32.7 G/DL (ref 32–36)
MCV RBC AUTO: 94 FL (ref 80–100)
MCV RBC AUTO: 94 FL (ref 80–100)
NRBC BLD-RTO: 0 /100 WBCS (ref 0–0)
NRBC BLD-RTO: 0 /100 WBCS (ref 0–0)
PHOSPHATE SERPL-MCNC: 3.4 MG/DL (ref 2.5–4.9)
PHOSPHATE SERPL-MCNC: 3.9 MG/DL (ref 2.5–4.9)
PLATELET # BLD AUTO: 195 X10*3/UL (ref 150–450)
PLATELET # BLD AUTO: 205 X10*3/UL (ref 150–450)
POTASSIUM SERPL-SCNC: 3.8 MMOL/L (ref 3.5–5.3)
POTASSIUM SERPL-SCNC: 4 MMOL/L (ref 3.5–5.3)
RBC # BLD AUTO: 3.47 X10*6/UL (ref 4.5–5.9)
RBC # BLD AUTO: 3.55 X10*6/UL (ref 4.5–5.9)
SODIUM SERPL-SCNC: 139 MMOL/L (ref 136–145)
SODIUM SERPL-SCNC: 141 MMOL/L (ref 136–145)
WBC # BLD AUTO: 13.6 X10*3/UL (ref 4.4–11.3)
WBC # BLD AUTO: 13.6 X10*3/UL (ref 4.4–11.3)

## 2023-12-09 PROCEDURE — 96372 THER/PROPH/DIAG INJ SC/IM: CPT | Performed by: STUDENT IN AN ORGANIZED HEALTH CARE EDUCATION/TRAINING PROGRAM

## 2023-12-09 PROCEDURE — 82947 ASSAY GLUCOSE BLOOD QUANT: CPT

## 2023-12-09 PROCEDURE — 2500000004 HC RX 250 GENERAL PHARMACY W/ HCPCS (ALT 636 FOR OP/ED)

## 2023-12-09 PROCEDURE — 80069 RENAL FUNCTION PANEL: CPT | Performed by: STUDENT IN AN ORGANIZED HEALTH CARE EDUCATION/TRAINING PROGRAM

## 2023-12-09 PROCEDURE — 2500000004 HC RX 250 GENERAL PHARMACY W/ HCPCS (ALT 636 FOR OP/ED): Performed by: STUDENT IN AN ORGANIZED HEALTH CARE EDUCATION/TRAINING PROGRAM

## 2023-12-09 PROCEDURE — 85027 COMPLETE CBC AUTOMATED: CPT | Performed by: STUDENT IN AN ORGANIZED HEALTH CARE EDUCATION/TRAINING PROGRAM

## 2023-12-09 PROCEDURE — 97162 PT EVAL MOD COMPLEX 30 MIN: CPT | Mod: GP | Performed by: PHYSICAL MEDICINE & REHABILITATION

## 2023-12-09 PROCEDURE — 83735 ASSAY OF MAGNESIUM: CPT | Performed by: STUDENT IN AN ORGANIZED HEALTH CARE EDUCATION/TRAINING PROGRAM

## 2023-12-09 PROCEDURE — 2500000001 HC RX 250 WO HCPCS SELF ADMINISTERED DRUGS (ALT 637 FOR MEDICARE OP)

## 2023-12-09 PROCEDURE — 2500000001 HC RX 250 WO HCPCS SELF ADMINISTERED DRUGS (ALT 637 FOR MEDICARE OP): Performed by: STUDENT IN AN ORGANIZED HEALTH CARE EDUCATION/TRAINING PROGRAM

## 2023-12-09 PROCEDURE — 1170000001 HC PRIVATE ONCOLOGY ROOM DAILY

## 2023-12-09 PROCEDURE — 99024 POSTOP FOLLOW-UP VISIT: CPT | Performed by: OTOLARYNGOLOGY

## 2023-12-09 RX ORDER — ACETAMINOPHEN 500 MG
5 TABLET ORAL NIGHTLY PRN
Status: DISCONTINUED | OUTPATIENT
Start: 2023-12-09 | End: 2023-12-14 | Stop reason: HOSPADM

## 2023-12-09 RX ORDER — HYDROGEN PEROXIDE 3 %
SOLUTION, NON-ORAL MISCELLANEOUS 3 TIMES DAILY PRN
Status: DISCONTINUED | OUTPATIENT
Start: 2023-12-09 | End: 2023-12-14 | Stop reason: HOSPADM

## 2023-12-09 RX ORDER — POTASSIUM CHLORIDE 1.5 G/1.58G
20 POWDER, FOR SOLUTION ORAL ONCE
Status: COMPLETED | OUTPATIENT
Start: 2023-12-09 | End: 2023-12-09

## 2023-12-09 RX ORDER — MAGNESIUM SULFATE HEPTAHYDRATE 40 MG/ML
2 INJECTION, SOLUTION INTRAVENOUS ONCE
Status: COMPLETED | OUTPATIENT
Start: 2023-12-09 | End: 2023-12-09

## 2023-12-09 RX ADMIN — AMPICILLIN AND SULBACTAM 3 G: 2; 1 INJECTION, POWDER, FOR SOLUTION INTRAVENOUS at 02:47

## 2023-12-09 RX ADMIN — ENOXAPARIN SODIUM 40 MG: 100 INJECTION SUBCUTANEOUS at 22:11

## 2023-12-09 RX ADMIN — Medication 5 MG: at 22:11

## 2023-12-09 RX ADMIN — AMPICILLIN SODIUM AND SULBACTAM SODIUM 3 G: 2; 1 INJECTION, POWDER, FOR SOLUTION INTRAMUSCULAR; INTRAVENOUS at 20:44

## 2023-12-09 RX ADMIN — SALINE NASAL SPRAY 2 SPRAY: 1.5 SOLUTION NASAL at 17:17

## 2023-12-09 RX ADMIN — SALINE NASAL SPRAY 2 SPRAY: 1.5 SOLUTION NASAL at 21:45

## 2023-12-09 RX ADMIN — BACITRACIN 1 APPLICATION: 500 OINTMENT TOPICAL at 15:19

## 2023-12-09 RX ADMIN — AMPICILLIN SODIUM AND SULBACTAM SODIUM 3 G: 2; 1 INJECTION, POWDER, FOR SOLUTION INTRAMUSCULAR; INTRAVENOUS at 08:03

## 2023-12-09 RX ADMIN — POLYETHYLENE GLYCOL 3350 17 G: 17 POWDER, FOR SOLUTION ORAL at 08:03

## 2023-12-09 RX ADMIN — ASPIRIN 325 MG ORAL TABLET 325 MG: 325 PILL ORAL at 08:03

## 2023-12-09 RX ADMIN — ACETAMINOPHEN 975 MG: 325 TABLET ORAL at 14:20

## 2023-12-09 RX ADMIN — BACITRACIN 1 APPLICATION: 500 OINTMENT TOPICAL at 08:21

## 2023-12-09 RX ADMIN — BACITRACIN 1 APPLICATION: 500 OINTMENT TOPICAL at 20:44

## 2023-12-09 RX ADMIN — ACETAMINOPHEN 975 MG: 325 TABLET ORAL at 22:11

## 2023-12-09 RX ADMIN — FAMOTIDINE 20 MG: 20 TABLET ORAL at 08:03

## 2023-12-09 RX ADMIN — SALINE NASAL SPRAY 2 SPRAY: 1.5 SOLUTION NASAL at 11:17

## 2023-12-09 RX ADMIN — AMPICILLIN SODIUM AND SULBACTAM SODIUM 3 G: 2; 1 INJECTION, POWDER, FOR SOLUTION INTRAMUSCULAR; INTRAVENOUS at 14:20

## 2023-12-09 RX ADMIN — MAGNESIUM SULFATE HEPTAHYDRATE 2 G: 40 INJECTION, SOLUTION INTRAVENOUS at 11:17

## 2023-12-09 RX ADMIN — POTASSIUM CHLORIDE 20 MEQ: 1.5 POWDER, FOR SOLUTION ORAL at 11:17

## 2023-12-09 RX ADMIN — SALINE NASAL SPRAY 2 SPRAY: 1.5 SOLUTION NASAL at 13:11

## 2023-12-09 SDOH — SOCIAL STABILITY: SOCIAL INSECURITY: DO YOU FEEL ANYONE HAS EXPLOITED OR TAKEN ADVANTAGE OF YOU FINANCIALLY OR OF YOUR PERSONAL PROPERTY?: NO

## 2023-12-09 SDOH — SOCIAL STABILITY: SOCIAL INSECURITY: WERE YOU ABLE TO COMPLETE ALL THE BEHAVIORAL HEALTH SCREENINGS?: YES

## 2023-12-09 SDOH — SOCIAL STABILITY: SOCIAL INSECURITY: ARE THERE ANY APPARENT SIGNS OF INJURIES/BEHAVIORS THAT COULD BE RELATED TO ABUSE/NEGLECT?: NO

## 2023-12-09 SDOH — SOCIAL STABILITY: SOCIAL INSECURITY: ABUSE: ADULT

## 2023-12-09 SDOH — SOCIAL STABILITY: SOCIAL INSECURITY: ARE YOU OR HAVE YOU BEEN THREATENED OR ABUSED PHYSICALLY, EMOTIONALLY, OR SEXUALLY BY ANYONE?: NO

## 2023-12-09 SDOH — SOCIAL STABILITY: SOCIAL INSECURITY: HAS ANYONE EVER THREATENED TO HURT YOUR FAMILY OR YOUR PETS?: NO

## 2023-12-09 SDOH — SOCIAL STABILITY: SOCIAL INSECURITY: DOES ANYONE TRY TO KEEP YOU FROM HAVING/CONTACTING OTHER FRIENDS OR DOING THINGS OUTSIDE YOUR HOME?: NO

## 2023-12-09 SDOH — SOCIAL STABILITY: SOCIAL INSECURITY: HAVE YOU HAD THOUGHTS OF HARMING ANYONE ELSE?: NO

## 2023-12-09 SDOH — SOCIAL STABILITY: SOCIAL INSECURITY: DO YOU FEEL UNSAFE GOING BACK TO THE PLACE WHERE YOU ARE LIVING?: NO

## 2023-12-09 ASSESSMENT — ACTIVITIES OF DAILY LIVING (ADL)
HEARING - LEFT EAR: FUNCTIONAL
JUDGMENT_ADEQUATE_SAFELY_COMPLETE_DAILY_ACTIVITIES: YES
TOILETING: INDEPENDENT
DRESSING YOURSELF: INDEPENDENT
ADL_ASSISTANCE: INDEPENDENT
GROOMING: INDEPENDENT
FEEDING YOURSELF: INDEPENDENT
LACK_OF_TRANSPORTATION: NO
BATHING: INDEPENDENT
HEARING - RIGHT EAR: FUNCTIONAL
PATIENT'S MEMORY ADEQUATE TO SAFELY COMPLETE DAILY ACTIVITIES?: YES
WALKS IN HOME: INDEPENDENT
ADEQUATE_TO_COMPLETE_ADL: YES

## 2023-12-09 ASSESSMENT — LIFESTYLE VARIABLES
HOW OFTEN DO YOU HAVE A DRINK CONTAINING ALCOHOL: MONTHLY OR LESS
HOW MANY STANDARD DRINKS CONTAINING ALCOHOL DO YOU HAVE ON A TYPICAL DAY: PATIENT DOES NOT DRINK
AUDIT-C TOTAL SCORE: 1
SKIP TO QUESTIONS 9-10: 1
HOW OFTEN DO YOU HAVE 6 OR MORE DRINKS ON ONE OCCASION: NEVER
AUDIT-C TOTAL SCORE: 1

## 2023-12-09 ASSESSMENT — COGNITIVE AND FUNCTIONAL STATUS - GENERAL
MOVING TO AND FROM BED TO CHAIR: A LITTLE
STANDING UP FROM CHAIR USING ARMS: A LITTLE
TURNING FROM BACK TO SIDE WHILE IN FLAT BAD: A LITTLE
MOVING FROM LYING ON BACK TO SITTING ON SIDE OF FLAT BED WITH BEDRAILS: A LITTLE
CLIMB 3 TO 5 STEPS WITH RAILING: TOTAL
WALKING IN HOSPITAL ROOM: A LITTLE
MOBILITY SCORE: 16

## 2023-12-09 ASSESSMENT — PAIN SCALES - GENERAL
PAINLEVEL_OUTOF10: 7
PAINLEVEL_OUTOF10: 0 - NO PAIN
PAINLEVEL_OUTOF10: 4

## 2023-12-09 ASSESSMENT — COLUMBIA-SUICIDE SEVERITY RATING SCALE - C-SSRS
1. IN THE PAST MONTH, HAVE YOU WISHED YOU WERE DEAD OR WISHED YOU COULD GO TO SLEEP AND NOT WAKE UP?: NO
6. HAVE YOU EVER DONE ANYTHING, STARTED TO DO ANYTHING, OR PREPARED TO DO ANYTHING TO END YOUR LIFE?: NO
2. HAVE YOU ACTUALLY HAD ANY THOUGHTS OF KILLING YOURSELF?: NO

## 2023-12-09 ASSESSMENT — PAIN - FUNCTIONAL ASSESSMENT
PAIN_FUNCTIONAL_ASSESSMENT: 0-10
PAIN_FUNCTIONAL_ASSESSMENT: 0-10

## 2023-12-09 ASSESSMENT — PATIENT HEALTH QUESTIONNAIRE - PHQ9
1. LITTLE INTEREST OR PLEASURE IN DOING THINGS: NOT AT ALL
2. FEELING DOWN, DEPRESSED OR HOPELESS: NOT AT ALL
SUM OF ALL RESPONSES TO PHQ9 QUESTIONS 1 & 2: 0

## 2023-12-09 NOTE — ANESTHESIA POSTPROCEDURE EVALUATION
Patient: Adarsh De La Vega    Procedure Summary       Date: 12/08/23 Room / Location: [facility] OR 04 / Virtual [facility] OR    Anesthesia Start: 0723 Anesthesia Stop: 1918    Procedures:       Osteocutaneous radial forearm free flap, autogenous rib graft, nasal reconstruction with skin graft and auricular cartilage grafts; TOTAL CASE LENGTH= 8 hours      Excision Full Thickness Skin Graft Head/Neck      Rhinoplasty      Reconstruction Maxilla Diagnosis:       Squamous cell cancer of skin of nose      Acquired deformity of nose      Nasal lesion      (Squamous cell cancer of skin of nose [C44.321])      (Acquired deformity of nose [M95.0])      (Nasal lesion [J34.89])    Surgeons: Sami Mcclain MD Responsible Provider: Emery Tejeda DO    Anesthesia Type: general ASA Status: 3            Anesthesia Type: general    Vitals Value Taken Time   /82 12/08/23 1915   Temp 36.1 12/08/23 1919   Pulse 96 12/08/23 1916   Resp 27 12/08/23 1916   SpO2 96 % 12/08/23 1916   Vitals shown include unvalidated device data.    Anesthesia Post Evaluation    Patient location during evaluation: PACU  Patient participation: complete - patient participated  Level of consciousness: awake and alert  Pain score: 2  Pain management: adequate  Multimodal analgesia pain management approach  Airway patency: patent  Two or more strategies used to mitigate risk of obstructive sleep apnea  Cardiovascular status: acceptable  Respiratory status: acceptable  Hydration status: acceptable  Postoperative Nausea and Vomiting: none        There were no known notable events for this encounter.
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
## 2023-12-09 NOTE — PROGRESS NOTES
Otolaryngology-[facility] Daily Progress Note  --------------------------------------------------------------------------  Subjective:  No acute events overnight.   --------------------------------------------------------------------------  Objective:  Vitals reviewed  Gen: NAD, AOx3, resting comfortably in bed  Face/Neck: All incisions c/d/i, neck soft and flat without evidence of hematoma or fluid collection  -Skin paddle visible lining nasal cavity, pink, warm, soft  - L neck penrose in place with SS drainage  -Internal doppler with strong arterial signal   Oral Cavity: MMM  Nose: External nose created from paramedian forehead flap, pink, warm, soft, minimally dusky distal edges  Extremities: L arm  warm with intact movement and sensation  -L thigh skin graft donor site with tegaderm in place  Chest: incision c/d/I, no crepitus  Drains: All drains in place and holding suction with serosanguinous drainage (stripped)  --------------------------------------------------------------------------  Assessment:  54 y/o M with history of nasal SCC s/p total rhinectomy now s/p OR s/p osteocutaneous L RFFF, left radius plating (ortho) nasal reconstruction from prior paramedian/FTSG first stage, R rib graft, L arm wound vac, nasal trumpets, remaining cartilage banked in L chest.   -------------------------------------------------  Plan:  - Neuro: tylenol, PCA   - Resp: wean O2 as able  - CV:   - GI: bowel regimen  - FEN: Monitor and replete electrolytes as needed, adv to regular diet  - : rene in place, plan to DC 12/10  - Heme: follow up CBC  - ID: unasyn periop x 3d  - Drains: monitor output  - Endo: q6 BG checks  - Embolic PPx: SQH, SCDs while in bed  - Dispo: continued care on SCC5  --------------------------------------------------------------------------  ENT  [id number]

## 2023-12-09 NOTE — PROGRESS NOTES
Physical Therapy    Physical Therapy Evaluation    Patient Name: Adarsh De La Vega  MRN: [id number]  Today's Date: 12/9/2023   Time Calculation  Start Time: 1055  Stop Time: 1120  Time Calculation (min): 25 min    Assessment/Plan   PT Assessment  PT Assessment Results: Decreased strength, Decreased endurance, Impaired balance, Decreased mobility, Pain, Decreased skin integrity  Rehab Prognosis: Good  Strengths: Premorbid level of function  Barriers to Participation: Rehab experience  End of Session Communication: Bedside nurse  Assessment Comment: Patient with decline in functional mobiltiy as compared to baseline. Patient coud benefit from continued PT services to maximize mobility prior to D/C  End of Session Patient Position: Up in chair  IP OR SWING BED PT PLAN  Inpatient or Swing Bed: Inpatient  PT Plan  Treatment/Interventions: Bed mobility, Transfer training, Gait training, Stair training, Balance training, Neuromuscular re-education, Strengthening, Therapeutic exercise, Therapeutic activity  PT Plan: Skilled PT  PT Frequency: 3 times per week  PT Discharge Recommendations: Low intensity level of continued care  Equipment Recommended upon Discharge:  (TBD in future sessions. May benefit from SPC)  PT Recommended Transfer Status: Assist x2  PT - OK to Discharge: Yes (Eval completed and D/C recommendation made.)      Subjective   General Visit Information:  General  Reason for Referral: 52 y/o M with history of nasal SCC s/p total rhinectomy now s/p OR s/p osteocutaneous L RFFF, left radius plating (ortho) nasal reconstruction from prior paramedian/FTSG first stage, R rib graft, L arm wound vac, nasal trumpets, remaining cartilage banked in L chest  Prior to Session Communication: Bedside nurse  Patient Position Received: Bed, 3 rail up  Preferred Learning Style: verbal, visual  General Comment: Patient pleasant and agreeable to PT Eval  Home Living:  Home Living  Type of Home: House  Lives With: Spouse, Adult  "children (Able to assist as needed)  Home Adaptive Equipment: None  Home Layout: Two level, Stairs to alternate level with rails  Alternate Level Stairs-Rails: Right  Alternate Level Stairs-Number of Steps: 7  Home Access: Stairs to enter with rails  Entrance Stairs-Rails: Right  Entrance Stairs-Number of Steps: 5  Prior Level of Function:  Prior Function Per Pt/Caregiver Report  Level of [place]: Independent with ADLs and functional transfers  ADL Assistance: Independent  Homemaking Assistance: Independent  Ambulatory Assistance: Independent  Precautions:  Precautions  UE Weight Bearing Status: Left Non-Weight Bearing  LE Weight Bearing Status: Weight Bearing as Tolerated (RLE)  Vital Signs:       Objective   Pain:  Pain Assessment  Pain Assessment: 0-10  Pain Score: 0 - No pain  Cognition:  Cognition  Orientation Level: Oriented X4    General Assessments:  General Observation  General Observation: Patient reports leaning to R to \"protect L arm\"         Coordination  Movements are Fluid and Coordinated: Yes         Static Sitting Balance  Static Sitting-Balance Support: Feet supported, No upper extremity supported  Static Sitting-Level of Assistance: Close supervision  Dynamic Sitting Balance  Dynamic Sitting-Balance Support: Feet supported, No upper extremity supported  Dynamic Sitting-Balance: Reaching for objects  Dynamic Sitting-Comments: SBA    Static Standing Balance  Static Standing-Balance Support: No upper extremity supported  Static Standing-Level of Assistance: Minimum assistance  Static Standing-Comment/Number of Minutes: R lateral list  Dynamic Standing Balance  Dynamic Standing-Balance Support: No upper extremity supported  Dynamic Standing-Balance: Reaching for objects  Dynamic Standing-Comments: Min A  Functional Assessments:  Bed Mobility  Bed Mobility: Yes  Bed Mobility 1  Bed Mobility 1: Supine to sitting  Level of Assistance 1: Minimum assistance, Minimal verbal cues, Minimal tactile " cues  Bed Mobility Comments 1: at trunk    Transfers  Transfer: Yes  Transfer 1  Transfer From 1: Sit to  Transfer to 1: Stand  Transfer Level of Assistance 1: Minimum assistance, Moderate verbal cues, Moderate tactile cues  Transfers 2  Transfer From 2: Stand to  Transfer to 2: Sit  Transfer Level of Assistance 2: Minimum assistance, Minimal verbal cues, Minimal tactile cues  Trials/Comments 2: Min A for controlled descent  Transfers 3  Transfer From 3: Bed to  Transfer to 3: Chair with arms  Transfer Level of Assistance 3: Minimum assistance, Minimal verbal cues, Minimal tactile cues (x2)  Trials/Comments 3:  (Uncoordinated stepping, R lateral lean, cues for sequencing steps with transfer)       Extremity/Trunk Assessments:  RUE   RUE :  (Grossly 4/5)  LUE   LUE:  (at least 3/5)  RLE   RLE :  (Grossly at least 3/5)  LLE   LLE :  (Grossly 4/5)  Outcome Measures:  [facility] Basic Mobility  Turning from your back to your side while in a flat bed without using bedrails: A little  Moving from lying on your back to sitting on the side of a flat bed without using bedrails: A little  Moving to and from bed to chair (including a wheelchair): A little  Standing up from a chair using your arms (e.g. wheelchair or bedside chair): A little  To walk in hospital room: A little  Climbing 3-5 steps with railing: Total  Basic Mobility - Total Score: 16    Encounter Problems       Encounter Problems (Active)       Balance       STG - Maintains dynamic standing balance without upper extremity support >/= 10 min (Progressing)       Start:  12/09/23    Expected End:  12/23/23       INTERVENTIONS:  1. Practice standing with minimal support.  2. Educate patient about standing tolerance.  3. Educate patient about independence with gait, transfers, and ADL's.  4. Educate patient about use of assistive device.  5. Educate patient about self-directed care.            Mobility       STG - Patient will ambulate community distance LRAD and  supervision  (Progressing)       Start:  12/09/23    Expected End:  12/23/23            STG - Patient will ascend and descend 7 stairs with railing and SBA (Progressing)       Start:  12/09/23    Expected End:  12/23/23               Transfers       STG - Transfer from bed to chair independent  (Progressing)       Start:  12/09/23    Expected End:  12/23/23            STG - Patient will perform bed mobility independent  (Progressing)       Start:  12/09/23    Expected End:  12/23/23            STG - Patient will transfer sit to and from stand independent  (Progressing)       Start:  12/09/23    Expected End:  12/23/23                   Education Documentation  Mobility Training, taught by Ayde Garcia PT at 12/9/2023  2:15 PM.  Learner: Patient  Readiness: Acceptance  Method: Explanation, Demonstration  Response: Verbalizes Understanding  Comment: Patient educated on the role of PT services and benefits of mobility    Education Comments  No comments found.

## 2023-12-10 LAB
ALBUMIN SERPL BCP-MCNC: 3.7 G/DL (ref 3.4–5)
ANION GAP SERPL CALC-SCNC: 12 MMOL/L (ref 10–20)
BUN SERPL-MCNC: 10 MG/DL (ref 6–23)
CALCIUM SERPL-MCNC: 8.7 MG/DL (ref 8.6–10.6)
CHLORIDE SERPL-SCNC: 106 MMOL/L (ref 98–107)
CO2 SERPL-SCNC: 28 MMOL/L (ref 21–32)
CREAT SERPL-MCNC: 0.8 MG/DL (ref 0.5–1.3)
ERYTHROCYTE [DISTWIDTH] IN BLOOD BY AUTOMATED COUNT: 12.5 % (ref 11.5–14.5)
GFR SERPL CREATININE-BSD FRML MDRD: >90 ML/MIN/1.73M*2
GLUCOSE SERPL-MCNC: 95 MG/DL (ref 74–99)
HCT VFR BLD AUTO: 31.6 % (ref 41–52)
HGB BLD-MCNC: 10.5 G/DL (ref 13.5–17.5)
MAGNESIUM SERPL-MCNC: 2 MG/DL (ref 1.6–2.4)
MCH RBC QN AUTO: 31.3 PG (ref 26–34)
MCHC RBC AUTO-ENTMCNC: 33.2 G/DL (ref 32–36)
MCV RBC AUTO: 94 FL (ref 80–100)
NRBC BLD-RTO: 0 /100 WBCS (ref 0–0)
PHOSPHATE SERPL-MCNC: 2 MG/DL (ref 2.5–4.9)
PLATELET # BLD AUTO: 160 X10*3/UL (ref 150–450)
POTASSIUM SERPL-SCNC: 3.6 MMOL/L (ref 3.5–5.3)
RBC # BLD AUTO: 3.36 X10*6/UL (ref 4.5–5.9)
SODIUM SERPL-SCNC: 142 MMOL/L (ref 136–145)
WBC # BLD AUTO: 7.5 X10*3/UL (ref 4.4–11.3)

## 2023-12-10 PROCEDURE — 84295 ASSAY OF SERUM SODIUM: CPT

## 2023-12-10 PROCEDURE — 96372 THER/PROPH/DIAG INJ SC/IM: CPT | Performed by: STUDENT IN AN ORGANIZED HEALTH CARE EDUCATION/TRAINING PROGRAM

## 2023-12-10 PROCEDURE — 83735 ASSAY OF MAGNESIUM: CPT

## 2023-12-10 PROCEDURE — 85027 COMPLETE CBC AUTOMATED: CPT

## 2023-12-10 PROCEDURE — 1170000001 HC PRIVATE ONCOLOGY ROOM DAILY

## 2023-12-10 PROCEDURE — 2500000001 HC RX 250 WO HCPCS SELF ADMINISTERED DRUGS (ALT 637 FOR MEDICARE OP)

## 2023-12-10 PROCEDURE — 2500000004 HC RX 250 GENERAL PHARMACY W/ HCPCS (ALT 636 FOR OP/ED)

## 2023-12-10 PROCEDURE — 2500000005 HC RX 250 GENERAL PHARMACY W/O HCPCS

## 2023-12-10 PROCEDURE — 2500000001 HC RX 250 WO HCPCS SELF ADMINISTERED DRUGS (ALT 637 FOR MEDICARE OP): Performed by: STUDENT IN AN ORGANIZED HEALTH CARE EDUCATION/TRAINING PROGRAM

## 2023-12-10 PROCEDURE — 2500000004 HC RX 250 GENERAL PHARMACY W/ HCPCS (ALT 636 FOR OP/ED): Performed by: STUDENT IN AN ORGANIZED HEALTH CARE EDUCATION/TRAINING PROGRAM

## 2023-12-10 PROCEDURE — 80069 RENAL FUNCTION PANEL: CPT

## 2023-12-10 RX ORDER — OXYCODONE HYDROCHLORIDE 5 MG/1
10 TABLET ORAL EVERY 4 HOURS PRN
Status: DISCONTINUED | OUTPATIENT
Start: 2023-12-10 | End: 2023-12-14 | Stop reason: HOSPADM

## 2023-12-10 RX ORDER — OXYCODONE HYDROCHLORIDE 5 MG/1
5 TABLET ORAL EVERY 4 HOURS PRN
Status: DISCONTINUED | OUTPATIENT
Start: 2023-12-10 | End: 2023-12-14 | Stop reason: HOSPADM

## 2023-12-10 RX ADMIN — ENOXAPARIN SODIUM 40 MG: 100 INJECTION SUBCUTANEOUS at 22:51

## 2023-12-10 RX ADMIN — SALINE NASAL SPRAY 2 SPRAY: 1.5 SOLUTION NASAL at 21:45

## 2023-12-10 RX ADMIN — PETROLATUM 1 APPLICATION: 1 OINTMENT TOPICAL at 22:52

## 2023-12-10 RX ADMIN — AMPICILLIN SODIUM AND SULBACTAM SODIUM 3 G: 2; 1 INJECTION, POWDER, FOR SOLUTION INTRAMUSCULAR; INTRAVENOUS at 02:21

## 2023-12-10 RX ADMIN — PETROLATUM 1 APPLICATION: 1 OINTMENT TOPICAL at 09:00

## 2023-12-10 RX ADMIN — ASPIRIN 325 MG ORAL TABLET 325 MG: 325 PILL ORAL at 08:54

## 2023-12-10 RX ADMIN — SALINE NASAL SPRAY 2 SPRAY: 1.5 SOLUTION NASAL at 08:56

## 2023-12-10 RX ADMIN — AMPICILLIN SODIUM AND SULBACTAM SODIUM 3 G: 2; 1 INJECTION, POWDER, FOR SOLUTION INTRAMUSCULAR; INTRAVENOUS at 08:54

## 2023-12-10 RX ADMIN — BACITRACIN 1 APPLICATION: 500 OINTMENT TOPICAL at 08:55

## 2023-12-10 RX ADMIN — POTASSIUM PHOSPHATE, MONOBASIC POTASSIUM PHOSPHATE, DIBASIC 21 MMOL: 224; 236 INJECTION, SOLUTION, CONCENTRATE INTRAVENOUS at 17:40

## 2023-12-10 RX ADMIN — PETROLATUM 1 APPLICATION: 1 OINTMENT TOPICAL at 15:00

## 2023-12-10 RX ADMIN — OXYCODONE HYDROCHLORIDE 10 MG: 5 TABLET ORAL at 17:50

## 2023-12-10 RX ADMIN — ACETAMINOPHEN 975 MG: 325 TABLET ORAL at 22:51

## 2023-12-10 RX ADMIN — SALINE NASAL SPRAY 2 SPRAY: 1.5 SOLUTION NASAL at 13:07

## 2023-12-10 RX ADMIN — Medication 5 MG: at 22:51

## 2023-12-10 RX ADMIN — SALINE NASAL SPRAY 2 SPRAY: 1.5 SOLUTION NASAL at 17:40

## 2023-12-10 RX ADMIN — SALINE NASAL SPRAY 2 SPRAY: 1.5 SOLUTION NASAL at 02:23

## 2023-12-10 RX ADMIN — ACETAMINOPHEN 975 MG: 325 TABLET ORAL at 05:21

## 2023-12-10 RX ADMIN — AMPICILLIN SODIUM AND SULBACTAM SODIUM 3 G: 2; 1 INJECTION, POWDER, FOR SOLUTION INTRAMUSCULAR; INTRAVENOUS at 14:43

## 2023-12-10 RX ADMIN — FAMOTIDINE 20 MG: 20 TABLET ORAL at 08:54

## 2023-12-10 RX ADMIN — SALINE NASAL SPRAY 2 SPRAY: 1.5 SOLUTION NASAL at 05:45

## 2023-12-10 RX ADMIN — AMPICILLIN SODIUM AND SULBACTAM SODIUM 3 G: 2; 1 INJECTION, POWDER, FOR SOLUTION INTRAMUSCULAR; INTRAVENOUS at 22:51

## 2023-12-10 RX ADMIN — ACETAMINOPHEN 975 MG: 325 TABLET ORAL at 14:44

## 2023-12-10 ASSESSMENT — COGNITIVE AND FUNCTIONAL STATUS - GENERAL
CLIMB 3 TO 5 STEPS WITH RAILING: A LITTLE
WALKING IN HOSPITAL ROOM: A LITTLE
DAILY ACTIVITIY SCORE: 21
CLIMB 3 TO 5 STEPS WITH RAILING: A LITTLE
DRESSING REGULAR LOWER BODY CLOTHING: A LITTLE
DAILY ACTIVITIY SCORE: 24
MOBILITY SCORE: 22
MOBILITY SCORE: 22
TURNING FROM BACK TO SIDE WHILE IN FLAT BAD: A LITTLE
HELP NEEDED FOR BATHING: A LITTLE
DRESSING REGULAR UPPER BODY CLOTHING: A LITTLE

## 2023-12-10 ASSESSMENT — PAIN SCALES - GENERAL
PAINLEVEL_OUTOF10: 0 - NO PAIN
PAINLEVEL_OUTOF10: 5 - MODERATE PAIN
PAINLEVEL_OUTOF10: 10 - WORST POSSIBLE PAIN

## 2023-12-10 ASSESSMENT — PAIN - FUNCTIONAL ASSESSMENT: PAIN_FUNCTIONAL_ASSESSMENT: 0-10

## 2023-12-10 NOTE — CARE PLAN
The patient's goals for the shift include  pain management, flap perfusion check    The clinical goals for the shift include pain control for duration of shift    Over the shift, the patient did not make progress toward the following goals. Barriers to progression include  not utilizing call light for pain purposes. Recommendations to address these barriers include education regarding call light.

## 2023-12-10 NOTE — PROGRESS NOTES
Otolaryngology-[facility] Daily Progress Note    Subjective:  No acute events overnight. Endorsing left thumb pain, otherwise, no new concerns at this time.     Objective:  Vitals reviewed  Gen: NAD, AOx3, resting comfortably in bed  Face/Neck: All incisions c/d/i, neck soft and flat without evidence of hematoma or fluid collection  -Skin paddle visible lining nasal cavity, pink, warm, soft  -L neck penrose in place with SS drainage; milked   -Internal doppler with strong arterial signal   Oral Cavity: MMM  Nose: External nose created from paramedian forehead flap, pink, warm, soft, minimally dusky distal edges  Extremities: L arm warm with intact movement and sensation; cast appropriately tight   -L thigh skin graft donor site with tegaderm in place  Chest: incision c/d/I, no crepitus  Drains: All drains in place and holding suction with serosanguinous drainage (stripped)    Assessment:  52 y/o M with history of nasal SCC s/p total rhinectomy now s/p OR s/p osteocutaneous L RFFF, left radius plating (ortho) nasal reconstruction from prior paramedian/FTSG first stage, R rib graft, L arm wound vac, nasal trumpets, remaining cartilage banked in L chest.     Plan:  - Neuro: tylenol, DC PCA, oxycodone for pain control   - Resp: wean O2 as able  - CV: no needs   - GI: bowel regimen  - FEN: Monitor and replete electrolytes as needed, adv to regular diet  - : dc lópez, due to void   - Heme: follow up CBC  - ID: unasyn periop x 3d  - Drains: monitor output  - Endo: q6 BG checks  - Embolic PPx: SQH, SCDs while in bed  - Dispo: continued care on SCC5; ADOD 12/13     Patient seen and discussed with attending Dr. Echevarria who agrees with plans.     Rita Garcia MD   PGY-1  Otolaryngology - Head & Neck Surgery  [facility]    ENT Consult Pager: [phone]  Head and Neck Phone: [phone]  ENT Peds Pager: [phone]  ENT Subspecialty Team: [place]

## 2023-12-11 LAB
ALBUMIN SERPL BCP-MCNC: 3.5 G/DL (ref 3.4–5)
ANION GAP SERPL CALC-SCNC: 14 MMOL/L (ref 10–20)
BUN SERPL-MCNC: 8 MG/DL (ref 6–23)
CALCIUM SERPL-MCNC: 8.6 MG/DL (ref 8.6–10.6)
CHLORIDE SERPL-SCNC: 106 MMOL/L (ref 98–107)
CO2 SERPL-SCNC: 25 MMOL/L (ref 21–32)
CREAT SERPL-MCNC: 0.77 MG/DL (ref 0.5–1.3)
ERYTHROCYTE [DISTWIDTH] IN BLOOD BY AUTOMATED COUNT: 12.1 % (ref 11.5–14.5)
GFR SERPL CREATININE-BSD FRML MDRD: >90 ML/MIN/1.73M*2
GLUCOSE SERPL-MCNC: 95 MG/DL (ref 74–99)
HCT VFR BLD AUTO: 28.7 % (ref 41–52)
HGB BLD-MCNC: 9.8 G/DL (ref 13.5–17.5)
MAGNESIUM SERPL-MCNC: 1.82 MG/DL (ref 1.6–2.4)
MCH RBC QN AUTO: 31.6 PG (ref 26–34)
MCHC RBC AUTO-ENTMCNC: 34.1 G/DL (ref 32–36)
MCV RBC AUTO: 93 FL (ref 80–100)
NRBC BLD-RTO: 0 /100 WBCS (ref 0–0)
PHOSPHATE SERPL-MCNC: 3.3 MG/DL (ref 2.5–4.9)
PLATELET # BLD AUTO: 170 X10*3/UL (ref 150–450)
POTASSIUM SERPL-SCNC: 3.7 MMOL/L (ref 3.5–5.3)
RBC # BLD AUTO: 3.1 X10*6/UL (ref 4.5–5.9)
SODIUM SERPL-SCNC: 141 MMOL/L (ref 136–145)
WBC # BLD AUTO: 5.4 X10*3/UL (ref 4.4–11.3)

## 2023-12-11 PROCEDURE — 2500000001 HC RX 250 WO HCPCS SELF ADMINISTERED DRUGS (ALT 637 FOR MEDICARE OP)

## 2023-12-11 PROCEDURE — 2500000004 HC RX 250 GENERAL PHARMACY W/ HCPCS (ALT 636 FOR OP/ED): Performed by: STUDENT IN AN ORGANIZED HEALTH CARE EDUCATION/TRAINING PROGRAM

## 2023-12-11 PROCEDURE — 2500000001 HC RX 250 WO HCPCS SELF ADMINISTERED DRUGS (ALT 637 FOR MEDICARE OP): Performed by: STUDENT IN AN ORGANIZED HEALTH CARE EDUCATION/TRAINING PROGRAM

## 2023-12-11 PROCEDURE — 80069 RENAL FUNCTION PANEL: CPT

## 2023-12-11 PROCEDURE — 2500000001 HC RX 250 WO HCPCS SELF ADMINISTERED DRUGS (ALT 637 FOR MEDICARE OP): Performed by: NURSE PRACTITIONER

## 2023-12-11 PROCEDURE — 96372 THER/PROPH/DIAG INJ SC/IM: CPT | Performed by: STUDENT IN AN ORGANIZED HEALTH CARE EDUCATION/TRAINING PROGRAM

## 2023-12-11 PROCEDURE — 99024 POSTOP FOLLOW-UP VISIT: CPT | Performed by: OTOLARYNGOLOGY

## 2023-12-11 PROCEDURE — 83735 ASSAY OF MAGNESIUM: CPT

## 2023-12-11 PROCEDURE — 85027 COMPLETE CBC AUTOMATED: CPT

## 2023-12-11 PROCEDURE — 97165 OT EVAL LOW COMPLEX 30 MIN: CPT | Mod: GO

## 2023-12-11 PROCEDURE — 1170000001 HC PRIVATE ONCOLOGY ROOM DAILY

## 2023-12-11 RX ORDER — ADHESIVE BANDAGE
30 BANDAGE TOPICAL DAILY
Status: DISCONTINUED | OUTPATIENT
Start: 2023-12-11 | End: 2023-12-11

## 2023-12-11 RX ORDER — LANOLIN ALCOHOL/MO/W.PET/CERES
400 CREAM (GRAM) TOPICAL ONCE
Status: COMPLETED | OUTPATIENT
Start: 2023-12-11 | End: 2023-12-11

## 2023-12-11 RX ORDER — SENNOSIDES 8.6 MG/1
1 TABLET ORAL 2 TIMES DAILY
Status: DISCONTINUED | OUTPATIENT
Start: 2023-12-11 | End: 2023-12-14 | Stop reason: HOSPADM

## 2023-12-11 RX ORDER — POTASSIUM CHLORIDE 1.5 G/1.58G
20 POWDER, FOR SOLUTION ORAL ONCE
Status: COMPLETED | OUTPATIENT
Start: 2023-12-11 | End: 2023-12-11

## 2023-12-11 RX ADMIN — SALINE NASAL SPRAY 2 SPRAY: 1.5 SOLUTION NASAL at 13:21

## 2023-12-11 RX ADMIN — OXYCODONE HYDROCHLORIDE 10 MG: 5 TABLET ORAL at 16:42

## 2023-12-11 RX ADMIN — FAMOTIDINE 20 MG: 20 TABLET ORAL at 09:36

## 2023-12-11 RX ADMIN — PETROLATUM 1 APPLICATION: 1 OINTMENT TOPICAL at 09:00

## 2023-12-11 RX ADMIN — ASPIRIN 325 MG ORAL TABLET 325 MG: 325 PILL ORAL at 09:38

## 2023-12-11 RX ADMIN — ENOXAPARIN SODIUM 40 MG: 100 INJECTION SUBCUTANEOUS at 21:57

## 2023-12-11 RX ADMIN — ACETAMINOPHEN 975 MG: 325 TABLET ORAL at 21:57

## 2023-12-11 RX ADMIN — AMPICILLIN SODIUM AND SULBACTAM SODIUM 3 G: 2; 1 INJECTION, POWDER, FOR SOLUTION INTRAMUSCULAR; INTRAVENOUS at 11:32

## 2023-12-11 RX ADMIN — SALINE NASAL SPRAY 2 SPRAY: 1.5 SOLUTION NASAL at 05:02

## 2023-12-11 RX ADMIN — SALINE NASAL SPRAY 2 SPRAY: 1.5 SOLUTION NASAL at 09:36

## 2023-12-11 RX ADMIN — SENNOSIDES 8.6 MG: 8.6 TABLET, FILM COATED ORAL at 09:36

## 2023-12-11 RX ADMIN — AMPICILLIN SODIUM AND SULBACTAM SODIUM 3 G: 2; 1 INJECTION, POWDER, FOR SOLUTION INTRAMUSCULAR; INTRAVENOUS at 17:30

## 2023-12-11 RX ADMIN — PETROLATUM 1 APPLICATION: 1 OINTMENT TOPICAL at 21:00

## 2023-12-11 RX ADMIN — AMPICILLIN SODIUM AND SULBACTAM SODIUM 3 G: 2; 1 INJECTION, POWDER, FOR SOLUTION INTRAMUSCULAR; INTRAVENOUS at 05:02

## 2023-12-11 RX ADMIN — HYDROGEN PEROXIDE: 30 LIQUID TOPICAL at 09:40

## 2023-12-11 RX ADMIN — ACETAMINOPHEN 975 MG: 325 TABLET ORAL at 05:03

## 2023-12-11 RX ADMIN — SALINE NASAL SPRAY 2 SPRAY: 1.5 SOLUTION NASAL at 17:35

## 2023-12-11 RX ADMIN — Medication 5 MG: at 22:00

## 2023-12-11 RX ADMIN — ACETAMINOPHEN 975 MG: 325 TABLET ORAL at 13:21

## 2023-12-11 RX ADMIN — PETROLATUM 1 APPLICATION: 1 OINTMENT TOPICAL at 15:00

## 2023-12-11 RX ADMIN — POTASSIUM CHLORIDE 20 MEQ: 1.5 POWDER, FOR SOLUTION ORAL at 11:33

## 2023-12-11 RX ADMIN — MAGNESIUM OXIDE TAB 400 MG (241.3 MG ELEMENTAL MG) 400 MG: 400 (241.3 MG) TAB at 11:33

## 2023-12-11 RX ADMIN — SALINE NASAL SPRAY 2 SPRAY: 1.5 SOLUTION NASAL at 21:58

## 2023-12-11 RX ADMIN — SALINE NASAL SPRAY 2 SPRAY: 1.5 SOLUTION NASAL at 01:45

## 2023-12-11 ASSESSMENT — COGNITIVE AND FUNCTIONAL STATUS - GENERAL
EATING MEALS: A LITTLE
MOBILITY SCORE: 22
DRESSING REGULAR UPPER BODY CLOTHING: A LITTLE
HELP NEEDED FOR BATHING: A LITTLE
MOBILITY SCORE: 22
DAILY ACTIVITIY SCORE: 20
EATING MEALS: A LITTLE
TURNING FROM BACK TO SIDE WHILE IN FLAT BAD: A LITTLE
DRESSING REGULAR LOWER BODY CLOTHING: A LITTLE
HELP NEEDED FOR BATHING: A LITTLE
EATING MEALS: A LITTLE
DAILY ACTIVITIY SCORE: 20
HELP NEEDED FOR BATHING: A LITTLE
DRESSING REGULAR UPPER BODY CLOTHING: A LITTLE
DAILY ACTIVITIY SCORE: 20
DRESSING REGULAR LOWER BODY CLOTHING: A LITTLE
CLIMB 3 TO 5 STEPS WITH RAILING: A LITTLE
CLIMB 3 TO 5 STEPS WITH RAILING: A LITTLE
DRESSING REGULAR LOWER BODY CLOTHING: A LITTLE
DRESSING REGULAR UPPER BODY CLOTHING: A LITTLE
TURNING FROM BACK TO SIDE WHILE IN FLAT BAD: A LITTLE

## 2023-12-11 ASSESSMENT — PAIN - FUNCTIONAL ASSESSMENT
PAIN_FUNCTIONAL_ASSESSMENT: 0-10
PAIN_FUNCTIONAL_ASSESSMENT: 0-10

## 2023-12-11 ASSESSMENT — PAIN SCALES - GENERAL
PAINLEVEL_OUTOF10: 7
PAINLEVEL_OUTOF10: 7
PAINLEVEL_OUTOF10: 0 - NO PAIN
PAINLEVEL_OUTOF10: 5 - MODERATE PAIN

## 2023-12-11 ASSESSMENT — ACTIVITIES OF DAILY LIVING (ADL)
ADL_ASSISTANCE: INDEPENDENT
BATHING_ASSISTANCE: MINIMAL

## 2023-12-11 NOTE — PROGRESS NOTES
Occupational Therapy    Evaluation    Patient Name: Adarsh De La Vega  MRN: [id number]  Today's Date: 12/11/2023  Time Calculation  Start Time: 0850  Stop Time: 0909  Time Calculation (min): 19 min    Assessment  IP OT Assessment  OT Assessment: Pt will benefit from skilled OT while admitted to increase IND in ADLs/IADLs prior to d/c.  Prognosis: Good  Barriers to Discharge: None  Evaluation/Treatment Tolerance: Patient tolerated treatment well  Medical Staff Made Aware: Yes  End of Session Communication: Bedside nurse  End of Session Patient Position: Bed, 3 rail up, Alarm off, not on at start of session  Plan:  Treatment Interventions: ADL retraining, Functional transfer training, UE strengthening/ROM, Endurance training, Equipment evaluation/education, Patient/family training, Compensatory technique education  OT Frequency: 2 times per week  OT Discharge Recommendations: Low intensity level of continued care  OT Recommended Transfer Status: Stand by assist, Assist of 1  OT - OK to Discharge: Yes    Subjective   Current Problem:  1. Nasal deformity        2. Squamous cell cancer of skin of nose  Surgical Pathology Exam    Surgical Pathology Exam      3. Acquired deformity of nose  Surgical Pathology Exam    Surgical Pathology Exam      4. Nasal lesion  Surgical Pathology Exam    Surgical Pathology Exam        General:  General  Reason for Referral: 54 y/o M with history of nasal SCC s/p total rhinectomy now s/p OR s/p osteocutaneous L RFFF, left radius plating (ortho) nasal reconstruction from prior paramedian/FTSG first stage, R rib graft, L arm wound vac, nasal trumpets, remaining cartilage banked in L chest  Past Medical History Relevant to Rehab: Squamous cell carcinoma, facial asymmetry  Family/Caregiver Present: No  Prior to Session Communication: Bedside nurse  Patient Position Received: Bed, 3 rail up, Alarm off, not on at start of session  Preferred Learning Style: verbal, visual  General Comment: Pt  pleasant and agreeable to therapy session  Precautions:  UE Weight Bearing Status: Left Non-Weight Bearing  LE Weight Bearing Status: Weight Bearing as Tolerated (R LE)  Medical Precautions: Fall precautions  Vital Signs:     Pain:  Pain Assessment  Pain Assessment: 0-10  Pain Score: 7  Pain Type: Acute pain  Pain Location: Arm  Pain Orientation: Left    Objective   Cognition:  Overall Cognitive Status: Within Functional Limits  Orientation Level: Oriented X4       Home Living:  Type of Home: House  Lives With: Spouse, Adult children (Able to assist as needed)  Home Adaptive Equipment: None  Home Layout: Two level, Stairs to alternate level with rails  Alternate Level Stairs-Rails: Right  Alternate Level Stairs-Number of Steps: 7  Home Access: Stairs to enter with rails  Entrance Stairs-Rails: Right  Entrance Stairs-Number of Steps: 5  Bathroom Shower/Tub: Tub/shower unit  Bathroom Toilet: Standard  Bathroom Equipment: Shower chair with back   Prior Function:  Level of [place]: Independent with ADLs and functional transfers, Independent with homemaking with ambulation  ADL Assistance: Independent  Homemaking Assistance: Independent  Ambulatory Assistance: Independent  Vocational: Full time employment  Hand Dominance: Right  IADL History:  Homemaking Responsibilities: Yes  Meal Prep Responsibility: Primary  Laundry Responsibility: Primary  Cleaning Responsibility: Primary  Bill Paying/Finance Responsibility: Primary  Shopping Responsibility: Primary  Homemaking Comments: Shares with spouse  Current License: Yes  Mode of Transportation: Car  Occupation: Full time employment  Type of Occupation:   Leisure and Hobbies: TV  ADL:  Eating Assistance: Modified independent (Device) (Anticipated)  Eating Deficit: Setup  Grooming Assistance: Stand by (Anticipated)  Grooming Deficit: Supervision/safety  Bathing Assistance: Minimal (Anticipated)  UE Dressing Assistance: Stand by (Anticipated)  UE Dressing  Deficit: Setup, Supervision/safety  LE Dressing Assistance: Minimal  LE Dressing Deficit: Don/doff R sock, Don/doff L sock (Pt initiates donning socks in figure four but requires assistance completing due to difficulty reaching to feet.)  Toileting Assistance with Device: Stand by (Simulated)  Toileting Deficit: Supervison/safety  Activity Tolerance:  Endurance: Tolerates 10 - 20 min exercise with multiple rests  Bed Mobility/Transfers: Bed Mobility  Bed Mobility: Yes  Bed Mobility 1  Bed Mobility 1: Supine to sitting  Level of Assistance 1: Close supervision  Bed Mobility Comments 1: SBA for safety  Bed Mobility 2  Bed Mobility  2: Sitting to supine  Level of Assistance 2: Close supervision  Bed Mobility Comments 2: SBA for safety    Transfers  Transfer: Yes  Transfer 1  Transfer From 1: Bed to  Transfer to 1: Toilet  Technique 1:  (Ambulating)  Transfer Level of Assistance 1: Contact guard  Trials/Comments 1: CGA for safety, ambulates to bathroom and uses GB for balance. Similar assist during return transfer    Sitting Balance:  Static Sitting Balance  Static Sitting-Balance Support: No upper extremity supported  Static Sitting-Level of Assistance: Distant supervision  Static Sitting-Comment/Number of Minutes: SUP for safety  Standing Balance:  Static Standing Balance  Static Standing-Balance Support: No upper extremity supported  Static Standing-Level of Assistance: Close supervision  Static Standing-Comment/Number of Minutes: SBA for safety  Dynamic Standing Balance  Dynamic Standing-Balance Support: No upper extremity supported  Dynamic Standing-Comments: SBA    IADL's:   Homemaking Responsibilities: Yes  Meal Prep Responsibility: Primary  Laundry Responsibility: Primary  Cleaning Responsibility: Primary  Bill Paying/Finance Responsibility: Primary  Shopping Responsibility: Primary  Homemaking Comments: Shares with spouse  Current License: Yes  Mode of Transportation: Car  Occupation: Full time employment  Type  of Occupation:   Leisure and Hobbies: TV  Vision: Vision - Basic Assessment  Current Vision: No visual deficits  Sensation:  Light Touch: No apparent deficits  Sensation Comment: Reports numbness in R hand has improved - no complaints at this time  Strength:  Strength Comments: L UE not tested due to NWB in L, R UE WFL  Perception:  Inattention/Neglect: Appears intact  Coordination:  Movements are Fluid and Coordinated: Yes  Coordination Comment: Able to move fingers and make full fist in L hand   Hand Function:  Hand Function  Gross Grasp: Functional  Coordination: Functional  Extremities: RUE   RUE : Within Functional Limits and LUE   LUE: Exceptions to WFL (L UE NWB)      Outcome Measures: [facility] Daily Activity  Putting on and taking off regular lower body clothing: A little  Bathing (including washing, rinsing, drying): A little  Putting on and taking off regular upper body clothing: A little  Toileting, which includes using toilet, bedpan or urinal: None  Taking care of personal grooming such as brushing teeth: None  Eating Meals: A little  Daily Activity - Total Score: 20     and OT Adult Other Outcome Measures  4AT: 4 AT -  Education Documentation  Body Mechanics, taught by Tyler Ariza OT at 12/11/2023 10:08 AM.  Learner: Patient  Readiness: Acceptance  Method: Explanation, Demonstration  Response: Verbalizes Understanding    ADL Training, taught by Tyler Ariza OT at 12/11/2023 10:08 AM.  Learner: Patient  Readiness: Acceptance  Method: Explanation, Demonstration  Response: Verbalizes Understanding    Education Comments  No comments found.      Goals:   Encounter Problems       Encounter Problems (Active)       ADLs       Patient will perform UB and LB bathing with modified independent level of assistance and DME PRN. (Progressing)       Start:  12/11/23    Expected End:  01/01/24            Patient with complete lower body dressing with modified independent level of assistance man and  doffing pants without a zipper/fasteners with PRN adaptive equipment while edge of bed  and standing (Progressing)       Start:  12/11/23    Expected End:  01/01/24            Patient will complete daily grooming tasks brushing teeth and washing face/hair with modified independent level of assistance and PRN adaptive equipment while standing. (Progressing)       Start:  12/11/23    Expected End:  01/01/24               BALANCE       Pt will maintain dynamic standing balance during ADL task with modified independent level of assistance in order to demonstrate decreased risk of falling and improved postural control. (Progressing)       Start:  12/11/23    Expected End:  01/01/24               MOBILITY       Patient will perform Functional mobility Household distances/Community Distances with modified independent level of assistance and least restrictive device in order to improve safety and functional mobility. (Progressing)       Start:  12/11/23    Expected End:  01/01/24                   Tyler LYN/SHANT

## 2023-12-11 NOTE — OP NOTE
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  Left neck exploration and submandibular gland excision  2).  Assistance with harvest and microvascular anastomosis of left radial forearm osteocutaneous free flap         Surgeons      * Sami Mcclain - Primary     * Collin Enriquez    Resident/Fellow/Other Assistant:  Surgeon(s) and Role:     * Dmitri Quintero MD - Assisting     * Randell Luis MD - Assisting     * Enid Rivera MD - Resident - Assisting     * Adarsh Harrison MD - Resident - Assisting     * Lorena Ward MD - Resident - Assisting     * Collin Enriquez MD    Procedure Summary  Anesthesia: * No anesthesia type entered *  ASA: II  Anesthesia Staff: Anesthesiologist: Emery Tejeda DO; Tyree Martin MD  CRNA: JENNI Stewart-CRNA  C-AA: BARBI Calixto; BARBI Kurtz  Anesthesia Resident: Cornelius Sprague MD  Estimated Blood Loss: 100 mL  Intra-op Medications:   Medication Name Total Dose   sodium chloride 0.9 % irrigation solution 2,000 mL   lidocaine-epinephrine (Xylocaine W/EPI) 1 %-1:100,000 injection 10 mL   oxymetazoline (Afrin) 0.05 % nasal spray 3 spray              Anesthesia Record               Intraprocedure I/O Totals          Intake    Phenylephrine Drip 0.00 mL    The total shown is the total  volume documented since Anesthesia Start was filed.    lactated Ringer's 1000.00 mL    albumin human 5 % 500.00 mL    Total Intake 1500 mL       Output    Urine 500 mL    Total Output 500 mL       Net    Net Volume 1000 mL          Specimen:   ID Type Source Tests Collected by Time   1 : LEFT LEVEL 1B NECK Tissue SOFT TISSUE RESECTION SURGICAL PATHOLOGY EXAM Collin Enriquez MD 12/8/2023 1543   2 : LEFT SUBMANDIBULAR GLAND Tissue SOFT TISSUE RESECTION SURGICAL PATHOLOGY EXAM Collin Enriquez MD 12/8/2023 1550        Staff:   Circulator: Rohan Angel RN; Terri Pisano RN  Relief Circulator: Kayla Kennedy RN; Carson Pedraza, RN  Relief Scrub: Nguyen Flowers; Carson Pedraza RN  Scrub Person: Silvia Mccord RN; Esther Franco         Drains and/or Catheters:   Closed/Suction Drain Inferior;Left;Proximal 10 Fr. (Active)   Site Description Healing 12/10/23 1749   Dressing Status Clean;Dry 12/10/23 1749   Drainage Appearance Serosanguineous 12/08/23 1927   Output (mL) 10 mL 12/10/23 1714       Open Drain Left Other (Comment) (Active)   Site Description Bleeding 12/10/23 1812   Dressing Status Clean;Dry 12/10/23 1812   Drainage Appearance Serosanguineous 12/08/23 2000   Status Other (Comment) 12/08/23 2000   Output (mL) 20 mL 12/10/23 0535       [REMOVED] Urethral Catheter Double-lumen 16 Fr. (Removed)   Site Assessment Clean;Skin intact 12/10/23 0916   Collection Container Standard drainage bag 12/08/23 1929   Securement Method Securing device (Describe) 12/08/23 1929   Output (mL) 500 mL 12/10/23 0947       Tourniquet Times:       N/A  Implants:  Implants       Type Name Action Serial No.      Screw PLATE, RADIUS DIST 9H LT VOLAR LCP LUCIO META - [id number] Implanted      Screw SCREW, CORTICAL, SELF-TAPPING, 3.5 X 18 MM, STAINLESS STEEL - [id number] Implanted      Screw SCREW, CORTICAL, SELF-TAPPING, 3.5 X 20 MM, STAINLESS STEEL - [id number] Implanted      Screw SCREW, LOCKING SELF TAP W/RECESS  2.4MM X 20MM, SS - [id number] Implanted      Screw SCREW, LOCKING SELF TAP W/RECESS 2.4MM X 20MM, SS - [id number] Implanted      Screw SCREW, LOCKING SELF TAP W/RECESS 2.4MM X 18MM, SS - SN/A - [id number] Implanted N/A     Screw PLATE Y 8MM 5H - SN/A - [id number] Implanted N/A     Screw PLATE Y 8MM 5H - SN/A - [id number] Wasted N/A     Screw SCREW, 1.9 X 5 CROSS PIN SELF TAP - SN/A - [id number] Implanted N/A     Screw SCREW, 1.9 X 5 CROSS PIN SELF TAP - SN/A - [id number] Wasted N/A     Screw SCREW, 1.7 X 4 CROSS PIN SELF DRLL - SN/A - [id number] Implanted N/A     Screw SCREW, 1.7 X 5 CROSS PIN SELF DRLL - SN/A - [id number] Wasted N/A     Screw SCREW, 1.7 X 4 CROSS PIN SELF TAP - SN/A - [id number] Implanted N/A     Screw SCREW, 1.7 X 4 CROSS PIN SELF TAP - SN/A - [id number] Wasted N/A      PROBE, FLOW DOPPLER SERVANDO-PING - [id number] Implanted               Findings:   -Left facial artery and vein identified after removal of the submandibular gland  Findings:   -Bone segment 7 cm, skin paddle 11 x 5 cm for osteocutanous forearm  -Ischemia time 180 minutes  -Arterial Anastamosis Left Facial Artery  -Venous Anastamosis Left Facial vein with 3-0     Indications: Adarsh De La Vega is an 53 y.o. 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     The patient was seen in the preoperative area. The risks, benefits, complications, treatment options, non-operative alternatives, expected recovery and outcomes were discussed with the patient. The possibilities of reaction to medication, pulmonary aspiration, injury to surrounding structures, bleeding, recurrent infection, the need for additional procedures, failure to diagnose a condition, and creating a complication requiring transfusion or operation were discussed with the patient. The patient concurred with the proposed plan, giving informed consent.  The site of surgery was properly  noted/marked if necessary per policy. The patient has been actively warmed in preoperative area. Preoperative antibiotics have been ordered and given within 1 hours of incision. Venous thrombosis prophylaxis have been ordered including bilateral sequential compression devices    Procedure Details:   After confirmation of the correct patient and site, the patient brought back to the operating theater.  They were then intubated by our anesthesia colleagues without any issues and the bed was turned and the patient was prepped and draped in the standard fashion.     I assisted Dr. Mcclain with the forearm flap. As noted:   I then moved down to the left arm. . I marked out a flap starting 2 cm proximal to the thenar eminence. The flap was centered over the palpable radial artery. It was lateral enough to include the cephalic vein. The incision was made circumferentially, and extended proximally into the antebrachial fossa. I began on the radial side by dissecting through the subcutaneous fat down to the brachialradialis muscle and tendon. I elevated the skin off the underlying tendon and muscle suprafascially, leaving plenty of paratenon. The cephalic vein was included with the flap while the superficial radial nerve was identified and preserved in it's entirety.      I then began on the ulnar side, dissecting down over to the muscle fascia. The skin was elevated suprafascially until the flexor carpi radialis tendon was reached. Again paratenon was left on the tendon, and then the dissection transitioned into a subfascial plane after reaching the lateral edge of the tendon. The pedicle and its skin perforators were identified.      At this point, I elevated proximally to expose the cephalic vein running in an ulnar direction. This was traced to an adequate distance and size into the proximal forearm. The pedicle was then traced in a similar fashion into the antebrachial fossa, taking care to clip small branches to  surrounding muscle. The artery and VCs were then , and we traced the vein up high enough that the cephalic and Vcs joined.      The artery was then clipped distally with 3 clips, and the Vc's clipped distally with 2 clips each.      We then continued to remove the fascia of the brachioradialis tendon until we were able to identify the flexor pollicis longus muscle.  Using manual palpation, we felt the radius underneath the muscle.  We then used monopolar cautery to incise through the muscle to expose the radius on both the radial and ulnar sides.  We then used an oscillating saw to take about 40% of the radius.  Bone wax was used for control of hemostasis.  The orthopedic surgery team will dictate their note separately on plating of the radius..     Once this bone was removed, it was ensured to be intact with the rest of the flap.  The flap was then raised from a distal to proximal fashion until the pedicle was completed 360.     Proximally, the artery was clipped as well with 3 clips, and the veins 2 each. The flap was then transferred up to the head for microvascular anastomosis and inset.     I then palpated the facial notch and midline incision of a few centimeters.  A 15 blade was used to cut through the skin and subcutaneous tissue.  Monopolar cautery was then used to cut through the platysma.  Meticulous dissection was then performed to identify facial artery and facial vein.  The facial artery was quite minuscule in caliber, therefore decision was made to remove the submandibular gland in order to further free of the facial artery to ensure that we had an appropriate facial artery to perform   our microvascular anastomosis.  His unable gland was identified and freed from surrounding attachments and no subfascial fashion in order to protect the marginal mandibular nerve.  A small inferior branch was transected..   Complications:  None; patient tolerated the procedure well.    Disposition: PACU -  hemodynamically stable.  Condition: stable         Additional Details: None    Attending Attestation:     Sami Mcclain  Phone Number: [phone]

## 2023-12-11 NOTE — OP NOTE
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 Rib [place] of Right Rib  2). Paramedian Forehead Flap  3). Open reconstruction of the nose  4). Left Osteocutaneous Forearm Free Flap   5). Split Thickness Skin Graft from left thigh measuring 11 x 5 cm    Surgeons      * Sami Mcclain - Primary     * Collin Enriquez    Resident/Fellow/Other Assistant:  Surgeon(s) and Role:     * Dmitri Quintero MD - Assisting     * Randell Luis MD - Assisting     * Enid Rivera MD - Resident - Assisting     * Adarsh Harrison MD - Resident - Assisting     * Lorena Ward MD - Resident - Assisting     * Collin Enriquez MD    Procedure Summary  Anesthesia: * No anesthesia type entered *  ASA: II  Anesthesia Staff: Anesthesiologist: Emery Tejeda DO; Tyree Martin MD  CRNA: JENNI Stewart-CRNA  C-AA: BARBI Calixto; BARBI Kurtz  Anesthesia Resident: Cornelius Sprague MD  Estimated Blood Loss: 100 mL  Intra-op Medications:   Medication Name Total Dose   sodium chloride 0.9 % irrigation solution 2,000 mL   lidocaine-epinephrine (Xylocaine W/EPI) 1 %-1:100,000 injection 10 mL   oxymetazoline (Afrin) 0.05 % nasal spray 3 spray              Anesthesia Record               Intraprocedure I/O Totals          Intake    Phenylephrine Drip 0.00  mL    The total shown is the total volume documented since Anesthesia Start was filed.    lactated Ringer's 1000.00 mL    albumin human 5 % 500.00 mL    Total Intake 1500 mL       Output    Urine 500 mL    Total Output 500 mL       Net    Net Volume 1000 mL          Specimen:   ID Type Source Tests Collected by Time   1 : LEFT LEVEL 1B NECK Tissue SOFT TISSUE RESECTION SURGICAL PATHOLOGY EXAM Collin Enriquez MD 12/8/2023 1543   2 : LEFT SUBMANDIBULAR GLAND Tissue SOFT TISSUE RESECTION SURGICAL PATHOLOGY EXAM Collin Enriquez MD 12/8/2023 1550        Staff:   Circulator: Rohan Angel RN; Terri Pisano RN  Relief Circulator: Kayla Kennedy RN; Carson Pedraza RN  Relief Scrub: Nguyen Flowers; Carson Pedraza RN  Scrub Person: Silvia Mccord RN; Esther Franco         Drains and/or Catheters:   Closed/Suction Drain Inferior;Left;Proximal 10 Fr. (Active)   Site Description Healing 12/10/23 1749   Dressing Status Clean;Dry 12/10/23 1749   Drainage Appearance Serosanguineous 12/08/23 1927   Output (mL) 10 mL 12/10/23 1714       Open Drain Left Other (Comment) (Active)   Site Description Bleeding 12/10/23 1812   Dressing Status Clean;Dry 12/10/23 1812   Drainage Appearance Serosanguineous 12/08/23 2000   Status Other (Comment) 12/08/23 2000   Output (mL) 20 mL 12/10/23 0535       [REMOVED] Urethral Catheter Double-lumen 16 Fr. (Removed)   Site Assessment Clean;Skin intact 12/10/23 0916   Collection Container Standard drainage bag 12/08/23 1929   Securement Method Securing device (Describe) 12/08/23 1929   Output (mL) 500 mL 12/10/23 0947       Tourniquet Times:       N/A  Implants:  Implants       Type Name Action Serial No.      Screw PLATE, RADIUS DIST 9H LT VOLAR LCP LUCIO META - [id number] Implanted      Screw SCREW, CORTICAL, SELF-TAPPING, 3.5 X 18 MM, STAINLESS STEEL - [id number] Implanted      Screw SCREW, CORTICAL, SELF-TAPPING, 3.5 X 20 MM, STAINLESS STEEL - [id number] Implanted      Screw  SCREW, LOCKING SELF TAP W/RECESS 2.4MM X 20MM, SS - [id number] Implanted      Screw SCREW, LOCKING SELF TAP W/RECESS 2.4MM X 20MM, SS - [id number] Implanted      Screw SCREW, LOCKING SELF TAP W/RECESS 2.4MM X 18MM, SS - SN/A - [id number] Implanted N/A     Screw PLATE Y 8MM 5H - SN/A - [id number] Implanted N/A     Screw PLATE Y 8MM 5H - SN/A - [id number] Wasted N/A     Screw SCREW, 1.9 X 5 CROSS PIN SELF TAP - SN/A - [id number] Implanted N/A     Screw SCREW, 1.9 X 5 CROSS PIN SELF TAP - SN/A - [id number] Wasted N/A     Screw SCREW, 1.7 X 4 CROSS PIN SELF DRLL - SN/A - [id number] Implanted N/A     Screw SCREW, 1.7 X 5 CROSS PIN SELF DRLL - SN/A - [id number] Wasted N/A     Screw SCREW, 1.7 X 4 CROSS PIN SELF TAP - SN/A - [id number] Implanted N/A     Screw SCREW, 1.7 X 4 CROSS PIN SELF TAP - SN/A - [id number] Wasted N/A      PROBE, FLOW DOPPLER SERVANDO-PING - [id number] Implanted               Findings:   -Bone segment 7 cm, skin paddle 11 x 5 cm for osteocutanous forearm  -Ischemia time 180 minutes  -Arterial Anastamosis Left Facial Artery  -Venous Anastamosis Left Facial vein with 3-0         Indications: Adarsh De La Vega is an 53 y.o. 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     The patient was seen in the preoperative area. The risks, benefits, complications, treatment options, non-operative alternatives, expected recovery and outcomes were discussed with the patient. The possibilities of reaction to medication, pulmonary aspiration, injury to surrounding structures, bleeding, recurrent infection, the need for additional procedures, failure to diagnose a condition, and creating a complication requiring transfusion or operation were discussed with the patient. The patient concurred with the proposed plan, giving informed consent.  The site of surgery was properly noted/marked if necessary per policy. The patient has been  actively warmed in preoperative area. Preoperative antibiotics have been ordered and given within 1 hours of incision. Venous thrombosis prophylaxis have been ordered including bilateral sequential compression devices    Procedure Details:   After confirmation of the correct patient and site, the patient brought back to the operating theater.  They were then intubated by our anesthesia colleagues without any issues and the bed was turned and the patient was prepped and draped in the standard fashion.     We first turned our attention to the rib graft from the right side.The  6th rib was harvested - an incision was at the inframammary crease.  Dissection was carried down to the subcutaneous fat and fascia to the extra costal muscles which were divided.  The rib was then visualized.  A sono PET was utilized to make the cut through the lateral cartilage and the perichondrium posteriorly remained on the pleura.  The rib was then elevated in a subperichondrial plane with a 5 cm segment removed.  Saline was then applied to the wound with Valsalva maneuver to evaluate for pneumothorax and violation of the pleural cavity which was not appreciated.  The rib cartilage was set aside for grafting purposes.  A dermatome blade was utilized to cut the right in a oblique fashion.  Portions of excess cartilage were reimplanted in the subcutaneous tissue prior to closure.  The incision was closed in a multilayer fashion with Vicryl, Monocryl, and fast gut suture.  A dressing was applied.    I then moved down to the left arm.  Dr. Polanco and I performed the following portion of the procedure including osteocutaneous forearm free flap harvest as co-surgeon's.  I marked out a flap starting 2 cm proximal to the thenar eminence. The flap was centered over the palpable radial artery. It was lateral enough to include the cephalic vein. The incision was made circumferentially, and extended proximally into the antebrachial fossa. I began on  the radial side by dissecting through the subcutaneous fat down to the brachialradialis muscle and tendon. I elevated the skin off the underlying tendon and muscle suprafascially, leaving plenty of paratenon. The cephalic vein was included with the flap while the superficial radial nerve was identified and preserved in it's entirety.     I then began on the ulnar side, dissecting down over to the muscle fascia. The skin was elevated suprafascially until the flexor carpi radialis tendon was reached. Again paratenon was left on the tendon, and then the dissection transitioned into a subfascial plane after reaching the lateral edge of the tendon. The pedicle and its skin perforators were identified.     At this point, I elevated proximally to expose the cephalic vein running in an ulnar direction. This was traced to an adequate distance and size into the proximal forearm. The pedicle was then traced in a similar fashion into the antebrachial fossa, taking care to clip small branches to surrounding muscle. The artery and VCs were then , and we traced the vein up high enough that the cephalic and Vcs joined.     The artery was then clipped distally with 3 clips, and the Vc's clipped distally with 2 clips each.     We then continued to remove the fascia of the brachioradialis tendon until we were able to identify the flexor pollicis longus muscle.  Using manual palpation, we felt the radius underneath the muscle.  We then used monopolar cautery to incise through the muscle to expose the radius on both the radial and ulnar sides.  We then used an oscillating saw to take about 40% of the radius.  Bone wax was used for control of hemostasis.  The orthopedic surgery team will dictate their note separately on plating of the radius..    Once this bone was removed, it was ensured to be intact with the rest of the flap.  The flap was then raised from a distal to proximal fashion until the pedicle was completed  360.    Proximally, the artery was clipped as well with 3 clips, and the veins 2 each. The flap was then transferred up to the head for microvascular anastomosis and inset.     Dr. Quintero performed plating of the radius which is separately dictated.  Please refer to his operative note.  The wound was copiously irrigated with sterile saline.  Muscle transposition of the brachial radialis and forearm musculature was then elevated and reapproximated overlying the plate to ensure no plate exposure.  A split-thickness skin graft measuring 11 x 5 cm was harvested from the left thigh using a dermatome blade.  This was then secured overlying the left forearm wound with running chromic gut suture.  Fibrous incisions were made for egress.  A negative pressure wound VAC was then applied overlying the skin graft with a JUANCHO drain utilized for the proximal portion of the wound.  The forearm was then dressed and casted.    First, the bone was shaved and shaped to achieve a candidate for bone for reconstruction of the dorsal L strut.  The frontal bone and radix was then contoured with the sono PET apex bit for inset of the bone flap.  Next, this was secured to the frontal bone and radix using a Y plate.  The distal end of the bone was shaped to create a space to sit onto the rib cartilage that was previously harvested.    The faical artery and vein were identified by Dr. Enriquez, which will be dictated separately.     The facial artery stump was identified. Some small venous contributions were found and clipped. The adventitia was cleaned off carefully and the lumen was irrigated with heparin saline. A V3 clamp was applied and the artery was noted to have excellent pulsatile flow.     The flap was loosely tacked into position. The adventitia over the artery was carefully cleaned off. The vein was  from the artery to allow for some freedom in positioning. Heparin saline was used to flush the artery and there was good flow through  the vein.     The arteries were brought together using a double V3 clamp. 9-0 nylon sutures were used to perform microvascular anastomosis in an interrupted fashion. After this was complete, the vessel clamps were released. The artery had excellent arterial flow. Two rescue stitches were required. At this point, the venous coupling was performed. A 3-0  was used, and each vein was carefully pinned to their respective side. The  was then closed and released. A strip test was performed on the vein and it showed good flow. Gelfoam was placed to ensure excellent vessel geometry.  An implantable Doppler was placed and secured in place    Sharp excision with a 15 blade was performed to excise portions of the external and internal nasal skin that was no longer applicable to the reconstruction of the nose.  Portions of scar and contracture were sharply excised to prepare the defect for reconstruction.  The septum was addressed posteriorly. Significant deviation and obstruction from residual septum was appreciated. Septectomy posteriorly was performed with the sonopet saw and portions of the septum were debrided with forceps. The remaining mucosa superiorly and posteriorly was cauterized with suction bovey. We then inset the cutaneous portion of the free flap to recreate the internal lining of the nose.  A midline osteotomy was made for inset of a caudal septal replacement graft fashioned from the rib cartilage.  The rib cartilage was sutured fixated to the anterior nasal spine with a PDS suture through holes created with a wire passing drill bit in the maxilla.  The bony portion of the radial forearm free flap was then fixed to the caudal septal replacement with PDS suture in a precise lock and key fashion.  Further contouring the graft was performed prior to coverage with the cutaneous portion of the free flap to line the internal aspect of the nose.  This was reapproximated to the previous delamination of  the distal portion of the forehead flap once inset.    Next, the paramedian forehead flap was raised based on the supratrochlear neurovascular bundle.  A portion of the distal flap had been prelaminated with a full-thickness skin graft in the arm which was preserved.  A 15 blade was used to make skin incision and subcutaneous tissue incisions.  The flap was elevated subcutaneously distally and transitioned to the subgaleal plane proximally to protect the pedicle. This was then placed over the cartilage graft and the radius bone to create the external lining of the nose.    This was inset circumferentially using combination of deep and superficial stitches.  This resulted in the internal lining of the nose to be laminated with radial forearm skin and skin graft from the preventative forehead flap.  The L strut was reconstructed with a dorsal cantilever radial bone and caudal septal replacement fashion from autologous rib cartilage.  The remaining portion of the ala was to be contoured in following surgical stages.  2 nasal trumpets were sutured into the nose to maintain patency.    We then turned attention to the forehead.  Wide undermining was performed to reconstruct a defect approximately 40 cm².  Near complete approximation was achieved utilizing 0 Prolene sutures in a pulley fashion to the majority of the forehead.  Subsequently a multilayer closure with Monocryl and fast gut suture was performed at the remaining incisions.  A Xeroform gauze was then placed over the remaining small defect and stapled in place to secure it.    The left neck incision was then copiously irrigated and hemostasis was achieved.  A Penrose drain was then placed and secured in place anteriorly.    Complications:  None; patient tolerated the procedure well.    Disposition: PACU - hemodynamically stable.  Condition: stable     The fellow was involved in the critical parts of the advanced portions of this procedure which include the  harvest of the free flap, critical portions working under and using  the operating microscope for the microvascular anastomosis, insetting of the flap including for the nasal recon as there was  no qualified resident of suitable training available for these portions of the procedure.  Resident involvement in other portions of the procedure going on simultaneously include closure of the donor site as well as obtaining skin graft for closure.      Additional Details: None    Attending Attestation: I was present and scrubbed for the entire procedure.    Sami Mcclain  Phone Number: [phone]

## 2023-12-11 NOTE — PROGRESS NOTES
52 y/o M with history of nasal SCC s/p total rhinectomy now s/p OR s/p osteocutaneous L RFFF, left radius plating (ortho) nasal reconstruction from prior paramedian/FTSG first stage, R rib graft, L arm wound vac, nasal trumpets, remaining cartilage banked in L chest.   --- Met with the pt at the bedside to complete admission assessment. I explained my role as a care coordinator and will assist with discharge planning. Pt was agreeable.  Pt states he was independent with ADLs prior to admit. Pt stated he lives with his wife and she is supportive with care. Pt understands he may need HC services for PT/Ot and RN. Pt is agreeable. [facility] and would like to send referral to [facility]. He asked me to confirm his wife the home care agency. Called his wife Betty and she confirmed he was active with [facility]. Referral sent to [facility] in [facility]. Care transitions will continue to follow until discharge.  Emy Taylor RN [facility]    12/11/23 6611   Update: [facility] can not accept pt. More referrals sent in [facility].

## 2023-12-11 NOTE — CARE PLAN
Problem: Fall/Injury  Goal: Not fall by end of shift  Outcome: Progressing  Goal: Be free from injury by end of the shift  Outcome: Progressing  Goal: Verbalize understanding of personal risk factors for fall in the hospital  Outcome: Progressing  Goal: Verbalize understanding of risk factor reduction measures to prevent injury from fall in the home  Outcome: Progressing  Goal: Use assistive devices by end of the shift  Outcome: Progressing  Goal: Pace activities to prevent fatigue by end of the shift  Outcome: Progressing     Problem: Pain  Goal: Takes deep breaths with improved pain control throughout the shift  Outcome: Progressing  Goal: Turns in bed with improved pain control throughout the shift  Outcome: Progressing  Goal: Walks with improved pain control throughout the shift  Outcome: Progressing  Goal: Performs ADL's with improved pain control throughout shift  Outcome: Progressing  Goal: Participates in PT with improved pain control throughout the shift  Outcome: Progressing  Goal: Free from opioid side effects throughout the shift  Outcome: Progressing  Goal: Free from acute confusion related to pain meds throughout the shift  Outcome: Progressing   The patient's goals for the shift include      The clinical goals for the shift include pain management    Over the shift, the patient did not make progress toward the following goals. Barriers to progression include pain control. Recommendations to address these barriers include admin pain meds.

## 2023-12-11 NOTE — PROGRESS NOTES
Physical Therapy                 Therapy Communication Note    Patient Name: Adarsh De La Vega  MRN: [id number]  Today's Date: 12/11/2023     Discipline: Physical Therapy    Missed Visit Reason: Missed Visit Reason:  (Pt declining mobility at this time, states has been mobilizing in room; explained importance of continuing OOB/activity, verbalized understanding but declined PT at this time.)    Missed Time: attempt

## 2023-12-11 NOTE — SIGNIFICANT EVENT
.Subjective:  Moderate amount of SS drainage milked from penrose.     Objective:  Vitals reviewed in EMR  Gen: NAD, AOx3, resting comfortably in bed  Face/Neck: All incisions c/d/i, neck soft and flat without evidence of hematoma or fluid collection  -Skin paddle visible lining nasal cavity, pink, warm, soft  - L neck penrose in place with SS drainage  -Internal doppler with strong arterial signal   Oral Cavity: MMM  Nose: External nose created from paramedian forehead flap, pink, warm, soft, minimally dusky distal edges  Extremities: L arm  warm with intact movement and sensation  -L thigh skin graft donor site with tegaderm in place  Chest: incision c/d/I, no crepitus  Drains: All drains in place and holding suction with serosanguinous drainage (stripped)    Assessment/Plan:  52 y/o M with history of nasal SCC s/p total rhinectomy now s/p OR s/p osteocutaneous L RFFF, left radius plating (ortho) nasal reconstruction from prior paramedian/FTSG first stage, R rib graft, L arm wound vac, nasal trumpets, remaining cartilage banked in L chest.     -Continue q4hr flap checks  - NO afrin in the nose    Lorena Ward MD  ENT [id number]    
.Subjective:  On Prabha 5. No complaints. Moderate amount of SS drainage milked from penrose.     Objective:  Vitals reviewed in EMR  Gen: NAD, AOx3, resting comfortably in bed  Face/Neck: All incisions c/d/i, neck soft and flat without evidence of hematoma or fluid collection  -Skin paddle visible lining nasal cavity, pink, warm, soft  - L neck penrose in place with SS drainage  -Internal doppler with strong arterial signal   Oral Cavity: MMM  Nose: External nose created from paramedian forehead flap, pink, warm, soft, minimally dusky distal edges  Extremities: L arm  warm with intact movement and sensation  -L thigh skin graft donor site with tegaderm in place  Chest: incision c/d/I, no crepitus  Drains: All drains in place and holding suction with serosanguinous drainage (stripped)    Assessment/Plan:  54 y/o M with history of nasal SCC s/p total rhinectomy now s/p OR s/p osteocutaneous L RFFF, left radius plating (ortho) nasal reconstruction from prior paramedian/FTSG first stage, R rib graft, L arm wound vac, nasal trumpets, remaining cartilage banked in L chest.     -Continue q4hr flap checks  - NO afrin in the nose    Lorena Ward MD  ENT [id number]    
.Subjective:  Patient resting comfortably in PACU    Objective:  Vitals reviewed in EMR  Gen: NAD, AOx3, resting comfortably in bed  Face/Neck: All incisions c/d/i, neck soft and flat without evidence of hematoma or fluid collection  -Skin paddle visible lining nasal cavity, pink, warm, soft  - L neck penrose in place with SS drainage  -Internal doppler with strong arterial signal   Oral Cavity: MMM  Nose: External nose created from paramedian forehead flap, pink, warm, soft, minimally dusky distal edges  Extremities: L arm  warm with intact movement and sensation  -L thigh skin graft donor site with tegaderm in place  Chest: incision c/d/I, no crepitus  Drains: All drains in place and holding suction with serosanguinous drainage (stripped)    Assessment/Plan:  54 y/o M with history of nasal SCC s/p total rhinectomy now s/p OR s/p osteocutaneous L RFFF, left radius plating (ortho) nasal reconstruction from prior paramedian/FTSG first stage, R rib graft, L arm wound vac, nasal trumpets, remaining cartilage banked in L chest.     -Continue q4hr flap checks  - absolute NO afrin in the nose    Lorena Ward MD  ENT [id number]    
Subjective:  Moderate amount of SS drainage milked from penrose.     Objective:  Vitals reviewed in EMR  Gen: NAD, AOx3, resting comfortably in bed  Face/Neck: All incisions c/d/i, neck soft and flat without evidence of hematoma or fluid collection  -Skin paddle visible lining nasal cavity, pink, warm, soft  - L neck penrose in place with SS drainage  -Internal doppler with strong arterial signal   Oral Cavity: MMM  Nose: External nose created from paramedian forehead flap, pink, warm, soft, minimally dusky distal edges  Extremities: L arm  warm with intact movement and sensation  -L thigh skin graft donor site with tegaderm in place  Chest: incision c/d/I, no crepitus  Drains: All drains in place and holding suction with serosanguinous drainage (stripped)    Assessment/Plan:  52 y/o M with history of nasal SCC s/p total rhinectomy now s/p OR s/p osteocutaneous L RFFF, left radius plating (ortho) nasal reconstruction from prior paramedian/FTSG first stage, R rib graft, L arm wound vac, nasal trumpets, remaining cartilage banked in L chest.     -Continue q4hr flap checks  - NO afrin in the nose    Silas Lanier MD  ENT [id number]    
Subjective:  Small amount of SS drainage milked from penrose.     Objective:  Vitals reviewed in EMR  Gen: NAD, AOx3, resting comfortably in bed  Face/Neck: All incisions c/d/i, neck soft and flat without evidence of hematoma or fluid collection  -Skin paddle visible lining nasal cavity, pink, warm, soft  - L neck penrose in place with SS drainage  -Internal doppler with strong arterial signal   Oral Cavity: MMM  Nose: External nose created from paramedian forehead flap, pink, warm, soft, minimally dusky distal edges  Extremities: L arm  warm with intact movement and sensation  -L thigh skin graft donor site with tegaderm in place  Chest: incision c/d/I, no crepitus  Drains: All drains in place and holding suction with serosanguinous drainage (stripped)    Assessment/Plan:  52 y/o M with history of nasal SCC s/p total rhinectomy now s/p OR s/p osteocutaneous L RFFF, left radius plating (ortho) nasal reconstruction from prior paramedian/FTSG first stage, R rib graft, L arm wound vac, nasal trumpets, remaining cartilage banked in L chest.     -Continue q4hr flap checks  - NO afrin in the nose    Silas Lanier MD  ENT [id number]    
Subjective:  Small amount of SS drainage milked from penrose.     Objective:  Vitals reviewed in EMR  Gen: NAD, AOx3, resting comfortably in bed  Face/Neck: All incisions c/d/i, neck soft and flat without evidence of hematoma or fluid collection  -Skin paddle visible lining nasal cavity, pink, warm, soft  - L neck penrose in place with SS drainage  -Internal doppler with strong arterial signal   Oral Cavity: MMM  Nose: External nose created from paramedian forehead flap, pink, warm, soft, minimally dusky distal edges  Extremities: L arm  warm with intact movement and sensation  -L thigh skin graft donor site with tegaderm in place  Chest: incision c/d/I, no crepitus  Drains: All drains in place and holding suction with serosanguinous drainage (stripped)    Assessment/Plan:  52 y/o M with history of nasal SCC s/p total rhinectomy now s/p OR s/p osteocutaneous L RFFF, left radius plating (ortho) nasal reconstruction from prior paramedian/FTSG first stage, R rib graft, L arm wound vac, nasal trumpets, remaining cartilage banked in L chest.     -Continue q4hr flap checks  - NO afrin in the nose    Silas Lanier MD  ENT [id number]    
Subjective:  Small amount of SS drainage milked from penrose.     Objective:  Vitals reviewed in EMR  Gen: NAD, AOx3, resting comfortably in bed  Face/Neck: All incisions c/d/i, neck soft and flat without evidence of hematoma or fluid collection  -Skin paddle visible lining nasal cavity, pink, warm, soft  - L neck penrose in place with SS drainage  -Internal doppler with strong arterial signal   Oral Cavity: MMM  Nose: External nose created from paramedian forehead flap, pink, warm, soft, minimally dusky distal edges  Extremities: L arm  warm with intact movement and sensation  -L thigh skin graft donor site with tegaderm in place  Chest: incision c/d/I, no crepitus  Drains: All drains in place and holding suction with serosanguinous drainage (stripped)    Assessment/Plan:  52 y/o M with history of nasal SCC s/p total rhinectomy now s/p OR s/p osteocutaneous L RFFF, left radius plating (ortho) nasal reconstruction from prior paramedian/FTSG first stage, R rib graft, L arm wound vac, nasal trumpets, remaining cartilage banked in L chest.     -Continue q4hr flap checks  - NO afrin in the nose    Silas Lanier MD  ENT [id number]    
Subjective:  Small amount of SS drainage milked from penrose.     Objective:  Vitals reviewed in EMR  Gen: NAD, AOx3, resting comfortably in bed  Face/Neck: All incisions c/d/i, neck soft and flat without evidence of hematoma or fluid collection  -Skin paddle visible lining nasal cavity, pink, warm, soft  - L neck penrose in place with SS drainage  -Internal doppler with strong arterial signal   Oral Cavity: MMM  Nose: External nose created from paramedian forehead flap, pink, warm, soft, minimally dusky distal edges  Extremities: L arm  warm with intact movement and sensation  -L thigh skin graft donor site with tegaderm in place  Chest: incision c/d/I, no crepitus  Drains: All drains in place and holding suction with serosanguinous drainage (stripped)    Assessment/Plan:  54 y/o M with history of nasal SCC s/p total rhinectomy now s/p OR s/p osteocutaneous L RFFF, left radius plating (ortho) nasal reconstruction from prior paramedian/FTSG first stage, R rib graft, L arm wound vac, nasal trumpets, remaining cartilage banked in L chest.     -Continue q4hr flap checks  - NO afrin in the nose    Silas Lanier MD  ENT [id number]    
Subjective:  Small amount of SS drainage milked from penrose.     Objective:  Vitals reviewed in EMR  Gen: NAD, AOx3, resting comfortably in bed  Face/Neck: All incisions c/d/i, neck soft and flat without evidence of hematoma or fluid collection  -Skin paddle visible lining nasal cavity, pink, warm, soft  - L neck penrose in place with SS drainage  -Internal doppler with strong arterial signal   Oral Cavity: MMM  Nose: External nose created from paramedian forehead flap, pink, warm, soft, minimally dusky distal edges  Extremities: L arm  warm with intact movement and sensation  -L thigh skin graft donor site with tegaderm in place  Chest: incision c/d/I, no crepitus  Drains: All drains in place and holding suction with serosanguinous drainage (stripped)    Assessment/Plan:  54 y/o M with history of nasal SCC s/p total rhinectomy now s/p OR s/p osteocutaneous L RFFF, left radius plating (ortho) nasal reconstruction from prior paramedian/FTSG first stage, R rib graft, L arm wound vac, nasal trumpets, remaining cartilage banked in L chest.     -Continue q6hr flap checks  - NO afrin in the nose    Adarsh Beard MD  ENT [id number]    
Subjective:  Small amount of SS drainage milked from penrose.  Arm is feeling slightly better this morning with more pronation.    Objective:  Vitals reviewed in EMR  Gen: NAD, AOx3, resting comfortably in bed  Face/Neck: All incisions c/d/i, neck soft and flat without evidence of hematoma or fluid collection  -Skin paddle visible lining nasal cavity, pink, warm, soft  - L neck penrose in place with SS drainage  -Internal doppler with strong arterial signal   Oral Cavity: MMM  Nose: External nose created from paramedian forehead flap, pink, warm, soft, minimally dusky distal edges  Extremities: L arm  warm with intact movement and sensation  -L thigh skin graft donor site with tegaderm in place  Chest: incision c/d/I, no crepitus  Drains: All drains in place and holding suction with serosanguinous drainage (stripped)    Assessment/Plan:  52 y/o M with history of nasal SCC s/p total rhinectomy now s/p OR s/p osteocutaneous L RFFF, left radius plating (ortho) nasal reconstruction from prior paramedian/FTSG first stage, R rib graft, L arm wound vac, nasal trumpets, remaining cartilage banked in L chest.     -Continue q6hr flap checks  - NO afrin in the nose    Adarsh Beard MD  ENT [id number]    
Subjective:  Small amount of SS drainage milked from penrose.  Arm is feeling slightly better this morning with more pronation.    Objective:  Vitals reviewed in EMR  Gen: NAD, AOx3, resting comfortably in bed  Face/Neck: All incisions c/d/i, neck soft and flat without evidence of hematoma or fluid collection  -Skin paddle visible lining nasal cavity, pink, warm, soft  - L neck penrose in place with SS drainage  -Internal doppler with strong arterial signal   Oral Cavity: MMM  Nose: External nose created from paramedian forehead flap, pink, warm, soft, minimally dusky distal edges  Extremities: L arm  warm with intact movement and sensation  -L thigh skin graft donor site with tegaderm in place  Chest: incision c/d/I, no crepitus  Drains: All drains in place and holding suction with serosanguinous drainage (stripped)    Assessment/Plan:  54 y/o M with history of nasal SCC s/p total rhinectomy now s/p OR s/p osteocutaneous L RFFF, left radius plating (ortho) nasal reconstruction from prior paramedian/FTSG first stage, R rib graft, L arm wound vac, nasal trumpets, remaining cartilage banked in L chest.     -Continue q6hr flap checks  - NO afrin in the nose    Adarsh Beard MD  ENT [id number]    
Subjective:  Small amount of SS drainage milked from penrose.  Donor site fingers feeling slightly cold but has full movement and sensation, warmed blanket applied. Kerlix rewrapped to be slightly less tight.     Objective:  Vitals reviewed in EMR  Gen: NAD, AOx3, resting comfortably in bed  Face/Neck: All incisions c/d/i, neck soft and flat without evidence of hematoma or fluid collection  -Skin paddle visible lining nasal cavity, pink, warm, soft  - L neck penrose in place with SS drainage  -Internal doppler with strong arterial signal   Oral Cavity: MMM  Nose: External nose created from paramedian forehead flap, pink, warm, soft, minimally dusky distal edges  Extremities: L arm  warm with intact movement and sensation  -L thigh skin graft donor site with tegaderm in place  Chest: incision c/d/I, no crepitus  Drains: All drains in place and holding suction with serosanguinous drainage (stripped)    Assessment/Plan:  54 y/o M with history of nasal SCC s/p total rhinectomy now s/p OR s/p osteocutaneous L RFFF, left radius plating (ortho) nasal reconstruction from prior paramedian/FTSG first stage, R rib graft, L arm wound vac, nasal trumpets, remaining cartilage banked in L chest.     -Continue q12hr flap checks  - NO afrin in the nose    Adarsh Beard MD  ENT [id number]    
7

## 2023-12-12 LAB
ALBUMIN SERPL BCP-MCNC: 3.5 G/DL (ref 3.4–5)
ANION GAP SERPL CALC-SCNC: 14 MMOL/L (ref 10–20)
BUN SERPL-MCNC: 11 MG/DL (ref 6–23)
CALCIUM SERPL-MCNC: 8.9 MG/DL (ref 8.6–10.6)
CHLORIDE SERPL-SCNC: 106 MMOL/L (ref 98–107)
CO2 SERPL-SCNC: 26 MMOL/L (ref 21–32)
CREAT SERPL-MCNC: 0.81 MG/DL (ref 0.5–1.3)
ERYTHROCYTE [DISTWIDTH] IN BLOOD BY AUTOMATED COUNT: 12.1 % (ref 11.5–14.5)
GFR SERPL CREATININE-BSD FRML MDRD: >90 ML/MIN/1.73M*2
GLUCOSE SERPL-MCNC: 91 MG/DL (ref 74–99)
HCT VFR BLD AUTO: 30.8 % (ref 41–52)
HGB BLD-MCNC: 10.4 G/DL (ref 13.5–17.5)
MAGNESIUM SERPL-MCNC: 1.8 MG/DL (ref 1.6–2.4)
MCH RBC QN AUTO: 31.1 PG (ref 26–34)
MCHC RBC AUTO-ENTMCNC: 33.8 G/DL (ref 32–36)
MCV RBC AUTO: 92 FL (ref 80–100)
NRBC BLD-RTO: 0 /100 WBCS (ref 0–0)
PHOSPHATE SERPL-MCNC: 3.9 MG/DL (ref 2.5–4.9)
PLATELET # BLD AUTO: 188 X10*3/UL (ref 150–450)
POTASSIUM SERPL-SCNC: 4 MMOL/L (ref 3.5–5.3)
RBC # BLD AUTO: 3.34 X10*6/UL (ref 4.5–5.9)
SODIUM SERPL-SCNC: 142 MMOL/L (ref 136–145)
WBC # BLD AUTO: 4.4 X10*3/UL (ref 4.4–11.3)

## 2023-12-12 PROCEDURE — RXMED WILLOW AMBULATORY MEDICATION CHARGE

## 2023-12-12 PROCEDURE — 2500000001 HC RX 250 WO HCPCS SELF ADMINISTERED DRUGS (ALT 637 FOR MEDICARE OP): Performed by: STUDENT IN AN ORGANIZED HEALTH CARE EDUCATION/TRAINING PROGRAM

## 2023-12-12 PROCEDURE — 2500000001 HC RX 250 WO HCPCS SELF ADMINISTERED DRUGS (ALT 637 FOR MEDICARE OP)

## 2023-12-12 PROCEDURE — 2500000004 HC RX 250 GENERAL PHARMACY W/ HCPCS (ALT 636 FOR OP/ED): Performed by: NURSE PRACTITIONER

## 2023-12-12 PROCEDURE — 2500000004 HC RX 250 GENERAL PHARMACY W/ HCPCS (ALT 636 FOR OP/ED): Performed by: STUDENT IN AN ORGANIZED HEALTH CARE EDUCATION/TRAINING PROGRAM

## 2023-12-12 PROCEDURE — 1170000001 HC PRIVATE ONCOLOGY ROOM DAILY

## 2023-12-12 PROCEDURE — 96372 THER/PROPH/DIAG INJ SC/IM: CPT | Performed by: STUDENT IN AN ORGANIZED HEALTH CARE EDUCATION/TRAINING PROGRAM

## 2023-12-12 PROCEDURE — 80069 RENAL FUNCTION PANEL: CPT | Performed by: STUDENT IN AN ORGANIZED HEALTH CARE EDUCATION/TRAINING PROGRAM

## 2023-12-12 PROCEDURE — 2500000001 HC RX 250 WO HCPCS SELF ADMINISTERED DRUGS (ALT 637 FOR MEDICARE OP): Performed by: NURSE PRACTITIONER

## 2023-12-12 PROCEDURE — 83735 ASSAY OF MAGNESIUM: CPT | Performed by: STUDENT IN AN ORGANIZED HEALTH CARE EDUCATION/TRAINING PROGRAM

## 2023-12-12 PROCEDURE — 99231 SBSQ HOSP IP/OBS SF/LOW 25: CPT | Performed by: NURSE PRACTITIONER

## 2023-12-12 PROCEDURE — 85027 COMPLETE CBC AUTOMATED: CPT | Performed by: STUDENT IN AN ORGANIZED HEALTH CARE EDUCATION/TRAINING PROGRAM

## 2023-12-12 RX ORDER — FAMOTIDINE 20 MG/1
20 TABLET, FILM COATED ORAL DAILY
Qty: 30 TABLET | Refills: 0 | Status: SHIPPED | OUTPATIENT
Start: 2023-12-13 | End: 2024-01-22 | Stop reason: WASHOUT

## 2023-12-12 RX ORDER — SENNOSIDES 8.6 MG/1
1 TABLET ORAL DAILY
Qty: 15 TABLET | Refills: 0 | Status: SHIPPED | OUTPATIENT
Start: 2023-12-12 | End: 2024-01-03 | Stop reason: HOSPADM

## 2023-12-12 RX ORDER — BISMUTH TRIBROMOPH/PETROLATUM 5"X9"
1 BANDAGE TOPICAL DAILY
Qty: 30 EACH | Refills: 2 | Status: SHIPPED | OUTPATIENT
Start: 2023-12-12 | End: 2024-01-11

## 2023-12-12 RX ORDER — MAGNESIUM SULFATE HEPTAHYDRATE 40 MG/ML
2 INJECTION, SOLUTION INTRAVENOUS ONCE
Status: COMPLETED | OUTPATIENT
Start: 2023-12-12 | End: 2023-12-12

## 2023-12-12 RX ORDER — NON-ADHERENT BANDAGE 6"X3"
1 BANDAGE TOPICAL DAILY
Qty: 30 EACH | Refills: 2 | Status: SHIPPED | OUTPATIENT
Start: 2023-12-12 | End: 2024-01-11

## 2023-12-12 RX ORDER — ASPIRIN 325 MG
325 TABLET ORAL DAILY
Qty: 26 TABLET | Refills: 0 | Status: SHIPPED | OUTPATIENT
Start: 2023-12-13 | End: 2024-01-09

## 2023-12-12 RX ORDER — POLYETHYLENE GLYCOL 3350 17 G/17G
17 POWDER, FOR SOLUTION ORAL DAILY
Qty: 238 G | Refills: 0 | Status: SHIPPED | OUTPATIENT
Start: 2023-12-12 | End: 2024-02-08 | Stop reason: ALTCHOICE

## 2023-12-12 RX ORDER — ACETAMINOPHEN 325 MG/1
975 TABLET ORAL EVERY 8 HOURS PRN
Qty: 30 TABLET | Refills: 0 | Status: SHIPPED | OUTPATIENT
Start: 2023-12-12 | End: 2023-12-22

## 2023-12-12 RX ORDER — PETROLATUM 420 MG/G
1 OINTMENT TOPICAL 3 TIMES DAILY
Qty: 100 G | Refills: 0 | Status: SHIPPED | OUTPATIENT
Start: 2023-12-12 | End: 2024-02-08 | Stop reason: ALTCHOICE

## 2023-12-12 RX ORDER — ACETAMINOPHEN 500 MG
5 TABLET ORAL NIGHTLY PRN
Qty: 10 TABLET | Refills: 0 | Status: SHIPPED | OUTPATIENT
Start: 2023-12-12 | End: 2023-12-24

## 2023-12-12 RX ADMIN — AMPICILLIN SODIUM AND SULBACTAM SODIUM 3 G: 2; 1 INJECTION, POWDER, FOR SOLUTION INTRAMUSCULAR; INTRAVENOUS at 05:13

## 2023-12-12 RX ADMIN — OXYCODONE HYDROCHLORIDE 10 MG: 5 TABLET ORAL at 18:10

## 2023-12-12 RX ADMIN — PETROLATUM 1 APPLICATION: 1 OINTMENT TOPICAL at 21:00

## 2023-12-12 RX ADMIN — PETROLATUM 1 APPLICATION: 1 OINTMENT TOPICAL at 09:00

## 2023-12-12 RX ADMIN — ENOXAPARIN SODIUM 40 MG: 100 INJECTION SUBCUTANEOUS at 22:04

## 2023-12-12 RX ADMIN — SALINE NASAL SPRAY 2 SPRAY: 1.5 SOLUTION NASAL at 08:39

## 2023-12-12 RX ADMIN — AMPICILLIN SODIUM AND SULBACTAM SODIUM 3 G: 2; 1 INJECTION, POWDER, FOR SOLUTION INTRAMUSCULAR; INTRAVENOUS at 00:02

## 2023-12-12 RX ADMIN — Medication 5 MG: at 22:15

## 2023-12-12 RX ADMIN — OXYCODONE HYDROCHLORIDE 10 MG: 5 TABLET ORAL at 05:13

## 2023-12-12 RX ADMIN — SALINE NASAL SPRAY 2 SPRAY: 1.5 SOLUTION NASAL at 22:05

## 2023-12-12 RX ADMIN — SALINE NASAL SPRAY 2 SPRAY: 1.5 SOLUTION NASAL at 05:14

## 2023-12-12 RX ADMIN — SALINE NASAL SPRAY 2 SPRAY: 1.5 SOLUTION NASAL at 00:49

## 2023-12-12 RX ADMIN — ACETAMINOPHEN 975 MG: 325 TABLET ORAL at 22:04

## 2023-12-12 RX ADMIN — FAMOTIDINE 20 MG: 20 TABLET ORAL at 08:19

## 2023-12-12 RX ADMIN — SALINE NASAL SPRAY 2 SPRAY: 1.5 SOLUTION NASAL at 14:09

## 2023-12-12 RX ADMIN — ACETAMINOPHEN 975 MG: 325 TABLET ORAL at 05:13

## 2023-12-12 RX ADMIN — AMPICILLIN SODIUM AND SULBACTAM SODIUM 3 G: 2; 1 INJECTION, POWDER, FOR SOLUTION INTRAMUSCULAR; INTRAVENOUS at 16:58

## 2023-12-12 RX ADMIN — SENNOSIDES 8.6 MG: 8.6 TABLET, FILM COATED ORAL at 22:04

## 2023-12-12 RX ADMIN — AMPICILLIN SODIUM AND SULBACTAM SODIUM 3 G: 2; 1 INJECTION, POWDER, FOR SOLUTION INTRAMUSCULAR; INTRAVENOUS at 22:04

## 2023-12-12 RX ADMIN — SALINE NASAL SPRAY 2 SPRAY: 1.5 SOLUTION NASAL at 18:11

## 2023-12-12 RX ADMIN — PETROLATUM 1 APPLICATION: 1 OINTMENT TOPICAL at 15:00

## 2023-12-12 RX ADMIN — POLYETHYLENE GLYCOL 3350 17 G: 17 POWDER, FOR SOLUTION ORAL at 08:39

## 2023-12-12 RX ADMIN — ASPIRIN 325 MG ORAL TABLET 325 MG: 325 PILL ORAL at 08:19

## 2023-12-12 RX ADMIN — MAGNESIUM SULFATE HEPTAHYDRATE 2 G: 40 INJECTION, SOLUTION INTRAVENOUS at 08:18

## 2023-12-12 RX ADMIN — AMPICILLIN SODIUM AND SULBACTAM SODIUM 3 G: 2; 1 INJECTION, POWDER, FOR SOLUTION INTRAMUSCULAR; INTRAVENOUS at 11:20

## 2023-12-12 RX ADMIN — SENNOSIDES 8.6 MG: 8.6 TABLET, FILM COATED ORAL at 08:19

## 2023-12-12 ASSESSMENT — PAIN SCALES - GENERAL
PAINLEVEL_OUTOF10: 7
PAINLEVEL_OUTOF10: 7
PAINLEVEL_OUTOF10: 5 - MODERATE PAIN
PAINLEVEL_OUTOF10: 3
PAINLEVEL_OUTOF10: 5 - MODERATE PAIN

## 2023-12-12 ASSESSMENT — COGNITIVE AND FUNCTIONAL STATUS - GENERAL
MOVING TO AND FROM BED TO CHAIR: A LITTLE
STANDING UP FROM CHAIR USING ARMS: A LITTLE
EATING MEALS: A LITTLE
DRESSING REGULAR LOWER BODY CLOTHING: A LITTLE
DAILY ACTIVITIY SCORE: 20
DRESSING REGULAR UPPER BODY CLOTHING: A LITTLE
CLIMB 3 TO 5 STEPS WITH RAILING: A LITTLE
WALKING IN HOSPITAL ROOM: A LITTLE
HELP NEEDED FOR BATHING: A LITTLE
TURNING FROM BACK TO SIDE WHILE IN FLAT BAD: A LITTLE
MOBILITY SCORE: 19

## 2023-12-12 ASSESSMENT — PAIN DESCRIPTION - LOCATION: LOCATION: FACE

## 2023-12-12 ASSESSMENT — PAIN - FUNCTIONAL ASSESSMENT
PAIN_FUNCTIONAL_ASSESSMENT: 0-10
PAIN_FUNCTIONAL_ASSESSMENT: 0-10

## 2023-12-12 NOTE — PROGRESS NOTES
Adarsh De La Vega is a 53 y.o. male on day 4 of admission presenting with Squamous cell cancer of skin of nose. No acute events overnight. Flap checks stable.    Subjective   Patient resting in bed on morning rounds. Overnight he had complaints of  his  donor site hand feeling cold along with having new pain. Overnight resident was able to loosen the wrap dressing and that intervention has resolved the issues.     Objective   Physical Exam  Subjective:    Objective:  Vitals reviewed in EMR  Gen: NAD, AOx3, resting comfortably in bed  Eyes: EOMI, sclera clear, PERRL  Ears: Normal external ears bilaterally  Nose: External nose created from paramedian forehead flap, pink, warm, soft, minimally dusky distal edges   -bilateral nasal trumpets in place  Oral Cavity: MMM  Head: normocephalic, atraumatic  Face/Neck:  All incisions c/d/i, neck soft and flat without evidence of hematoma or fluid collection  -Skin paddle visible lining nasal cavity, pink, warm, soft  - L neck penrose in place with SS drainage  -Internal doppler with strong arterial signal   Resp: Breathing comfortably on room air, no stridor  Cards: RRR on Doppler  Gastro: Soft, non-distended, +BM   Chest: incision c/d/I, no crepitus   : Voiding clear yellow urine  MSK: L arm  warm with intact movement and sensation  -L thigh skin graft donor site with tegaderm in place  Psych: Appropriate mood and affect  Drains: All drains in place and holding suction with serosanguinous drainage (stripped)      Last Recorded Vitals  Blood pressure 123/80, pulse 70, temperature 36.1 °C (97 °F), resp. rate 16, SpO2 96 %.  Intake/Output last 3 Shifts:  I/O last 3 completed shifts:  In: 0   Out: 70 [Drains:70]    Relevant Results  Results for orders placed or performed during the hospital encounter of 12/08/23 (from the past 24 hour(s))   CBC   Result Value Ref Range    WBC 4.4 4.4 - 11.3 x10*3/uL    nRBC 0.0 0.0 - 0.0 /100 WBCs    RBC 3.34 (L) 4.50 - 5.90 x10*6/uL     Patient here for AIT. Stated was advised at last visit on 4/21/18 to have a f/u breathing test (angely) after finishing prednisone course. Per patient, finished a course of prednisone yesterday prescribed by Dr. Curry Cruz on 4/11/18.  Stated has intermittent Hemoglobin 10.4 (L) 13.5 - 17.5 g/dL    Hematocrit 30.8 (L) 41.0 - 52.0 %    MCV 92 80 - 100 fL    MCH 31.1 26.0 - 34.0 pg    MCHC 33.8 32.0 - 36.0 g/dL    RDW 12.1 11.5 - 14.5 %    Platelets 188 150 - 450 x10*3/uL   Magnesium   Result Value Ref Range    Magnesium 1.80 1.60 - 2.40 mg/dL   Renal Function Panel   Result Value Ref Range    Glucose 91 74 - 99 mg/dL    Sodium 142 136 - 145 mmol/L    Potassium 4.0 3.5 - 5.3 mmol/L    Chloride 106 98 - 107 mmol/L    Bicarbonate 26 21 - 32 mmol/L    Anion Gap 14 10 - 20 mmol/L    Urea Nitrogen 11 6 - 23 mg/dL    Creatinine 0.81 0.50 - 1.30 mg/dL    eGFR >90 >60 mL/min/1.73m*2    Calcium 8.9 8.6 - 10.6 mg/dL    Phosphorus 3.9 2.5 - 4.9 mg/dL    Albumin 3.5 3.4 - 5.0 g/dL        Scheduled medications  acetaminophen, 975 mg, oral, q8h CESAR   Or  acetaminophen, 1,000 mg, oral, q8h CESAR   Or  acetaminophen, 1,000 mg, g-tube, q8h CESAR  ampicillin-sulbactam, 3 g, intravenous, q6h  aspirin, 325 mg, oral, Daily  enoxaparin, 40 mg, subcutaneous, q24h  famotidine, 20 mg, oral, Daily  magnesium sulfate, 2 g, intravenous, Once  polyethylene glycol, 17 g, oral, Daily  sennosides, 1 tablet, oral, BID  sodium chloride, 2 spray, Each Nostril, q4h  white petrolatum, 1 Application, Topical, TID      Continuous medications     PRN medications  PRN medications: hydrogen peroxide, lubricating eye drops, melatonin, naloxone, naloxone, ondansetron ODT **OR** ondansetron, oxyCODONE, oxyCODONE, oxygen, oxygen     Assessment/Plan   Principal Problem:    Squamous cell cancer of skin of nose  Active Problems:    Nasal lesion    Nasal deformity    Acquired deformity of nose      Assessment:  54 y/o M with history of nasal SCC s/p total rhinectomy now s/p OR s/p osteocutaneous L RFFF, left radius plating (ortho) nasal reconstruction from prior paramedian/FTSG first stage, R rib graft, L arm wound vac, nasal trumpets, remaining cartilage banked in L chest on 12/08/2023 by Dr. Enriquez and Dr. Mcclain.    Active  Issues:  SCC of nose  Acute Blood Loss Anemia  Hypokalemia  Hypomagnesia    Plan:  -Flap Checks: q24 rs  -Drains: Monitor output  -Analgesia:  Dilaudid PCA, Scheduled Acetaminophen  -FEN: mIVFs, regular diet; monitor and replete electrolytes as needed  -Pulm:  wean oxygen to room air, Incentive Spirometer 10x/hr while awake  -ID: Unasyn 3g q6hrs x 4days, stop date 12/12  -Cardiac: Vitals per ENT free flap protocol then transition to Q4hr vitals; Aspirin 325mg daily  -Endo: No Current interventions  -GI: Bowel regimen, PPI,  and PRN anti-emetic  -: Jameson catheter x 48hrs  -Steroids/Special: Incision care per ENT free flap protocol  -Embolic PPx: SQH, SCDs while in bed  -Dispo: [facility]. PT/OT ordered->AM PAC of 20 (low intensity); Discharge planning for POD 6 home with home care. [facility] to send out referral to home care agencies.    All plans discussed with attendings after morning rounds.     I spent 15 minutes in the professional and overall care of this patient.      Flora Mccord APRAPRIL, CNP  Department of Otolaryngology: Head & Neck Surgery  Personal Pager [phone]  ENT Team  Head and Neck Phone: [phone]

## 2023-12-12 NOTE — HOSPITAL COURSE
53 yr old male with SCC of the nose status post partial rhinectomy in May of 2023 and Chemo therapy. Now the patient is s/p osteocutaneous Left Radial Forearm Free Flap; Left Radius plating, Nasal reconstruction from prior paramedian/FTSGfirst stage, right rib graft and wound vac placement on 12/08/2023 with Dr. Enriquez and Dr. Mcclain. Please see operative report for full details. Patient tolerated the procedure well and recovered briefly in PACU before being transitioned to regular nursing floor. Post-op course was uncomplicated. Diet was advanced as tolerated.  IV medication transitioned to oral as diet advanced. The drains were monitored and once the output was less than 30ml in a 24hr time frame they were removed accordingly. On the day of discharge, the pt was tolerating a diet, pain was controlled on PO pain medication, and they were ambulating and voiding spontaneously. Xeroform dressing to forehead remained in place, bilateral nasal trumpets remained in place. They were discharged home in stable condition with instructions to follow up as outpatient.

## 2023-12-12 NOTE — PROGRESS NOTES
Otolaryngology-[facility] Daily Progress Note    Subjective:  No acute events overnight. No new concerns at this time.     Objective:  Vitals reviewed  Gen: NAD, AOx3, resting comfortably in bed  Face/Neck: All incisions c/d/i, neck soft and flat without evidence of hematoma or fluid collection  -Skin paddle visible lining nasal cavity, pink, warm, soft  -L neck penrose in place with SS drainage; milked   -Internal doppler with strong arterial signal   Oral Cavity: MMM  Nose: External nose created from paramedian forehead flap, pink, warm, soft, minimally dusky distal edges  Extremities: L arm warm with intact movement and sensation; cast appropriately tight   -L thigh skin graft donor site with tegaderm in place  Chest: incision c/d/I, no crepitus  Drains: All drains in place and holding suction with serosanguinous drainage (stripped)    Assessment:  54 y/o M with history of nasal SCC s/p total rhinectomy now s/p OR s/p osteocutaneous L RFFF, left radius plating (ortho) nasal reconstruction from prior paramedian/FTSG first stage, R rib graft, L arm wound vac, nasal trumpets, remaining cartilage banked in L chest.     Plan:  - Neuro: tylenol, oxycodone for pain control   - Resp: wean O2 as able  - CV: no needs   - GI: bowel regimen; added Senna, pending BM  - FEN: Monitor and replete electrolytes as needed, tolerating regular diet, continue mIVF  - : Voiding spontaneously  - Heme: follow up CBC  - ID: unasyn periop x 3d  - Drains: monitor output  - Endo: q6 BG checks  - Embolic PPx: SQH, SCDs while in bed  - Dispo: continued care on SCC5; ADOD [facility] 12/14     Patient seen and discussed with attending Dr. Arriaga who agrees with plans.     Jonathan Arvizu DO, PGY3  Otolaryngology - Head & Neck Surgery  [facility]    ENT Consult Pager: [phone]  Head and Neck Phone: [phone]  ENT Peds Pager: [phone]  ENT Subspecialty Team: [place]

## 2023-12-13 LAB
ALBUMIN SERPL BCP-MCNC: 3.5 G/DL (ref 3.4–5)
ANION GAP SERPL CALC-SCNC: 13 MMOL/L (ref 10–20)
BUN SERPL-MCNC: 11 MG/DL (ref 6–23)
CALCIUM SERPL-MCNC: 8.7 MG/DL (ref 8.6–10.6)
CHLORIDE SERPL-SCNC: 106 MMOL/L (ref 98–107)
CO2 SERPL-SCNC: 27 MMOL/L (ref 21–32)
CREAT SERPL-MCNC: 0.91 MG/DL (ref 0.5–1.3)
ERYTHROCYTE [DISTWIDTH] IN BLOOD BY AUTOMATED COUNT: 12.2 % (ref 11.5–14.5)
GFR SERPL CREATININE-BSD FRML MDRD: >90 ML/MIN/1.73M*2
GLUCOSE SERPL-MCNC: 91 MG/DL (ref 74–99)
HCT VFR BLD AUTO: 30.6 % (ref 41–52)
HGB BLD-MCNC: 10.4 G/DL (ref 13.5–17.5)
MAGNESIUM SERPL-MCNC: 2.04 MG/DL (ref 1.6–2.4)
MCH RBC QN AUTO: 31.5 PG (ref 26–34)
MCHC RBC AUTO-ENTMCNC: 34 G/DL (ref 32–36)
MCV RBC AUTO: 93 FL (ref 80–100)
NRBC BLD-RTO: 0 /100 WBCS (ref 0–0)
PHOSPHATE SERPL-MCNC: 4.1 MG/DL (ref 2.5–4.9)
PLATELET # BLD AUTO: 217 X10*3/UL (ref 150–450)
POTASSIUM SERPL-SCNC: 4 MMOL/L (ref 3.5–5.3)
RBC # BLD AUTO: 3.3 X10*6/UL (ref 4.5–5.9)
SODIUM SERPL-SCNC: 142 MMOL/L (ref 136–145)
WBC # BLD AUTO: 4.9 X10*3/UL (ref 4.4–11.3)

## 2023-12-13 PROCEDURE — 2500000001 HC RX 250 WO HCPCS SELF ADMINISTERED DRUGS (ALT 637 FOR MEDICARE OP): Performed by: STUDENT IN AN ORGANIZED HEALTH CARE EDUCATION/TRAINING PROGRAM

## 2023-12-13 PROCEDURE — RXMED WILLOW AMBULATORY MEDICATION CHARGE

## 2023-12-13 PROCEDURE — 85027 COMPLETE CBC AUTOMATED: CPT | Performed by: STUDENT IN AN ORGANIZED HEALTH CARE EDUCATION/TRAINING PROGRAM

## 2023-12-13 PROCEDURE — 2500000005 HC RX 250 GENERAL PHARMACY W/O HCPCS: Performed by: NURSE PRACTITIONER

## 2023-12-13 PROCEDURE — 83735 ASSAY OF MAGNESIUM: CPT | Performed by: STUDENT IN AN ORGANIZED HEALTH CARE EDUCATION/TRAINING PROGRAM

## 2023-12-13 PROCEDURE — 2500000001 HC RX 250 WO HCPCS SELF ADMINISTERED DRUGS (ALT 637 FOR MEDICARE OP): Performed by: NURSE PRACTITIONER

## 2023-12-13 PROCEDURE — 80069 RENAL FUNCTION PANEL: CPT | Performed by: STUDENT IN AN ORGANIZED HEALTH CARE EDUCATION/TRAINING PROGRAM

## 2023-12-13 PROCEDURE — 2500000004 HC RX 250 GENERAL PHARMACY W/ HCPCS (ALT 636 FOR OP/ED): Performed by: STUDENT IN AN ORGANIZED HEALTH CARE EDUCATION/TRAINING PROGRAM

## 2023-12-13 PROCEDURE — 2500000001 HC RX 250 WO HCPCS SELF ADMINISTERED DRUGS (ALT 637 FOR MEDICARE OP)

## 2023-12-13 PROCEDURE — 1170000001 HC PRIVATE ONCOLOGY ROOM DAILY

## 2023-12-13 PROCEDURE — 99231 SBSQ HOSP IP/OBS SF/LOW 25: CPT | Performed by: NURSE PRACTITIONER

## 2023-12-13 PROCEDURE — 96372 THER/PROPH/DIAG INJ SC/IM: CPT | Performed by: STUDENT IN AN ORGANIZED HEALTH CARE EDUCATION/TRAINING PROGRAM

## 2023-12-13 RX ORDER — OXYCODONE HYDROCHLORIDE 5 MG/1
5 TABLET ORAL EVERY 4 HOURS PRN
Qty: 84 TABLET | Refills: 0 | Status: SHIPPED | OUTPATIENT
Start: 2023-12-13 | End: 2024-01-03 | Stop reason: HOSPADM

## 2023-12-13 RX ORDER — EMOLLIENT BASE
1 CREAM (GRAM) TOPICAL 2 TIMES DAILY
Qty: 113 G | Refills: 0 | Status: SHIPPED | OUTPATIENT
Start: 2023-12-13 | End: 2024-01-31 | Stop reason: HOSPADM

## 2023-12-13 RX ADMIN — ACETAMINOPHEN 975 MG: 325 TABLET ORAL at 06:25

## 2023-12-13 RX ADMIN — SALINE NASAL SPRAY 2 SPRAY: 1.5 SOLUTION NASAL at 01:45

## 2023-12-13 RX ADMIN — OXYCODONE HYDROCHLORIDE 10 MG: 5 TABLET ORAL at 03:07

## 2023-12-13 RX ADMIN — AMPICILLIN SODIUM AND SULBACTAM SODIUM 3 G: 2; 1 INJECTION, POWDER, FOR SOLUTION INTRAMUSCULAR; INTRAVENOUS at 06:25

## 2023-12-13 RX ADMIN — ACETAMINOPHEN 975 MG: 325 TABLET ORAL at 23:15

## 2023-12-13 RX ADMIN — SALINE NASAL SPRAY 2 SPRAY: 1.5 SOLUTION NASAL at 15:11

## 2023-12-13 RX ADMIN — OXYCODONE HYDROCHLORIDE 5 MG: 5 TABLET ORAL at 17:43

## 2023-12-13 RX ADMIN — PETROLATUM 1 APPLICATION: 1 OINTMENT TOPICAL at 21:00

## 2023-12-13 RX ADMIN — SALINE NASAL SPRAY 2 SPRAY: 1.5 SOLUTION NASAL at 09:45

## 2023-12-13 RX ADMIN — PETROLATUM 1 APPLICATION: 1 OINTMENT TOPICAL at 15:00

## 2023-12-13 RX ADMIN — PETROLATUM 1 APPLICATION: 1 OINTMENT TOPICAL at 09:00

## 2023-12-13 RX ADMIN — ENOXAPARIN SODIUM 40 MG: 100 INJECTION SUBCUTANEOUS at 23:15

## 2023-12-13 RX ADMIN — SALINE NASAL SPRAY 2 SPRAY: 1.5 SOLUTION NASAL at 20:46

## 2023-12-13 RX ADMIN — Medication 1 APPLICATION: at 21:00

## 2023-12-13 RX ADMIN — SENNOSIDES 8.6 MG: 8.6 TABLET, FILM COATED ORAL at 23:15

## 2023-12-13 RX ADMIN — SALINE NASAL SPRAY 2 SPRAY: 1.5 SOLUTION NASAL at 06:25

## 2023-12-13 RX ADMIN — POLYETHYLENE GLYCOL 3350 17 G: 17 POWDER, FOR SOLUTION ORAL at 15:10

## 2023-12-13 RX ADMIN — ASPIRIN 325 MG ORAL TABLET 325 MG: 325 PILL ORAL at 09:49

## 2023-12-13 RX ADMIN — FAMOTIDINE 20 MG: 20 TABLET ORAL at 09:49

## 2023-12-13 RX ADMIN — Medication 1 APPLICATION: at 09:00

## 2023-12-13 RX ADMIN — ACETAMINOPHEN 975 MG: 325 TABLET ORAL at 14:00

## 2023-12-13 RX ADMIN — OXYCODONE HYDROCHLORIDE 5 MG: 5 TABLET ORAL at 09:48

## 2023-12-13 ASSESSMENT — PAIN SCALES - GENERAL
PAINLEVEL_OUTOF10: 5 - MODERATE PAIN
PAINLEVEL_OUTOF10: 2
PAINLEVEL_OUTOF10: 3
PAINLEVEL_OUTOF10: 7
PAINLEVEL_OUTOF10: 0 - NO PAIN
PAINLEVEL_OUTOF10: 4
PAINLEVEL_OUTOF10: 6

## 2023-12-13 ASSESSMENT — PAIN - FUNCTIONAL ASSESSMENT
PAIN_FUNCTIONAL_ASSESSMENT: 0-10

## 2023-12-13 ASSESSMENT — COGNITIVE AND FUNCTIONAL STATUS - GENERAL
DRESSING REGULAR LOWER BODY CLOTHING: A LITTLE
DAILY ACTIVITIY SCORE: 22
CLIMB 3 TO 5 STEPS WITH RAILING: A LITTLE
MOBILITY SCORE: 23
DRESSING REGULAR UPPER BODY CLOTHING: A LITTLE

## 2023-12-13 ASSESSMENT — PAIN DESCRIPTION - LOCATION
LOCATION: FACE
LOCATION: FACE

## 2023-12-13 NOTE — PROGRESS NOTES
Adarsh De La Vega is a 53 y.o. 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 No acute events overnight. Flap checks stable.    Subjective   Patient did well overnight. He is moving along flap pathway. Patient and spouse have been preparing for discharge.     Objective   Physical Exam  Vitals reviewed in EMR  Gen: NAD, AOx3, resting comfortably in bed  Eyes: EOMI, sclera clear, PERRL  Ears: Normal external ears bilaterally  Nose: External nose created from paramedian forehead flap, pink, warm, soft, minimally dusky distal edges   -bilateral nasal trumpets in place  Oral Cavity: MMM  Head: normocephalic, atraumatic  Face/Neck:  All incisions c/d/i, neck soft and flat without evidence of hematoma or fluid collection  -Skin paddle visible lining nasal cavity, pink, warm, soft  - L neck penrose in place with SS drainage  -Internal doppler with strong arterial signal   Resp: Breathing comfortably on room air, no stridor  Cards: RRR on Doppler  Gastro: Soft, non-distended, +BM   Chest: incision c/d/I, no crepitus   : voiding clear yellow urine  MSK: L arm  warm with intact movement and sensation  -L thigh skin graft donor site with tegaderm in place  Psych: Appropriate mood and affect  Drains: All drains in place and holding suction with serosanguinous drainage (stripped)    Last Recorded Vitals  Blood pressure 124/79, pulse 72, temperature 36.7 °C (98.1 °F), temperature source Temporal, resp. rate 16, SpO2 98 %.  Intake/Output last 3 Shifts:  I/O last 3 completed shifts:  In: -   Out: 53.5 [Drains:53.5]    Relevant Results  Results for orders placed or performed during the hospital encounter of 12/08/23 (from the past 24 hour(s))   CBC   Result Value Ref Range    WBC 4.9 4.4 - 11.3 x10*3/uL    nRBC 0.0 0.0 - 0.0 /100 WBCs    RBC 3.30 (L) 4.50 - 5.90 x10*6/uL    Hemoglobin 10.4 (L) 13.5 - 17.5 g/dL    Hematocrit 30.6 (L) 41.0 - 52.0 %    MCV 93 80 - 100 fL    MCH 31.5 26.0 - 34.0 pg     MCHC 34.0 32.0 - 36.0 g/dL    RDW 12.2 11.5 - 14.5 %    Platelets 217 150 - 450 x10*3/uL      Scheduled medications  acetaminophen, 975 mg, oral, q8h CESAR   Or  acetaminophen, 1,000 mg, oral, q8h CESAR   Or  acetaminophen, 1,000 mg, g-tube, q8h CESAR  aspirin, 325 mg, oral, Daily  enoxaparin, 40 mg, subcutaneous, q24h  famotidine, 20 mg, oral, Daily  polyethylene glycol, 17 g, oral, Daily  sennosides, 1 tablet, oral, BID  sodium chloride, 2 spray, Each Nostril, q4h  white petrolatum, 1 Application, Topical, TID    Continuous medications     PRN medications  PRN medications: hydrogen peroxide, lubricating eye drops, melatonin, naloxone, naloxone, ondansetron ODT **OR** ondansetron, oxyCODONE, oxyCODONE, oxygen, oxygen     Assessment/Plan   Principal Problem:    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 Enriquez and Dr. Mcclain.     Active Issues:  SCC of nose  Acute Blood Loss Anemia  Hypokalemia  Hypomagnesia  Moderate Protein Calorie Malnutrition     Plan:  -Flap Checks: q24hrs  -Drains: Monitor output  -Analgesia:  Scheduled Acetaminophen; PRN Oxycodone  -FEN: mIVFs-discontinued, regular diet; monitor and replete electrolytes as needed  -Pulm: wean oxygen to room air, Incentive Spirometer 10x/hr while awake  -ID: Unasyn 3g q6hrs x 4days, stop date 12/12  -Cardiac: Vitals per ENT free flap protocol then transition to Q4hr vitals; Aspirin 325mg daily  -Endo: No Current interventions  -GI: Bowel regimen, PPI,  and PRN anti-emetic  -: Jameson catheter removed  -Steroids/Special: Incision care per ENT free flap protocol  -Embolic PPx: SQH, SCDs while in  bed  -Dispo: [facility]. PT/OT ordered->AM PAC of 20 (low intensity); Discharge planning for POD 6 home with home care. TCC to send out referral to home care agencies.     All plans discussed with attendings after morning rounds.       I spent 15 minutes in the professional and overall care of this patient.    Flora Mccord APRN, CNP  Certified Family Nurse Practitioner  Nurse Practitioner III  Department of Otolaryngology: Head & Neck Surgery  Personal Pager [phone]  ENT Team  Head and Neck Phone: [phone]

## 2023-12-13 NOTE — CARE PLAN
The patient's goals for the shift include      The clinical goals for the shift include patient will ambulate in halls 3 x this shift 12/13/23 1600  Over the shift, the patient did not make progress toward the following goals. Barriers to progression include pt not ambulating. Recommendations to address these barriers include educating patient on the importance of ambulating.

## 2023-12-14 ENCOUNTER — PHARMACY VISIT (OUTPATIENT)
Dept: PHARMACY | Facility: CLINIC | Age: 53
End: 2023-12-14
Payer: MEDICARE

## 2023-12-14 VITALS
RESPIRATION RATE: 16 BRPM | SYSTOLIC BLOOD PRESSURE: 116 MMHG | DIASTOLIC BLOOD PRESSURE: 75 MMHG | OXYGEN SATURATION: 99 % | HEART RATE: 60 BPM | TEMPERATURE: 97 F

## 2023-12-14 LAB
ALBUMIN SERPL BCP-MCNC: 3.5 G/DL (ref 3.4–5)
ANION GAP SERPL CALC-SCNC: 13 MMOL/L (ref 10–20)
BUN SERPL-MCNC: 11 MG/DL (ref 6–23)
CALCIUM SERPL-MCNC: 8.9 MG/DL (ref 8.6–10.6)
CHLORIDE SERPL-SCNC: 104 MMOL/L (ref 98–107)
CO2 SERPL-SCNC: 28 MMOL/L (ref 21–32)
CREAT SERPL-MCNC: 0.93 MG/DL (ref 0.5–1.3)
ERYTHROCYTE [DISTWIDTH] IN BLOOD BY AUTOMATED COUNT: 12.3 % (ref 11.5–14.5)
GFR SERPL CREATININE-BSD FRML MDRD: >90 ML/MIN/1.73M*2
GLUCOSE SERPL-MCNC: 96 MG/DL (ref 74–99)
HCT VFR BLD AUTO: 30.5 % (ref 41–52)
HGB BLD-MCNC: 10.2 G/DL (ref 13.5–17.5)
MAGNESIUM SERPL-MCNC: 1.91 MG/DL (ref 1.6–2.4)
MCH RBC QN AUTO: 30.6 PG (ref 26–34)
MCHC RBC AUTO-ENTMCNC: 33.4 G/DL (ref 32–36)
MCV RBC AUTO: 92 FL (ref 80–100)
NRBC BLD-RTO: 0 /100 WBCS (ref 0–0)
PHOSPHATE SERPL-MCNC: 4 MG/DL (ref 2.5–4.9)
PLATELET # BLD AUTO: 228 X10*3/UL (ref 150–450)
POTASSIUM SERPL-SCNC: 3.8 MMOL/L (ref 3.5–5.3)
RBC # BLD AUTO: 3.33 X10*6/UL (ref 4.5–5.9)
SODIUM SERPL-SCNC: 141 MMOL/L (ref 136–145)
WBC # BLD AUTO: 5 X10*3/UL (ref 4.4–11.3)

## 2023-12-14 PROCEDURE — 2500000001 HC RX 250 WO HCPCS SELF ADMINISTERED DRUGS (ALT 637 FOR MEDICARE OP): Performed by: STUDENT IN AN ORGANIZED HEALTH CARE EDUCATION/TRAINING PROGRAM

## 2023-12-14 PROCEDURE — 97530 THERAPEUTIC ACTIVITIES: CPT | Mod: GO | Performed by: OCCUPATIONAL THERAPIST

## 2023-12-14 PROCEDURE — 83735 ASSAY OF MAGNESIUM: CPT | Performed by: STUDENT IN AN ORGANIZED HEALTH CARE EDUCATION/TRAINING PROGRAM

## 2023-12-14 PROCEDURE — RXMED WILLOW AMBULATORY MEDICATION CHARGE

## 2023-12-14 PROCEDURE — 85027 COMPLETE CBC AUTOMATED: CPT | Performed by: STUDENT IN AN ORGANIZED HEALTH CARE EDUCATION/TRAINING PROGRAM

## 2023-12-14 PROCEDURE — 99232 SBSQ HOSP IP/OBS MODERATE 35: CPT | Performed by: NURSE PRACTITIONER

## 2023-12-14 PROCEDURE — 2500000004 HC RX 250 GENERAL PHARMACY W/ HCPCS (ALT 636 FOR OP/ED): Performed by: NURSE PRACTITIONER

## 2023-12-14 PROCEDURE — 80069 RENAL FUNCTION PANEL: CPT | Performed by: STUDENT IN AN ORGANIZED HEALTH CARE EDUCATION/TRAINING PROGRAM

## 2023-12-14 RX ORDER — HEPARIN 100 UNIT/ML
5 SYRINGE INTRAVENOUS ONCE
Status: COMPLETED | OUTPATIENT
Start: 2023-12-14 | End: 2023-12-14

## 2023-12-14 RX ORDER — HEPARIN 100 UNIT/ML
5 SYRINGE INTRAVENOUS AS NEEDED
Status: DISCONTINUED | OUTPATIENT
Start: 2023-12-14 | End: 2023-12-14

## 2023-12-14 RX ADMIN — FAMOTIDINE 20 MG: 20 TABLET ORAL at 09:47

## 2023-12-14 RX ADMIN — PETROLATUM 1 APPLICATION: 1 OINTMENT TOPICAL at 09:00

## 2023-12-14 RX ADMIN — SALINE NASAL SPRAY 2 SPRAY: 1.5 SOLUTION NASAL at 05:17

## 2023-12-14 RX ADMIN — SALINE NASAL SPRAY 2 SPRAY: 1.5 SOLUTION NASAL at 09:49

## 2023-12-14 RX ADMIN — ACETAMINOPHEN 975 MG: 325 TABLET ORAL at 05:17

## 2023-12-14 RX ADMIN — ASPIRIN 325 MG ORAL TABLET 325 MG: 325 PILL ORAL at 09:47

## 2023-12-14 RX ADMIN — Medication 1 APPLICATION: at 09:00

## 2023-12-14 RX ADMIN — HEPARIN 500 UNITS: 100 SYRINGE at 12:32

## 2023-12-14 ASSESSMENT — PAIN SCALES - GENERAL: PAINLEVEL_OUTOF10: 4

## 2023-12-14 ASSESSMENT — PAIN DESCRIPTION - LOCATION: LOCATION: HEAD

## 2023-12-14 ASSESSMENT — PAIN DESCRIPTION - ORIENTATION: ORIENTATION: UPPER

## 2023-12-14 NOTE — PROGRESS NOTES
Occupational Therapy    OT Treatment    Patient Name: Adarsh De La Vega  MRN: [id number]  Today's Date: 12/14/2023  Time Calculation  Start Time: 0840  Stop Time: 0855  Time Calculation (min): 15 min         Assessment:  Evaluation/Treatment Tolerance: Patient tolerated treatment well  Medical Staff Made Aware: Yes  End of Session Communication: Bedside nurse  End of Session Patient Position: Bed, 3 rail up, Alarm off, not on at start of session  Evaluation/Treatment Tolerance: Patient tolerated treatment well  Medical Staff Made Aware: Yes          Subjective      12/14/23 0840   OT Last Visit   OT Received On 12/14/23   General   Reason for Referral 54 y/o M with history of nasal SCC s/p total rhinectomy now s/p OR s/p osteocutaneous L RFFF, left radius plating (ortho) nasal reconstruction from prior paramedian/FTSG first stage, R rib graft, L arm wound vac, nasal trumpets, remaining cartilage banked in L chest   Past Medical History Relevant to Rehab Squamous cell carcinoma, facial asymmetry   Prior to Session Communication Physician;Bedside nurse   Patient Position Received Bed, 3 rail up;Alarm off, not on at start of session   General Comment met in bed, cooperative, eager for d/c   Precautions   UE Weight Bearing Status Weight Bearing as Tolerated  (confirmed with surgeon)   Pain Assessment   Pain Assessment   (pain was not a limiting factor)   Cognition   Overall Cognitive Status WFL  (pt grossly flat, limited engagment however cooperative, appropriate)   Splinting   Splinting Education Fitting;Donning;[place];Wear schedule;Precautions   Splinting Prefabricated Dorsal wrist support   Splinting Comments pt issued pre-brent resting hand splint and education provided on proper fit, wear, and care with returned understanding, re-inforced WBAT and implications on ADLs; pt denies further questions or concerns   IP OT Assessment   Evaluation/Treatment Tolerance Patient tolerated treatment well   Medical Staff Made  Aware Yes   End of Session Communication Bedside nurse   End of Session Patient Position Bed, 3 rail up;Alarm off, not on at start of session         Outcome Measures:   Education Documentation  Body Mechanics, taught by Clarita Theodore OT at 12/14/2023 12:10 PM.  Learner: Patient  Readiness: Acceptance  Method: Explanation  Response: Verbalizes Understanding    ADL Training, taught by Clarita Theodore OT at 12/14/2023 12:10 PM.  Learner: Patient  Readiness: Acceptance  Method: Explanation  Response: Verbalizes Understanding    Education Comments  No comments found.            Goals:  Encounter Problems       Encounter Problems (Active)       ADLs       Patient will perform UB and LB bathing with modified independent level of assistance and DME PRN. (Progressing)       Start:  12/11/23    Expected End:  01/01/24            Patient with complete lower body dressing with modified independent level of assistance donning and doffing pants without a zipper/fasteners with PRN adaptive equipment while edge of bed  and standing (Progressing)       Start:  12/11/23    Expected End:  01/01/24            Patient will complete daily grooming tasks brushing teeth and washing face/hair with modified independent level of assistance and PRN adaptive equipment while standing. (Progressing)       Start:  12/11/23    Expected End:  01/01/24               BALANCE       Pt will maintain dynamic standing balance during ADL task with modified independent level of assistance in order to demonstrate decreased risk of falling and improved postural control. (Progressing)       Start:  12/11/23    Expected End:  01/01/24                 MOBILITY       Patient will perform Functional mobility Household distances/Community Distances with modified independent level of assistance and least restrictive device in order to improve safety and functional mobility. (Progressing)       Start:  12/11/23    Expected End:  01/01/24                          Clarita Theodore, OT

## 2023-12-14 NOTE — CARE PLAN
The clinical goals for the shift include remain safe prior to discharge    Patient was safely discharged. RN flushed mediport with heparin before deaccess. Received meds to beds. RN reviewed dc instructions with patient and family. Received dressing supplies. Transport took patient downstairs in wheelchair. Patient safely discharged. Sarika Mauricio RN

## 2023-12-14 NOTE — DISCHARGE SUMMARY
Discharge Diagnosis  Squamous cell cancer of skin of nose    Issues Requiring Follow-Up  Post opvisit    Test Results Pending At Discharge  Pending Labs       Order Current Status    Surgical Pathology Exam In process            Hospital Course  53 yr old male with SCC of the nose status post partial rhinectomy in May of 2023 and Chemo therapy. Now the patient is s/p osteocutaneous Left Radial Forearm Free Flap; Left Radius plating, Nasal reconstruction from prior paramedian/FTSGfirst stage, right rib graft and wound vac placement on 12/08/2023 with Dr. Enriquez and Dr. Mcclain. Please see operative report for full details. Patient tolerated the procedure well and recovered briefly in PACU before being transitioned to regular nursing floor. Post-op course was uncomplicated. Diet was advanced as tolerated.  IV medication transitioned to oral as diet advanced. The drains were monitored and once the output was less than 30ml in a 24hr time frame they were removed accordingly. On the day of discharge, the pt was tolerating a diet, pain was controlled on PO pain medication, and they were ambulating and voiding spontaneously. Xeroform dressing to forehead remained in place, bilateral nasal trumpets remained in place. They were discharged home in stable condition with instructions to follow up as outpatient.      Pertinent Physical Exam At Time of Discharge  Physical Exam  Vitals reviewed in EMR  Gen: NAD, AOx3, resting comfortably in bed  Eyes: EOMI, sclera clear, PERRL  Ears: Normal external ears bilaterally  Nose: External nose created from paramedian forehead flap, pink, warm, soft, minimally dusky distal edges   -bilateral nasal trumpets in place  Oral Cavity: MMM  Head: normocephalic, atraumatic  Face/Neck:  All incisions c/d/i, neck soft and flat without evidence of hematoma or fluid collection  -Skin paddle visible lining nasal cavity, pink, warm, soft  - L neck penrose in place with SS drainage  -Internal doppler with  "strong arterial signal   Resp: Breathing comfortably on room air, no stridor  Cards: RRR on Doppler-removed   Gastro: Soft, non-distended, +BM   Chest: incision c/d/I, no crepitus   : voiding clear yellow urine  MSK: L arm  warm with intact movement and sensation  -L thigh skin graft donor site with tegaderm in place  Psych: Appropriate mood and affect  Drains: All drains removed     Home Medications     Medication List      START taking these medications     acetaminophen 325 mg tablet; Commonly known as: Tylenol; Take 3 tablets   (975 mg) by mouth every 8 hours if needed for mild pain (1 - 3) for up to   10 days.   aspirin 325 mg tablet; Take 1 tablet (325 mg) by mouth once daily for 26   doses. Do not start before December 13, 2023.   Deep Sea Nasal 0.65 % nasal spray; Generic drug: sodium chloride;   Administer 2 sprays into each nostril every 4 hours.   famotidine 20 mg tablet; Commonly known as: Pepcid; Take 1 tablet (20   mg) by mouth once daily. Do not start before December 13, 2023.   gauze bandage 4 1/2 X 22 \" sponge; Apply 1 each topically once daily.   Dressing to free flap site is to be changed daily. Apply Xeroform then   cover with telfa. Wrap with kerlix to hold in place. Wrap with an ace   wrap.   Lubricant Eye Drops ophthalmic solution; Generic drug: lubricating eye   drops; Administer 1 drop into both eyes 2 times a day as needed for dry   eyes.   melatonin 5 mg tablet; Take 1 tablet (5 mg) by mouth as needed at   bedtime for sleep for up to 10 days.   oxyCODONE 5 mg immediate release tablet; Commonly known as: Roxicodone;   Take 1 tablet (5 mg) by mouth every 4 hours if needed for moderate pain (4   - 6) or severe pain (7 - 10) (Take 5mg for moderate pain, take 10mg for   severe pain).   polyethylene glycol 17 gram/dose powder; Commonly known as: Glycolax,   Miralax; Take 17 g by mouth once daily.   senna 8.6 mg tablet; Generic drug: sennosides; Take 1 tablet (8.6 mg) by   mouth once daily for " "15 days.   Telfa Ouchless Non-Adherent 6 X 3 \" bandage; Generic drug: non-adherent   bandage; Apply 1 each topically once daily. Dressing to free flap site is   to be changed daily. Apply Xeroform then cover with telfa. Wrap with   kerlix to hold in place. Wrap with an ace wrap.   Vanicream cream; Generic drug: emollient; Apply 1 Application topically   2 times a day.   white petrolatum 41 % ointment ointment; Commonly known as: Aquaphor;   Apply 1 Application topically 3 times a day.   Xeroform 5 X 9 \" bandage; Generic drug: bismuth tribrom-petrolatum,wh;   Apply 1 each topically once daily. Dressing to free flap site is to be   changed daily. Apply Xeroform then cover with telfa. Wrap with kerlix to   hold in place. Wrap with an ace wrap.     CONTINUE taking these medications     mupirocin 2 % ointment; Commonly known as: Bactroban; Apply ointment   liberally to surgical incision sites three times a day for 14 days       Outpatient Follow-Up  Future Appointments   Date Time Provider Department [place]   12/28/2023  8:30 AM Sami Mcclain MD [id number] [place]   1/22/2024 10:30 AM Dena Mei MD [id number]       Flora Mccord APRN, CNP  Certified Family Nurse Practitioner  Nurse Practitioner III  Department of Otolaryngology: Head & Neck Surgery  Personal Pager [phone]  ENT Team  Head and Neck Phone: [phone]    "

## 2023-12-14 NOTE — PROGRESS NOTES
Pt has been discharged home with wife. [facility] has accepted pt for services, [facility] sent in [facility]. [facility] will supply wd care dressings. Pt given weeks worth of supplies until delivery. Emy Taylor RN TCC

## 2023-12-15 LAB
LABORATORY COMMENT REPORT: NORMAL
PATH REPORT.FINAL DX SPEC: NORMAL
PATH REPORT.GROSS SPEC: NORMAL
PATH REPORT.RELEVANT HX SPEC: NORMAL
PATH REPORT.TOTAL CANCER: NORMAL

## 2023-12-18 NOTE — PROGRESS NOTES
POV s/p 10/31/23 1st stage of subtotal nasal reconstruction, Elevation of forehead flap, prelamination of flap with full thickness skin graft from right arm    12/8/23: Doing well, endorses improved swelling.  Continues daily washes and ointment to surgical sites. Right arm incision healing well with expected mild inflammatory changes, clean dry intact.  Forehead dressing removed revealing flap site well healing, good vascularization and appears without signs of infection or necrosis.     12/28/23: Doing well, endorses improved swelling.  Continues daily washes and ointment to surgical sites. Right arm incision healing well with expected mild inflammatory changes, clean dry intact. Forehead dressing removed revealing flap site well healing, good vascularization and appears without signs of infection or necrosis.      Continued staged nasal reconstruction

## 2023-12-20 ENCOUNTER — PREP FOR PROCEDURE (OUTPATIENT)
Dept: OTOLARYNGOLOGY | Facility: CLINIC | Age: 53
End: 2023-12-20
Payer: COMMERCIAL

## 2023-12-20 DIAGNOSIS — C44.321 SQUAMOUS CELL CANCER OF SKIN OF ALA NASI: ICD-10-CM

## 2023-12-28 ENCOUNTER — OFFICE VISIT (OUTPATIENT)
Dept: OTOLARYNGOLOGY | Facility: CLINIC | Age: 53
End: 2023-12-28
Payer: COMMERCIAL

## 2023-12-28 VITALS — WEIGHT: 180.7 LBS | HEIGHT: 76 IN | BODY MASS INDEX: 22 KG/M2

## 2023-12-28 DIAGNOSIS — J34.89 NASAL LESION: ICD-10-CM

## 2023-12-28 DIAGNOSIS — M95.0 NASAL DEFORMITY: Primary | ICD-10-CM

## 2023-12-28 DIAGNOSIS — M95.0 ACQUIRED DEFORMITY OF NOSE: ICD-10-CM

## 2023-12-28 DIAGNOSIS — Q67.0 FACIAL ASYMMETRY: ICD-10-CM

## 2023-12-28 DIAGNOSIS — M95.0 NASAL DEFECT: ICD-10-CM

## 2023-12-28 PROCEDURE — 99024 POSTOP FOLLOW-UP VISIT: CPT | Performed by: OTOLARYNGOLOGY

## 2023-12-28 RX ORDER — MUPIROCIN 20 MG/G
OINTMENT TOPICAL
Qty: 30 G | Refills: 0 | Status: SHIPPED | OUTPATIENT
Start: 2023-12-28 | End: 2024-01-03 | Stop reason: HOSPADM

## 2023-12-28 RX ORDER — MUPIROCIN 20 MG/G
OINTMENT TOPICAL
Qty: 30 G | Refills: 2 | Status: SHIPPED | OUTPATIENT
Start: 2023-12-28 | End: 2024-01-03 | Stop reason: HOSPADM

## 2023-12-28 ASSESSMENT — PATIENT HEALTH QUESTIONNAIRE - PHQ9
2. FEELING DOWN, DEPRESSED OR HOPELESS: NOT AT ALL
SUM OF ALL RESPONSES TO PHQ9 QUESTIONS 1 & 2: 0
1. LITTLE INTEREST OR PLEASURE IN DOING THINGS: NOT AT ALL

## 2023-12-28 ASSESSMENT — PAIN SCALES - GENERAL: PAINLEVEL: 6

## 2024-01-02 ENCOUNTER — ANESTHESIA EVENT (OUTPATIENT)
Dept: OPERATING ROOM | Facility: HOSPITAL | Age: 54
End: 2024-01-02
Payer: COMMERCIAL

## 2024-01-02 ENCOUNTER — ANESTHESIA (OUTPATIENT)
Dept: OPERATING ROOM | Facility: HOSPITAL | Age: 54
End: 2024-01-02
Payer: COMMERCIAL

## 2024-01-02 ENCOUNTER — HOSPITAL ENCOUNTER (OUTPATIENT)
Facility: HOSPITAL | Age: 54
Discharge: HOME | End: 2024-01-03
Attending: OTOLARYNGOLOGY | Admitting: OTOLARYNGOLOGY
Payer: COMMERCIAL

## 2024-01-02 DIAGNOSIS — C44.321 SQUAMOUS CELL SKIN CANCER, NASAL TIP: Primary | ICD-10-CM

## 2024-01-02 LAB
ABO GROUP (TYPE) IN BLOOD: NORMAL
ANTIBODY SCREEN: NORMAL
RH FACTOR (ANTIGEN D): NORMAL

## 2024-01-02 PROCEDURE — 2500000005 HC RX 250 GENERAL PHARMACY W/O HCPCS: Performed by: NURSE ANESTHETIST, CERTIFIED REGISTERED

## 2024-01-02 PROCEDURE — 30465 REPAIR NASAL STENOSIS: CPT | Performed by: OTOLARYNGOLOGY

## 2024-01-02 PROCEDURE — 7100000002 HC RECOVERY ROOM TIME - EACH INCREMENTAL 1 MINUTE: Performed by: OTOLARYNGOLOGY

## 2024-01-02 PROCEDURE — 2500000001 HC RX 250 WO HCPCS SELF ADMINISTERED DRUGS (ALT 637 FOR MEDICARE OP): Performed by: OTOLARYNGOLOGY

## 2024-01-02 PROCEDURE — A30400 PR RECONSTR NOSE: Performed by: STUDENT IN AN ORGANIZED HEALTH CARE EDUCATION/TRAINING PROGRAM

## 2024-01-02 PROCEDURE — 2500000004 HC RX 250 GENERAL PHARMACY W/ HCPCS (ALT 636 FOR OP/ED): Performed by: NURSE ANESTHETIST, CERTIFIED REGISTERED

## 2024-01-02 PROCEDURE — 86901 BLOOD TYPING SEROLOGIC RH(D): CPT | Performed by: STUDENT IN AN ORGANIZED HEALTH CARE EDUCATION/TRAINING PROGRAM

## 2024-01-02 PROCEDURE — 30520 REPAIR OF NASAL SEPTUM: CPT | Performed by: OTOLARYNGOLOGY

## 2024-01-02 PROCEDURE — G0378 HOSPITAL OBSERVATION PER HR: HCPCS

## 2024-01-02 PROCEDURE — 96372 THER/PROPH/DIAG INJ SC/IM: CPT | Performed by: OTOLARYNGOLOGY

## 2024-01-02 PROCEDURE — 2720000007 HC OR 272 NO HCPCS: Performed by: OTOLARYNGOLOGY

## 2024-01-02 PROCEDURE — 15004 WOUND PREP F/N/HF/G: CPT | Performed by: OTOLARYNGOLOGY

## 2024-01-02 PROCEDURE — 14060 TIS TRNFR E/N/E/L 10 SQ CM/<: CPT | Performed by: OTOLARYNGOLOGY

## 2024-01-02 PROCEDURE — 2500000005 HC RX 250 GENERAL PHARMACY W/O HCPCS: Performed by: OTOLARYNGOLOGY

## 2024-01-02 PROCEDURE — 7100000001 HC RECOVERY ROOM TIME - INITIAL BASE CHARGE: Performed by: OTOLARYNGOLOGY

## 2024-01-02 PROCEDURE — 15733 MUSC MYOQ/FSCQ FLP H&N PEDCL: CPT | Performed by: OTOLARYNGOLOGY

## 2024-01-02 PROCEDURE — A30400 PR RECONSTR NOSE: Performed by: NURSE ANESTHETIST, CERTIFIED REGISTERED

## 2024-01-02 PROCEDURE — 21235 EAR CARTILAGE GRAFT: CPT | Performed by: OTOLARYNGOLOGY

## 2024-01-02 PROCEDURE — 2500000004 HC RX 250 GENERAL PHARMACY W/ HCPCS (ALT 636 FOR OP/ED): Performed by: OTOLARYNGOLOGY

## 2024-01-02 PROCEDURE — 3600000009 HC OR TIME - EACH INCREMENTAL 1 MINUTE - PROCEDURE LEVEL FOUR: Performed by: OTOLARYNGOLOGY

## 2024-01-02 PROCEDURE — P9045 ALBUMIN (HUMAN), 5%, 250 ML: HCPCS | Mod: JZ,JG | Performed by: NURSE ANESTHETIST, CERTIFIED REGISTERED

## 2024-01-02 PROCEDURE — 3700000002 HC GENERAL ANESTHESIA TIME - EACH INCREMENTAL 1 MINUTE: Performed by: OTOLARYNGOLOGY

## 2024-01-02 PROCEDURE — A4217 STERILE WATER/SALINE, 500 ML: HCPCS | Performed by: OTOLARYNGOLOGY

## 2024-01-02 PROCEDURE — 21230 RIB CARTILAGE GRAFT: CPT | Performed by: OTOLARYNGOLOGY

## 2024-01-02 PROCEDURE — 3600000004 HC OR TIME - INITIAL BASE CHARGE - PROCEDURE LEVEL FOUR: Performed by: OTOLARYNGOLOGY

## 2024-01-02 PROCEDURE — 2500000004 HC RX 250 GENERAL PHARMACY W/ HCPCS (ALT 636 FOR OP/ED): Performed by: STUDENT IN AN ORGANIZED HEALTH CARE EDUCATION/TRAINING PROGRAM

## 2024-01-02 PROCEDURE — 96372 THER/PROPH/DIAG INJ SC/IM: CPT | Performed by: STUDENT IN AN ORGANIZED HEALTH CARE EDUCATION/TRAINING PROGRAM

## 2024-01-02 PROCEDURE — 2500000001 HC RX 250 WO HCPCS SELF ADMINISTERED DRUGS (ALT 637 FOR MEDICARE OP): Performed by: STUDENT IN AN ORGANIZED HEALTH CARE EDUCATION/TRAINING PROGRAM

## 2024-01-02 PROCEDURE — 3700000001 HC GENERAL ANESTHESIA TIME - INITIAL BASE CHARGE: Performed by: OTOLARYNGOLOGY

## 2024-01-02 RX ORDER — LIDOCAINE HCL/PF 100 MG/5ML
SYRINGE (ML) INTRAVENOUS AS NEEDED
Status: DISCONTINUED | OUTPATIENT
Start: 2024-01-02 | End: 2024-01-02

## 2024-01-02 RX ORDER — MIDAZOLAM HYDROCHLORIDE 1 MG/ML
INJECTION INTRAMUSCULAR; INTRAVENOUS AS NEEDED
Status: DISCONTINUED | OUTPATIENT
Start: 2024-01-02 | End: 2024-01-02

## 2024-01-02 RX ORDER — HYDROMORPHONE HYDROCHLORIDE 1 MG/ML
0.2 INJECTION, SOLUTION INTRAMUSCULAR; INTRAVENOUS; SUBCUTANEOUS EVERY 5 MIN PRN
Status: DISCONTINUED | OUTPATIENT
Start: 2024-01-02 | End: 2024-01-02 | Stop reason: HOSPADM

## 2024-01-02 RX ORDER — PROPOFOL 10 MG/ML
INJECTION, EMULSION INTRAVENOUS AS NEEDED
Status: DISCONTINUED | OUTPATIENT
Start: 2024-01-02 | End: 2024-01-02

## 2024-01-02 RX ORDER — DEXAMETHASONE SODIUM PHOSPHATE 4 MG/ML
INJECTION, SOLUTION INTRA-ARTICULAR; INTRALESIONAL; INTRAMUSCULAR; INTRAVENOUS; SOFT TISSUE AS NEEDED
Status: DISCONTINUED | OUTPATIENT
Start: 2024-01-02 | End: 2024-01-02

## 2024-01-02 RX ORDER — CEFAZOLIN SODIUM 2 G/100ML
2 INJECTION, SOLUTION INTRAVENOUS EVERY 8 HOURS
Status: DISCONTINUED | OUTPATIENT
Start: 2024-01-02 | End: 2024-01-03 | Stop reason: HOSPADM

## 2024-01-02 RX ORDER — ASPIRIN 325 MG
325 TABLET ORAL DAILY
Status: DISCONTINUED | OUTPATIENT
Start: 2024-01-02 | End: 2024-01-03 | Stop reason: HOSPADM

## 2024-01-02 RX ORDER — ACETAMINOPHEN 325 MG/1
975 TABLET ORAL EVERY 8 HOURS PRN
COMMUNITY

## 2024-01-02 RX ORDER — NORETHINDRONE AND ETHINYL ESTRADIOL 0.5-0.035
KIT ORAL AS NEEDED
Status: DISCONTINUED | OUTPATIENT
Start: 2024-01-02 | End: 2024-01-02

## 2024-01-02 RX ORDER — ONDANSETRON HYDROCHLORIDE 2 MG/ML
4 INJECTION, SOLUTION INTRAVENOUS EVERY 8 HOURS PRN
Status: DISCONTINUED | OUTPATIENT
Start: 2024-01-02 | End: 2024-01-03 | Stop reason: HOSPADM

## 2024-01-02 RX ORDER — LABETALOL HYDROCHLORIDE 5 MG/ML
5 INJECTION, SOLUTION INTRAVENOUS ONCE AS NEEDED
Status: DISCONTINUED | OUTPATIENT
Start: 2024-01-02 | End: 2024-01-02 | Stop reason: HOSPADM

## 2024-01-02 RX ORDER — OMEPRAZOLE 20 MG/1
20 CAPSULE, DELAYED RELEASE ORAL
COMMUNITY
End: 2024-01-22 | Stop reason: WASHOUT

## 2024-01-02 RX ORDER — HYDROGEN PEROXIDE 3 %
1 SOLUTION, NON-ORAL MISCELLANEOUS 3 TIMES DAILY
Status: DISCONTINUED | OUTPATIENT
Start: 2024-01-02 | End: 2024-01-03 | Stop reason: HOSPADM

## 2024-01-02 RX ORDER — AMOXICILLIN 250 MG
2 CAPSULE ORAL NIGHTLY PRN
Status: DISCONTINUED | OUTPATIENT
Start: 2024-01-02 | End: 2024-01-03 | Stop reason: HOSPADM

## 2024-01-02 RX ORDER — OXYCODONE HYDROCHLORIDE 5 MG/1
5 TABLET ORAL EVERY 4 HOURS PRN
Status: DISCONTINUED | OUTPATIENT
Start: 2024-01-02 | End: 2024-01-02 | Stop reason: HOSPADM

## 2024-01-02 RX ORDER — HYDRALAZINE HYDROCHLORIDE 20 MG/ML
5 INJECTION INTRAMUSCULAR; INTRAVENOUS EVERY 30 MIN PRN
Status: DISCONTINUED | OUTPATIENT
Start: 2024-01-02 | End: 2024-01-02 | Stop reason: HOSPADM

## 2024-01-02 RX ORDER — BACITRACIN 500 [USP'U]/G
OINTMENT TOPICAL AS NEEDED
Status: DISCONTINUED | OUTPATIENT
Start: 2024-01-02 | End: 2024-01-02 | Stop reason: HOSPADM

## 2024-01-02 RX ORDER — AMOXICILLIN 250 MG
1 CAPSULE ORAL DAILY
Qty: 14 TABLET | Refills: 0 | Status: SHIPPED | OUTPATIENT
Start: 2024-01-02 | End: 2024-01-22 | Stop reason: WASHOUT

## 2024-01-02 RX ORDER — ACETAMINOPHEN 500 MG
5 TABLET ORAL NIGHTLY PRN
Status: ON HOLD | COMMUNITY
End: 2024-02-27 | Stop reason: ALTCHOICE

## 2024-01-02 RX ORDER — FENTANYL CITRATE 50 UG/ML
INJECTION, SOLUTION INTRAMUSCULAR; INTRAVENOUS AS NEEDED
Status: DISCONTINUED | OUTPATIENT
Start: 2024-01-02 | End: 2024-01-02

## 2024-01-02 RX ORDER — BACITRACIN ZINC 500 UNIT/G
OINTMENT IN PACKET (EA) TOPICAL 3 TIMES DAILY
Status: DISCONTINUED | OUTPATIENT
Start: 2024-01-02 | End: 2024-01-03 | Stop reason: HOSPADM

## 2024-01-02 RX ORDER — PANTOPRAZOLE SODIUM 20 MG/1
20 TABLET, DELAYED RELEASE ORAL
Status: DISCONTINUED | OUTPATIENT
Start: 2024-01-03 | End: 2024-01-03 | Stop reason: HOSPADM

## 2024-01-02 RX ORDER — SODIUM CHLORIDE 0.9 G/100ML
IRRIGANT IRRIGATION AS NEEDED
Status: DISCONTINUED | OUTPATIENT
Start: 2024-01-02 | End: 2024-01-02 | Stop reason: HOSPADM

## 2024-01-02 RX ORDER — ACETAMINOPHEN 500 MG
5 TABLET ORAL NIGHTLY PRN
Status: DISCONTINUED | OUTPATIENT
Start: 2024-01-02 | End: 2024-01-03 | Stop reason: HOSPADM

## 2024-01-02 RX ORDER — OXYCODONE HYDROCHLORIDE 5 MG/1
5 TABLET ORAL EVERY 6 HOURS PRN
Qty: 15 TABLET | Refills: 0 | Status: SHIPPED | OUTPATIENT
Start: 2024-01-02 | End: 2024-01-08

## 2024-01-02 RX ORDER — OXYCODONE HYDROCHLORIDE 5 MG/1
5 TABLET ORAL EVERY 4 HOURS PRN
Status: DISCONTINUED | OUTPATIENT
Start: 2024-01-02 | End: 2024-01-03 | Stop reason: HOSPADM

## 2024-01-02 RX ORDER — HYDROMORPHONE HYDROCHLORIDE 1 MG/ML
INJECTION, SOLUTION INTRAMUSCULAR; INTRAVENOUS; SUBCUTANEOUS AS NEEDED
Status: DISCONTINUED | OUTPATIENT
Start: 2024-01-02 | End: 2024-01-02

## 2024-01-02 RX ORDER — HYDROMORPHONE HYDROCHLORIDE 1 MG/ML
0.4 INJECTION, SOLUTION INTRAMUSCULAR; INTRAVENOUS; SUBCUTANEOUS EVERY 5 MIN PRN
Status: DISCONTINUED | OUTPATIENT
Start: 2024-01-02 | End: 2024-01-02 | Stop reason: HOSPADM

## 2024-01-02 RX ORDER — ENOXAPARIN SODIUM 100 MG/ML
40 INJECTION SUBCUTANEOUS EVERY 24 HOURS
Status: DISCONTINUED | OUTPATIENT
Start: 2024-01-02 | End: 2024-01-03 | Stop reason: HOSPADM

## 2024-01-02 RX ORDER — NALOXONE HYDROCHLORIDE 0.4 MG/ML
0.2 INJECTION, SOLUTION INTRAMUSCULAR; INTRAVENOUS; SUBCUTANEOUS EVERY 5 MIN PRN
Status: DISCONTINUED | OUTPATIENT
Start: 2024-01-02 | End: 2024-01-03 | Stop reason: HOSPADM

## 2024-01-02 RX ORDER — ALBUTEROL SULFATE 0.83 MG/ML
2.5 SOLUTION RESPIRATORY (INHALATION) ONCE AS NEEDED
Status: DISCONTINUED | OUTPATIENT
Start: 2024-01-02 | End: 2024-01-02 | Stop reason: HOSPADM

## 2024-01-02 RX ORDER — LIDOCAINE HYDROCHLORIDE 10 MG/ML
0.1 INJECTION INFILTRATION; PERINEURAL ONCE
Status: DISCONTINUED | OUTPATIENT
Start: 2024-01-02 | End: 2024-01-02 | Stop reason: HOSPADM

## 2024-01-02 RX ORDER — ONDANSETRON HYDROCHLORIDE 2 MG/ML
INJECTION, SOLUTION INTRAVENOUS AS NEEDED
Status: DISCONTINUED | OUTPATIENT
Start: 2024-01-02 | End: 2024-01-02

## 2024-01-02 RX ORDER — OXYCODONE HYDROCHLORIDE 5 MG/1
10 TABLET ORAL EVERY 4 HOURS PRN
Status: DISCONTINUED | OUTPATIENT
Start: 2024-01-02 | End: 2024-01-02 | Stop reason: HOSPADM

## 2024-01-02 RX ORDER — ACETAMINOPHEN 325 MG/1
975 TABLET ORAL EVERY 8 HOURS SCHEDULED
Status: DISCONTINUED | OUTPATIENT
Start: 2024-01-02 | End: 2024-01-03 | Stop reason: HOSPADM

## 2024-01-02 RX ORDER — SODIUM CHLORIDE, SODIUM LACTATE, POTASSIUM CHLORIDE, CALCIUM CHLORIDE 600; 310; 30; 20 MG/100ML; MG/100ML; MG/100ML; MG/100ML
100 INJECTION, SOLUTION INTRAVENOUS CONTINUOUS
Status: DISCONTINUED | OUTPATIENT
Start: 2024-01-02 | End: 2024-01-02 | Stop reason: HOSPADM

## 2024-01-02 RX ORDER — SUCCINYLCHOLINE CHLORIDE 20 MG/ML
INJECTION INTRAMUSCULAR; INTRAVENOUS AS NEEDED
Status: DISCONTINUED | OUTPATIENT
Start: 2024-01-02 | End: 2024-01-02

## 2024-01-02 RX ORDER — PHENYLEPHRINE HCL IN 0.9% NACL 0.4MG/10ML
SYRINGE (ML) INTRAVENOUS AS NEEDED
Status: DISCONTINUED | OUTPATIENT
Start: 2024-01-02 | End: 2024-01-02

## 2024-01-02 RX ORDER — ALBUMIN HUMAN 50 G/1000ML
SOLUTION INTRAVENOUS AS NEEDED
Status: DISCONTINUED | OUTPATIENT
Start: 2024-01-02 | End: 2024-01-02

## 2024-01-02 RX ORDER — LIDOCAINE HYDROCHLORIDE AND EPINEPHRINE 10; 10 MG/ML; UG/ML
INJECTION, SOLUTION INFILTRATION; PERINEURAL AS NEEDED
Status: DISCONTINUED | OUTPATIENT
Start: 2024-01-02 | End: 2024-01-02 | Stop reason: HOSPADM

## 2024-01-02 RX ORDER — PETROLATUM 420 MG/G
1 OINTMENT TOPICAL 3 TIMES DAILY
Status: DISCONTINUED | OUTPATIENT
Start: 2024-01-04 | End: 2024-01-03 | Stop reason: HOSPADM

## 2024-01-02 RX ORDER — FAMOTIDINE 20 MG/1
20 TABLET, FILM COATED ORAL DAILY
Status: DISCONTINUED | OUTPATIENT
Start: 2024-01-02 | End: 2024-01-03 | Stop reason: HOSPADM

## 2024-01-02 RX ORDER — OXYCODONE HYDROCHLORIDE 5 MG/1
10 TABLET ORAL EVERY 6 HOURS PRN
Status: DISCONTINUED | OUTPATIENT
Start: 2024-01-02 | End: 2024-01-03 | Stop reason: HOSPADM

## 2024-01-02 RX ORDER — ONDANSETRON HYDROCHLORIDE 2 MG/ML
4 INJECTION, SOLUTION INTRAVENOUS ONCE AS NEEDED
Status: DISCONTINUED | OUTPATIENT
Start: 2024-01-02 | End: 2024-01-02 | Stop reason: HOSPADM

## 2024-01-02 RX ORDER — CEFAZOLIN 1 G/1
INJECTION, POWDER, FOR SOLUTION INTRAVENOUS AS NEEDED
Status: DISCONTINUED | OUTPATIENT
Start: 2024-01-02 | End: 2024-01-02

## 2024-01-02 RX ORDER — ROCURONIUM BROMIDE 10 MG/ML
INJECTION, SOLUTION INTRAVENOUS AS NEEDED
Status: DISCONTINUED | OUTPATIENT
Start: 2024-01-02 | End: 2024-01-02

## 2024-01-02 RX ADMIN — LIDOCAINE HYDROCHLORIDE 80 MG: 20 INJECTION INTRAVENOUS at 12:55

## 2024-01-02 RX ADMIN — ROCURONIUM BROMIDE 10 MG: 10 INJECTION INTRAVENOUS at 16:09

## 2024-01-02 RX ADMIN — ALBUMIN HUMAN 250 ML: 0.05 INJECTION, SOLUTION INTRAVENOUS at 14:57

## 2024-01-02 RX ADMIN — Medication 160 MCG: at 13:01

## 2024-01-02 RX ADMIN — BACITRACIN 1 APPLICATION: 500 OINTMENT TOPICAL at 21:56

## 2024-01-02 RX ADMIN — Medication 80 MCG: at 13:54

## 2024-01-02 RX ADMIN — ALBUMIN HUMAN 250 ML: 0.05 INJECTION, SOLUTION INTRAVENOUS at 13:31

## 2024-01-02 RX ADMIN — HYDROMORPHONE HYDROCHLORIDE 0.4 MG: 1 INJECTION, SOLUTION INTRAMUSCULAR; INTRAVENOUS; SUBCUTANEOUS at 16:10

## 2024-01-02 RX ADMIN — ENOXAPARIN SODIUM 40 MG: 100 INJECTION SUBCUTANEOUS at 21:55

## 2024-01-02 RX ADMIN — DEXAMETHASONE SODIUM PHOSPHATE 8 MG: 4 INJECTION, SOLUTION INTRA-ARTICULAR; INTRALESIONAL; INTRAMUSCULAR; INTRAVENOUS; SOFT TISSUE at 13:06

## 2024-01-02 RX ADMIN — Medication 120 MCG: at 13:07

## 2024-01-02 RX ADMIN — Medication 120 MCG: at 13:27

## 2024-01-02 RX ADMIN — SALINE NASAL SPRAY 2 SPRAY: 1.5 SOLUTION NASAL at 21:57

## 2024-01-02 RX ADMIN — Medication 80 MCG: at 13:20

## 2024-01-02 RX ADMIN — HYDROGEN PEROXIDE 1 APPLICATION: 30 SOLUTION TOPICAL at 21:55

## 2024-01-02 RX ADMIN — ROCURONIUM BROMIDE 20 MG: 10 INJECTION INTRAVENOUS at 14:12

## 2024-01-02 RX ADMIN — SODIUM CHLORIDE, SODIUM LACTATE, POTASSIUM CHLORIDE, AND CALCIUM CHLORIDE: 600; 310; 30; 20 INJECTION, SOLUTION INTRAVENOUS at 12:14

## 2024-01-02 RX ADMIN — CEFAZOLIN SODIUM 2 G: 2 INJECTION, SOLUTION INTRAVENOUS at 21:54

## 2024-01-02 RX ADMIN — ROCURONIUM BROMIDE 20 MG: 10 INJECTION INTRAVENOUS at 14:17

## 2024-01-02 RX ADMIN — HYDROMORPHONE HYDROCHLORIDE 0.2 MG: 1 INJECTION, SOLUTION INTRAMUSCULAR; INTRAVENOUS; SUBCUTANEOUS at 16:58

## 2024-01-02 RX ADMIN — BACITRACIN 1 APPLICATION: 500 OINTMENT TOPICAL at 22:14

## 2024-01-02 RX ADMIN — HYDROMORPHONE HYDROCHLORIDE 0.4 MG: 1 INJECTION, SOLUTION INTRAMUSCULAR; INTRAVENOUS; SUBCUTANEOUS at 16:55

## 2024-01-02 RX ADMIN — ROCURONIUM BROMIDE 20 MG: 10 INJECTION INTRAVENOUS at 15:24

## 2024-01-02 RX ADMIN — ONDANSETRON 4 MG: 2 INJECTION INTRAMUSCULAR; INTRAVENOUS at 16:20

## 2024-01-02 RX ADMIN — EPHEDRINE SULFATE 10 MG: 50 INJECTION, SOLUTION INTRAVENOUS at 13:54

## 2024-01-02 RX ADMIN — FENTANYL CITRATE 100 MCG: 50 INJECTION, SOLUTION INTRAMUSCULAR; INTRAVENOUS at 12:55

## 2024-01-02 RX ADMIN — EPHEDRINE SULFATE 5 MG: 50 INJECTION, SOLUTION INTRAVENOUS at 13:31

## 2024-01-02 RX ADMIN — SUCCINYLCHOLINE CHLORIDE 160 MG: 20 INJECTION, SOLUTION INTRAMUSCULAR; INTRAVENOUS at 12:45

## 2024-01-02 RX ADMIN — Medication 120 MCG: at 12:57

## 2024-01-02 RX ADMIN — SUGAMMADEX 400 MG: 100 INJECTION, SOLUTION INTRAVENOUS at 16:37

## 2024-01-02 RX ADMIN — MIDAZOLAM HYDROCHLORIDE 2 MG: 1 INJECTION, SOLUTION INTRAMUSCULAR; INTRAVENOUS at 12:07

## 2024-01-02 RX ADMIN — Medication 80 MCG: at 13:39

## 2024-01-02 RX ADMIN — PROPOFOL 200 MG: 10 INJECTION, EMULSION INTRAVENOUS at 12:55

## 2024-01-02 RX ADMIN — EPHEDRINE SULFATE 10 MG: 50 INJECTION, SOLUTION INTRAVENOUS at 13:37

## 2024-01-02 RX ADMIN — CEFAZOLIN 2 G: 330 INJECTION, POWDER, FOR SOLUTION INTRAMUSCULAR; INTRAVENOUS at 13:00

## 2024-01-02 RX ADMIN — SODIUM CHLORIDE, SODIUM LACTATE, POTASSIUM CHLORIDE, AND CALCIUM CHLORIDE: 600; 310; 30; 20 INJECTION, SOLUTION INTRAVENOUS at 14:31

## 2024-01-02 SDOH — SOCIAL STABILITY: SOCIAL INSECURITY: ARE THERE ANY APPARENT SIGNS OF INJURIES/BEHAVIORS THAT COULD BE RELATED TO ABUSE/NEGLECT?: NO

## 2024-01-02 SDOH — SOCIAL STABILITY: SOCIAL INSECURITY: WERE YOU ABLE TO COMPLETE ALL THE BEHAVIORAL HEALTH SCREENINGS?: YES

## 2024-01-02 SDOH — SOCIAL STABILITY: SOCIAL INSECURITY: ABUSE: ADULT

## 2024-01-02 SDOH — SOCIAL STABILITY: SOCIAL INSECURITY: DO YOU FEEL ANYONE HAS EXPLOITED OR TAKEN ADVANTAGE OF YOU FINANCIALLY OR OF YOUR PERSONAL PROPERTY?: NO

## 2024-01-02 SDOH — SOCIAL STABILITY: SOCIAL INSECURITY: HAS ANYONE EVER THREATENED TO HURT YOUR FAMILY OR YOUR PETS?: NO

## 2024-01-02 SDOH — SOCIAL STABILITY: SOCIAL INSECURITY: HAVE YOU HAD THOUGHTS OF HARMING ANYONE ELSE?: NO

## 2024-01-02 SDOH — SOCIAL STABILITY: SOCIAL INSECURITY: DO YOU FEEL UNSAFE GOING BACK TO THE PLACE WHERE YOU ARE LIVING?: NO

## 2024-01-02 SDOH — HEALTH STABILITY: MENTAL HEALTH: CURRENT SMOKER: 0

## 2024-01-02 SDOH — SOCIAL STABILITY: SOCIAL INSECURITY: ARE YOU OR HAVE YOU BEEN THREATENED OR ABUSED PHYSICALLY, EMOTIONALLY, OR SEXUALLY BY ANYONE?: NO

## 2024-01-02 SDOH — SOCIAL STABILITY: SOCIAL INSECURITY: DOES ANYONE TRY TO KEEP YOU FROM HAVING/CONTACTING OTHER FRIENDS OR DOING THINGS OUTSIDE YOUR HOME?: NO

## 2024-01-02 ASSESSMENT — COGNITIVE AND FUNCTIONAL STATUS - GENERAL
PATIENT BASELINE BEDBOUND: NO
MOBILITY SCORE: 24
DAILY ACTIVITIY SCORE: 24

## 2024-01-02 ASSESSMENT — PAIN SCALES - GENERAL
PAINLEVEL_OUTOF10: 0 - NO PAIN
PAINLEVEL_OUTOF10: 0 - NO PAIN
PAIN_LEVEL: 0
PAINLEVEL_OUTOF10: 2
PAINLEVEL_OUTOF10: 5 - MODERATE PAIN
PAINLEVEL_OUTOF10: 0 - NO PAIN

## 2024-01-02 ASSESSMENT — LIFESTYLE VARIABLES
PRESCIPTION_ABUSE_PAST_12_MONTHS: NO
SKIP TO QUESTIONS 9-10: 1
HOW MANY STANDARD DRINKS CONTAINING ALCOHOL DO YOU HAVE ON A TYPICAL DAY: PATIENT DOES NOT DRINK
AUDIT-C TOTAL SCORE: 0
AUDIT-C TOTAL SCORE: 0
HOW OFTEN DO YOU HAVE A DRINK CONTAINING ALCOHOL: NEVER
SUBSTANCE_ABUSE_PAST_12_MONTHS: NO
HOW OFTEN DO YOU HAVE 6 OR MORE DRINKS ON ONE OCCASION: NEVER

## 2024-01-02 ASSESSMENT — ACTIVITIES OF DAILY LIVING (ADL)
LACK_OF_TRANSPORTATION: NO
JUDGMENT_ADEQUATE_SAFELY_COMPLETE_DAILY_ACTIVITIES: YES
GROOMING: INDEPENDENT
ADEQUATE_TO_COMPLETE_ADL: YES
FEEDING YOURSELF: INDEPENDENT
HEARING - RIGHT EAR: FUNCTIONAL
PATIENT'S MEMORY ADEQUATE TO SAFELY COMPLETE DAILY ACTIVITIES?: YES
BATHING: INDEPENDENT
TOILETING: INDEPENDENT
WALKS IN HOME: INDEPENDENT
DRESSING YOURSELF: INDEPENDENT
HEARING - LEFT EAR: FUNCTIONAL

## 2024-01-02 ASSESSMENT — COLUMBIA-SUICIDE SEVERITY RATING SCALE - C-SSRS
6. HAVE YOU EVER DONE ANYTHING, STARTED TO DO ANYTHING, OR PREPARED TO DO ANYTHING TO END YOUR LIFE?: NO
1. IN THE PAST MONTH, HAVE YOU WISHED YOU WERE DEAD OR WISHED YOU COULD GO TO SLEEP AND NOT WAKE UP?: NO
2. HAVE YOU ACTUALLY HAD ANY THOUGHTS OF KILLING YOURSELF?: NO

## 2024-01-02 ASSESSMENT — PAIN - FUNCTIONAL ASSESSMENT
PAIN_FUNCTIONAL_ASSESSMENT: 0-10
PAIN_FUNCTIONAL_ASSESSMENT: 0-10

## 2024-01-02 NOTE — ANESTHESIA PREPROCEDURE EVALUATION
"Patient: Adarsh De La Vega    Procedure Information       Date/Time: 24 1300    Procedure: Nasal reconstruction, Revision forehead flap; TOTAL CASE LENGTH= 4 hours    Location: [facility] OR  /  [facility] OR    Surgeons: Sami Mcclain MD        ALLERGIES:  No Known Allergies     MEDICAL HISTORY:  Past Medical History:   Diagnosis Date    GERD (gastroesophageal reflux disease)     Head and neck cancer (CMS/HCC)         Relevant Problems   GI   (+) Gastroesophageal reflux disease        SURGICAL HISTORY:  Past Surgical History:   Procedure Laterality Date    AMPUTATION      APPENDECTOMY      CHOLECYSTECTOMY      SINUS SURGERY          VITALS:      2023     8:18 AM 2023     8:47 AM 2023     9:57 PM   Vitals   Systolic  116 120   Diastolic  75 77   Heart Rate  60 62   Temp  36.1 °C (97 °F) 36.6 °C (97.9 °F)   Resp  16 16   Height (in) 1.93 m (6' 4\")     Weight (lb) 180.7     BMI 22 kg/m2     BSA (m2) 2.1 m2     Visit Report Report         LABS:   BMP   Lab Results   Component Value Date    GLUCOSE 96 2023    CALCIUM 8.9 2023     2023    K 3.8 2023    CO2 28 2023     2023    BUN 11 2023    CREATININE 0.93 2023   , CBC  Lab Results   Component Value Date    WBC 5.0 2023    HGB 10.2 (L) 2023    HCT 30.5 (L) 2023    MCV 92 2023     2023          SOCIAL:  Social History     Tobacco Use   Smoking Status Former    Packs/day: 1.50    Years: 30.00    Additional pack years: 0.00    Total pack years: 45.00    Types: Cigarettes    Quit date: 2023    Years since quittin.8   Smokeless Tobacco Never      Social History     Substance and Sexual Activity   Alcohol Use Never      Social History     Substance and Sexual Activity   Drug Use Never        NPO STATUS:  No data recorded    Clinical Areas Reviewed:                 Physical Exam    Airway  TM distance: >3 FB  Neck ROM: full   "   Cardiovascular   Rhythm: regular  Rate: normal     Dental    Pulmonary   Comments: Non labored resp   Abdominal            Anesthesia Plan    ASA 3     general     The patient is not a current smoker.    intravenous induction   Postoperative administration of opioids is intended.  Trial extubation is planned.  Anesthetic plan and risks discussed with patient.  Use of blood products discussed with patient who.    Plan discussed with CAA and CRNA.

## 2024-01-02 NOTE — PROGRESS NOTES
"Pharmacy Medication History Review    Adarsh De La Vega is a 53 y.o. male admitted for Squamous cell cancer of skin of [facility]. Pharmacy reviewed the patient's [id number] medications and allergies for accuracy.    The list below reflects the updated PTA list. Comments regarding how patient may be taking medications differently can be found in the Admit Orders Activity  Prior to Admission Medications   Prescriptions Last Dose Informant Patient Reported?   acetaminophen (Tylenol) 325 mg tablet 12/31/2023  Yes   Sig: Take 3 tablets (975 mg) by mouth every 8 hours if needed for mild pain (1 - 3).   aspirin 325 mg tablet 1/1/2024  No   Sig: Take 1 tablet (325 mg) by mouth once daily for 26 doses. Do not start before December 13, 2023.   bismuth tribrom-petrolatum,wh (Xeroform) 5 X 9 \" bandage   No   Sig: Apply 1 each topically once daily. Dressing to free flap site is to be changed daily. Apply Xeroform then cover with telfa. Wrap with kerlix to hold in place. Wrap with an ace wrap.   emollient (Vanicream) cream   No   Sig: Apply 1 Application topically 2 times a day.   famotidine (Pepcid) 20 mg tablet 1/1/2024  No   Sig: Take 1 tablet (20 mg) by mouth once daily. Do not start before December 13, 2023.   gauze bandage 4 1/2 X 22 \" sponge   No   Sig: Apply 1 each topically once daily. Dressing to free flap site is to be changed daily. Apply Xeroform then cover with telfa. Wrap with kerlix to hold in place. Wrap with an ace wrap.   lubricating eye drops ophthalmic solution Past Month  No   Sig: Administer 1 drop into both eyes 2 times a day as needed for dry eyes.   melatonin 5 mg tablet Past Month  Yes   Sig: Take 1 tablet (5 mg) by mouth as needed at bedtime for sleep.   mupirocin (Bactroban) 2 % ointment Unknown  No   Sig: Apply ointment liberally to surgical incision sites three times a day for 14 days   mupirocin (Bactroban) 2 % ointment Unknown  No   Sig: Apply ointment liberally to surgical incision sites " "three times a day for 14 days   mupirocin (Bactroban) 2 % ointment Unknown  No   Sig: Apply ointment liberally to surgical incision sites three times a day for 14 days   non-adherent bandage (Telfa Ouchless Non-Adherent) 6 X 3 \" bandage   No   Sig: Apply 1 each topically once daily. Dressing to free flap site is to be changed daily. Apply Xeroform then cover with telfa. Wrap with kerlix to hold in place. Wrap with an ace wrap.   omeprazole (PriLOSEC) 20 mg DR capsule Past Month  Yes   Sig: Take 1 capsule (20 mg) by mouth once daily in the morning. Take before meals. Do not crush or chew.   oxyCODONE (Roxicodone) 5 mg immediate release tablet 12/30/2023  No   Sig: Take 1 tablet (5 mg) by mouth every 4 hours if needed for moderate pain (4 - 6) or severe pain (7 - 10) (Take 5mg for moderate pain, take 10mg for severe pain).   polyethylene glycol (Glycolax, Miralax) 17 gram/dose powder   No   Sig: Take 17 g by mouth once daily.   sennosides (Senokot) 8.6 mg tablet 12/31/2023  No   Sig: Take 1 tablet (8.6 mg) by mouth once daily for 15 days.   sodium chloride (Ocean) 0.65 % nasal spray 1/1/2024  No   Sig: Administer 2 sprays into each nostril every 4 hours.   white petrolatum 41 % ointment ointment Unknown  No   Sig: Apply 1 Application topically 3 times a day.      Facility-Administered Medications: None        The list below reflects the updated allergy list. Please review each documented allergy for additional clarification and justification.  Allergies  Reviewed by Grace Nelson [facility] on 1/2/2024   No Known Allergies         Patient accepts M2B at discharge. [facility]    Sources used to confirm home medication list include: Patient interview, OARRS, Care Everywhere, medication fill history.    Below are additional concerns with the patient's PTA list.  None to note.    Grace Nelson [facility]   Transitions of Care Pharmacist   Meds Ambulatory and Retail Services  Please " reach out via Secure Chat for questions, or if no response call Pharmacopeia or [facility]

## 2024-01-02 NOTE — BRIEF OP NOTE
Date: 2024  OR Location: [facility] OR    Name: Adarsh De La Vega, : 1970, Age: 53 y.o., MRN: [id number], Sex: male    Diagnosis  Pre-op Diagnosis     * Squamous cell cancer of skin of ala nasi [C44.321] Post-op Diagnosis     * Squamous cell cancer of skin of ala nasi [C44.321]     Procedures  Complex nasal reconstruction  Left conchal cartilage graft  [place] and utilization of right sided costal cartilage    Surgeon:     * Sami Mcclain - Primary    Resident/Fellow/Other Assistant:  Surgeon(s) and Role:     * Manjula Kay MD - Resident - Assisting    Procedure Summary  Anesthesia: General  ASA: III  Anesthesia Staff: Anesthesiologist: Debbie Jane MD; Daniela Aguilera MD; Nannette Dewey MD  CRNA: JENNI Haney-CRNA  C-AA: BARBI Pop; BARBI Becerra  Estimated Blood Loss: 25mL  Intra-op Medications:   Medication Name Total Dose   sodium chloride 0.9 % irrigation solution 2,000 mL   balanced salts (BSS Plus) intraocular solution 15 mL              Anesthesia Record               Intraprocedure I/O Totals          Intake    LR 1100.00 mL    Total Intake 1100 mL       Output    Urine 260 mL    Est. Blood Loss 40 mL    Total Output 300 mL       Net    Net Volume 800 mL          Specimen: No specimens collected     Staff:   Circulator: Carson Pedraza RN  Relief Circulator: Adarsh Chaidez RN  Relief Scrub: Maru Mejia; Matt Billy  Scrub Person: Karlene Aguero RN; Dimple Levy RN; Moshe Wilson          Findings: well vascularized previous paramedian flap, dehiscence of left neck incision from previous surgery    Complications:  None; patient tolerated the procedure well.     Disposition: PACU - hemodynamically stable.  Condition: stable  Specimens Collected: No specimens collected  Attending Attestation: I was present and scrubbed for the entire procedure.    Sami Mcclain  Phone Number: [phone]

## 2024-01-02 NOTE — ANESTHESIA POSTPROCEDURE EVALUATION
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    Anesthesia Post Evaluation    Patient location during evaluation: PACU  Patient participation: complete - patient participated  Level of consciousness: awake and alert  Pain score: 0  Pain management: adequate  Airway patency: patent  Two or more strategies used to mitigate risk of obstructive sleep apnea  Cardiovascular status: acceptable and stable  Respiratory status: acceptable  Hydration status: acceptable  Postoperative Nausea and Vomiting: none  Comments: VSS, pain level -tolerable to pt. Dilaudid given in pacu.        No notable events documented.

## 2024-01-02 NOTE — ANESTHESIA PROCEDURE NOTES
Airway  Date/Time: 1/2/2024 12:59 PM  Urgency: elective    Airway not difficult    Staffing  Performed: CRNA and attending   Authorized by: Debbie Jane MD    Performed by: JENNI Haney-GIULIANA  Patient location during procedure: OR    Indications and Patient Condition  Indications for airway management: anesthesia and airway protection  Spontaneous ventilation: present  Sedation level: deep  Preoxygenated: yes  Patient position: sniffing  Mask difficulty assessment: 0 - not attempted  Planned trial extubation    Final Airway Details  Final airway type: endotracheal airway      Successful airway: ETT     Successful intubation technique: video laryngoscopy  Facilitating devices/methods: intubating stylet  Blade size: #4  ETT size (mm): 7.0  Cormack-Lehane Classification: grade I - full view of glottis  Placement verified by: chest auscultation, capnometry and palpation of cuff   Measured from: lips (taped at midline)  ETT to lips (cm): 22  Number of attempts at approach: 1    Additional Comments  Lips and teeth in preinduction condition

## 2024-01-02 NOTE — DISCHARGE SUMMARY
"Discharge Diagnosis  Squamous cell cancer of skin of ala nasi    Issues Requiring Follow-Up  Nasal deformity s/p staged nasal reconstruction    Test Results Pending At Discharge  Pending Labs       No current pending labs.            Hospital Course   53 yr old male with SCC s/p previous rhinectomy and reconstruction with paramedian forehead flap and radial forearm osseocutaneous free flap who is now s/p complex nasal reconstruction on 1/2/24 with Dr. Mcclain. Please see operative report for full details. Patient tolerated the procedure well and recovered briefly in PACU before being transitioned to regular nursing floor. Post-op course was uncomplicated. Diet was advanced as tolerated.  IV medication transitioned to oral as diet advanced. On the day of discharge, the pt was tolerating a diet, pain was controlled on PO pain medication, and they were ambulating and voiding spontaneously. They were discharged home in stable condition with instructions to follow up as outpatient.     Pertinent Physical Exam At Time of Discharge  Physical Exam  HENT:      Head: Normocephalic and atraumatic.      Right Ear: External ear normal.      Left Ear: External ear normal.      Nose:      Comments: S/p paramedian free flap with pedicle up, incisions intact to the right ala, nasal trumpets in place  Neurological:      Mental Status: He is alert.     Chest incision intact    Home Medications     Medication List      CONTINUE taking these medications     gauze bandage 4 1/2 X 22 \" sponge; Apply 1 each topically once daily.   Dressing to free flap site is to be changed daily. Apply Xeroform then   cover with telfa. Wrap with kerlix to hold in place. Wrap with an ace   wrap.   Telfa Ouch[place] Non-Adherent 6 X 3 \" bandage; Generic drug: non-adherent   bandage; Apply 1 each topically once daily. Dressing to free flap site is   to be changed daily. Apply Xeroform then cover with telfa. Wrap with   kerlix to hold in place. Wrap with an ace " "wrap.   Xeroform 5 X 9 \" bandage; Generic drug: bismuth tribrom-petrolatum,wh;   Apply 1 each topically once daily. Dressing to free flap site is to be   changed daily. Apply Xeroform then cover with telfa. Wrap with kerlix to   hold in place. Wrap with an ace wrap.     ASK your doctor about these medications     acetaminophen 325 mg tablet; Commonly known as: Tylenol   aspirin 325 mg tablet; Take 1 tablet (325 mg) by mouth once daily for 26   doses. Do not start before December 13, 2023.   Deep Sea Nasal 0.65 % nasal spray; Generic drug: sodium chloride;   Administer 2 sprays into each nostril every 4 hours.   famotidine 20 mg tablet; Commonly known as: Pepcid; Take 1 tablet (20   mg) by mouth once daily. Do not start before December 13, 2023.   Lubricant Eye Drops ophthalmic solution; Generic drug: lubricating eye   drops; Administer 1 drop into both eyes 2 times a day as needed for dry   eyes.   melatonin 5 mg tablet   * mupirocin 2 % ointment; Commonly known as: Bactroban; Apply ointment   liberally to surgical incision sites three times a day for 14 days   * mupirocin 2 % ointment; Commonly known as: Bactroban; Apply ointment   liberally to surgical incision sites three times a day for 14 days   * mupirocin 2 % ointment; Commonly known as: Bactroban; Apply ointment   liberally to surgical incision sites three times a day for 14 days   omeprazole 20 mg DR capsule; Commonly known as: PriLOSEC   oxyCODONE 5 mg immediate release tablet; Commonly known as: Roxicodone;   Take 1 tablet (5 mg) by mouth every 4 hours if needed for moderate pain (4   - 6) or severe pain (7 - 10) (Take 5mg for moderate pain, take 10mg for   severe pain).   polyethylene glycol 17 gram/dose powder; Commonly known as: Glycolax,   Miralax; Take 17 g by mouth once daily.   senna 8.6 mg tablet; Generic drug: sennosides; Take 1 tablet (8.6 mg) by   mouth once daily for 15 days.; Ask about: Should I take this medication?   Vanicream cream; " Generic drug: emollient; Apply 1 Application topically   2 times a day.   white petrolatum 41 % ointment ointment; Commonly known as: Aquaphor;   Apply 1 Application topically 3 times a day.  * This list has 3 medication(s) that are the same as other medications   prescribed for you. Read the directions carefully, and ask your doctor or   other care provider to review them with you.       Outpatient Follow-Up  Future Appointments   Date Time Provider Department Center   1/22/2024 10:30 AM Dena Mei MD [id number]       Manjula Kay MD

## 2024-01-02 NOTE — DISCHARGE INSTRUCTIONS
DISCHARGE INSTRUCTIONS    Adarsh De La Vega  [id number]    The following is a brief overview of your hospitalization. Some of the information contained on this summary may be confidential.  This information should be kept in your records and should be shared with your regular doctor.    Admission Date:1/2/2024  9:46 AM  Discharge Date: 1/3/24      Other Diagnosis: Patient Active Hospital Problem List:    Patient Active Problem List   Diagnosis    Granulation tissue of site of gastrostomy    Hypernatremia    Nasal lesion    Squamous cell carcinoma of nose    Nasal bleeding    Nasal defect    Facial asymmetry    Squamous cell skin cancer, nasal tip    Acquired deformity of nose    Gastroesophageal reflux disease    Squamous cell cancer of skin of nose    Oropharyngeal dysphagia    Nasal deformity    Squamous cell cancer of skin of ala nasi       Physicians:               Attending: Sarahi    Treatment/wound care:   Keep area(s) clean and dry.   It is okay to shower 48 hours after time of surgery.    Do not scrub wound(s), pat dry.    Do not submerge wound(s) in standing water until seen in followup (no tub bathing, swimming, or hot tubs).    Please visually inspect your wound(s) at least once daily.  If the wound(s) are in a difficult to see location, please use a mirror or have someone else assist with visual inspection.    If you have sutures that you can see outside of the skin or staples: Do not remove the staples/sutures on your own.  Return sooner or call if wound(s) or surrounding area have increased swelling, pain, warmth, redness, or drainage that is thick, yellow and/or green.    Pain Control:    Adequate management:   Oxycodone can be taken as prescribed as needed for breakthrough pain.    This is a narcotic, which can induce constipation.   Please take stool softeners when taking this medication to prevent constipation.    Activity after Discharge:   No heavy lifting, weight bearing as tolerated, no  driving until mobility is returned to normal.   Do not submerge wound(s) in standing water for 7 days after surgery (no tub bathing, swimming, or hot tubs).   Do not lift, push or pull more than 10 pounds or drive for 6 weeks and do not drive or operate heavy machinery while taking narcotic pain medications as these medications can alter perception, impair judgement, and slow reaction times.    Diet: regular    Follow-Up:   - Dr. Mcclain's office will call to schedule follow up.      Future Appointments   Date Time Provider Department Center   1/22/2024 10:30 AM Dena Mei MD [address]         Additional Instructions:   Facial/Neck Incision Care: Cleanse all facial/neck incisions twice daily with baby shampoo or mild soap and water. Apply petroleum jelly/vaseline to incision line twice daily until incision has healed.     Nasal trumpets: will remain in place until follow up appointment. [place] spray to the bilateral nares 3x a day to prevent crusting    Replace nugauze packing gently in your left neck once a day. Soak gauze is saline and place in the wound bed. Leave a tail for easy removal. You can cover this area with a piece of gauze and tape.

## 2024-01-03 ENCOUNTER — PHARMACY VISIT (OUTPATIENT)
Dept: PHARMACY | Facility: CLINIC | Age: 54
End: 2024-01-03
Payer: MEDICARE

## 2024-01-03 VITALS
HEART RATE: 57 BPM | RESPIRATION RATE: 16 BRPM | DIASTOLIC BLOOD PRESSURE: 70 MMHG | TEMPERATURE: 97.9 F | WEIGHT: 176.59 LBS | BODY MASS INDEX: 21.5 KG/M2 | HEIGHT: 76 IN | OXYGEN SATURATION: 96 % | SYSTOLIC BLOOD PRESSURE: 110 MMHG

## 2024-01-03 PROBLEM — C44.90 SKIN CANCER: Status: ACTIVE | Noted: 2024-01-03

## 2024-01-03 LAB
ALBUMIN SERPL BCP-MCNC: 3.8 G/DL (ref 3.4–5)
ALBUMIN SERPL BCP-MCNC: 4 G/DL (ref 3.4–5)
ANION GAP SERPL CALC-SCNC: 13 MMOL/L (ref 10–20)
ANION GAP SERPL CALC-SCNC: 15 MMOL/L (ref 10–20)
BUN SERPL-MCNC: 14 MG/DL (ref 6–23)
BUN SERPL-MCNC: 14 MG/DL (ref 6–23)
CALCIUM SERPL-MCNC: 9.6 MG/DL (ref 8.6–10.6)
CALCIUM SERPL-MCNC: 9.6 MG/DL (ref 8.6–10.6)
CHLORIDE SERPL-SCNC: 104 MMOL/L (ref 98–107)
CHLORIDE SERPL-SCNC: 104 MMOL/L (ref 98–107)
CO2 SERPL-SCNC: 24 MMOL/L (ref 21–32)
CO2 SERPL-SCNC: 26 MMOL/L (ref 21–32)
CREAT SERPL-MCNC: 0.93 MG/DL (ref 0.5–1.3)
CREAT SERPL-MCNC: 0.96 MG/DL (ref 0.5–1.3)
ERYTHROCYTE [DISTWIDTH] IN BLOOD BY AUTOMATED COUNT: 12.8 % (ref 11.5–14.5)
GFR SERPL CREATININE-BSD FRML MDRD: >90 ML/MIN/1.73M*2
GFR SERPL CREATININE-BSD FRML MDRD: >90 ML/MIN/1.73M*2
GLUCOSE SERPL-MCNC: 110 MG/DL (ref 74–99)
GLUCOSE SERPL-MCNC: 113 MG/DL (ref 74–99)
HCT VFR BLD AUTO: 31.4 % (ref 41–52)
HGB BLD-MCNC: 10.4 G/DL (ref 13.5–17.5)
MAGNESIUM SERPL-MCNC: 1.85 MG/DL (ref 1.6–2.4)
MCH RBC QN AUTO: 30.3 PG (ref 26–34)
MCHC RBC AUTO-ENTMCNC: 33.1 G/DL (ref 32–36)
MCV RBC AUTO: 92 FL (ref 80–100)
NRBC BLD-RTO: 0 /100 WBCS (ref 0–0)
PHOSPHATE SERPL-MCNC: 4.3 MG/DL (ref 2.5–4.9)
PHOSPHATE SERPL-MCNC: 4.3 MG/DL (ref 2.5–4.9)
PLATELET # BLD AUTO: 202 X10*3/UL (ref 150–450)
POTASSIUM SERPL-SCNC: 3.9 MMOL/L (ref 3.5–5.3)
POTASSIUM SERPL-SCNC: 4 MMOL/L (ref 3.5–5.3)
RBC # BLD AUTO: 3.43 X10*6/UL (ref 4.5–5.9)
SODIUM SERPL-SCNC: 139 MMOL/L (ref 136–145)
SODIUM SERPL-SCNC: 139 MMOL/L (ref 136–145)
WBC # BLD AUTO: 9 X10*3/UL (ref 4.4–11.3)

## 2024-01-03 PROCEDURE — RXMED WILLOW AMBULATORY MEDICATION CHARGE

## 2024-01-03 PROCEDURE — 85027 COMPLETE CBC AUTOMATED: CPT | Performed by: STUDENT IN AN ORGANIZED HEALTH CARE EDUCATION/TRAINING PROGRAM

## 2024-01-03 PROCEDURE — G0378 HOSPITAL OBSERVATION PER HR: HCPCS

## 2024-01-03 PROCEDURE — 2500000004 HC RX 250 GENERAL PHARMACY W/ HCPCS (ALT 636 FOR OP/ED): Performed by: STUDENT IN AN ORGANIZED HEALTH CARE EDUCATION/TRAINING PROGRAM

## 2024-01-03 PROCEDURE — 2500000001 HC RX 250 WO HCPCS SELF ADMINISTERED DRUGS (ALT 637 FOR MEDICARE OP): Performed by: STUDENT IN AN ORGANIZED HEALTH CARE EDUCATION/TRAINING PROGRAM

## 2024-01-03 PROCEDURE — 83735 ASSAY OF MAGNESIUM: CPT | Performed by: STUDENT IN AN ORGANIZED HEALTH CARE EDUCATION/TRAINING PROGRAM

## 2024-01-03 PROCEDURE — 80069 RENAL FUNCTION PANEL: CPT | Performed by: STUDENT IN AN ORGANIZED HEALTH CARE EDUCATION/TRAINING PROGRAM

## 2024-01-03 PROCEDURE — 7100000011 HC EXTENDED STAY RECOVERY HOURLY - NURSING UNIT

## 2024-01-03 RX ADMIN — ACETAMINOPHEN 975 MG: 325 TABLET ORAL at 06:58

## 2024-01-03 RX ADMIN — CEFAZOLIN SODIUM 2 G: 2 INJECTION, SOLUTION INTRAVENOUS at 04:38

## 2024-01-03 RX ADMIN — SALINE NASAL SPRAY 2 SPRAY: 1.5 SOLUTION NASAL at 02:32

## 2024-01-03 RX ADMIN — BACITRACIN 1 APPLICATION: 500 OINTMENT TOPICAL at 08:50

## 2024-01-03 RX ADMIN — FAMOTIDINE 20 MG: 20 TABLET ORAL at 08:50

## 2024-01-03 RX ADMIN — SALINE NASAL SPRAY 2 SPRAY: 1.5 SOLUTION NASAL at 06:59

## 2024-01-03 RX ADMIN — HYDROGEN PEROXIDE 1 APPLICATION: 30 SOLUTION TOPICAL at 08:50

## 2024-01-03 RX ADMIN — ASPIRIN 325 MG ORAL TABLET 325 MG: 325 PILL ORAL at 08:50

## 2024-01-03 RX ADMIN — PANTOPRAZOLE SODIUM 20 MG: 20 TABLET, DELAYED RELEASE ORAL at 06:59

## 2024-01-03 ASSESSMENT — COGNITIVE AND FUNCTIONAL STATUS - GENERAL
MOBILITY SCORE: 24
DAILY ACTIVITIY SCORE: 24

## 2024-01-03 ASSESSMENT — PAIN SCALES - GENERAL
PAINLEVEL_OUTOF10: 4
PAINLEVEL_OUTOF10: 0 - NO PAIN

## 2024-01-03 ASSESSMENT — PAIN DESCRIPTION - LOCATION: LOCATION: HEAD

## 2024-01-03 NOTE — OP NOTE
Nasal reconstruction, Revision forehead flap; TOTAL CASE LENGTH= 4 hours Operative Note     Date: 2024  OR Location: [facility] OR    Name: Adarsh De La Vega, : 1970, Age: 53 y.o., MRN: [id number], Sex: male    Diagnosis  Pre-op Diagnosis     * Squamous cell cancer of skin of ala nasi [C44.321] Post-op Diagnosis     * Squamous cell cancer of skin of ala nasi [C44.321]     Procedures  Nasal reconstruction, Revision forehead flap  REPAIR NASAL VESTIBULAR STENOSIS (36009  Autologous auricular and rib cartilage graft (31604 and 18257)  PREP SITE F/S/N/H/F/G/M/D GT 1ST 100 SQ CM/1PCT [41557]  ADJT TIS TRNSFR/REARRGMT E/N/E/L DFCT 10 SQ CM/< [42554]  Elevation of paramedian forehead flap based on supratrochlear vascular bundle [53248]  Caudal septal replacement graft debridement and replacement [15116]  Forehead tissue debridement [53052]    Surgeons      * Sami Mcclain - Primary    Resident/Fellow/Other Assistant:  Surgeon(s) and Role:     * Manjula Kay MD - Resident - Assisting    Procedure Summary  Anesthesia: General  ASA: III  Anesthesia Staff: Anesthesiologist: Debbie Jane MD; Daniela Aguilera MD; Nannette Dewey MD  CRNA: JENNI Haney-GIULIANA  C-AA: BARBI Pop; BARBI Becerra  Estimated Blood Loss: 50 mL  Intra-op Medications:   Medication Name Total Dose   sodium chloride 0.9 % irrigation solution 2,000 mL   balanced salts (BSS Plus) intraocular solution 15 mL              Anesthesia Record               Intraprocedure I/O Totals          Intake    LR 1100.00 mL    Total Intake 1100 mL       Output    Urine 260 mL    Est. Blood Loss 40 mL    Total Output 300 mL       Net    Net Volume 800 mL          Specimen: No specimens collected     Staff:   Circulator: Carson Pedraza RN  Relief Circulator: Adarsh Chaidez RN  Relief Scrub: Maru Mejia; Matt Billy  Scrub Person: Karlene Aguero RN; Dimple Levy RN; Moshe Wilson         Drains and/or  Catheters:   [REMOVED] Urethral Catheter Non-latex 14 Fr. (Removed)       Tourniquet Times:         Implants:     Findings: see operative note    Indications: Adarsh De La Vega is an 53 y.o. male who is having surgery for Squamous cell cancer of skin of ala nasi [C44.321].     The patient was seen in the preoperative area. The risks, benefits, complications, treatment options, non-operative alternatives, expected recovery and outcomes were discussed with the patient. The possibilities of reaction to medication, pulmonary aspiration, injury to surrounding structures, bleeding, recurrent infection, the need for additional procedures, failure to diagnose a condition, and creating a complication requiring transfusion or operation were discussed with the patient. The patient concurred with the proposed plan, giving informed consent.  The site of surgery was properly noted/marked if necessary per policy. The patient has been actively warmed in preoperative area. Preoperative antibiotics have been ordered and given within 1 hours of incision. Venous thrombosis prophylaxis have been ordered including bilateral sequential compression devices    Procedure Details:     Adarsh De La Vega presents with a complicated history of squamous cell carcinoma of the nose treated with a rhinectomy and septectomy.  He underwent adjuvant radiation from which she is now healed.  Follow-up imaging revealed no evidence of disease.  He has undergone staged reconstruction of the subtotal nasal defect.  At his last surgery he underwent a osteocutaneous radial forearm free flap to reconstruct the dorsal support and internal lining of the nose, as well as autologous rib grafting and paramedian forehead flap to reconstruct the caudal septum and external lining of the nose.    The patient was brought to the operating room for his next stage of reconstruction.  A timeout was performed to verify patient procedure.  General anesthesia was induced and  the patient was then positioned, prepped, and draped for the above procedures.    The previous flaps were examined and found to be in healthy condition.  1% lidocaine with epinephrine was injected at the planned incision sites around the nose and forehead.    We began with reconstruction of the right side of the nose with the intention to elevate the previous paramedian forehead flap excise soft tissue and debulk the flap.  An incision was made at the right junction of the medial cheek and nasal sidewall carried down to the alar facial groove.  This incision was carried through the alar margin to the columella laterally.  A uniform subcutaneous flap was then elevated with the axial blood supply from the supratrochlear artery maintained in the flap.  The left lateral attachment of the paramedian forehead flap to the cheek was maintained as well as the columellar attachment to the radial forearm along the left alar margin and columella.  On the right side sharp excision of scar tissue was performed underlying the forehead flap and the radial forearm flap.  Portions of this tissue were discarded.  The previous radial forearm bone that was secured for the dorsal support was visualized and intact.  Unfortunately the caudal septal replacement graft constructed from autologous rib material was fractured with evidence of resorption.  This cartilage was discarded.  Cleansing of the wound with granulation tissue was performed sharply.  The previous premaxilla inset site was denuded and cleansed of granulation.    At the right chest a previous incision was made with dissection carried in the subcutaneous soft tissue to acquire autologous rib cartilage for grafting purposes.  This with cartilage was previously harvested and faint for future utilization.  A suitable graft was then fashioned for a caudal septal replacement graft and positioned along the previous inset site at the maxilla secured with a 5-0 Prolene suture.  This  was locked into the dorsal bone graft with a locking key contouring method.  This completed the caudal septal replacement.  Portions of the columella and ala were also excised in order to achieve appropriate contour of the alar margin and alar soft tissue.  At the left ear 1% lidocaine with epinephrine was injected.  An incision was made along the lateral jack with elevation and a subtype perichondrial plane performed to isolate auricular cartilage at the consul bowl.  An incision was made in the cartilage with dissection carried underlying the posterior aspect of the chondral cartilage elevated.  The consul cartilage was then isolated and set aside for grafting purposes.  Irrigation of the wound and closure of the incision was then performed with a gut suture with quilting subsequently.    The auricular cartilage was then cut and fashioned for placement at the right ala as a nonanatomic graft for alar reconstruction.  This auricular cartilage graft helped to reconstruct the external nasal valve and ala.  A portion of the autologous rib cartilage was then utilized to support the right middle vault portion of the nose and recreate the internal nasal valve support.  Multiple passes were made with a wire passing drill bit through the bony dorsum to superior the costal cartilage graft.  Quilting sutures through the costal and auricular cartilage were performed from the radial forearm skin to allow patency of the nasal airway and valve.  A portion of auricular cartilage was also used as a tip graft to soften the nasal tip.    The forehead flap was then inset overlying the nasal defect to recreate the right nasal sidewall, right nasal ala, nasal soft tissue triangle and tip.  The flap was then inset using gut suture in an interrupted fashion.  At the right alar facial margin a transposition of forehead tissue over a 1 cm² defect was performed.    Nasal trumpets were then placed bilaterally and secured with Prolene  suture.    Portions of the forehead site were then examined where the wound was granulating.  Eschar and granulation were sharply excised to cleanse the wound with a dressing applied to aid with healing.  At the left neck a dehiscence of the incision was visualized.  The incision was opened and a small amount of blood collection was cleansed with irrigation.  Strip gauze was then placed with saline solution to pack the wound.    This completed the stage of reconstruction involving the right side of the nose with further reconstruction staging including the left side and takedown of the paramedian forehead flap.    The patient was awoken from general anesthesia without complication.    Complications:  None; patient tolerated the procedure well.    Disposition: PACU - hemodynamically stable.  Condition: stable     Additional Details: None    Attending Attestation: I was present and scrubbed for the entire procedure.    Sami Mcclain  Phone Number: [phone]

## 2024-01-03 NOTE — CARE PLAN
Problem: Pain  Goal: My pain/discomfort is manageable  Outcome: Progressing     Problem: Safety  Goal: Patient will be injury free during hospitalization  Outcome: Progressing     Problem: Psychosocial Needs  Goal: Demonstrates ability to cope with hospitalization/illness  Outcome: Progressing  Goal: Collaborate with me, my family, and caregiver to identify my specific goals  Outcome: Progressing     Problem: Discharge Barriers  Goal: My discharge needs are met  Outcome: Progressing   The patient's goals for the shift include      The clinical goals for the shift include Patient will remain stable pending discharge    Over the shift, the patient did not make progress toward the following goals. Barriers to progression include patient being discharged.

## 2024-01-03 NOTE — NURSING NOTE
Patient discharged home, taken home by family member. RN reviewed discharge instructions, prescriptions and follow up appointments. Gave supplies for packing facial incision and patient is independent in care. IV removed catheter intact. Candace Oliver RN

## 2024-01-03 NOTE — CARE PLAN
The patient's goals for the shift include      The clinical goals for the shift include      Problem: Pain  Goal: My pain/discomfort is manageable  Outcome: Progressing     Problem: Safety  Goal: Patient will be injury free during hospitalization  Outcome: Progressing     Problem: Psychosocial Needs  Goal: Demonstrates ability to cope with hospitalization/illness  Outcome: Progressing  Goal: Collaborate with me, my family, and caregiver to identify my specific goals  Outcome: Progressing     Problem: Discharge Barriers  Goal: My discharge needs are met  Outcome: Progressing

## 2024-01-12 ENCOUNTER — TELEPHONE (OUTPATIENT)
Dept: OTOLARYNGOLOGY | Facility: HOSPITAL | Age: 54
End: 2024-01-12
Payer: COMMERCIAL

## 2024-01-12 NOTE — TELEPHONE ENCOUNTER
Spoke to patient. He states he needs more xeroform. I let him know that he should be able to call the supply company that has been supplying it and get more. He is going to look at the box when he gets home to see who his supplier is. Will call me if there is an issue.

## 2024-01-12 NOTE — TELEPHONE ENCOUNTER
----- Message from July Marquis sent at 1/12/2024  1:03 PM EST -----  Regarding: call back  Patient calling for a script for xeroform

## 2024-01-16 ENCOUNTER — HOSPITAL ENCOUNTER (OUTPATIENT)
Dept: INFUSION THERAPY | Age: 54
Discharge: HOME | End: 2024-01-16
Payer: COMMERCIAL

## 2024-01-16 ENCOUNTER — OFFICE VISIT (OUTPATIENT)
Dept: ONCOLOGY | Age: 54
End: 2024-01-16
Payer: COMMERCIAL

## 2024-01-16 ENCOUNTER — TELEPHONE (OUTPATIENT)
Dept: CASE MANAGEMENT | Age: 54
End: 2024-01-16

## 2024-01-16 VITALS
HEIGHT: 76 IN | BODY MASS INDEX: 21.43 KG/M2 | DIASTOLIC BLOOD PRESSURE: 78 MMHG | TEMPERATURE: 97.1 F | OXYGEN SATURATION: 99 % | SYSTOLIC BLOOD PRESSURE: 118 MMHG | WEIGHT: 176 LBS | HEART RATE: 97 BPM

## 2024-01-16 DIAGNOSIS — S01.20XA OPEN WOUND OF NASAL CAVITY, INITIAL ENCOUNTER: ICD-10-CM

## 2024-01-16 DIAGNOSIS — C76.0 HEAD AND NECK CANCER (HCC): Primary | ICD-10-CM

## 2024-01-16 LAB
ALBUMIN SERPL-MCNC: 4.2 G/DL (ref 3.5–5.2)
ALP SERPL-CCNC: 102 U/L (ref 40–129)
ALT SERPL-CCNC: 7 U/L (ref 0–40)
ANION GAP SERPL CALCULATED.3IONS-SCNC: 12 MMOL/L (ref 7–16)
AST SERPL-CCNC: 8 U/L (ref 0–39)
BASOPHILS # BLD: 0 K/UL (ref 0–0.2)
BASOPHILS NFR BLD: 0 % (ref 0–2)
BILIRUB SERPL-MCNC: 0.6 MG/DL (ref 0–1.2)
BUN SERPL-MCNC: 9 MG/DL (ref 6–20)
CALCIUM SERPL-MCNC: 9.4 MG/DL (ref 8.6–10.2)
CHLORIDE SERPL-SCNC: 105 MMOL/L (ref 98–107)
CO2 SERPL-SCNC: 24 MMOL/L (ref 22–29)
CREAT SERPL-MCNC: 1.1 MG/DL (ref 0.7–1.2)
EOSINOPHIL # BLD: 0.24 K/UL (ref 0.05–0.5)
EOSINOPHILS RELATIVE PERCENT: 4 % (ref 0–6)
ERYTHROCYTE [DISTWIDTH] IN BLOOD BY AUTOMATED COUNT: 13.4 % (ref 11.5–15)
GFR SERPL CREATININE-BSD FRML MDRD: >60 ML/MIN/1.73M2
GLUCOSE SERPL-MCNC: 110 MG/DL (ref 74–99)
HCT VFR BLD AUTO: 37.1 % (ref 37–54)
HGB BLD-MCNC: 12.1 G/DL (ref 12.5–16.5)
LYMPHOCYTES NFR BLD: 0.86 K/UL (ref 1.5–4)
LYMPHOCYTES RELATIVE PERCENT: 16 % (ref 20–42)
MAGNESIUM SERPL-MCNC: 2 MG/DL (ref 1.6–2.6)
MCH RBC QN AUTO: 29.2 PG (ref 26–35)
MCHC RBC AUTO-ENTMCNC: 32.6 G/DL (ref 32–34.5)
MCV RBC AUTO: 89.6 FL (ref 80–99.9)
MONOCYTES NFR BLD: 0.19 K/UL (ref 0.1–0.95)
MONOCYTES NFR BLD: 4 % (ref 2–12)
NEUTROPHILS NFR BLD: 77 % (ref 43–80)
NEUTS SEG NFR BLD: 4.21 K/UL (ref 1.8–7.3)
PLATELET # BLD AUTO: 267 K/UL (ref 130–450)
PMV BLD AUTO: 9.8 FL (ref 7–12)
POTASSIUM SERPL-SCNC: 4 MMOL/L (ref 3.5–5)
PROT SERPL-MCNC: 7 G/DL (ref 6.4–8.3)
RBC # BLD AUTO: 4.14 M/UL (ref 3.8–5.8)
RBC # BLD: ABNORMAL 10*6/UL
SODIUM SERPL-SCNC: 141 MMOL/L (ref 132–146)
WBC OTHER # BLD: 5.5 K/UL (ref 4.5–11.5)

## 2024-01-16 PROCEDURE — 80053 COMPREHEN METABOLIC PANEL: CPT

## 2024-01-16 PROCEDURE — 2580000003 HC RX 258: Performed by: INTERNAL MEDICINE

## 2024-01-16 PROCEDURE — 85025 COMPLETE CBC W/AUTO DIFF WBC: CPT

## 2024-01-16 PROCEDURE — 83735 ASSAY OF MAGNESIUM: CPT

## 2024-01-16 PROCEDURE — 99214 OFFICE O/P EST MOD 30 MIN: CPT | Performed by: INTERNAL MEDICINE

## 2024-01-16 PROCEDURE — 6360000002 HC RX W HCPCS: Performed by: INTERNAL MEDICINE

## 2024-01-16 PROCEDURE — 36591 DRAW BLOOD OFF VENOUS DEVICE: CPT

## 2024-01-16 RX ORDER — SODIUM CHLORIDE 9 MG/ML
25 INJECTION, SOLUTION INTRAVENOUS PRN
OUTPATIENT
Start: 2024-01-16

## 2024-01-16 RX ORDER — SODIUM CHLORIDE 0.9 % (FLUSH) 0.9 %
5-40 SYRINGE (ML) INJECTION PRN
Status: DISCONTINUED | OUTPATIENT
Start: 2024-01-16 | End: 2024-01-17 | Stop reason: HOSPADM

## 2024-01-16 RX ORDER — HEPARIN 100 UNIT/ML
500 SYRINGE INTRAVENOUS PRN
Status: DISCONTINUED | OUTPATIENT
Start: 2024-01-16 | End: 2024-01-17 | Stop reason: HOSPADM

## 2024-01-16 RX ORDER — SODIUM CHLORIDE 0.9 % (FLUSH) 0.9 %
5-40 SYRINGE (ML) INJECTION PRN
OUTPATIENT
Start: 2024-01-16

## 2024-01-16 RX ORDER — HEPARIN 100 UNIT/ML
500 SYRINGE INTRAVENOUS PRN
OUTPATIENT
Start: 2024-01-16

## 2024-01-16 RX ADMIN — SODIUM CHLORIDE, PRESERVATIVE FREE 10 ML: 5 INJECTION INTRAVENOUS at 08:57

## 2024-01-16 RX ADMIN — HEPARIN 500 UNITS: 100 SYRINGE at 08:57

## 2024-01-16 NOTE — PROGRESS NOTES
[facility] PHYSICIANS [facility] MED ONCOLOGY  [address]  [facility] [address]  Dept: [phone]  Loc: [phone]  Attending progress note       Reason for Visit: Squamous cell carcinoma of the nose/nasal septum.     Referring Physician:  Arnold Han MD     PCP:  Tarik Lemus MD     History of Present Illness:       Mr. Cantor is a pleasant 53-year-old gentleman, fairly healthy, who was diagnosed with squamous cell carcinoma of the nose/nasal septum.  The patient was hit by his dog's head on the left side of his nose in April 2022, in November 2022 he had noticed a lesion inside his nose, and she was diagnosed with staph infection, was treated with antibiotics, he then developed a pimple on his nose and eventually developed a hole in his nose, he was seen by ID, he was referred to the hospital due to concern about myelitis, he had a CT scan done revealing chronic sinusitis involving the left sphenoid sinus, he had a biopsy done on 2/21/2023, revealing invasive poorly differentiated squamous cell carcinoma, grade 3, the patient was referred Dr. Loredo, he underwent a total rhinectomy on 5/5/2023, the patient was found to have tumor growth to the glabella and the maxillary crest, path:   Case Summary Report   Procedure:    Rhinectomy, total   Tumor Site:   Nasal septal mucosa, cartilage, subcutaneous tissue, skin.     Tumor Laterality: Left and Right.   Left >> Right     Tumor Focality: Single focus     Tumor Size (Greatest dimension): At least 3.5 cm.     Histologic Type: Squamous cell carcinoma, keratinizing     Histologic Grade (squamous cell carcinomas only): Poorly differentiated     Specimen Margins (Main specimen; part B)     _X_ Involved by invasive carcinoma        Specify margin(s), per orientation, if possible: Superior and inferior   septal margins, superior right and left skin margins.     _X_ Uninvolved by high-grade dysplasia/in situ disease     Separately

## 2024-01-16 NOTE — TELEPHONE ENCOUNTER
Met with patient and his wife, Aida, during his follow up appointment with Dr. Alphonse House today. Patient completed his active treatment for his head and neck cancer.  Patient appears in good spirits just states that he still has a hard time finding things to eat that don't burn. He met with dietitian today at appointment today for assistance. He states that he has at least 2 more surgeries, first in early February, and also doesn't like the food at the [facility] so that doesn't help.  Provided support and encouragement. Patient other concern is that his disability was denied and that he has contacted a  for assistance, provided patient with 's contact information but patient states he will wait to see what  says first but will call if needed.  Instructed patient in detail on his Cancer Treatment Summary and Survivorship Care Plan.  Copy sent to patient's PCP Dr. Tarik Lemus MD.  Provided patient with Patient Resource Survivorship booklet and Local Resources for Cancer Survivors. I also provided patient with written information on [employer] information, [employer] Healthy Eating After Cancer, ACS Life After Cancer, ACS Physical Activity and the Person with Cancer, [employer], Survivorship: Late effects of Radiation after head and neck cancer, [place] Live[employer] and my business card. Encouraged patient to maintain physical activity and other health appointments and screenings.  Patient is scheduled for follow up appointment 04/16/2024. Instructed to call with any questions or concerns.  Verbally agreed.  Patient appreciative of visit and information. I will discharge patient from a navigation standpoint.  Johanna GARCIA, RN-OCN Nurse Navigator

## 2024-01-16 NOTE — PROGRESS NOTES
Andrew Cantor  1/16/2024  Ht Readings from Last 1 Encounters:   01/16/24 1.93 m (6' 4\")     Wt Readings from Last 10 Encounters:   01/16/24 79.8 kg (176 lb)   10/03/23 88.5 kg (195 lb)   09/22/23 87 kg (191 lb 12.8 oz)   09/18/23 85.3 kg (188 lb)   09/11/23 82.6 kg (182 lb 3.2 oz)   09/05/23 81.6 kg (180 lb)   09/05/23 81.6 kg (180 lb)   08/28/23 85.5 kg (188 lb 9.6 oz)   08/21/23 85.3 kg (188 lb)   08/21/23 85.3 kg (188 lb)     Body mass index is 21.42 kg/m².    Assessment: Met w/ pt during follow up visit. He has lost 19# over last 3 months (9.7%). Overall wt loss of 48# since initial oncology nutrition assessment 8 months ago (21.4%). Pt has had multiple reconstructive surgeries which has impacted overall PO intake. PEG was removed 11/27 by ENT. Pt reports he is consuming 3 meals/day, not utilizing any ONS products. Discussed overall needs, focusing on protein for incision healing as well as lean body mass maintenance. Instructed pt to increase frequency of intake given that his portion sizes have decreased. Pt receptive. Provided w/ list of nutrient dense snack ideas. Pt plans to incorporate Premier [employer]. Discussed strategies for increasing calorie content of this low calorie ONS. Pt admits his sweet taste receptors remain decreased. He is pleased w/ overall progress, though knows he needs to increase nutrition. Will follow PRN.    Weight change: -21% x8 months  Appetite: fair  Nutritional Side Effects: early satiety  Calculated Needs: 28-30 kcal/kg CBW =  kcal, 1.2-1.4 gm/kg CBW =  gm pro, 1 ml/kcal =  ml fluids  Malnutrition Status: Severe  Nutrition Diagnosis: Severe malnutrition r/t H&N CA AEB severe fat and muscle wasting, >20% wt loss x <1 year.    Recommendations: Pt to consume at least 3 meals/day + nutrient dense snacking as able, ONS of choice at least BID    Priscilla Rubio, MS, RD, LD

## 2024-01-18 ENCOUNTER — PREP FOR PROCEDURE (OUTPATIENT)
Dept: OTOLARYNGOLOGY | Facility: CLINIC | Age: 54
End: 2024-01-18
Payer: COMMERCIAL

## 2024-01-18 DIAGNOSIS — C44.321 SQUAMOUS CELL CANCER OF SKIN OF NOSE: ICD-10-CM

## 2024-01-18 DIAGNOSIS — J34.89 NASAL LESION: ICD-10-CM

## 2024-01-18 DIAGNOSIS — Q67.0 ASYMMETRY OF FACE: ICD-10-CM

## 2024-01-18 DIAGNOSIS — M95.0 ACQUIRED DEFORMITY OF NOSE: ICD-10-CM

## 2024-01-19 ASSESSMENT — ENCOUNTER SYMPTOMS
SHORTNESS OF BREATH: 0
SORE THROAT: 0
ADENOPATHY: 0
VOICE CHANGE: 0
NECK PAIN: 0
FACIAL SWELLING: 0
TROUBLE SWALLOWING: 0
STRIDOR: 0
APPETITE CHANGE: 0
UNEXPECTED WEIGHT CHANGE: 0
FEVER: 0
NAUSEA: 0
VOMITING: 0
COUGH: 0
CHILLS: 0
FATIGUE: 0

## 2024-01-19 NOTE — H&P (VIEW-ONLY)
Chief Complaint   Patient presents with    Follow-up    Head And Neck Cancer     Follow up      HPI:  Adarsh De La Vega is a 53 year old  male following up with me today for his sinonasal SCCa s/p bilateral nasal endoscopy, total rhinectomy on 5/5/23. He completed adjuvant chemoradiation 8/28/23. Last seen 11/23.  He is now s/p complex nasal reconstruction on 1/2/24 with Dr. Mcclain that included an auricular and rib cartilage graft, paramedian forehead flap based on supratrochlear vascular bundle, caudal septal replacement graft debridement and replacement, and forehead tissue debridement. He is scheduled for another surgery with Dr. Mcclain on 2/7/24. His most recent CT of neck  from 11/2023 was negative, and CT of chest showed a stable lung nodule.     History:  Dx: Squamous cell carcinoma sinonasal [id number]  ~ 6 months ago dog hit nose  1/23: Seen by an ENT who told him he had a nasal infection. developed a lump on the outside of his nose a few days following that appointment  2/21/23: Biopsy of nasal lesion. Path + for squamous cell carcinoma  2/27/23: seen by infectious disease and was in the hospital on IV antibiotics for about 1 week and discharged with a PICC line   3/17/23: CT neck and Chest- slightly enhancing nasal mass with involvement of the anterior nose & nasal septum. No pathologic lymphadenopathy. CT chest with 6mm pleural based nodule LLL.   3/20/23: First seen by me  5/5/23:S/p bilateral nasal endoscopy, total rhinectomy. Path +   5/17/23: Nasal trumpets removed  7/10/23: Started adjuvant chemoXRT  7/31/23: Admitted for dehydration/odynophagia and PEG tube placed  8/14/23: In ED, waiting to be admitted for IV hydration  8/28/23: Completed adjuvant chemoXRT   10/23: CT neck/chest complete response, stable lung nodule  10/31/23: 1st stage prelamination of paramedian forehead flap  12/8/23: Osteocutaneous RFFF and 2nd stage  1/2/23: 3rd stage nasal reconstruction     SH:  Tob: Current 1/2 ppd  smoker. Former 1ppd since a teen  ETOH: Social  Here with wife    ROS:  Review of Systems   Constitutional:  Negative for appetite change, chills, fatigue, fever and unexpected weight change.   HENT:  Negative for dental problem, drooling, ear pain, facial swelling, hearing loss, mouth sores, sore throat, tinnitus, trouble swallowing and voice change.    Respiratory:  Negative for cough, shortness of breath and stridor.    Gastrointestinal:  Negative for nausea and vomiting.   Musculoskeletal:  Negative for neck pain.   Hematological:  Negative for adenopathy.   All other systems reviewed and are negative.       PE:  ENT Physical Exam  Constitutional  Appearance: patient appears well-developed, patient is cooperative;  Communication/Voice: communication appropriate for developmental age;  Head and Face  Appearance: head appears normal; facial scars present;  Head and Face comments: See nasal recon below  Ear  Auricles: right auricle normal; left auricle normal;  Nose  External Nose: nares patent bilaterally; nasal deformity visible;  Nose comments: Paramedian reconstruction with nasal trumpets in place, decrusting of the nasal trumpets  Oral Cavity/Oropharynx  Lips: normal;  Gums: gingiva normal;  Oral mucosa: normal;  Neck  Neck: scars present;  Respiratory  Inspection: breathing unlabored;  Cardiovascular  Inspection: extremities are warm and well perfused;  Lymphatic  Palpation: no cervical adenopathy noted;  Neurovestibular  Mental Status: alert and oriented;  Psychiatric: mood normal; affect is appropriate;  Neurological comments: Left RFFF wrap removed and STSG now well healed      Procedures     ASSESSMENT AND PLAN:  Problem List Items Addressed This Visit       Squamous cell carcinoma of nose    Current Assessment & Plan     No evidence of disease  No scope today with nasal trumpets in place  Follow up in 2 months         Acquired deformity of nose    Current Assessment & Plan     Nasal reconstruction  healing well  Crusting removed from right nasal trumpet  Left RFFF with excellent healing  Referral to PT, encouraged to get further instructions from Dr. Mcclain          Other Visit Diagnoses       Injury of left upper extremity, subsequent encounter    -  Primary    Relevant Orders    Referral to Physical Therapy          Dena Mei MD    Head & Neck Surgical Oncology & Reconstruction  Department of Otolaryngology - Head and Neck Surgery        By signing my name below, I, Nikole Park Scribe, attest that this documentation has been prepared under the direction and in the presence of Dr. Dena Mei MD.     All medical record entries made by the Scribe were at my direction and personally dictated by me, Dr. Dena Mei. I have reviewed the chart and agree that the record accurately reflects my personal performance of the history, physical exam, discussion and plan.

## 2024-01-19 NOTE — PROGRESS NOTES
Chief Complaint   Patient presents with    Follow-up    Head And Neck Cancer     Follow up      HPI:  Adarsh De La Vega is a 53 year old  male following up with me today for his sinonasal SCCa s/p bilateral nasal endoscopy, total rhinectomy on 5/5/23. He completed adjuvant chemoradiation 8/28/23. Last seen 11/23.  He is now s/p complex nasal reconstruction on 1/2/24 with Dr. Mcclain that included an auricular and rib cartilage graft, paramedian forehead flap based on supratrochlear vascular bundle, caudal septal replacement graft debridement and replacement, and forehead tissue debridement. He is scheduled for another surgery with Dr. Mcclain on 2/7/24. His most recent CT of neck  from 11/2023 was negative, and CT of chest showed a stable lung nodule.     History:  Dx: Squamous cell carcinoma sinonasal [id number]  ~ 6 months ago dog hit nose  1/23: Seen by an ENT who told him he had a nasal infection. developed a lump on the outside of his nose a few days following that appointment  2/21/23: Biopsy of nasal lesion. Path + for squamous cell carcinoma  2/27/23: seen by infectious disease and was in the hospital on IV antibiotics for about 1 week and discharged with a PICC line   3/17/23: CT neck and Chest- slightly enhancing nasal mass with involvement of the anterior nose & nasal septum. No pathologic lymphadenopathy. CT chest with 6mm pleural based nodule LLL.   3/20/23: First seen by me  5/5/23:S/p bilateral nasal endoscopy, total rhinectomy. Path +   5/17/23: Nasal trumpets removed  7/10/23: Started adjuvant chemoXRT  7/31/23: Admitted for dehydration/odynophagia and PEG tube placed  8/14/23: In ED, waiting to be admitted for IV hydration  8/28/23: Completed adjuvant chemoXRT   10/23: CT neck/chest complete response, stable lung nodule  10/31/23: 1st stage prelamination of paramedian forehead flap  12/8/23: Osteocutaneous RFFF and 2nd stage  1/2/23: 3rd stage nasal reconstruction     SH:  Tob: Current 1/2 ppd  smoker. Former 1ppd since a teen  ETOH: Social  Here with wife    ROS:  Review of Systems   Constitutional:  Negative for appetite change, chills, fatigue, fever and unexpected weight change.   HENT:  Negative for dental problem, drooling, ear pain, facial swelling, hearing loss, mouth sores, sore throat, tinnitus, trouble swallowing and voice change.    Respiratory:  Negative for cough, shortness of breath and stridor.    Gastrointestinal:  Negative for nausea and vomiting.   Musculoskeletal:  Negative for neck pain.   Hematological:  Negative for adenopathy.   All other systems reviewed and are negative.       PE:  ENT Physical Exam  Constitutional  Appearance: patient appears well-developed, patient is cooperative;  Communication/Voice: communication appropriate for developmental age;  Head and Face  Appearance: head appears normal; facial scars present;  Head and Face comments: See nasal recon below  Ear  Auricles: right auricle normal; left auricle normal;  Nose  External Nose: nares patent bilaterally; nasal deformity visible;  Nose comments: Paramedian reconstruction with nasal trumpets in place, decrusting of the nasal trumpets  Oral Cavity/Oropharynx  Lips: normal;  Gums: gingiva normal;  Oral mucosa: normal;  Neck  Neck: scars present;  Respiratory  Inspection: breathing unlabored;  Cardiovascular  Inspection: extremities are warm and well perfused;  Lymphatic  Palpation: no cervical adenopathy noted;  Neurovestibular  Mental Status: alert and oriented;  Psychiatric: mood normal; affect is appropriate;  Neurological comments: Left RFFF wrap removed and STSG now well healed      Procedures     ASSESSMENT AND PLAN:  Problem List Items Addressed This Visit       Squamous cell carcinoma of nose    Current Assessment & Plan     No evidence of disease  No scope today with nasal trumpets in place  Follow up in 2 months         Acquired deformity of nose    Current Assessment & Plan     Nasal reconstruction  healing well  Crusting removed from right nasal trumpet  Left RFFF with excellent healing  Referral to PT, encouraged to get further instructions from Dr. Mcclain          Other Visit Diagnoses       Injury of left upper extremity, subsequent encounter    -  Primary    Relevant Orders    Referral to Physical Therapy          Dena Mei MD    Head & Neck Surgical Oncology & Reconstruction  Department of Otolaryngology - Head and Neck Surgery        By signing my name below, I, Nikole Park Scribe, attest that this documentation has been prepared under the direction and in the presence of Dr. Dena Mei MD.     All medical record entries made by the Scribe were at my direction and personally dictated by me, Dr. Dena Mei. I have reviewed the chart and agree that the record accurately reflects my personal performance of the history, physical exam, discussion and plan.

## 2024-01-22 ENCOUNTER — OFFICE VISIT (OUTPATIENT)
Dept: OTOLARYNGOLOGY | Facility: CLINIC | Age: 54
End: 2024-01-22
Payer: COMMERCIAL

## 2024-01-22 VITALS — RESPIRATION RATE: 18 BRPM | BODY MASS INDEX: 24.24 KG/M2 | HEIGHT: 72 IN | WEIGHT: 179 LBS

## 2024-01-22 DIAGNOSIS — M95.0 ACQUIRED DEFORMITY OF NOSE: ICD-10-CM

## 2024-01-22 DIAGNOSIS — S49.92XD INJURY OF LEFT UPPER EXTREMITY, SUBSEQUENT ENCOUNTER: Primary | ICD-10-CM

## 2024-01-22 DIAGNOSIS — C44.321 SQUAMOUS CELL CARCINOMA OF NOSE: ICD-10-CM

## 2024-01-22 PROCEDURE — 1036F TOBACCO NON-USER: CPT | Performed by: OTOLARYNGOLOGY

## 2024-01-22 PROCEDURE — 99024 POSTOP FOLLOW-UP VISIT: CPT | Performed by: OTOLARYNGOLOGY

## 2024-01-22 ASSESSMENT — PATIENT HEALTH QUESTIONNAIRE - PHQ9
SUM OF ALL RESPONSES TO PHQ9 QUESTIONS 1 AND 2: 0
2. FEELING DOWN, DEPRESSED OR HOPELESS: NOT AT ALL
1. LITTLE INTEREST OR PLEASURE IN DOING THINGS: NOT AT ALL

## 2024-01-22 ASSESSMENT — PAIN SCALES - GENERAL: PAINLEVEL: 6

## 2024-01-22 NOTE — ASSESSMENT & PLAN NOTE
Nasal reconstruction healing well  Crusting removed from right nasal trumpet  Left RFFF with excellent healing  Referral to PT, encouraged to get further instructions from Dr. Mcclain

## 2024-01-22 NOTE — PATIENT INSTRUCTIONS
Dr. Mei evaluated you today.    Your care plan is outlined below:  -- No need to wrap the arm anymore  -- See physical therapist for the arm exercises.   -- Follow up with Dr. Mei in 2 mo.  This appointment was scheduled at the end of your visit today.  If you need to reschedule, please call the office at [phone].  Please keep in mind that last minute cancellations often result in delayed follow-up appointments.     General appointment line please call [phone]  For general questions or scheduling issues please call [phone] option #2   For medical questions or surgery scheduling please call [phone] on Mondays, Wednesdays and Thursdays or [phone] on Tuesdays and Fridays. Please be sure to leave a voice mail or your call will not be able to be returned.     Dr. Mei makes every effort to run on time for your appointments.  Therefore, if you are more than 30 minutes late for your appointment, unrelated to a scan or another appointment such as chemotherapy or radiation, your appointment will need to be rescheduled to another day.  We appreciate your understanding.   29-Apr-2022 16:35

## 2024-01-25 NOTE — PROGRESS NOTES
POV s/p 10/31/23 1st stage of subtotal nasal reconstruction, Elevation of forehead flap, prelamination of flap with full thickness skin graft from right arm    12/8/23: Doing well, endorses improved swelling.  Continues daily washes and ointment to surgical sites. Right arm incision healing well with expected mild inflammatory changes, clean dry intact.  Forehead dressing removed revealing flap site well healing, good vascularization and appears without signs of infection or necrosis.     12/28/23: Doing well, endorses improved swelling.  Continues daily washes and ointment to surgical sites. Right arm incision healing well with expected mild inflammatory changes, clean dry intact. Forehead dressing removed revealing flap site well healing, good vascularization and appears without signs of infection or necrosis.      1/18/24: Continued staged nasal reconstruction    1/29/24: Patient doing well.     Plan for next stage. Nasal reconstruction and forehead flap revision.

## 2024-01-29 ENCOUNTER — ANESTHESIA EVENT (OUTPATIENT)
Dept: OPERATING ROOM | Facility: HOSPITAL | Age: 54
End: 2024-01-29
Payer: COMMERCIAL

## 2024-01-29 ENCOUNTER — OFFICE VISIT (OUTPATIENT)
Dept: OTOLARYNGOLOGY | Facility: CLINIC | Age: 54
End: 2024-01-29
Payer: COMMERCIAL

## 2024-01-29 VITALS — WEIGHT: 178.6 LBS | HEIGHT: 76 IN | BODY MASS INDEX: 21.75 KG/M2

## 2024-01-29 DIAGNOSIS — M95.0 NASAL DEFECT: ICD-10-CM

## 2024-01-29 DIAGNOSIS — C44.90 SKIN CANCER: ICD-10-CM

## 2024-01-29 DIAGNOSIS — Z45.2 ENCOUNTER FOR ADJUSTMENT OR MANAGEMENT OF VASCULAR ACCESS DEVICE: ICD-10-CM

## 2024-01-29 DIAGNOSIS — M95.0 NASAL DEFORMITY: ICD-10-CM

## 2024-01-29 DIAGNOSIS — J34.89 NASAL LESION: ICD-10-CM

## 2024-01-29 DIAGNOSIS — C44.321 SQUAMOUS CELL CANCER OF SKIN OF ALA NASI: ICD-10-CM

## 2024-01-29 DIAGNOSIS — Z94.5 H/O SKIN GRAFT: Primary | ICD-10-CM

## 2024-01-29 DIAGNOSIS — Q67.0 ASYMMETRY OF FACE: ICD-10-CM

## 2024-01-29 PROCEDURE — 99024 POSTOP FOLLOW-UP VISIT: CPT | Performed by: OTOLARYNGOLOGY

## 2024-01-29 PROCEDURE — 1036F TOBACCO NON-USER: CPT | Performed by: OTOLARYNGOLOGY

## 2024-01-29 ASSESSMENT — PATIENT HEALTH QUESTIONNAIRE - PHQ9
2. FEELING DOWN, DEPRESSED OR HOPELESS: NOT AT ALL
1. LITTLE INTEREST OR PLEASURE IN DOING THINGS: NOT AT ALL
SUM OF ALL RESPONSES TO PHQ9 QUESTIONS 1 AND 2: 0

## 2024-01-30 ENCOUNTER — APPOINTMENT (OUTPATIENT)
Dept: RADIOLOGY | Facility: HOSPITAL | Age: 54
End: 2024-01-30
Payer: COMMERCIAL

## 2024-01-30 ENCOUNTER — ANESTHESIA (OUTPATIENT)
Dept: OPERATING ROOM | Facility: HOSPITAL | Age: 54
End: 2024-01-30
Payer: COMMERCIAL

## 2024-01-30 ENCOUNTER — HOSPITAL ENCOUNTER (OUTPATIENT)
Facility: HOSPITAL | Age: 54
LOS: 1 days | Discharge: HOME | End: 2024-01-31
Attending: OTOLARYNGOLOGY | Admitting: OTOLARYNGOLOGY
Payer: COMMERCIAL

## 2024-01-30 DIAGNOSIS — J34.89 NASAL LESION: ICD-10-CM

## 2024-01-30 DIAGNOSIS — C44.321 SQUAMOUS CELL CANCER OF SKIN OF NOSE: ICD-10-CM

## 2024-01-30 DIAGNOSIS — M95.0 ACQUIRED DEFORMITY OF NOSE: Primary | ICD-10-CM

## 2024-01-30 DIAGNOSIS — G89.18 POSTOPERATIVE PAIN: ICD-10-CM

## 2024-01-30 DIAGNOSIS — Q67.0 ASYMMETRY OF FACE: ICD-10-CM

## 2024-01-30 PROBLEM — G56.00 CARPAL TUNNEL SYNDROME: Status: ACTIVE | Noted: 2024-01-30

## 2024-01-30 LAB
ABO GROUP (TYPE) IN BLOOD: NORMAL
RH FACTOR (ANTIGEN D): NORMAL

## 2024-01-30 PROCEDURE — A30465 PR REPAIR NASAL CAVITY STENOSIS: Performed by: NURSE ANESTHETIST, CERTIFIED REGISTERED

## 2024-01-30 PROCEDURE — 21235 EAR CARTILAGE GRAFT: CPT | Performed by: OTOLARYNGOLOGY

## 2024-01-30 PROCEDURE — 2500000005 HC RX 250 GENERAL PHARMACY W/O HCPCS: Performed by: NURSE ANESTHETIST, CERTIFIED REGISTERED

## 2024-01-30 PROCEDURE — P9045 ALBUMIN (HUMAN), 5%, 250 ML: HCPCS | Mod: JZ,JG | Performed by: NURSE ANESTHETIST, CERTIFIED REGISTERED

## 2024-01-30 PROCEDURE — 2500000004 HC RX 250 GENERAL PHARMACY W/ HCPCS (ALT 636 FOR OP/ED)

## 2024-01-30 PROCEDURE — 3700000002 HC GENERAL ANESTHESIA TIME - EACH INCREMENTAL 1 MINUTE: Performed by: OTOLARYNGOLOGY

## 2024-01-30 PROCEDURE — 14040 TIS TRNFR F/C/C/M/N/A/G/H/F: CPT | Performed by: OTOLARYNGOLOGY

## 2024-01-30 PROCEDURE — 2500000004 HC RX 250 GENERAL PHARMACY W/ HCPCS (ALT 636 FOR OP/ED): Mod: JZ,JG | Performed by: NURSE ANESTHETIST, CERTIFIED REGISTERED

## 2024-01-30 PROCEDURE — 2500000005 HC RX 250 GENERAL PHARMACY W/O HCPCS

## 2024-01-30 PROCEDURE — 2500000001 HC RX 250 WO HCPCS SELF ADMINISTERED DRUGS (ALT 637 FOR MEDICARE OP): Performed by: STUDENT IN AN ORGANIZED HEALTH CARE EDUCATION/TRAINING PROGRAM

## 2024-01-30 PROCEDURE — 2500000004 HC RX 250 GENERAL PHARMACY W/ HCPCS (ALT 636 FOR OP/ED): Performed by: ANESTHESIOLOGY

## 2024-01-30 PROCEDURE — 3600000009 HC OR TIME - EACH INCREMENTAL 1 MINUTE - PROCEDURE LEVEL FOUR: Performed by: OTOLARYNGOLOGY

## 2024-01-30 PROCEDURE — A4217 STERILE WATER/SALINE, 500 ML: HCPCS | Performed by: OTOLARYNGOLOGY

## 2024-01-30 PROCEDURE — 1170000001 HC PRIVATE ONCOLOGY ROOM DAILY

## 2024-01-30 PROCEDURE — 2500000004 HC RX 250 GENERAL PHARMACY W/ HCPCS (ALT 636 FOR OP/ED): Performed by: OTOLARYNGOLOGY

## 2024-01-30 PROCEDURE — 30465 REPAIR NASAL STENOSIS: CPT | Performed by: OTOLARYNGOLOGY

## 2024-01-30 PROCEDURE — 7100000001 HC RECOVERY ROOM TIME - INITIAL BASE CHARGE: Performed by: OTOLARYNGOLOGY

## 2024-01-30 PROCEDURE — A30465 PR REPAIR NASAL CAVITY STENOSIS: Performed by: ANESTHESIOLOGY

## 2024-01-30 PROCEDURE — 21230 RIB CARTILAGE GRAFT: CPT | Performed by: OTOLARYNGOLOGY

## 2024-01-30 PROCEDURE — 87075 CULTR BACTERIA EXCEPT BLOOD: CPT | Performed by: OTOLARYNGOLOGY

## 2024-01-30 PROCEDURE — 15004 WOUND PREP F/N/HF/G: CPT | Performed by: OTOLARYNGOLOGY

## 2024-01-30 PROCEDURE — 2500000001 HC RX 250 WO HCPCS SELF ADMINISTERED DRUGS (ALT 637 FOR MEDICARE OP): Performed by: OTOLARYNGOLOGY

## 2024-01-30 PROCEDURE — 3700000001 HC GENERAL ANESTHESIA TIME - INITIAL BASE CHARGE: Performed by: OTOLARYNGOLOGY

## 2024-01-30 PROCEDURE — 3600000004 HC OR TIME - INITIAL BASE CHARGE - PROCEDURE LEVEL FOUR: Performed by: OTOLARYNGOLOGY

## 2024-01-30 PROCEDURE — 36415 COLL VENOUS BLD VENIPUNCTURE: CPT

## 2024-01-30 PROCEDURE — 15733 MUSC MYOQ/FSCQ FLP H&N PEDCL: CPT | Performed by: OTOLARYNGOLOGY

## 2024-01-30 PROCEDURE — 2500000005 HC RX 250 GENERAL PHARMACY W/O HCPCS: Performed by: OTOLARYNGOLOGY

## 2024-01-30 PROCEDURE — 7100000002 HC RECOVERY ROOM TIME - EACH INCREMENTAL 1 MINUTE: Performed by: OTOLARYNGOLOGY

## 2024-01-30 PROCEDURE — 2500000004 HC RX 250 GENERAL PHARMACY W/ HCPCS (ALT 636 FOR OP/ED): Performed by: STUDENT IN AN ORGANIZED HEALTH CARE EDUCATION/TRAINING PROGRAM

## 2024-01-30 PROCEDURE — 2720000007 HC OR 272 NO HCPCS: Performed by: OTOLARYNGOLOGY

## 2024-01-30 RX ORDER — SODIUM CHLORIDE 0.9 G/100ML
IRRIGANT IRRIGATION AS NEEDED
Status: DISCONTINUED | OUTPATIENT
Start: 2024-01-30 | End: 2024-01-30 | Stop reason: HOSPADM

## 2024-01-30 RX ORDER — LABETALOL HYDROCHLORIDE 5 MG/ML
5 INJECTION, SOLUTION INTRAVENOUS ONCE AS NEEDED
Status: DISCONTINUED | OUTPATIENT
Start: 2024-01-30 | End: 2024-01-30

## 2024-01-30 RX ORDER — LIDOCAINE HYDROCHLORIDE 10 MG/ML
0.1 INJECTION INFILTRATION; PERINEURAL ONCE
Status: DISCONTINUED | OUTPATIENT
Start: 2024-01-30 | End: 2024-01-30

## 2024-01-30 RX ORDER — SULFAMETHOXAZOLE AND TRIMETHOPRIM 800; 160 MG/1; MG/1
160 TABLET ORAL EVERY 12 HOURS SCHEDULED
Status: DISCONTINUED | OUTPATIENT
Start: 2024-01-30 | End: 2024-01-31 | Stop reason: HOSPADM

## 2024-01-30 RX ORDER — SENNOSIDES 8.6 MG/1
1 TABLET ORAL DAILY
Qty: 5 TABLET | Refills: 0 | Status: SHIPPED | OUTPATIENT
Start: 2024-01-30 | End: 2024-02-04

## 2024-01-30 RX ORDER — ROCURONIUM BROMIDE 10 MG/ML
INJECTION, SOLUTION INTRAVENOUS AS NEEDED
Status: DISCONTINUED | OUTPATIENT
Start: 2024-01-30 | End: 2024-01-30

## 2024-01-30 RX ORDER — ONDANSETRON 4 MG/1
4 TABLET, ORALLY DISINTEGRATING ORAL EVERY 8 HOURS PRN
Status: DISCONTINUED | OUTPATIENT
Start: 2024-01-30 | End: 2024-01-31 | Stop reason: HOSPADM

## 2024-01-30 RX ORDER — PETROLATUM 420 MG/G
1 OINTMENT TOPICAL 3 TIMES DAILY
Status: DISCONTINUED | OUTPATIENT
Start: 2024-02-01 | End: 2024-01-31 | Stop reason: HOSPADM

## 2024-01-30 RX ORDER — CEFAZOLIN 1 G/1
INJECTION, POWDER, FOR SOLUTION INTRAVENOUS AS NEEDED
Status: DISCONTINUED | OUTPATIENT
Start: 2024-01-30 | End: 2024-01-30

## 2024-01-30 RX ORDER — ACETAMINOPHEN 160 MG/5ML
1000 SOLUTION ORAL EVERY 8 HOURS SCHEDULED
Status: DISCONTINUED | OUTPATIENT
Start: 2024-01-30 | End: 2024-01-31 | Stop reason: HOSPADM

## 2024-01-30 RX ORDER — ENOXAPARIN SODIUM 100 MG/ML
40 INJECTION SUBCUTANEOUS EVERY 24 HOURS
Status: DISCONTINUED | OUTPATIENT
Start: 2024-01-30 | End: 2024-01-31 | Stop reason: HOSPADM

## 2024-01-30 RX ORDER — BISACODYL 5 MG
10 TABLET, DELAYED RELEASE (ENTERIC COATED) ORAL DAILY PRN
Status: DISCONTINUED | OUTPATIENT
Start: 2024-01-30 | End: 2024-01-31 | Stop reason: HOSPADM

## 2024-01-30 RX ORDER — PROPOFOL 10 MG/ML
INJECTION, EMULSION INTRAVENOUS AS NEEDED
Status: DISCONTINUED | OUTPATIENT
Start: 2024-01-30 | End: 2024-01-30

## 2024-01-30 RX ORDER — SODIUM CHLORIDE, SODIUM LACTATE, POTASSIUM CHLORIDE, CALCIUM CHLORIDE 600; 310; 30; 20 MG/100ML; MG/100ML; MG/100ML; MG/100ML
100 INJECTION, SOLUTION INTRAVENOUS CONTINUOUS
Status: DISCONTINUED | OUTPATIENT
Start: 2024-01-30 | End: 2024-01-30 | Stop reason: HOSPADM

## 2024-01-30 RX ORDER — HYDROMORPHONE HYDROCHLORIDE 1 MG/ML
0.4 INJECTION, SOLUTION INTRAMUSCULAR; INTRAVENOUS; SUBCUTANEOUS EVERY 5 MIN PRN
Status: DISCONTINUED | OUTPATIENT
Start: 2024-01-30 | End: 2024-01-30

## 2024-01-30 RX ORDER — SODIUM CHLORIDE, SODIUM LACTATE, POTASSIUM CHLORIDE, CALCIUM CHLORIDE 600; 310; 30; 20 MG/100ML; MG/100ML; MG/100ML; MG/100ML
INJECTION, SOLUTION INTRAVENOUS CONTINUOUS PRN
Status: DISCONTINUED | OUTPATIENT
Start: 2024-01-30 | End: 2024-01-30

## 2024-01-30 RX ORDER — MIDAZOLAM HYDROCHLORIDE 1 MG/ML
INJECTION INTRAMUSCULAR; INTRAVENOUS AS NEEDED
Status: DISCONTINUED | OUTPATIENT
Start: 2024-01-30 | End: 2024-01-30

## 2024-01-30 RX ORDER — ONDANSETRON 4 MG/1
4 TABLET, ORALLY DISINTEGRATING ORAL EVERY 8 HOURS PRN
Qty: 20 TABLET | Refills: 0 | Status: SHIPPED | OUTPATIENT
Start: 2024-01-30 | End: 2024-02-13

## 2024-01-30 RX ORDER — HYDROMORPHONE HYDROCHLORIDE 1 MG/ML
0.2 INJECTION, SOLUTION INTRAMUSCULAR; INTRAVENOUS; SUBCUTANEOUS EVERY 5 MIN PRN
Status: DISCONTINUED | OUTPATIENT
Start: 2024-01-30 | End: 2024-01-30

## 2024-01-30 RX ORDER — VANCOMYCIN HYDROCHLORIDE 750 MG/150ML
INJECTION, SOLUTION INTRAVENOUS AS NEEDED
Status: DISCONTINUED | OUTPATIENT
Start: 2024-01-30 | End: 2024-01-30

## 2024-01-30 RX ORDER — ONDANSETRON HYDROCHLORIDE 2 MG/ML
4 INJECTION, SOLUTION INTRAVENOUS ONCE AS NEEDED
Status: DISCONTINUED | OUTPATIENT
Start: 2024-01-30 | End: 2024-01-30

## 2024-01-30 RX ORDER — TRAMADOL HYDROCHLORIDE 50 MG/1
50 TABLET ORAL EVERY 6 HOURS PRN
Qty: 15 TABLET | Refills: 0 | Status: SHIPPED | OUTPATIENT
Start: 2024-01-30 | End: 2024-01-30 | Stop reason: HOSPADM

## 2024-01-30 RX ORDER — HYDROMORPHONE HYDROCHLORIDE 1 MG/ML
0.6 INJECTION, SOLUTION INTRAMUSCULAR; INTRAVENOUS; SUBCUTANEOUS EVERY 5 MIN PRN
Status: DISCONTINUED | OUTPATIENT
Start: 2024-01-30 | End: 2024-01-30

## 2024-01-30 RX ORDER — LIDOCAINE 560 MG/1
1 PATCH PERCUTANEOUS; TOPICAL; TRANSDERMAL DAILY
Status: DISCONTINUED | OUTPATIENT
Start: 2024-01-30 | End: 2024-01-31 | Stop reason: HOSPADM

## 2024-01-30 RX ORDER — LIDOCAINE HYDROCHLORIDE AND EPINEPHRINE 10; 10 MG/ML; UG/ML
INJECTION, SOLUTION INFILTRATION; PERINEURAL AS NEEDED
Status: DISCONTINUED | OUTPATIENT
Start: 2024-01-30 | End: 2024-01-30 | Stop reason: HOSPADM

## 2024-01-30 RX ORDER — POLYETHYLENE GLYCOL 3350 17 G/17G
17 POWDER, FOR SOLUTION ORAL DAILY
Status: DISCONTINUED | OUTPATIENT
Start: 2024-01-30 | End: 2024-01-31 | Stop reason: HOSPADM

## 2024-01-30 RX ORDER — MUPIROCIN 20 MG/G
OINTMENT TOPICAL
Qty: 30 G | Refills: 0 | Status: SHIPPED | OUTPATIENT
Start: 2024-01-30 | End: 2024-02-04

## 2024-01-30 RX ORDER — LIDOCAINE HCL/PF 100 MG/5ML
SYRINGE (ML) INTRAVENOUS AS NEEDED
Status: DISCONTINUED | OUTPATIENT
Start: 2024-01-30 | End: 2024-01-30

## 2024-01-30 RX ORDER — SULFAMETHOXAZOLE AND TRIMETHOPRIM 800; 160 MG/1; MG/1
1 TABLET ORAL 2 TIMES DAILY
Qty: 20 TABLET | Refills: 0 | Status: SHIPPED | OUTPATIENT
Start: 2024-01-30 | End: 2024-02-09

## 2024-01-30 RX ORDER — ACETAMINOPHEN 325 MG/1
975 TABLET ORAL EVERY 8 HOURS SCHEDULED
Status: DISCONTINUED | OUTPATIENT
Start: 2024-01-30 | End: 2024-01-31 | Stop reason: HOSPADM

## 2024-01-30 RX ORDER — TRAMADOL HYDROCHLORIDE 50 MG/1
50 TABLET ORAL EVERY 6 HOURS PRN
Status: DISCONTINUED | OUTPATIENT
Start: 2024-01-30 | End: 2024-01-31 | Stop reason: HOSPADM

## 2024-01-30 RX ORDER — ALBUTEROL SULFATE 0.83 MG/ML
2.5 SOLUTION RESPIRATORY (INHALATION) ONCE AS NEEDED
Status: DISCONTINUED | OUTPATIENT
Start: 2024-01-30 | End: 2024-01-30

## 2024-01-30 RX ORDER — ONDANSETRON HYDROCHLORIDE 2 MG/ML
4 INJECTION, SOLUTION INTRAVENOUS EVERY 8 HOURS PRN
Status: DISCONTINUED | OUTPATIENT
Start: 2024-01-30 | End: 2024-01-31 | Stop reason: HOSPADM

## 2024-01-30 RX ORDER — HYDROGEN PEROXIDE 3 %
1 SOLUTION, NON-ORAL MISCELLANEOUS 3 TIMES DAILY
Status: DISCONTINUED | OUTPATIENT
Start: 2024-01-30 | End: 2024-01-31 | Stop reason: HOSPADM

## 2024-01-30 RX ORDER — ALBUMIN HUMAN 50 G/1000ML
SOLUTION INTRAVENOUS AS NEEDED
Status: DISCONTINUED | OUTPATIENT
Start: 2024-01-30 | End: 2024-01-30

## 2024-01-30 RX ORDER — SUCCINYLCHOLINE CHLORIDE 100 MG/5ML
SYRINGE (ML) INTRAVENOUS AS NEEDED
Status: DISCONTINUED | OUTPATIENT
Start: 2024-01-30 | End: 2024-01-30

## 2024-01-30 RX ORDER — NORETHINDRONE AND ETHINYL ESTRADIOL 0.5-0.035
KIT ORAL AS NEEDED
Status: DISCONTINUED | OUTPATIENT
Start: 2024-01-30 | End: 2024-01-30

## 2024-01-30 RX ORDER — ONDANSETRON HYDROCHLORIDE 2 MG/ML
INJECTION, SOLUTION INTRAVENOUS AS NEEDED
Status: DISCONTINUED | OUTPATIENT
Start: 2024-01-30 | End: 2024-01-30

## 2024-01-30 RX ORDER — DEXAMETHASONE SODIUM PHOSPHATE 100 MG/10ML
INJECTION INTRAMUSCULAR; INTRAVENOUS AS NEEDED
Status: DISCONTINUED | OUTPATIENT
Start: 2024-01-30 | End: 2024-01-30

## 2024-01-30 RX ORDER — BISACODYL 10 MG/1
10 SUPPOSITORY RECTAL DAILY PRN
Status: DISCONTINUED | OUTPATIENT
Start: 2024-01-30 | End: 2024-01-31 | Stop reason: HOSPADM

## 2024-01-30 RX ORDER — METHOCARBAMOL 100 MG/ML
1000 INJECTION, SOLUTION INTRAMUSCULAR; INTRAVENOUS ONCE AS NEEDED
Status: DISCONTINUED | OUTPATIENT
Start: 2024-01-30 | End: 2024-01-30

## 2024-01-30 RX ORDER — HYDRALAZINE HYDROCHLORIDE 20 MG/ML
5 INJECTION INTRAMUSCULAR; INTRAVENOUS EVERY 30 MIN PRN
Status: DISCONTINUED | OUTPATIENT
Start: 2024-01-30 | End: 2024-01-30

## 2024-01-30 RX ORDER — BACITRACIN 500 [USP'U]/G
OINTMENT TOPICAL 3 TIMES DAILY
Status: DISCONTINUED | OUTPATIENT
Start: 2024-01-30 | End: 2024-01-31 | Stop reason: HOSPADM

## 2024-01-30 RX ORDER — FENTANYL CITRATE 50 UG/ML
INJECTION, SOLUTION INTRAMUSCULAR; INTRAVENOUS AS NEEDED
Status: DISCONTINUED | OUTPATIENT
Start: 2024-01-30 | End: 2024-01-30

## 2024-01-30 RX ORDER — NALOXONE HYDROCHLORIDE 0.4 MG/ML
0.2 INJECTION, SOLUTION INTRAMUSCULAR; INTRAVENOUS; SUBCUTANEOUS EVERY 5 MIN PRN
Status: DISCONTINUED | OUTPATIENT
Start: 2024-01-30 | End: 2024-01-31 | Stop reason: HOSPADM

## 2024-01-30 RX ORDER — HYDROMORPHONE HYDROCHLORIDE 1 MG/ML
INJECTION, SOLUTION INTRAMUSCULAR; INTRAVENOUS; SUBCUTANEOUS AS NEEDED
Status: DISCONTINUED | OUTPATIENT
Start: 2024-01-30 | End: 2024-01-30

## 2024-01-30 RX ORDER — OXYMETAZOLINE HCL 0.05 %
SPRAY, NON-AEROSOL (ML) NASAL AS NEEDED
Status: DISCONTINUED | OUTPATIENT
Start: 2024-01-30 | End: 2024-01-30 | Stop reason: HOSPADM

## 2024-01-30 RX ADMIN — HYDROGEN PEROXIDE 1 APPLICATION: 30 SOLUTION TOPICAL at 16:24

## 2024-01-30 RX ADMIN — EPHEDRINE SULFATE 5 MG: 50 INJECTION, SOLUTION INTRAVENOUS at 08:10

## 2024-01-30 RX ADMIN — EPHEDRINE SULFATE 10 MG: 50 INJECTION, SOLUTION INTRAVENOUS at 09:09

## 2024-01-30 RX ADMIN — ROCURONIUM BROMIDE 10 MG: 10 INJECTION INTRAVENOUS at 09:16

## 2024-01-30 RX ADMIN — LIDOCAINE HYDROCHLORIDE 40 MG: 20 INJECTION INTRAVENOUS at 10:36

## 2024-01-30 RX ADMIN — CEFAZOLIN 2 G: 1 INJECTION, POWDER, FOR SOLUTION INTRAMUSCULAR; INTRAVENOUS at 07:49

## 2024-01-30 RX ADMIN — SALINE NASAL SPRAY 2 SPRAY: 1.5 SOLUTION NASAL at 20:03

## 2024-01-30 RX ADMIN — ACETAMINOPHEN 975 MG: 325 TABLET ORAL at 20:02

## 2024-01-30 RX ADMIN — SODIUM CHLORIDE, POTASSIUM CHLORIDE, SODIUM LACTATE AND CALCIUM CHLORIDE 100 ML/HR: 600; 310; 30; 20 INJECTION, SOLUTION INTRAVENOUS at 10:52

## 2024-01-30 RX ADMIN — VANCOMYCIN HYDROCHLORIDE 750 MG: 750 INJECTION, SOLUTION INTRAVENOUS at 09:45

## 2024-01-30 RX ADMIN — SUGAMMADEX 200 MG: 100 INJECTION, SOLUTION INTRAVENOUS at 10:31

## 2024-01-30 RX ADMIN — ROCURONIUM BROMIDE 20 MG: 10 INJECTION INTRAVENOUS at 08:36

## 2024-01-30 RX ADMIN — ALBUMIN HUMAN 250 ML: 0.05 INJECTION, SOLUTION INTRAVENOUS at 09:28

## 2024-01-30 RX ADMIN — ROCURONIUM BROMIDE 40 MG: 10 INJECTION INTRAVENOUS at 07:48

## 2024-01-30 RX ADMIN — HYDROMORPHONE HYDROCHLORIDE 0.4 MG: 1 INJECTION, SOLUTION INTRAMUSCULAR; INTRAVENOUS; SUBCUTANEOUS at 10:30

## 2024-01-30 RX ADMIN — ENOXAPARIN SODIUM 40 MG: 100 INJECTION SUBCUTANEOUS at 16:24

## 2024-01-30 RX ADMIN — Medication 100 MG: at 07:37

## 2024-01-30 RX ADMIN — HYDROMORPHONE HYDROCHLORIDE 0.4 MG: 1 INJECTION, SOLUTION INTRAMUSCULAR; INTRAVENOUS; SUBCUTANEOUS at 12:00

## 2024-01-30 RX ADMIN — FENTANYL CITRATE 50 MCG: 50 INJECTION, SOLUTION INTRAMUSCULAR; INTRAVENOUS at 08:49

## 2024-01-30 RX ADMIN — FENTANYL CITRATE 50 MCG: 50 INJECTION, SOLUTION INTRAMUSCULAR; INTRAVENOUS at 07:38

## 2024-01-30 RX ADMIN — SODIUM CHLORIDE, POTASSIUM CHLORIDE, SODIUM LACTATE AND CALCIUM CHLORIDE: 600; 310; 30; 20 INJECTION, SOLUTION INTRAVENOUS at 07:25

## 2024-01-30 RX ADMIN — MIDAZOLAM HYDROCHLORIDE 2 MG: 1 INJECTION, SOLUTION INTRAMUSCULAR; INTRAVENOUS at 07:29

## 2024-01-30 RX ADMIN — SALINE NASAL SPRAY 2 SPRAY: 1.5 SOLUTION NASAL at 16:24

## 2024-01-30 RX ADMIN — EPHEDRINE SULFATE 10 MG: 50 INJECTION, SOLUTION INTRAVENOUS at 08:27

## 2024-01-30 RX ADMIN — ROCURONIUM BROMIDE 10 MG: 10 INJECTION INTRAVENOUS at 09:45

## 2024-01-30 RX ADMIN — ONDANSETRON 4 MG: 2 INJECTION, SOLUTION INTRAMUSCULAR; INTRAVENOUS at 10:03

## 2024-01-30 RX ADMIN — PROPOFOL 200 MG: 10 INJECTION, EMULSION INTRAVENOUS at 07:38

## 2024-01-30 RX ADMIN — SALINE NASAL SPRAY 2 SPRAY: 1.5 SOLUTION NASAL at 18:14

## 2024-01-30 RX ADMIN — BACITRACIN: 500 OINTMENT TOPICAL at 20:00

## 2024-01-30 RX ADMIN — DEXAMETHASONE SODIUM PHOSPHATE 10 MG: 10 INJECTION INTRAMUSCULAR; INTRAVENOUS at 07:48

## 2024-01-30 RX ADMIN — SULFAMETHOXAZOLE AND TRIMETHOPRIM 160 MG: 800; 160 TABLET ORAL at 20:00

## 2024-01-30 RX ADMIN — LIDOCAINE HYDROCHLORIDE 60 MG: 20 INJECTION INTRAVENOUS at 09:19

## 2024-01-30 RX ADMIN — EPHEDRINE SULFATE 10 MG: 50 INJECTION, SOLUTION INTRAVENOUS at 09:46

## 2024-01-30 RX ADMIN — BACITRACIN: 500 OINTMENT TOPICAL at 16:24

## 2024-01-30 RX ADMIN — ROCURONIUM BROMIDE 20 MG: 10 INJECTION INTRAVENOUS at 07:38

## 2024-01-30 RX ADMIN — EPHEDRINE SULFATE 10 MG: 50 INJECTION, SOLUTION INTRAVENOUS at 08:19

## 2024-01-30 RX ADMIN — HYDROGEN PEROXIDE 1 APPLICATION: 30 SOLUTION TOPICAL at 20:03

## 2024-01-30 SDOH — SOCIAL STABILITY: SOCIAL INSECURITY: HAVE YOU HAD THOUGHTS OF HARMING ANYONE ELSE?: YES

## 2024-01-30 SDOH — SOCIAL STABILITY: SOCIAL INSECURITY: ARE YOU OR HAVE YOU BEEN THREATENED OR ABUSED PHYSICALLY, EMOTIONALLY, OR SEXUALLY BY ANYONE?: NO

## 2024-01-30 SDOH — SOCIAL STABILITY: SOCIAL INSECURITY: ABUSE: ADULT

## 2024-01-30 SDOH — HEALTH STABILITY: MENTAL HEALTH: CURRENT SMOKER: 0

## 2024-01-30 SDOH — SOCIAL STABILITY: SOCIAL INSECURITY: DO YOU FEEL ANYONE HAS EXPLOITED OR TAKEN ADVANTAGE OF YOU FINANCIALLY OR OF YOUR PERSONAL PROPERTY?: NO

## 2024-01-30 SDOH — SOCIAL STABILITY: SOCIAL INSECURITY: WERE YOU ABLE TO COMPLETE ALL THE BEHAVIORAL HEALTH SCREENINGS?: YES

## 2024-01-30 SDOH — SOCIAL STABILITY: SOCIAL INSECURITY: ARE THERE ANY APPARENT SIGNS OF INJURIES/BEHAVIORS THAT COULD BE RELATED TO ABUSE/NEGLECT?: NO

## 2024-01-30 SDOH — SOCIAL STABILITY: SOCIAL INSECURITY: DO YOU FEEL UNSAFE GOING BACK TO THE PLACE WHERE YOU ARE LIVING?: NO

## 2024-01-30 SDOH — SOCIAL STABILITY: SOCIAL INSECURITY: HAS ANYONE EVER THREATENED TO HURT YOUR FAMILY OR YOUR PETS?: NO

## 2024-01-30 SDOH — SOCIAL STABILITY: SOCIAL INSECURITY: DOES ANYONE TRY TO KEEP YOU FROM HAVING/CONTACTING OTHER FRIENDS OR DOING THINGS OUTSIDE YOUR HOME?: NO

## 2024-01-30 ASSESSMENT — PAIN SCALES - GENERAL
PAINLEVEL_OUTOF10: 4
PAINLEVEL_OUTOF10: 3
PAINLEVEL_OUTOF10: 0 - NO PAIN
PAINLEVEL_OUTOF10: 3
PAINLEVEL_OUTOF10: 1
PAINLEVEL_OUTOF10: 3
PAINLEVEL_OUTOF10: 4
PAINLEVEL_OUTOF10: 3
PAIN_LEVEL: 0
PAINLEVEL_OUTOF10: 0 - NO PAIN
PAINLEVEL_OUTOF10: 5 - MODERATE PAIN
PAINLEVEL_OUTOF10: 0 - NO PAIN
PAINLEVEL_OUTOF10: 0 - NO PAIN
PAINLEVEL_OUTOF10: 3

## 2024-01-30 ASSESSMENT — COGNITIVE AND FUNCTIONAL STATUS - GENERAL
PATIENT BASELINE BEDBOUND: NO
DRESSING REGULAR UPPER BODY CLOTHING: A LITTLE
DAILY ACTIVITIY SCORE: 23
MOBILITY SCORE: 23
CLIMB 3 TO 5 STEPS WITH RAILING: A LITTLE

## 2024-01-30 ASSESSMENT — PAIN - FUNCTIONAL ASSESSMENT

## 2024-01-30 ASSESSMENT — ACTIVITIES OF DAILY LIVING (ADL)
WALKS IN HOME: INDEPENDENT
HEARING - LEFT EAR: FUNCTIONAL
GROOMING: INDEPENDENT
TOILETING: INDEPENDENT
HEARING - RIGHT EAR: FUNCTIONAL
PATIENT'S MEMORY ADEQUATE TO SAFELY COMPLETE DAILY ACTIVITIES?: YES
LACK_OF_TRANSPORTATION: NO
DRESSING YOURSELF: INDEPENDENT
DRESSING YOURSELF: INDEPENDENT
BATHING: INDEPENDENT
FEEDING YOURSELF: INDEPENDENT
WALKS IN HOME: INDEPENDENT
FEEDING YOURSELF: INDEPENDENT
TOILETING: INDEPENDENT
JUDGMENT_ADEQUATE_SAFELY_COMPLETE_DAILY_ACTIVITIES: YES
LACK_OF_TRANSPORTATION: NO
PATIENT'S MEMORY ADEQUATE TO SAFELY COMPLETE DAILY ACTIVITIES?: YES
HEARING - LEFT EAR: FUNCTIONAL
ADEQUATE_TO_COMPLETE_ADL: YES
JUDGMENT_ADEQUATE_SAFELY_COMPLETE_DAILY_ACTIVITIES: YES
ADEQUATE_TO_COMPLETE_ADL: YES
GROOMING: INDEPENDENT
HEARING - RIGHT EAR: FUNCTIONAL

## 2024-01-30 ASSESSMENT — COLUMBIA-SUICIDE SEVERITY RATING SCALE - C-SSRS
1. IN THE PAST MONTH, HAVE YOU WISHED YOU WERE DEAD OR WISHED YOU COULD GO TO SLEEP AND NOT WAKE UP?: NO
2. HAVE YOU ACTUALLY HAD ANY THOUGHTS OF KILLING YOURSELF?: NO
6. HAVE YOU EVER DONE ANYTHING, STARTED TO DO ANYTHING, OR PREPARED TO DO ANYTHING TO END YOUR LIFE?: NO

## 2024-01-30 ASSESSMENT — LIFESTYLE VARIABLES
HOW MANY STANDARD DRINKS CONTAINING ALCOHOL DO YOU HAVE ON A TYPICAL DAY: PATIENT DOES NOT DRINK
PRESCIPTION_ABUSE_PAST_12_MONTHS: NO
SKIP TO QUESTIONS 9-10: 1
SUBSTANCE_ABUSE_PAST_12_MONTHS: NO
HOW OFTEN DO YOU HAVE 6 OR MORE DRINKS ON ONE OCCASION: NEVER
HOW OFTEN DO YOU HAVE A DRINK CONTAINING ALCOHOL: NEVER
AUDIT-C TOTAL SCORE: 0
AUDIT-C TOTAL SCORE: 0

## 2024-01-30 ASSESSMENT — PAIN DESCRIPTION - LOCATION: LOCATION: HEAD

## 2024-01-30 NOTE — ANESTHESIA PROCEDURE NOTES
Airway  Date/Time: 1/30/2024 7:39 AM  Urgency: elective    Airway not difficult    Staffing  Performed: SRNA   Authorized by: Chris Rider MD    Performed by: JENNI Kaba-GIULIANA  Patient location during procedure: OR    Indications and Patient Condition  Indications for airway management: anesthesia and airway protection  Spontaneous Ventilation: absent  Sedation level: deep  Preoxygenated: yes  Patient position: sniffing  MILS maintained throughout  Mask difficulty assessment: 0 - not attempted  Planned trial extubation    Final Airway Details  Final airway type: endotracheal airway      Successful airway: ETT  Cuffed: yes   Successful intubation technique: direct laryngoscopy  Endotracheal tube insertion site: oral  Blade: Ryan  Blade size: #4  ETT size (mm): 7.5  Cormack-Lehane Classification: grade IIa - partial view of glottis  Placement verified by: chest auscultation and capnometry   Measured from: lips  ETT to lips (cm): 23  Number of attempts at approach: 1    Additional Comments  Modified RSI. Lips and teeth in preanesthetic condition.

## 2024-01-30 NOTE — ANESTHESIA POSTPROCEDURE EVALUATION
Patient: Adarsh De La Vega    Procedure Summary       Date: 01/30/24 Room / Location: [facility] OR 05 / [facility] OR    Anesthesia Start: 0730 Anesthesia Stop: 1101    Procedures:       Nasal reconstruction, forehead flap revision stage 2; Total case lenggth= 4 hours      Repair Nasal Valve      Excision Cartilage Graft Donor Site Ear Diagnosis:       Squamous cell cancer of skin of nose      Asymmetry of face      Nasal lesion      Acquired deformity of nose      (Squamous cell cancer of skin of nose [C44.321])      (Asymmetry of face [Q67.0])      (Nasal lesion [J34.89])      (Acquired deformity of nose [M95.0])    Surgeons: Sami Mcclain MD Responsible Provider: Chris Rider MD    Anesthesia Type: general ASA Status: 3            Anesthesia Type: general    Vitals Value Taken Time   /56 01/30/24 1400   Temp 36.5 °C (97.7 °F) 01/30/24 1345   Pulse 74 01/30/24 1413   Resp 17 01/30/24 1413   SpO2 97 % 01/30/24 1413   Vitals shown include unvalidated device data.    Anesthesia Post Evaluation    Patient location during evaluation: PACU  Patient participation: complete - patient participated  Level of consciousness: awake  Pain score: 0  Pain management: adequate  Airway patency: patent  Cardiovascular status: acceptable  Respiratory status: acceptable  Hydration status: acceptable  Postoperative Nausea and Vomiting: none        There were no known notable events for this encounter.

## 2024-01-30 NOTE — CARE PLAN
The patient's goals for the shift include      The clinical goals for the shift include pain control    Over the shift, the patient did not make progress toward the following goals. Barriers to progression include pain control. Recommendations to address these barriers include frequently assess patient's pain.      Problem: Pain  Goal: My pain/discomfort is manageable  Outcome: Progressing     Problem: Safety  Goal: Patient will be injury free during hospitalization  Outcome: Progressing  Goal: I will remain free of falls  Outcome: Progressing     Problem: Daily Care  Goal: Daily care needs are met  Outcome: Progressing     Problem: Psychosocial Needs  Goal: Demonstrates ability to cope with hospitalization/illness  Outcome: Progressing  Goal: Collaborate with me, my family, and caregiver to identify my specific goals  Outcome: Progressing  Flowsheets (Taken 1/30/2024 1382)  Cultural Requests During Hospitalization: none  Spiritual Requests During Hospitalization: none     Problem: Discharge Barriers  Goal: My discharge needs are met  Outcome: Progressing     Problem: Pain  Goal: Takes deep breaths with improved pain control throughout the shift  Outcome: Progressing  Goal: Turns in bed with improved pain control throughout the shift  Outcome: Progressing  Goal: Walks with improved pain control throughout the shift  Outcome: Progressing  Goal: Performs ADL's with improved pain control throughout shift  Outcome: Progressing  Goal: Participates in PT with improved pain control throughout the shift  Outcome: Progressing  Goal: Free from opioid side effects throughout the shift  Outcome: Progressing  Goal: Free from acute confusion related to pain meds throughout the shift  Outcome: Progressing

## 2024-01-30 NOTE — OP NOTE
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]     * Asymmetry of face [Q67.0]     * Nasal lesion [J34.89]     * Acquired deformity of nose [M95.0]     Procedures  Nasal reconstruction, forehead flap revision stage 2; Total case lenggth= 4 hours  80565 - AK REPAIR NASAL VESTIBULAR STENOSIS  05170 - Graft Rib cartilage to nose  36598 - AK GRAFT EAR CRTLG AUTOGENOUS NOSE/EAR  26779 - PREP SITE F/S/N/H/F/G/M/D GT 1ST 100 SQ CM/1PCT   07367 - ADJT/REARGMT F/C/C/M/N/AX/G/H/F 10.1-30.0 SQ CM  20734 - ELEVATION OF AXIAL BASED FLAP    Surgeons      * Sami Mcclain - Primary    Resident/Fellow/Other Assistant:  Surgeon(s) and Role:     * Enid Rivera MD - Resident - Assisting    Procedure Summary  Anesthesia: General  ASA: III  Anesthesia Staff: Anesthesiologist: Chris Rider MD  CRNA: JENNI Kaba-GIULIANA  SRNA: Julianne Nicholas  Estimated Blood Loss: 10 mL  Intra-op Medications:   Administrations occurring from 0715 to 1230 on 24:   Medication Name Total Dose   lidocaine-epinephrine (Xylocaine W/EPI) 1 %-1:100,000 injection 40 mL   oxymetazoline (Afrin) 0.05 % nasal spray 20 spray   sodium chloride 0.9 % irrigation solution 1,000 mL   lactated Ringer's infusion 163.33 mL   HYDROmorphone (Dilaudid) injection 0.4 mg 0.4 mg              Anesthesia Record               Intraprocedure I/O Totals          Intake    lactated Ringer's 1500.00 mL    Total Intake 1500 mL       Output    Urine 240 mL    Total Output 240 mL        Net    Net Volume 1260 mL          Specimen:   ID Type Source Tests Collected by Time   A : RIGHT NASAL CAVITY SWAB FOR CULTURE Swab NASAL/SINONASAL RIGHT CONTENTS TISSUE/WOUND CULTURE/SMEAR Sami Mcclain MD 1/30/2024 0818   B : RIGHT CHEST WALL CAVITY CULTURE Swab SOFT TISSUE RESECTION TISSUE/WOUND CULTURE/SMEAR Sami Mcclain MD 1/30/2024 0824   C : RIGHT CHEST CAVITY, RIB, CULTURE Tissue SOFT TISSUE RESECTION TISSUE/WOUND CULTURE/SMEAR Sami Mcclain MD 1/30/2024 0825        Staff:   Circulator: Dorina De La Rosa RN  Scrub Person: Jose Sandhu; Francine Young RN         Drains and/or Catheters:   Urethral Catheter Double-lumen 16 Fr. (Active)       Tourniquet Times:         Implants:     Findings: see op note    Indications: Adarsh De La Vega is an 53 y.o. 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     The patient was seen in the preoperative area. The risks, benefits, complications, treatment options, non-operative alternatives, expected recovery and outcomes were discussed with the patient. The possibilities of reaction to medication, pulmonary aspiration, injury to surrounding structures, bleeding, recurrent infection, the need for additional procedures, failure to diagnose a condition, and creating a complication requiring transfusion or operation were discussed with the patient. The patient concurred with the proposed plan, giving informed consent.  The site of surgery was properly noted/marked if necessary per policy. The patient has been actively warmed in preoperative area. Preoperative antibiotics have been ordered and given within 1 hours of incision. Venous thrombosis prophylaxis have been ordered including bilateral sequential compression devices     Procedure Details:      Adarsh De La Vega presents with a complicated history of squamous cell carcinoma of the  nose treated with a rhinectomy and septectomy.  He underwent adjuvant radiation from which she is now healed.  Follow-up imaging revealed no evidence of disease.  He has undergone staged reconstruction of the subtotal nasal defect.  At his last surgery he underwent a osteocutaneous radial forearm free flap to reconstruct the dorsal support and internal lining of the nose, as well as autologous rib grafting and paramedian forehead flap to reconstruct the caudal septum and external lining of the nose.     The patient was brought to the operating room for his next stage of reconstruction.  A timeout was performed to verify patient procedure.  General anesthesia was induced and the patient was then positioned, prepped, and draped for the above procedures.     The previous flaps were examined and found to be in healthy condition.  1% lidocaine with epinephrine was injected at the planned incision sites around the nose and forehead.     We began with reconstruction of the right side of the nose with the intention to elevate the previous paramedian forehead flap excise soft tissue and debulk the flap.  An incision was made at the LEFT junction of the medial cheek and nasal sidewall carried down to the alar facial groove.  This incision was carried through the alar margin to the columella laterally.  A uniform subcutaneous flap was then elevated with the axial blood supply from the supratrochlear artery maintained in the flap.  The RIGHT lateral attachment of the paramedian forehead flap to the cheek was maintained as well as the columellar attachment to the radial forearm along the RIGHT alar margin and columella.  On the right side sharp excision of scar tissue was performed underlying the forehead flap and the radial forearm flap.  Portions of this tissue were discarded.  The previous radial forearm bone that was secured for the dorsal support was visualized and intact.       At the right chest a previous incision was  made with dissection carried in the subcutaneous and muscular tissue. No viable rib cartilage remained from previous graft harvest. Thus another rib segment was dissected. A portion of the rib cartilage was excised from the 5th rib. This was carved into the appropriate size and thickness to act as a lateral wall strut graft and replace the upper lateral cartilage and support the internal nasal valve. This was secured to the lateral pyriform bone and dorsal bone graft.    At the right ear 1% lidocaine with epinephrine was injected.  An incision was made along the lateral jack with elevation and a subtype perichondrial plane performed to isolate auricular cartilage at the consul bowl.  An incision was made in the cartilage with dissection carried underlying the posterior aspect of the chondral cartilage elevated.  The conchal cartilage was then isolated and set aside for grafting purposes.  Irrigation of the wound and closure of the incision was then performed with a gut suture with quilting subsequently.     The auricular cartilage was then cut and fashioned for placement at the left  ala as a nonanatomic graft for alar reconstruction.  This auricular cartilage graft was positioned to reconstruct the external nasal valve and ala.  A portion of the autologous rib cartilage was then utilized to support the right middle vault portion of the nose and recreate the internal nasal valve support.  Quilting sutures through the costal and auricular cartilage were performed from the radial forearm skin to allow patency of the nasal airway and valve.  A portion of auricular cartilage was also used as a tip graft to soften the nasal tip.     The forehead flap was then inset overlying the nasal defect to recreate the right nasal sidewall, right nasal ala, nasal soft tissue triangle and tip.  The flap was then inset using gut suture in an interrupted fashion.  At the right alar facial margin a transposition of forehead skin over  a 1 cm² defect was performed.     Nasal trumpet was then placed at the left and secured with Prolene suture.     This completed the stage of reconstruction involving the left side of the nose with further reconstruction staging including takedown of the forehead flap.     The patient was awoken from general anesthesia without complication.    Complications:  None; patient tolerated the procedure well.    Disposition: PACU - hemodynamically stable.  Condition: stable         Additional Details: none    Attending Attestation: I was present and scrubbed for the entire procedure.    Sami Mcclain  Phone Number: [phone]

## 2024-01-30 NOTE — DISCHARGE INSTRUCTIONS
.FACIAL PLASTIC SURGERY POSTOPERATIVE INSTRUCTIONS    NASAL SURGERY  Important Phone Numbers  Dr. Sami Mcclain: [phone]  Shruthi Miller R.N.  Evenings/Weekends Emergency: [phone]  - please ask for the ENT resident on-call    At Home after Surgery:    Head Elevation: Keep your head elevated (the height of 2 pillows is appropriate) for 3 days to help with swelling.     Facial and Chest Incision care: Cleanse all incisions three times daily with baby shampoo or mild soap and water. Apply bactroban or petroleum jelly/vaseline to incision line twice daily until incision has healed.        Ice: Apply cold compresses to the cheeks and forehead, up to 20 minutes of each hour while awake after surgery, for the first 48 hours.  This will help reduce swelling and bruising.     Nasal Packing: If you have nasal packing in place with strings hanging out of your nose, you will remove it at home 24 or 48 hours after the operation (as directed by Dr. Mcclain) by pulling on the strings beneath the nose.  Please call the office if you are struggling to remove it.  If you have splints inside the nose that are sutured into place, they will be removed at your follow-up appointment.    Nasal Care: Use nasal saline spray, 4 sprays to each nostril every 2-3 hours while awake.  This will help keep the nasal passages moist and prevent scabbing in the nose.  It is normal to feel congested for several weeks after surgery.  Do not blow your nose for two weeks after surgery.      Incision/Cast Care: If you have an incision on the nose, apply antibiotic ointment four times a day.  The incision will heal most optimally if it is kept moist and clean.  You can use hydrogen peroxide on Q-tips to gently clean any crusts.  Do not rub but gently dab the incision to clean.   If you have a cast or dressing on the outside of your nose, this will remain in place until follow-up.  If the cast falls off, do not worry.  Tape it to your nose at  nighttime while you sleep and if/when you wear glasses.      Shower: You may shower 24 hours after surgery.  Do not let the shower spray hit your face/nose directly and do not soak your face in water.  If you have a cast or dressing on the outside of the nose, try to keep it as dry as possible.  Towel blot your nose/cast after your shower.    Bleeding: Most patients have mild, active bleeding the first night.  Some blood-tinged drainage is normal for 1-2 weeks after surgery.  Afrin nasal spray may be helpful for bleeding after surgery but should not be used for more than 3 days.  Excessive bleeding that does not stop is not expected; please call the office or seek medical attention if this occurs.    Medications: Take the medications as prescribed.  You can take Tylenol in addition to the narcotic pain medication prescribed.  Resume all home medications the night of surgery unless otherwise directed.  Avoid aspirin and NSAIDs for one week after surgery.     Activity: Resume normal activities of daily living, as you feel able.  However, avoid strenuous activity and heavy lifting (more than 10 lbs) for 3 weeks after surgery.  Light activity such as walking may be resumed after 1 week after surgery.  Sport activities may be resumed 1 month after surgery but try to protect your nose as it is still healing.    Seek Medical Attention: Call the office or seek medical attention if you develop fever greater than 101 degrees, excessive bleeding, excessive pain that is not well-controlled, skin rash, visual disturbances, or other unusual symptoms.     Follow-Up Care:  First Appointment: You will return one week after surgery for an appointment for suture and cast/dressing removal.  There are additional sutures inside your nose that will dissolve on their own.    Postoperative Healing: Your nose will be swollen and will remain so for several weeks.  It is important to keep in mind that although much of the swelling resolves  over the first several weeks after surgery, it takes 12 to 15 months for all of the swelling in the nose to resolve.  However, most patients have a good appearance even 2-3 weeks after the operation.      Additional Appointments: Ideally, we would like to see you back between within 1-2 weeks after surgery. Subsequently, our follow up will be approximately 4-6 weeks, then 4-6 months after surgery to examine the healing. After this, the follow-up is quite variable, and depends on how you are doing and feeling. Often, this means visits at about 12 months after surgery to follow your healing process.  Please call the office at any time if you have any questions or concerns and would like to be seen sooner than your next scheduled visit.

## 2024-01-30 NOTE — HOSPITAL COURSE
53 yr old male with SCC s/p previous rhinectomy and reconstruction with paramedian forehead flap and radial forearm osseocutaneous free flap who is now s/p 2nd stage pof complex nasal reconstruction on 1/30/24 with Dr. Mcclain. Please see operative report for full details. Patient tolerated the procedure well and recovered briefly in PACU before being transitioned to regular nursing floor. Post-op course was uncomplicated. Diet was advanced as tolerated.  IV medication transitioned to oral as diet advanced. On the day of discharge, the pt was tolerating a diet, pain was controlled on PO pain medication, and they were ambulating and voiding spontaneously. They were discharged home in stable condition with instructions to follow up as outpatient.

## 2024-01-30 NOTE — ANESTHESIA PREPROCEDURE EVALUATION
Patient: Adarsh De La Vega    Procedure Information       Anesthesia Start Date/Time: 01/30/24 0730    Procedures:       Nasal reconstruction, forehead flap revision stage 2; Total case lenggth= 4 hours      Repair Nasal Valve      Excision Cartilage Graft Donor Site Ear    Location: [facility] OR 05 / [facility] OR    Surgeons: Sami Mcclain MD            Relevant Problems   Anesthesia (within normal limits)   (-) Difficult intubation   (-) Malignant hyperthermia   (-) PONV (postoperative nausea and vomiting)      Cardiovascular (within normal limits)      Endocrine (within normal limits)      GI   (+) Gastroesophageal reflux disease      Neuro/Psych (within normal limits)      Pulmonary (within normal limits)      Hematology (within normal limits)       Clinical information reviewed:   Tobacco  Allergies  Meds   Med Hx  Surg Hx   Fam Hx  Soc Hx        NPO Detail:  NPO/Void Status  Carbohydrate Drink Given Prior to Surgery? : N  Date of Last Liquid: 01/30/24  Time of Last Liquid: 0000  Date of Last Solid: 01/30/24  Time of Last Solid: 0000  Last Intake Type: Clear fluids  Time of Last Void: 0000         Physical Exam    Airway  Mallampati: II  TM distance: >3 FB     Cardiovascular - normal exam  Rhythm: regular  Rate: normal     Dental - normal exam     Pulmonary - normal exam  Breath sounds clear to auscultation     Abdominal - normal exam  Abdomen: soft             Anesthesia Plan    History of general anesthesia?: yes  History of complications of general anesthesia?: no    ASA 3     general     The patient is not a current smoker.    intravenous induction   Postoperative administration of opioids is intended.  Anesthetic plan and risks discussed with patient.  Use of blood products discussed with patient who.    Plan discussed with CRNA.

## 2024-01-30 NOTE — H&P
"History Of Present Illness  Adarsh De La Vega is a 53 y.o. male presenting with nasal deformity, here today for nasal reconstruction, forehead flap revision stage 2.    Past Medical History  He has a past medical history of GERD (gastroesophageal reflux disease), Head and neck cancer (CMS/HCC), and Squamous cell carcinoma in situ.    Surgical History  He has a past surgical history that includes Sinus surgery; Cholecystectomy; Appendectomy; and Amputation.     Social History  He reports that he quit smoking about a year ago. His smoking use included cigarettes. He has a 45.00 pack-year smoking history. He has never used smokeless tobacco. He reports that he does not drink alcohol and does not use drugs.    Family History  No family history on file.     Allergies  Patient has no known allergies.    ROS negative except what is otherwise specified in the HPI.        HENT:      Head: Normocephalic and atraumatic. Nasal flap intact with good wound healing  Eyes:      Conjunctiva/sclera: Conjunctivae normal.      Pupils: Pupils are equal, round, and reactive to light.   Cardiovascular:      Rate and Rhythm: Normal rate and regular rhythm.   Pulmonary:      Effort: Pulmonary effort is normal. No respiratory distress.      Breath sounds: Normal breath sounds.   Abdominal:      General: Bowel sounds are normal.      Palpations: Abdomen is soft.   Musculoskeletal:      Cervical back: Normal range of motion and neck supple.   Skin:     General: Skin is warm and dry.   Neurological:      Mental Status: Alert and oriented to person, place, and time.      Cranial Nerves: No cranial nerve deficit.      Coordination: Coordination normal.   Psychiatric:         Mood and Affect: Affect normal.       Last Recorded Vitals  Blood pressure 123/64, pulse 83, temperature 36.2 °C (97.2 °F), temperature source Temporal, resp. rate 18, height 1.93 m (6' 4\"), weight 80.1 kg (176 lb 9.4 oz), SpO2 98 %.    Relevant Results        Scheduled " medications    Continuous medications    PRN medications           Assessment/Plan   Principal Problem:    Asymmetry of face  Active Problems:    Nasal lesion    Acquired deformity of nose    Squamous cell cancer of skin of nose             I spent 15 minutes in the professional and overall care of this patient.      Zaheer Torres PA-C     07-Jan-2018

## 2024-01-31 ENCOUNTER — TELEPHONE (OUTPATIENT)
Dept: ONCOLOGY | Age: 54
End: 2024-01-31

## 2024-01-31 VITALS
BODY MASS INDEX: 21.5 KG/M2 | WEIGHT: 176.59 LBS | RESPIRATION RATE: 16 BRPM | HEIGHT: 76 IN | SYSTOLIC BLOOD PRESSURE: 100 MMHG | OXYGEN SATURATION: 96 % | HEART RATE: 69 BPM | DIASTOLIC BLOOD PRESSURE: 59 MMHG | TEMPERATURE: 97.3 F

## 2024-01-31 PROCEDURE — 7100000011 HC EXTENDED STAY RECOVERY HOURLY - NURSING UNIT

## 2024-01-31 PROCEDURE — 2500000004 HC RX 250 GENERAL PHARMACY W/ HCPCS (ALT 636 FOR OP/ED): Performed by: STUDENT IN AN ORGANIZED HEALTH CARE EDUCATION/TRAINING PROGRAM

## 2024-01-31 RX ADMIN — BACITRACIN: 500 OINTMENT TOPICAL at 09:29

## 2024-01-31 RX ADMIN — SALINE NASAL SPRAY 2 SPRAY: 1.5 SOLUTION NASAL at 09:28

## 2024-01-31 RX ADMIN — HYDROGEN PEROXIDE 1 APPLICATION: 30 SOLUTION TOPICAL at 09:29

## 2024-01-31 RX ADMIN — SULFAMETHOXAZOLE AND TRIMETHOPRIM 160 MG: 800; 160 TABLET ORAL at 09:28

## 2024-01-31 ASSESSMENT — COGNITIVE AND FUNCTIONAL STATUS - GENERAL
DAILY ACTIVITIY SCORE: 24
MOBILITY SCORE: 24

## 2024-01-31 ASSESSMENT — PAIN SCALES - GENERAL: PAINLEVEL_OUTOF10: 0 - NO PAIN

## 2024-01-31 ASSESSMENT — ACTIVITIES OF DAILY LIVING (ADL): LACK_OF_TRANSPORTATION: NO

## 2024-01-31 NOTE — TELEPHONE ENCOUNTER
Briefly met w/ pt in [facility]. Pt s/p repeat surgery, finding it easier for PO consumption w/ new nose footprint. He continues w/ hypogeusia, but continues to find new foods that work. He denies any needs at this time. He reports stable wt. Will continue to follow PRN.  Electronically signed by Priscilla Rubio MS, RD, LD on 1/31/2024 at 4:07 PM

## 2024-01-31 NOTE — PROGRESS NOTES
Otolaryngology - Head and Neck Surgery Inpatient Progress Note    Subjective:  No acute events overnight.     Objective:  Visit Vitals  /59 (BP Location: Right arm, Patient Position: Lying)   Pulse 69   Temp 36.3 °C (97.3 °F) (Temporal)   Resp 16     General: Alert, oriented, no acute distress  Resp: Breathing comfortably on room air, no stridor  Head: Atraumatic, normocephalic  Oral Cavity: MMM  Ears: right external ear with stitches in placed  Nose: reconstruction with incisions in place, stents in place  Chest: incision clean, dry, and intact  Neck: trachea midline    Assessment:  53 yr old male with SCC s/p previous rhinectomy and reconstruction with paramedian forehead flap and radial forearm osseocutaneous free flap who is now s/p 2nd stage pof complex nasal reconstruction on 1/30/24 with Dr. Mcclain.    Plan:  ENT: standard wound care of incisions,   Neuro/Pain: tylenol, oxy5/10,   CV: continue to monitor vitals  Pulm: IS, maintain O2 sats >92%   GI/FEN:  bowel regimen, replete electrolytes as needed  : Voiding spontaneously   Heme: Monitor CBC  ID:   monitor for infection  Endo: no hx of DM   MSK: OOB  Dispo: plan for discharge 1/31      Bob Cowan MD  Dept. of Otolaryngology - Head and Neck Surgery, PGY-4   ENT Consults: [id number]  ENT Overnight (5p-6a), and Weekends: [id number]  ENT Head and Neck Surgery Phone: [phone]  ENT Peds: [phone]  ENT Outpatient scheduling number: [phone]

## 2024-01-31 NOTE — NURSING NOTE
RN explained discharge instructions to pt and wife. RN addressed all questions and concerns. Pt IV was removed and was taken down to the lobby by transport. No further needs at this time. Fabi Jones RN.

## 2024-02-01 ENCOUNTER — TELEPHONE (OUTPATIENT)
Dept: SURGERY | Facility: HOSPITAL | Age: 54
End: 2024-02-01
Payer: COMMERCIAL

## 2024-02-01 LAB
B-LACTAMASE ORGANISM ISLT: POSITIVE
BACTERIA SPEC CULT: NORMAL
BACTERIA SPEC CULT: NORMAL
GRAM STN SPEC: NORMAL
GRAM STN SPEC: NORMAL

## 2024-02-01 NOTE — PROGRESS NOTES
POV s/p 1/30/24 Nasal reconstruction, forehead flap revision stage 2     Doing well, pain well controlled, swelling improved  Pt washing surgical sites daily, applying mupirocin bid to all surgical sites    Incisions c/d/I  Nasal trumpets in bilateral nares removed, revealing patent airway   Skin appears well perfused  Right rib incision c/d/i, no signs of dehiscence or erythema, healing well    Plan: RTC in 4 weeks or PRN  Augmentin 875 x 14 days    Zaheer Torres PA-C

## 2024-02-02 LAB
BACTERIA SPEC CULT: ABNORMAL
BACTERIA SPEC CULT: ABNORMAL
GRAM STN SPEC: ABNORMAL

## 2024-02-02 NOTE — DISCHARGE SUMMARY
Discharge Diagnosis  Asymmetry of face    Issues Requiring Follow-Up  Post-op follow-up    Test Results Pending At Discharge  Pending Labs       No current pending labs.            Hospital Course  53 yr old male with SCC s/p previous rhinectomy and reconstruction with paramedian forehead flap and radial forearm osseocutaneous free flap who is now s/p 2nd stage pof complex nasal reconstruction on 1/30/24 with Dr. Mcclain. Please see operative report for full details. Patient tolerated the procedure well and recovered briefly in PACU before being transitioned to regular nursing floor. Post-op course was uncomplicated. Diet was advanced as tolerated.  IV medication transitioned to oral as diet advanced. On the day of discharge, the pt was tolerating a diet, pain was controlled on PO pain medication, and they were ambulating and voiding spontaneously. They were discharged home in stable condition with instructions to follow up as outpatient.     Pertinent Physical Exam At Time of Discharge  Please see daily progress note for day of discharge physical examination.      Home Medications     Medication List      START taking these medications     mupirocin 2 % ointment; Commonly known as: Bactroban; Apply topically 3   times a day for 5 days.   ondansetron ODT 4 mg disintegrating tablet; Commonly known as:   Zofran-ODT; Take 1 tablet (4 mg) by mouth every 8 hours if needed for   nausea or vomiting for up to 14 days.   sennosides 8.6 mg tablet; Commonly known as: Senokot; Take 1 tablet (8.6   mg) by mouth once daily for 5 days.   sulfamethoxazole-trimethoprim 800-160 mg tablet; Commonly known as:   Bactrim DS; Take 1 tablet by mouth 2 times a day for 10 days.     CHANGE how you take these medications     sodium chloride 0.65 % nasal spray; Commonly known as: Ocean; Administer   2 sprays into each nostril every 2 hours.; What changed: when to take this     CONTINUE taking these medications     acetaminophen 325 mg tablet;  Commonly known as: Tylenol   melatonin 5 mg tablet     STOP taking these medications     Lubricant Eye Drops ophthalmic solution; Generic drug: lubricating eye   drops   polyethylene glycol 17 gram/dose powder; Commonly known as: Glycolax,   Miralax   Vanicream cream; Generic drug: emollient   white petrolatum 41 % ointment ointment; Commonly known as: Aquaphor       Outpatient Follow-Up  Future Appointments   Date Time Provider Department [place]   2/5/2024  1:45 PM Agustin Plascencia MD [id number] [facility]   2/8/2024 10:45 AM Zaheer Torres PA-C [id number] [place]   3/11/2024  4:00 PM Dena Mei MD [id number] [facility]       Bob Cowan MD

## 2024-02-05 ENCOUNTER — TELEMEDICINE (OUTPATIENT)
Dept: SURGERY | Facility: CLINIC | Age: 54
End: 2024-02-05
Payer: COMMERCIAL

## 2024-02-05 ENCOUNTER — DOCUMENTATION (OUTPATIENT)
Dept: OTOLARYNGOLOGY | Facility: CLINIC | Age: 54
End: 2024-02-05

## 2024-02-05 DIAGNOSIS — L08.9 SKIN INFECTION: Primary | ICD-10-CM

## 2024-02-05 DIAGNOSIS — Z45.2 ENCOUNTER FOR ADJUSTMENT OR MANAGEMENT OF VASCULAR ACCESS DEVICE: ICD-10-CM

## 2024-02-05 DIAGNOSIS — Q67.0 FACIAL ASYMMETRY: ICD-10-CM

## 2024-02-05 DIAGNOSIS — M95.0 NASAL DEFECT: ICD-10-CM

## 2024-02-05 PROCEDURE — 99204 OFFICE O/P NEW MOD 45 MIN: CPT | Performed by: SURGERY

## 2024-02-05 RX ORDER — VANCOMYCIN HYDROCHLORIDE 250 MG/1
250 CAPSULE ORAL 4 TIMES DAILY
Qty: 40 CAPSULE | Refills: 0 | Status: SHIPPED | OUTPATIENT
Start: 2024-02-05 | End: 2024-02-05 | Stop reason: ENTERED-IN-ERROR

## 2024-02-05 ASSESSMENT — ENCOUNTER SYMPTOMS
CARDIOVASCULAR NEGATIVE: 1
PSYCHIATRIC NEGATIVE: 1
NEUROLOGICAL NEGATIVE: 1
RESPIRATORY NEGATIVE: 1
ENDOCRINE NEGATIVE: 1
MUSCULOSKELETAL NEGATIVE: 1

## 2024-02-05 NOTE — H&P (VIEW-ONLY)
Subjective   Patient ID: Adarsh De La Vega is a 53 y.o. male who presents for surgical consultation for Mediport removal.    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  Patient has now been undergoing staged reconstruction by otolaryngology.  Patient states that his port was initially put in at [facility] in [place].  He has completed all chemotherapy.  There is no plan for additional chemotherapy and he was cleared for removal of the port by ENT.    He is still occasionally getting blood draws and they are appropriately flushing the port currently.  He never had any problems with infection or venous thrombosis associated with the port.    He thinks his next surgery with his otolaryngologist will be toward the end of February.    Review of Systems   HENT:  Positive for congestion.    Respiratory: Negative.     Cardiovascular: Negative.    Endocrine: Negative.    Musculoskeletal: Negative.    Skin: Negative.    Neurological: Negative.    Psychiatric/Behavioral: Negative.         Objective   Physical Exam  Constitutional:       Comments: Patient has some asymmetry of his nasal reconstruction.  No signs of any infection.  This was done as a video chat virtual visit consultation.  Therefore a full physical exam was not able to be completed.    Patient states that his Mediport is on his right chest wall.         Assessment/Plan    Mr. De La Vega had a squamous cell cancer of his face requiring rhinectomy and he is now undergoing a multi staged reconstruction.  He had a Mediport placed at [facility] in [place] that is no longer being used for any additional chemotherapy and he is ready to have that removed.    I told him that I will work with his otolaryngologist to see if we can try to remove the Mediport during the time of his next reconstructive operation.    Patient is amenable to this plan.         Agustin Plascencia MD  02/05/24 1:45 PM

## 2024-02-05 NOTE — PROGRESS NOTES
Subjective   Patient ID: Adarsh De La Vega is a 53 y.o. male who presents for surgical consultation for Mediport removal.    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  Patient has now been undergoing staged reconstruction by otolaryngology.  Patient states that his port was initially put in at [facility] in [place].  He has completed all chemotherapy.  There is no plan for additional chemotherapy and he was cleared for removal of the port by ENT.    He is still occasionally getting blood draws and they are appropriately flushing the port currently.  He never had any problems with infection or venous thrombosis associated with the port.    He thinks his next surgery with his otolaryngologist will be toward the end of February.    Review of Systems   HENT:  Positive for congestion.    Respiratory: Negative.     Cardiovascular: Negative.    Endocrine: Negative.    Musculoskeletal: Negative.    Skin: Negative.    Neurological: Negative.    Psychiatric/Behavioral: Negative.         Objective   Physical Exam  Constitutional:       Comments: Patient has some asymmetry of his nasal reconstruction.  No signs of any infection.  This was done as a video chat virtual visit consultation.  Therefore a full physical exam was not able to be completed.    Patient states that his Mediport is on his right chest wall.         Assessment/Plan    Mr. De La Vega had a squamous cell cancer of his face requiring rhinectomy and he is now undergoing a multi staged reconstruction.  He had a Mediport placed at [facility] in [place] that is no longer being used for any additional chemotherapy and he is ready to have that removed.    I told him that I will work with his otolaryngologist to see if we can try to remove the Mediport during the time of his next reconstructive operation.    Patient is amenable to this plan.         Agustin Plascencia MD  02/05/24 1:45 PM

## 2024-02-07 ENCOUNTER — APPOINTMENT (OUTPATIENT)
Dept: OTOLARYNGOLOGY | Facility: CLINIC | Age: 54
End: 2024-02-07
Payer: COMMERCIAL

## 2024-02-08 ENCOUNTER — OFFICE VISIT (OUTPATIENT)
Dept: OTOLARYNGOLOGY | Facility: CLINIC | Age: 54
End: 2024-02-08
Payer: COMMERCIAL

## 2024-02-08 VITALS — BODY MASS INDEX: 21.81 KG/M2 | HEIGHT: 76 IN | WEIGHT: 179.1 LBS

## 2024-02-08 DIAGNOSIS — M95.0 NASAL DEFORMITY: ICD-10-CM

## 2024-02-08 DIAGNOSIS — C44.321 SQUAMOUS CELL CARCINOMA OF NOSE: Primary | ICD-10-CM

## 2024-02-08 DIAGNOSIS — M95.0 NASAL DEFECT: ICD-10-CM

## 2024-02-08 DIAGNOSIS — M95.0 ACQUIRED DEFORMITY OF NOSE: ICD-10-CM

## 2024-02-08 PROCEDURE — 99024 POSTOP FOLLOW-UP VISIT: CPT | Performed by: PHYSICIAN ASSISTANT

## 2024-02-08 PROCEDURE — 1036F TOBACCO NON-USER: CPT | Performed by: PHYSICIAN ASSISTANT

## 2024-02-08 RX ORDER — AMOXICILLIN AND CLAVULANATE POTASSIUM 875; 125 MG/1; MG/1
1 TABLET, FILM COATED ORAL 2 TIMES DAILY
Qty: 28 TABLET | Refills: 0 | Status: SHIPPED | OUTPATIENT
Start: 2024-02-08 | End: 2024-02-27 | Stop reason: HOSPADM

## 2024-02-08 RX ORDER — MUPIROCIN 20 MG/G
OINTMENT TOPICAL
Qty: 30 G | Refills: 2 | Status: ON HOLD | OUTPATIENT
Start: 2024-02-08 | End: 2024-02-27 | Stop reason: ALTCHOICE

## 2024-02-08 ASSESSMENT — PAIN SCALES - GENERAL: PAINLEVEL: 4

## 2024-02-12 ENCOUNTER — PREP FOR PROCEDURE (OUTPATIENT)
Dept: SURGICAL ONCOLOGY | Facility: HOSPITAL | Age: 54
End: 2024-02-12
Payer: COMMERCIAL

## 2024-02-12 DIAGNOSIS — C44.321 SQUAMOUS CELL CANCER OF SKIN OF NOSE: Primary | ICD-10-CM

## 2024-02-12 NOTE — H&P
Ge History Of Present Illness  Adarsh De La Vega is a 53 y.o. male presenting  for surgical consultation for Mediport removal.     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  Patient has now been undergoing staged reconstruction by otolaryngology.  Patient states that his port was initially put in at [facility] in [place].  He has completed all chemotherapy.  There is no plan for additional chemotherapy and he was cleared for removal of the port by ENT.     He is still occasionally getting blood draws and they are appropriately flushing the port currently.  He never had any problems with infection or venous thrombosis associated with the port.     He thinks his next surgery with his otolaryngologist will be toward the end of February..     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 His smoking use included cigarettes. He has a 45.00 pack-year smoking history. He has never used smokeless tobacco. He reports that he does not drink alcohol and does not use drugs.    Family History  No family history on file.     Allergies  Patient has no known allergies.    Review of Systems     Physical Exam     Last Recorded Vitals  There were no vitals taken for this visit.    Relevant Results             Assessment/Plan       I am scheduling patient for Mediport removal will at [facility] on 2/27/2024.             Agustin Plascencia MD

## 2024-02-12 NOTE — H&P (VIEW-ONLY)
Ge History Of Present Illness  Adarsh De La Vega is a 53 y.o. male presenting  for surgical consultation for Mediport removal.     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  Patient has now been undergoing staged reconstruction by otolaryngology.  Patient states that his port was initially put in at [facility] in [place].  He has completed all chemotherapy.  There is no plan for additional chemotherapy and he was cleared for removal of the port by ENT.     He is still occasionally getting blood draws and they are appropriately flushing the port currently.  He never had any problems with infection or venous thrombosis associated with the port.     He thinks his next surgery with his otolaryngologist will be toward the end of February..     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 His smoking use included cigarettes. He has a 45.00 pack-year smoking history. He has never used smokeless tobacco. He reports that he does not drink alcohol and does not use drugs.    Family History  No family history on file.     Allergies  Patient has no known allergies.    Review of Systems     Physical Exam     Last Recorded Vitals  There were no vitals taken for this visit.    Relevant Results             Assessment/Plan       I am scheduling patient for Mediport removal will at [facility] on 2/27/2024.             Agustin Plascencia MD

## 2024-02-13 ENCOUNTER — TELEPHONE (OUTPATIENT)
Dept: SURGERY | Facility: CLINIC | Age: 54
End: 2024-02-13
Payer: COMMERCIAL

## 2024-02-13 DIAGNOSIS — Z00.00 HEALTHCARE MAINTENANCE: ICD-10-CM

## 2024-02-13 DIAGNOSIS — Z01.818 PREOPERATIVE TESTING: ICD-10-CM

## 2024-02-13 NOTE — TELEPHONE ENCOUNTER
Call to patient to confirm OR date 2/27/24. Notified patient he needs to complete labs and an ECG before the date of surgery. Orders in EMR.  Patient would like to receive pre-op instructions via email and address confirmed. Reviewed medication list. All questions addressed. Callback number provided.

## 2024-02-20 ENCOUNTER — PREP FOR PROCEDURE (OUTPATIENT)
Dept: OTOLARYNGOLOGY | Facility: CLINIC | Age: 54
End: 2024-02-20
Payer: COMMERCIAL

## 2024-02-20 DIAGNOSIS — M95.0 ACQUIRED DEFORMITY OF NOSE: ICD-10-CM

## 2024-02-20 DIAGNOSIS — C44.321 SQUAMOUS CELL CARCINOMA OF NOSE: ICD-10-CM

## 2024-02-23 ENCOUNTER — HOSPITAL ENCOUNTER (OUTPATIENT)
Dept: CARDIOLOGY | Facility: HOSPITAL | Age: 54
Discharge: HOME | End: 2024-02-23
Payer: COMMERCIAL

## 2024-02-23 ENCOUNTER — LAB (OUTPATIENT)
Dept: LAB | Facility: LAB | Age: 54
End: 2024-02-23
Payer: COMMERCIAL

## 2024-02-23 ENCOUNTER — APPOINTMENT (OUTPATIENT)
Dept: CARDIOLOGY | Facility: HOSPITAL | Age: 54
End: 2024-02-23
Payer: COMMERCIAL

## 2024-02-23 DIAGNOSIS — Z01.818 PREOPERATIVE TESTING: ICD-10-CM

## 2024-02-23 DIAGNOSIS — Z00.00 HEALTHCARE MAINTENANCE: ICD-10-CM

## 2024-02-23 DIAGNOSIS — C44.321 SQUAMOUS CELL CARCINOMA OF NOSE: ICD-10-CM

## 2024-02-23 LAB
ALBUMIN SERPL BCP-MCNC: 4.2 G/DL (ref 3.4–5)
ALP SERPL-CCNC: 91 U/L (ref 33–120)
ALT SERPL W P-5'-P-CCNC: 7 U/L (ref 10–52)
ANION GAP SERPL CALC-SCNC: 12 MMOL/L (ref 10–20)
APTT PPP: 30 SECONDS (ref 27–38)
AST SERPL W P-5'-P-CCNC: 10 U/L (ref 9–39)
BILIRUB SERPL-MCNC: 0.7 MG/DL (ref 0–1.2)
BUN SERPL-MCNC: 13 MG/DL (ref 6–23)
CALCIUM SERPL-MCNC: 9.4 MG/DL (ref 8.6–10.3)
CHLORIDE SERPL-SCNC: 106 MMOL/L (ref 98–107)
CO2 SERPL-SCNC: 26 MMOL/L (ref 21–32)
CREAT SERPL-MCNC: 0.96 MG/DL (ref 0.5–1.3)
EGFRCR SERPLBLD CKD-EPI 2021: >90 ML/MIN/1.73M*2
ERYTHROCYTE [DISTWIDTH] IN BLOOD BY AUTOMATED COUNT: 14 % (ref 11.5–14.5)
GLUCOSE SERPL-MCNC: 73 MG/DL (ref 74–99)
HCT VFR BLD AUTO: 45.3 % (ref 41–52)
HGB BLD-MCNC: 13.4 G/DL (ref 13.5–17.5)
INR PPP: 1 (ref 0.9–1.1)
MCH RBC QN AUTO: 28.2 PG (ref 26–34)
MCHC RBC AUTO-ENTMCNC: 29.6 G/DL (ref 32–36)
MCV RBC AUTO: 95 FL (ref 80–100)
NRBC BLD-RTO: 0 /100 WBCS (ref 0–0)
PHOSPHATE SERPL-MCNC: 3.6 MG/DL (ref 2.5–4.9)
PLATELET # BLD AUTO: 212 X10*3/UL (ref 150–450)
POTASSIUM SERPL-SCNC: 3.9 MMOL/L (ref 3.5–5.3)
PROT SERPL-MCNC: 6.9 G/DL (ref 6.4–8.2)
PROTHROMBIN TIME: 11.2 SECONDS (ref 9.8–12.8)
RBC # BLD AUTO: 4.76 X10*6/UL (ref 4.5–5.9)
SODIUM SERPL-SCNC: 140 MMOL/L (ref 136–145)
WBC # BLD AUTO: 3.3 X10*3/UL (ref 4.4–11.3)

## 2024-02-23 PROCEDURE — 85027 COMPLETE CBC AUTOMATED: CPT

## 2024-02-23 PROCEDURE — 93005 ELECTROCARDIOGRAM TRACING: CPT

## 2024-02-23 PROCEDURE — 84100 ASSAY OF PHOSPHORUS: CPT

## 2024-02-23 PROCEDURE — 85730 THROMBOPLASTIN TIME PARTIAL: CPT

## 2024-02-23 PROCEDURE — 80053 COMPREHEN METABOLIC PANEL: CPT

## 2024-02-23 PROCEDURE — 85610 PROTHROMBIN TIME: CPT

## 2024-02-24 LAB
ATRIAL RATE: 66 BPM
P AXIS: 82 DEGREES
PR INTERVAL: 188 MS
Q ONSET: 251 MS
QRS COUNT: 11 BEATS
QRS DURATION: 85 MS
QT INTERVAL: 380 MS
QTC CALCULATION(BAZETT): 399 MS
QTC FREDERICIA: 392 MS
R AXIS: 93 DEGREES
T AXIS: 74 DEGREES
T OFFSET: 441 MS
VENTRICULAR RATE: 66 BPM

## 2024-02-26 ENCOUNTER — ANESTHESIA EVENT (OUTPATIENT)
Dept: OPERATING ROOM | Facility: HOSPITAL | Age: 54
End: 2024-02-26
Payer: COMMERCIAL

## 2024-02-26 ENCOUNTER — APPOINTMENT (OUTPATIENT)
Dept: CARDIOLOGY | Facility: HOSPITAL | Age: 54
End: 2024-02-26
Payer: COMMERCIAL

## 2024-02-26 RX ORDER — LIDOCAINE HYDROCHLORIDE 10 MG/ML
0.1 INJECTION, SOLUTION EPIDURAL; INFILTRATION; INTRACAUDAL; PERINEURAL ONCE
Status: CANCELLED | OUTPATIENT
Start: 2024-02-26 | End: 2024-02-26

## 2024-02-26 RX ORDER — ONDANSETRON HYDROCHLORIDE 2 MG/ML
4 INJECTION, SOLUTION INTRAVENOUS ONCE AS NEEDED
Status: CANCELLED | OUTPATIENT
Start: 2024-02-26

## 2024-02-26 RX ORDER — SODIUM CHLORIDE, SODIUM LACTATE, POTASSIUM CHLORIDE, CALCIUM CHLORIDE 600; 310; 30; 20 MG/100ML; MG/100ML; MG/100ML; MG/100ML
100 INJECTION, SOLUTION INTRAVENOUS CONTINUOUS
Status: CANCELLED | OUTPATIENT
Start: 2024-02-26

## 2024-02-26 RX ORDER — OXYCODONE HYDROCHLORIDE 5 MG/1
5 TABLET ORAL EVERY 4 HOURS PRN
Status: CANCELLED | OUTPATIENT
Start: 2024-02-26

## 2024-02-26 RX ORDER — MEPERIDINE HYDROCHLORIDE 25 MG/ML
12.5 INJECTION INTRAMUSCULAR; INTRAVENOUS; SUBCUTANEOUS EVERY 10 MIN PRN
Status: CANCELLED | OUTPATIENT
Start: 2024-02-26

## 2024-02-27 ENCOUNTER — HOSPITAL ENCOUNTER (OUTPATIENT)
Facility: HOSPITAL | Age: 54
Setting detail: OUTPATIENT SURGERY
Discharge: HOME | End: 2024-02-27
Attending: SURGERY | Admitting: SURGERY
Payer: COMMERCIAL

## 2024-02-27 ENCOUNTER — ANESTHESIA (OUTPATIENT)
Dept: OPERATING ROOM | Facility: HOSPITAL | Age: 54
End: 2024-02-27
Payer: COMMERCIAL

## 2024-02-27 VITALS
HEIGHT: 76 IN | OXYGEN SATURATION: 97 % | SYSTOLIC BLOOD PRESSURE: 100 MMHG | DIASTOLIC BLOOD PRESSURE: 73 MMHG | HEART RATE: 52 BPM | BODY MASS INDEX: 21.69 KG/M2 | TEMPERATURE: 97.5 F | RESPIRATION RATE: 16 BRPM | WEIGHT: 178.13 LBS

## 2024-02-27 DIAGNOSIS — C44.321 SQUAMOUS CELL CANCER OF SKIN OF NOSE: Primary | ICD-10-CM

## 2024-02-27 PROBLEM — G62.9 PERIPHERAL NEUROPATHY: Status: ACTIVE | Noted: 2024-02-27

## 2024-02-27 PROCEDURE — 3700000002 HC GENERAL ANESTHESIA TIME - EACH INCREMENTAL 1 MINUTE: Performed by: SURGERY

## 2024-02-27 PROCEDURE — 7100000010 HC PHASE TWO TIME - EACH INCREMENTAL 1 MINUTE: Performed by: SURGERY

## 2024-02-27 PROCEDURE — 3700000001 HC GENERAL ANESTHESIA TIME - INITIAL BASE CHARGE: Performed by: SURGERY

## 2024-02-27 PROCEDURE — 2720000007 HC OR 272 NO HCPCS: Performed by: SURGERY

## 2024-02-27 PROCEDURE — 2500000004 HC RX 250 GENERAL PHARMACY W/ HCPCS (ALT 636 FOR OP/ED): Performed by: ANESTHESIOLOGY

## 2024-02-27 PROCEDURE — A4217 STERILE WATER/SALINE, 500 ML: HCPCS | Performed by: SURGERY

## 2024-02-27 PROCEDURE — 2500000005 HC RX 250 GENERAL PHARMACY W/O HCPCS: Performed by: SURGERY

## 2024-02-27 PROCEDURE — 36590 REMOVAL TUNNELED CV CATH: CPT | Performed by: SURGERY

## 2024-02-27 PROCEDURE — 7100000001 HC RECOVERY ROOM TIME - INITIAL BASE CHARGE: Performed by: SURGERY

## 2024-02-27 PROCEDURE — A36590 PR REMOVAL TUNNELED CV CATH W SUBQ PORT OR PUMP: Performed by: NURSE ANESTHETIST, CERTIFIED REGISTERED

## 2024-02-27 PROCEDURE — 7100000009 HC PHASE TWO TIME - INITIAL BASE CHARGE: Performed by: SURGERY

## 2024-02-27 PROCEDURE — 2500000005 HC RX 250 GENERAL PHARMACY W/O HCPCS: Performed by: NURSE ANESTHETIST, CERTIFIED REGISTERED

## 2024-02-27 PROCEDURE — 3600000002 HC OR TIME - INITIAL BASE CHARGE - PROCEDURE LEVEL TWO: Performed by: SURGERY

## 2024-02-27 PROCEDURE — 3600000007 HC OR TIME - EACH INCREMENTAL 1 MINUTE - PROCEDURE LEVEL TWO: Performed by: SURGERY

## 2024-02-27 PROCEDURE — 2500000004 HC RX 250 GENERAL PHARMACY W/ HCPCS (ALT 636 FOR OP/ED): Performed by: SURGERY

## 2024-02-27 PROCEDURE — 7100000002 HC RECOVERY ROOM TIME - EACH INCREMENTAL 1 MINUTE: Performed by: SURGERY

## 2024-02-27 PROCEDURE — A36590 PR REMOVAL TUNNELED CV CATH W SUBQ PORT OR PUMP: Performed by: ANESTHESIOLOGY

## 2024-02-27 PROCEDURE — 2500000004 HC RX 250 GENERAL PHARMACY W/ HCPCS (ALT 636 FOR OP/ED): Performed by: NURSE ANESTHETIST, CERTIFIED REGISTERED

## 2024-02-27 RX ORDER — SODIUM CHLORIDE, SODIUM LACTATE, POTASSIUM CHLORIDE, CALCIUM CHLORIDE 600; 310; 30; 20 MG/100ML; MG/100ML; MG/100ML; MG/100ML
100 INJECTION, SOLUTION INTRAVENOUS CONTINUOUS
Status: DISCONTINUED | OUTPATIENT
Start: 2024-02-27 | End: 2024-02-27 | Stop reason: HOSPADM

## 2024-02-27 RX ORDER — LIDOCAINE HYDROCHLORIDE 20 MG/ML
INJECTION, SOLUTION EPIDURAL; INFILTRATION; INTRACAUDAL; PERINEURAL AS NEEDED
Status: DISCONTINUED | OUTPATIENT
Start: 2024-02-27 | End: 2024-02-27

## 2024-02-27 RX ORDER — ONDANSETRON HYDROCHLORIDE 2 MG/ML
INJECTION, SOLUTION INTRAVENOUS AS NEEDED
Status: DISCONTINUED | OUTPATIENT
Start: 2024-02-27 | End: 2024-02-27

## 2024-02-27 RX ORDER — MIDAZOLAM HYDROCHLORIDE 1 MG/ML
INJECTION INTRAMUSCULAR; INTRAVENOUS AS NEEDED
Status: DISCONTINUED | OUTPATIENT
Start: 2024-02-27 | End: 2024-02-27

## 2024-02-27 RX ORDER — DEXAMETHASONE SODIUM PHOSPHATE 4 MG/ML
INJECTION, SOLUTION INTRA-ARTICULAR; INTRALESIONAL; INTRAMUSCULAR; INTRAVENOUS; SOFT TISSUE AS NEEDED
Status: DISCONTINUED | OUTPATIENT
Start: 2024-02-27 | End: 2024-02-27

## 2024-02-27 RX ORDER — PROPOFOL 10 MG/ML
INJECTION, EMULSION INTRAVENOUS CONTINUOUS PRN
Status: DISCONTINUED | OUTPATIENT
Start: 2024-02-27 | End: 2024-02-27

## 2024-02-27 RX ORDER — PROPOFOL 10 MG/ML
INJECTION, EMULSION INTRAVENOUS AS NEEDED
Status: DISCONTINUED | OUTPATIENT
Start: 2024-02-27 | End: 2024-02-27

## 2024-02-27 RX ORDER — OMEPRAZOLE 20 MG/1
20 TABLET, DELAYED RELEASE ORAL
COMMUNITY

## 2024-02-27 RX ORDER — SODIUM CHLORIDE 0.9 G/100ML
IRRIGANT IRRIGATION AS NEEDED
Status: DISCONTINUED | OUTPATIENT
Start: 2024-02-27 | End: 2024-02-27 | Stop reason: HOSPADM

## 2024-02-27 RX ORDER — GABAPENTIN 300 MG/1
300 CAPSULE ORAL 2 TIMES DAILY
COMMUNITY

## 2024-02-27 RX ORDER — FENTANYL CITRATE 50 UG/ML
INJECTION, SOLUTION INTRAMUSCULAR; INTRAVENOUS AS NEEDED
Status: DISCONTINUED | OUTPATIENT
Start: 2024-02-27 | End: 2024-02-27

## 2024-02-27 RX ORDER — CEFAZOLIN 1 G/1
INJECTION, POWDER, FOR SOLUTION INTRAVENOUS AS NEEDED
Status: DISCONTINUED | OUTPATIENT
Start: 2024-02-27 | End: 2024-02-27

## 2024-02-27 RX ADMIN — MIDAZOLAM HYDROCHLORIDE 2 MG: 1 INJECTION, SOLUTION INTRAMUSCULAR; INTRAVENOUS at 08:25

## 2024-02-27 RX ADMIN — ONDANSETRON 4 MG: 2 INJECTION, SOLUTION INTRAMUSCULAR; INTRAVENOUS at 08:35

## 2024-02-27 RX ADMIN — LIDOCAINE HYDROCHLORIDE 50 MG: 20 INJECTION, SOLUTION EPIDURAL; INFILTRATION; INTRACAUDAL; PERINEURAL at 08:35

## 2024-02-27 RX ADMIN — CEFAZOLIN 2 G: 1 INJECTION, POWDER, FOR SOLUTION INTRAMUSCULAR; INTRAVENOUS at 08:30

## 2024-02-27 RX ADMIN — FENTANYL CITRATE 50 MCG: 50 INJECTION, SOLUTION INTRAMUSCULAR; INTRAVENOUS at 08:35

## 2024-02-27 RX ADMIN — SODIUM CHLORIDE, POTASSIUM CHLORIDE, SODIUM LACTATE AND CALCIUM CHLORIDE: 600; 310; 30; 20 INJECTION, SOLUTION INTRAVENOUS at 08:22

## 2024-02-27 RX ADMIN — LIDOCAINE HYDROCHLORIDE 50 MG: 20 INJECTION, SOLUTION EPIDURAL; INFILTRATION; INTRACAUDAL; PERINEURAL at 08:29

## 2024-02-27 RX ADMIN — FENTANYL CITRATE 50 MCG: 50 INJECTION, SOLUTION INTRAMUSCULAR; INTRAVENOUS at 09:12

## 2024-02-27 RX ADMIN — PROPOFOL 100 MCG/KG/MIN: 10 INJECTION, EMULSION INTRAVENOUS at 08:35

## 2024-02-27 RX ADMIN — DEXAMETHASONE SODIUM PHOSPHATE 8 MG: 4 INJECTION, SOLUTION INTRA-ARTICULAR; INTRALESIONAL; INTRAMUSCULAR; INTRAVENOUS; SOFT TISSUE at 08:35

## 2024-02-27 RX ADMIN — PROPOFOL 50 MG: 10 INJECTION, EMULSION INTRAVENOUS at 08:35

## 2024-02-27 ASSESSMENT — PAIN - FUNCTIONAL ASSESSMENT
PAIN_FUNCTIONAL_ASSESSMENT: 0-10
PAIN_FUNCTIONAL_ASSESSMENT: VAS (VISUAL ANALOG SCALE)
PAIN_FUNCTIONAL_ASSESSMENT: 0-10
PAIN_FUNCTIONAL_ASSESSMENT: VAS (VISUAL ANALOG SCALE)
PAIN_FUNCTIONAL_ASSESSMENT: 0-10

## 2024-02-27 ASSESSMENT — PAIN SCALES - GENERAL
PAINLEVEL_OUTOF10: 0 - NO PAIN
PAIN_LEVEL: 0
PAINLEVEL_OUTOF10: 0 - NO PAIN

## 2024-02-27 NOTE — INTERVAL H&P NOTE
H&P reviewed. The patient was examined and there are no changes to the H&P.    Physical Exam:  General: resting comfortably, NAD  HEENT: some facial asymmetry present s/p nasal reconstruction, surgical scars to nose healing well  CV: regular rate  Pulm: nonlabored on RA  Abd: soft, NT, ND  MSK: normal ROM  Psych: appropriate mood and behavior  Skin: warm and dry

## 2024-02-27 NOTE — OP NOTE
Removal Central Venous Catheter (R) Operative Note     Date: 2024  OR Location: [facility] OR    Name: Adarsh De La Vega, : 1970, Age: 53 y.o., MRN: [id number], Sex: male    Diagnosis  Pre-op Diagnosis     * Squamous cell cancer of skin of nose [C44.321] Post-op Diagnosis     * Squamous cell cancer of skin of nose [C44.321]     Procedures  Removal Central Venous Catheter  [address] - [place] [facility] CTR VAD W/SUBQ PORT/ CTR/PRPH INSJ      Surgeons      * Agustin Plascencia - Primary    Resident/Fellow/Other Assistant:  Surgeon(s) and Role:    Procedure Summary  Anesthesia: General  ASA: III  Anesthesia Staff: Anesthesiologist: Eren Hernandez MD  CRNA: JENNI Ghotra-JANAK GIORDANO  Estimated Blood Loss: 2mL  Intra-op Medications:   Administrations occurring from 0830 to 0930 on 24:   Medication Name Total Dose   sodium chloride 0.9 % irrigation solution 1,000 mL              Anesthesia Record               Intraprocedure I/O Totals          Intake    Propofol Drip 0.00 mL    The total shown is the total volume documented since Anesthesia Start was filed.    Total Intake 0 mL          Specimen: No specimens collected     Staff:   Circulator: Lore Meza RN; Landon Butts RN  Scrub Person: Kathy Lanza         Drains and/or Catheters: * None in log *    Tourniquet Times:         Implants:     Findings: Well incorporated Mediport.  No evidence of infection    Indications: Adarsh De La Vega is an 53 y.o. male who is having surgery for removal of Mediport that he is no longer using.    The patient was seen in the preoperative area. The risks, benefits, complications, treatment options, non-operative alternatives, expected recovery and outcomes were discussed with the patient. The possibilities of reaction to medication, pulmonary aspiration, injury to surrounding structures, bleeding, recurrent infection, the need for additional procedures, failure to diagnose a condition, and creating a  complication requiring transfusion or operation were discussed with the patient. The patient concurred with the proposed plan, giving informed consent.  The site of surgery was properly noted/marked if necessary per policy. The patient has been actively warmed in preoperative area. Preoperative antibiotics have been ordered and given within 1 hours of incision. Venous thrombosis prophylaxis have been ordered including bilateral sequential compression devices    Procedure Details: The operative date patient's brought to the operating room.  He received some cefazolin his antibiotic he is prepped and draped in usual sterile fashion.  SCDs on for DVT prophylaxis.  We used 1% lidocaine half percent Marcaine as local anesthetic through the incision directly over his Mediport on the right chest wall.  We then made an incision through his old scar.  He has been electrocautery dissect down through soft tissue and came down onto the port which was well incorporated.  We freed the port up from the capsule around the.  Followed by back on the catheter and freed up the hub.  At that point we held pressure over the subclavian vein underneath the collarbone and removed the port and tubing which all came out completely intact.    We held pressure for several minutes irrigated the pocket everything was hemostatic we then closed with 3-0 and 4-0 sutures and Dermabond was applied to the wound.  Complications:  None; patient tolerated the procedure well.    Disposition: PACU - hemodynamically stable.  Condition: stable         Additional Details:     Attending Attestation: I was present and scrubbed for the entire procedure.    Agustin Plascencia  Phone Number: [phone]

## 2024-02-27 NOTE — ANESTHESIA PREPROCEDURE EVALUATION
Patient: Adarsh De La Vega    Procedure Information       Date/Time: 02/27/24 0830    Procedure: Removal Central Venous Catheter (Right)    Location: [facility] OR 03 / Virtual [facility] OR    Surgeons: Agustin Plascencia MD            Relevant Problems   Anesthesia (within normal limits)      Cardiovascular (within normal limits)      Endocrine (within normal limits)      GI   (+) Gastroesophageal reflux disease      /Renal (within normal limits)      Neuro/Psych   (+) Peripheral neuropathy      Pulmonary (within normal limits)      GI/Hepatic (within normal limits)      Hematology (within normal limits)      Infectious Disease   (+) Skin infection       Clinical information reviewed:   Tobacco  Allergies    Med Hx  Surg Hx   Fam Hx  Soc Hx        NPO Detail:  NPO/Void Status  Carbohydrate Drink Given Prior to Surgery? : N  Date of Last Liquid: 02/26/24  Time of Last Liquid: 2100  Date of Last Solid: 02/26/24  Time of Last Solid: 2100  Last Intake Type: Clear fluids  Time of Last Void: 0430         Physical Exam    Airway  Mallampati: II     Cardiovascular    Dental    Pulmonary    Abdominal          Anesthesia Plan    History of general anesthesia?: yes  History of complications of general anesthesia?: no    ASA 3     MAC     intravenous induction   Anesthetic plan and risks discussed with patient.

## 2024-02-27 NOTE — ANESTHESIA POSTPROCEDURE EVALUATION
Patient: Adarsh De La Vega    Procedure Summary       Date: 02/27/24 Room / Location: [facility] OR 03 / Virtual [facility] OR    Anesthesia Start: 0818 Anesthesia Stop: 0922    Procedure: Removal Central Venous Catheter (Right) Diagnosis:       Squamous cell cancer of skin of nose      (Squamous cell cancer of skin of nose [C44.321])    Surgeons: Agustin Plascencia MD Responsible Provider: Eren Hernandez MD    Anesthesia Type: MAC ASA Status: 3            Anesthesia Type: MAC    Vitals Value Taken Time   /69 02/27/24 0931   Temp 36.4 °C (97.5 °F) 02/27/24 0915   Pulse 53 02/27/24 0941   Resp 12 02/27/24 0930   SpO2 95 % 02/27/24 0941   Vitals shown include unvalidated device data.    Anesthesia Post Evaluation    Patient location during evaluation: bedside  Patient participation: complete - patient cannot participate  Level of consciousness: awake  Pain score: 0  Pain management: adequate  Airway patency: patent  Cardiovascular status: acceptable  Respiratory status: acceptable  Hydration status: acceptable  Postoperative Nausea and Vomiting: none        There were no known notable events for this encounter.

## 2024-02-27 NOTE — PERIOPERATIVE NURSING NOTE
1004 Pt ambulated around room with steady gait without assistance.Pt assisted with dressing with help of family. IV removed dressing applied. Discharge instructions provided to pt and family. Educated on medications, effects of anesthesia, and homecoming care. Pt and family verbalizing understanding of all instructions provided at this time. All questions and concerns answered. Pt given contact information for provider.

## 2024-02-28 ENCOUNTER — ANESTHESIA EVENT (OUTPATIENT)
Dept: OPERATING ROOM | Facility: HOSPITAL | Age: 54
End: 2024-02-28
Payer: COMMERCIAL

## 2024-02-29 ENCOUNTER — HOSPITAL ENCOUNTER (OUTPATIENT)
Facility: HOSPITAL | Age: 54
Setting detail: OUTPATIENT SURGERY
Discharge: HOME | End: 2024-02-29
Attending: OTOLARYNGOLOGY | Admitting: OTOLARYNGOLOGY
Payer: COMMERCIAL

## 2024-02-29 ENCOUNTER — ANESTHESIA (OUTPATIENT)
Dept: OPERATING ROOM | Facility: HOSPITAL | Age: 54
End: 2024-02-29
Payer: COMMERCIAL

## 2024-02-29 VITALS
HEART RATE: 65 BPM | DIASTOLIC BLOOD PRESSURE: 78 MMHG | RESPIRATION RATE: 15 BRPM | BODY MASS INDEX: 22.18 KG/M2 | OXYGEN SATURATION: 97 % | TEMPERATURE: 97.9 F | WEIGHT: 182.1 LBS | SYSTOLIC BLOOD PRESSURE: 125 MMHG | HEIGHT: 76 IN

## 2024-02-29 DIAGNOSIS — M95.0 ACQUIRED DEFORMITY OF NOSE: ICD-10-CM

## 2024-02-29 DIAGNOSIS — G89.18 POST-OP PAIN: ICD-10-CM

## 2024-02-29 DIAGNOSIS — M95.0 NASAL DEFECT: Primary | ICD-10-CM

## 2024-02-29 DIAGNOSIS — C44.321 SQUAMOUS CELL CARCINOMA OF NOSE: ICD-10-CM

## 2024-02-29 LAB
ABO GROUP (TYPE) IN BLOOD: NORMAL
ANTIBODY SCREEN: NORMAL
RH FACTOR (ANTIGEN D): NORMAL

## 2024-02-29 PROCEDURE — 11042 DBRDMT SUBQ TIS 1ST 20SQCM/<: CPT | Performed by: OTOLARYNGOLOGY

## 2024-02-29 PROCEDURE — 15620 DELAY FLAP F/C/C/N/AX/G/H/F: CPT | Performed by: OTOLARYNGOLOGY

## 2024-02-29 PROCEDURE — A4217 STERILE WATER/SALINE, 500 ML: HCPCS | Performed by: OTOLARYNGOLOGY

## 2024-02-29 PROCEDURE — 3600000007 HC OR TIME - EACH INCREMENTAL 1 MINUTE - PROCEDURE LEVEL TWO: Performed by: OTOLARYNGOLOGY

## 2024-02-29 PROCEDURE — 7100000001 HC RECOVERY ROOM TIME - INITIAL BASE CHARGE: Performed by: OTOLARYNGOLOGY

## 2024-02-29 PROCEDURE — 15630 DELAY FLAP EYE/NOS/EAR/LIP: CPT | Performed by: OTOLARYNGOLOGY

## 2024-02-29 PROCEDURE — 2500000004 HC RX 250 GENERAL PHARMACY W/ HCPCS (ALT 636 FOR OP/ED): Performed by: ANESTHESIOLOGIST ASSISTANT

## 2024-02-29 PROCEDURE — A15620 PR DELAY/SECTN FLAP FACE,GENIT,HAND,FT: Performed by: ANESTHESIOLOGIST ASSISTANT

## 2024-02-29 PROCEDURE — A15620 PR DELAY/SECTN FLAP FACE,GENIT,HAND,FT: Performed by: ANESTHESIOLOGY

## 2024-02-29 PROCEDURE — 2500000004 HC RX 250 GENERAL PHARMACY W/ HCPCS (ALT 636 FOR OP/ED): Performed by: ANESTHESIOLOGY

## 2024-02-29 PROCEDURE — 2500000004 HC RX 250 GENERAL PHARMACY W/ HCPCS (ALT 636 FOR OP/ED): Performed by: OTOLARYNGOLOGY

## 2024-02-29 PROCEDURE — 14060 TIS TRNFR E/N/E/L 10 SQ CM/<: CPT | Performed by: OTOLARYNGOLOGY

## 2024-02-29 PROCEDURE — 2500000005 HC RX 250 GENERAL PHARMACY W/O HCPCS: Performed by: OTOLARYNGOLOGY

## 2024-02-29 PROCEDURE — 2500000005 HC RX 250 GENERAL PHARMACY W/O HCPCS: Performed by: ANESTHESIOLOGIST ASSISTANT

## 2024-02-29 PROCEDURE — 15004 WOUND PREP F/N/HF/G: CPT | Performed by: OTOLARYNGOLOGY

## 2024-02-29 PROCEDURE — 2720000007 HC OR 272 NO HCPCS: Performed by: OTOLARYNGOLOGY

## 2024-02-29 PROCEDURE — 3700000002 HC GENERAL ANESTHESIA TIME - EACH INCREMENTAL 1 MINUTE: Performed by: OTOLARYNGOLOGY

## 2024-02-29 PROCEDURE — 86901 BLOOD TYPING SEROLOGIC RH(D): CPT | Performed by: OTOLARYNGOLOGY

## 2024-02-29 PROCEDURE — 3700000001 HC GENERAL ANESTHESIA TIME - INITIAL BASE CHARGE: Performed by: OTOLARYNGOLOGY

## 2024-02-29 PROCEDURE — 7100000002 HC RECOVERY ROOM TIME - EACH INCREMENTAL 1 MINUTE: Performed by: OTOLARYNGOLOGY

## 2024-02-29 PROCEDURE — 36415 COLL VENOUS BLD VENIPUNCTURE: CPT | Performed by: OTOLARYNGOLOGY

## 2024-02-29 PROCEDURE — 7100000010 HC PHASE TWO TIME - EACH INCREMENTAL 1 MINUTE: Performed by: OTOLARYNGOLOGY

## 2024-02-29 PROCEDURE — 7100000009 HC PHASE TWO TIME - INITIAL BASE CHARGE: Performed by: OTOLARYNGOLOGY

## 2024-02-29 PROCEDURE — 2500000001 HC RX 250 WO HCPCS SELF ADMINISTERED DRUGS (ALT 637 FOR MEDICARE OP): Performed by: OTOLARYNGOLOGY

## 2024-02-29 PROCEDURE — 3600000002 HC OR TIME - INITIAL BASE CHARGE - PROCEDURE LEVEL TWO: Performed by: OTOLARYNGOLOGY

## 2024-02-29 RX ORDER — SODIUM CHLORIDE, SODIUM LACTATE, POTASSIUM CHLORIDE, CALCIUM CHLORIDE 600; 310; 30; 20 MG/100ML; MG/100ML; MG/100ML; MG/100ML
100 INJECTION, SOLUTION INTRAVENOUS CONTINUOUS
Status: DISCONTINUED | OUTPATIENT
Start: 2024-02-29 | End: 2024-02-29 | Stop reason: HOSPADM

## 2024-02-29 RX ORDER — DIPHENHYDRAMINE HYDROCHLORIDE 50 MG/ML
12.5 INJECTION INTRAMUSCULAR; INTRAVENOUS ONCE AS NEEDED
Status: DISCONTINUED | OUTPATIENT
Start: 2024-02-29 | End: 2024-02-29 | Stop reason: HOSPADM

## 2024-02-29 RX ORDER — DEXAMETHASONE SODIUM PHOSPHATE 100 MG/10ML
INJECTION INTRAMUSCULAR; INTRAVENOUS AS NEEDED
Status: DISCONTINUED | OUTPATIENT
Start: 2024-02-29 | End: 2024-02-29

## 2024-02-29 RX ORDER — HYDROMORPHONE HYDROCHLORIDE 1 MG/ML
0.2 INJECTION, SOLUTION INTRAMUSCULAR; INTRAVENOUS; SUBCUTANEOUS EVERY 5 MIN PRN
Status: DISCONTINUED | OUTPATIENT
Start: 2024-02-29 | End: 2024-02-29 | Stop reason: HOSPADM

## 2024-02-29 RX ORDER — NORETHINDRONE AND ETHINYL ESTRADIOL 0.5-0.035
KIT ORAL AS NEEDED
Status: DISCONTINUED | OUTPATIENT
Start: 2024-02-29 | End: 2024-02-29

## 2024-02-29 RX ORDER — LIDOCAINE HYDROCHLORIDE 10 MG/ML
0.1 INJECTION INFILTRATION; PERINEURAL ONCE
Status: DISCONTINUED | OUTPATIENT
Start: 2024-02-29 | End: 2024-02-29 | Stop reason: HOSPADM

## 2024-02-29 RX ORDER — ALBUTEROL SULFATE 0.83 MG/ML
2.5 SOLUTION RESPIRATORY (INHALATION) ONCE AS NEEDED
Status: DISCONTINUED | OUTPATIENT
Start: 2024-02-29 | End: 2024-02-29 | Stop reason: HOSPADM

## 2024-02-29 RX ORDER — HYDROMORPHONE HYDROCHLORIDE 1 MG/ML
INJECTION, SOLUTION INTRAMUSCULAR; INTRAVENOUS; SUBCUTANEOUS AS NEEDED
Status: DISCONTINUED | OUTPATIENT
Start: 2024-02-29 | End: 2024-02-29

## 2024-02-29 RX ORDER — FENTANYL CITRATE 50 UG/ML
INJECTION, SOLUTION INTRAMUSCULAR; INTRAVENOUS AS NEEDED
Status: DISCONTINUED | OUTPATIENT
Start: 2024-02-29 | End: 2024-02-29

## 2024-02-29 RX ORDER — MEPERIDINE HYDROCHLORIDE 25 MG/ML
12.5 INJECTION INTRAMUSCULAR; INTRAVENOUS; SUBCUTANEOUS EVERY 10 MIN PRN
Status: DISCONTINUED | OUTPATIENT
Start: 2024-02-29 | End: 2024-02-29 | Stop reason: HOSPADM

## 2024-02-29 RX ORDER — POLYETHYLENE GLYCOL 3350 17 G/17G
17 POWDER, FOR SOLUTION ORAL DAILY
Qty: 3 PACKET | Refills: 0 | Status: SHIPPED | OUTPATIENT
Start: 2024-02-29 | End: 2024-03-11 | Stop reason: WASHOUT

## 2024-02-29 RX ORDER — ONDANSETRON 4 MG/1
4 TABLET, FILM COATED ORAL EVERY 8 HOURS PRN
Qty: 9 TABLET | Refills: 0 | Status: SHIPPED | OUTPATIENT
Start: 2024-02-29 | End: 2024-03-11 | Stop reason: WASHOUT

## 2024-02-29 RX ORDER — LIDOCAINE HYDROCHLORIDE AND EPINEPHRINE 10; 10 MG/ML; UG/ML
INJECTION, SOLUTION INFILTRATION; PERINEURAL AS NEEDED
Status: DISCONTINUED | OUTPATIENT
Start: 2024-02-29 | End: 2024-02-29 | Stop reason: HOSPADM

## 2024-02-29 RX ORDER — ONDANSETRON HYDROCHLORIDE 2 MG/ML
INJECTION, SOLUTION INTRAVENOUS AS NEEDED
Status: DISCONTINUED | OUTPATIENT
Start: 2024-02-29 | End: 2024-02-29

## 2024-02-29 RX ORDER — OXYCODONE HYDROCHLORIDE 5 MG/1
5 TABLET ORAL EVERY 4 HOURS PRN
Status: DISCONTINUED | OUTPATIENT
Start: 2024-02-29 | End: 2024-02-29 | Stop reason: HOSPADM

## 2024-02-29 RX ORDER — ROCURONIUM BROMIDE 10 MG/ML
INJECTION, SOLUTION INTRAVENOUS AS NEEDED
Status: DISCONTINUED | OUTPATIENT
Start: 2024-02-29 | End: 2024-02-29

## 2024-02-29 RX ORDER — PROPOFOL 10 MG/ML
INJECTION, EMULSION INTRAVENOUS AS NEEDED
Status: DISCONTINUED | OUTPATIENT
Start: 2024-02-29 | End: 2024-02-29

## 2024-02-29 RX ORDER — MUPIROCIN CALCIUM 20 MG/G
CREAM TOPICAL 3 TIMES DAILY
Qty: 30 G | Refills: 1 | Status: SHIPPED | OUTPATIENT
Start: 2024-02-29 | End: 2024-02-29 | Stop reason: SDUPTHER

## 2024-02-29 RX ORDER — LIDOCAINE HYDROCHLORIDE 40 MG/ML
INJECTION, SOLUTION RETROBULBAR AS NEEDED
Status: DISCONTINUED | OUTPATIENT
Start: 2024-02-29 | End: 2024-02-29

## 2024-02-29 RX ORDER — MIDAZOLAM HYDROCHLORIDE 1 MG/ML
INJECTION INTRAMUSCULAR; INTRAVENOUS AS NEEDED
Status: DISCONTINUED | OUTPATIENT
Start: 2024-02-29 | End: 2024-02-29

## 2024-02-29 RX ORDER — CEFAZOLIN 1 G/1
INJECTION, POWDER, FOR SOLUTION INTRAVENOUS AS NEEDED
Status: DISCONTINUED | OUTPATIENT
Start: 2024-02-29 | End: 2024-02-29

## 2024-02-29 RX ORDER — ACETAMINOPHEN 325 MG/1
650 TABLET ORAL EVERY 4 HOURS PRN
Status: DISCONTINUED | OUTPATIENT
Start: 2024-02-29 | End: 2024-02-29 | Stop reason: HOSPADM

## 2024-02-29 RX ORDER — OXYCODONE HYDROCHLORIDE 5 MG/1
5 TABLET ORAL EVERY 6 HOURS PRN
Qty: 12 TABLET | Refills: 0 | Status: SHIPPED | OUTPATIENT
Start: 2024-02-29 | End: 2024-03-11 | Stop reason: WASHOUT

## 2024-02-29 RX ORDER — LABETALOL HYDROCHLORIDE 5 MG/ML
5 INJECTION, SOLUTION INTRAVENOUS ONCE AS NEEDED
Status: DISCONTINUED | OUTPATIENT
Start: 2024-02-29 | End: 2024-02-29 | Stop reason: HOSPADM

## 2024-02-29 RX ORDER — MUPIROCIN 20 MG/G
OINTMENT TOPICAL AS NEEDED
Status: DISCONTINUED | OUTPATIENT
Start: 2024-02-29 | End: 2024-02-29 | Stop reason: HOSPADM

## 2024-02-29 RX ORDER — PHENYLEPHRINE HYDROCHLORIDE 10 MG/ML
INJECTION INTRAVENOUS AS NEEDED
Status: DISCONTINUED | OUTPATIENT
Start: 2024-02-29 | End: 2024-02-29

## 2024-02-29 RX ORDER — MUPIROCIN 20 MG/G
OINTMENT TOPICAL
Qty: 22 G | Refills: 0 | Status: SHIPPED | OUTPATIENT
Start: 2024-02-29 | End: 2024-04-01 | Stop reason: SDUPTHER

## 2024-02-29 RX ORDER — SODIUM CHLORIDE 0.9 G/100ML
IRRIGANT IRRIGATION AS NEEDED
Status: DISCONTINUED | OUTPATIENT
Start: 2024-02-29 | End: 2024-02-29 | Stop reason: HOSPADM

## 2024-02-29 RX ORDER — ONDANSETRON HYDROCHLORIDE 2 MG/ML
4 INJECTION, SOLUTION INTRAVENOUS ONCE AS NEEDED
Status: COMPLETED | OUTPATIENT
Start: 2024-02-29 | End: 2024-02-29

## 2024-02-29 RX ORDER — SUCCINYLCHOLINE CHLORIDE 20 MG/ML
INJECTION INTRAMUSCULAR; INTRAVENOUS AS NEEDED
Status: DISCONTINUED | OUTPATIENT
Start: 2024-02-29 | End: 2024-02-29

## 2024-02-29 RX ORDER — HYDROMORPHONE HYDROCHLORIDE 1 MG/ML
0.5 INJECTION, SOLUTION INTRAMUSCULAR; INTRAVENOUS; SUBCUTANEOUS EVERY 5 MIN PRN
Status: DISCONTINUED | OUTPATIENT
Start: 2024-02-29 | End: 2024-02-29 | Stop reason: HOSPADM

## 2024-02-29 RX ADMIN — ONDANSETRON 4 MG: 2 INJECTION INTRAMUSCULAR; INTRAVENOUS at 11:18

## 2024-02-29 RX ADMIN — EPHEDRINE SULFATE 5 MG: 50 INJECTION, SOLUTION INTRAVENOUS at 10:37

## 2024-02-29 RX ADMIN — SUCCINYLCHOLINE CHLORIDE 100 MG: 20 INJECTION, SOLUTION INTRAMUSCULAR; INTRAVENOUS at 10:00

## 2024-02-29 RX ADMIN — HYDROMORPHONE HYDROCHLORIDE 0.5 MG: 1 INJECTION, SOLUTION INTRAMUSCULAR; INTRAVENOUS; SUBCUTANEOUS at 13:15

## 2024-02-29 RX ADMIN — PROPOFOL 150 MG: 10 INJECTION, EMULSION INTRAVENOUS at 10:00

## 2024-02-29 RX ADMIN — CEFAZOLIN 2 G: 330 INJECTION, POWDER, FOR SOLUTION INTRAMUSCULAR; INTRAVENOUS at 10:06

## 2024-02-29 RX ADMIN — FENTANYL CITRATE 50 MCG: 50 INJECTION, SOLUTION INTRAMUSCULAR; INTRAVENOUS at 10:02

## 2024-02-29 RX ADMIN — EPHEDRINE SULFATE 25 MG: 50 INJECTION, SOLUTION INTRAVENOUS at 10:39

## 2024-02-29 RX ADMIN — PHENYLEPHRINE HYDROCHLORIDE 120 MCG: 10 INJECTION INTRAVENOUS at 10:30

## 2024-02-29 RX ADMIN — EPHEDRINE SULFATE 5 MG: 50 INJECTION, SOLUTION INTRAVENOUS at 10:45

## 2024-02-29 RX ADMIN — LIDOCAINE HYDROCHLORIDE 80 MG: 40 INJECTION, SOLUTION RETROBULBAR; TOPICAL at 10:00

## 2024-02-29 RX ADMIN — DEXAMETHASONE SODIUM PHOSPHATE 6 MG: 10 INJECTION INTRAMUSCULAR; INTRAVENOUS at 11:12

## 2024-02-29 RX ADMIN — SODIUM CHLORIDE, POTASSIUM CHLORIDE, SODIUM LACTATE AND CALCIUM CHLORIDE 100 ML/HR: 600; 310; 30; 20 INJECTION, SOLUTION INTRAVENOUS at 12:13

## 2024-02-29 RX ADMIN — PHENYLEPHRINE HYDROCHLORIDE 160 MCG: 10 INJECTION INTRAVENOUS at 10:35

## 2024-02-29 RX ADMIN — ROCURONIUM BROMIDE 50 MG: 10 INJECTION INTRAVENOUS at 10:02

## 2024-02-29 RX ADMIN — HYDROMORPHONE HYDROCHLORIDE 0.25 MG: 1 INJECTION, SOLUTION INTRAMUSCULAR; INTRAVENOUS; SUBCUTANEOUS at 11:58

## 2024-02-29 RX ADMIN — SUGAMMADEX 200 MG: 100 INJECTION, SOLUTION INTRAVENOUS at 11:43

## 2024-02-29 RX ADMIN — SODIUM CHLORIDE, SODIUM LACTATE, POTASSIUM CHLORIDE, AND CALCIUM CHLORIDE: 600; 310; 30; 20 INJECTION, SOLUTION INTRAVENOUS at 09:52

## 2024-02-29 RX ADMIN — DEXAMETHASONE SODIUM PHOSPHATE 4 MG: 10 INJECTION INTRAMUSCULAR; INTRAVENOUS at 10:22

## 2024-02-29 RX ADMIN — HYDROMORPHONE HYDROCHLORIDE 0.2 MG: 1 INJECTION, SOLUTION INTRAMUSCULAR; INTRAVENOUS; SUBCUTANEOUS at 12:47

## 2024-02-29 RX ADMIN — HYDROMORPHONE HYDROCHLORIDE 0.25 MG: 1 INJECTION, SOLUTION INTRAMUSCULAR; INTRAVENOUS; SUBCUTANEOUS at 11:30

## 2024-02-29 RX ADMIN — MIDAZOLAM HYDROCHLORIDE 2 MG: 1 INJECTION, SOLUTION INTRAMUSCULAR; INTRAVENOUS at 09:56

## 2024-02-29 RX ADMIN — ONDANSETRON 4 MG: 2 INJECTION INTRAMUSCULAR; INTRAVENOUS at 12:05

## 2024-02-29 RX ADMIN — EPHEDRINE SULFATE 10 MG: 50 INJECTION, SOLUTION INTRAVENOUS at 10:58

## 2024-02-29 RX ADMIN — PHENYLEPHRINE HYDROCHLORIDE 120 MCG: 10 INJECTION INTRAVENOUS at 10:32

## 2024-02-29 ASSESSMENT — PAIN SCALES - GENERAL
PAINLEVEL_OUTOF10: 2
PAINLEVEL_OUTOF10: 4
PAINLEVEL_OUTOF10: 0 - NO PAIN
PAINLEVEL_OUTOF10: 6
PAINLEVEL_OUTOF10: 7
PAINLEVEL_OUTOF10: 4
PAINLEVEL_OUTOF10: 3
PAINLEVEL_OUTOF10: 4
PAINLEVEL_OUTOF10: 6
PAINLEVEL_OUTOF10: 5 - MODERATE PAIN
PAINLEVEL_OUTOF10: 4
PAINLEVEL_OUTOF10: 5 - MODERATE PAIN
PAINLEVEL_OUTOF10: 3
PAINLEVEL_OUTOF10: 2

## 2024-02-29 ASSESSMENT — PAIN DESCRIPTION - LOCATION
LOCATION: NOSE
LOCATION: NOSE

## 2024-02-29 ASSESSMENT — COLUMBIA-SUICIDE SEVERITY RATING SCALE - C-SSRS
2. HAVE YOU ACTUALLY HAD ANY THOUGHTS OF KILLING YOURSELF?: NO
1. IN THE PAST MONTH, HAVE YOU WISHED YOU WERE DEAD OR WISHED YOU COULD GO TO SLEEP AND NOT WAKE UP?: NO
6. HAVE YOU EVER DONE ANYTHING, STARTED TO DO ANYTHING, OR PREPARED TO DO ANYTHING TO END YOUR LIFE?: NO

## 2024-02-29 ASSESSMENT — PAIN DESCRIPTION - ORIENTATION
ORIENTATION: RIGHT
ORIENTATION: RIGHT

## 2024-02-29 NOTE — OP NOTE
Takedown forehead flap; Total case length= 3 hours Operative Note     Date: 2024  OR Location: [facility] OR    Name: Adarsh De La Vega, : 1970, Age: 53 y.o., MRN: [id number], Sex: male    Diagnosis  Pre-op Diagnosis     * Squamous cell carcinoma of nose [C44.321]     * Acquired deformity of nose [M95.0] Post-op Diagnosis     * Squamous cell carcinoma of nose [C44.321]     * Acquired deformity of nose [M95.0]     Procedures    16973 - CA DELAY FLAP/SECTIONING FLAP F/C/C/N/AX/G/H/F [13059]  17650 - CA PREP SITE F/S/N/H/F/G/M/D GT 1ST 100 SQ CM/1PCT   48577 - CA ADJT TIS TRNSFR/REARRGMT E/N/E/L DFCT 10 SQ CM/<   92516 - CA DELAY FLAP/SCTJ FLAP EYELIDS NOSE EARS/LIPS    Surgeons      * Sami Mcclain - Primary    Resident/Fellow/Other Assistant:  Surgeon(s) and Role:     * Nhi Santana MD - Assisting    Procedure Summary  Anesthesia: General  ASA: III  Anesthesia Staff: Anesthesiologist: Dena Kevin MD  C-AA: BARBI Nunez; BARBI Cottrell  Estimated Blood Loss: 10mL  Intra-op Medications:   Administrations occurring from 1000 to 1500 on 24:   Medication Name Total Dose   lidocaine-epinephrine (Xylocaine W/EPI) 1 %-1:100,000 injection 15 mL   sodium chloride 0.9 % irrigation solution 1,000 mL   mupirocin (Bactroban) 2 % ointment 1 Application   balanced salts (BSS) intraocular solution 15 mL   lactated Ringer's infusion 278.33 mL   ondansetron (Zofran) injection 4 mg 4 mg              Anesthesia Record               Intraprocedure I/O Totals          Output    Est. Blood Loss 2 mL    Total Output 2 mL          Specimen: No specimens collected     Staff:   Circulator: Emy Fields RN  Relief Scrub: Lissette Akins  Scrub Person: Alicja Celaya; Esther Franco      Findings:  Successful division of paramedian forehead flap pedicle with nasal contouring performed     Indications: Adarsh De La Vega is an 53 y.o. male who is having surgery for Squamous cell carcinoma of nose  [C44.321]  Acquired deformity of nose [M95.0].     The patient was seen in the preoperative area. The risks, benefits, complications, treatment options, non-operative alternatives, expected recovery and outcomes were discussed with the patient. The possibilities of reaction to medication, pulmonary aspiration, injury to surrounding structures, bleeding, recurrent infection, the need for additional procedures, failure to diagnose a condition, and creating a complication requiring transfusion or operation were discussed with the patient. The patient concurred with the proposed plan, giving informed consent.  The site of surgery was properly noted/marked if necessary per policy. The patient has been actively warmed in preoperative area. Preoperative antibiotics have been ordered and given within 1 hours of incision. Venous thrombosis prophylaxis have been ordered including bilateral sequential compression devices    Procedure Details:     The patient was taken to the operating room and placed supine on the table.  The anesthesia team induced a state of general anesthesia.  The table was rotated 90 degrees.  The patient was prepped and draped in the usual fashion.  The eyes were protected.     1% lidocaine with epinephrine in a 1:100,000 dilution was infiltrated in the areas of planned incisions. The pedicle of the flap was sharply transected at the midpoint with a 15-blade scalpel.  The tubed portion of the distal flap was opened to restore a flat shape.  A 15-blade scalpel was used to sharply prepare the inset site at the nasal dorsum  by removing scar tissue and creating surgical skin edges with an outward bevel.  Adjacent tissue transfer was used to thin and rotate the nasal dorsal skin over an area of 1 cm x 1.5 cm to achieve a tension-free and smooth appearing contour at the inset site.  Deep monocryl sutures were used and the skin was meticulously closed with interrupted 5-0 fast absorbing gut sutures.     We  then turned our attention to the forehead inset site.  The tubed portion of the proximal flap was opened to restore a flat shape. The flap was trimmed and thinned and rotated back to its original position and orientation in the brow.  It was inset with deep monocryl sutures and the skin was meticulously closed with interrupted fast gut sutures.     Intra-operatively, an area of dehiscence at the left nasal ala was noted. Skin and necrotic cartilage was excised sharply. Edges of the nasal skin were undermined and advanced for closure by 4-0 plain gut suture in an interrupted fashion.    Ointment and dressings were applied.      This concluded the case, the patient was awoken and transferred to the recovery area in stable condition.    Dr. Mcclain was present for the entirety of the case and performed all critical steps.     Complications:  None; patient tolerated the procedure well.    Disposition: PACU - hemodynamically stable.  Condition: stable       Additional Details: None    Attending Attestation: I was present and scrubbed for the entire procedure.    Sami Mcclain  Phone Number: [phone]

## 2024-02-29 NOTE — ANESTHESIA POSTPROCEDURE EVALUATION
Patient: Adarsh De La Vega    Procedure Summary       Date: 02/29/24 Room / Location: [facility] OR 05 / Virtual [facility]    Anesthesia Start: 0952 Anesthesia Stop: 1203    Procedure: Takedown forehead flap; Total case length= 3 hours Diagnosis:       Squamous cell carcinoma of nose      Acquired deformity of nose      (Squamous cell carcinoma of nose [C44.321])      (Acquired deformity of nose [M95.0])    Surgeons: Sami Mcclain MD Responsible Provider: Dena Kevin MD    Anesthesia Type: general ASA Status: 3            Anesthesia Type: general    Vitals Value Taken Time   /65 02/29/24 1158   Temp 36.1 02/29/24 1209   Pulse 70 02/29/24 1206   Resp 15 02/29/24 1209   SpO2 99 % 02/29/24 1206   Vitals shown include unvalidated device data.    Anesthesia Post Evaluation    Patient location during evaluation: PACU  Patient participation: complete - patient participated  Level of consciousness: awake  Pain management: adequate  Airway patency: patent  Cardiovascular status: acceptable  Respiratory status: acceptable  Hydration status: acceptable  Postoperative Nausea and Vomiting: none  Comments: Patient SV on 8 mL/min w/SFM.  VSS.        No notable events documented.

## 2024-02-29 NOTE — ANESTHESIA PREPROCEDURE EVALUATION
Patient: Adarsh De La Vega    Procedure Information       Date/Time: 02/29/24 1000    Procedure: Takedown forehead flap; Total case length= 3 hours    Location: [facility] OR 05 / [facility]    Surgeons: Sami Mcclain MD            Relevant Problems   Anesthesia (within normal limits)      Cardiovascular (within normal limits)      GI   (+) Gastroesophageal reflux disease      Neuro/Psych   (+) Peripheral neuropathy      Pulmonary (within normal limits)      Infectious Disease   (+) Skin infection     Vitals:    02/29/24 0849   BP: 118/80   Pulse: 66   Resp: 14   Temp: 36.2 °C (97.2 °F)   SpO2: 100%       Past Surgical History:   Procedure Laterality Date   • AMPUTATION      nose   • APPENDECTOMY     • CHOLECYSTECTOMY     • SINUS SURGERY       Past Medical History:   Diagnosis Date   • GERD (gastroesophageal reflux disease)    • Head and neck cancer (CMS/HCC)    • 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  Prior to Admission medications    Medication Sig Start Date End Date Taking? Authorizing Provider   acetaminophen (Tylenol) 325 mg tablet Take 3 tablets (975 mg) by mouth every 8 hours if needed for mild pain (1 - 3).    Historical Provider, MD   gabapentin (Neurontin) 300 mg capsule Take 1 capsule (300 mg) by mouth 2 times a day.    Historical Provider, MD   omeprazole OTC (PriLOSEC OTC) 20 mg EC tablet Take 1 tablet (20 mg) by mouth once daily in the morning. Take before meals. Do not crush, chew, or split.    Historical Provider, MD   sodium chloride (Ocean) 0.65 % nasal spray Administer 2 sprays into each nostril every 2 hours. 1/30/24 2/29/24  Enid Rivera MD   amoxicillin-pot clavulanate (Augmentin) 875-125 mg tablet Take 1 tablet by mouth 2 times a day for 14 days. 2/8/24 2/27/24  Zaheer Torres PA-C   melatonin 5 mg tablet Take 1 tablet (5 mg) by mouth as needed at bedtime for sleep.  2/27/24   Historical Provider, MD   mupirocin (Bactroban) 2 % ointment Apply ointment liberally to surgical incision sites three times a day for 14 days  Patient not taking: Reported on 2024  Zaheer Torres PA-C     No Known Allergies  Social History     Tobacco Use   • Smoking status: Former     Packs/day: 1.50     Years: 30.00     Additional pack years: 0.00     Total pack years: 45.00     Types: Cigarettes     Quit date: 2023     Years since quittin.0   • Smokeless tobacco: Never   Substance Use Topics   • Alcohol use: Never         Chemistry    Lab Results   Component Value Date/Time     2024 0832    K 3.9 2024 0832     2024 0832    CO2 26 2024 0832    BUN 13 2024 0832    CREATININE 0.96 2024 0832    Lab Results   Component Value Date/Time    CALCIUM 9.4 2024 0832    ALKPHOS 91 2024 0832    AST 10 2024 0832    ALT 7 (L) 2024 0832    BILITOT 0.7 2024 0832          Lab Results   Component Value Date/Time    WBC 3.3 (L) 2024 0832    HGB 13.4 (L) 2024 0832    HCT 45.3 2024 0832     2024 0832     Lab Results   Component Value Date/Time    PROTIME 11.2 2024 0832    INR 1.0 2024 0832     Encounter Date: 24   ECG 12 Lead   Result Value    Ventricular Rate 66    Atrial Rate 66    DE Interval 188    QRS Duration 85    QT Interval 380    QTC Calculation(Bazett) 399    P Axis 82    R Axis 93    T Axis 74    QRS Count 11    Q Onset 251    T Offset 441    QTC Fredericia 392    Narrative    Sinus rhythm  Borderline right axis deviation  Baseline wander in lead(s) V1    See ED provider note for full interpretation and clinical correlation  Confirmed by Deisy Welch ([id number]) on 2024 9:39:44 PM     Clinical information reviewed:   Tobacco  Allergies    Med Hx  Surg Hx   Fam Hx  Soc Hx        NPO Detail:  NPO/Void Status  Date of Last Liquid: 24  Time of Last Liquid:  2200  Date of Last Solid: 02/28/24  Time of Last Solid: 2200         Physical Exam    Airway  Mallampati: II  TM distance: >3 FB  Neck ROM: full     Cardiovascular    Dental - normal exam     Pulmonary    Abdominal          Anesthesia Plan    History of general anesthesia?: yes  History of complications of general anesthesia?: no    ASA 3     general     intravenous induction   Anesthetic plan and risks discussed with patient.

## 2024-02-29 NOTE — DISCHARGE INSTRUCTIONS
NASAL SURGERY POST-OPERATIVE  PATIENT INSTRUCTIONS      FOLLOW-UP:  You will follow up with ENT in 1-2 weeks for wound check. Call us immediately if you have any fevers greater than 102.5, drainage from your wound that is not clear or looks infected, persistent bleeding, increasing pain,  or persistent difficulty breathing.      WOUND CARE INSTRUCTIONS:    Do not blow your nose until instructed or remove any packing unless instructed to do so. Wipe or dab the nose gently with a q-tip and hydrogen peroxide.  Change the dressing under the nose (if present) as needed.  Once drainage has stopped you no longer need to keep a dressing in place.  Brush your teeth gently with a soft tooth bruch only.  Wash your face carefully, do not scrub incisions. Apply mupirocin ointment to incisions three times daily.  Sleep with your head up and only in the supine position.  Don't turn your head side to side.      DIET:  You may eat any foods that you can tolerate.  It is a good idea to eat a high fiber diet and take in plenty of fluids to prevent constipation.  If you do become constipated you may want to take a mild laxative or take ducolax tablets on a daily basis until your bowel habits are regular.      MEDICATIONS:  Try to take narcotic medications and anti-inflammatory medications, such as tylenol, ibuprofen, naprosyn, etc., with food.  This will minimize stomach upset from the medication.  Should you develop nausea and vomiting from the pain medication, or develop a rash, please discontinue the medication and contact your physician.  You should not drive, make important decisions, or operate machinery when taking narcotic pain medication.    QUESTIONS:  Please feel free to call your physician or the hospital  if you have any questions, and they will be glad to assist you.

## 2024-02-29 NOTE — ANESTHESIA PROCEDURE NOTES
Airway  Date/Time: 2/29/2024 10:03 AM  Urgency: elective      Staffing  Performed: ROSALBA   Authorized by: Dena Kevin MD    Performed by: BARBI Cottrell  Patient location during procedure: OR    Indications and Patient Condition  Indications for airway management: anesthesia  Spontaneous ventilation: present  Sedation level: deep  Preoxygenated: yes  Patient position: sniffing  Mask difficulty assessment: 0 - not attempted  Planned trial extubation    Final Airway Details  Final airway type: endotracheal airway      Successful airway: ETT  Cuffed: yes   Successful intubation technique: video laryngoscopy  Facilitating devices/methods: intubating stylet  Blade size: #4 (Large glidescope blade)  ETT size (mm): 7.0  Cormack-Lehane Classification: grade I - full view of glottis  Placement verified by: chest auscultation and capnometry   Measured from: lips  ETT to lips (cm): 20  Number of attempts at approach: 1  Ventilation between attempts: BVM    Additional Comments  Lips and teeth in pre-op condition

## 2024-02-29 NOTE — H&P
"History Of Present Illness  Adarsh De La Vega is a 53 y.o. male presenting today for stage 3 nasal reconstruction .     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 His smoking use included cigarettes. He has a 45.00 pack-year smoking history. He has never used smokeless tobacco. He reports that he does not drink alcohol and does not use drugs.    Family History  No family history on file.     Allergies  Patient has no known allergies.    Review of Systems   Negative except as listed in HPI    Physical Exam   Vitals reviewed  General: Alert, oriented, no acute distress  Resp: Breathing comfortably on room air, no stridor  Head: Atraumatic, normocephalic  Oral Cavity: MMM  Ears: Normal external ears   Nose: Evidence of prior nasal reconstruction, well healed skin   Neck: Supple     Last Recorded Vitals  Blood pressure 118/80, pulse 66, temperature 36.2 °C (97.2 °F), resp. rate 14, height 1.93 m (6' 4\"), weight 82.6 kg (182 lb 1.6 oz), SpO2 100 %.    Relevant Results       Assessment/Plan   Active Problems:    Squamous cell carcinoma of nose    Acquired deformity of nose    Plan to proceed for stage 3 of nasal reconstruction today            Nhi Santana MD    "

## 2024-03-08 ASSESSMENT — ENCOUNTER SYMPTOMS
SORE THROAT: 0
NECK PAIN: 0
FACIAL SWELLING: 0
UNEXPECTED WEIGHT CHANGE: 0
ADENOPATHY: 0
TROUBLE SWALLOWING: 0
FEVER: 0
APPETITE CHANGE: 0
FATIGUE: 0
VOICE CHANGE: 0
NAUSEA: 0
STRIDOR: 0
VOMITING: 0
COUGH: 0
CHILLS: 0
SHORTNESS OF BREATH: 0

## 2024-03-08 NOTE — PROGRESS NOTES
Chief Complaint   Patient presents with    Cancer     Nasal cancer f/u     HPI:  Adarsh De La Vega is a 53 year old  male following up with me today for his sinonasal SCCa s/p bilateral nasal endoscopy, total rhinectomy on 5/5/23. He completed adjuvant chemoradiation 8/28/23. Last seen 1/2024.  He is now s/p complex staged nasal reconstruction with Dr. Mcclain last surgery 2/29/24 with Dr. Mcclain.  He is recovering well from surgery.  Has a small open wound at the forehead but otherwise nasal incisions are healing well.  No nasal pain.  Able to breath through his nose except int he morning.  +xerostomia.  Mild dysphagia.    History:  Dx: Squamous cell carcinoma sinonasal [id number]  ~ 6 months ago dog hit nose  1/23: Seen by an ENT who told him he had a nasal infection. developed a lump on the outside of his nose a few days following that appointment  2/21/23: Biopsy of nasal lesion. Path + for squamous cell carcinoma  2/27/23: seen by infectious disease and was in the hospital on IV antibiotics for about 1 week and discharged with a PICC line   3/17/23: CT neck and Chest- slightly enhancing nasal mass with involvement of the anterior nose & nasal septum. No pathologic lymphadenopathy. CT chest with 6mm pleural based nodule LLL.   3/20/23: First seen by me  5/5/23:S/p bilateral nasal endoscopy, total rhinectomy. Path +   5/17/23: Nasal trumpets removed  7/10/23: Started adjuvant chemoXRT  7/31/23: Admitted for dehydration/odynophagia and PEG tube placed  8/14/23: In ED, waiting to be admitted for IV hydration  8/28/23: Completed adjuvant chemoXRT   10/23: CT neck/chest complete response, stable lung nodule  10/31/23: 1st stage prelamination of paramedian forehead flap  12/8/23: Osteocutaneous RFFF and 2nd stage  1/2/24: 3rd stage nasal reconstruction  2/29/24: 4th stage nasal reconstruction     SH:  Tob: Current 1/2 ppd smoker. Former 1ppd since a teen  ETOH: Social  Here with wife    ROS:  Review of Systems    Constitutional:  Negative for appetite change, chills, fatigue, fever and unexpected weight change.   HENT:  Negative for dental problem, drooling, ear pain, facial swelling, hearing loss, mouth sores, sore throat, tinnitus, trouble swallowing and voice change.    Respiratory:  Negative for cough, shortness of breath and stridor.    Gastrointestinal:  Negative for nausea and vomiting.   Musculoskeletal:  Negative for neck pain.   Hematological:  Negative for adenopathy.   All other systems reviewed and are negative.       PE:  ENT Physical Exam  Constitutional  Appearance: patient appears well-developed, patient is cooperative;  Communication/Voice: communication appropriate for developmental age;  Head and Face  Appearance: head appears normal; facial scars present;  Head and Face comments: See nasal recon below, forehead paramedian wound is the only wound which remains open and is superficial, no bone exposure  Ear  Auricles: right auricle normal; left auricle normal;  Ear Canals: right ear canal normal; left ear canal normal;  Tympanic Membranes: right tympanic membrane normal; left tympanic membrane normal;  Nose  External Nose: nares patent bilaterally; nasal deformity visible;  Nose comments: Paramedian reconstruction, good contouring of the nose much improved from previous, nares is patent bilaterally with minimal crusting.  The nasal incisions are healing well  Oral Cavity/Oropharynx  Lips: normal;  Gums: gingiva normal;  Oral mucosa: normal;  Neck  Neck: scars present;  Respiratory  Inspection: breathing unlabored;  Cardiovascular  Inspection: extremities are warm and well perfused;  Lymphatic  Palpation: no cervical adenopathy noted;  Neurovestibular  Mental Status: alert and oriented;  Psychiatric: mood normal; affect is appropriate;  Neurological comments: Left forearm now well healed,  strength is returned to normal      Procedures     ASSESSMENT AND PLAN:  Problem List Items Addressed This Visit        Squamous cell carcinoma of nose    Current Assessment & Plan     No evidence of disease  Deferred endoscopy today due to recent nasal reconstruction  Will scope at next visit  Follow up in 2 months         Acquired deformity of nose - Primary    Current Assessment & Plan     S/p staged nasal reconstruction by Dr. Mcclain  Now complete  No further treatment at this time  Follow up with Dr. Mcclain per his recommendations in a few months         Oropharyngeal dysphagia    Current Assessment & Plan     Chronic, mild  Continue current diet          Dena Mei MD    Head & Neck Surgical Oncology & Reconstruction  Department of Otolaryngology - Head and Neck Surgery        By signing my name below, I, Esther Kumaribe, attest that this documentation has been prepared under the direction and in the presence of Dr. Dena Mei MD.     All medical record entries made by the Scribe were at my direction and personally dictated by me, Dr. Dena Mei. I have reviewed the chart and agree that the record accurately reflects my personal performance of the history, physical exam, discussion and plan.

## 2024-03-11 ENCOUNTER — OFFICE VISIT (OUTPATIENT)
Dept: OTOLARYNGOLOGY | Facility: CLINIC | Age: 54
End: 2024-03-11
Payer: COMMERCIAL

## 2024-03-11 VITALS — WEIGHT: 180 LBS | HEIGHT: 76 IN | BODY MASS INDEX: 21.92 KG/M2

## 2024-03-11 DIAGNOSIS — R13.12 OROPHARYNGEAL DYSPHAGIA: ICD-10-CM

## 2024-03-11 DIAGNOSIS — M95.0 ACQUIRED DEFORMITY OF NOSE: Primary | ICD-10-CM

## 2024-03-11 DIAGNOSIS — C44.321 SQUAMOUS CELL CARCINOMA OF NOSE: ICD-10-CM

## 2024-03-11 PROBLEM — S01.20XA: Status: RESOLVED | Noted: 2023-02-20 | Resolved: 2024-03-11

## 2024-03-11 PROBLEM — S37.009A INJURY OF KIDNEY: Status: ACTIVE | Noted: 2024-03-11

## 2024-03-11 PROBLEM — T45.1X5A CHEMOTHERAPY INDUCED NAUSEA AND VOMITING: Status: ACTIVE | Noted: 2023-08-01

## 2024-03-11 PROBLEM — G56.02 CARPAL TUNNEL SYNDROME OF LEFT WRIST: Status: ACTIVE | Noted: 2024-03-11

## 2024-03-11 PROBLEM — E44.0 MODERATE PROTEIN-CALORIE MALNUTRITION (MULTI): Chronic | Status: ACTIVE | Noted: 2023-08-22

## 2024-03-11 PROBLEM — S01.20XA: Status: ACTIVE | Noted: 2023-02-20

## 2024-03-11 PROBLEM — Q67.0 ASYMMETRY OF FACE: Status: RESOLVED | Noted: 2024-01-18 | Resolved: 2024-03-11

## 2024-03-11 PROBLEM — R04.0 NASAL BLEEDING: Status: RESOLVED | Noted: 2023-10-11 | Resolved: 2024-03-11

## 2024-03-11 PROBLEM — S37.009A INJURY OF KIDNEY: Status: RESOLVED | Noted: 2024-03-11 | Resolved: 2024-03-11

## 2024-03-11 PROBLEM — C76.0 HEAD AND NECK CANCER (MULTI): Status: ACTIVE | Noted: 2023-04-18

## 2024-03-11 PROBLEM — Q67.0 FACIAL ASYMMETRY: Status: RESOLVED | Noted: 2023-10-12 | Resolved: 2024-03-11

## 2024-03-11 PROBLEM — G89.18 ACUTE POSTOPERATIVE PAIN: Status: ACTIVE | Noted: 2023-10-31

## 2024-03-11 PROBLEM — T45.1X5A CHEMOTHERAPY INDUCED NAUSEA AND VOMITING: Status: RESOLVED | Noted: 2023-08-01 | Resolved: 2024-03-11

## 2024-03-11 PROBLEM — J34.89 NASAL LESION: Status: RESOLVED | Noted: 2023-10-11 | Resolved: 2024-03-11

## 2024-03-11 PROBLEM — R91.8 OTHER NONSPECIFIC ABNORMAL FINDING OF LUNG FIELD: Status: ACTIVE | Noted: 2023-04-18

## 2024-03-11 PROBLEM — Z94.5 HISTORY OF SKIN GRAFT: Status: ACTIVE | Noted: 2024-03-11

## 2024-03-11 PROBLEM — C44.90 SKIN CANCER: Status: RESOLVED | Noted: 2024-01-03 | Resolved: 2024-03-11

## 2024-03-11 PROBLEM — E87.0 HYPERNATREMIA: Status: RESOLVED | Noted: 2023-10-11 | Resolved: 2024-03-11

## 2024-03-11 PROBLEM — R11.2 CHEMOTHERAPY INDUCED NAUSEA AND VOMITING: Status: RESOLVED | Noted: 2023-08-01 | Resolved: 2024-03-11

## 2024-03-11 PROBLEM — C76.0 HEAD AND NECK CANCER (MULTI): Status: RESOLVED | Noted: 2023-04-18 | Resolved: 2024-03-11

## 2024-03-11 PROBLEM — R11.2 CHEMOTHERAPY INDUCED NAUSEA AND VOMITING: Status: ACTIVE | Noted: 2023-08-01

## 2024-03-11 PROCEDURE — 99024 POSTOP FOLLOW-UP VISIT: CPT | Performed by: OTOLARYNGOLOGY

## 2024-03-11 PROCEDURE — 1036F TOBACCO NON-USER: CPT | Performed by: OTOLARYNGOLOGY

## 2024-03-11 NOTE — ASSESSMENT & PLAN NOTE
No evidence of disease  Deferred endoscopy today due to recent nasal reconstruction  Will scope at next visit  Follow up in 2 months

## 2024-03-11 NOTE — ASSESSMENT & PLAN NOTE
S/p staged nasal reconstruction by Dr. Mcclain  Now complete  No further treatment at this time  Follow up with Dr. Mcclain per his recommendations in a few months

## 2024-03-18 ENCOUNTER — APPOINTMENT (OUTPATIENT)
Dept: OTOLARYNGOLOGY | Facility: CLINIC | Age: 54
End: 2024-03-18
Payer: COMMERCIAL

## 2024-03-29 NOTE — PROGRESS NOTES
POV s/p 2/29/24 Forehead flap takedown    Has previously undergone OCRFF, PMFF, autologous rib grafting, auricular cartilage grafting for reconstruction.    Doing well, swelling improved  Wound at the left ala with granulation present.  Incisions c/d/I    Procedure: Rigid nasal endoscopy with debridement  Surgeon:  Sami Mcclain MD  Findings:  A 0-degree rigid endoscope was passed through the patient's bilateral naris via a 3 pass maneuver.  The first pass was along the floor of the nose to the nasopharynx.  The second pass was to the area of the middle meatus.  The third pass was to the sphenoethmoidal recess.    Septum absent   Right nasal cavity:      Inferior turbinate:  2+    Inferior meatus:  Clear, no discharge, no polyps/masses/lesions    Middle meatus:  significant crusting removed with instrumentation and suction, forearm skin viable   Left nasal cavity:    Inferior turbinate:  2+    Inferior meatus:  Clear, no discharge, no polyps/masses/lesions    Middle meatus:  significant crusting removed with instrumentation and suction, forearm skin viable   Nasopharynx:  Clear, no discharge, no masses/lesions      Discussed care of the wound at the left nostril with cipro soaks and bactroban  We will consider revision and/or biopsy if persistent wound  2-3 weeks, RTC

## 2024-04-01 ENCOUNTER — OFFICE VISIT (OUTPATIENT)
Dept: OTOLARYNGOLOGY | Facility: CLINIC | Age: 54
End: 2024-04-01
Payer: COMMERCIAL

## 2024-04-01 DIAGNOSIS — J34.89 NASAL CRUSTING: ICD-10-CM

## 2024-04-01 DIAGNOSIS — M95.0 NASAL DEFORMITY: ICD-10-CM

## 2024-04-01 DIAGNOSIS — M95.0 ACQUIRED DEFORMITY OF NOSE: ICD-10-CM

## 2024-04-01 DIAGNOSIS — M95.0 NASAL DEFECT: Primary | ICD-10-CM

## 2024-04-01 DIAGNOSIS — R09.81 NASAL CONGESTION: ICD-10-CM

## 2024-04-01 DIAGNOSIS — Q67.0 ASYMMETRY OF FACE: ICD-10-CM

## 2024-04-01 PROCEDURE — 31237 NSL/SINS NDSC SURG BX POLYPC: CPT | Performed by: OTOLARYNGOLOGY

## 2024-04-01 RX ORDER — MUPIROCIN 20 MG/G
OINTMENT TOPICAL 4 TIMES DAILY
Qty: 30 G | Refills: 3 | Status: SHIPPED | OUTPATIENT
Start: 2024-04-01 | End: 2024-05-09 | Stop reason: WASHOUT

## 2024-04-01 RX ORDER — CIPROFLOXACIN 500 MG/1
TABLET ORAL
Qty: 3 TABLET | Refills: 0 | Status: SHIPPED | OUTPATIENT
Start: 2024-04-01 | End: 2024-04-25 | Stop reason: ALTCHOICE

## 2024-04-09 NOTE — PROGRESS NOTES
Facial Plastic & Reconstructive Surgery      POV s/p 2/29/24 forehead flap takedown; Has previously undergone OCRFF, PMFF, autologous rib grafting, auricular cartilage grafting for reconstruction.    Doing well, swelling improved  Left ala wound present approximately 1 x 2 cm remaining  Incisions c/d/I  Using cipro soaks and bactroban  We will consider revision and/or biopsy if persistent wound    Plan:   Discussed Full thickness skin graft to nasal vestibule given delayed healing in attempt to prevent further wound breakdown    Will schedule for surgery

## 2024-04-16 ENCOUNTER — HOSPITAL ENCOUNTER (OUTPATIENT)
Dept: INFUSION THERAPY | Age: 54
Discharge: HOME | End: 2024-04-16
Payer: COMMERCIAL

## 2024-04-16 ENCOUNTER — OFFICE VISIT (OUTPATIENT)
Dept: ONCOLOGY | Age: 54
End: 2024-04-16
Payer: COMMERCIAL

## 2024-04-16 ENCOUNTER — TELEPHONE (OUTPATIENT)
Dept: INFUSION THERAPY | Age: 54
End: 2024-04-16

## 2024-04-16 VITALS
BODY MASS INDEX: 22.16 KG/M2 | OXYGEN SATURATION: 100 % | WEIGHT: 182 LBS | TEMPERATURE: 98 F | SYSTOLIC BLOOD PRESSURE: 130 MMHG | HEART RATE: 61 BPM | HEIGHT: 76 IN | DIASTOLIC BLOOD PRESSURE: 74 MMHG

## 2024-04-16 DIAGNOSIS — S01.20XA OPEN WOUND OF NASAL CAVITY, INITIAL ENCOUNTER: ICD-10-CM

## 2024-04-16 DIAGNOSIS — C76.0 HEAD AND NECK CANCER (HCC): Primary | ICD-10-CM

## 2024-04-16 LAB
ALBUMIN SERPL-MCNC: 4.5 G/DL (ref 3.5–5.2)
ALP SERPL-CCNC: 86 U/L (ref 40–129)
ALT SERPL-CCNC: 9 U/L (ref 0–40)
ANION GAP SERPL CALCULATED.3IONS-SCNC: 14 MMOL/L (ref 7–16)
AST SERPL-CCNC: 11 U/L (ref 0–39)
BASOPHILS # BLD: 0.03 K/UL (ref 0–0.2)
BASOPHILS NFR BLD: 1 % (ref 0–2)
BILIRUB SERPL-MCNC: 1 MG/DL (ref 0–1.2)
BUN SERPL-MCNC: 12 MG/DL (ref 6–20)
CALCIUM SERPL-MCNC: 9.8 MG/DL (ref 8.6–10.2)
CHLORIDE SERPL-SCNC: 106 MMOL/L (ref 98–107)
CO2 SERPL-SCNC: 22 MMOL/L (ref 22–29)
CREAT SERPL-MCNC: 1.1 MG/DL (ref 0.7–1.2)
EOSINOPHIL # BLD: 0.07 K/UL (ref 0.05–0.5)
EOSINOPHILS RELATIVE PERCENT: 2 % (ref 0–6)
ERYTHROCYTE [DISTWIDTH] IN BLOOD BY AUTOMATED COUNT: 14.6 % (ref 11.5–15)
GFR SERPL CREATININE-BSD FRML MDRD: 78 ML/MIN/1.73M2
GLUCOSE SERPL-MCNC: 90 MG/DL (ref 74–99)
HCT VFR BLD AUTO: 42 % (ref 37–54)
HGB BLD-MCNC: 13.8 G/DL (ref 12.5–16.5)
LYMPHOCYTES NFR BLD: 0.34 K/UL (ref 1.5–4)
LYMPHOCYTES RELATIVE PERCENT: 9 % (ref 20–42)
MAGNESIUM SERPL-MCNC: 1.9 MG/DL (ref 1.6–2.6)
MCH RBC QN AUTO: 28.9 PG (ref 26–35)
MCHC RBC AUTO-ENTMCNC: 32.9 G/DL (ref 32–34.5)
MCV RBC AUTO: 87.9 FL (ref 80–99.9)
MONOCYTES NFR BLD: 0.34 K/UL (ref 0.1–0.95)
MONOCYTES NFR BLD: 9 % (ref 2–12)
NEUTROPHILS NFR BLD: 80 % (ref 43–80)
NEUTS SEG NFR BLD: 3.12 K/UL (ref 1.8–7.3)
PLATELET # BLD AUTO: 189 K/UL (ref 130–450)
PMV BLD AUTO: 10.5 FL (ref 7–12)
POTASSIUM SERPL-SCNC: 3.8 MMOL/L (ref 3.5–5)
PROT SERPL-MCNC: 6.9 G/DL (ref 6.4–8.3)
RBC # BLD AUTO: 4.78 M/UL (ref 3.8–5.8)
RBC # BLD: NORMAL 10*6/UL
SODIUM SERPL-SCNC: 142 MMOL/L (ref 132–146)
WBC OTHER # BLD: 3.9 K/UL (ref 4.5–11.5)

## 2024-04-16 PROCEDURE — 36415 COLL VENOUS BLD VENIPUNCTURE: CPT

## 2024-04-16 PROCEDURE — 99214 OFFICE O/P EST MOD 30 MIN: CPT | Performed by: INTERNAL MEDICINE

## 2024-04-16 PROCEDURE — 85025 COMPLETE CBC W/AUTO DIFF WBC: CPT

## 2024-04-16 PROCEDURE — 80053 COMPREHEN METABOLIC PANEL: CPT

## 2024-04-16 PROCEDURE — 83735 ASSAY OF MAGNESIUM: CPT

## 2024-04-16 RX ORDER — HEPARIN 100 UNIT/ML
500 SYRINGE INTRAVENOUS PRN
Status: DISCONTINUED | OUTPATIENT
Start: 2024-04-16 | End: 2024-04-17 | Stop reason: HOSPADM

## 2024-04-16 RX ORDER — HEPARIN 100 UNIT/ML
500 SYRINGE INTRAVENOUS PRN
OUTPATIENT
Start: 2024-04-16

## 2024-04-16 RX ORDER — SODIUM CHLORIDE 0.9 % (FLUSH) 0.9 %
5-40 SYRINGE (ML) INJECTION PRN
OUTPATIENT
Start: 2024-04-16

## 2024-04-16 RX ORDER — GABAPENTIN 300 MG/1
300 CAPSULE ORAL 2 TIMES DAILY
COMMUNITY

## 2024-04-16 RX ORDER — SODIUM CHLORIDE 9 MG/ML
25 INJECTION, SOLUTION INTRAVENOUS PRN
OUTPATIENT
Start: 2024-04-16

## 2024-04-16 RX ORDER — SODIUM CHLORIDE 0.9 % (FLUSH) 0.9 %
5-40 SYRINGE (ML) INJECTION PRN
Status: DISCONTINUED | OUTPATIENT
Start: 2024-04-16 | End: 2024-04-17 | Stop reason: HOSPADM

## 2024-04-16 NOTE — PROGRESS NOTES
[facility] PHYSICIANS [facility] MED ONCOLOGY  [address]  [facility] [address]  Dept: [phone]  Loc: [phone]  Attending progress note       Reason for Visit: Squamous cell carcinoma of the nose/nasal septum.     Referring Physician:  Arnold Han MD     PCP:  Tarik Lemus MD     History of Present Illness:       Mr. Cantor is a pleasant 53-year-old gentleman, fairly healthy, who was diagnosed with squamous cell carcinoma of the nose/nasal septum.  The patient was hit by his dog's head on the left side of his nose in April 2022, in November 2022 he had noticed a lesion inside his nose, and she was diagnosed with staph infection, was treated with antibiotics, he then developed a pimple on his nose and eventually developed a hole in his nose, he was seen by ID, he was referred to the hospital due to concern about myelitis, he had a CT scan done revealing chronic sinusitis involving the left sphenoid sinus, he had a biopsy done on 2/21/2023, revealing invasive poorly differentiated squamous cell carcinoma, grade 3, the patient was referred Dr. Loredo, he underwent a total rhinectomy on 5/5/2023, the patient was found to have tumor growth to the glabella and the maxillary crest, path:   Case Summary Report   Procedure:    Rhinectomy, total   Tumor Site:   Nasal septal mucosa, cartilage, subcutaneous tissue, skin.     Tumor Laterality: Left and Right.   Left >> Right     Tumor Focality: Single focus     Tumor Size (Greatest dimension): At least 3.5 cm.     Histologic Type: Squamous cell carcinoma, keratinizing     Histologic Grade (squamous cell carcinomas only): Poorly differentiated     Specimen Margins (Main specimen; part B)     _X_ Involved by invasive carcinoma        Specify margin(s), per orientation, if possible: Superior and inferior   septal margins, superior right and left skin margins.     _X_ Uninvolved by high-grade dysplasia/in situ disease     Separately

## 2024-04-16 NOTE — TELEPHONE ENCOUNTER
Per Dr Alphonse House, called Dr White office from [facility] ([phone]) to ask if they would call patient to schedule a follow for him with Dr White. CT scans from November faxed to office per their request.

## 2024-04-17 ENCOUNTER — PREP FOR PROCEDURE (OUTPATIENT)
Dept: OTOLARYNGOLOGY | Facility: CLINIC | Age: 54
End: 2024-04-17

## 2024-04-17 ENCOUNTER — OFFICE VISIT (OUTPATIENT)
Dept: OTOLARYNGOLOGY | Facility: CLINIC | Age: 54
End: 2024-04-17
Payer: COMMERCIAL

## 2024-04-17 VITALS
SYSTOLIC BLOOD PRESSURE: 122 MMHG | HEART RATE: 55 BPM | HEIGHT: 76 IN | DIASTOLIC BLOOD PRESSURE: 79 MMHG | WEIGHT: 184 LBS | BODY MASS INDEX: 22.41 KG/M2

## 2024-04-17 DIAGNOSIS — M95.0 ACQUIRED DEFORMITY OF NOSE: ICD-10-CM

## 2024-04-17 DIAGNOSIS — C44.321 SQUAMOUS CELL CANCER OF SKIN OF ALA NASI: ICD-10-CM

## 2024-04-17 DIAGNOSIS — Q67.0 ASYMMETRY OF FACE: ICD-10-CM

## 2024-04-17 DIAGNOSIS — M95.0 NASAL DEFECT: Primary | ICD-10-CM

## 2024-04-17 DIAGNOSIS — J34.89 NASAL CRUSTING: ICD-10-CM

## 2024-04-17 DIAGNOSIS — J34.89 NASAL LESION: ICD-10-CM

## 2024-04-17 DIAGNOSIS — M95.0 NASAL DEFORMITY: ICD-10-CM

## 2024-04-17 PROCEDURE — 1036F TOBACCO NON-USER: CPT | Performed by: OTOLARYNGOLOGY

## 2024-04-17 PROCEDURE — 99024 POSTOP FOLLOW-UP VISIT: CPT | Performed by: OTOLARYNGOLOGY

## 2024-04-24 ENCOUNTER — HOSPITAL ENCOUNTER (OUTPATIENT)
Facility: CLINIC | Age: 54
Setting detail: OUTPATIENT SURGERY
Discharge: HOME | End: 2024-04-24
Attending: OTOLARYNGOLOGY | Admitting: OTOLARYNGOLOGY
Payer: COMMERCIAL

## 2024-04-24 ENCOUNTER — ANESTHESIA EVENT (OUTPATIENT)
Dept: OPERATING ROOM | Facility: CLINIC | Age: 54
End: 2024-04-24
Payer: COMMERCIAL

## 2024-04-24 ENCOUNTER — ANESTHESIA (OUTPATIENT)
Dept: OPERATING ROOM | Facility: CLINIC | Age: 54
End: 2024-04-24
Payer: COMMERCIAL

## 2024-04-24 VITALS
HEIGHT: 76 IN | SYSTOLIC BLOOD PRESSURE: 114 MMHG | TEMPERATURE: 97.9 F | RESPIRATION RATE: 16 BRPM | WEIGHT: 182.98 LBS | OXYGEN SATURATION: 99 % | DIASTOLIC BLOOD PRESSURE: 57 MMHG | HEART RATE: 66 BPM | BODY MASS INDEX: 22.28 KG/M2

## 2024-04-24 DIAGNOSIS — M95.0 NASAL DEFECT: ICD-10-CM

## 2024-04-24 DIAGNOSIS — R91.8 OTHER NONSPECIFIC ABNORMAL FINDING OF LUNG FIELD: ICD-10-CM

## 2024-04-24 DIAGNOSIS — Q67.0 ASYMMETRY OF FACE: ICD-10-CM

## 2024-04-24 DIAGNOSIS — G89.18 ACUTE POSTOPERATIVE PAIN: ICD-10-CM

## 2024-04-24 DIAGNOSIS — T14.8XXA SKIN WOUND FROM SURGICAL INCISION: Primary | ICD-10-CM

## 2024-04-24 DIAGNOSIS — J34.89 INFECTED LESION IN NOSE: ICD-10-CM

## 2024-04-24 DIAGNOSIS — C44.321 SQUAMOUS CELL CANCER OF SKIN OF ALA NASI: ICD-10-CM

## 2024-04-24 DIAGNOSIS — M95.0 NASAL DEFORMITY: ICD-10-CM

## 2024-04-24 PROCEDURE — A15260 PR FULL THICK GRFT NOS,EAR,LID <20 SQCM: Performed by: ANESTHESIOLOGY

## 2024-04-24 PROCEDURE — 2500000005 HC RX 250 GENERAL PHARMACY W/O HCPCS

## 2024-04-24 PROCEDURE — 3600000008 HC OR TIME - EACH INCREMENTAL 1 MINUTE - PROCEDURE LEVEL THREE: Performed by: OTOLARYNGOLOGY

## 2024-04-24 PROCEDURE — A15260 PR FULL THICK GRFT NOS,EAR,LID <20 SQCM

## 2024-04-24 PROCEDURE — 3600000003 HC OR TIME - INITIAL BASE CHARGE - PROCEDURE LEVEL THREE: Performed by: OTOLARYNGOLOGY

## 2024-04-24 PROCEDURE — 2500000004 HC RX 250 GENERAL PHARMACY W/ HCPCS (ALT 636 FOR OP/ED)

## 2024-04-24 PROCEDURE — 7100000001 HC RECOVERY ROOM TIME - INITIAL BASE CHARGE: Performed by: OTOLARYNGOLOGY

## 2024-04-24 PROCEDURE — 3700000002 HC GENERAL ANESTHESIA TIME - EACH INCREMENTAL 1 MINUTE: Performed by: OTOLARYNGOLOGY

## 2024-04-24 PROCEDURE — 7100000002 HC RECOVERY ROOM TIME - EACH INCREMENTAL 1 MINUTE: Performed by: OTOLARYNGOLOGY

## 2024-04-24 PROCEDURE — 2500000005 HC RX 250 GENERAL PHARMACY W/O HCPCS: Performed by: OTOLARYNGOLOGY

## 2024-04-24 PROCEDURE — 3700000001 HC GENERAL ANESTHESIA TIME - INITIAL BASE CHARGE: Performed by: OTOLARYNGOLOGY

## 2024-04-24 PROCEDURE — 15004 WOUND PREP F/N/HF/G: CPT | Performed by: OTOLARYNGOLOGY

## 2024-04-24 PROCEDURE — 15260 FTH/GFT FR N/E/E/L 20 SQCM/<: CPT | Performed by: OTOLARYNGOLOGY

## 2024-04-24 PROCEDURE — 2500000005 HC RX 250 GENERAL PHARMACY W/O HCPCS: Performed by: ANESTHESIOLOGY

## 2024-04-24 PROCEDURE — 2720000007 HC OR 272 NO HCPCS: Performed by: OTOLARYNGOLOGY

## 2024-04-24 PROCEDURE — 2500000001 HC RX 250 WO HCPCS SELF ADMINISTERED DRUGS (ALT 637 FOR MEDICARE OP)

## 2024-04-24 PROCEDURE — 7100000009 HC PHASE TWO TIME - INITIAL BASE CHARGE: Performed by: OTOLARYNGOLOGY

## 2024-04-24 PROCEDURE — 7100000010 HC PHASE TWO TIME - EACH INCREMENTAL 1 MINUTE: Performed by: OTOLARYNGOLOGY

## 2024-04-24 PROCEDURE — 2500000002 HC RX 250 W HCPCS SELF ADMINISTERED DRUGS (ALT 637 FOR MEDICARE OP, ALT 636 FOR OP/ED)

## 2024-04-24 PROCEDURE — 15004 WOUND PREP F/N/HF/G: CPT | Performed by: PHYSICIAN ASSISTANT

## 2024-04-24 RX ORDER — SODIUM CHLORIDE, SODIUM LACTATE, POTASSIUM CHLORIDE, CALCIUM CHLORIDE 600; 310; 30; 20 MG/100ML; MG/100ML; MG/100ML; MG/100ML
INJECTION, SOLUTION INTRAVENOUS CONTINUOUS PRN
Status: DISCONTINUED | OUTPATIENT
Start: 2024-04-24 | End: 2024-04-24

## 2024-04-24 RX ORDER — KETOROLAC TROMETHAMINE 30 MG/ML
INJECTION, SOLUTION INTRAMUSCULAR; INTRAVENOUS AS NEEDED
Status: DISCONTINUED | OUTPATIENT
Start: 2024-04-24 | End: 2024-04-24

## 2024-04-24 RX ORDER — TRANEXAMIC ACID 100 MG/ML
INJECTION, SOLUTION INTRAVENOUS AS NEEDED
Status: DISCONTINUED | OUTPATIENT
Start: 2024-04-24 | End: 2024-04-24 | Stop reason: HOSPADM

## 2024-04-24 RX ORDER — ACETAMINOPHEN 325 MG/1
TABLET ORAL AS NEEDED
Status: DISCONTINUED | OUTPATIENT
Start: 2024-04-24 | End: 2024-04-24

## 2024-04-24 RX ORDER — ONDANSETRON HYDROCHLORIDE 2 MG/ML
4 INJECTION, SOLUTION INTRAVENOUS ONCE AS NEEDED
Status: DISCONTINUED | OUTPATIENT
Start: 2024-04-24 | End: 2024-04-24 | Stop reason: HOSPADM

## 2024-04-24 RX ORDER — ALBUTEROL SULFATE 0.83 MG/ML
2.5 SOLUTION RESPIRATORY (INHALATION) ONCE AS NEEDED
Status: DISCONTINUED | OUTPATIENT
Start: 2024-04-24 | End: 2024-04-24 | Stop reason: HOSPADM

## 2024-04-24 RX ORDER — LIDOCAINE HYDROCHLORIDE 40 MG/ML
SOLUTION TOPICAL AS NEEDED
Status: DISCONTINUED | OUTPATIENT
Start: 2024-04-24 | End: 2024-04-24

## 2024-04-24 RX ORDER — NORETHINDRONE AND ETHINYL ESTRADIOL 0.5-0.035
KIT ORAL AS NEEDED
Status: DISCONTINUED | OUTPATIENT
Start: 2024-04-24 | End: 2024-04-24

## 2024-04-24 RX ORDER — PHENYLEPHRINE HCL IN 0.9% NACL 1 MG/10 ML
SYRINGE (ML) INTRAVENOUS AS NEEDED
Status: DISCONTINUED | OUTPATIENT
Start: 2024-04-24 | End: 2024-04-24

## 2024-04-24 RX ORDER — OXYCODONE HYDROCHLORIDE 5 MG/1
5 TABLET ORAL EVERY 4 HOURS PRN
Status: DISCONTINUED | OUTPATIENT
Start: 2024-04-24 | End: 2024-04-24 | Stop reason: HOSPADM

## 2024-04-24 RX ORDER — LIDOCAINE HYDROCHLORIDE AND EPINEPHRINE 10; 10 MG/ML; UG/ML
INJECTION, SOLUTION INFILTRATION; PERINEURAL AS NEEDED
Status: DISCONTINUED | OUTPATIENT
Start: 2024-04-24 | End: 2024-04-24 | Stop reason: HOSPADM

## 2024-04-24 RX ORDER — MUPIROCIN 20 MG/G
OINTMENT TOPICAL 4 TIMES DAILY
Qty: 30 G | Refills: 2 | Status: CANCELLED | OUTPATIENT
Start: 2024-04-24 | End: 2024-05-08

## 2024-04-24 RX ORDER — LIDOCAINE HYDROCHLORIDE 20 MG/ML
INJECTION, SOLUTION INFILTRATION; PERINEURAL AS NEEDED
Status: DISCONTINUED | OUTPATIENT
Start: 2024-04-24 | End: 2024-04-24

## 2024-04-24 RX ORDER — OXYCODONE HYDROCHLORIDE 5 MG/1
5 TABLET ORAL EVERY 6 HOURS PRN
Qty: 16 TABLET | Refills: 0 | Status: SHIPPED | OUTPATIENT
Start: 2024-04-24 | End: 2024-04-28

## 2024-04-24 RX ORDER — MIDAZOLAM HYDROCHLORIDE 1 MG/ML
INJECTION, SOLUTION INTRAMUSCULAR; INTRAVENOUS AS NEEDED
Status: DISCONTINUED | OUTPATIENT
Start: 2024-04-24 | End: 2024-04-24

## 2024-04-24 RX ORDER — HYDROMORPHONE HYDROCHLORIDE 1 MG/ML
0.5 INJECTION, SOLUTION INTRAMUSCULAR; INTRAVENOUS; SUBCUTANEOUS EVERY 5 MIN PRN
Status: DISCONTINUED | OUTPATIENT
Start: 2024-04-24 | End: 2024-04-24 | Stop reason: HOSPADM

## 2024-04-24 RX ORDER — GABAPENTIN 300 MG/1
CAPSULE ORAL AS NEEDED
Status: DISCONTINUED | OUTPATIENT
Start: 2024-04-24 | End: 2024-04-24

## 2024-04-24 RX ORDER — VANCOMYCIN HYDROCHLORIDE 250 MG/1
250 CAPSULE ORAL 4 TIMES DAILY
Qty: 40 CAPSULE | Refills: 0 | Status: SHIPPED | OUTPATIENT
Start: 2024-04-24 | End: 2024-04-25

## 2024-04-24 RX ORDER — ONDANSETRON HYDROCHLORIDE 2 MG/ML
INJECTION, SOLUTION INTRAVENOUS AS NEEDED
Status: DISCONTINUED | OUTPATIENT
Start: 2024-04-24 | End: 2024-04-24

## 2024-04-24 RX ORDER — PROPOFOL 10 MG/ML
INJECTION, EMULSION INTRAVENOUS AS NEEDED
Status: DISCONTINUED | OUTPATIENT
Start: 2024-04-24 | End: 2024-04-24

## 2024-04-24 RX ORDER — HONEY 100 %
PASTE (ML) TOPICAL
Qty: 103 ML | Refills: 2 | Status: SHIPPED | OUTPATIENT
Start: 2024-04-24

## 2024-04-24 RX ORDER — APREPITANT 40 MG/1
CAPSULE ORAL AS NEEDED
Status: DISCONTINUED | OUTPATIENT
Start: 2024-04-24 | End: 2024-04-24

## 2024-04-24 RX ORDER — CEFAZOLIN 1 G/1
INJECTION, POWDER, FOR SOLUTION INTRAVENOUS AS NEEDED
Status: DISCONTINUED | OUTPATIENT
Start: 2024-04-24 | End: 2024-04-24

## 2024-04-24 RX ORDER — SODIUM CHLORIDE, SODIUM LACTATE, POTASSIUM CHLORIDE, CALCIUM CHLORIDE 600; 310; 30; 20 MG/100ML; MG/100ML; MG/100ML; MG/100ML
100 INJECTION, SOLUTION INTRAVENOUS CONTINUOUS
Status: DISCONTINUED | OUTPATIENT
Start: 2024-04-24 | End: 2024-04-24 | Stop reason: HOSPADM

## 2024-04-24 RX ORDER — DEXAMETHASONE SODIUM PHOSPHATE 100 MG/10ML
INJECTION INTRAMUSCULAR; INTRAVENOUS AS NEEDED
Status: DISCONTINUED | OUTPATIENT
Start: 2024-04-24 | End: 2024-04-24

## 2024-04-24 RX ORDER — SUCCINYLCHOLINE CHLORIDE 20 MG/ML
INJECTION INTRAMUSCULAR; INTRAVENOUS AS NEEDED
Status: DISCONTINUED | OUTPATIENT
Start: 2024-04-24 | End: 2024-04-24

## 2024-04-24 RX ORDER — CEPHALEXIN 500 MG/1
500 CAPSULE ORAL 4 TIMES DAILY
Qty: 40 CAPSULE | Refills: 0 | Status: CANCELLED | OUTPATIENT
Start: 2024-04-24 | End: 2024-05-04

## 2024-04-24 RX ORDER — LIDOCAINE IN NACL,ISO-OSMOT/PF 30 MG/3 ML
0.1 SYRINGE (ML) INJECTION ONCE
Status: DISCONTINUED | OUTPATIENT
Start: 2024-04-24 | End: 2024-04-24 | Stop reason: HOSPADM

## 2024-04-24 RX ORDER — LABETALOL HYDROCHLORIDE 5 MG/ML
5 INJECTION, SOLUTION INTRAVENOUS ONCE AS NEEDED
Status: DISCONTINUED | OUTPATIENT
Start: 2024-04-24 | End: 2024-04-24 | Stop reason: HOSPADM

## 2024-04-24 RX ORDER — FENTANYL CITRATE 50 UG/ML
INJECTION, SOLUTION INTRAMUSCULAR; INTRAVENOUS AS NEEDED
Status: DISCONTINUED | OUTPATIENT
Start: 2024-04-24 | End: 2024-04-24

## 2024-04-24 RX ORDER — GLYCOPYRROLATE 0.2 MG/ML
INJECTION INTRAMUSCULAR; INTRAVENOUS AS NEEDED
Status: DISCONTINUED | OUTPATIENT
Start: 2024-04-24 | End: 2024-04-24

## 2024-04-24 RX ADMIN — SUCCINYLCHOLINE CHLORIDE 140 MG: 20 INJECTION, SOLUTION INTRAMUSCULAR; INTRAVENOUS at 10:49

## 2024-04-24 RX ADMIN — DEXAMETHASONE SODIUM PHOSPHATE 10 MG: 10 INJECTION INTRAMUSCULAR; INTRAVENOUS at 10:58

## 2024-04-24 RX ADMIN — GLYCOPYRROLATE 0.2 MG: 0.2 INJECTION INTRAMUSCULAR; INTRAVENOUS at 10:50

## 2024-04-24 RX ADMIN — LIDOCAINE HYDROCHLORIDE 40 MG: 20 INJECTION, SOLUTION INFILTRATION; PERINEURAL at 10:44

## 2024-04-24 RX ADMIN — PROPOFOL 60 MCG/KG/MIN: 10 INJECTION, EMULSION INTRAVENOUS at 10:55

## 2024-04-24 RX ADMIN — APREPITANT 40 MG: 40 CAPSULE ORAL at 09:15

## 2024-04-24 RX ADMIN — LIDOCAINE HYDROCHLORIDE 4 ML: 40 SOLUTION TOPICAL at 10:50

## 2024-04-24 RX ADMIN — FENTANYL CITRATE 25 MCG: 50 INJECTION, SOLUTION INTRAMUSCULAR; INTRAVENOUS at 11:03

## 2024-04-24 RX ADMIN — FENTANYL CITRATE 50 MCG: 50 INJECTION, SOLUTION INTRAMUSCULAR; INTRAVENOUS at 10:49

## 2024-04-24 RX ADMIN — Medication 100 MCG: at 11:13

## 2024-04-24 RX ADMIN — ONDANSETRON 4 MG: 2 INJECTION INTRAMUSCULAR; INTRAVENOUS at 11:10

## 2024-04-24 RX ADMIN — FENTANYL CITRATE 25 MCG: 50 INJECTION, SOLUTION INTRAMUSCULAR; INTRAVENOUS at 10:58

## 2024-04-24 RX ADMIN — KETOROLAC TROMETHAMINE 30 MG: 30 INJECTION, SOLUTION INTRAMUSCULAR at 11:20

## 2024-04-24 RX ADMIN — GABAPENTIN 300 MG: 300 CAPSULE ORAL at 09:15

## 2024-04-24 RX ADMIN — MIDAZOLAM 2 MG: 1 INJECTION INTRAMUSCULAR; INTRAVENOUS at 10:44

## 2024-04-24 RX ADMIN — EPHEDRINE SULFATE 10 MG: 50 INJECTION, SOLUTION INTRAVENOUS at 11:23

## 2024-04-24 RX ADMIN — Medication 10 L/MIN: at 11:31

## 2024-04-24 RX ADMIN — Medication 100 MCG: at 11:20

## 2024-04-24 RX ADMIN — PROPOFOL 130 MG: 10 INJECTION, EMULSION INTRAVENOUS at 10:49

## 2024-04-24 RX ADMIN — ACETAMINOPHEN 975 MG: 325 TABLET ORAL at 09:15

## 2024-04-24 RX ADMIN — CEFAZOLIN 2 G: 1 INJECTION, POWDER, FOR SOLUTION INTRAMUSCULAR; INTRAVENOUS at 10:57

## 2024-04-24 RX ADMIN — Medication 100 MCG: at 11:16

## 2024-04-24 RX ADMIN — SODIUM CHLORIDE, POTASSIUM CHLORIDE, SODIUM LACTATE AND CALCIUM CHLORIDE: 600; 310; 30; 20 INJECTION, SOLUTION INTRAVENOUS at 10:44

## 2024-04-24 SDOH — HEALTH STABILITY: MENTAL HEALTH: CURRENT SMOKER: 0

## 2024-04-24 ASSESSMENT — PAIN SCALES - GENERAL
PAINLEVEL_OUTOF10: 0 - NO PAIN
PAINLEVEL_OUTOF10: 2
PAINLEVEL_OUTOF10: 0 - NO PAIN
PAINLEVEL_OUTOF10: 2
PAINLEVEL_OUTOF10: 2

## 2024-04-24 ASSESSMENT — PAIN - FUNCTIONAL ASSESSMENT
PAIN_FUNCTIONAL_ASSESSMENT: 0-10

## 2024-04-24 ASSESSMENT — COLUMBIA-SUICIDE SEVERITY RATING SCALE - C-SSRS
2. HAVE YOU ACTUALLY HAD ANY THOUGHTS OF KILLING YOURSELF?: NO
6. HAVE YOU EVER DONE ANYTHING, STARTED TO DO ANYTHING, OR PREPARED TO DO ANYTHING TO END YOUR LIFE?: NO
1. IN THE PAST MONTH, HAVE YOU WISHED YOU WERE DEAD OR WISHED YOU COULD GO TO SLEEP AND NOT WAKE UP?: NO

## 2024-04-24 ASSESSMENT — PAIN DESCRIPTION - DESCRIPTORS
DESCRIPTORS: DULL
DESCRIPTORS: DULL

## 2024-04-24 NOTE — ANESTHESIA PREPROCEDURE EVALUATION
Patient: Adarsh De La Vega    Procedure Information       Date/Time: 04/24/24 0945    Procedure: Application Skin Graft to forehead - total case length= 1 hour    Location: [facility] OR 03 / [facility]    Surgeons: Sami Mcclain MD           53-year-old gentleman, fairly healthy, who was diagnosed with  invasive poorly differentiated squamous cell carcinoma of the nose/nasal septum. He is s/p a total rhinectomy on 5/5/2023, the patient was found to have tumor growth to the glabella and the maxillary crest, path was consistent with poorly differentiated keratinizing squamous cell carcinoma, tumor site was the nasal septal mucosa, cartilage, subcutaneous tissue and skin, size at least 3.5 cm, with lymphovascular invasion, and cartilage invasion, no perineural invasion, final pathologic stage [id number].        Relevant Problems   Neuro   (+) Carpal tunnel syndrome of left wrist   (+) Peripheral neuropathy      GI   (+) Gastroesophageal reflux disease   (+) Oropharyngeal dysphagia      Musculoskeletal   (+) Carpal tunnel syndrome of left wrist      ID   (+) Skin infection      Skin   (+) Squamous cell cancer of skin of ala nasi       Clinical information reviewed:   Tobacco  Allergies  Meds  Problems  Med Hx  Surg Hx   Fam Hx  Soc   Hx        NPO Detail:  NPO/Void Status  Date of Last Liquid: 04/23/24  Time of Last Liquid: 2000  Date of Last Solid: 04/23/24  Time of Last Solid: 2000  Last Intake Type: Clear fluids; Food  Time of Last Void: 0800         Vitals:    04/24/24 0915   BP: 136/73   Pulse: 57   Resp: 18   Temp: 36.1 °C (97 °F)   SpO2: 100%       Past Surgical History:   Procedure Laterality Date    AMPUTATION      nose    APPENDECTOMY      CHOLECYSTECTOMY      SINUS SURGERY       Past Medical History:   Diagnosis Date    GERD (gastroesophageal reflux disease)     Head and neck cancer (Multi)     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  No Known Allergies  Social History     Tobacco Use    Smoking status: Former     Current packs/day: 0.00     Average packs/day: 1.5 packs/day for 30.0 years (45.0 ttl pk-yrs)     Types: Cigarettes     Start date: 1993     Quit date: 2023     Years since quittin.1    Smokeless tobacco: Never   Substance Use Topics    Alcohol use: Never         Chemistry    Lab Results   Component Value Date/Time     2024 0832    K 3.9 2024 0832     2024 0832    CO2 26 2024 0832    BUN 13 2024 0832    CREATININE 0.96 2024 0832    Lab Results   Component Value Date/Time    CALCIUM 9.4 2024 0832    ALKPHOS 91 2024 0832    AST 10 2024 0832    ALT 7 (L) 2024 0832    BILITOT 0.7 2024 0832          Lab Results   Component Value Date/Time    WBC 3.3 (L) 2024 0832    HGB 13.4 (L) 2024 0832    HCT 45.3 2024 0832     2024 0832     Lab Results   Component Value Date/Time    PROTIME 11.2 2024 0832    INR 1.0 2024 0832     Encounter Date: 24   ECG 12 Lead   Result Value    Ventricular Rate 66    Atrial Rate 66    IN Interval 188    QRS Duration 85    QT Interval 380    QTC Calculation(Bazett) 399    P Axis 82    R Axis 93    T Axis 74    QRS Count 11    Q Onset 251    T Offset 441    QTC Fredericia 392    Narrative    Sinus rhythm  Borderline right axis deviation  Baseline wander in lead(s) V1    See ED provider note for full interpretation and clinical correlation  Confirmed by Deisy Welch ([id number]) on 2024 9:39:44 PM     No results found for this or any previous visit from the past 1095 days.        NPO Detail:  NPO/Void Status  Date of Last Liquid: 24  Time of Last Liquid:   Date of Last Solid: 24  Time of Last Solid: 2000  Last Intake Type: Clear fluids; Food  Time of Last Void: 0800          Review of Systems    Physical Exam    Airway  Mallampati: II  TM distance: >3 FB  Neck ROM: full     Cardiovascular   Rhythm: regular  Rate: normal     Dental    Pulmonary - normal exam     Abdominal            Anesthesia Plan    History of general anesthesia?: yes  History of complications of general anesthesia?: no    ASA 2     general     The patient is not a current smoker.    intravenous induction   Anesthetic plan and risks discussed with patient.    Plan discussed with CRNA, CAA and attending.

## 2024-04-24 NOTE — ANESTHESIA POSTPROCEDURE EVALUATION
Patient: Adarsh De La Vega    Procedure Summary       Date: 04/24/24 Room / Location: [facility] [place] [facility] / [facility]    Anesthesia Start: 1044 Anesthesia Stop: 1141    Procedure: Application Skin Graft to forehead (Face) Diagnosis:       Squamous cell cancer of skin of ala nasi      Asymmetry of face      (Squamous cell cancer of skin of ala nasi [C44.321])      (Asymmetry of face [Q67.0])    Surgeons: Sami Mcclain MD Responsible Provider: Ruth Best MD    Anesthesia Type: general ASA Status: 2            Anesthesia Type: general    Vitals Value Taken Time   /62 04/24/24 1200   Temp 36.2 °C (97.2 °F) 04/24/24 1200   Pulse 70 04/24/24 1200   Resp 13 04/24/24 1200   SpO2 98 % 04/24/24 1200       Anesthesia Post Evaluation    Patient location during evaluation: PACU  Patient participation: complete - patient participated  Level of consciousness: awake and alert  Pain management: adequate  Airway patency: patent  Cardiovascular status: acceptable  Respiratory status: acceptable  Hydration status: acceptable  Postoperative Nausea and Vomiting: none        There were no known notable events for this encounter.

## 2024-04-24 NOTE — DISCHARGE INSTRUCTIONS
NASAL SURGERY POST-OPERATIVE  PATIENT INSTRUCTIONS      FOLLOW-UP:  Postop appointment will be scheduled for you in 2 weeks. Call us immediately if you have any fevers greater than 102.5, drainage from your wound that is not clear or looks infected, persistent bleeding, increasing pain,  or persistent difficulty breathing.      WOUND CARE INSTRUCTIONS:    WE WILL REMOVE THE YELLOW DRESSINGS IN AND OUTSIDE YOUR NOSE IN 2 WEEKS.  Keep exterior dressings moist with sterile water/ saline throughout the day. Do not blow your nose until instructed or remove any packing unless instructed to do so. Wipe or dab the nose gently with a q-tip and hydrogen peroxide.  Brush your teeth gently with a soft tooth bruch only.  Wash your face carefully, avoiding the dressing and taking care not to get splint wet, if present.  Sleep with your head up and only in the supine position.  Don't turn your head side to side.      DIET:  You may eat any foods that you can tolerate.  It is a good idea to eat a high fiber diet and take in plenty of fluids to prevent constipation.  If you do become constipated you may want to take a mild laxative or take ducolax tablets on a daily basis until your bowel habits are regular.      MEDICATIONS:  Try to take narcotic medications and anti-inflammatory medications, such as tylenol, ibuprofen, naprosyn, etc., with food.  This will minimize stomach upset from the medication.  Should you develop nausea and vomiting from the pain medication, or develop a rash, please discontinue the medication and contact your physician.  You should not drive, make important decisions, or operate machinery when taking narcotic pain medication.    QUESTIONS:  Please feel free to call your physician or the hospital  if you have any questions, and they will be glad to assist you.    May have Tylenol after: 3:15PM    May have Ibuprofen/advil/motrin/aleve after: 5:20PM    If pharmacy denies VANCO, please call office to  request bactrim [phone]

## 2024-04-24 NOTE — H&P
History Of Present Illness  Adarsh De La Vega is a 53 y.o. male presenting for application of full thickness skin graft to nasal vestibule for hx of nasal defect s/p forehead flap.      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 His smoking use included cigarettes. He started smoking about 31 years ago. He has a 45 pack-year smoking history. He has never used smokeless tobacco. He reports that he does not drink alcohol and does not use drugs.    Family History  No family history on file.     Allergies  Patient has no known allergies.    ROS negative except what is otherwise specified in the HPI.     HENT:      Head: Normocephalic and atraumatic. Skin healing from prior forehead flap site.  Excess tissue   Eyes:      Conjunctiva/sclera: Conjunctivae normal.      Pupils: Pupils are equal, round, and reactive to light.   Cardiovascular:      Rate and Rhythm: Normal rate and regular rhythm.   Pulmonary:      Effort: Pulmonary effort is normal. No respiratory distress.      Breath sounds: Normal breath sounds.   Abdominal:      General: Bowel sounds are normal.      Palpations: Abdomen is soft.   Musculoskeletal:      Cervical back: Normal range of motion and neck supple.   Skin:     General: Skin is warm and dry.   Neurological:      Mental Status: Alert and oriented to person, place, and time.      Cranial Nerves: No cranial nerve deficit.      Coordination: Coordination normal.   Psychiatric:         Mood and Affect: Affect normal.       Last Recorded Vitals  There were no vitals  taken for this visit.    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      Zaheer Torres PA-C

## 2024-04-24 NOTE — ANESTHESIA PROCEDURE NOTES
Airway  Date/Time: 4/24/2024 10:50 AM  Urgency: elective    Airway not difficult    Staffing  Performed: BARBI   Authorized by: Ruth Best MD    Performed by: BARBI Castaneda  Patient location during procedure: OR    Indications and Patient Condition  Indications for airway management: anesthesia and airway protection  Spontaneous Ventilation: absent  Sedation level: deep  Preoxygenated: yes  Patient position: sniffing  Mask difficulty assessment: 1 - vent by mask  Planned trial extubation    Final Airway Details  Final airway type: endotracheal airway      Successful airway: ETT  Cuffed: yes   Successful intubation technique: direct laryngoscopy  Facilitating devices/methods: intubating stylet  Endotracheal tube insertion site: oral  Blade: Ryan  Blade size: #4  ETT size (mm): 7.5  Cormack-Lehane Classification: grade I - full view of glottis  Placement verified by: chest auscultation   Measured from: teeth  ETT to teeth (cm): 22  Number of attempts at approach: 1    Additional Comments  Lips/teeth in preanesthetic condition. LTA kit used.

## 2024-04-25 DIAGNOSIS — L08.9 SKIN INFECTION: ICD-10-CM

## 2024-04-25 DIAGNOSIS — J34.89 NASAL CRUSTING: Primary | ICD-10-CM

## 2024-04-25 RX ORDER — SULFAMETHOXAZOLE AND TRIMETHOPRIM 800; 160 MG/1; MG/1
1 TABLET ORAL 2 TIMES DAILY
Qty: 28 TABLET | Refills: 0 | Status: SHIPPED | OUTPATIENT
Start: 2024-04-25 | End: 2024-05-09

## 2024-04-25 ASSESSMENT — PAIN SCALES - GENERAL: PAINLEVEL_OUTOF10: 0 - NO PAIN

## 2024-04-25 NOTE — PROGRESS NOTES
PO vancomycin denied by insurance for corynebacterium nasal infection.    PO bactrim rx sent to pharmacy  Patient contacted    Plan: RTC in two weeks for POV

## 2024-04-25 NOTE — OP NOTE
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 [C44.321]     * Asymmetry of face [Q67.0]     Procedures  Application FTSG to left nasal vestibule  22108 - [place] FTH/GFT FREE W/DIRECT CLOSURE N/E/E/L 20 SQ CM/<  11200 - [place] PREP SITE F/S/N/H/F/G/M/D GT 1ST 100 SQ CM/1PCT    Surgeons      * Sami Mcclain - Primary    Resident/Fellow/Other Assistant:  Surgeons and Role:     * Zaheer Torres PA-C - Assisting    Procedure Summary  Anesthesia: General  ASA: II  Anesthesia Staff: Anesthesiologist: Ruth Best MD  C-AA: BARBI Castaneda  Estimated Blood Loss: 0 mL  Intra-op Medications:   Administrations occurring from 0945 to 1100 on 24:   Medication Name Total Dose   lidocaine-epinephrine (Xylocaine W/EPI) 1 %-1:100,000 injection 18 mL              Anesthesia Record               Intraprocedure I/O Totals          Intake    Propofol Drip 26.94 mL    The total shown is the total volume documented since Anesthesia Start was filed.    LR infusion 400.00 mL    Total Intake 426.94 mL          Specimen: No specimens collected     Staff:   Circulator: Terri Burrell RN  Scrub Person: Debo Hilliard RN         Drains and/or Catheters: * None in log *    Findings: see op note    Indications: Adarsh De La Vega is an 53 y.o. 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 The risks, benefits, complications, treatment options, non-operative alternatives, expected recovery and outcomes were discussed with the patient. The possibilities of reaction to medication, pulmonary aspiration, injury to surrounding  structures, bleeding, recurrent infection, the need for additional procedures, failure to diagnose a condition, and creating a complication requiring transfusion or operation were discussed with the patient. The patient concurred with the proposed plan, giving informed consent.  The site of surgery was properly noted/marked if necessary per policy. The patient has been actively warmed in preoperative area. Preoperative antibiotics have been ordered and given within 1 hours of incision. Venous thrombosis prophylaxis have been ordered including bilateral sequential compression devices    Procedure Details:     The patient was brought to the operating room and placed in the supine position, then intubated under general anesthesia.  The nose and face was injected with 1% lidocaine with epinephrine and then packed with decongestant-soaked pledgets.      The face was prepped and draped in the usual sterile fashion.      A 1.5 x 3 cm fusiform excision was planned to obtain full thickness skin graft at the left lateral cheek and pre-auricular skin. The incision was made with the 15 blade. The skin was elevated in a subdermal plane from the underlying subcutaneous tissue sharply. The skin graft was released and set aside in saline. The site was closed a simple interrupted fashion with 5-0 fast gut.    At the left nasal vestibule, the wound was examined, found to be approximated 2 x 1 cm. A 15 blade was utilized to sharply excise granulation and scar tissue to prepare the defect. The full thickness skin graft was brought into the field and cut to size the defect. The skin graft was secured with 5-0 chromic gut suture circumferentially. Ointment was applied and xeroform bolster was secured with prolene quilting sutures.      This concluded the goals of the procedure.  The patient was turned over to Anesthesia, extubated, and transferred to the PACU.    Complications:  None; patient tolerated the procedure well.    Disposition:  PACU - hemodynamically stable.  Condition: stable         Additional Details: No qualified resident was available and Zaheer Torres served as my full and primary assistant for the entire case.      Attending Attestation: I was present and scrubbed for the entire procedure.    Sami Mcclain  Phone Number: [phone]

## 2024-05-01 ENCOUNTER — APPOINTMENT (OUTPATIENT)
Dept: OTOLARYNGOLOGY | Facility: CLINIC | Age: 54
End: 2024-05-01
Payer: COMMERCIAL

## 2024-05-03 ENCOUNTER — TELEPHONE (OUTPATIENT)
Dept: OTOLARYNGOLOGY | Facility: HOSPITAL | Age: 54
End: 2024-05-03
Payer: COMMERCIAL

## 2024-05-03 ASSESSMENT — ENCOUNTER SYMPTOMS
SHORTNESS OF BREATH: 0
ADENOPATHY: 0
STRIDOR: 0
FATIGUE: 0
VOMITING: 0
APPETITE CHANGE: 0
SORE THROAT: 0
FEVER: 0
CHILLS: 0
COUGH: 0
FACIAL SWELLING: 0
UNEXPECTED WEIGHT CHANGE: 0
NECK PAIN: 0
NAUSEA: 0
VOICE CHANGE: 0

## 2024-05-03 NOTE — PROGRESS NOTES
Chief Complaint   Patient presents with    head and neck cancer follow up     HPI:  Adarsh De La Vega is a 53 year old  male following up with me today for his sinonasal SCCa s/p bilateral nasal endoscopy, total rhinectomy on 5/5/23. He completed adjuvant chemoradiation 8/28/23. Last seen 3/2024.  He is now s/p complex staged nasal reconstruction with Dr. Mcclain.  He just had revision surgery 4/24/24 with Dr. Mcclain for his left nares. He is recovering ok for surgery. He has a little wound dehiscence near the left ear incision.  He is seeing Dr. Mcclain Thursday this week.  Otherwise doing well. He has been having a lot of trouble swallowing which is why he came to see me today. He feels he is having to force food down and feels like he needs to clear his throat when he eats. He reports he has to drink lots of water to get the food and pills to go down. Weight is stable at 184#.  Feels like there is a shelf sometimes.  Wondering if he needs to be stretched (knew someone who needed this after cancer treatment).      History:  Dx: Squamous cell carcinoma sinonasal [id number]  ~ 6 months ago dog hit nose  1/23: Seen by an ENT who told him he had a nasal infection. developed a lump on the outside of his nose a few days following that appointment  2/21/23: Biopsy of nasal lesion. Path + for squamous cell carcinoma  2/27/23: seen by infectious disease and was in the hospital on IV antibiotics for about 1 week and discharged with a PICC line   3/17/23: CT neck and Chest- slightly enhancing nasal mass with involvement of the anterior nose & nasal septum. No pathologic lymphadenopathy. CT chest with 6mm pleural based nodule LLL.   3/20/23: First seen by me  5/5/23:S/p bilateral nasal endoscopy, total rhinectomy. Path +   5/17/23: Nasal trumpets removed  7/10/23: Started adjuvant chemoXRT  7/31/23: Admitted for dehydration/odynophagia and PEG tube placed  8/14/23: In ED, waiting to be admitted for IV hydration  8/28/23:  Completed adjuvant chemoXRT   10/23: CT neck/chest complete response, stable lung nodule  10/31/23: 1st stage prelamination of paramedian forehead flap  12/8/23: Osteocutaneous RFFF and 2nd stage  1/2/24: 3rd stage nasal reconstruction  2/29/24: 4th stage nasal reconstruction  4/24/24: Revision nasal reconstruction to the left nares     SH:  Tob: Current 1/2 ppd smoker. Former 1ppd since a teen  ETOH: Social  Here with wife    ROS:  Review of Systems   Constitutional:  Negative for appetite change, chills, fatigue, fever and unexpected weight change.   HENT:  Positive for trouble swallowing. Negative for dental problem, drooling, ear pain, facial swelling, hearing loss, mouth sores, sore throat, tinnitus and voice change.    Respiratory:  Negative for cough, shortness of breath and stridor.    Gastrointestinal:  Negative for nausea and vomiting.   Musculoskeletal:  Negative for neck pain.   Hematological:  Negative for adenopathy.   All other systems reviewed and are negative.       PE:  ENT Physical Exam  Constitutional  Appearance: patient appears well-developed, patient is cooperative;  Communication/Voice: communication appropriate for developmental age;  Head and Face  Appearance: head appears normal; facial scars present;  Head and Face comments: See nasal recon below, forehead paramedian is now healed.  He has xeroform bolster to the left nares.  Preauricular incision on the left has small dehiscence.  Culture obtained.  No purulence.  +crusting and dry.  No significant erythema  Ear  Auricles: right auricle normal; left auricle normal;  Nose  External Nose: nares patent bilaterally; nasal deformity visible;  Nose comments: Paramedian reconstruction, good contouring of the nose looks very good, nares is slightly small on the right and has xeroform bolster on the left after recent surgery  Oral Cavity/Oropharynx  Lips: normal;  Gums: gingiva normal;  Oral mucosa: normal;  Neck  Neck: scars  present;  Respiratory  Inspection: breathing unlabored;  Cardiovascular  Inspection: extremities are warm and well perfused;  Lymphatic  Palpation: no cervical adenopathy noted;  Neurovestibular  Mental Status: alert and oriented;  Psychiatric: mood normal; affect is appropriate;      Procedures   PROCEDURE NOTE:  Recommended flexible nasopharyngoscopy & laryngoscopy.  Risks, benefits, personnel and alternatives were explained.  The patient wished to proceed.  S/he was re-identified.  PROCEDURE:  Flexible nasopharyngoscopy and laryngoscopy  PREOPERATIVE DIAGNOSIS: Dysphagia   POSTOPERATIVE DIAGNOSIS: Same as above  INDICATIONS: Inability to tolerate mirror exam  ANESTHESIA: 4% lidocaine and 0.5% phenylephrine  PROCEDURE:  With the patient sitting upright topical anesthesia and vasoconstriction was applied with spray to the right side(s) of the nose.  After waiting an appropriate period of time for anesthesia/vasoconstriction to become effective, a flexible laryngoscope was passed through the right side(s) of the nose.  Nasopharynx, oropharynx, hypopharynx and larynx were examined.  FINDINGS:  The scope was placed on the right nares which is tight from his nasal reconstruction but was able to advance this into place.  There is no obvious cancer in the nares which is widely patent after extensive surgery.  No masses at the skull base.  Advanced through the nasopharynx into oropharynx were normal without any lesions or masses visualized.  No masses or lesions were visualized at the base of tongue, vallecula, epiglottis, aryepiglottic folds, pyriform sinuses, and lateral pharyngeal walls.  True vocal fold movement was normal.  The patient's airway was widely patent with no evidence of obstruction.  Patient tolerated the procedure well although this was quite painful for him and there were no complications.    ASSESSMENT AND PLAN:  Problem List Items Addressed This Visit       Squamous cell carcinoma of nose - Primary     Current Assessment & Plan     No evidence of disease on exam and endoscopy now 1 year from surgery  Completed adjuvant chemoradiation as well         Acquired deformity of nose    Current Assessment & Plan     Following with Dr. Mcclain for nasal reconstruction  Small dehiscence of preauricular incision, culture obtained today  Follow up with Dr. Mcclain in 3 days, on abx         Oropharyngeal dysphagia    Current Assessment & Plan     Moderate oropharyngeal dysphagia  Chronic, adverse effect of radiation  Recommend MBS w/speech and esophagram  Discussed possible OR for DL/esophagoscopy and dilation but need MBS first to assess function of the muscles during swallowing  Recommend SLP therapy in addition to any stretching   Follow up after MBS and then will consider OR intervention  Negative endoscopy today         Relevant Orders    FL modified barium swallow study       Dena Mei MD    Head & Neck Surgical Oncology & Reconstruction  Department of Otolaryngology - Head and Neck Surgery        By signing my name below, I, Esther Kumaribe, attest that this documentation has been prepared under the direction and in the presence of Dr. Dena Mei MD.     All medical record entries made by the Scribe were at my direction and personally dictated by me, Dr. Dena Mei. I have reviewed the chart and agree that the record accurately reflects my personal performance of the history, physical exam, discussion and plan.

## 2024-05-03 NOTE — TELEPHONE ENCOUNTER
Spoke to patient. He is now having trouble swallowing. He feels like he is forcing food down, and trying not to cough it back up. I was able to get him an appointment with Sigifredo Lau next week in [place].

## 2024-05-06 ENCOUNTER — OFFICE VISIT (OUTPATIENT)
Dept: OTOLARYNGOLOGY | Facility: CLINIC | Age: 54
End: 2024-05-06
Payer: COMMERCIAL

## 2024-05-06 ENCOUNTER — APPOINTMENT (OUTPATIENT)
Dept: OTOLARYNGOLOGY | Facility: CLINIC | Age: 54
End: 2024-05-06
Payer: COMMERCIAL

## 2024-05-06 VITALS — BODY MASS INDEX: 22.4 KG/M2 | WEIGHT: 184 LBS

## 2024-05-06 DIAGNOSIS — R91.1 PULMONARY NODULE: ICD-10-CM

## 2024-05-06 DIAGNOSIS — R13.12 OROPHARYNGEAL DYSPHAGIA: ICD-10-CM

## 2024-05-06 DIAGNOSIS — T14.8XXA OPEN WOUND: ICD-10-CM

## 2024-05-06 DIAGNOSIS — M95.0 ACQUIRED DEFORMITY OF NOSE: ICD-10-CM

## 2024-05-06 DIAGNOSIS — C44.321 SQUAMOUS CELL CARCINOMA OF NOSE: Primary | ICD-10-CM

## 2024-05-06 PROCEDURE — 99213 OFFICE O/P EST LOW 20 MIN: CPT | Performed by: OTOLARYNGOLOGY

## 2024-05-06 PROCEDURE — 92511 NASOPHARYNGOSCOPY: CPT | Performed by: OTOLARYNGOLOGY

## 2024-05-06 PROCEDURE — 87205 SMEAR GRAM STAIN: CPT

## 2024-05-06 PROCEDURE — 1036F TOBACCO NON-USER: CPT | Performed by: OTOLARYNGOLOGY

## 2024-05-06 PROCEDURE — 87070 CULTURE OTHR SPECIMN AEROBIC: CPT

## 2024-05-06 PROCEDURE — 87075 CULTR BACTERIA EXCEPT BLOOD: CPT

## 2024-05-06 ASSESSMENT — PATIENT HEALTH QUESTIONNAIRE - PHQ9
2. FEELING DOWN, DEPRESSED OR HOPELESS: NOT AT ALL
SUM OF ALL RESPONSES TO PHQ9 QUESTIONS 1 AND 2: 0
1. LITTLE INTEREST OR PLEASURE IN DOING THINGS: NOT AT ALL

## 2024-05-06 ASSESSMENT — ENCOUNTER SYMPTOMS: TROUBLE SWALLOWING: 1

## 2024-05-06 NOTE — ASSESSMENT & PLAN NOTE
Moderate oropharyngeal dysphagia  Chronic, adverse effect of radiation  Recommend MBS w/speech and esophagram  Discussed possible OR for DL/esophagoscopy and dilation but need MBS first to assess function of the muscles during swallowing  Recommend SLP therapy in addition to any stretching   Follow up after MBS and then will consider OR intervention  Negative endoscopy today

## 2024-05-06 NOTE — LETTER
May 6, 2024     Sami Mcclain MD  [id number]  [facility] [address]    Patient: Adarsh De La Vega   YOB: 1970   Date of Visit: 5/6/2024       Dear Dr. Sami Mcclain MD:    Thank you for referring Adarsh De La Vega to me for evaluation. Below are my notes for this consultation.  If you have questions, please do not hesitate to call me. I look forward to following your patient along with you.       Sincerely,     Dena Mei MD      CC: No Recipients  ______________________________________________________________________________________    Chief Complaint   Patient presents with   • head and neck cancer follow up     HPI:  Adarsh De La Vega is a 53 year old  male following up with me today for his sinonasal SCCa s/p bilateral nasal endoscopy, total rhinectomy on 5/5/23. He completed adjuvant chemoradiation 8/28/23. Last seen 3/2024.  He is now s/p complex staged nasal reconstruction with Dr. Mcclain.  He just had revision surgery 4/24/24 with Dr. Mcclain for his left nares. He is recovering ok for surgery. He has a little wound dehiscence near the left ear incision.  He is seeing Dr. Mcclain Thursday this week.  Otherwise doing well. He has been having a lot of trouble swallowing which is why he came to see me today. He feels he is having to force food down and feels like he needs to clear his throat when he eats. He reports he has to drink lots of water to get the food and pills to go down. Weight is stable at 184#.  Feels like there is a shelf sometimes.  Wondering if he needs to be stretched (knew someone who needed this after cancer treatment).      History:  Dx: Squamous cell carcinoma sinonasal [id number]  ~ 6 months ago dog hit nose  1/23: Seen by an ENT who told him he had a nasal infection. developed a lump on the outside of his nose a few days following that appointment  2/21/23: Biopsy of nasal lesion. Path + for squamous cell carcinoma  2/27/23: seen by infectious disease and was  in the hospital on IV antibiotics for about 1 week and discharged with a PICC line   3/17/23: CT neck and Chest- slightly enhancing nasal mass with involvement of the anterior nose & nasal septum. No pathologic lymphadenopathy. CT chest with 6mm pleural based nodule LLL.   3/20/23: First seen by me  5/5/23:S/p bilateral nasal endoscopy, total rhinectomy. Path +   5/17/23: Nasal trumpets removed  7/10/23: Started adjuvant chemoXRT  7/31/23: Admitted for dehydration/odynophagia and PEG tube placed  8/14/23: In ED, waiting to be admitted for IV hydration  8/28/23: Completed adjuvant chemoXRT   10/23: CT neck/chest complete response, stable lung nodule  10/31/23: 1st stage prelamination of paramedian forehead flap  12/8/23: Osteocutaneous RFFF and 2nd stage  1/2/24: 3rd stage nasal reconstruction  2/29/24: 4th stage nasal reconstruction  4/24/24: Revision nasal reconstruction to the left nares     SH:  Tob: Current 1/2 ppd smoker. Former 1ppd since a teen  ETOH: Social  Here with wife    ROS:  Review of Systems   Constitutional:  Negative for appetite change, chills, fatigue, fever and unexpected weight change.   HENT:  Positive for trouble swallowing. Negative for dental problem, drooling, ear pain, facial swelling, hearing loss, mouth sores, sore throat, tinnitus and voice change.    Respiratory:  Negative for cough, shortness of breath and stridor.    Gastrointestinal:  Negative for nausea and vomiting.   Musculoskeletal:  Negative for neck pain.   Hematological:  Negative for adenopathy.   All other systems reviewed and are negative.       PE:  ENT Physical Exam  Constitutional  Appearance: patient appears well-developed, patient is cooperative;  Communication/Voice: communication appropriate for developmental age;  Head and Face  Appearance: head appears normal; facial scars present;  Head and Face comments: See nasal recon below, forehead paramedian is now healed.  He has xeroform bolster to the left nares.   Preauricular incision on the left has small dehiscence.  Culture obtained.  No purulence.  +crusting and dry.  No significant erythema  Ear  Auricles: right auricle normal; left auricle normal;  Nose  External Nose: nares patent bilaterally; nasal deformity visible;  Nose comments: Paramedian reconstruction, good contouring of the nose looks very good, nares is slightly small on the right and has xeroform bolster on the left after recent surgery  Oral Cavity/Oropharynx  Lips: normal;  Gums: gingiva normal;  Oral mucosa: normal;  Neck  Neck: scars present;  Respiratory  Inspection: breathing unlabored;  Cardiovascular  Inspection: extremities are warm and well perfused;  Lymphatic  Palpation: no cervical adenopathy noted;  Neurovestibular  Mental Status: alert and oriented;  Psychiatric: mood normal; affect is appropriate;      Procedures   PROCEDURE NOTE:  Recommended flexible nasopharyngoscopy & laryngoscopy.  Risks, benefits, personnel and alternatives were explained.  The patient wished to proceed.  S/he was re-identified.  PROCEDURE:  Flexible nasopharyngoscopy and laryngoscopy  PREOPERATIVE DIAGNOSIS: Dysphagia   POSTOPERATIVE DIAGNOSIS: Same as above  INDICATIONS: Inability to tolerate mirror exam  ANESTHESIA: 4% lidocaine and 0.5% phenylephrine  PROCEDURE:  With the patient sitting upright topical anesthesia and vasoconstriction was applied with spray to the right side(s) of the nose.  After waiting an appropriate period of time for anesthesia/vasoconstriction to become effective, a flexible laryngoscope was passed through the right side(s) of the nose.  Nasopharynx, oropharynx, hypopharynx and larynx were examined.  FINDINGS:  The scope was placed on the right nares which is tight from his nasal reconstruction but was able to advance this into place.  There is no obvious cancer in the nares which is widely patent after extensive surgery.  No masses at the skull base.  Advanced through the nasopharynx into  oropharynx were normal without any lesions or masses visualized.  No masses or lesions were visualized at the base of tongue, vallecula, epiglottis, aryepiglottic folds, pyriform sinuses, and lateral pharyngeal walls.  True vocal fold movement was normal.  The patient's airway was widely patent with no evidence of obstruction.  Patient tolerated the procedure well although this was quite painful for him and there were no complications.    ASSESSMENT AND PLAN:  Problem List Items Addressed This Visit       Squamous cell carcinoma of nose - Primary    Current Assessment & Plan     No evidence of disease on exam and endoscopy now 1 year from surgery  Completed adjuvant chemoradiation as well         Acquired deformity of nose    Current Assessment & Plan     Following with Dr. Mcclain for nasal reconstruction  Small dehiscence of preauricular incision, culture obtained today  Follow up with Dr. Mcclain in 3 days, on abx         Oropharyngeal dysphagia    Current Assessment & Plan     Moderate oropharyngeal dysphagia  Chronic, adverse effect of radiation  Recommend MBS w/speech and esophagram  Discussed possible OR for DL/esophagoscopy and dilation but need MBS first to assess function of the muscles during swallowing  Recommend SLP therapy in addition to any stretching   Follow up after MBS and then will consider OR intervention  Negative endoscopy today         Relevant Orders    FL modified barium swallow study       Dena Mei MD    Head & Neck Surgical Oncology & Reconstruction  Department of Otolaryngology - Head and Neck Surgery        By signing my name below, I, Flor Kumar, attest that this documentation has been prepared under the direction and in the presence of Dr. Dena Mei MD.     All medical record entries made by the Scribe were at my direction and personally dictated by me, Dr. Dena Mei. I have reviewed the chart and agree that the record accurately reflects my personal  performance of the history, physical exam, discussion and plan.

## 2024-05-06 NOTE — PROGRESS NOTES
Facial Plastic & Reconstructive Surgery    5/9/24  POV s/p 2/29/24 forehead flap takedown; Has previously undergone OCRFF, PMFF, autologous rib grafting, auricular cartilage grafting for reconstruction.  Telehealth visit 4/29/24 plan: Discussed Full thickness skin graft to nasal vestibule given delayed healing in attempt to prevent further wound breakdown    Doing well, swelling improved  Left ala wound previously seen now healing very well.   Using saline and medihoney daily  Incisions c/d/I    Exam: Right preauricular skin graft with 1/2cm dehiscence; edges c/d/I.  Nasal exam reveals patent airways bilaterally, incisions c/d/I, good graft take in vestibules.     Plan: RTC in one month for wound check.     Zaheer Torres PA-C

## 2024-05-06 NOTE — ASSESSMENT & PLAN NOTE
Following with Dr. Mcclain for nasal reconstruction  Small dehiscence of preauricular incision, culture obtained today  Follow up with Dr. Mcclain in 3 days, on abx

## 2024-05-06 NOTE — ASSESSMENT & PLAN NOTE
No evidence of disease on exam and endoscopy now 1 year from surgery  Completed adjuvant chemoradiation as well   [FreeTextEntry1] : EXAM: PETCT SKABIGAIL FORDE ONC FDGSUBS KX\par \par \par PROCEDURE DATE: 11/24/2020\par \par \par \par INTERPRETATION: PROCEDURE: PET/CT SKULL BASE-MID THIGH IMAGING\par \par CLINICAL INFORMATION: 65-year-old female with stage resected III sigmoid colon cancer status post chemotherapy on surveillance with IV contrast allergy. Evaluate for recurrence. PET/CT is done as part of the subsequent treatment strategy evaluation.\par \par RADIOPHARMACEUTICAL: 9.93 mCi F-18, FDG, I.V.\par \par TECHNIQUE: Fasting blood sugar measured prior to injection of radiopharmaceutical was 77 mg/dl. Following intravenous injection of the radiopharmaceutical and an uptake period of approximately 60 minutes, FDG-PET/CT was obtained on a [employer] Discovery 710 from skull base to mid thigh. Oral contrast was given during the uptake period. CT was performed during shallow respiration. The CT protocol was optimized for PET attenuation correction and anatomic localization. The CT protocol was not designed to produce and cannot replace state-of-the-art diagnostic CT images with specific imaging protocols for different body parts and indications. Images were reviewed on a dedicated workstation using multiplanar reconstruction.\par \par The standardized uptake values (SUV) are normalized to patient body weight and indicate the highest activity concentration (SUVmax) in a given disease site. All image numbers refer to axial image number.\par \par COMPARISON: FDG PET/CT dated 11/22/2019.\par \par OTHER STUDIES USED FOR CORRELATION: CT chest dated 1/9/2020.\par \par FINDINGS:\par \par HEAD/NECK: Physiologic FDG activity in visualized brain, head, and neck.\par \par CHEST: No abnormal FDG activity. No enlarged or FDG-avid lymph node.\par \par LUNGS: Minimally FDG-avid cluster of 4 mm nodules in the peripheral aspect of right upper lobe are unchanged on CT and decreased in metabolism (SUV 1.2; image 72; previous SUV 4.3). Questionable non-FDG-avid 7 mm nodule in the right lung base, unchanged as compared to prior study from 11/22/2019, and is not seen on interval diagnostic CT (image 94). Bilateral perifissural nodules measuring 1-2 mm are unchanged, likely normal lymph nodes.\par \par PLEURA/PERICARDIUM: No abnormal FDG activity. No effusion.\par \par HEPATOBILIARY/PANCREAS: Physiologic FDG activity. Liver SUV mean is 2.2, unchanged.\par \par SPLEEN: Physiologic FDG activity. Normal in size.\par \par ADRENAL GLANDS: No abnormal FDG activity. No nodule.\par \par KIDNEYS/URINARY BLADDER: Physiologic FDG activity.\par \par REPRODUCTIVE ORGANS: No abnormal FDG activity.\par \par ABDOMINOPELVIC NODES: No enlarged or FDG-avid lymph node.\par \par BOWEL/PERITONEUM/MESENTERY: No abnormal FDG activity. Moderate-sized hiatal hernia, without associated hypermetabolism, unchanged. Sigmoid resection, unchanged. No abnormal FDG activity adjacent to the colonic anastomosis.\par \par BONES/SOFT TISSUES: Physiologic FDG activity in visualized osseous structures.\par \par Symmetric hypermetabolism in thoracic paraspinal musculature is again seen.\par \par IMPRESSION: Compared to FDG-PET/CT scan dated 11/22/2019:\par \par 1. Interval decrease in FDG activity associated with right upper lobe cluster of small nodules which is unchanged on CT, likely mucoid impacted distal airways.\par \par 2. Remainder of study is unchanged. No scan evidence of recurrent or metastatic disease.\par \par \par \par \par \par \par \par \par KALA GERMAIN MD; Attending Nuclear Medicine\par This document has been electronically signed. Nov 25 2020 12:10PM

## 2024-05-08 ENCOUNTER — TELEPHONE (OUTPATIENT)
Dept: OTOLARYNGOLOGY | Facility: HOSPITAL | Age: 54
End: 2024-05-08
Payer: COMMERCIAL

## 2024-05-08 LAB
BACTERIA SPEC CULT: ABNORMAL
GRAM STN SPEC: ABNORMAL
GRAM STN SPEC: ABNORMAL

## 2024-05-08 NOTE — LETTER
May 8, 2024     Patient: Adarsh De La Vega   YOB: 1970   Date of Visit: 5/8/2024       To Whom It May Concern:    Adarsh De La Vega was seen in my clinic on 5/6/24 at ?. His wife Betty accompanied him to this appointments. Please excuse her from work this day to attend this appointment.     If you have any questions or concerns, please don't hesitate to call.         Sincerely,         Nikole Park RN        CC: No Recipients

## 2024-05-09 ENCOUNTER — OFFICE VISIT (OUTPATIENT)
Dept: OTOLARYNGOLOGY | Facility: CLINIC | Age: 54
End: 2024-05-09
Payer: COMMERCIAL

## 2024-05-09 VITALS — WEIGHT: 183 LBS | HEIGHT: 76 IN | BODY MASS INDEX: 22.29 KG/M2

## 2024-05-09 DIAGNOSIS — Q67.0 ASYMMETRY OF FACE: ICD-10-CM

## 2024-05-09 DIAGNOSIS — M95.0 ACQUIRED DEFORMITY OF NOSE: ICD-10-CM

## 2024-05-09 DIAGNOSIS — R13.12 OROPHARYNGEAL DYSPHAGIA: Primary | ICD-10-CM

## 2024-05-09 DIAGNOSIS — M95.0 NASAL DEFECT: ICD-10-CM

## 2024-05-09 DIAGNOSIS — J34.89 NASAL CRUSTING: ICD-10-CM

## 2024-05-09 DIAGNOSIS — C44.321 SQUAMOUS CELL CARCINOMA OF NOSE: ICD-10-CM

## 2024-05-09 DIAGNOSIS — M95.0 NASAL DEFORMITY: ICD-10-CM

## 2024-05-09 PROCEDURE — 1036F TOBACCO NON-USER: CPT | Performed by: PHYSICIAN ASSISTANT

## 2024-05-09 PROCEDURE — 99024 POSTOP FOLLOW-UP VISIT: CPT | Performed by: PHYSICIAN ASSISTANT

## 2024-05-09 RX ORDER — MUPIROCIN 20 MG/G
OINTMENT TOPICAL 4 TIMES DAILY
Qty: 30 G | Refills: 3 | Status: SHIPPED | OUTPATIENT
Start: 2024-05-09 | End: 2024-05-23

## 2024-05-09 ASSESSMENT — PAIN SCALES - GENERAL: PAINLEVEL: 0-NO PAIN

## 2024-05-20 ENCOUNTER — HOSPITAL ENCOUNTER (OUTPATIENT)
Dept: RADIOLOGY | Facility: HOSPITAL | Age: 54
Discharge: HOME | End: 2024-05-20
Payer: COMMERCIAL

## 2024-05-20 DIAGNOSIS — R13.12 OROPHARYNGEAL DYSPHAGIA: ICD-10-CM

## 2024-05-20 PROCEDURE — 74230 X-RAY XM SWLNG FUNCJ C+: CPT | Performed by: RADIOLOGY

## 2024-05-20 PROCEDURE — 74230 X-RAY XM SWLNG FUNCJ C+: CPT

## 2024-05-20 PROCEDURE — 2500000005 HC RX 250 GENERAL PHARMACY W/O HCPCS: Performed by: OTOLARYNGOLOGY

## 2024-05-20 RX ADMIN — BARIUM SULFATE 80 ML: 0.81 POWDER, FOR SUSPENSION ORAL at 11:35

## 2024-05-20 RX ADMIN — BARIUM SULFATE 15 ML: 400 PASTE ORAL at 11:36

## 2024-05-20 RX ADMIN — BARIUM SULFATE 70 ML: 400 SUSPENSION ORAL at 11:36

## 2024-05-20 RX ADMIN — BARIUM SULFATE 700 MG: 700 TABLET ORAL at 11:37

## 2024-05-20 NOTE — PROCEDURES
Speech-Language Pathology    SLP Adult Outpatient MBSS Evaluation    Patient Name: Adarsh De La Vega  MRN: [id number]  Today's Date: 5/20/2024   Time Calculation  Start Time: 1030  Stop Time: 1115  Time Calculation (min): 45 min         Current Problem:   1. Oropharyngeal dysphagia  FL modified barium swallow study    FL modified barium swallow study        Modified Barium Swallow Study completed. Informed verbal consent obtained prior to completion of exam. Trials of thin, nectar/mildly thick liquid, puree, regular solids and barium tablet with thin liquid were given.     SLP: Jose C Olivas, SLP   Phone/Pager: Haiku JoMaJa; phone: [phone]      SPEECH FINDINGS:   Reason for referral: Concern for dysphagia with solids  Patient hx: Oropharyngeal dysphagia secondary to multimodality treatment for head and neck CA. Pt c/o globus sensation and difficulty swallowing solids.   Respiratory status: Room air  Current diet: Soft solids and thin liquids.    FINAL SPEECH RECOMMENDATIONS  Diet: Per the results of today's MBSS, patient to continue baseline diet of regular consistencies and thin liquids following swallow strategies listed below:    Swallow Strategies:  - Small bites  - Small, single sips  - Alternate consistencies  - Upright for all PO intake  - Chin tuck  - Reflux Precautions    OUTCOME MEASURES:  Rosenbek's Penetration Aspiration Scale  Thin Liquids: 1. NO ASPIRATION & NO PENETRATION - no aspiration, contrast does not enter airway  [place] Thick Liquids: 1. NO ASPIRATION & NO PENETRATION - no aspiration, contrast does not enter airway  Puree: 1. NO ASPIRATION & NO PENETRATION - no aspiration, contrast does not enter airway  Solids: 1. NO ASPIRATION & NO PENETRATION - no aspiration, contrast does not enter airway       Eating Assessment Tool (EAT-10) 17/40  A total score of 3 or above may indicate difficulty with swallowing safely and/or efficiently    Functional Oral Intake Scale  Functional Oral Intake  Scale: Level 5        total oral diet with multiple consistencies, but requires special preparations and compensations    Plan:  Treatment/Interventions: Pharyngeal exercises, Patient/family education, Bolus trials. Diet tolerance/advancement  SLP Plan: Skilled SLP warranted  SLP Frequency: To be determined by treating SLP in outpatient setting  Duration:    Discussed POC: Patient  Discussed Risks/Benefits: Yes  Patient/Caregiver Agreeable: Yes    Short term goals established 05/20/24:   - Patient will tolerate baseline/recommended PO diet without overt s/s of aspiration, further difficulty, or concern for aspiration-related complications in 90% of observed therapeutic trials.  - Pt will complete effortful swallows 10 reps, 3 times per day for 7 days a week; to strengthen pharyngeal muscles for improve bolus clearance through the pharynx.    - Pt will compete jeaneth maneuver 10 times, 3x a day for 5 days, independent of cues to improve posterior pharyngeal wall contraction/strength necessary for bolus clearance through the pharynx.      Long term goals 05/20/24:   Patient will tolerate the least restrictive diet without overt difficulty or further pulmonary compromise by time of discharge.    Education provided: ST provided education to Patient regarding MBSS impressions, recommendations and POC at this time. Verbal understanding and agreement given on all accounts.     Treatment Provided today: ST provided extensive education to pt/pt family regarding anatomy/physiology of swallow function, risk of aspiration/aspiration pna, diet modifications, and the use of compensatory swallow strategies to promote pt safety upon PO intake including chin tuck and methods to assist with slow esophogeal clearance. In addition, ST provided instruction/training on oral and pharyngeal exercises (re: effortful swallow and jeaneth ).     Additional consult suggested: Dr. Scarlet Frank for esophageal dysfunction or consider additional  testing of the esophagus.    Repeat study/ dc plan: n/a       SLP Impressions with Severity Rating:   Pt presents with Mild oropharyngeal dysphagia characterized by deficits noted below. Most significant deficits impacting swallowing safety and efficiency include BOT retraction and posterior pharyngeal wall constriction. These deficits resulted in moderate pharyngeal stasis at the level of the valleculae after the swallow with solids. Pt demonstrated no evidence of penetration or aspiration across all consistencies. Upon turning patient into a-p view notable barium retention mid sternum from the last solid bolus presentation in the lateral view. Water provided to assist with some clearance. Full barium retention with solids throughout the entire esophagus and with barium tablet at proximal to mid esophagus of > 1 minute. Water required to assist with esophageal clearance of barium. The A-P bolus follow-through is not intended to be utilized as a diagnostic assessment of the esophagus, rather a tool to observe the biomechanically aspects of the swallow continuum and to inform the need for further evaluation by medical specialists, as applicable.      Strategies attempted- Chin tuck maneuver effective to eliminate stasis in the pharynx with solids. Pt required min cues to elicit.     Mechanics of the swallow summary:    ORAL PHASE:  Lip Closure - No labial escape/anterior loss of bolus   Tongue Control During Bolus Hold - Cohesive bolus between tongue to palatal seal   Bolus prep/mastication - Slow prolonged mastication with complete re-collection necessary   Bolus transport/lingual motion - Delayed initiation of tongue motion for A-P movement of the bolus   Oral residue - Residue collection on oral structure     PHARYNGEAL PHASE:  Initiation of pharyngeal swallow - Bolus head at posterior angle of ramus   Soft palate elevation - No bolus between soft palate/pharyngeal wall   Laryngeal elevation - Complete superior  movement of thyroid cartilage with contact of arytenoids to epiglottic petiole   Anterior hyoid excursion - Partial anterior movement   Epiglottic movement - Complete inversion    Laryngeal vestibule closure - Complete - no air/contrast in laryngeal vestibule   Pharyngeal stripping wave - Present, however, diminished   Pharyngeal contraction (A/P view) - Complete  Pharyngoesophageal segment opening - Partial distension/partial duration with partial obstruction of flow of bolus   Tongue base retraction - Narrow column of contrast or air between tongue base and pharyngeal wall   Pharyngeal residue - Majority of contrast within or on the pharyngeal structures     ESOPHAGEAL PHASE:  Esophageal clearance - Minimal to no esophageal clearance

## 2024-05-20 NOTE — LETTER
May 21, 2024     Patient: Adarsh De La Vega   YOB: 1970   Date of Visit: 5/20/2024       To Whom It May Concern:    Adarsh De La Vega was seen in my clinic on 5/20/2024 at 10:30 am. Please excuse Adarsh and Betty for their absence from work on this day to provide transportation to make the appointment.    If you have any questions or concerns, please don't hesitate to call.         Sincerely,         Jose C Olivas, SLP        CC: No Recipients

## 2024-05-22 ENCOUNTER — HOSPITAL ENCOUNTER (OUTPATIENT)
Dept: RADIOLOGY | Facility: HOSPITAL | Age: 54
Discharge: HOME | End: 2024-05-22
Payer: COMMERCIAL

## 2024-05-22 DIAGNOSIS — R91.1 PULMONARY NODULE: ICD-10-CM

## 2024-05-22 PROCEDURE — 71250 CT THORAX DX C-: CPT

## 2024-05-22 PROCEDURE — 71250 CT THORAX DX C-: CPT | Performed by: STUDENT IN AN ORGANIZED HEALTH CARE EDUCATION/TRAINING PROGRAM

## 2024-05-31 ENCOUNTER — TELEMEDICINE CLINICAL SUPPORT (OUTPATIENT)
Dept: SPEECH THERAPY | Facility: HOSPITAL | Age: 54
End: 2024-05-31
Payer: COMMERCIAL

## 2024-05-31 PROCEDURE — 92526 ORAL FUNCTION THERAPY: CPT | Mod: GN

## 2024-05-31 NOTE — PROGRESS NOTES
Speech-Language Pathology    SLP Adult Outpatient Speech-Language Pathology Treatment     Patient Name: Adarsh De La Vega  MRN: [id number]  Today's Date: 5/31/2024  Time Calculation  Start Time: 0930  Stop Time: 0957  Time Calculation (min): 27 min         Current Problem:   Oropharyngeal Dysphagia secondary to multimodality treatment for head and neck cancer.     SUBJECTIVE  Patient alert and oriented and ready to participate in office/telehealth visit this date.    OBJECTIVE  LONG TERM GOAL  Patient will tolerate the least restrictive diet without overt difficulty or further pulmonary compromise by time of discharge.     SHORT TERM GOALS  - Patient will tolerate baseline/recommended PO diet without overt s/s of aspiration, further difficulty, or concern for aspiration-related complications in 90% of observed therapeutic trials.  - Pt will complete effortful swallows 10 reps, 3 times per day for 7 days a week; to strengthen pharyngeal muscles for improve bolus clearance through the pharynx.    - Pt will compete jeaneth maneuver 10 times, 3x a day for 5 days, independent of cues to improve posterior pharyngeal wall contraction/strength necessary for bolus clearance through the pharynx.    ASSESSMENT  Instructed patient this date in:  Results and recommendations from recent MBS discussed with patient in detail.   Patient endorsing improved PO tolerance with implementation of chin tuck maneuver and alternating liquids and solids. Additional information regarding conservative reflux precautions reviewed to assist with esophageal dysmotility.    SLP reviewed long term expected changes to speech and swallow function during post RT treatment. Dental care, and oral hygiene in relation to aspiration discussed. As well as the additional prognostic indicators that result in higher risks of development of aspiration pneumonia. Xerostomia alleviation and treatment discussed with the importance of continuing exercises to maintain  strength, mobility and endurance.      Pt also demonstrated adequate skill in swallow exercises and prophylactic stretches to maintain speech and swallow muscles.    Clinician modeled all techniques and accurate patient follow through confirmed.  Handouts already provided to facilitate optimal home carryover; as pt will require a daily exercise regiment for life given head and neck radiation.     PLAN  - Discharge patient 2/2 improved PO intake without weight loss or pulmonary sequel.    - Patient instructed to call if there are problems  - Patient to follow up with physician to discuss esophageal dysfunction.  - Patient was referred to Dr. Scarlet Frank or Dr. Mei    Patient/caregiver agreeable with POC     SLP Outcome Measures:  Functional Oral Intake Scale   FIOS level Comment    Level 1  Nothing by Mouth    Level 2 Tube dependent with minimal attempts of food and liquid.    Level 3 Tube dependent with consistent oral intake of food and liquid.    Level 4 Total oral diet of a single consistency.   X Level 5 Total oral diet with multiple consistencies, but requires special preparations and compensations.    Level 6 Total oral diet with multiple consistencies without special preparations but specific food limitations.    Level 7 Total oral diet with no restrictions.

## 2024-06-12 NOTE — PROGRESS NOTES
Facial Plastic & Reconstructive Surgery    6/13/24  POV s/p 2/29/24 forehead flap takedown; Has previously undergone OCRFF, PMFF, autologous rib grafting, auricular cartilage grafting for reconstruction.  Telehealth visit 4/29/24 plan: Discussed Full thickness skin graft to nasal vestibule given delayed healing in attempt to prevent further wound breakdown    Doing well, swelling improved  Left ala wound previously seen now healing very well.   Using saline and medihoney daily  Incisions c/d/I    Plan: RTC in 3 months for check

## 2024-06-13 ENCOUNTER — APPOINTMENT (OUTPATIENT)
Dept: OTOLARYNGOLOGY | Facility: CLINIC | Age: 54
End: 2024-06-13
Payer: COMMERCIAL

## 2024-06-13 VITALS — WEIGHT: 182.6 LBS | BODY MASS INDEX: 22.24 KG/M2 | HEIGHT: 76 IN

## 2024-06-13 DIAGNOSIS — M95.0 NASAL DEFORMITY: ICD-10-CM

## 2024-06-13 DIAGNOSIS — M95.0 ACQUIRED DEFORMITY OF NOSE: ICD-10-CM

## 2024-06-13 DIAGNOSIS — C44.321 SQUAMOUS CELL CARCINOMA OF NOSE: Primary | ICD-10-CM

## 2024-06-13 DIAGNOSIS — M95.0 NASAL DEFECT: ICD-10-CM

## 2024-06-13 DIAGNOSIS — Q67.0 ASYMMETRY OF FACE: ICD-10-CM

## 2024-06-13 PROCEDURE — 99024 POSTOP FOLLOW-UP VISIT: CPT | Performed by: OTOLARYNGOLOGY

## 2024-06-13 ASSESSMENT — PAIN SCALES - GENERAL: PAINLEVEL: 0-NO PAIN

## 2024-07-05 ASSESSMENT — ENCOUNTER SYMPTOMS
FACIAL SWELLING: 0
STRIDOR: 0
CHILLS: 0
FATIGUE: 0
VOMITING: 0
APPETITE CHANGE: 0
NECK PAIN: 0
ADENOPATHY: 0
SHORTNESS OF BREATH: 0
FEVER: 0
VOICE CHANGE: 0
SORE THROAT: 0
NAUSEA: 0
UNEXPECTED WEIGHT CHANGE: 0
TROUBLE SWALLOWING: 1
COUGH: 0

## 2024-07-05 NOTE — PROGRESS NOTES
Chief Complaint   Patient presents with    Follow-up     HPI:  Adarsh De La Vega is a 53 year old male following up with me today for his sinonasal SCCa s/p bilateral nasal endoscopy, total rhinectomy on 5/5/23. He completed adjuvant chemoradiation 8/28/23. Last seen 5/2024.  He is now s/p complex staged nasal reconstruction with Dr. Mcclain.  His CT from 5/2024 showed a stable lung nodule.  Denies pain.  He is having difficulty swallowing although he passed his MBS 5/24.  Weight stable.    History:  Dx: Squamous cell carcinoma sinonasal [id number]  ~ 6 months ago dog hit nose  1/23: Seen by an ENT who told him he had a nasal infection. developed a lump on the outside of his nose a few days following that appointment  2/21/23: Biopsy of nasal lesion. Path + for squamous cell carcinoma  2/27/23: seen by infectious disease and was in the hospital on IV antibiotics for about 1 week and discharged with a PICC line   3/17/23: CT neck and Chest- slightly enhancing nasal mass with involvement of the anterior nose & nasal septum. No pathologic lymphadenopathy. CT chest with 6mm pleural based nodule LLL.   3/20/23: First seen by me  5/5/23:S/p bilateral nasal endoscopy, total rhinectomy. Path +SCCa   7/10/23: Started adjuvant chemoXRT  7/31/23: Admitted for dehydration/odynophagia and PEG tube placed  8/14/23: In ED, waiting to be admitted for IV hydration  8/28/23: Completed adjuvant chemoXRT   10/23: CT neck/chest complete response, stable lung nodule  10/31/23: 1st stage prelamination of paramedian forehead flap  12/8/23: Osteocutaneous RFFF and 2nd stage  1/2/24: 3rd stage nasal reconstruction  2/29/24: 4th stage nasal reconstruction  4/24/24: Revision nasal reconstruction to the left nares  5/24: MBS passed - small bites/sips, alternating     SH:  Tob: Current 1/2 ppd smoker. Former 1ppd since a teen  ETOH: Social  Here with wife    ROS:  Review of Systems   Constitutional:  Negative for appetite change, chills, fatigue,  fever and unexpected weight change.   HENT:  Positive for trouble swallowing. Negative for dental problem, drooling, ear pain, facial swelling, hearing loss, mouth sores, sore throat, tinnitus and voice change.    Respiratory:  Negative for cough, shortness of breath and stridor.    Gastrointestinal:  Negative for nausea and vomiting.   Musculoskeletal:  Negative for neck pain.   Hematological:  Negative for adenopathy.   All other systems reviewed and are negative.       PE:  ENT Physical Exam  Constitutional  Appearance: patient appears well-developed, patient is cooperative;  Communication/Voice: communication appropriate for developmental age;  Head and Face  Appearance: head appears normal; facial scars present;  Head and Face comments: See nasal recon below, forehead paramedian is now healed.    Ear  Auricles: right auricle normal; left auricle normal;  Nose  External Nose: nares patent bilaterally;  Nose comments: Paramedian reconstruction, good contouring of the nose looks very good.  Patent bilaterally, see scope below  Oral Cavity/Oropharynx  Lips: normal;  Gums: gingiva normal;  Oral mucosa: normal;  Neck  Neck: scars present;  Respiratory  Inspection: breathing unlabored;  Cardiovascular  Inspection: extremities are warm and well perfused;  Lymphatic  Palpation: no cervical adenopathy noted;  Neurovestibular  Mental Status: alert and oriented;  Psychiatric: mood normal; affect is appropriate;      Procedures   PROCEDURE NOTE:  Recommended bilateral nasal endoscopy.  Risks, benefits, personnel and alternatives were explained.  The patient wished to proceed.  S/he was re-identified.  PROCEDURE:  Bilateral nasal endoscopy  PREOPERATIVE DIAGNOSIS: Nasal cancer  POSTOPERATIVE DIAGNOSIS: Same as above, no recurrence masses or lesions  INDICATIONS: Same  ANESTHESIA: 4% lidocaine and 0.5% phenylephrine  PROCEDURE:  With the patient sitting upright topical anesthesia and vasoconstriction was applied with spray to  the right and left sides of the nose.  After waiting an appropriate period of time for anesthesia/vasoconstriction to become effective, a flexible nasal endoscope was passed through the right and left sides of the nose.  Bilateral nares and nasopharynx were examined.  FINDINGS:  S/p previous nasal resection with reconstruction - no recurrent nasal masses or lesions.  No nasal polyps.  Bilaterally patent although tight.  Nasopharynx was normal without masses.  Bilaterally patent eustachian tube orifice.  Patient tolerated the procedure well, and there were no complications.     ASSESSMENT AND PLAN:  Problem List Items Addressed This Visit       Acquired deformity of nose - Primary    Current Assessment & Plan     Excellent nasal reconstruction  Working with Dr. Mcclain         Oropharyngeal dysphagia    Current Assessment & Plan     Passed MBS  Continue po diet         Squamous cell cancer of skin of ala nasi    Current Assessment & Plan     No evidence of disease on exam or endoscopy   Follow up in 3 months          Dena Mei MD    Head & Neck Surgical Oncology & Reconstruction  Department of Otolaryngology - Head and Neck Surgery        By signing my name below, I, Nikole Park Scribe, attest that this documentation has been prepared under the direction and in the presence of Dr. Dena Mei MD.     All medical record entries made by the Scribe were at my direction and personally dictated by me, Dr. Dena Mei. I have reviewed the chart and agree that the record accurately reflects my personal performance of the history, physical exam, discussion and plan.

## 2024-07-07 ENCOUNTER — PATIENT MESSAGE (OUTPATIENT)
Dept: OTOLARYNGOLOGY | Facility: CLINIC | Age: 54
End: 2024-07-07
Payer: COMMERCIAL

## 2024-07-07 DIAGNOSIS — R20.0 NUMBNESS AND TINGLING IN RIGHT HAND: Primary | ICD-10-CM

## 2024-07-07 DIAGNOSIS — R20.2 NUMBNESS AND TINGLING IN RIGHT HAND: Primary | ICD-10-CM

## 2024-07-08 ENCOUNTER — APPOINTMENT (OUTPATIENT)
Dept: OTOLARYNGOLOGY | Facility: CLINIC | Age: 54
End: 2024-07-08
Payer: COMMERCIAL

## 2024-07-08 VITALS — BODY MASS INDEX: 23.03 KG/M2 | WEIGHT: 189.2 LBS

## 2024-07-08 DIAGNOSIS — R13.12 OROPHARYNGEAL DYSPHAGIA: ICD-10-CM

## 2024-07-08 DIAGNOSIS — M95.0 ACQUIRED DEFORMITY OF NOSE: Primary | ICD-10-CM

## 2024-07-08 DIAGNOSIS — C44.321 SQUAMOUS CELL CANCER OF SKIN OF ALA NASI: ICD-10-CM

## 2024-07-08 PROCEDURE — 99214 OFFICE O/P EST MOD 30 MIN: CPT | Performed by: OTOLARYNGOLOGY

## 2024-07-08 PROCEDURE — 31231 NASAL ENDOSCOPY DX: CPT | Performed by: OTOLARYNGOLOGY

## 2024-07-08 ASSESSMENT — COLUMBIA-SUICIDE SEVERITY RATING SCALE - C-SSRS: 1. IN THE PAST MONTH, HAVE YOU WISHED YOU WERE DEAD OR WISHED YOU COULD GO TO SLEEP AND NOT WAKE UP?: NO

## 2024-07-08 ASSESSMENT — PAIN SCALES - GENERAL: PAINLEVEL: 5

## 2024-07-09 PROBLEM — R20.0 NUMBNESS AND TINGLING IN RIGHT HAND: Status: ACTIVE | Noted: 2024-07-09

## 2024-07-09 PROBLEM — R20.2 NUMBNESS AND TINGLING IN RIGHT HAND: Status: ACTIVE | Noted: 2024-07-09

## 2024-07-09 NOTE — TELEPHONE ENCOUNTER
I've entered a response to the patient's message below.    Patient was called back; suggested to start anti-inflammatories and exercises to forearm.  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  Recommended these exercises TID.  If greater than 3 months, recommend patient see nerve specialist.     Zaheer Torres PA-C

## 2024-08-02 DIAGNOSIS — C76.0 HEAD AND NECK CANCER (HCC): Primary | ICD-10-CM

## 2024-08-06 ENCOUNTER — HOSPITAL ENCOUNTER (OUTPATIENT)
Dept: INFUSION THERAPY | Age: 54
Discharge: HOME | End: 2024-08-06
Payer: COMMERCIAL

## 2024-08-06 ENCOUNTER — OFFICE VISIT (OUTPATIENT)
Dept: ONCOLOGY | Age: 54
End: 2024-08-06
Payer: COMMERCIAL

## 2024-08-06 VITALS
WEIGHT: 191 LBS | BODY MASS INDEX: 23.26 KG/M2 | SYSTOLIC BLOOD PRESSURE: 107 MMHG | HEIGHT: 76 IN | DIASTOLIC BLOOD PRESSURE: 69 MMHG | OXYGEN SATURATION: 98 % | TEMPERATURE: 97.1 F | HEART RATE: 66 BPM

## 2024-08-06 DIAGNOSIS — C76.0 HEAD AND NECK CANCER (HCC): ICD-10-CM

## 2024-08-06 DIAGNOSIS — C76.0 HEAD AND NECK CANCER (HCC): Primary | ICD-10-CM

## 2024-08-06 LAB
ALBUMIN SERPL-MCNC: 4.4 G/DL (ref 3.5–5.2)
ALP SERPL-CCNC: 78 U/L (ref 40–129)
ALT SERPL-CCNC: 11 U/L (ref 0–40)
ANION GAP SERPL CALCULATED.3IONS-SCNC: 10 MMOL/L (ref 7–16)
AST SERPL-CCNC: 8 U/L (ref 0–39)
BASOPHILS # BLD: 0.05 K/UL (ref 0–0.2)
BASOPHILS NFR BLD: 1 % (ref 0–2)
BILIRUB SERPL-MCNC: 1.1 MG/DL (ref 0–1.2)
BUN SERPL-MCNC: 14 MG/DL (ref 6–20)
CALCIUM SERPL-MCNC: 9.1 MG/DL (ref 8.6–10.2)
CHLORIDE SERPL-SCNC: 106 MMOL/L (ref 98–107)
CO2 SERPL-SCNC: 23 MMOL/L (ref 22–29)
CREAT SERPL-MCNC: 1.1 MG/DL (ref 0.7–1.2)
EOSINOPHIL # BLD: 0.14 K/UL (ref 0.05–0.5)
EOSINOPHILS RELATIVE PERCENT: 3 % (ref 0–6)
ERYTHROCYTE [DISTWIDTH] IN BLOOD BY AUTOMATED COUNT: 13.3 % (ref 11.5–15)
GFR, ESTIMATED: 78 ML/MIN/1.73M2
GLUCOSE SERPL-MCNC: 93 MG/DL (ref 74–99)
HCT VFR BLD AUTO: 44.1 % (ref 37–54)
HGB BLD-MCNC: 14.8 G/DL (ref 12.5–16.5)
LYMPHOCYTES NFR BLD: 0.65 K/UL (ref 1.5–4)
LYMPHOCYTES RELATIVE PERCENT: 12 % (ref 20–42)
MCH RBC QN AUTO: 30.8 PG (ref 26–35)
MCHC RBC AUTO-ENTMCNC: 33.6 G/DL (ref 32–34.5)
MCV RBC AUTO: 91.7 FL (ref 80–99.9)
MONOCYTES NFR BLD: 0.28 K/UL (ref 0.1–0.95)
MONOCYTES NFR BLD: 5 % (ref 2–12)
NEUTROPHILS NFR BLD: 79 % (ref 43–80)
NEUTS SEG NFR BLD: 4.28 K/UL (ref 1.8–7.3)
PLATELET # BLD AUTO: 203 K/UL (ref 130–450)
PMV BLD AUTO: 10 FL (ref 7–12)
POTASSIUM SERPL-SCNC: 4.2 MMOL/L (ref 3.5–5)
PROT SERPL-MCNC: 6.9 G/DL (ref 6.4–8.3)
RBC # BLD AUTO: 4.81 M/UL (ref 3.8–5.8)
RBC # BLD: ABNORMAL 10*6/UL
RBC # BLD: ABNORMAL 10*6/UL
SODIUM SERPL-SCNC: 139 MMOL/L (ref 132–146)
TSH SERPL DL<=0.05 MIU/L-ACNC: 1.23 UIU/ML (ref 0.27–4.2)
WBC # BLD: ABNORMAL 10*3/UL
WBC OTHER # BLD: 5.4 K/UL (ref 4.5–11.5)

## 2024-08-06 PROCEDURE — 99214 OFFICE O/P EST MOD 30 MIN: CPT | Performed by: INTERNAL MEDICINE

## 2024-08-06 PROCEDURE — 84443 ASSAY THYROID STIM HORMONE: CPT

## 2024-08-06 PROCEDURE — 80053 COMPREHEN METABOLIC PANEL: CPT

## 2024-08-06 PROCEDURE — 85025 COMPLETE CBC W/AUTO DIFF WBC: CPT

## 2024-08-06 PROCEDURE — 36415 COLL VENOUS BLD VENIPUNCTURE: CPT

## 2024-08-07 NOTE — PROGRESS NOTES
Andrew Cantor  8/6/2024  Ht Readings from Last 1 Encounters:   08/06/24 1.93 m (6' 4\")     Wt Readings from Last 10 Encounters:   08/06/24 86.6 kg (191 lb)   04/16/24 82.6 kg (182 lb)   01/16/24 79.8 kg (176 lb)   10/03/23 88.5 kg (195 lb)   09/22/23 87 kg (191 lb 12.8 oz)   09/18/23 85.3 kg (188 lb)   09/11/23 82.6 kg (182 lb 3.2 oz)   09/05/23 81.6 kg (180 lb)   09/05/23 81.6 kg (180 lb)   08/28/23 85.5 kg (188 lb 9.6 oz)     Body mass index is 23.25 kg/m².    Assessment: Met w/ pt during follow up visit. Pt has regained 9# over last 4 months. He reports good appetite. He feels taste acuity appropriate, though does admit that sweets can be overpowering at times. He does admit to ongoing dysphagia. He had MBS done at  in May, w/ recs for regular diet. Pt must utilize chin tuck 2/2 delayed transit time in esophagus, and take sips of water between bites. He reports that he is scheduled to see a specialist for this on 9/4. He denies any additional nutrition concerns at this time. Will remain available for pt as needed.    Weight change: +9# x4 months  Appetite: good  Nutritional Side Effects: dysphagia  Calculated Needs: 28-30 kcal/kg CBW =  kcal, 1.2-1.4 gm/kg CBW = 105-120 gm pro, 1 ml/kcal =  ml fluids    Recommendations: Pt to continue current eating pattern as tolerated    Priscilla Rubio, MS, RD, LD    
Patient did NOT stop at check out window, left without AVS.  Patient has MYCHART.    
Devaughn is a pleasant 53-year-old gentleman, fairly healthy, who was diagnosed with squamous cell carcinoma of the nose/nasal septum.  The patient was hit by his dog's head on the left side of his nose in April 2022, in November 2022 he had noticed a lesion inside his nose, and she was diagnosed with staph infection, was treated with antibiotics, he then developed a pimple on his nose and eventually developed a hole in his nose, he was seen by ID, he was referred to the hospital due to concern about osteomyelitis, he had a CT scan done revealing chronic sinusitis involving the left sphenoid sinus, he had a biopsy done on 2/21/2023, revealing invasive poorly differentiated squamous cell carcinoma, grade 3, the patient was referred Dr. Loredo, he underwent a total rhinectomy on 5/5/2023, the patient was found to have tumor growth to the glabella and the maxillary crest, path was consistent with poorly differentiated keratinizing squamous cell carcinoma, tumor site was the nasal septal mucosa, cartilage, subcutaneous tissue and skin, size at least 3.5 cm, with lymphovascular invasion, and cartilage invasion, no perineural invasion, final pathologic stage [id number].       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  The patient has a T4 disease with high risk features, we should be aggressive with adjuvant therapy, case was discussed with Dr. Han, I do recommend cisplatin concurrently with radiation therapy, the side effects and the schedule of the treatment were reviewed with the patient, I called head and neck oncology at [facility], they do not have any trials available for him, recommendation was for adjuvant chemo/RT followed by close surveillance.     On 7/11/2023, the patient was started on weekly cisplatin, concurrently with

## 2024-09-03 NOTE — PROGRESS NOTES
Patient: Adarsh De La Vega   MRN: [id number] YOB: 1970   Sex: male Age: 54 y.o.  Date of Service: 2024       ASSESSMENT AND PLAN  I discussed the findings with Adarsh De La Vega and have recommended the followin. Dysphagia to solids, MBS  24 - minimal CP webbing, no significant residue, stasis of contrast at the aortic arch and pill at the clavicle  - Schedule for esophagoscopy, biopsies to r/o EOE, and dilation (likely full esophagus) in the endosuite or [place]. Risks, benefits, and alternatives of esophagoscopy and dilation discussed with the patient, including but not limited to bleeding, infection, pain, need for repeat procedure, no improvement in symptoms, and rarely, esophageal perforation. The patient expressed understanding and agrees to proceed.         CHIEF COMPLAINT  Chief Complaint   Patient presents with    Dysphagia       HISTORY OF PRESENT ILLNESS  Adarsh De La Vega is a 54 y.o. male referred by Dena Blackmon MD for evaluation of dysphagia.  The patient has a history of sinonasal SCCa s/p bilateral nasal endoscopy, total rhinectomy on 23. He completed adjuvant chemoradiation 23. He is now s/p complex staged nasal reconstruction with Dr. Mcclain.      He reports while he is eating, food feels like it gets stuck in his throat and doesn't want to go down. Liquids will help it go down. This is all new since the cancer treatment, over the past 6 months. No voice or breathing issues.    The dysphagia history includes:      Dysphagia for solids               yes    Dysphagia for liquids              no    Dysphagia for pills                  no  Associated weight loss            no    Recent pneumonia/bronchitis  no  GERD/Acid reflux   Yes on PPI once daily in the morning    PMH: otherwise healthy  SH: quit smoking 2022      ADDITIONAL HISTORY  Past Medical History  He has a past medical history of GERD (gastroesophageal reflux disease), Head and neck  "cancer (Multi), Squamous cell carcinoma in situ, and Squamous cell carcinoma in situ (SCCIS) of skin of nose. Surgical History  He has a past surgical history that includes Sinus surgery; Cholecystectomy; Appendectomy; and Amputation.   Social History  He reports that he quit smoking about 18 months ago. His smoking use included cigarettes. He started smoking about 31 years ago. He has a 45 pack-year smoking history. He has never used smokeless tobacco. He reports that he does not drink alcohol and does not use drugs. Allergies  Patient has no known allergies.     Family History  No family history on file.     REVIEW OF SYSTEMS  All 10 systems were reviewed and negative except for above.      PHYSICAL EXAM  ENT Physical Exam   GENERAL: Well-nourished and developed, alert and appropriate, no distress, voice mildly hyponasal, [id number]  RESPIRATORY: Breathing quietly, no stridor  HEAD: Normocephalic atraumatic  FACE: Symmetric, no masses or lesions  EYES:  Pupils reactive, sclera clear, external ocular muscles intact, no nystagmus.    EARS:  Pinnae normal. External auditory canals clear and tympanic membranes intact.  NOSE:  No anterior lesions, masses or polyps. S/p total nasal reconstruction with paramedian forehead flap, crusting R>L  ORAL CAVITY/OROPHARYNX:  Buccal mucosa is moist without lesions or masses, tongue midline and palate elevates symmetrically. Tongue mobility intact.  NECK:  Soft. There is no lymphadenopathy or thyromegaly.    NEUROLOGIC:  Cranial nerves II-XII grossly intact.       Last Recorded Vitals  Temperature 35.7 °C (96.3 °F), height 1.93 m (6' 4\"), weight 89 kg (196 lb 3.2 oz).    RESULTS    Patient Reported Outcome Measures  N/A    Laboratory, Radiology, and Pathology  I personally reviewed the following results, with the following interpretation:   MBS 5/20/24 - minimal CP webbing, no significant residue, stasis of contrast at the aortic arch and pill at the " clavicle          PROCEDURES  Laryngoscopy    Date/Time: 9/4/2024 10:41 AM    Performed by: Scarlet Frank MD  Authorized by: Scarlet Frank MD      Flexible Fiberoptic Laryngoscopy     Patient failed a mirror exam due to limitations of equipment and the need for laryngoscopy to assess laryngeal anatomy and function    PREOPERATIVE DIAGNOSIS: dysphagia    POSTOPERATIVE DIAGNOSIS: Same    PROCEDURE: Transnasal videolaryngoscopy    ANESTHESIA:  Topical    COMPLICATIONS:  None    SPECIMENS:  None    PROCEDURE IN DETAIL:  The patient was brought into the endoscopy suite, placed in the upright position.  The nasal cavity was topically decongested anesthetized.  The distal chip video laryngoscope was passed through the nasal cavity.  The nasal cavity and nasopharynx were within normal limits except noted below. The following findings on laryngoscopy were noted:     Tongue Base: no masses or lesions, no pooled secretions   Vocal Fold Mobility               Right VF: mobile               Left VF: mobile   TVF Appearance               Edema/Erythema: none               Lesions/vibratory margin irregularities: none   Muscle Tension Patterns:  AP+lateral   Other Findings: s/p nasal reconstruction with single nasal cavity, crusting on right    The patient tolerated the procedure well.        ----------------------------------------------------------------------  Scarlet Frank MD, MAEd    [facility]  Department of Otolaryngology - Head and Neck Surgery  [facility]    The total time I spent in care of this patient today (excluding time spent on other billable services) is as follows:    Time Spent  Prep time on day of patient encounter: 10 minutes  Time spent directly with patient, family or caregiver: 20 minutes  Additional Time Spent on Patient Care Activities: 5 minutes  Documentation Time: 10 minutes  Other Time Spent: 0 minutes  Total: 45 minutes

## 2024-09-04 ENCOUNTER — OFFICE VISIT (OUTPATIENT)
Dept: OTOLARYNGOLOGY | Facility: HOSPITAL | Age: 54
End: 2024-09-04
Payer: COMMERCIAL

## 2024-09-04 VITALS — WEIGHT: 196.2 LBS | TEMPERATURE: 96.3 F | BODY MASS INDEX: 23.89 KG/M2 | HEIGHT: 76 IN

## 2024-09-04 DIAGNOSIS — K21.9 GASTROESOPHAGEAL REFLUX DISEASE, UNSPECIFIED WHETHER ESOPHAGITIS PRESENT: ICD-10-CM

## 2024-09-04 DIAGNOSIS — C44.321 SQUAMOUS CELL CARCINOMA OF NOSE: ICD-10-CM

## 2024-09-04 DIAGNOSIS — R13.10 DYSPHAGIA, UNSPECIFIED TYPE: Primary | ICD-10-CM

## 2024-09-04 DIAGNOSIS — R13.12 OROPHARYNGEAL DYSPHAGIA: ICD-10-CM

## 2024-09-04 PROCEDURE — 99215 OFFICE O/P EST HI 40 MIN: CPT | Performed by: OTOLARYNGOLOGY

## 2024-09-04 PROCEDURE — 31575 DIAGNOSTIC LARYNGOSCOPY: CPT | Performed by: OTOLARYNGOLOGY

## 2024-09-04 PROCEDURE — 1036F TOBACCO NON-USER: CPT | Performed by: OTOLARYNGOLOGY

## 2024-09-04 PROCEDURE — 3008F BODY MASS INDEX DOCD: CPT | Performed by: OTOLARYNGOLOGY

## 2024-09-04 ASSESSMENT — PATIENT HEALTH QUESTIONNAIRE - PHQ9
SUM OF ALL RESPONSES TO PHQ9 QUESTIONS 1 AND 2: 0
1. LITTLE INTEREST OR PLEASURE IN DOING THINGS: NOT AT ALL
2. FEELING DOWN, DEPRESSED OR HOPELESS: NOT AT ALL

## 2024-09-04 ASSESSMENT — PAIN SCALES - GENERAL: PAINLEVEL: 0-NO PAIN

## 2024-09-04 NOTE — LETTER
September 4, 2024     Patient: Adarsh De La Vega   YOB: 1970   Date of Visit: 9/4/2024       To Whom It May Concern:    Adarsh De La Vega was seen in my clinic on 9/4/2024 at 10:30 am. Please excuse Adarsh and his wife, Betty De La Vega for their absence from work on this day to make the appointment.    If you have any questions or concerns, please don't hesitate to call.         Sincerely,         Scarlet Frank MD        CC: No Recipients

## 2024-09-18 ENCOUNTER — APPOINTMENT (OUTPATIENT)
Dept: OTOLARYNGOLOGY | Facility: CLINIC | Age: 54
End: 2024-09-18
Payer: COMMERCIAL

## 2024-09-18 VITALS
BODY MASS INDEX: 23.6 KG/M2 | HEART RATE: 58 BPM | HEIGHT: 76 IN | DIASTOLIC BLOOD PRESSURE: 86 MMHG | SYSTOLIC BLOOD PRESSURE: 120 MMHG | WEIGHT: 193.8 LBS

## 2024-09-18 DIAGNOSIS — R22.0 SWELLING OF NOSE: Primary | ICD-10-CM

## 2024-09-18 PROCEDURE — 99212 OFFICE O/P EST SF 10 MIN: CPT | Performed by: OTOLARYNGOLOGY

## 2024-09-18 PROCEDURE — 3008F BODY MASS INDEX DOCD: CPT | Performed by: OTOLARYNGOLOGY

## 2024-09-18 NOTE — LETTER
September 18, 2024     Patient: Adarsh De La Vega   YOB: 1970   Date of Visit: 9/18/2024       To Whom It May Concern:    Adarsh De La Vega was seen in my clinic on 9/18/2024 at 11:30 am. Please excuse Betty De La Vega for her absence from work on this day to make the appointment.    If you have any questions or concerns, please don't hesitate to call.         Sincerely,         Sami Mcclain MD        CC: No Recipients

## 2024-10-04 ENCOUNTER — HOSPITAL ENCOUNTER (OUTPATIENT)
Dept: RADIOLOGY | Facility: HOSPITAL | Age: 54
Discharge: HOME | End: 2024-10-04
Payer: COMMERCIAL

## 2024-10-04 DIAGNOSIS — R22.0 SWELLING OF NOSE: ICD-10-CM

## 2024-10-04 PROCEDURE — 70487 CT MAXILLOFACIAL W/DYE: CPT

## 2024-10-04 PROCEDURE — 2550000001 HC RX 255 CONTRASTS: Performed by: OTOLARYNGOLOGY

## 2024-10-10 ASSESSMENT — ENCOUNTER SYMPTOMS
VOICE CHANGE: 0
FACIAL SWELLING: 0
ADENOPATHY: 0
NAUSEA: 0
STRIDOR: 0
UNEXPECTED WEIGHT CHANGE: 0
VOMITING: 0
NECK PAIN: 0
SHORTNESS OF BREATH: 0
SORE THROAT: 0
FEVER: 0
CHILLS: 0
COUGH: 0
FATIGUE: 0
TROUBLE SWALLOWING: 1
APPETITE CHANGE: 0

## 2024-10-10 NOTE — H&P (VIEW-ONLY)
Chief Complaint   Patient presents with    Follow-up     HPI:  Adarsh De La Vega is a 54 y.o.  male following up with me today for his sinonasal SCCa s/p bilateral nasal endoscopy, total rhinectomy on 5/5/23. He completed adjuvant chemoradiation 8/28/23. Last seen 7/2024.  He is now s/p complex staged nasal reconstruction with Dr. Mcclain.  He has developed pain along the right lateral aspect of his nasal reconstruction.  He had a CT sinus 10/4/24 which I personally reviewed and is negative for cancer. Denies pain.  He is having difficulty swallowing although he passed his MBS 5/24.  He is scheduled for a esophageal dilation.  Weight stable.    History:  Dx: Squamous cell carcinoma sinonasal [id number]  1/23: Seen by an ENT who told him he had a nasal infection. developed a lump on the outside of his nose a few days following that appointment  2/21/23: Biopsy of nasal lesion. Path + for squamous cell carcinoma  2/27/23: seen by infectious disease and was in the hospital on IV antibiotics for about 1 week and discharged with a PICC line   3/17/23: CT neck and Chest- slightly enhancing nasal mass with involvement of the anterior nose & nasal septum. No pathologic lymphadenopathy. CT chest with 6mm pleural based nodule LLL.   3/20/23: First seen by me  5/5/23:S/p bilateral nasal endoscopy, total rhinectomy. Path +SCCa   7/10/23: Started adjuvant chemoXRT  7/31/23: Admitted for dehydration/odynophagia and PEG tube placed  8/14/23: In ED, waiting to be admitted for IV hydration  8/28/23: Completed adjuvant chemoXRT   10/23: CT neck/chest complete response, stable lung nodule  10/31/23: 1st stage prelamination of paramedian forehead flap  12/8/23: Osteocutaneous RFFF and 2nd stage  1/2/24: 3rd stage nasal reconstruction  2/29/24: 4th stage nasal reconstruction  4/24/24: Revision nasal reconstruction to the left nares  5/24: MBS passed - small bites/sips, alternating  10/24: Scheduled for dilation with Dr. Frank     SH:  Tob:  Current 1/2 ppd smoker. Former 1ppd since a teen  ETOH: Social  Here with wife    ROS:  Review of Systems   Constitutional:  Negative for appetite change, chills, fatigue, fever and unexpected weight change.   HENT:  Positive for trouble swallowing. Negative for dental problem, drooling, ear pain, facial swelling, hearing loss, mouth sores, sore throat, tinnitus and voice change.         +nasal pain   Respiratory:  Negative for cough, shortness of breath and stridor.    Gastrointestinal:  Negative for nausea and vomiting.   Musculoskeletal:  Negative for neck pain.   Hematological:  Negative for adenopathy.   All other systems reviewed and are negative.       PE:  ENT Physical Exam  Constitutional  Appearance: patient appears well-developed, patient is cooperative;  Communication/Voice: communication appropriate for developmental age;  Head and Face  Appearance: head appears normal; facial scars present;  Head and Face comments: See nasal recon below, forehead paramedian is now healed.    Ear  Auricles: right auricle normal; left auricle normal;  Nose  External Nose: nares patent bilaterally;  Nose comments: Paramedian reconstruction, good contouring of the nose looks very good.  Patent bilaterally, see scope below.  No nasal masses along the right lateral aspect of the incision  Oral Cavity/Oropharynx  Lips: normal;  Gums: gingiva normal;  Oral mucosa: normal;  Neck  Neck: scars present;  Respiratory  Inspection: breathing unlabored;  Cardiovascular  Inspection: extremities are warm and well perfused;  Lymphatic  Palpation: no cervical adenopathy noted;  Neurovestibular  Mental Status: alert and oriented;  Psychiatric: mood normal; affect is appropriate;      Procedures     ASSESSMENT AND PLAN:  Problem List Items Addressed This Visit       Squamous cell carcinoma of nose - Primary    Current Assessment & Plan     S/p total rhinectomy, s/p chemoradiation and nasal reconstruction  Right nasal pain s/p CT scan which  is negative  No evidence of recurrence at last visit did endoscopy, today reviewed scan  Reassurance provided  Follow up in 2 months         Oropharyngeal dysphagia    Current Assessment & Plan     Seeing Dr. Frank for esophageal dilation            Dena Mei MD    Head & Neck Surgical Oncology & Reconstruction  Department of Otolaryngology - Head and Neck Surgery        By signing my name below, I, Esther Kumaribilda, attest that this documentation has been prepared under the direction and in the presence of Dr. Dena Mei MD.     All medical record entries made by the Scribe were at my direction and personally dictated by me, Dr. Dena Mei. I have reviewed the chart and agree that the record accurately reflects my personal performance of the history, physical exam, discussion and plan.

## 2024-10-10 NOTE — PROGRESS NOTES
Chief Complaint   Patient presents with    Follow-up     HPI:  Adarsh De La Vega is a 54 y.o.  male following up with me today for his sinonasal SCCa s/p bilateral nasal endoscopy, total rhinectomy on 5/5/23. He completed adjuvant chemoradiation 8/28/23. Last seen 7/2024.  He is now s/p complex staged nasal reconstruction with Dr. Mcclain.  He has developed pain along the right lateral aspect of his nasal reconstruction.  He had a CT sinus 10/4/24 which I personally reviewed and is negative for cancer. Denies pain.  He is having difficulty swallowing although he passed his MBS 5/24.  He is scheduled for a esophageal dilation.  Weight stable.    History:  Dx: Squamous cell carcinoma sinonasal [id number]  1/23: Seen by an ENT who told him he had a nasal infection. developed a lump on the outside of his nose a few days following that appointment  2/21/23: Biopsy of nasal lesion. Path + for squamous cell carcinoma  2/27/23: seen by infectious disease and was in the hospital on IV antibiotics for about 1 week and discharged with a PICC line   3/17/23: CT neck and Chest- slightly enhancing nasal mass with involvement of the anterior nose & nasal septum. No pathologic lymphadenopathy. CT chest with 6mm pleural based nodule LLL.   3/20/23: First seen by me  5/5/23:S/p bilateral nasal endoscopy, total rhinectomy. Path +SCCa   7/10/23: Started adjuvant chemoXRT  7/31/23: Admitted for dehydration/odynophagia and PEG tube placed  8/14/23: In ED, waiting to be admitted for IV hydration  8/28/23: Completed adjuvant chemoXRT   10/23: CT neck/chest complete response, stable lung nodule  10/31/23: 1st stage prelamination of paramedian forehead flap  12/8/23: Osteocutaneous RFFF and 2nd stage  1/2/24: 3rd stage nasal reconstruction  2/29/24: 4th stage nasal reconstruction  4/24/24: Revision nasal reconstruction to the left nares  5/24: MBS passed - small bites/sips, alternating  10/24: Scheduled for dilation with Dr. Frank     SH:  Tob:  Current 1/2 ppd smoker. Former 1ppd since a teen  ETOH: Social  Here with wife    ROS:  Review of Systems   Constitutional:  Negative for appetite change, chills, fatigue, fever and unexpected weight change.   HENT:  Positive for trouble swallowing. Negative for dental problem, drooling, ear pain, facial swelling, hearing loss, mouth sores, sore throat, tinnitus and voice change.         +nasal pain   Respiratory:  Negative for cough, shortness of breath and stridor.    Gastrointestinal:  Negative for nausea and vomiting.   Musculoskeletal:  Negative for neck pain.   Hematological:  Negative for adenopathy.   All other systems reviewed and are negative.       PE:  ENT Physical Exam  Constitutional  Appearance: patient appears well-developed, patient is cooperative;  Communication/Voice: communication appropriate for developmental age;  Head and Face  Appearance: head appears normal; facial scars present;  Head and Face comments: See nasal recon below, forehead paramedian is now healed.    Ear  Auricles: right auricle normal; left auricle normal;  Nose  External Nose: nares patent bilaterally;  Nose comments: Paramedian reconstruction, good contouring of the nose looks very good.  Patent bilaterally, see scope below.  No nasal masses along the right lateral aspect of the incision  Oral Cavity/Oropharynx  Lips: normal;  Gums: gingiva normal;  Oral mucosa: normal;  Neck  Neck: scars present;  Respiratory  Inspection: breathing unlabored;  Cardiovascular  Inspection: extremities are warm and well perfused;  Lymphatic  Palpation: no cervical adenopathy noted;  Neurovestibular  Mental Status: alert and oriented;  Psychiatric: mood normal; affect is appropriate;      Procedures     ASSESSMENT AND PLAN:  Problem List Items Addressed This Visit       Squamous cell carcinoma of nose - Primary    Current Assessment & Plan     S/p total rhinectomy, s/p chemoradiation and nasal reconstruction  Right nasal pain s/p CT scan which  is negative  No evidence of recurrence at last visit did endoscopy, today reviewed scan  Reassurance provided  Follow up in 2 months         Oropharyngeal dysphagia    Current Assessment & Plan     Seeing Dr. Frank for esophageal dilation            Dena Mei MD    Head & Neck Surgical Oncology & Reconstruction  Department of Otolaryngology - Head and Neck Surgery        By signing my name below, I, Esther Kumaribilda, attest that this documentation has been prepared under the direction and in the presence of Dr. Dena Mei MD.     All medical record entries made by the Scribe were at my direction and personally dictated by me, Dr. Dena Mei. I have reviewed the chart and agree that the record accurately reflects my personal performance of the history, physical exam, discussion and plan.

## 2024-10-14 ENCOUNTER — APPOINTMENT (OUTPATIENT)
Dept: OTOLARYNGOLOGY | Facility: CLINIC | Age: 54
End: 2024-10-14
Payer: COMMERCIAL

## 2024-10-14 VITALS — BODY MASS INDEX: 23.96 KG/M2 | WEIGHT: 196.8 LBS

## 2024-10-14 DIAGNOSIS — C44.321 SQUAMOUS CELL CARCINOMA OF NOSE: Primary | ICD-10-CM

## 2024-10-14 DIAGNOSIS — R13.12 OROPHARYNGEAL DYSPHAGIA: ICD-10-CM

## 2024-10-14 PROCEDURE — 1036F TOBACCO NON-USER: CPT | Performed by: OTOLARYNGOLOGY

## 2024-10-14 PROCEDURE — 99213 OFFICE O/P EST LOW 20 MIN: CPT | Performed by: OTOLARYNGOLOGY

## 2024-10-14 ASSESSMENT — PATIENT HEALTH QUESTIONNAIRE - PHQ9
1. LITTLE INTEREST OR PLEASURE IN DOING THINGS: NOT AT ALL
SUM OF ALL RESPONSES TO PHQ9 QUESTIONS 1 AND 2: 0
2. FEELING DOWN, DEPRESSED OR HOPELESS: NOT AT ALL

## 2024-10-14 ASSESSMENT — COLUMBIA-SUICIDE SEVERITY RATING SCALE - C-SSRS: 1. IN THE PAST MONTH, HAVE YOU WISHED YOU WERE DEAD OR WISHED YOU COULD GO TO SLEEP AND NOT WAKE UP?: NO

## 2024-10-14 ASSESSMENT — PAIN SCALES - GENERAL: PAINLEVEL: 5

## 2024-10-14 NOTE — ASSESSMENT & PLAN NOTE
S/p total rhinectomy, s/p chemoradiation and nasal reconstruction  Right nasal pain s/p CT scan which is negative  No evidence of recurrence at last visit did endoscopy, today reviewed scan  Reassurance provided  Follow up in 2 months

## 2024-10-17 ENCOUNTER — ANESTHESIA (OUTPATIENT)
Dept: GASTROENTEROLOGY | Facility: HOSPITAL | Age: 54
End: 2024-10-17
Payer: COMMERCIAL

## 2024-10-17 ENCOUNTER — ANESTHESIA EVENT (OUTPATIENT)
Dept: GASTROENTEROLOGY | Facility: HOSPITAL | Age: 54
End: 2024-10-17
Payer: COMMERCIAL

## 2024-10-17 ENCOUNTER — HOSPITAL ENCOUNTER (OUTPATIENT)
Dept: GASTROENTEROLOGY | Facility: HOSPITAL | Age: 54
Setting detail: OUTPATIENT SURGERY
Discharge: HOME | End: 2024-10-17
Payer: COMMERCIAL

## 2024-10-17 VITALS
DIASTOLIC BLOOD PRESSURE: 78 MMHG | RESPIRATION RATE: 16 BRPM | TEMPERATURE: 96.8 F | WEIGHT: 196 LBS | HEIGHT: 76 IN | SYSTOLIC BLOOD PRESSURE: 101 MMHG | HEART RATE: 56 BPM | BODY MASS INDEX: 23.87 KG/M2 | OXYGEN SATURATION: 97 %

## 2024-10-17 DIAGNOSIS — Q39.4 CRICOPHARYNGEAL WEB (HHS-HCC): ICD-10-CM

## 2024-10-17 DIAGNOSIS — R13.10 DYSPHAGIA, UNSPECIFIED TYPE: Primary | ICD-10-CM

## 2024-10-17 DIAGNOSIS — K21.00 GASTROESOPHAGEAL REFLUX DISEASE WITH ESOPHAGITIS WITHOUT HEMORRHAGE: ICD-10-CM

## 2024-10-17 DIAGNOSIS — C76.0 HEAD AND NECK CANCER (MULTI): ICD-10-CM

## 2024-10-17 PROCEDURE — 7100000010 HC PHASE TWO TIME - EACH INCREMENTAL 1 MINUTE: Performed by: OTOLARYNGOLOGY

## 2024-10-17 PROCEDURE — 3700000001 HC GENERAL ANESTHESIA TIME - INITIAL BASE CHARGE: Performed by: OTOLARYNGOLOGY

## 2024-10-17 PROCEDURE — 3700000002 HC GENERAL ANESTHESIA TIME - EACH INCREMENTAL 1 MINUTE: Performed by: OTOLARYNGOLOGY

## 2024-10-17 PROCEDURE — 43239 EGD BIOPSY SINGLE/MULTIPLE: CPT | Performed by: OTOLARYNGOLOGY

## 2024-10-17 PROCEDURE — 7100000009 HC PHASE TWO TIME - INITIAL BASE CHARGE: Performed by: OTOLARYNGOLOGY

## 2024-10-17 PROCEDURE — 2500000004 HC RX 250 GENERAL PHARMACY W/ HCPCS (ALT 636 FOR OP/ED)

## 2024-10-17 PROCEDURE — 43233 EGD BALLOON DIL ESOPH30 MM/>: CPT | Performed by: OTOLARYNGOLOGY

## 2024-10-17 PROCEDURE — 2720000007 HC OR 272 NO HCPCS: Performed by: OTOLARYNGOLOGY

## 2024-10-17 RX ORDER — LIDOCAINE HYDROCHLORIDE 10 MG/ML
0.1 INJECTION, SOLUTION EPIDURAL; INFILTRATION; INTRACAUDAL; PERINEURAL ONCE
OUTPATIENT
Start: 2024-10-17 | End: 2024-10-17

## 2024-10-17 RX ORDER — PROPOFOL 10 MG/ML
INJECTION, EMULSION INTRAVENOUS AS NEEDED
Status: DISCONTINUED | OUTPATIENT
Start: 2024-10-17 | End: 2024-10-17

## 2024-10-17 RX ORDER — FENTANYL CITRATE 50 UG/ML
INJECTION, SOLUTION INTRAMUSCULAR; INTRAVENOUS AS NEEDED
Status: DISCONTINUED | OUTPATIENT
Start: 2024-10-17 | End: 2024-10-17

## 2024-10-17 RX ORDER — ONDANSETRON HYDROCHLORIDE 2 MG/ML
4 INJECTION, SOLUTION INTRAVENOUS ONCE AS NEEDED
Status: CANCELLED | OUTPATIENT
Start: 2024-10-17

## 2024-10-17 RX ORDER — ONDANSETRON HYDROCHLORIDE 2 MG/ML
4 INJECTION, SOLUTION INTRAVENOUS ONCE AS NEEDED
OUTPATIENT
Start: 2024-10-17

## 2024-10-17 RX ORDER — MIDAZOLAM HYDROCHLORIDE 1 MG/ML
INJECTION INTRAMUSCULAR; INTRAVENOUS AS NEEDED
Status: DISCONTINUED | OUTPATIENT
Start: 2024-10-17 | End: 2024-10-17

## 2024-10-17 RX ORDER — FLUMAZENIL 0.1 MG/ML
0.2 INJECTION INTRAVENOUS ONCE AS NEEDED
Status: CANCELLED | OUTPATIENT
Start: 2024-10-17

## 2024-10-17 RX ORDER — SODIUM CHLORIDE, SODIUM LACTATE, POTASSIUM CHLORIDE, CALCIUM CHLORIDE 600; 310; 30; 20 MG/100ML; MG/100ML; MG/100ML; MG/100ML
INJECTION, SOLUTION INTRAVENOUS CONTINUOUS PRN
Status: DISCONTINUED | OUTPATIENT
Start: 2024-10-17 | End: 2024-10-17

## 2024-10-17 RX ORDER — OMEPRAZOLE 20 MG/1
20 TABLET, DELAYED RELEASE ORAL
Qty: 60 TABLET | Refills: 1 | Status: SHIPPED | OUTPATIENT
Start: 2024-10-17 | End: 2024-12-16

## 2024-10-17 RX ORDER — NALOXONE HYDROCHLORIDE 0.4 MG/ML
0.2 INJECTION, SOLUTION INTRAMUSCULAR; INTRAVENOUS; SUBCUTANEOUS EVERY 5 MIN PRN
Status: CANCELLED | OUTPATIENT
Start: 2024-10-17

## 2024-10-17 SDOH — HEALTH STABILITY: MENTAL HEALTH: CURRENT SMOKER: 0

## 2024-10-17 ASSESSMENT — PAIN - FUNCTIONAL ASSESSMENT: PAIN_FUNCTIONAL_ASSESSMENT: 0-10

## 2024-10-17 ASSESSMENT — PAIN SCALES - GENERAL
PAINLEVEL_OUTOF10: 0 - NO PAIN

## 2024-10-17 NOTE — ANESTHESIA PREPROCEDURE EVALUATION
Patient: Adarsh De La Vega    Procedure Information       Date/Time: 10/17/24 1000    Scheduled providers: Scarlet Frank MD; Wilmer Powers MD    Procedure: EGD    Location: [facility]          Patient with dysphagia to solids here for dilation. 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 : N  Date of Last Liquid: 10/16/24  Time of Last Liquid: 2200  Date of Last Solid: 10/16/24  Time of Last Solid: 2200  Last Intake Type: Clear fluids  Time of Last Void: 0902         Physical Exam    Airway  Mallampati: II  TM distance: >3 FB  Neck ROM: full     Cardiovascular - normal exam     Dental    Pulmonary - normal exam     Abdominal            Anesthesia Plan    History of general anesthesia?: yes  History of complications of general anesthesia?: no    ASA 2     MAC     The patient is not a current smoker.    intravenous induction   Anesthetic plan and risks discussed with patient.

## 2024-10-17 NOTE — SIGNIFICANT EVENT
Anesthesia Valentín Powers aware of patient's blood pressure upon arrival to the Recovery Area and fluids continued and patient very sleepy upon arrival.

## 2024-10-17 NOTE — ANESTHESIA POSTPROCEDURE EVALUATION
Patient: Adarsh De La Vega    Procedure Summary       Date: 10/17/24 Room / Location: [facility]    Anesthesia Start: 1010 Anesthesia Stop: 1054    Procedure: EGD Diagnosis:       Dysphagia, unspecified type      Gastroesophageal reflux disease with esophagitis without hemorrhage      Cricopharyngeal web (HHS-HCC)      Head and neck cancer (Multi)      Dysphagia, unspecified type    Scheduled Providers: Scarlet Frank MD; Wilmer Powers MD Responsible Provider: Wilmer Powers MD    Anesthesia Type: MAC ASA Status: 2            Anesthesia Type: MAC    Vitals Value Taken Time   BP 87/63 10/17/24 1048   Temp 36 °C (96.8 °F) 10/17/24 1048   Pulse 56 10/17/24 1048   Resp 15 10/17/24 1048   SpO2 100 % 10/17/24 1048       Anesthesia Post Evaluation    Patient location during evaluation: PACU  Patient participation: complete - patient participated  Level of consciousness: awake and alert  Pain management: adequate  Airway patency: patent  Cardiovascular status: acceptable  Respiratory status: acceptable  Hydration status: acceptable  Postoperative Nausea and Vomiting: none        There were no known notable events for this encounter.

## 2024-10-17 NOTE — DISCHARGE INSTRUCTIONS
Postoperative Instructions    General: Pain of the nose and throat can be normal after these procedures. A small amount of blood from the nose or mouth can be expected.  Diet: Start with liquids after anesthesia. Soft foods may feel best for the first 2-3 days following your procedure. Soft foods include soup, noodles, scrambled eggs, oatmeal, yogurt, smoothies, applesauce, mashed potatoes, pasta or ice cream. Avoid toast, chips, hard crusted breads, and steak or similar meats. After your throat begins to feel less sore, you can continue your normal diet.   Pain Control: You may experience a mild to moderate sore throat or tongue for several days following the procedure. The throat pain may also seem to cause earaches from referred pain. We encourage use of acetaminophen (Tylenol) and /or ibuprofen (Motrin/Advil) for most pain control. These two medications can be taken together or can be alternated. We will sometimes prescribe you something stronger for pain for “break through.”  Use it only as needed. Do not drive while taking any narcotic pain medications.  Follow-up: Your follow-up with your doctor should be scheduled 2-3 weeks following surgery. If a biopsy was preformed, results will usually be available in the system 7 days following the surgery. You will be informed of the results.   Please call the office or go to the emergency room if you experience any of the following: difficulty breathing, inability to swallow, severe chest pain, fever great than 101 degrees, significant bleeding, or any new/concerning symptoms.  Contact:  During office hours between 8 AM and 5 PM, please call Dr. Frank's office at [phone].  If you have an emergency over night or on the weekend, please call [phone] and ask the  to connect you to the ENT resident on call.

## 2024-10-24 LAB
LABORATORY COMMENT REPORT: NORMAL
PATH REPORT.FINAL DX SPEC: NORMAL
PATH REPORT.GROSS SPEC: NORMAL
PATH REPORT.TOTAL CANCER: NORMAL

## 2024-11-05 ENCOUNTER — OFFICE VISIT (OUTPATIENT)
Dept: ONCOLOGY | Age: 54
End: 2024-11-05
Payer: COMMERCIAL

## 2024-11-05 ENCOUNTER — HOSPITAL ENCOUNTER (OUTPATIENT)
Dept: INFUSION THERAPY | Age: 54
Discharge: HOME | End: 2024-11-05
Payer: COMMERCIAL

## 2024-11-05 VITALS
TEMPERATURE: 97.7 F | DIASTOLIC BLOOD PRESSURE: 88 MMHG | OXYGEN SATURATION: 99 % | BODY MASS INDEX: 24.24 KG/M2 | HEIGHT: 76 IN | SYSTOLIC BLOOD PRESSURE: 127 MMHG | WEIGHT: 199.1 LBS | HEART RATE: 64 BPM

## 2024-11-05 DIAGNOSIS — C76.0 MALIGNANT NEOPLASM OF HEAD, FACE AND NECK (HCC): ICD-10-CM

## 2024-11-05 DIAGNOSIS — C76.0 HEAD AND NECK CANCER (HCC): ICD-10-CM

## 2024-11-05 DIAGNOSIS — C76.0 HEAD AND NECK CANCER (HCC): Primary | ICD-10-CM

## 2024-11-05 LAB
ALBUMIN SERPL-MCNC: 4.4 G/DL (ref 3.5–5.2)
ALP SERPL-CCNC: 90 U/L (ref 40–129)
ALT SERPL-CCNC: 11 U/L (ref 0–40)
ANION GAP SERPL CALCULATED.3IONS-SCNC: 11 MMOL/L (ref 7–16)
AST SERPL-CCNC: 11 U/L (ref 0–39)
BASOPHILS # BLD: 0.04 K/UL (ref 0–0.2)
BASOPHILS NFR BLD: 1 % (ref 0–2)
BILIRUB SERPL-MCNC: 1.1 MG/DL (ref 0–1.2)
BUN SERPL-MCNC: 16 MG/DL (ref 6–20)
CALCIUM SERPL-MCNC: 9.8 MG/DL (ref 8.6–10.2)
CHLORIDE SERPL-SCNC: 101 MMOL/L (ref 98–107)
CO2 SERPL-SCNC: 25 MMOL/L (ref 22–29)
CREAT SERPL-MCNC: 1.2 MG/DL (ref 0.7–1.2)
EOSINOPHIL # BLD: 0.16 K/UL (ref 0.05–0.5)
EOSINOPHILS RELATIVE PERCENT: 3 % (ref 0–6)
ERYTHROCYTE [DISTWIDTH] IN BLOOD BY AUTOMATED COUNT: 12.9 % (ref 11.5–15)
GFR, ESTIMATED: 73 ML/MIN/1.73M2
GLUCOSE SERPL-MCNC: 99 MG/DL (ref 74–99)
HCT VFR BLD AUTO: 44.8 % (ref 37–54)
HGB BLD-MCNC: 14.9 G/DL (ref 12.5–16.5)
IMM GRANULOCYTES # BLD AUTO: <0.03 K/UL (ref 0–0.58)
IMM GRANULOCYTES NFR BLD: 0 % (ref 0–5)
LYMPHOCYTES NFR BLD: 0.66 K/UL (ref 1.5–4)
LYMPHOCYTES RELATIVE PERCENT: 13 % (ref 20–42)
MCH RBC QN AUTO: 30.4 PG (ref 26–35)
MCHC RBC AUTO-ENTMCNC: 33.3 G/DL (ref 32–34.5)
MCV RBC AUTO: 91.4 FL (ref 80–99.9)
MONOCYTES NFR BLD: 0.51 K/UL (ref 0.1–0.95)
MONOCYTES NFR BLD: 10 % (ref 2–12)
NEUTROPHILS NFR BLD: 73 % (ref 43–80)
NEUTS SEG NFR BLD: 3.85 K/UL (ref 1.8–7.3)
PLATELET # BLD AUTO: 218 K/UL (ref 130–450)
PMV BLD AUTO: 10.5 FL (ref 7–12)
POTASSIUM SERPL-SCNC: 4.4 MMOL/L (ref 3.5–5)
PROT SERPL-MCNC: 7 G/DL (ref 6.4–8.3)
RBC # BLD AUTO: 4.9 M/UL (ref 3.8–5.8)
SODIUM SERPL-SCNC: 137 MMOL/L (ref 132–146)
WBC OTHER # BLD: 5.2 K/UL (ref 4.5–11.5)

## 2024-11-05 PROCEDURE — 99214 OFFICE O/P EST MOD 30 MIN: CPT | Performed by: INTERNAL MEDICINE

## 2024-11-05 PROCEDURE — 85025 COMPLETE CBC W/AUTO DIFF WBC: CPT

## 2024-11-05 PROCEDURE — 36415 COLL VENOUS BLD VENIPUNCTURE: CPT

## 2024-11-05 PROCEDURE — 80053 COMPREHEN METABOLIC PANEL: CPT

## 2024-11-05 RX ORDER — OMEPRAZOLE 10 MG/1
10 CAPSULE, DELAYED RELEASE ORAL 2 TIMES DAILY
COMMUNITY

## 2024-11-05 NOTE — PROGRESS NOTES
[facility] PHYSICIANS [facility] MED ONCOLOGY  [address]  [place] [facility] [address]  Dept: [phone]  Loc: [phone]  Attending progress note       Reason for Visit: Squamous cell carcinoma of the nose/nasal septum.     Referring Physician:  Arnold Han MD     PCP:  Tarik Lemus MD     History of Present Illness:       Mr. Cantor is a pleasant 54-year-old gentleman, fairly healthy, who was diagnosed with squamous cell carcinoma of the nose/nasal septum.  The patient was hit by his dog's head on the left side of his nose in April 2022, in November 2022 he had noticed a lesion inside his nose, and she was diagnosed with staph infection, was treated with antibiotics, he then developed a pimple on his nose and eventually developed a hole in his nose, he was seen by ID, he was referred to the hospital due to concern about myelitis, he had a CT scan done revealing chronic sinusitis involving the left sphenoid sinus, he had a biopsy done on 2/21/2023, revealing invasive poorly differentiated squamous cell carcinoma, grade 3, the patient was referred Dr. Loredo, he underwent a total rhinectomy on 5/5/2023, the patient was found to have tumor growth to the glabella and the maxillary crest, path:   Case Summary Report   Procedure:    Rhinectomy, total   Tumor Site:   Nasal septal mucosa, cartilage, subcutaneous tissue, skin.     Tumor Laterality: Left and Right.   Left >> Right     Tumor Focality: Single focus     Tumor Size (Greatest dimension): At least 3.5 cm.     Histologic Type: Squamous cell carcinoma, keratinizing     Histologic Grade (squamous cell carcinomas only): Poorly differentiated     Specimen Margins (Main specimen; part B)     _X_ Involved by invasive carcinoma        Specify margin(s), per orientation, if possible: Superior and inferior   septal margins, superior right and left skin margins.     _X_ Uninvolved by high-grade dysplasia/in situ disease     Separately

## 2024-11-05 NOTE — PROGRESS NOTES
Andrew Cantor  11/5/2024  Ht Readings from Last 1 Encounters:   11/05/24 1.93 m (6' 4\")     Wt Readings from Last 10 Encounters:   11/05/24 90.3 kg (199 lb 1.6 oz)   08/06/24 86.6 kg (191 lb)   04/16/24 82.6 kg (182 lb)   01/16/24 79.8 kg (176 lb)   10/03/23 88.5 kg (195 lb)   09/22/23 87 kg (191 lb 12.8 oz)   09/18/23 85.3 kg (188 lb)   09/11/23 82.6 kg (182 lb 3.2 oz)   09/05/23 81.6 kg (180 lb)   09/05/23 81.6 kg (180 lb)     Body mass index is 24.24 kg/m².    Assessment: Met w/ pt during follow up visit. He has regained 8# since last oncology nutrition assessment. He had esophageal dilation 10/17. He reports improving hypogeusia. He does admit to some morning dry mouth, though attributes this to sleeping w/ mouth open. He denies any nutrition needs at this time. Will remain available to pt PRN.    Weight change: +8# x3 months  Appetite: good  Nutritional Side Effects: hypogeusia, morning xerostomia  Calculated Needs: 25-30 kcal/kg CBW =  kcal, 1.0-1.2 gm/kg CBW =  gm pro, 1 ml/kcal =  ml fluids  Malnutrition Status: Resolved  Nutrition Diagnosis: No nutrition dx at this time    Recommendations: Pt to continue current dietary habits as tolerated, to follow w/ this clinician PRN    Priscilla Rubio, MS, RD, LD

## 2024-11-12 ENCOUNTER — TELEMEDICINE (OUTPATIENT)
Dept: OTOLARYNGOLOGY | Facility: CLINIC | Age: 54
End: 2024-11-12
Payer: COMMERCIAL

## 2024-11-12 VITALS — HEIGHT: 76 IN | WEIGHT: 197 LBS | BODY MASS INDEX: 23.99 KG/M2

## 2024-11-12 DIAGNOSIS — K21.9 GASTROESOPHAGEAL REFLUX DISEASE, UNSPECIFIED WHETHER ESOPHAGITIS PRESENT: ICD-10-CM

## 2024-11-12 DIAGNOSIS — R13.10 DYSPHAGIA, UNSPECIFIED TYPE: ICD-10-CM

## 2024-11-12 DIAGNOSIS — R13.12 OROPHARYNGEAL DYSPHAGIA: ICD-10-CM

## 2024-11-12 DIAGNOSIS — R13.10 DYSPHAGIA, UNSPECIFIED TYPE: Primary | ICD-10-CM

## 2024-11-12 DIAGNOSIS — C44.321 SQUAMOUS CELL CARCINOMA OF NOSE: ICD-10-CM

## 2024-11-12 PROCEDURE — 3008F BODY MASS INDEX DOCD: CPT | Performed by: OTOLARYNGOLOGY

## 2024-11-12 PROCEDURE — 1036F TOBACCO NON-USER: CPT | Performed by: OTOLARYNGOLOGY

## 2024-11-12 PROCEDURE — 99213 OFFICE O/P EST LOW 20 MIN: CPT | Performed by: OTOLARYNGOLOGY

## 2024-11-12 ASSESSMENT — PAIN SCALES - GENERAL: PAINLEVEL_OUTOF10: 0-NO PAIN

## 2024-11-12 NOTE — PROGRESS NOTES
Patient: Adarsh De La Vega   MRN: [id number] YOB: 1970   Sex: male Age: 54 y.o.  Date of Service: 2024       ASSESSMENT AND PLAN  I discussed the findings with Adarsh De La Vega and have recommended the followin. Dysphagia to solids, MBS 24 - minimal CP webbing, no significant residue, stasis of contrast at the aortic arch and pill at the clavicle. S/p esophagoscopy and dilation 10/17/24 to 32mm. 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   - Schedule repeat esophagoscopy and dilation at [facility]. Risks, benefits, and alternatives of esophagoscopy and dilation discussed with the patient, including but not limited to bleeding, infection, pain, need for repeat procedure, no improvement in symptoms, and rarely, esophageal perforation. The patient expressed understanding and agrees to proceed.   - Continue PPI BID until next endoscopy      CHIEF COMPLAINT  Chief Complaint   Patient presents with    Follow-up       HISTORY OF PRESENT ILLNESS  Adarsh De La Vega is a very kind 54 y.o. male who we have been following for dysphagia.  The patient has a history of sinonasal SCCa s/p bilateral nasal endoscopy, total rhinectomy on 23. He completed adjuvant chemoradiation 23. He is now s/p complex staged nasal reconstruction with Dr. Mcclain.     24  S/p esophagoscopy and dilation 10/17/24 to 32mm. 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 He reports that that overall he felt that the dilation.     24  He reports while he is eating, food feels like it gets stuck in his throat and doesn't want to go down. Liquids will help it go down. This is all new since the cancer treatment, over the past 6 months. No voice or breathing issues.     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 Surgical History  He has a past surgical history that includes Sinus surgery; Cholecystectomy; Appendectomy; and Amputation.   Social History  He reports that he quit smoking about 20 months ago. His smoking use included cigarettes. He started smoking about 31 years ago. He has a 45 pack-year smoking history. He has never used smokeless tobacco. He reports that he does not drink alcohol and does not use drugs. Allergies  Patient has no known allergies.     Family History  No family history on file.     REVIEW OF SYSTEMS  All 10 systems were reviewed and negative except for above.      PHYSICAL EXAM  ENT Physical Exam   GENERAL: Well-nourished and developed, alert and appropriate, no distress, voice mildly hyponasal, [id number]  RESPIRATORY: Breathing quietly, no stridor  HEAD: Normocephalic atraumatic  FACE: Symmetric, no masses or lesions  EYES:  Pupils reactive, sclera clear, external ocular muscles intact, no nystagmus.    EARS:  Pinnae normal. External auditory canals clear and tympanic membranes intact.  NOSE:  No anterior lesions, masses or polyps. S/p total nasal reconstruction with paramedian forehead flap, crusting R>L     Last Recorded Vitals  Height 1.93 m (6' 4\"), weight 89.4 kg (197 lb).    RESULTS    Patient Reported Outcome Measures  N/A    Laboratory, Radiology, and Pathology  I personally reviewed the following results, with the following interpretation:   MBS 5/20/24 - minimal CP webbing, no significant residue, stasis of contrast at " the aortic arch and pill at the clavicle        PROCEDURES  None    ----------------------------------------------------------------------  Scarlet Frank MD, MAEd    [facility]  Department of Otolaryngology - Head and Neck Surgery  [facility]    The total time I spent in care of this patient today (excluding time spent on other billable services) is as follows:    Time Spent  Prep time on day of patient encounter: 5 minutes  Time spent directly with patient, family or caregiver: 5 minutes  Additional Time Spent on Patient Care Activities: 5 minutes  Documentation Time: 5 minutes  Other Time Spent: 0 minutes  Total: 20 minutes       Virtual or Telephone Consent    An interactive audio and video telecommunication system which permits real time communications between the patient (at the originating site) and provider (at the distant site) was utilized to provide this telehealth service.   Verbal consent was requested and obtained from Adarsh De La Vega on this date, 11/12/24 for a telehealth visit.

## 2024-11-12 NOTE — H&P (VIEW-ONLY)
Patient: Adarsh De La Vega   MRN: [id number] YOB: 1970   Sex: male Age: 54 y.o.  Date of Service: 2024       ASSESSMENT AND PLAN  I discussed the findings with Adarsh De La Vega and have recommended the followin. Dysphagia to solids, MBS 24 - minimal CP webbing, no significant residue, stasis of contrast at the aortic arch and pill at the clavicle. S/p esophagoscopy and dilation 10/17/24 to 32mm. 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   - Schedule repeat esophagoscopy and dilation at [facility]. Risks, benefits, and alternatives of esophagoscopy and dilation discussed with the patient, including but not limited to bleeding, infection, pain, need for repeat procedure, no improvement in symptoms, and rarely, esophageal perforation. The patient expressed understanding and agrees to proceed.   - Continue PPI BID until next endoscopy      CHIEF COMPLAINT  Chief Complaint   Patient presents with    Follow-up       HISTORY OF PRESENT ILLNESS  Adarsh De La Vega is a very kind 54 y.o. male who we have been following for dysphagia.  The patient has a history of sinonasal SCCa s/p bilateral nasal endoscopy, total rhinectomy on 23. He completed adjuvant chemoradiation 23. He is now s/p complex staged nasal reconstruction with Dr. Mcclain.     24  S/p esophagoscopy and dilation 10/17/24 to 32mm. 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 He reports that that overall he felt that the dilation.     24  He reports while he is eating, food feels like it gets stuck in his throat and doesn't want to go down. Liquids will help it go down. This is all new since the cancer treatment, over the past 6 months. No voice or breathing issues.     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 Surgical History  He has a past surgical history that includes Sinus surgery; Cholecystectomy; Appendectomy; and Amputation.   Social History  He reports that he quit smoking about 20 months ago. His smoking use included cigarettes. He started smoking about 31 years ago. He has a 45 pack-year smoking history. He has never used smokeless tobacco. He reports that he does not drink alcohol and does not use drugs. Allergies  Patient has no known allergies.     Family History  No family history on file.     REVIEW OF SYSTEMS  All 10 systems were reviewed and negative except for above.      PHYSICAL EXAM  ENT Physical Exam   GENERAL: Well-nourished and developed, alert and appropriate, no distress, voice mildly hyponasal, [id number]  RESPIRATORY: Breathing quietly, no stridor  HEAD: Normocephalic atraumatic  FACE: Symmetric, no masses or lesions  EYES:  Pupils reactive, sclera clear, external ocular muscles intact, no nystagmus.    EARS:  Pinnae normal. External auditory canals clear and tympanic membranes intact.  NOSE:  No anterior lesions, masses or polyps. S/p total nasal reconstruction with paramedian forehead flap, crusting R>L     Last Recorded Vitals  Height 1.93 m (6' 4\"), weight 89.4 kg (197 lb).    RESULTS    Patient Reported Outcome Measures  N/A    Laboratory, Radiology, and Pathology  I personally reviewed the following results, with the following interpretation:   MBS 5/20/24 - minimal CP webbing, no significant residue, stasis of contrast at " the aortic arch and pill at the clavicle        PROCEDURES  None    ----------------------------------------------------------------------  Scarlet Frank MD, MAEd    [facility]  Department of Otolaryngology - Head and Neck Surgery  [facility]    The total time I spent in care of this patient today (excluding time spent on other billable services) is as follows:    Time Spent  Prep time on day of patient encounter: 5 minutes  Time spent directly with patient, family or caregiver: 5 minutes  Additional Time Spent on Patient Care Activities: 5 minutes  Documentation Time: 5 minutes  Other Time Spent: 0 minutes  Total: 20 minutes       Virtual or Telephone Consent    An interactive audio and video telecommunication system which permits real time communications between the patient (at the originating site) and provider (at the distant site) was utilized to provide this telehealth service.   Verbal consent was requested and obtained from Adarsh De La Vega on this date, 11/12/24 for a telehealth visit.

## 2024-11-21 NOTE — PROGRESS NOTES
Facial Plastic & Reconstructive Surgery        11/22/24    POV s/p 2/29/24 forehead flap takedown; Has previously undergone OCRFF, PMFF, autologous rib grafting, auricular cartilage grafting for reconstruction.  Telehealth visit 4/29/24 plan: Discussed Full thickness skin graft to nasal vestibule given delayed healing in attempt to prevent further wound breakdown    Doing well, swelling improved  Right nasal pain has settled  Left ala wound previously seen now healed  Endorses limited nasal breathing due to crusting and nasal stenosis    Using saline and ointment daily  Incisions c/d/I    Photodocumentation below  Adequate nasal construct  Nasal vestibular stenosis present  Adequate tip support            We discussed revision reconstruction of the nasal construct with open reconstructive rhinoplasty, local tissue rearrangement, excision of previous scar tissue, nasal vestibule repair    ------------------------------  9/18/24  POV s/p 2/29/24 forehead flap takedown; Has previously undergone OCRFF, PMFF, autologous rib grafting, auricular cartilage grafting for reconstruction.  Telehealth visit 4/29/24 plan: Discussed Full thickness skin graft to nasal vestibule given delayed healing in attempt to prevent further wound breakdown    Doing well, swelling improved  C/o right nasal pain  Left ala wound previously seen now healing very well.   Using saline daily  Incisions c/d/I    We discussed CT sinus w/ contrast for surveillance of disease   Plan: RTC in 3 months for check      ---------------------------  6/13/24  POV s/p 2/29/24 forehead flap takedown; Has previously undergone OCRFF, PMFF, autologous rib grafting, auricular cartilage grafting for reconstruction.  Telehealth visit 4/29/24 plan: Discussed Full thickness skin graft to nasal vestibule given delayed healing in attempt to prevent further wound breakdown    Doing well, swelling improved  Left ala wound previously seen now healing very well.   Using  saline and medihoney daily  Incisions c/d/I    Plan: RTC in 3 months for check

## 2024-11-22 ENCOUNTER — OFFICE VISIT (OUTPATIENT)
Dept: OTOLARYNGOLOGY | Facility: CLINIC | Age: 54
End: 2024-11-22
Payer: COMMERCIAL

## 2024-11-22 DIAGNOSIS — J34.89 NASAL OBSTRUCTION: ICD-10-CM

## 2024-11-22 DIAGNOSIS — M95.0 NASAL DEFORMITY: Primary | ICD-10-CM

## 2024-11-22 DIAGNOSIS — J34.8210 NASAL ALAR COLLAPSE: ICD-10-CM

## 2024-11-22 DIAGNOSIS — C44.321 SQUAMOUS CELL CARCINOMA OF NOSE: ICD-10-CM

## 2024-11-22 PROCEDURE — 99213 OFFICE O/P EST LOW 20 MIN: CPT | Performed by: OTOLARYNGOLOGY

## 2024-11-22 ASSESSMENT — PAIN SCALES - GENERAL: PAINLEVEL_OUTOF10: 0-NO PAIN

## 2024-11-22 ASSESSMENT — COLUMBIA-SUICIDE SEVERITY RATING SCALE - C-SSRS: 1. IN THE PAST MONTH, HAVE YOU WISHED YOU WERE DEAD OR WISHED YOU COULD GO TO SLEEP AND NOT WAKE UP?: NO

## 2024-12-04 ENCOUNTER — TELEPHONE (OUTPATIENT)
Dept: OTOLARYNGOLOGY | Facility: HOSPITAL | Age: 54
End: 2024-12-04
Payer: COMMERCIAL

## 2024-12-04 NOTE — TELEPHONE ENCOUNTER
Pt confirmed 9 AM arrival for his procedure to start at 10 AM tomorrow with Dr. Frank.    Pt called me back and states the soonest he can be there is 10 AM because he lives one hour away. Informed pt I would speak to our other nurse and give him a call back.    Called pt back and asked him if he could arrive at 9:30 AM. Pt states he would just like to leave the arrival time of 1:00 PM and procedure start at 2:00 PM.

## 2024-12-05 ENCOUNTER — ANESTHESIA (OUTPATIENT)
Dept: GASTROENTEROLOGY | Facility: HOSPITAL | Age: 54
End: 2024-12-05
Payer: COMMERCIAL

## 2024-12-05 ENCOUNTER — HOSPITAL ENCOUNTER (OUTPATIENT)
Dept: GASTROENTEROLOGY | Facility: HOSPITAL | Age: 54
Discharge: HOME | End: 2024-12-05
Payer: COMMERCIAL

## 2024-12-05 ENCOUNTER — ANESTHESIA EVENT (OUTPATIENT)
Dept: GASTROENTEROLOGY | Facility: HOSPITAL | Age: 54
End: 2024-12-05
Payer: COMMERCIAL

## 2024-12-05 VITALS
DIASTOLIC BLOOD PRESSURE: 80 MMHG | HEART RATE: 69 BPM | OXYGEN SATURATION: 100 % | RESPIRATION RATE: 20 BRPM | SYSTOLIC BLOOD PRESSURE: 121 MMHG

## 2024-12-05 DIAGNOSIS — Q39.4 CRICOPHARYNGEAL WEB (HHS-HCC): ICD-10-CM

## 2024-12-05 DIAGNOSIS — K20.90 ESOPHAGITIS: ICD-10-CM

## 2024-12-05 DIAGNOSIS — R13.12 OROPHARYNGEAL DYSPHAGIA: Primary | ICD-10-CM

## 2024-12-05 PROCEDURE — 2720000007 HC OR 272 NO HCPCS: Performed by: OTOLARYNGOLOGY

## 2024-12-05 PROCEDURE — 3700000002 HC GENERAL ANESTHESIA TIME - EACH INCREMENTAL 1 MINUTE: Performed by: OTOLARYNGOLOGY

## 2024-12-05 PROCEDURE — 7100000009 HC PHASE TWO TIME - INITIAL BASE CHARGE: Performed by: OTOLARYNGOLOGY

## 2024-12-05 PROCEDURE — A43239 PR EDG TRANSORAL BIOPSY SINGLE/MULTIPLE: Performed by: STUDENT IN AN ORGANIZED HEALTH CARE EDUCATION/TRAINING PROGRAM

## 2024-12-05 PROCEDURE — A43239 PR EDG TRANSORAL BIOPSY SINGLE/MULTIPLE

## 2024-12-05 PROCEDURE — 3700000001 HC GENERAL ANESTHESIA TIME - INITIAL BASE CHARGE: Performed by: OTOLARYNGOLOGY

## 2024-12-05 PROCEDURE — 7100000010 HC PHASE TWO TIME - EACH INCREMENTAL 1 MINUTE: Performed by: OTOLARYNGOLOGY

## 2024-12-05 PROCEDURE — 2500000004 HC RX 250 GENERAL PHARMACY W/ HCPCS (ALT 636 FOR OP/ED)

## 2024-12-05 PROCEDURE — 43233 EGD BALLOON DIL ESOPH30 MM/>: CPT | Performed by: OTOLARYNGOLOGY

## 2024-12-05 PROCEDURE — C1725 CATH, TRANSLUMIN NON-LASER: HCPCS | Performed by: OTOLARYNGOLOGY

## 2024-12-05 RX ORDER — PROPOFOL 10 MG/ML
INJECTION, EMULSION INTRAVENOUS CONTINUOUS PRN
Status: DISCONTINUED | OUTPATIENT
Start: 2024-12-05 | End: 2024-12-05

## 2024-12-05 RX ORDER — ONDANSETRON HYDROCHLORIDE 2 MG/ML
4 INJECTION, SOLUTION INTRAVENOUS ONCE AS NEEDED
OUTPATIENT
Start: 2024-12-05

## 2024-12-05 RX ORDER — NALOXONE HYDROCHLORIDE 0.4 MG/ML
0.2 INJECTION, SOLUTION INTRAMUSCULAR; INTRAVENOUS; SUBCUTANEOUS EVERY 5 MIN PRN
OUTPATIENT
Start: 2024-12-05

## 2024-12-05 RX ORDER — FLUMAZENIL 0.1 MG/ML
0.2 INJECTION INTRAVENOUS ONCE AS NEEDED
OUTPATIENT
Start: 2024-12-05

## 2024-12-05 RX ORDER — SODIUM CHLORIDE, SODIUM LACTATE, POTASSIUM CHLORIDE, CALCIUM CHLORIDE 600; 310; 30; 20 MG/100ML; MG/100ML; MG/100ML; MG/100ML
INJECTION, SOLUTION INTRAVENOUS CONTINUOUS PRN
Status: DISCONTINUED | OUTPATIENT
Start: 2024-12-05 | End: 2024-12-05

## 2024-12-05 RX ORDER — MIDAZOLAM HYDROCHLORIDE 1 MG/ML
INJECTION INTRAMUSCULAR; INTRAVENOUS AS NEEDED
Status: DISCONTINUED | OUTPATIENT
Start: 2024-12-05 | End: 2024-12-05

## 2024-12-05 ASSESSMENT — COLUMBIA-SUICIDE SEVERITY RATING SCALE - C-SSRS: 1. IN THE PAST MONTH, HAVE YOU WISHED YOU WERE DEAD OR WISHED YOU COULD GO TO SLEEP AND NOT WAKE UP?: NO

## 2024-12-05 NOTE — ANESTHESIA POSTPROCEDURE EVALUATION
Patient: Adarsh De La Vega    Procedure Summary       Date: 12/05/24 Room / Location: [facility]    Anesthesia Start: 1334 Anesthesia Stop: 1414    Procedure: EGD Diagnosis:       Oropharyngeal dysphagia      Esophagitis      Cricopharyngeal web (HHS-HCC)      Oropharyngeal dysphagia    Scheduled Providers: Scarlet Frank MD Responsible Provider: Emery Tejeda DO    Anesthesia Type: MAC ASA Status: 2            Anesthesia Type: MAC    Vitals Value Taken Time   /88 12/05/24 1410   Temp  12/05/24 1425   Pulse 78 12/05/24 1410   Resp 23 12/05/24 1410   SpO2 97 % 12/05/24 1410       Anesthesia Post Evaluation    Patient location during evaluation: PACU  Patient participation: complete - patient participated  Level of consciousness: awake  Pain management: adequate  Multimodal analgesia pain management approach  Airway patency: patent  Two or more strategies used to mitigate risk of obstructive sleep apnea  Cardiovascular status: acceptable  Respiratory status: face mask  Hydration status: acceptable  Postoperative Nausea and Vomiting: none        There were no known notable events for this encounter.

## 2024-12-05 NOTE — ANESTHESIA PREPROCEDURE EVALUATION
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       Clinical information reviewed:   Tobacco  Allergies  Meds   Med Hx  Surg Hx   Fam Hx  Soc Hx        NPO Detail:  NPO/Void Status  Date of Last Liquid: 12/04/24  Date of Last Solid: 12/04/24  Last Intake Type: Food         Physical Exam    Airway  Mallampati: II  Neck ROM: full     Cardiovascular - normal exam     Dental - normal exam     Pulmonary - normal exam  Breath sounds clear to auscultation     Abdominal - normal exam             Anesthesia Plan    History of general anesthesia?: yes  History of complications of general anesthesia?: no    ASA 2     MAC     intravenous induction   Anesthetic plan and risks discussed with patient.    Plan discussed with CRNA.

## 2024-12-11 DIAGNOSIS — Q67.0 ASYMMETRY OF FACE: ICD-10-CM

## 2024-12-11 DIAGNOSIS — C44.321 SQUAMOUS CELL CANCER OF SKIN OF ALA NASI: ICD-10-CM

## 2024-12-20 DIAGNOSIS — K22.719 BARRETT'S ESOPHAGUS WITH DYSPLASIA: ICD-10-CM

## 2024-12-20 LAB
LAB AP ASR DISCLAIMER: NORMAL
LABORATORY COMMENT REPORT: NORMAL
PATH REPORT.COMMENTS IMP SPEC: NORMAL
PATH REPORT.FINAL DX SPEC: NORMAL
PATH REPORT.GROSS SPEC: NORMAL
PATH REPORT.TOTAL CANCER: NORMAL

## 2025-01-14 ENCOUNTER — TELEMEDICINE (OUTPATIENT)
Dept: OTOLARYNGOLOGY | Facility: CLINIC | Age: 55
End: 2025-01-14
Payer: COMMERCIAL

## 2025-01-14 ENCOUNTER — APPOINTMENT (OUTPATIENT)
Dept: GASTROENTEROLOGY | Facility: HOSPITAL | Age: 55
End: 2025-01-14
Payer: COMMERCIAL

## 2025-01-14 VITALS — WEIGHT: 199 LBS | BODY MASS INDEX: 24.23 KG/M2 | HEIGHT: 76 IN

## 2025-01-14 DIAGNOSIS — C44.321 SQUAMOUS CELL CARCINOMA OF NOSE: ICD-10-CM

## 2025-01-14 DIAGNOSIS — R13.10 DYSPHAGIA, UNSPECIFIED TYPE: Primary | ICD-10-CM

## 2025-01-14 DIAGNOSIS — Q39.4 CRICOPHARYNGEAL WEB (HHS-HCC): ICD-10-CM

## 2025-01-14 DIAGNOSIS — K22.70 BARRETT'S ESOPHAGUS DETERMINED BY BIOPSY: ICD-10-CM

## 2025-01-14 PROCEDURE — 99213 OFFICE O/P EST LOW 20 MIN: CPT | Performed by: OTOLARYNGOLOGY

## 2025-01-14 PROCEDURE — 3008F BODY MASS INDEX DOCD: CPT | Performed by: OTOLARYNGOLOGY

## 2025-01-14 PROCEDURE — 1036F TOBACCO NON-USER: CPT | Performed by: OTOLARYNGOLOGY

## 2025-01-14 ASSESSMENT — PAIN SCALES - GENERAL: PAINLEVEL_OUTOF10: 0-NO PAIN

## 2025-01-14 NOTE — PROGRESS NOTES
Patient: Adarsh De La Vega   MRN: [id number] YOB: 1970   Sex: male Age: 54 y.o.  Date of Service: 2025       ASSESSMENT AND PLAN  I discussed the findings with Adarsh De La Vega and have recommended the followin. Dysphagia to solids, MBS 24 - minimal CP webbing, no significant residue, stasis of contrast at the aortic arch and pill at the clavicle. S/p esophagoscopy and dilation 10/17/24 to 32mm and 24 to 38mm. 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   - GI referral as scheduled for esophageal dysplasia  - Repeat esophagoscopy and dilation at [facility] as needed  - Continue PPI BID      CHIEF COMPLAINT  Chief Complaint   Patient presents with    Follow-up       HISTORY OF PRESENT ILLNESS  Adarsh De La Vega is a very kind 54 y.o. male who we have been following for dysphagia.  The patient has a history of sinonasal SCCa s/p bilateral nasal endoscopy, total rhinectomy on 23. He completed adjuvant chemoradiation 23. He is now s/p complex staged nasal reconstruction with Dr. Mcclain.     25  S/p esophagoscopy and dilation 24 to 38mm. Feeling improved. On PPI BID for Grade C esophagitis, biopsies returned with low grade dysplasia. He is awaiting GI appointment scheduled in March.    24  S/p esophagoscopy and dilation 10/17/24 to 32mm. 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 He reports that that overall he felt that the dilation.     24  He reports while he is eating, food feels like it gets stuck in his throat and doesn't want to go down. Liquids will help it go down. This is all new since the cancer treatment, over the past 6 months. No voice or breathing issues.     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 Surgical History  He has a past surgical history that includes Sinus surgery; Cholecystectomy; Appendectomy; and Amputation.   Social History  He reports that he quit smoking about 22 months ago. His smoking use included cigarettes. He started smoking about 31 years ago. He has a 45 pack-year smoking history. He has never used smokeless tobacco. He reports that he does not currently use alcohol. He reports that he does not use drugs. Allergies  Patient has no known allergies.     Family History  No family history on file.     REVIEW OF SYSTEMS  All 10 systems were reviewed and negative except for above.      PHYSICAL EXAM  ENT Physical Exam   GENERAL: Well-nourished and developed, alert and appropriate, no distress, voice mildly hyponasal, [id number]  RESPIRATORY: Breathing quietly, no stridor  HEAD: Normocephalic atraumatic  FACE: Symmetric, no masses or lesions  EYES:  Pupils reactive, sclera clear, external ocular muscles intact, no nystagmus.    EARS:  Pinnae normal. External auditory canals clear and tympanic membranes intact.  NOSE:  No anterior lesions, masses or polyps. S/p total nasal reconstruction with paramedian forehead flap, crusting R>L     Last Recorded Vitals  Height 1.93 m (6' 4\"), weight 90.3 kg (199 lb).    RESULTS    Patient Reported Outcome Measures  N/A    Laboratory, Radiology, and Pathology  I personally reviewed the following results, with the following interpretation:   Path 12/5/24  FINAL DIAGNOSIS   ESOPHAGOGASTRIC JUNCTION, BIOPSY:  - Low-grade glandular dysplasia involving " fragments of squamocolumnar mucosa (see comment).     [facility] 5/20/24 - minimal CP webbing, no significant residue, stasis of contrast at the aortic arch and pill at the clavicle        PROCEDURES  None    ----------------------------------------------------------------------  Scarlet Frank MD, MAEd    [facility]  Department of Otolaryngology - Head and Neck Surgery  [facility]    The total time I spent in care of this patient today (excluding time spent on other billable services) is as follows:    Time Spent  Prep time on day of patient encounter: 5 minutes  Time spent directly with patient, family or caregiver: 10 minutes  Additional Time Spent on Patient Care Activities: 5 minutes  Documentation Time: 5 minutes  Other Time Spent: 0 minutes  Total: 25 minutes      Virtual or Telephone Consent    An interactive audio and video telecommunication system which permits real time communications between the patient (at the originating site) and provider (at the distant site) was utilized to provide this telehealth service.   Verbal consent was requested and obtained from Adarsh De La Vega on this date, 01/14/25 for a telehealth visit.

## 2025-01-15 ENCOUNTER — ANESTHESIA EVENT (OUTPATIENT)
Dept: OPERATING ROOM | Facility: CLINIC | Age: 55
End: 2025-01-15
Payer: COMMERCIAL

## 2025-01-20 ENCOUNTER — HOSPITAL ENCOUNTER (OUTPATIENT)
Facility: CLINIC | Age: 55
Setting detail: OUTPATIENT SURGERY
Discharge: HOME | End: 2025-01-20
Attending: OTOLARYNGOLOGY | Admitting: OTOLARYNGOLOGY
Payer: COMMERCIAL

## 2025-01-20 ENCOUNTER — ANESTHESIA (OUTPATIENT)
Dept: OPERATING ROOM | Facility: CLINIC | Age: 55
End: 2025-01-20
Payer: COMMERCIAL

## 2025-01-20 VITALS
SYSTOLIC BLOOD PRESSURE: 112 MMHG | HEIGHT: 76 IN | DIASTOLIC BLOOD PRESSURE: 64 MMHG | BODY MASS INDEX: 24.43 KG/M2 | HEART RATE: 56 BPM | RESPIRATION RATE: 16 BRPM | WEIGHT: 200.62 LBS | TEMPERATURE: 96.8 F | OXYGEN SATURATION: 99 %

## 2025-01-20 DIAGNOSIS — C44.321 SQUAMOUS CELL CARCINOMA OF NOSE: ICD-10-CM

## 2025-01-20 DIAGNOSIS — M95.0 NASAL DEFECT: ICD-10-CM

## 2025-01-20 DIAGNOSIS — G89.18 POST-OP PAIN: Primary | ICD-10-CM

## 2025-01-20 PROCEDURE — 7100000001 HC RECOVERY ROOM TIME - INITIAL BASE CHARGE: Performed by: OTOLARYNGOLOGY

## 2025-01-20 PROCEDURE — 2500000002 HC RX 250 W HCPCS SELF ADMINISTERED DRUGS (ALT 637 FOR MEDICARE OP, ALT 636 FOR OP/ED): Performed by: ANESTHESIOLOGY

## 2025-01-20 PROCEDURE — 2500000004 HC RX 250 GENERAL PHARMACY W/ HCPCS (ALT 636 FOR OP/ED): Performed by: ANESTHESIOLOGY

## 2025-01-20 PROCEDURE — 7100000010 HC PHASE TWO TIME - EACH INCREMENTAL 1 MINUTE: Performed by: OTOLARYNGOLOGY

## 2025-01-20 PROCEDURE — 2500000005 HC RX 250 GENERAL PHARMACY W/O HCPCS: Performed by: OTOLARYNGOLOGY

## 2025-01-20 PROCEDURE — 2500000001 HC RX 250 WO HCPCS SELF ADMINISTERED DRUGS (ALT 637 FOR MEDICARE OP): Performed by: OTOLARYNGOLOGY

## 2025-01-20 PROCEDURE — A21208: Performed by: NURSE ANESTHETIST, CERTIFIED REGISTERED

## 2025-01-20 PROCEDURE — 2500000004 HC RX 250 GENERAL PHARMACY W/ HCPCS (ALT 636 FOR OP/ED): Performed by: OTOLARYNGOLOGY

## 2025-01-20 PROCEDURE — 2500000005 HC RX 250 GENERAL PHARMACY W/O HCPCS

## 2025-01-20 PROCEDURE — 14061 TIS TRNFR E/N/E/L10.1-30SQCM: CPT | Performed by: OTOLARYNGOLOGY

## 2025-01-20 PROCEDURE — 2500000004 HC RX 250 GENERAL PHARMACY W/ HCPCS (ALT 636 FOR OP/ED)

## 2025-01-20 PROCEDURE — 3700000001 HC GENERAL ANESTHESIA TIME - INITIAL BASE CHARGE: Performed by: OTOLARYNGOLOGY

## 2025-01-20 PROCEDURE — 3600000009 HC OR TIME - EACH INCREMENTAL 1 MINUTE - PROCEDURE LEVEL FOUR: Performed by: OTOLARYNGOLOGY

## 2025-01-20 PROCEDURE — 7100000002 HC RECOVERY ROOM TIME - EACH INCREMENTAL 1 MINUTE: Performed by: OTOLARYNGOLOGY

## 2025-01-20 PROCEDURE — 30450 REVISION OF NOSE: CPT | Performed by: OTOLARYNGOLOGY

## 2025-01-20 PROCEDURE — 2720000007 HC OR 272 NO HCPCS: Performed by: OTOLARYNGOLOGY

## 2025-01-20 PROCEDURE — 7100000009 HC PHASE TWO TIME - INITIAL BASE CHARGE: Performed by: OTOLARYNGOLOGY

## 2025-01-20 PROCEDURE — A21208: Performed by: ANESTHESIOLOGY

## 2025-01-20 PROCEDURE — 14060 TIS TRNFR E/N/E/L 10 SQ CM/<: CPT | Performed by: OTOLARYNGOLOGY

## 2025-01-20 PROCEDURE — 15004 WOUND PREP F/N/HF/G: CPT | Performed by: OTOLARYNGOLOGY

## 2025-01-20 PROCEDURE — 3600000004 HC OR TIME - INITIAL BASE CHARGE - PROCEDURE LEVEL FOUR: Performed by: OTOLARYNGOLOGY

## 2025-01-20 PROCEDURE — 2500000001 HC RX 250 WO HCPCS SELF ADMINISTERED DRUGS (ALT 637 FOR MEDICARE OP): Performed by: ANESTHESIOLOGY

## 2025-01-20 PROCEDURE — 3700000002 HC GENERAL ANESTHESIA TIME - EACH INCREMENTAL 1 MINUTE: Performed by: OTOLARYNGOLOGY

## 2025-01-20 RX ORDER — PHENYLEPHRINE HCL IN 0.9% NACL 1 MG/10 ML
SYRINGE (ML) INTRAVENOUS AS NEEDED
Status: DISCONTINUED | OUTPATIENT
Start: 2025-01-20 | End: 2025-01-20

## 2025-01-20 RX ORDER — SODIUM CHLORIDE, SODIUM LACTATE, POTASSIUM CHLORIDE, CALCIUM CHLORIDE 600; 310; 30; 20 MG/100ML; MG/100ML; MG/100ML; MG/100ML
INJECTION, SOLUTION INTRAVENOUS CONTINUOUS PRN
Status: DISCONTINUED | OUTPATIENT
Start: 2025-01-20 | End: 2025-01-20

## 2025-01-20 RX ORDER — LIDOCAINE HYDROCHLORIDE AND EPINEPHRINE 10; 10 UG/ML; MG/ML
INJECTION, SOLUTION INFILTRATION; PERINEURAL AS NEEDED
Status: DISCONTINUED | OUTPATIENT
Start: 2025-01-20 | End: 2025-01-20 | Stop reason: HOSPADM

## 2025-01-20 RX ORDER — ONDANSETRON HYDROCHLORIDE 2 MG/ML
4 INJECTION, SOLUTION INTRAVENOUS ONCE AS NEEDED
Status: DISCONTINUED | OUTPATIENT
Start: 2025-01-20 | End: 2025-01-20 | Stop reason: HOSPADM

## 2025-01-20 RX ORDER — LIDOCAINE HYDROCHLORIDE 20 MG/ML
INJECTION, SOLUTION INFILTRATION; PERINEURAL AS NEEDED
Status: DISCONTINUED | OUTPATIENT
Start: 2025-01-20 | End: 2025-01-20

## 2025-01-20 RX ORDER — HYDROMORPHONE HYDROCHLORIDE 1 MG/ML
0.2 INJECTION, SOLUTION INTRAMUSCULAR; INTRAVENOUS; SUBCUTANEOUS EVERY 5 MIN PRN
Status: DISCONTINUED | OUTPATIENT
Start: 2025-01-20 | End: 2025-01-20 | Stop reason: HOSPADM

## 2025-01-20 RX ORDER — TRANEXAMIC ACID 100 MG/ML
INJECTION, SOLUTION INTRAVENOUS AS NEEDED
Status: DISCONTINUED | OUTPATIENT
Start: 2025-01-20 | End: 2025-01-20 | Stop reason: HOSPADM

## 2025-01-20 RX ORDER — LIDOCAINE HYDROCHLORIDE 10 MG/ML
0.1 INJECTION, SOLUTION EPIDURAL; INFILTRATION; INTRACAUDAL; PERINEURAL ONCE
Status: DISCONTINUED | OUTPATIENT
Start: 2025-01-20 | End: 2025-01-20 | Stop reason: HOSPADM

## 2025-01-20 RX ORDER — CEFAZOLIN 1 G/1
INJECTION, POWDER, FOR SOLUTION INTRAVENOUS AS NEEDED
Status: DISCONTINUED | OUTPATIENT
Start: 2025-01-20 | End: 2025-01-20

## 2025-01-20 RX ORDER — MIDAZOLAM HYDROCHLORIDE 1 MG/ML
INJECTION, SOLUTION INTRAMUSCULAR; INTRAVENOUS AS NEEDED
Status: DISCONTINUED | OUTPATIENT
Start: 2025-01-20 | End: 2025-01-20

## 2025-01-20 RX ORDER — ALBUTEROL SULFATE 0.83 MG/ML
2.5 SOLUTION RESPIRATORY (INHALATION) ONCE AS NEEDED
Status: DISCONTINUED | OUTPATIENT
Start: 2025-01-20 | End: 2025-01-20 | Stop reason: HOSPADM

## 2025-01-20 RX ORDER — SCOPOLAMINE 1 MG/3D
PATCH, EXTENDED RELEASE TRANSDERMAL AS NEEDED
Status: DISCONTINUED | OUTPATIENT
Start: 2025-01-20 | End: 2025-01-20

## 2025-01-20 RX ORDER — SODIUM CHLORIDE 0.9 G/100ML
INJECTION, SOLUTION IRRIGATION AS NEEDED
Status: DISCONTINUED | OUTPATIENT
Start: 2025-01-20 | End: 2025-01-20 | Stop reason: HOSPADM

## 2025-01-20 RX ORDER — SUCCINYLCHOLINE CHLORIDE 100 MG/5ML
SYRINGE (ML) INTRAVENOUS AS NEEDED
Status: DISCONTINUED | OUTPATIENT
Start: 2025-01-20 | End: 2025-01-20

## 2025-01-20 RX ORDER — OXYCODONE HYDROCHLORIDE 5 MG/1
5 TABLET ORAL EVERY 4 HOURS PRN
Status: DISCONTINUED | OUTPATIENT
Start: 2025-01-20 | End: 2025-01-20 | Stop reason: HOSPADM

## 2025-01-20 RX ORDER — FENTANYL CITRATE 50 UG/ML
INJECTION, SOLUTION INTRAMUSCULAR; INTRAVENOUS AS NEEDED
Status: DISCONTINUED | OUTPATIENT
Start: 2025-01-20 | End: 2025-01-20

## 2025-01-20 RX ORDER — ONDANSETRON HYDROCHLORIDE 2 MG/ML
INJECTION, SOLUTION INTRAVENOUS AS NEEDED
Status: DISCONTINUED | OUTPATIENT
Start: 2025-01-20 | End: 2025-01-20

## 2025-01-20 RX ORDER — SODIUM CHLORIDE, SODIUM LACTATE, POTASSIUM CHLORIDE, CALCIUM CHLORIDE 600; 310; 30; 20 MG/100ML; MG/100ML; MG/100ML; MG/100ML
100 INJECTION, SOLUTION INTRAVENOUS CONTINUOUS
Status: DISCONTINUED | OUTPATIENT
Start: 2025-01-20 | End: 2025-01-20 | Stop reason: HOSPADM

## 2025-01-20 RX ORDER — MUPIROCIN 20 MG/G
OINTMENT TOPICAL AS NEEDED
Status: DISCONTINUED | OUTPATIENT
Start: 2025-01-20 | End: 2025-01-20 | Stop reason: HOSPADM

## 2025-01-20 RX ORDER — METOCLOPRAMIDE HYDROCHLORIDE 5 MG/ML
10 INJECTION INTRAMUSCULAR; INTRAVENOUS ONCE AS NEEDED
Status: DISCONTINUED | OUTPATIENT
Start: 2025-01-20 | End: 2025-01-20 | Stop reason: HOSPADM

## 2025-01-20 RX ORDER — LIDOCAINE HYDROCHLORIDE 40 MG/ML
SOLUTION TOPICAL AS NEEDED
Status: DISCONTINUED | OUTPATIENT
Start: 2025-01-20 | End: 2025-01-20

## 2025-01-20 RX ORDER — APREPITANT 40 MG/1
CAPSULE ORAL AS NEEDED
Status: DISCONTINUED | OUTPATIENT
Start: 2025-01-20 | End: 2025-01-20

## 2025-01-20 RX ORDER — OXYMETAZOLINE HCL 0.05 %
SPRAY, NON-AEROSOL (ML) NASAL AS NEEDED
Status: DISCONTINUED | OUTPATIENT
Start: 2025-01-20 | End: 2025-01-20 | Stop reason: HOSPADM

## 2025-01-20 RX ORDER — GABAPENTIN 300 MG/1
CAPSULE ORAL AS NEEDED
Status: DISCONTINUED | OUTPATIENT
Start: 2025-01-20 | End: 2025-01-20

## 2025-01-20 RX ORDER — HYDROMORPHONE HYDROCHLORIDE 1 MG/ML
0.4 INJECTION, SOLUTION INTRAMUSCULAR; INTRAVENOUS; SUBCUTANEOUS EVERY 5 MIN PRN
Status: DISCONTINUED | OUTPATIENT
Start: 2025-01-20 | End: 2025-01-20 | Stop reason: HOSPADM

## 2025-01-20 RX ORDER — PROPOFOL 10 MG/ML
INJECTION, EMULSION INTRAVENOUS CONTINUOUS PRN
Status: DISCONTINUED | OUTPATIENT
Start: 2025-01-20 | End: 2025-01-20

## 2025-01-20 RX ORDER — CLINDAMYCIN HYDROCHLORIDE 300 MG/1
300 CAPSULE ORAL 3 TIMES DAILY
Qty: 21 CAPSULE | Refills: 0 | Status: SHIPPED | OUTPATIENT
Start: 2025-01-20 | End: 2025-01-27

## 2025-01-20 RX ORDER — NORETHINDRONE AND ETHINYL ESTRADIOL 0.5-0.035
KIT ORAL AS NEEDED
Status: DISCONTINUED | OUTPATIENT
Start: 2025-01-20 | End: 2025-01-20

## 2025-01-20 RX ORDER — METHOCARBAMOL 100 MG/ML
INJECTION, SOLUTION INTRAMUSCULAR; INTRAVENOUS AS NEEDED
Status: DISCONTINUED | OUTPATIENT
Start: 2025-01-20 | End: 2025-01-20

## 2025-01-20 RX ORDER — GLYCOPYRROLATE 0.2 MG/ML
INJECTION INTRAMUSCULAR; INTRAVENOUS AS NEEDED
Status: DISCONTINUED | OUTPATIENT
Start: 2025-01-20 | End: 2025-01-20

## 2025-01-20 RX ORDER — ACETAMINOPHEN 325 MG/1
TABLET ORAL AS NEEDED
Status: DISCONTINUED | OUTPATIENT
Start: 2025-01-20 | End: 2025-01-20

## 2025-01-20 RX ORDER — CELECOXIB 200 MG/1
CAPSULE ORAL AS NEEDED
Status: DISCONTINUED | OUTPATIENT
Start: 2025-01-20 | End: 2025-01-20

## 2025-01-20 RX ORDER — OXYCODONE HYDROCHLORIDE 5 MG/1
5 TABLET ORAL EVERY 6 HOURS PRN
Qty: 15 TABLET | Refills: 0 | Status: SHIPPED | OUTPATIENT
Start: 2025-01-20

## 2025-01-20 RX ADMIN — PROPOFOL 20 MG: 10 INJECTION, EMULSION INTRAVENOUS at 14:08

## 2025-01-20 RX ADMIN — CELECOXIB 200 MG: 200 CAPSULE ORAL at 11:50

## 2025-01-20 RX ADMIN — MIDAZOLAM HYDROCHLORIDE 2 MG: 1 INJECTION, SOLUTION INTRAMUSCULAR; INTRAVENOUS at 12:43

## 2025-01-20 RX ADMIN — LIDOCAINE HYDROCHLORIDE 4 ML: 40 SOLUTION TOPICAL at 12:51

## 2025-01-20 RX ADMIN — PROPOFOL 20 MG: 10 INJECTION, EMULSION INTRAVENOUS at 14:02

## 2025-01-20 RX ADMIN — PROPOFOL 20 MG: 10 INJECTION, EMULSION INTRAVENOUS at 13:52

## 2025-01-20 RX ADMIN — Medication 100 MG: at 12:48

## 2025-01-20 RX ADMIN — EPHEDRINE SULFATE 5 MG: 50 INJECTION INTRAVENOUS at 13:46

## 2025-01-20 RX ADMIN — SODIUM CHLORIDE, POTASSIUM CHLORIDE, SODIUM LACTATE AND CALCIUM CHLORIDE: 600; 310; 30; 20 INJECTION, SOLUTION INTRAVENOUS at 12:46

## 2025-01-20 RX ADMIN — APREPITANT 40 MG: 40 CAPSULE ORAL at 11:50

## 2025-01-20 RX ADMIN — ACETAMINOPHEN 975 MG: 325 TABLET, FILM COATED ORAL at 11:50

## 2025-01-20 RX ADMIN — Medication 50 MCG: at 13:13

## 2025-01-20 RX ADMIN — METHOCARBAMOL 1000 MG: 100 INJECTION, SOLUTION INTRAMUSCULAR; INTRAVENOUS at 12:58

## 2025-01-20 RX ADMIN — SCOPALAMINE 1 PATCH: 1 PATCH, EXTENDED RELEASE TRANSDERMAL at 11:50

## 2025-01-20 RX ADMIN — FENTANYL CITRATE 100 MCG: 50 INJECTION, SOLUTION INTRAMUSCULAR; INTRAVENOUS at 12:48

## 2025-01-20 RX ADMIN — GABAPENTIN 300 MG: 300 CAPSULE ORAL at 11:50

## 2025-01-20 RX ADMIN — PROPOFOL 75 MCG/KG/MIN: 10 INJECTION, EMULSION INTRAVENOUS at 12:57

## 2025-01-20 RX ADMIN — DEXAMETHASONE SODIUM PHOSPHATE 10 MG: 4 INJECTION INTRA-ARTICULAR; INTRALESIONAL; INTRAMUSCULAR; INTRAVENOUS; SOFT TISSUE at 12:58

## 2025-01-20 RX ADMIN — Medication 100 MCG: at 13:19

## 2025-01-20 RX ADMIN — PROPOFOL 20 MG: 10 INJECTION, EMULSION INTRAVENOUS at 14:19

## 2025-01-20 RX ADMIN — CEFAZOLIN 2 G: 330 INJECTION, POWDER, FOR SOLUTION INTRAMUSCULAR; INTRAVENOUS at 12:58

## 2025-01-20 RX ADMIN — ONDANSETRON 4 MG: 2 INJECTION INTRAMUSCULAR; INTRAVENOUS at 13:49

## 2025-01-20 RX ADMIN — GLYCOPYRROLATE 0.1 MG: 0.2 INJECTION INTRAMUSCULAR; INTRAVENOUS at 12:43

## 2025-01-20 RX ADMIN — PROPOFOL 20 MG: 10 INJECTION, EMULSION INTRAVENOUS at 13:57

## 2025-01-20 RX ADMIN — LIDOCAINE HYDROCHLORIDE 60 MG: 20 INJECTION, SOLUTION INFILTRATION; PERINEURAL at 12:48

## 2025-01-20 RX ADMIN — EPHEDRINE SULFATE 5 MG: 50 INJECTION INTRAVENOUS at 13:22

## 2025-01-20 RX ADMIN — PROPOFOL 200 MG: 10 INJECTION, EMULSION INTRAVENOUS at 12:48

## 2025-01-20 SDOH — HEALTH STABILITY: MENTAL HEALTH: CURRENT SMOKER: 0

## 2025-01-20 ASSESSMENT — PAIN - FUNCTIONAL ASSESSMENT
PAIN_FUNCTIONAL_ASSESSMENT: 0-10

## 2025-01-20 ASSESSMENT — PAIN SCALES - GENERAL
PAINLEVEL_OUTOF10: 0 - NO PAIN

## 2025-01-20 NOTE — DISCHARGE INSTRUCTIONS
.NASAL SURGERY  Important Phone Numbers  Dr. Sami Mcclain: [phone]  Shruthi Miller R.N.  Evenings/Weekends Emergency: [phone]  - please ask for the ENT resident on-call    At Home after Surgery:    Head Elevation: Keep your head elevated (the height of 2 pillows is appropriate) for 3 days to help with swelling.     Ice: Apply cold compresses to the cheeks and forehead, up to 20 minutes of each hour while awake after surgery, for the first 48 hours.  This will help reduce swelling and bruising.     Nasal Packing: If you have nasal packing in place with strings hanging out of your nose, you will remove it at home 24 or 48 hours after the operation (as directed by Dr. Mcclain) by pulling on the strings beneath the nose.  Please call the office if you are struggling to remove it.  If you have splints inside the nose that are sutured into place, they will be removed at your follow-up appointment.    Nasal Care: Use nasal saline spray, 4 sprays to each nostril every 2-3 hours while awake.  This will help keep the nasal passages moist and prevent scabbing in the nose.  It is normal to feel congested for several weeks after surgery.  Do not blow your nose for two weeks after surgery.      Incision/Cast Care: If you have an incision on the nose, apply antibiotic ointment four times a day.  The incision will heal most optimally if it is kept moist and clean.  You can use hydrogen peroxide on Q-tips to gently clean any crusts.  Do not rub but gently dab the incision to clean.  You  may remove the yellow nasal packing 48 hours after surgery.      Shower: You may shower 24 hours after surgery.  Do not let the shower spray hit your face/nose directly and do not soak your face in water.  If you have a cast or dressing on the outside of the nose, try to keep it as dry as possible.  Towel blot your nose/cast after your shower.    Bleeding: Most patients have mild, active bleeding the first night.  Some blood-tinged  drainage is normal for 1-2 weeks after surgery.  Afrin nasal spray may be helpful for bleeding after surgery but should not be used for more than 3 days.  Excessive bleeding that does not stop is not expected; please call the office or seek medical attention if this occurs.    Medications: Take the medications as prescribed.  You can take Tylenol in addition to the narcotic pain medication prescribed.  Resume all home medications the night of surgery unless otherwise directed.  Avoid aspirin and NSAIDs for one week after surgery. Take antibiotics as prescribed for entire duration.     Activity: Resume normal activities of daily living, as you feel able.  However, avoid strenuous activity and heavy lifting (more than 10 lbs) for 3 weeks after surgery.  Light activity such as walking may be resumed after 1 week after surgery.  Sport activities may be resumed 1 month after surgery but try to protect your nose as it is still healing.    Seek Medical Attention: Call the office or seek medical attention if you develop fever greater than 101 degrees, excessive bleeding, excessive pain that is not well-controlled, skin rash, visual disturbances, or other unusual symptoms.     Follow-Up Care:  First Appointment: You will return one week after surgery for an appointment for suture and cast/dressing removal.  There are additional sutures inside your nose that will dissolve on their own.    Postoperative Healing: Your nose will be swollen and will remain so for several weeks.  It is important to keep in mind that although much of the swelling resolves over the first several weeks after surgery, it takes 12 to 15 months for all of the swelling in the nose to resolve.  However, most patients have a good appearance even 2-3 weeks after the operation.      Additional Appointments: Ideally, we would like to see you back between within 1-2 weeks after surgery. Subsequently, our follow up will be approximately 4-6 weeks, then 4-6  months after surgery to examine the healing. After this, the follow-up is quite variable, and depends on how you are doing and feeling. Often, this means visits at about 12 months after surgery to follow your healing process.  Please call the office at any time if you have any questions or concerns and would like to be seen sooner than your next scheduled visit.    Scopalamine patch may stay on for 72 hrs.  Remove patch, discard away form pets, children.  Do not touch eyes!  Wash hands thoroughly     TO REACH YOUR PHYSICIAN AFTER HOURS CALL  AND ASK FOR THE PHYSICIAN ON CALL    May have Tylenol after: 5: PM    May have Ibuprofen/advil/motrin/aleve after: 01/21/2025 at 11:50 am

## 2025-01-20 NOTE — OP NOTE
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 [C44.321]     * Asymmetry of face [Q67.0]     Procedures    DC PREP SITE F/S/N/H/F/G/M/D GT 1ST 100 SQ CM/1PCT [10740]  DC ADJT TIS TRNSFR/REARRGMT E/N/E/L DFCT 10 SQ CM/< [92943]  DC ADJT TIS REARGMT EYE/NOSE/EAR/LIP 10.1-30.0 SQCM [21174]  DC RHINOPLASTY SECONDARY MAJOR REVISION [20948]    Surgeons      * Sami Mcclain - Primary    Resident/Fellow/Other Assistant:  Surgeons and Role:  * No surgeons found with a matching role *    Staff:   Circulator: Cheli Schaeffer Person: Shwetha  Circulator: Alvaro    Anesthesia Staff: Anesthesiologist: Tyree Elena MD  CRNA: JENNI Pillai-CRNA  SRNA: Betty Sanders    Procedure Summary  Anesthesia: General  ASA: III  Estimated Blood Loss: 5mL  Intra-op Medications:   Administrations occurring from 1230 to 1530 on 25:   Medication Name Total Dose   oxymetazoline (Afrin) 0.05 % nasal spray 3 spray   lidocaine-epinephrine (Xylocaine W/EPI) 1 %-1:100,000 injection 18 mL   tranexamic acid (Cyklokapron) injection 0.2 g   balanced salts (BSS) intraocular solution 5 mL   mupirocin (Bactroban) 2 % ointment 1 Application   sodium chloride 0.9 % irrigation solution 500 mL   EPINEPHrine (Adrenalin) 1 mg/mL 2 mL, tranexamic acid (Cyklokapron) 10 mL in sodium chloride (PF) 0.9% 20 mL syringe 32 mL   ceFAZolin (Ancef) vial 1 g 2 g   dexAMETHasone (Decadron) 4 mg/mL 10 mg   ePHEDrine 50 mg/mL 10 mg   fentaNYL PF 0.05 mg/mL 100 mcg   glycopyrrolate (Robinul) injection 0.1 mg   LR infusion Cannot be calculated   lidocaine (Xylocaine) injection 2 % 60 mg   lidocaine (LTA Kit) for intubation 4 mL   methocarbamol (Robaxin) 100  mg/mL 1,000 mg   midazolam (Versed) 1 mg/1 mL 2 mg   ondansetron (Zofran) 2 mg/mL injection 4 mg   phenylephrine 100 mcg/mL syringe 10 mL (prefilled) 150 mcg   propofol (Diprivan) infusion 10 mg/mL 540.24 mg   succinylcholine 100 mg/5 mL syringe 100 mg              Anesthesia Record               Intraprocedure I/O Totals          Intake    Propofol Drip 0.00 mL    The total shown is the total volume documented since Anesthesia Start was filed.    LR infusion 800.00 mL    Total Intake 800 mL       Output    Est. Blood Loss 3 mL    Total Output 3 mL       Net    Net Volume 797 mL          Specimen: No specimens collected              Drains and/or Catheters: * None in log *    Tourniquet Times:         Implants:     Findings:   Hanging columella with excess soft tissue and thick subcutaneous scar  Right nasal bone deviation, improved contour status post osteotomy  Ptotic right brow, improved with direct brow lift 1cm    Indications: Adarsh De La Vega is an 54 y.o. 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     The patient was seen in the preoperative area. The risks, benefits, complications, treatment options, non-operative alternatives, expected recovery and outcomes were discussed with the patient. The possibilities of reaction to medication, pulmonary aspiration, injury to surrounding structures, bleeding, recurrent infection, the need for additional procedures, failure to diagnose a condition, and creating a complication requiring transfusion or operation were discussed with the patient. The patient concurred with the proposed plan, giving informed consent.  The site of surgery was properly noted/marked if necessary per policy. The patient has been actively warmed in preoperative area. Preoperative antibiotics have been ordered and given within 1 hours of incision. Venous thrombosis prophylaxis are not indicated.    Procedure Details:   .The  patient presented with nasal deformity status post rhinectomy and previous complex nasal reconstruction.  I discussed with the patient, in detail, the risks, benefits, limitations, and alternatives to the planned procedures. Risks discussed included but were not limited to infection, bleeding, septal perforation, synechia, need for further surgery, failure to achieve our desired results, worsened breathing, and worsened appearance. The patient expressed understanding of each of these complications and had all questions answered.    DESCRIPTION OF PROCEDURE:  The patient was brought to the operating room and placed in the supine position, then intubated under general anesthesia.   The face was prepped and draped in the usual sterile fashion.      First we proceeded with excision of scar tissue at the right suprabrow. A skin incision was made just superior to the right suprabrow and dissection was carried in a subcutaneous plane. This scar tissue was sharply discarded in order to prepare the defect for reconstruction measuring approximately 3 x 2.5 cm. Local tissue rearrangement including advancement rotation of the right periorbital skin was performed in order to achieve closure over this defect with limited undermining.  The wound bed was closed with 4-0 monocryl and 5-0 fastgut for skin.     Next, we proceeded with right lateral rhinotomy to address deformity of the nasal construct previously composed of radial forearm free flap and paramedian forehead flap skin..  A combined lateral rhinotomy on the right, combined bilateral marginal and transcolumellar incisions were made at the base of the nasal construct. The soft tissue was elevated over the nasal tip and lateral nasal bones. The piezotome with a straight saw was utilized to make medial lateral and transverse osteotomies in order to reshape the nasal bones. At the columella and nasal tip, excess fat and fibrotic tissue was sharply excised. Further contouring  of the caudal septal replacement was performed with sharp excision. The resultant defect overlying the columella measuring 1 x 0.5 cm was subsequently addressed. Rotation and advancement of the forehead skin was performed over the defect. Excess skin was excised with resultant improved contour of the forehead flap at the nasal columella and tip. The lateral rhinotomy, marginal and columella incisions were then closed using layered monocryl and 5-0 chromic.      The nose was gently cleansed with sterile saline and all incisions were covered with antibiotic ointment. The nose was packed bilaterally with xeroform gauze. This concluded the goals of the procedure.  The patient was turned over to Anesthesia, extubated, and transferred to the PACU.    Complications:  None; patient tolerated the procedure well.    Disposition: PACU - hemodynamically stable.  Condition: stable                 Additional Details:     Attending Attestation:     Sami Mcclain  Phone Number: [phone]

## 2025-01-20 NOTE — H&P
History Of Present Illness  Adarsh De La Vega is a 54 y.o. male with history of 2/29/24 forehead flap takedown; Has previously undergone OCRFF, PMFF, autologous rib grafting, auricular cartilage grafting for reconstruction. He presents today for revision nasal surgery.     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 His smoking use included cigarettes. He started smoking about 31 years ago. He has a 45 pack-year smoking history. He has never used smokeless tobacco. He reports that he does not currently use alcohol. He reports that he does not use drugs.    Family History  No family history on file.     Allergies  Patient has no known allergies.    Review of Systems   Negative  Physical Exam   Constitutional  Appearance: patient appears well-developed, patient is cooperative;  Communication/Voice: communication appropriate for developmental age;  Head and Face  Appearance: head appears normal; facial scars present;  Head and Face comments: See nasal recon below, forehead paramedian is now healed.    Ear  Auricles: right auricle normal; left auricle normal;  Nose  External Nose: nares patent bilaterally;  Nose comments: Paramedian reconstruction Patent bilaterally, see scope below.  No nasal masses along the right lateral aspect of the incision  Oral Cavity/Oropharynx  Lips: normal;  Gums: gingiva normal;  Oral mucosa: normal;  Neck  Neck: scars present;  Respiratory  Inspection: breathing unlabored;  Cardiovascular  Inspection: extremities are warm and well  "perfused;  Lymphatic  Palpation: no cervical adenopathy noted;  Neurovestibular  Mental Status: alert and oriented;  Psychiatric: mood normal; affect is appropriate;  Last Recorded Vitals  Blood pressure 132/80, pulse 53, temperature 36 °C (96.8 °F), temperature source Temporal, resp. rate 16, height 1.93 m (6' 4\"), weight 91 kg (200 lb 9.9 oz), SpO2 100%.    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

## 2025-01-20 NOTE — ANESTHESIA PREPROCEDURE EVALUATION
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       Clinical information reviewed: no acute problems   Tobacco  Allergies  Meds   Med Hx  Surg Hx   Fam Hx  Soc Hx        NPO Detail:  NPO/Void Status  Date of Last Liquid: 01/19/25  Time of Last Liquid: 2000  Date of Last Solid: 01/19/25  Time of Last Solid: 2000         Physical Exam    Airway  Mallampati: III  TM distance: <3 FB  Neck ROM: full     Cardiovascular - normal exam  Rhythm: regular  Rate: normal     Dental    Pulmonary    Abdominal      Other findings: Small mouth/opening, poor dentition, possible Difficult Airway      Anesthesia Plan    History of general anesthesia?: yes  History of complications of general anesthesia?: no    ASA 3     general     The patient is not a current smoker.    intravenous induction   Anesthetic plan and risks discussed with patient and spouse.    Plan discussed with CRNA and CAA.

## 2025-01-20 NOTE — ANESTHESIA POSTPROCEDURE EVALUATION
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 [C44.321])      (Asymmetry of face [Q67.0])    Surgeons: Sami Mcclain MD Responsible Provider: Tyree Elena MD    Anesthesia Type: general ASA Status: 3            Anesthesia Type: general    Vitals Value Taken Time   /68 01/20/25 1501   Temp 36 °C (96.8 °F) 01/20/25 1434   Pulse 58 01/20/25 1501   Resp 16 01/20/25 1501   SpO2 97 % 01/20/25 1501       Anesthesia Post Evaluation    Patient location during evaluation: bedside  Patient participation: complete - patient participated  Level of consciousness: awake  Pain management: satisfactory to patient  Multimodal analgesia pain management approach  Airway patency: patent  Cardiovascular status: acceptable  Respiratory status: acceptable  Hydration status: acceptable  Postoperative Nausea and Vomiting: none    No notable events documented.

## 2025-01-20 NOTE — ANESTHESIA PROCEDURE NOTES
Airway  Date/Time: 1/20/2025 12:51 PM  Urgency: elective    Airway not difficult    Staffing  Performed: MAXIMO   Authorized by: Tyree Elena MD    Performed by: Betty Sanders  Patient location during procedure: OR    Indications and Patient Condition  Indications for airway management: anesthesia and airway protection  Spontaneous Ventilation: absent  Sedation level: deep  Preoxygenated: yes  Patient position: sniffing  MILS maintained throughout  Mask difficulty assessment: 1 - vent by mask  Planned trial extubation    Final Airway Details  Final airway type: endotracheal airway      Successful airway: ETT and EZE tube  Cuffed: yes   Successful intubation technique: direct laryngoscopy  Facilitating devices/methods: intubating stylet  Endotracheal tube insertion site: oral  Blade: Ryan  Blade size: #4  ETT size (mm): 8.0  Cormack-Lehane Classification: grade I - full view of glottis  Placement verified by: capnometry   Measured from: lips  ETT to lips (cm): 22  Number of attempts at approach: 1  Ventilation between attempts: none  Number of other approaches attempted: 0

## 2025-01-21 ENCOUNTER — APPOINTMENT (OUTPATIENT)
Dept: GASTROENTEROLOGY | Facility: HOSPITAL | Age: 55
End: 2025-01-21
Payer: COMMERCIAL

## 2025-01-27 ENCOUNTER — OFFICE VISIT (OUTPATIENT)
Dept: OTOLARYNGOLOGY | Facility: CLINIC | Age: 55
End: 2025-01-27
Payer: COMMERCIAL

## 2025-01-27 ENCOUNTER — APPOINTMENT (OUTPATIENT)
Dept: OTOLARYNGOLOGY | Facility: CLINIC | Age: 55
End: 2025-01-27
Payer: COMMERCIAL

## 2025-01-27 VITALS
WEIGHT: 197 LBS | HEART RATE: 66 BPM | BODY MASS INDEX: 23.98 KG/M2 | DIASTOLIC BLOOD PRESSURE: 79 MMHG | SYSTOLIC BLOOD PRESSURE: 158 MMHG

## 2025-01-27 VITALS — BODY MASS INDEX: 23.99 KG/M2 | WEIGHT: 197 LBS | HEIGHT: 76 IN | TEMPERATURE: 97.5 F

## 2025-01-27 DIAGNOSIS — C44.321 SQUAMOUS CELL CARCINOMA OF NOSE: Primary | ICD-10-CM

## 2025-01-27 DIAGNOSIS — M95.0 NASAL DEFORMITY: Primary | ICD-10-CM

## 2025-01-27 PROCEDURE — 99024 POSTOP FOLLOW-UP VISIT: CPT | Performed by: OTOLARYNGOLOGY

## 2025-01-27 PROCEDURE — 3008F BODY MASS INDEX DOCD: CPT | Performed by: PHYSICIAN ASSISTANT

## 2025-01-27 PROCEDURE — 99024 POSTOP FOLLOW-UP VISIT: CPT | Performed by: PHYSICIAN ASSISTANT

## 2025-01-27 PROCEDURE — 1036F TOBACCO NON-USER: CPT | Performed by: OTOLARYNGOLOGY

## 2025-01-27 RX ORDER — AMOXICILLIN 500 MG/1
TABLET, FILM COATED ORAL
COMMUNITY
Start: 2024-11-14

## 2025-01-27 ASSESSMENT — ENCOUNTER SYMPTOMS
VOMITING: 0
VOICE CHANGE: 0
CHILLS: 0
NAUSEA: 0
UNEXPECTED WEIGHT CHANGE: 0
FEVER: 0
TROUBLE SWALLOWING: 0
ADENOPATHY: 0
COUGH: 0
SORE THROAT: 0
APPETITE CHANGE: 0
FATIGUE: 0
STRIDOR: 0
FACIAL SWELLING: 0
SHORTNESS OF BREATH: 0
NECK PAIN: 0

## 2025-01-27 NOTE — ASSESSMENT & PLAN NOTE
S/p total rhinectomy, s/p chemoradiation and nasal reconstruction  Right nasal pain s/p CT scan which is negative  No evidence of recurrence - will plan for endoscopy at next visit, deferred today due to revision last week by Dr. Mcclain  Reassurance provided  Follow up in 2-3 months

## 2025-01-27 NOTE — LETTER
January 27, 2025     Patient: Adarsh De La Vega   YOB: 1970   Date of Visit: 1/27/2025       To Whom It May Concern:    Adarsh De La Vega was seen in my clinic on 1/27/2025 at 2:30 pm. His wife accompanied him to this appointment. Please excuse her from work today to make this appointment with her .     If you have any questions or concerns, please don't hesitate to call our office at [phone]      Sincerely,     SHAHEEN Kumar RN

## 2025-01-27 NOTE — PATIENT INSTRUCTIONS
Dr. Mei evaluated you today.    Your care plan is outlined below:  -- Follow up with Dr. Mei in 4 mo.  This appointment was scheduled at the end of your visit today.  If you need to reschedule, please call the office at [phone].  Please keep in mind that last minute cancellations often result in delayed follow-up appointments.     General appointment line please call [phone]  For general questions or scheduling issues please call [phone] option #2   For medical questions or surgery scheduling please call [phone] on Mondays, Wednesdays and Thursdays or [phone] on Tuesdays and Fridays. Please be sure to leave a voice mail or your call will not be able to be returned.     Dr. Mei makes every effort to run on time for your appointments.  Therefore, if you are more than 30 minutes late for your appointment, unrelated to a scan or another appointment such as chemotherapy or radiation, your appointment will need to be rescheduled to another day.  We appreciate your understanding.

## 2025-01-27 NOTE — PROGRESS NOTES
Chief Complaint   Patient presents with    Follow-up     Cancer surveillance     HPI:  Adarsh De La Vega is a 54 y.o.  male following up with me today for his sinonasal SCCa s/p bilateral nasal endoscopy, total rhinectomy on 5/5/23. He completed adjuvant chemoradiation 8/28/23. Last seen 10/2024.  He is now s/p complex staged nasal reconstruction with Dr. Mcclain. He had recent nasal procedure 1/20/25 by Dr. Mcclain and had recent esophageal dilation by Dr. Frank x2 in 10/24 and 12/24 which did improve his swallowing.  Weight stable. Is being referred to GI for esophageal dysplasia.  They had to move the GI evaluation because it was the day after his surgery by Dr. Mcclain.  He was seen by Dr. Mcclain earlier today and is healing well.  He is having some pain by his right eyebrow revision.  Otherwise no nasal pain.  He gets some crusting R>L.  No concerns today.    History:  Dx: Squamous cell carcinoma sinonasal [id number]  1/23: Seen by an ENT who told him he had a nasal infection. developed a lump on the outside of his nose a few days following that appointment  2/21/23: Biopsy of nasal lesion. Path + for squamous cell carcinoma  2/27/23: seen by infectious disease and was in the hospital on IV antibiotics for about 1 week and discharged with a PICC line   3/17/23: CT neck and Chest- slightly enhancing nasal mass with involvement of the anterior nose & nasal septum. No pathologic lymphadenopathy. CT chest with 6mm pleural based nodule LLL.   3/20/23: First seen by me  5/5/23:S/p bilateral nasal endoscopy, total rhinectomy. Path +SCCa   7/10/23: Started adjuvant chemoXRT  7/31/23: Admitted for dehydration/odynophagia and PEG tube placed  8/14/23: In ED, waiting to be admitted for IV hydration  8/28/23: Completed adjuvant chemoXRT   10/23: CT neck/chest complete response, stable lung nodule  10/31/23: 1st stage prelamination of paramedian forehead flap  12/8/23: Osteocutaneous RFFF and 2nd stage  1/2/24: 3rd stage  nasal reconstruction  2/29/24: 4th stage nasal reconstruction  4/24/24: Revision nasal reconstruction to the left nares  5/24: MBS passed - small bites/sips, alternating  10/24: Dilated by Dr. Frank 32mm  12/24: Dilated by Dr. 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 Mcclain      SH:  Tob: Current 1/2 ppd smoker. Former 1ppd since a teen  ETOH: Social  Here with wife    ROS:  Review of Systems   Constitutional:  Negative for appetite change, chills, fatigue, fever and unexpected weight change.   HENT:  Negative for dental problem, drooling, ear pain, facial swelling, hearing loss, mouth sores, sore throat, tinnitus, trouble swallowing and voice change.         +nasal pain   Respiratory:  Negative for cough, shortness of breath and stridor.    Gastrointestinal:  Negative for nausea and vomiting.   Musculoskeletal:  Negative for neck pain.   Hematological:  Negative for adenopathy.   All other systems reviewed and are negative.       PE:  ENT Physical Exam  Constitutional  Appearance: patient appears well-developed, patient is cooperative;  Communication/Voice: communication appropriate for developmental age;  Head and Face  Appearance: head appears normal; facial scars present;  Head and Face comments: See nasal recon below, forehead paramedian scars are well healed. Recent revision to right eyebrow incision from paramedial are healing great, sutures in place.    Ear  Auricles: right auricle normal; left auricle normal;  Nose  External Nose: nares patent bilaterally;  Nose comments: Paramedian reconstruction, good contouring of the nose looks very good.  Patent bilaterally, R side is more restricted than left.   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 Mcclain  Reassurance provided  Follow up in 2-3 months              Dena Mei MD    Head & Neck Surgical Oncology & Reconstruction  Department of Otolaryngology - Head and Neck Surgery        By signing my name below, I, Esther Kumaribilda, attest that this documentation has been prepared under the direction and in the presence of Dr. Dena Mei MD.     All medical record entries made by the Scribe were at my direction and personally dictated by me, Dr. Dena Mei. I have reviewed the chart and agree that the record accurately reflects my personal performance of the history, physical exam, discussion and plan.

## 2025-01-27 NOTE — LETTER
January 27, 2025     Patient: Adarsh De La Vega   YOB: 1970   Date of Visit: 1/27/2025       To Whom It May Concern:    Adarsh De La Vega was seen in my clinic on 1/27/2025 at 2:30 pm. His wife accompanied him to this appointment today. Please excuse her from work today to make this appointment with her .    If you have any questions or concerns, please don't hesitate to call.         Sincerely,     SHAHEEN Kumar RN

## 2025-01-31 NOTE — PROGRESS NOTES
Facial Plastic & Reconstructive Surgery      POV s/p nasal surgery on 1/20/25    Incisions c/d/I  Nasal reconstruction intact

## 2025-02-04 ENCOUNTER — HOSPITAL ENCOUNTER (OUTPATIENT)
Dept: INFUSION THERAPY | Age: 55
Discharge: HOME | End: 2025-02-04
Payer: COMMERCIAL

## 2025-02-04 ENCOUNTER — OFFICE VISIT (OUTPATIENT)
Dept: ONCOLOGY | Age: 55
End: 2025-02-04
Payer: COMMERCIAL

## 2025-02-04 VITALS
TEMPERATURE: 98 F | DIASTOLIC BLOOD PRESSURE: 81 MMHG | HEART RATE: 60 BPM | SYSTOLIC BLOOD PRESSURE: 126 MMHG | WEIGHT: 199.2 LBS | BODY MASS INDEX: 24.26 KG/M2 | OXYGEN SATURATION: 100 % | HEIGHT: 76 IN

## 2025-02-04 DIAGNOSIS — R91.1 LUNG NODULE: ICD-10-CM

## 2025-02-04 DIAGNOSIS — C76.0 HEAD AND NECK CANCER (HCC): Primary | ICD-10-CM

## 2025-02-04 DIAGNOSIS — C76.0 MALIGNANT NEOPLASM OF HEAD, FACE AND NECK (HCC): ICD-10-CM

## 2025-02-04 DIAGNOSIS — C76.0 HEAD AND NECK CANCER (HCC): ICD-10-CM

## 2025-02-04 LAB
ALBUMIN SERPL-MCNC: 4.5 G/DL (ref 3.5–5.2)
ALP SERPL-CCNC: 97 U/L (ref 40–129)
ALT SERPL-CCNC: 23 U/L (ref 0–40)
ANION GAP SERPL CALCULATED.3IONS-SCNC: 10 MMOL/L (ref 7–16)
AST SERPL-CCNC: 17 U/L (ref 0–39)
BASOPHILS # BLD: 0.04 K/UL (ref 0–0.2)
BASOPHILS NFR BLD: 1 % (ref 0–2)
BILIRUB SERPL-MCNC: 0.8 MG/DL (ref 0–1.2)
BUN SERPL-MCNC: 12 MG/DL (ref 6–20)
CALCIUM SERPL-MCNC: 9.7 MG/DL (ref 8.6–10.2)
CHLORIDE SERPL-SCNC: 104 MMOL/L (ref 98–107)
CO2 SERPL-SCNC: 23 MMOL/L (ref 22–29)
CREAT SERPL-MCNC: 1.1 MG/DL (ref 0.7–1.2)
EOSINOPHIL # BLD: 0.16 K/UL (ref 0.05–0.5)
EOSINOPHILS RELATIVE PERCENT: 3 % (ref 0–6)
ERYTHROCYTE [DISTWIDTH] IN BLOOD BY AUTOMATED COUNT: 13.4 % (ref 11.5–15)
GFR, ESTIMATED: 85 ML/MIN/1.73M2
GLUCOSE SERPL-MCNC: 93 MG/DL (ref 74–99)
HCT VFR BLD AUTO: 48.4 % (ref 37–54)
HGB BLD-MCNC: 15.9 G/DL (ref 12.5–16.5)
IMM GRANULOCYTES # BLD AUTO: <0.03 K/UL (ref 0–0.58)
IMM GRANULOCYTES NFR BLD: 0 % (ref 0–5)
LYMPHOCYTES NFR BLD: 0.52 K/UL (ref 1.5–4)
LYMPHOCYTES RELATIVE PERCENT: 11 % (ref 20–42)
MCH RBC QN AUTO: 29.9 PG (ref 26–35)
MCHC RBC AUTO-ENTMCNC: 32.9 G/DL (ref 32–34.5)
MCV RBC AUTO: 91 FL (ref 80–99.9)
MONOCYTES NFR BLD: 0.38 K/UL (ref 0.1–0.95)
MONOCYTES NFR BLD: 8 % (ref 2–12)
NEUTROPHILS NFR BLD: 76 % (ref 43–80)
NEUTS SEG NFR BLD: 3.54 K/UL (ref 1.8–7.3)
PLATELET # BLD AUTO: 227 K/UL (ref 130–450)
PMV BLD AUTO: 10.2 FL (ref 7–12)
POTASSIUM SERPL-SCNC: 4.3 MMOL/L (ref 3.5–5)
PROT SERPL-MCNC: 7.5 G/DL (ref 6.4–8.3)
RBC # BLD AUTO: 5.32 M/UL (ref 3.8–5.8)
RBC # BLD: ABNORMAL 10*6/UL
SODIUM SERPL-SCNC: 137 MMOL/L (ref 132–146)
WBC OTHER # BLD: 4.7 K/UL (ref 4.5–11.5)

## 2025-02-04 PROCEDURE — 80053 COMPREHEN METABOLIC PANEL: CPT

## 2025-02-04 PROCEDURE — 99214 OFFICE O/P EST MOD 30 MIN: CPT | Performed by: INTERNAL MEDICINE

## 2025-02-04 PROCEDURE — 85025 COMPLETE CBC W/AUTO DIFF WBC: CPT

## 2025-02-04 PROCEDURE — 36415 COLL VENOUS BLD VENIPUNCTURE: CPT

## 2025-02-04 NOTE — PROGRESS NOTES
[facility] PHYSICIANS [facility] MED ONCOLOGY  [address]  [facility] [address]  Dept: [phone]  Loc: [phone]  Attending progress note       Reason for Visit: Squamous cell carcinoma of the nose/nasal septum.     Referring Physician:  Arnold Han MD     PCP:  Tarik Lemus MD     History of Present Illness:       Mr. Cantor is a pleasant 54-year-old gentleman, fairly healthy, who was diagnosed with squamous cell carcinoma of the nose/nasal septum.  The patient was hit by his dog's head on the left side of his nose in April 2022, in November 2022 he had noticed a lesion inside his nose, and she was diagnosed with staph infection, was treated with antibiotics, he then developed a pimple on his nose and eventually developed a hole in his nose, he was seen by ID, he was referred to the hospital due to concern about myelitis, he had a CT scan done revealing chronic sinusitis involving the left sphenoid sinus, he had a biopsy done on 2/21/2023, revealing invasive poorly differentiated squamous cell carcinoma, grade 3, the patient was referred Dr. Loredo, he underwent a total rhinectomy on 5/5/2023, the patient was found to have tumor growth to the glabella and the maxillary crest, path:   Case Summary Report   Procedure:    Rhinectomy, total   Tumor Site:   Nasal septal mucosa, cartilage, subcutaneous tissue, skin.     Tumor Laterality: Left and Right.   Left >> Right     Tumor Focality: Single focus     Tumor Size (Greatest dimension): At least 3.5 cm.     Histologic Type: Squamous cell carcinoma, keratinizing     Histologic Grade (squamous cell carcinomas only): Poorly differentiated     Specimen Margins (Main specimen; part B)     _X_ Involved by invasive carcinoma        Specify margin(s), per orientation, if possible: Superior and inferior   septal margins, superior right and left skin margins.     _X_ Uninvolved by high-grade dysplasia/in situ disease     Separately

## 2025-02-18 ENCOUNTER — PATIENT MESSAGE (OUTPATIENT)
Dept: OTOLARYNGOLOGY | Facility: CLINIC | Age: 55
End: 2025-02-18
Payer: COMMERCIAL

## 2025-03-18 ENCOUNTER — HOSPITAL ENCOUNTER (OUTPATIENT)
Dept: HYPERBARIC MEDICINE | Age: 55
Discharge: HOME | End: 2025-03-18
Payer: COMMERCIAL

## 2025-03-18 VITALS
HEART RATE: 59 BPM | RESPIRATION RATE: 16 BRPM | TEMPERATURE: 98 F | DIASTOLIC BLOOD PRESSURE: 78 MMHG | SYSTOLIC BLOOD PRESSURE: 118 MMHG

## 2025-03-18 DIAGNOSIS — M87.30 OSTEORADIONECROSIS (HCC): ICD-10-CM

## 2025-03-18 DIAGNOSIS — L59.8 SOFT TISSUE RADIONECROSIS: Chronic | ICD-10-CM
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
## 2025-03-18 DIAGNOSIS — C76.0 HEAD AND NECK CANCER (HCC): Primary | ICD-10-CM

## 2025-03-18 PROCEDURE — 99204 OFFICE O/P NEW MOD 45 MIN: CPT | Performed by: STUDENT IN AN ORGANIZED HEALTH CARE EDUCATION/TRAINING PROGRAM

## 2025-03-18 PROCEDURE — 99212 OFFICE O/P EST SF 10 MIN: CPT

## 2025-03-18 NOTE — PROGRESS NOTES
Hyperbaric Oxygen Therapy   Patient Orientation      NAME: Andrew Cantor  MEDICAL RECORD NUMBER:  [id number]  AGE: 54 y.o.   GENDER: male  : 1970  EPISODE DATE:  3/18/2025    HBO Orientation Completed with:  Patient         INTRODUCTION   Welcome to the Wound Center. This guide will assist in answering any questions which you might have concerning your scheduled hyperbaric oxygen treatment and if appropriate, any wound care you might need. During your stay with us you will hear your treatment called a “dive”.  This is just a nickname given to the procedure and does not refer to being in water.  You will only be exposed to air pressure changes during your treatments.    HYPERBARIC OXYGEN THERAPY  Hyperbaric oxygen treatment is a means of providing additional oxygen to your body tissues.  By increasing the oxygen in the tissues, healing is enhanced.  Two important effects on difficult wounds are first, to speed new microscopic blood vessel growth into the wound and second, to improve the ability of your white blood cells to kill germs.  It is important to know that hyperbaric oxygen is an additional (adjunctive) therapy in addition to the current medical or surgical care you are receiving.  It is also essential that you understand that this is not a “cure-all” but a part of your total medical care.     THE STAFF  The [facility] staff consists of fully trained physicians, nursing staff, and chamber operators.  There will always be a physician, as well as other staff members with you in the department while you are having your hyperbaric oxygen therapy treatment. Please feel free to ask for help from any of the staff members while you are here.    THE CHAMBER  The hyperbaric chamber located at the [facility] is a monoplace, seamless acrylic pressure chamber designed to treat one patient at a time.  You will be lying down with your head slightly elevated for your treatments.  A 
cavity (HCC) 3/28/2023       Past Surgical History:    Past Surgical History:   Procedure Laterality Date    APPENDECTOMY      CHOLECYSTECTOMY      ELBOW SURGERY      GASTROSTOMY TUBE PLACEMENT N/A 07/31/2023    EGD PEG TUBE PLACEMENT performed by Cachorro Mcwilliams MD at [facility] OR    INSERT PICC LINE Right 02/23/2023    removed March 2023    PORT SURGERY N/A 07/31/2023    PORT INSERTION performed by Cachorro Mcwilliams MD at [facility] OR    RHINECTOMY  05/05/2023    total rhinectomy    UPPER GASTROINTESTINAL ENDOSCOPY           Allergies:    Patient has no known allergies.    Labs:   CBC:   Lab Results   Component Value Date/Time    WBC 4.7 02/04/2025 10:50 AM    RBC 5.32 02/04/2025 10:50 AM    HGB 15.9 02/04/2025 10:50 AM    HCT 48.4 02/04/2025 10:50 AM    MCV 91.0 02/04/2025 10:50 AM    MCH 29.9 02/04/2025 10:50 AM    MCHC 32.9 02/04/2025 10:50 AM    RDW 13.4 02/04/2025 10:50 AM     02/04/2025 10:50 AM    MPV 10.2 02/04/2025 10:50 AM     CBC with Differential:    Lab Results   Component Value Date/Time    WBC 4.7 02/04/2025 10:50 AM    RBC 5.32 02/04/2025 10:50 AM    HGB 15.9 02/04/2025 10:50 AM    HCT 48.4 02/04/2025 10:50 AM     02/04/2025 10:50 AM    MCV 91.0 02/04/2025 10:50 AM    MCH 29.9 02/04/2025 10:50 AM    MCHC 32.9 02/04/2025 10:50 AM    RDW 13.4 02/04/2025 10:50 AM    NRBC 1 10/03/2023 08:36 AM    METASPCT 1 08/28/2023 09:17 AM    LYMPHOPCT 11 02/04/2025 10:50 AM    MONOPCT 8 02/04/2025 10:50 AM    EOSPCT 3 02/04/2025 10:50 AM    BASOPCT 1 02/04/2025 10:50 AM    MONOSABS 0.38 02/04/2025 10:50 AM    LYMPHSABS 0.52 02/04/2025 10:50 AM    EOSABS 0.16 02/04/2025 10:50 AM    BASOSABS 0.04 02/04/2025 10:50 AM     CMP:    Lab Results   Component Value Date/Time     02/04/2025 10:50 AM    K 4.3 02/04/2025 10:50 AM    K 3.9 02/21/2023 02:00 AM     02/04/2025 10:50 AM    CO2 23 02/04/2025 10:50 AM    BUN 12 02/04/2025 10:50 AM    CREATININE 1.1 02/04/2025 10:50 AM    GFRAA >60 04/04/2022 08:17 AM
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
## 2025-03-20 PROBLEM — L59.8 SOFT TISSUE RADIONECROSIS: Chronic | Status: ACTIVE | Noted: 2025-03-20

## 2025-03-21 ENCOUNTER — OFFICE VISIT (OUTPATIENT)
Dept: GASTROENTEROLOGY | Facility: CLINIC | Age: 55
End: 2025-03-21
Payer: COMMERCIAL

## 2025-03-21 VITALS
HEART RATE: 55 BPM | HEIGHT: 76 IN | TEMPERATURE: 97.6 F | DIASTOLIC BLOOD PRESSURE: 92 MMHG | BODY MASS INDEX: 24.72 KG/M2 | SYSTOLIC BLOOD PRESSURE: 142 MMHG | WEIGHT: 203 LBS

## 2025-03-21 DIAGNOSIS — K52.9 CHRONIC DIARRHEA: ICD-10-CM

## 2025-03-21 DIAGNOSIS — K21.9 GASTROESOPHAGEAL REFLUX DISEASE, UNSPECIFIED WHETHER ESOPHAGITIS PRESENT: ICD-10-CM

## 2025-03-21 DIAGNOSIS — Z12.11 ENCOUNTER FOR SCREENING COLONOSCOPY: ICD-10-CM

## 2025-03-21 DIAGNOSIS — Q39.8 SEVERE ESOPHAGEAL DYSPLASIA: Primary | ICD-10-CM

## 2025-03-21 DIAGNOSIS — K22.719 BARRETT'S ESOPHAGUS WITH DYSPLASIA: ICD-10-CM

## 2025-03-21 DIAGNOSIS — Z12.11 ENCOUNTER FOR SCREENING COLONOSCOPY: Primary | ICD-10-CM

## 2025-03-21 PROCEDURE — 99214 OFFICE O/P EST MOD 30 MIN: CPT | Performed by: NURSE PRACTITIONER

## 2025-03-21 PROCEDURE — 99204 OFFICE O/P NEW MOD 45 MIN: CPT | Performed by: NURSE PRACTITIONER

## 2025-03-21 PROCEDURE — 3008F BODY MASS INDEX DOCD: CPT | Performed by: NURSE PRACTITIONER

## 2025-03-21 RX ORDER — IBUPROFEN 800 MG/1
1 TABLET ORAL EVERY 8 HOURS PRN
COMMUNITY
Start: 2025-02-28

## 2025-03-21 RX ORDER — SODIUM, POTASSIUM,MAG SULFATES 17.5-3.13G
0.51 SOLUTION, RECONSTITUTED, ORAL ORAL SEE ADMIN INSTRUCTIONS
Qty: 354 ML | Refills: 0 | Status: SHIPPED | OUTPATIENT
Start: 2025-03-21

## 2025-03-21 ASSESSMENT — ENCOUNTER SYMPTOMS
FEVER: 0
SORE THROAT: 0
CHILLS: 0
FLANK PAIN: 0
JOINT SWELLING: 0
ARTHRALGIAS: 0
AGITATION: 0
LIGHT-HEADEDNESS: 0
HALLUCINATIONS: 0
DYSURIA: 0
FATIGUE: 0
BACK PAIN: 0
NERVOUS/ANXIOUS: 0
PHOTOPHOBIA: 0
COUGH: 0
HEMATURIA: 0
EYE PAIN: 0
PALPITATIONS: 0
DIAPHORESIS: 0
HEADACHES: 0
DIZZINESS: 0
SHORTNESS OF BREATH: 0
ADENOPATHY: 0
WHEEZING: 0
MYALGIAS: 0
FREQUENCY: 0
WEAKNESS: 0
NUMBNESS: 0

## 2025-03-21 NOTE — PROGRESS NOTES
Subjective   Patient ID: Adarsh De La Vega is a 54 y.o. male who presents for esophageal dysplasia per ENT referral.     This is a 54-year-old WM with history of GERD, sinonasal SCCa s/p bilateral nasal endoscopy, total rhinectomy on 5/5/23, adjuvant chemoradiation completed 8/28/23, and complex staged nasal reconstruction with Dr. Mcclain who is presenting to the GI clinic for an initial visit.     History per patient, wife, and review of EMR    Patient is accompanied to this visit by his wife Betty.    Reports in 8/2023 he began having issues with foods becoming stuck in his throat after swallowing.     On 9/4/24 patient saw Dr. Frank with ENT for dysphagia.     On 10/17/24 he underwent  esophagoscopy and dilation to 32mm. Found to have a 5 cm hiatal hernia and suspected Trevino's esophagus, but biopsies were without metaplasia or dysplasia. Negative for  EoE.     On 12/5/24 he underwent repeat esophagoscopy and dilation to 38 mm.  A 5 cm hiatal hernia was again noted. LA grade C esophagitis was noted at the GE junction.  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    Reports dysphagia has improved since his second dilation with Dr. Frank.     Reports heartburn and regurgitation has improved with OTC omeprazole two 20 mg tablets once daily. Coffee and chocolate will give him heartburn.     Reports having 2 EGDs many years back for what sounds like GERD.     ROS positive for longstanding diarrhea which started around time of his cholecystectomy.  He is not sure if the diarrhea started before the cholecystectomy or after the cholecystectomy.  He gets diarrhea every few weeks.  He uses Imodium with relief.  Denies nocturnal diarrhea.    Denies unintentional weight loss, odynophagia, vomiting, abdominal pain, abdominal bloating, abdominal distention, constipation, hematemesis, hematochezia, and melena.    Denies prior colorectal cancer screening      Past  medical history:  See above     Past surgical history:   See above   PEG tube (now removed)   Appendectomy (2009)   Cholecystectomy (2009)   Double elbow surgery for tendinitis  Right ankle surgery    Family history:  Unknown; adopted     Social history:  Quit smoking cigarettes on Feb 17, 2023   Rarely drinks alcohol; denies heavy alcohol consumption  Denies use of illicit drugs  From [place], [place]       Review of Systems   Constitutional:  Negative for chills, diaphoresis, fatigue and fever.   HENT:  Negative for congestion, ear pain, hearing loss, sneezing and sore throat.    Eyes:  Negative for photophobia, pain and visual disturbance.   Respiratory:  Negative for cough, shortness of breath and wheezing.    Cardiovascular:  Negative for chest pain, palpitations and leg swelling.   Endocrine: Negative for cold intolerance and heat intolerance.   Genitourinary:  Negative for dysuria, flank pain, frequency and hematuria.   Musculoskeletal:  Negative for arthralgias, back pain, gait problem, joint swelling and myalgias.   Skin:  Negative for rash.   Neurological:  Negative for dizziness, syncope, weakness, light-headedness, numbness and headaches.   Hematological:  Negative for adenopathy.   Psychiatric/Behavioral:  Negative for agitation and hallucinations. The patient is not nervous/anxious.        No Known Allergies    Current Outpatient Medications   Medication Sig Dispense Refill    acetaminophen (Tylenol) 325 mg tablet Take 3 tablets (975 mg) by mouth every 8 hours if needed for mild pain (1 - 3).      gabapentin (Neurontin) 300 mg capsule Take 1 capsule (300 mg) by mouth 2 times a day.      ibuprofen 800 mg tablet Take 1 tablet (800 mg) by mouth every 8 hours if needed for pain.      loperamide HCl (IMODIUM ORAL) Take by mouth if needed.      omeprazole OTC (PriLOSEC OTC) 20 mg EC tablet Take 1 tablet (20 mg) by mouth 2 times a day before meals. Do not crush, chew, or split. (Patient taking differently: Take  "1 tablet (20 mg) by mouth 2 times a day before meals. Do not crush, chew, or split.    Takes two 20 mg tablets once daily in the morning) 60 tablet 1    sodium,potassium,mag sulfates (Suprep) 17.5-3.13-1.6 gram solution Take 1 bottle by mouth see administration instructions. 354 mL 0     No current facility-administered medications for this visit.        Objective     Lab Results   Component Value Date    WBC 3.3 (L) 02/23/2024    HGB 13.4 (L) 02/23/2024    HCT 45.3 02/23/2024    MCV 95 02/23/2024     02/23/2024     Lab Results   Component Value Date    CREATININE 0.96 02/23/2024    BUN 13 02/23/2024     02/23/2024    K 3.9 02/23/2024     02/23/2024    CO2 26 02/23/2024      Lab Results   Component Value Date    ALT 7 (L) 02/23/2024    AST 10 02/23/2024    ALKPHOS 91 02/23/2024    BILITOT 0.7 02/23/2024       BP (!) 142/92   Pulse 55   Temp 36.4 °C (97.6 °F) (Temporal)   Ht 1.93 m (6' 4\")   Wt 92.1 kg (203 lb)   BMI 24.71 kg/m²     Physical Exam  Constitutional:       General: He is not in acute distress.     Appearance: Normal appearance.   HENT:      Head: Normocephalic and atraumatic.   Eyes:      Conjunctiva/sclera: Conjunctivae normal.   Cardiovascular:      Rate and Rhythm: Normal rate and regular rhythm.      Heart sounds: No murmur heard.     No gallop.   Pulmonary:      Effort: Pulmonary effort is normal.      Breath sounds: Normal breath sounds.   Abdominal:      General: Bowel sounds are normal. There is no distension.      Tenderness: There is no abdominal tenderness. There is no guarding.   Musculoskeletal:         General: No swelling or deformity. Normal range of motion.      Cervical back: Normal range of motion. No rigidity.   Skin:     General: Skin is warm and dry.      Coloration: Skin is not jaundiced.      Findings: No lesion or rash.   Neurological:      General: No focal deficit present.      Mental Status: He is alert and oriented to person, place, and time. "   Psychiatric:         Mood and Affect: Mood normal.         Assessment/Plan   Problem List Items Addressed This Visit       Gastroesophageal reflux disease     Other Visit Diagnoses       Severe esophageal dysplasia    -  Primary    Relevant Orders    Esophagogastroduodenoscopy (EGD) w RFA    Trevino's esophagus with dysplasia        Encounter for screening colonoscopy        Relevant Orders    Colonoscopy Screening; Average Risk Patient    Chronic diarrhea               Esophageal dysplasia:  - will proceed with EGD with Dr. Montes or Dr. Ball for consideration of EMR vs RFA vs other (discussed with Dr. Montes)     2.  Colorectal cancer screening:  - will proceed with screening colonoscopy  - OTC MiraLAX Gatorade split bowel prep    3.  GERD: overall controlled with omeprazole 40 mg/day  - EGD as above    4.  Chronic intermittent diarrhea: potentially functional in nature or secondary to post-cholecystectomy status. Mild and controlled with occasional use of Imodium.  - colonoscopy as above     5.  Follow-up:  - will be based upon the above

## 2025-03-21 NOTE — PATIENT INSTRUCTIONS
Thanks for coming to the GI clinic.    I would like you to get an EGD with Dr. Montes or Dr. Ball.     I would like you to get a screening colonoscopy.   Please read all of the instructions 7 days before your colonoscopy.   You will need to take ONLY clear liquids the ENTIRE day before your procedure. These include (clear fruit juices, soda, Gatorade, broth, jello and coffee/tea) Avoid red and purple drinks. No cream or milk in the coffee.   You will need to take a bowel preparation.   You will also need a .      Someone will contact you regarding scheduling the EGD and colonoscopy.  Otherwise please call [phone] to schedule.    Follow up will be based upon the above.

## 2025-03-21 NOTE — LETTER
March 21, 2025     Patient: Adarsh De La Vega   YOB: 1970   Date of Visit: 3/21/2025       To Whom It May Concern:    Adarsh De La Vega was seen in my clinic with his wife Betty on 3/21/2025 at 10:10 am.     If you have any questions or concerns, please don't hesitate to call.         Sincerely,         Manfred Fox, APRN-CNP

## 2025-03-24 ENCOUNTER — PATIENT MESSAGE (OUTPATIENT)
Dept: OTOLARYNGOLOGY | Facility: CLINIC | Age: 55
End: 2025-03-24
Payer: COMMERCIAL

## 2025-03-31 ENCOUNTER — HOSPITAL ENCOUNTER (OUTPATIENT)
Dept: HYPERBARIC MEDICINE | Age: 55
Discharge: HOME | End: 2025-03-31
Payer: COMMERCIAL

## 2025-03-31 VITALS
TEMPERATURE: 98 F | HEART RATE: 60 BPM | RESPIRATION RATE: 16 BRPM | SYSTOLIC BLOOD PRESSURE: 138 MMHG | DIASTOLIC BLOOD PRESSURE: 70 MMHG

## 2025-03-31 DIAGNOSIS — L59.8 SOFT TISSUE RADIONECROSIS: ICD-10-CM
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
## 2025-03-31 DIAGNOSIS — M87.30 OSTEORADIONECROSIS (HCC): Primary | ICD-10-CM
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
## 2025-03-31 PROCEDURE — 99183 HYPERBARIC OXYGEN THERAPY: CPT | Performed by: SURGERY

## 2025-03-31 PROCEDURE — G0277 HBOT, FULL BODY CHAMBER, 30M: HCPCS

## 2025-03-31 NOTE — PROGRESS NOTES
[facility]  Hyperbaric Oxygen Therapy   Progress Note    NAME: Andrew Cantor  MEDICAL RECORD NUMBER:  [id number]  AGE: 54 y.o.   GENDER: male  : 1970  EPISODE DATE:  3/31/2025   Subjective   HBO Treatment Number: 1 out of Total Treatments: 30  HBO Diagnosis:   Problem List Items Addressed This Visit       Osteoradionecrosis (HCC) - Primary    Relevant Orders    Notify physician (specify)    Hyperbaric Oxygen Therapy    Soft tissue radionecrosis (Chronic)    Relevant Orders    Notify physician (specify)    Hyperbaric Oxygen Therapy     Safety checks performed prior to treatment.  See doc flowsheets for documentation.  Objective      Please see lab results for finger stick glucose results  Pre treatment Vital Signs       Temp: 97.9 °F (36.6 °C)     Pulse: 56     Respirations: 16     BP: 131/86     Post treatment Vital Signs  Temp: 98 °F (36.7 °C)  Pulse: 60  Respirations: 16  BP: 138/70  Assessment      Physical Exam:  General Appearance:  alert and oriented to person, place and time, well-developed and well-nourished, in no acute distress  ENT:  tympanic membranes intact bilaterally, difficult to visualize because of buildup of wax, patient using Debrox, discussed with the nursing staff in the hyperbaric oxygen therapy department, to facilitate ENT appointment  Pulmonary/Chest:  clear to auscultation bilaterally- no wheezes, rales or rhonchi, normal air movement, no respiratory distress  Cardiovascular:  regular rate and rhythm  Chamber #: [id number]  Treatment Start Time: 1000     Pressure Reached Time: 1020  RETA : 2  Number of Air Breaks:  Treatment Status: Air break X 2 for 5 minutes each     Decompression Time: 1204   Treatment End Time: 1217  Length of Treatment: 90 Minutes  Symptoms Noted During Treatment: None  Total Treatment Time (min): 137    Adverse Event: no    I was present on these premises and immediately available to furnish assistance & direction throughout the procedure.

## 2025-04-01 ENCOUNTER — HOSPITAL ENCOUNTER (OUTPATIENT)
Dept: HYPERBARIC MEDICINE | Age: 55
Discharge: HOME | End: 2025-04-01
Payer: COMMERCIAL

## 2025-04-01 VITALS
RESPIRATION RATE: 16 BRPM | HEART RATE: 55 BPM | TEMPERATURE: 95.2 F | SYSTOLIC BLOOD PRESSURE: 134 MMHG | DIASTOLIC BLOOD PRESSURE: 88 MMHG
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
## 2025-04-01 DIAGNOSIS — L59.8 SOFT TISSUE RADIONECROSIS: ICD-10-CM

## 2025-04-01 DIAGNOSIS — M87.30 OSTEORADIONECROSIS (HCC): Primary | ICD-10-CM

## 2025-04-01 PROCEDURE — G0277 HBOT, FULL BODY CHAMBER, 30M: HCPCS

## 2025-04-01 PROCEDURE — 99183 HYPERBARIC OXYGEN THERAPY: CPT | Performed by: STUDENT IN AN ORGANIZED HEALTH CARE EDUCATION/TRAINING PROGRAM

## 2025-04-01 NOTE — DISCHARGE INSTRUCTIONS
Discharge instructions were reviewed with patient and written instructions were declined at this time.

## 2025-04-01 NOTE — PROGRESS NOTES
[facility]  Hyperbaric Oxygen Therapy   Progress Note    NAME: Andrew Cantor  MEDICAL RECORD NUMBER:  [id number]  AGE: 54 y.o.   GENDER: male  : 1970  EPISODE DATE:  2025   Subjective   HBO Treatment Number: 2 out of Total Treatments: 30  HBO Diagnosis:   Problem List Items Addressed This Visit          Musculoskeletal and Integument    Osteoradionecrosis (HCC) - Primary    Relevant Orders    Notify physician (specify)    Hyperbaric Oxygen Therapy       Other    Soft tissue radionecrosis (Chronic)    Relevant Orders    Notify physician (specify)    Hyperbaric Oxygen Therapy     Safety checks performed prior to treatment.  See doc flowsheets for documentation.  Objective      Please see lab results for finger stick glucose results  Pre treatment Vital Signs       Temp: (!) 96 °F (35.6 °C)     Pulse: 63     Respirations: 16     BP: 125/86     Post treatment Vital Signs  Temp: (!) 95.2 °F (35.1 °C)  Pulse: 55  Respirations: 16  BP: 134/88  Assessment      Physical Exam:  General Appearance:  alert and oriented to person, place and time, well-developed and well-nourished, in no acute distress  ENT:  tympanic membranes intact bilaterally, difficult to visualize because of buildup of wax, patient using Debrox, discussed with the nursing staff in the hyperbaric oxygen therapy department, to facilitate ENT appointment  Pulmonary/Chest:  clear to auscultation bilaterally- no wheezes, rales or rhonchi, normal air movement, no respiratory distress  Cardiovascular:  regular rate and rhythm  Chamber #: [id number]  Treatment Start Time: 1012     Pressure Reached Time: 1027  RETA : 2.5  Number of Air Breaks:  Treatment Status: Air break X 2 for 5 minutes each     Decompression Time: 1208   Treatment End Time: 1219  Length of Treatment: 90 Minutes  Symptoms Noted During Treatment: None  Total Treatment Time (min): 127    Adverse Event: no    I was present on these premises and immediately available

## 2025-04-02 ENCOUNTER — HOSPITAL ENCOUNTER (OUTPATIENT)
Dept: HYPERBARIC MEDICINE | Age: 55
Discharge: HOME | End: 2025-04-02
Payer: COMMERCIAL

## 2025-04-02 VITALS
DIASTOLIC BLOOD PRESSURE: 84 MMHG | SYSTOLIC BLOOD PRESSURE: 141 MMHG | HEART RATE: 66 BPM | TEMPERATURE: 97.2 F | RESPIRATION RATE: 18 BRPM

## 2025-04-02 DIAGNOSIS — L59.8 SOFT TISSUE RADIONECROSIS: ICD-10-CM

## 2025-04-02 DIAGNOSIS — M87.30 OSTEORADIONECROSIS (HCC): Primary | ICD-10-CM
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
## 2025-04-02 PROCEDURE — G0277 HBOT, FULL BODY CHAMBER, 30M: HCPCS

## 2025-04-02 PROCEDURE — 99183 HYPERBARIC OXYGEN THERAPY: CPT | Performed by: SURGERY

## 2025-04-02 NOTE — PROGRESS NOTES
[facility]  Hyperbaric Oxygen Therapy   Progress Note    NAME: Andrew Cantor  MEDICAL RECORD NUMBER:  [id number]  AGE: 54 y.o.   GENDER: male  : 1970  EPISODE DATE:  2025   Subjective   HBO Treatment Number: 3 out of Total Treatments: 30  HBO Diagnosis:   Problem List Items Addressed This Visit       Soft tissue radionecrosis (Chronic)    Relevant Orders    Notify physician (specify)    Hyperbaric Oxygen Therapy    Osteoradionecrosis (HCC) - Primary    Relevant Orders    Notify physician (specify)    Hyperbaric Oxygen Therapy     Safety checks performed prior to treatment.  See doc flowsheets for documentation.  Objective      Please see lab results for finger stick glucose results  Pre treatment Vital Signs       Temp: 97.2 °F (36.2 °C)     Pulse: 66     Respirations: 18     BP: (!) 141/84     Post treatment Vital Signs  Temp: 97.2 °F (36.2 °C)  Pulse: 66  Respirations: 18  BP: (!) 141/84  Assessment      Physical Exam:  General Appearance:  alert and oriented to person, place and time, well-developed and well-nourished, in no acute distress  ENT:  tympanic membranes intact bilaterally  Pulmonary/Chest:  clear to auscultation bilaterally- no wheezes, rales or rhonchi, normal air movement, no respiratory distress  Cardiovascular:  S1S2  Chamber #: [id number]  Treatment Start Time: 0941     Pressure Reached Time: 0955  RETA : 2.5  Number of Air Breaks:  Treatment Status: Air break X 2 for 5 minutes each     Decompression Time: 1138   Treatment End Time: 1152  Length of Treatment: 90 Minutes  Symptoms Noted During Treatment: None  Total Treatment Time (min): 131    Adverse Event: no    I was present on these premises and immediately available to furnish assistance & direction throughout the procedure.   Plan      Andrew Cantor is a 54 y.o. male  did successfully complete today's hyperbaric oxygen treatment at [facility].    In my clinical

## 2025-04-03 ENCOUNTER — HOSPITAL ENCOUNTER (OUTPATIENT)
Dept: HYPERBARIC MEDICINE | Age: 55
Discharge: HOME | End: 2025-04-03
Payer: COMMERCIAL

## 2025-04-03 VITALS
TEMPERATURE: 97.1 F | RESPIRATION RATE: 18 BRPM | HEART RATE: 57 BPM | SYSTOLIC BLOOD PRESSURE: 136 MMHG | DIASTOLIC BLOOD PRESSURE: 66 MMHG

## 2025-04-03 DIAGNOSIS — M87.30 OSTEORADIONECROSIS (HCC): Primary | ICD-10-CM

## 2025-04-03 DIAGNOSIS — L59.8 SOFT TISSUE RADIONECROSIS: ICD-10-CM
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
## 2025-04-03 PROCEDURE — G0277 HBOT, FULL BODY CHAMBER, 30M: HCPCS

## 2025-04-03 NOTE — PROGRESS NOTES
Andrew Cantor is a 54 y.o. male has been receiving hyperbaric oxygen treatment for management of:Indication  Indications: Other (Comment) (Osteoradionecrosis; Soft tissue radionecrosis). Mr. Cantor has completed Treatment Number: 4 out of a treatment protocol of Total Treatments: 30.    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    Pt is tolerating hyperbaric oxygen therapy  well without complications.         Ashley Sims RN  4/3/2025  10:29 AM

## 2025-04-04 ENCOUNTER — HOSPITAL ENCOUNTER (OUTPATIENT)
Dept: HYPERBARIC MEDICINE | Age: 55
Discharge: HOME | End: 2025-04-04
Payer: COMMERCIAL

## 2025-04-04 VITALS
TEMPERATURE: 97 F | DIASTOLIC BLOOD PRESSURE: 70 MMHG | HEART RATE: 58 BPM | RESPIRATION RATE: 16 BRPM | SYSTOLIC BLOOD PRESSURE: 138 MMHG

## 2025-04-04 DIAGNOSIS — L59.8 SOFT TISSUE RADIONECROSIS: ICD-10-CM
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
## 2025-04-04 DIAGNOSIS — M87.30 OSTEORADIONECROSIS (HCC): Primary | ICD-10-CM

## 2025-04-04 PROCEDURE — G0277 HBOT, FULL BODY CHAMBER, 30M: HCPCS

## 2025-04-07 ENCOUNTER — HOSPITAL ENCOUNTER (OUTPATIENT)
Dept: HYPERBARIC MEDICINE | Age: 55
Discharge: HOME | End: 2025-04-07
Payer: COMMERCIAL

## 2025-04-07 VITALS
TEMPERATURE: 97.4 F | RESPIRATION RATE: 16 BRPM | SYSTOLIC BLOOD PRESSURE: 122 MMHG | DIASTOLIC BLOOD PRESSURE: 80 MMHG | HEART RATE: 59 BPM
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
## 2025-04-07 DIAGNOSIS — M87.30 OSTEORADIONECROSIS (HCC): Primary | ICD-10-CM
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
## 2025-04-07 DIAGNOSIS — L59.8 SOFT TISSUE RADIONECROSIS: ICD-10-CM

## 2025-04-07 PROCEDURE — G0277 HBOT, FULL BODY CHAMBER, 30M: HCPCS

## 2025-04-07 PROCEDURE — 99183 HYPERBARIC OXYGEN THERAPY: CPT | Performed by: NURSE PRACTITIONER

## 2025-04-07 NOTE — PROGRESS NOTES
[place].    In my clinical judgement, ongoing HBO therapy is  necessary at this time, given a threat to patient function, limb or life from the current condition.      Supervision and attendance of Hyperbaric Oxygen Therapy provided.  Continue HBO treatment as outlined in the treatment plan.    Hyperbaric Oxygen: Andrew Cantor tolerated Treatment Number: 6 well today without complications.    Discharge Instructions were explained and given to Mr. Cantor     Electronically signed by OLYA Dominguez CNP on 4/7/2025 at 5:19 PM

## 2025-04-08 ENCOUNTER — HOSPITAL ENCOUNTER (OUTPATIENT)
Dept: HYPERBARIC MEDICINE | Age: 55
Discharge: HOME | End: 2025-04-08
Payer: COMMERCIAL

## 2025-04-08 VITALS
HEART RATE: 54 BPM | TEMPERATURE: 97.2 F | SYSTOLIC BLOOD PRESSURE: 136 MMHG | DIASTOLIC BLOOD PRESSURE: 84 MMHG | RESPIRATION RATE: 18 BRPM
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
## 2025-04-08 DIAGNOSIS — L59.8 SOFT TISSUE RADIONECROSIS: ICD-10-CM
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
## 2025-04-08 DIAGNOSIS — M87.30 OSTEORADIONECROSIS (HCC): Primary | ICD-10-CM

## 2025-04-08 PROCEDURE — 99183 HYPERBARIC OXYGEN THERAPY: CPT | Performed by: STUDENT IN AN ORGANIZED HEALTH CARE EDUCATION/TRAINING PROGRAM

## 2025-04-08 PROCEDURE — G0277 HBOT, FULL BODY CHAMBER, 30M: HCPCS

## 2025-04-08 NOTE — PROGRESS NOTES
[facility].    In my clinical judgement, ongoing HBO therapy is  necessary at this time, given a threat to patient function, limb or life from the current condition.      Supervision and attendance of Hyperbaric Oxygen Therapy provided.  Continue HBO treatment as outlined in the treatment plan.    Hyperbaric Oxygen: Andrew Cantor tolerated Treatment Number: 7 well today without complications.    Discharge Instructions were explained and given to Mr. Cantor     Electronically signed by Alise Phillips MD on 4/8/2025 at 3:41 PM

## 2025-04-09 ENCOUNTER — HOSPITAL ENCOUNTER (OUTPATIENT)
Dept: HYPERBARIC MEDICINE | Age: 55
Discharge: HOME | End: 2025-04-09
Payer: COMMERCIAL

## 2025-04-09 VITALS
SYSTOLIC BLOOD PRESSURE: 116 MMHG | DIASTOLIC BLOOD PRESSURE: 80 MMHG | RESPIRATION RATE: 18 BRPM | HEART RATE: 58 BPM | TEMPERATURE: 95.8 F
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
## 2025-04-09 DIAGNOSIS — M87.30 OSTEORADIONECROSIS (HCC): Primary | ICD-10-CM
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
## 2025-04-09 DIAGNOSIS — L59.8 SOFT TISSUE RADIONECROSIS: ICD-10-CM

## 2025-04-09 PROCEDURE — 99183 HYPERBARIC OXYGEN THERAPY: CPT | Performed by: SURGERY

## 2025-04-09 PROCEDURE — G0277 HBOT, FULL BODY CHAMBER, 30M: HCPCS

## 2025-04-10 ENCOUNTER — HOSPITAL ENCOUNTER (OUTPATIENT)
Dept: HYPERBARIC MEDICINE | Age: 55
Discharge: HOME | End: 2025-04-10
Payer: COMMERCIAL

## 2025-04-10 VITALS
SYSTOLIC BLOOD PRESSURE: 138 MMHG | RESPIRATION RATE: 16 BRPM | TEMPERATURE: 97 F | DIASTOLIC BLOOD PRESSURE: 80 MMHG | HEART RATE: 55 BPM

## 2025-04-10 DIAGNOSIS — L59.8 SOFT TISSUE RADIONECROSIS: ICD-10-CM

## 2025-04-10 DIAGNOSIS — M87.30 OSTEORADIONECROSIS (HCC): Primary | ICD-10-CM
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
## 2025-04-10 PROCEDURE — G0277 HBOT, FULL BODY CHAMBER, 30M: HCPCS

## 2025-04-10 NOTE — PROGRESS NOTES
Oxygen Therapy provided.  Continue HBO treatment as outlined in the treatment plan.    Hyperbaric Oxygen: Andrew Cantor tolerated Treatment Number: 8 well today without complications.    Discharge Instructions were explained and given to Mr. Cantor     Electronically signed by Todd Hernández MD on 4/9/2025 at 7:16 PM

## 2025-04-10 NOTE — PROGRESS NOTES
Andrew Cantor is a 54 y.o. male has been receiving hyperbaric oxygen treatment for management of:Indication  Indications: Other (Comment) (Osteoradionecrosis; Soft tissue radionecrosis). Mr. Cantor has completed Treatment Number: 9 out of a treatment protocol of Total Treatments: 30.    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    Pt is tolerating hyperbaric oxygen therapy  well without complications.         Ashley Sims RN  4/10/2025  10:37 AM

## 2025-04-11 ENCOUNTER — HOSPITAL ENCOUNTER (OUTPATIENT)
Dept: HYPERBARIC MEDICINE | Age: 55
Discharge: HOME | End: 2025-04-11
Payer: COMMERCIAL

## 2025-04-11 VITALS
SYSTOLIC BLOOD PRESSURE: 142 MMHG | RESPIRATION RATE: 16 BRPM | DIASTOLIC BLOOD PRESSURE: 86 MMHG | HEART RATE: 42 BPM | TEMPERATURE: 96 F

## 2025-04-11 DIAGNOSIS — M87.30 OSTEORADIONECROSIS (HCC): Primary | ICD-10-CM

## 2025-04-11 DIAGNOSIS — L59.8 SOFT TISSUE RADIONECROSIS: ICD-10-CM
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
## 2025-04-11 PROCEDURE — G0277 HBOT, FULL BODY CHAMBER, 30M: HCPCS

## 2025-04-14 ENCOUNTER — HOSPITAL ENCOUNTER (OUTPATIENT)
Dept: HYPERBARIC MEDICINE | Age: 55
Discharge: HOME | End: 2025-04-14
Payer: COMMERCIAL

## 2025-04-14 VITALS
RESPIRATION RATE: 16 BRPM | HEART RATE: 52 BPM | SYSTOLIC BLOOD PRESSURE: 128 MMHG | TEMPERATURE: 95.7 F | DIASTOLIC BLOOD PRESSURE: 82 MMHG

## 2025-04-14 DIAGNOSIS — L59.8 SOFT TISSUE RADIONECROSIS: Chronic | ICD-10-CM
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
## 2025-04-14 DIAGNOSIS — M87.30 OSTEORADIONECROSIS (HCC): Primary | ICD-10-CM

## 2025-04-14 PROCEDURE — G0277 HBOT, FULL BODY CHAMBER, 30M: HCPCS

## 2025-04-14 PROCEDURE — 99183 HYPERBARIC OXYGEN THERAPY: CPT | Performed by: SURGERY

## 2025-04-14 NOTE — PROGRESS NOTES
[facility]  Hyperbaric Oxygen Therapy   Progress Note    NAME: Andrew Cantor  MEDICAL RECORD NUMBER:  [id number]  AGE: 54 y.o.   GENDER: male  : 1970  EPISODE DATE:  2025   Subjective   HBO Treatment Number: 11 out of Total Treatments: 30  HBO Diagnosis:   Problem List Items Addressed This Visit       Osteoradionecrosis (HCC) - Primary    Soft tissue radionecrosis (Chronic)     Safety checks performed prior to treatment.  See doc flowsheets for documentation.  Objective      Please see lab results for finger stick glucose results  Pre treatment Vital Signs       Temp: (!) 96.2 °F (35.7 °C)     Pulse: 83     Respirations: 17     BP: 132/78     Post treatment Vital Signs  Temp: (!) 95.7 °F (35.4 °C)  Pulse: 52  Respirations: 16  BP: 128/82  Assessment      Physical Exam:  General Appearance:  alert and oriented to person, place and time, well-developed and well-nourished, in no acute distress  ENT:  tympanic membranes intact bilaterally  Pulmonary/Chest:  clear to auscultation bilaterally- no wheezes, rales or rhonchi, normal air movement, no respiratory distress  Cardiovascular:  regular rate and rhythm  Chamber #: [id number]  Treatment Start Time: 0950     Pressure Reached Time: 1005  RETA : 2.5  Number of Air Breaks:  Treatment Status: Air break X 2 for 5 minutes each     Decompression Time: 1145   Treatment End Time: 1158  Length of Treatment: 90 Minutes  Symptoms Noted During Treatment: None  Total Treatment Time (min): 128    Adverse Event: no    I was present on these premises and immediately available to furnish assistance & direction throughout the procedure.   Plan      Andrew Cantor is a 54 y.o. male  did successfully complete today's hyperbaric oxygen treatment at [facility].    In my clinical judgement, ongoing HBO therapy is  necessary at this time, given a threat to patient function, limb or life from the current condition.

## 2025-04-15 ENCOUNTER — HOSPITAL ENCOUNTER (OUTPATIENT)
Dept: HYPERBARIC MEDICINE | Age: 55
Discharge: HOME | End: 2025-04-15
Payer: COMMERCIAL

## 2025-04-15 VITALS
TEMPERATURE: 97.4 F | DIASTOLIC BLOOD PRESSURE: 79 MMHG | RESPIRATION RATE: 17 BRPM | SYSTOLIC BLOOD PRESSURE: 143 MMHG | HEART RATE: 59 BPM
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
## 2025-04-15 DIAGNOSIS — L59.8 SOFT TISSUE RADIONECROSIS: ICD-10-CM

## 2025-04-15 DIAGNOSIS — M87.30 OSTEORADIONECROSIS (HCC): Primary | ICD-10-CM
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
## 2025-04-15 PROCEDURE — G0277 HBOT, FULL BODY CHAMBER, 30M: HCPCS

## 2025-04-16 ENCOUNTER — HOSPITAL ENCOUNTER (OUTPATIENT)
Dept: HYPERBARIC MEDICINE | Age: 55
Discharge: HOME | End: 2025-04-16
Payer: COMMERCIAL

## 2025-04-16 VITALS
SYSTOLIC BLOOD PRESSURE: 134 MMHG | HEART RATE: 58 BPM | DIASTOLIC BLOOD PRESSURE: 82 MMHG | RESPIRATION RATE: 16 BRPM | TEMPERATURE: 97.5 F

## 2025-04-16 PROCEDURE — G0277 HBOT, FULL BODY CHAMBER, 30M: HCPCS

## 2025-04-17 ENCOUNTER — HOSPITAL ENCOUNTER (OUTPATIENT)
Dept: HYPERBARIC MEDICINE | Age: 55
Discharge: HOME | End: 2025-04-17
Payer: COMMERCIAL

## 2025-04-17 VITALS
TEMPERATURE: 95.3 F | SYSTOLIC BLOOD PRESSURE: 132 MMHG | RESPIRATION RATE: 16 BRPM | HEART RATE: 53 BPM | DIASTOLIC BLOOD PRESSURE: 72 MMHG

## 2025-04-17 DIAGNOSIS — L59.8 SOFT TISSUE RADIONECROSIS: ICD-10-CM
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
## 2025-04-17 DIAGNOSIS — M87.30 OSTEORADIONECROSIS (HCC): Primary | ICD-10-CM
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
## 2025-04-17 PROCEDURE — G0277 HBOT, FULL BODY CHAMBER, 30M: HCPCS

## 2025-04-17 NOTE — PROGRESS NOTES
Andrew Cantor is a 54 y.o. male has been receiving hyperbaric oxygen treatment for management of:Indication  Indications: Late Effect of Radiation (Osteoradionecrosis;Soft tissue radionecrosis). Mr. Cantor has completed Treatment Number: 14 out of a treatment protocol of Total Treatments: 30.    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    Pt is tolerating hyperbaric oxygen therapy  well without complications.         Ashley Sims RN  4/17/2025  11:18 AM

## 2025-04-18 ENCOUNTER — HOSPITAL ENCOUNTER (OUTPATIENT)
Dept: HYPERBARIC MEDICINE | Age: 55
Discharge: HOME | End: 2025-04-18
Payer: COMMERCIAL

## 2025-04-18 VITALS
HEART RATE: 50 BPM | TEMPERATURE: 96.8 F | SYSTOLIC BLOOD PRESSURE: 142 MMHG | DIASTOLIC BLOOD PRESSURE: 92 MMHG | RESPIRATION RATE: 16 BRPM
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
## 2025-04-18 DIAGNOSIS — L59.8 SOFT TISSUE RADIONECROSIS: ICD-10-CM

## 2025-04-18 DIAGNOSIS — M87.30 OSTEORADIONECROSIS (HCC): Primary | ICD-10-CM

## 2025-04-18 PROCEDURE — G0277 HBOT, FULL BODY CHAMBER, 30M: HCPCS

## 2025-04-18 NOTE — PROGRESS NOTES
[facility]  Hyperbaric Oxygen Therapy   Progress Note    NAME: Andrew Cantor  MEDICAL RECORD NUMBER:  [id number]  AGE: 54 y.o.   GENDER: male  : 1970  EPISODE DATE:  2025   Subjective   HBO Treatment Number: 15 out of Total Treatments: 30  HBO Diagnosis:   Problem List Items Addressed This Visit       Osteoradionecrosis (HCC) - Primary    Relevant Orders    Notify physician (specify)    Hyperbaric Oxygen Therapy    Soft tissue radionecrosis (Chronic)    Relevant Orders    Notify physician (specify)    Hyperbaric Oxygen Therapy     Safety checks performed prior to treatment.  See doc flowsheets for documentation.  Objective      Please see lab results for finger stick glucose results  Pre treatment Vital Signs       Temp: 96.9 °F (36.1 °C)     Pulse: 69     Respirations: 16     BP: 123/87     Post treatment Vital Signs  Temp: 96.8 °F (36 °C)  Pulse: 50  Respirations: 16  BP: (!) 142/92  Assessment      Physical Exam:  General Appearance:  alert and oriented to person, place and time, well-developed and well-nourished, in no acute distress  ENT:  tympanic membranes intact bilaterally  Pulmonary/Chest:  clear to auscultation bilaterally- no wheezes, rales or rhonchi, normal air movement, no respiratory distress  Cardiovascular:  regular rate and rhythm  Chamber #: [id number]  Treatment Start Time: 1001     Pressure Reached Time: 1018  RETA : 2.5  Number of Air Breaks:  Treatment Status: Air break X 2 for 5 minutes each     Decompression Time: 1201   Treatment End Time: 1210  Length of Treatment: 90 Minutes  Symptoms Noted During Treatment: None  Total Treatment Time (min): 129    Adverse Event: no    I was present on these premises and immediately available to furnish assistance & direction throughout the procedure.   Plan      Andrew Cantor is a 54 y.o. male  did successfully complete today's hyperbaric oxygen treatment at [facility].    In

## 2025-04-21 ENCOUNTER — HOSPITAL ENCOUNTER (OUTPATIENT)
Dept: HYPERBARIC MEDICINE | Age: 55
Discharge: HOME | End: 2025-04-21
Payer: COMMERCIAL

## 2025-04-21 VITALS
HEART RATE: 62 BPM | TEMPERATURE: 96.9 F | DIASTOLIC BLOOD PRESSURE: 92 MMHG | SYSTOLIC BLOOD PRESSURE: 142 MMHG | RESPIRATION RATE: 16 BRPM
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
## 2025-04-21 DIAGNOSIS — M87.30 OSTEORADIONECROSIS (HCC): Primary | ICD-10-CM

## 2025-04-21 DIAGNOSIS — L59.8 SOFT TISSUE RADIONECROSIS: Chronic | ICD-10-CM
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
## 2025-04-21 PROCEDURE — 99183 HYPERBARIC OXYGEN THERAPY: CPT | Performed by: SURGERY

## 2025-04-21 PROCEDURE — G0277 HBOT, FULL BODY CHAMBER, 30M: HCPCS

## 2025-04-21 NOTE — PROGRESS NOTES
Discharge instructions were reviewed with the patient and printed instructions were declined   Occupational Therapy    Visit Type: treatment  SUBJECTIVE  Patient agreed to participate in therapy this date.  \" I guess I'll try.\"   Patient / Family Goal: return to previous functional status, maximize function and return home    Pain     Location: L knee pain with repositioning, however, pt did not rate pain.     OBJECTIVE        Standing Balance  (AILEEN = base of support)  Firm Surface: Double Leg      - Static, Eyes Open       - Trial 1 details: minimal assist, with tactile cues, with verbal cues and moderate assist (L knee block required. )    Pt participated in static standing balance and standing tolerance activity this am.  Pt required mod to min assist for static standing balance using her B UE's for B UE support on an elevated table and an occasional L knee block as well as mod tactile cues to facilitate trunk extension, L lateral weight shifting, and midline orientation.        Transfers  - Sit to stand: maximal assist, moderate assist, with tactile cues, with verbal cues  - Stand to sit: maximal assist, moderate assist, with tactile cues, with verbal cues    Pt required max to mod assist and increased time to complete sit to stands from a w/c to an elevated mat table.  Pt completed x 3 sit to stands.  Pt initially required max assist to perform her first and last sit to stand and mod assist to perform her 2nd sit to stand.  Pt refused to initiate or perform further sit to stands secondary to c/o fatigue.         Interventions  Fine Motor / Coordination      Pt required max to total assist to doff and elizabeth a pillow case placed on a table using her B UE's while seated in a w/c at a table top level.    Skilled input: verbal instruction/cues, tactile instruction/cues, posture correction and facilitation  Verbal Consent: Writer verbally educated and received verbal consent for hand placement, positioning of patient, and techniques to be performed today from patient for clothing adjustments for techniques as  described above and how they are pertinent to the patient's plan of care.         ASSESSMENT  Impairments: activity tolerance, aerobic capacity, body habitus, bed mobility, balance, cardiovascular endurance, cognitive, communication, strength, range of motion, decreased insight into deficits, coordination/proprioception, safety awareness and abnormal tone  Functional Limitations: bed mobility, functional mobility, grooming, toileting, dressing, eating, bathing, functional transfers, showering and participating in meaningful/purposeful activities         Discharge Recommendations:  Recommendations for Discharge: OT IL: Patient requires 24 HOUR assistance to perform mobility and/or ADLs safely, Patient is appropriate for Occupational Therapy 1-3 times per week, (VERUS)Patient is appropriate for daily Occupational Therapy  PT/OT Mobility Equipment for Discharge: TBD  PT/OT ADL Equipment for Discharge: TBD  OT Identified Barriers to Discharge: pt with L side facial drop, L UE weakness, L visual inattention, impaired cognition, decreased activity tolerance, decreased static and dynamic sitting/standing balance.  Progress: slow progress, decreased activity tolerance, slow progress, cognitive deficits, slow progress, medical status limitations and slow progress, multiple tests/procedures    Therapy Participation: This patient participated in all scheduled occupational therapy time this session.This patient was unable to participate in minutes of scheduled therapy with this therapist this session due to To pt's decreased level of alertness and activity tolerance. Pt kept falling asleep.  Pt kept stating, \" I'm tired.\" .    Education:   - Present and ready to learn: patient  Education provided during session:  - Results of above outlined education: Verbalizes understanding and Needs reinforcement  Pain at End of Session: , location: L knee pain with repositioning, however, pt did not rate pain.    Patient at End of Session:    Location: in wheelchair  Safety measures: alarm system in place/re-engaged  Handoff to: rehab aide/tech    PLAN  Suggestions for next session as indicated: Frequency: 5-7 days per week  Frequency Comments: 45-90 minutes per day, ELOS=2 weeks,  Anticipated LOS is 06/04/23.      Interventions: activity tolerance training, bed mobility training, caregiver training, community reintegration, energy conservation, functional transfer training, neuromuscular reeducation, patient/family training, safety training, therapeutic activity, upper extremity strengthening/ROM, wheelchair mobility, work simplification, use of adaptive equipment, therapeutic exercise, positioning, coordination, compensatory technique education, cognitive retraining, ADL retraining, balance, compensatory techniques, fine motor coordination activities, HEP training, modalities, patient education and transfer training  Agreement to plan and goals: patient agrees with goals and treatment plan      GOALS  Review Date: 6/2/2023  Short Term Goals (STGs): to be met 7 days from date established, unless otherwise stated.  - Patient will complete self-feeding at moderate assist level with adaptive equipment as deemed appropriate. (Ongoing).   - Patient will complete bathing at moderate assist level with adaptive equipment as deemed appropriate.  - Patient will complete upper body dressing at moderate assist level with adaptive equipment as deemed appropriate. (Ongoing).     Long Term Goals (LTGs): to be met by discharge from rehab program.  - Patient will complete self-feeding at minimal assist level with adaptive equipment as deemed appropriate. (Ongoing).   - Patient will complete grooming at minimal assist level with adaptive equipment as deemed appropriate. (Ongoing).   - Patient will complete bathing at minimal assist level with adaptive equipment as deemed appropriate. (Ongoing).   - Patient will complete upper body dressing at minimal assist level with  adaptive equipment as deemed appropriate. (Ongoing).   - Patient will complete lower body dressing at minimal assist level and maximal assist level with adaptive equipment as deemed appropriate. (Ongoing).   - Patient will complete toileting at maximal assist level with adaptive equipment as deemed appropriate. (Ongoing).   - Patient will complete toilet transfer at minimal assist level with adaptive equipment as deemed appropriate. (Ongoing).           Therapy procedure time and total treatment time can be found documented on the Time Entry flowsheet

## 2025-04-21 NOTE — PROGRESS NOTES
[facility]  Hyperbaric Oxygen Therapy   Progress Note    NAME: Andrew Cantor  MEDICAL RECORD NUMBER:  [id number]  AGE: 54 y.o.   GENDER: male  : 1970  EPISODE DATE:  2025   Subjective   HBO Treatment Number: 16 out of Total Treatments: 30  HBO Diagnosis:   Problem List Items Addressed This Visit       Osteoradionecrosis (HCC) - Primary    Soft tissue radionecrosis (Chronic)     Safety checks performed prior to treatment.  See doc flowsheets for documentation.  Objective      Please see lab results for finger stick glucose results  Pre treatment Vital Signs       Temp: (!) 96.5 °F (35.8 °C)     Pulse: 75     Respirations: 16     BP: 122/82     Post treatment Vital Signs  Temp: 96.9 °F (36.1 °C)  Pulse: 62  Respirations: 16  BP: (!) 142/92  Assessment      Physical Exam:  General Appearance:  alert and oriented to person, place and time, well-developed and well-nourished, in no acute distress  ENT:  tympanic membranes intact bilaterally  Pulmonary/Chest:  clear to auscultation bilaterally- no wheezes, rales or rhonchi, normal air movement, no respiratory distress  Cardiovascular:  regular rate and rhythm  Chamber #: [id number]  Treatment Start Time: 0950     Pressure Reached Time: 1004  RETA : 2.5  Number of Air Breaks:  Treatment Status: Air break X 2 for 5 minutes each     Decompression Time: 1146   Treatment End Time: 1154  Length of Treatment: 90 Minutes  Symptoms Noted During Treatment: None  Total Treatment Time (min): 124    Adverse Event: no    I was present on these premises and immediately available to furnish assistance & direction throughout the procedure.   Plan      Andrew Cantor is a 54 y.o. male  did successfully complete today's hyperbaric oxygen treatment at [facility].    In my clinical judgement, ongoing HBO therapy is  necessary at this time, given a threat to patient function, limb or life from the current condition.

## 2025-04-22 ENCOUNTER — HOSPITAL ENCOUNTER (OUTPATIENT)
Dept: HYPERBARIC MEDICINE | Age: 55
Discharge: HOME | End: 2025-04-22
Payer: COMMERCIAL

## 2025-04-22 VITALS
SYSTOLIC BLOOD PRESSURE: 134 MMHG | HEART RATE: 58 BPM | TEMPERATURE: 95.1 F | DIASTOLIC BLOOD PRESSURE: 72 MMHG | RESPIRATION RATE: 16 BRPM
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
## 2025-04-22 DIAGNOSIS — M87.30 OSTEORADIONECROSIS (HCC): Primary | ICD-10-CM
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
## 2025-04-22 DIAGNOSIS — L59.8 SOFT TISSUE RADIONECROSIS: ICD-10-CM

## 2025-04-22 PROCEDURE — G0277 HBOT, FULL BODY CHAMBER, 30M: HCPCS

## 2025-04-22 ASSESSMENT — ENCOUNTER SYMPTOMS
FACIAL SWELLING: 0
UNEXPECTED WEIGHT CHANGE: 0
TROUBLE SWALLOWING: 0
FATIGUE: 0
VOICE CHANGE: 0
ADENOPATHY: 0
VOMITING: 0
SHORTNESS OF BREATH: 0
NECK PAIN: 0
STRIDOR: 0
FEVER: 0
CHILLS: 0
NAUSEA: 0
COUGH: 0
APPETITE CHANGE: 0
SORE THROAT: 0

## 2025-04-22 NOTE — PROGRESS NOTES
Chief Complaint   Patient presents with    Follow-up    Head And Neck Cancer     HPI:  Adarsh De La Vega is a 54 y.o.  male following up with me today for his sinonasal SCCa s/p bilateral nasal endoscopy, total rhinectomy on 5/5/23. He completed adjuvant chemoradiation 8/28/23. Last seen 1/2025. He is now s/p complex staged nasal reconstruction with Dr. Mcclain.  He has completed all of his nasal reconstruction.  He is currently undergoing HBO for his dental work.  He has found that it is helped heal his nasal incisions.  He denies any new pain  (+chronic) or epistaxis.  He has had some difficulty swallowing and has been working with Dr. Frank.  Weight is stable.  Dysphagia mild, xerostomia moderate.    History:  Dx: Squamous cell carcinoma sinonasal [id number]  1/23: Seen by an ENT who told him he had a nasal infection. developed a lump on the outside of his nose a few days following that appointment  2/21/23: Biopsy of nasal lesion. Path + for squamous cell carcinoma  2/27/23: seen by infectious disease and was in the hospital on IV antibiotics for about 1 week and discharged with a PICC line   3/17/23: CT neck and Chest- slightly enhancing nasal mass with involvement of the anterior nose & nasal septum. No pathologic lymphadenopathy. CT chest with 6mm pleural based nodule LLL.   3/20/23: First seen by me  5/5/23:S/p bilateral nasal endoscopy, total rhinectomy. Path +SCCa   7/10/23: Started adjuvant chemoXRT  7/31/23: Admitted for dehydration/odynophagia and PEG tube placed  8/14/23: In ED, waiting to be admitted for IV hydration  8/28/23: Completed adjuvant chemoXRT   10/23: CT neck/chest complete response, stable lung nodule  10/31/23: 1st stage prelamination of paramedian forehead flap  12/8/23: Osteocutaneous RFFF and 2nd stage  1/2/24: 3rd stage nasal reconstruction  2/29/24: 4th stage nasal reconstruction  4/24/24: Revision nasal reconstruction to the left nares  5/24: MBS passed - small bites/sips,  alternating  10/24: Dilated by Dr. Frank 32mm  12/24: Dilated by Dr. 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 Mcclain      SH:  Tob: Current 1/2 ppd smoker. Former 1ppd since a teen  ETOH: Social  Here with wife     ROS:  Review of Systems   Constitutional:  Negative for appetite change, chills, fatigue, fever and unexpected weight change.   HENT:  Negative for dental problem, drooling, ear pain, facial swelling, hearing loss, mouth sores, sore throat, tinnitus, trouble swallowing and voice change.         +nasal pain   Respiratory:  Negative for cough, shortness of breath and stridor.    Gastrointestinal:  Negative for nausea and vomiting.   Musculoskeletal:  Negative for neck pain.   Hematological:  Negative for adenopathy.   All other systems reviewed and are negative.       PE:  ENT Physical Exam  Constitutional  Appearance: patient appears well-developed, patient is cooperative;  Communication/Voice: communication appropriate for developmental age;  Head and Face  Appearance: head appears normal; facial scars present;  Head and Face comments: See nasal recon below, forehead paramedian scars are well healed. Right eyebrow scar well healed  Ear  Auricles: right auricle normal; left auricle normal;  Nose  External Nose: nares patent bilaterally;  Nose comments: Paramedian reconstruction, good contouring of the nose.  Patent bilaterally, R side is more restricted than left.   No visible recurrence, see scope below  Oral Cavity/Oropharynx  Lips: normal;  Gums: gingiva normal;  Oral mucosa: normal;  Neck  Neck: scars present;  Respiratory  Inspection: breathing unlabored;  Cardiovascular  Inspection: extremities are warm and well perfused;  Lymphatic  Palpation: no cervical adenopathy noted;  Neurovestibular  Mental Status: alert and oriented;  Psychiatric: mood normal; affect is appropriate;      Procedures   PROCEDURE NOTE:   Recommended bilateral nasal endoscopy.  Risks, benefits, personnel and alternatives were explained.  The patient wished to proceed.  S/he was re-identified.  PROCEDURE:  Bilateral nasal endoscopy  PREOPERATIVE DIAGNOSIS: Sinonasal cancer  POSTOPERATIVE DIAGNOSIS: Same as above, no evidence of recurrence, new masses or lesions  INDICATIONS: Same as above  ANESTHESIA: 4% lidocaine and 0.5% phenylephrine  PROCEDURE:  With the patient sitting upright topical anesthesia and vasoconstriction was applied with spray to the right and left sides of the nose.  After waiting an appropriate period of time for anesthesia/vasoconstriction to become effective, a flexible nasal endoscope was passed through the right and left sides of the nose.  Bilateral nares and nasopharynx were examined.  FINDINGS:  Bilateral nares is patent.  Upon entering the nasal cavity bilaterally the majority of nasal structures have been removed but there is no evidence of any recurrent masses or lesions.  S/p septectomy, patent maxillectomy. No nasal polyps.  Bilaterally patent.  Nasopharynx was normal without masses.  Bilaterally patent eustachian tube orifice.  Patient tolerated the procedure well, and there were no complications.     ASSESSMENT AND PLAN:  Problem List Items Addressed This Visit       Squamous cell carcinoma of nose    Current Assessment & Plan   S/p total rhinectomy, s/p chemoradiation and nasal reconstruction  Chronic right nasal pain s/p CT scan which is negative  No evidence of disease on exam or nasal endoscopy today  Reassurance provided  Follow up in 3 months, discussed transition to new H&N surgeon         Relevant Orders    CT soft tissue neck w IV contrast    Oropharyngeal dysphagia - Primary    Current Assessment & Plan   Mild to moderate  Working with Dr. Zac Mie MD    Head & Neck Surgical Oncology & Reconstruction  Department of Otolaryngology - Head and Neck Surgery        By signing my name below,  I, Flor Kumar, attest that this documentation has been prepared under the direction and in the presence of Dr. Dena Mei MD.     All medical record entries made by the [facility] were at my direction and personally dictated by me, Dr. Dena Mei. I have reviewed the chart and agree that the record accurately reflects my personal performance of the history, physical exam, discussion and plan.

## 2025-04-23 ENCOUNTER — HOSPITAL ENCOUNTER (OUTPATIENT)
Dept: HYPERBARIC MEDICINE | Age: 55
Discharge: HOME | End: 2025-04-23
Payer: COMMERCIAL

## 2025-04-23 VITALS
RESPIRATION RATE: 18 BRPM | TEMPERATURE: 96.4 F | DIASTOLIC BLOOD PRESSURE: 78 MMHG | SYSTOLIC BLOOD PRESSURE: 130 MMHG | HEART RATE: 54 BPM

## 2025-04-23 DIAGNOSIS — L59.8 SOFT TISSUE RADIONECROSIS: Chronic | ICD-10-CM

## 2025-04-23 DIAGNOSIS — M87.30 OSTEORADIONECROSIS (HCC): Primary | ICD-10-CM
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
## 2025-04-23 PROCEDURE — 99183 HYPERBARIC OXYGEN THERAPY: CPT | Performed by: SURGERY

## 2025-04-23 PROCEDURE — G0277 HBOT, FULL BODY CHAMBER, 30M: HCPCS

## 2025-04-23 NOTE — PROGRESS NOTES
[facility]  Hyperbaric Oxygen Therapy   Progress Note    NAME: Andrew Cantor  MEDICAL RECORD NUMBER:  [id number]  AGE: 54 y.o.   GENDER: male  : 1970  EPISODE DATE:  2025   Subjective   HBO Treatment Number: 18 out of Total Treatments: 30  HBO Diagnosis:   Problem List Items Addressed This Visit       Osteoradionecrosis (HCC) - Primary    Relevant Orders    Notify physician (specify)    Hyperbaric Oxygen Therapy    Soft tissue radionecrosis (Chronic)    Relevant Orders    Notify physician (specify)    Hyperbaric Oxygen Therapy     Safety checks performed prior to treatment.  See doc flowsheets for documentation.  Objective      Please see lab results for finger stick glucose results  Pre treatment Vital Signs       Temp: 97.3 °F (36.3 °C)     Pulse: 66     Respirations: 18     BP: (!) 136/90     Post treatment Vital Signs  Temp: (!) 96.4 °F (35.8 °C)  Pulse: 54  Respirations: 18  BP: 130/78  Assessment      Physical Exam:  General Appearance:  alert and oriented to person, place and time, well-developed and well-nourished, in no acute distress  ENT:  tympanic membranes intact bilaterally  Pulmonary/Chest:  clear to auscultation bilaterally- no wheezes, rales or rhonchi, normal air movement, no respiratory distress  Cardiovascular:  regular rate and rhythm  Chamber #: [id number]  Treatment Start Time: 0954     Pressure Reached Time: 1009  RETA : 2.5  Number of Air Breaks:  Treatment Status: Back to oxygen     Decompression Time: 1151   Treatment End Time: 1202  Length of Treatment: 90 Minutes  Symptoms Noted During Treatment: None  Total Treatment Time (min): 128    Adverse Event: No  I was present on these premises and immediately available to furnish assistance & direction throughout the procedure.   Plan      Andrew Cantor is a 54 y.o. male  did successfully complete today's hyperbaric oxygen treatment at [facility].    In my clinical

## 2025-04-24 ENCOUNTER — HOSPITAL ENCOUNTER (OUTPATIENT)
Dept: HYPERBARIC MEDICINE | Age: 55
Discharge: HOME | End: 2025-04-24
Payer: COMMERCIAL

## 2025-04-24 VITALS
SYSTOLIC BLOOD PRESSURE: 145 MMHG | DIASTOLIC BLOOD PRESSURE: 81 MMHG | TEMPERATURE: 96.9 F | RESPIRATION RATE: 16 BRPM | HEART RATE: 50 BPM

## 2025-04-24 DIAGNOSIS — M87.30 OSTEORADIONECROSIS (HCC): Primary | ICD-10-CM
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
## 2025-04-24 DIAGNOSIS — L59.8 SOFT TISSUE RADIONECROSIS: ICD-10-CM
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
## 2025-04-24 PROCEDURE — G0277 HBOT, FULL BODY CHAMBER, 30M: HCPCS

## 2025-04-25 ENCOUNTER — HOSPITAL ENCOUNTER (OUTPATIENT)
Dept: HYPERBARIC MEDICINE | Age: 55
Discharge: HOME | End: 2025-04-25
Payer: COMMERCIAL

## 2025-04-25 VITALS
TEMPERATURE: 96.9 F | DIASTOLIC BLOOD PRESSURE: 90 MMHG | SYSTOLIC BLOOD PRESSURE: 137 MMHG | RESPIRATION RATE: 18 BRPM | HEART RATE: 50 BPM
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
## 2025-04-25 DIAGNOSIS — L59.8 SOFT TISSUE RADIONECROSIS: ICD-10-CM

## 2025-04-25 DIAGNOSIS — M87.30 OSTEORADIONECROSIS (HCC): Primary | ICD-10-CM

## 2025-04-25 PROCEDURE — G0277 HBOT, FULL BODY CHAMBER, 30M: HCPCS

## 2025-04-25 NOTE — PROGRESS NOTES
[facility]  Hyperbaric Oxygen Therapy   Progress Note    NAME: Andrew Cantor  MEDICAL RECORD NUMBER:  [id number]  AGE: 54 y.o.   GENDER: male  : 1970  EPISODE DATE:  2025   Subjective   HBO Treatment Number: 17 out of Total Treatments: 30  HBO Diagnosis:   Problem List Items Addressed This Visit          Musculoskeletal and Integument    Osteoradionecrosis (HCC) - Primary       Other    Soft tissue radionecrosis (Chronic)     Safety checks performed prior to treatment.  See doc flowsheets for documentation.  Objective      Please see lab results for finger stick glucose results  Pre treatment Vital Signs       Temp: (!) 95.3 °F (35.2 °C)     Pulse: 58     Respirations: 16     BP: 129/72     Post treatment Vital Signs  Temp: (!) 95.1 °F (35.1 °C)  Pulse: 58  Respirations: 16  BP: 134/72  Assessment      Physical Exam:  General Appearance:  alert and oriented to person, place and time, well-developed and well-nourished, in no acute distress  ENT:  tympanic membranes intact bilaterally  Pulmonary/Chest:  clear to auscultation bilaterally- no wheezes, rales or rhonchi, normal air movement, no respiratory distress  Cardiovascular:  regular rate and rhythm  Chamber #: [id number]  Treatment Start Time: 1000     Pressure Reached Time: 1015  RETA : 2.5  Number of Air Breaks:  Treatment Status: Air break X 2 for 5 minutes each     Decompression Time: 1157   Treatment End Time: 1210  Length of Treatment: 90 Minutes  Symptoms Noted During Treatment: None  Total Treatment Time (min): 130    Adverse Event: no    I was present on these premises and immediately available to furnish assistance & direction throughout the procedure.   Plan      Andrew Cantor is a 54 y.o. male  did successfully complete today's hyperbaric oxygen treatment at [facility].    In my clinical judgement, ongoing HBO therapy is  necessary at this time, given a threat to patient

## 2025-04-28 ENCOUNTER — APPOINTMENT (OUTPATIENT)
Dept: OTOLARYNGOLOGY | Facility: CLINIC | Age: 55
End: 2025-04-28
Payer: COMMERCIAL

## 2025-04-28 VITALS — WEIGHT: 207 LBS | BODY MASS INDEX: 25.21 KG/M2 | HEIGHT: 76 IN

## 2025-04-28 DIAGNOSIS — C44.321 SQUAMOUS CELL CARCINOMA OF NOSE: ICD-10-CM

## 2025-04-28 DIAGNOSIS — R13.12 OROPHARYNGEAL DYSPHAGIA: Primary | ICD-10-CM

## 2025-04-28 PROCEDURE — 1036F TOBACCO NON-USER: CPT | Performed by: OTOLARYNGOLOGY

## 2025-04-28 PROCEDURE — 31231 NASAL ENDOSCOPY DX: CPT | Performed by: OTOLARYNGOLOGY

## 2025-04-28 PROCEDURE — 3008F BODY MASS INDEX DOCD: CPT | Performed by: OTOLARYNGOLOGY

## 2025-04-28 PROCEDURE — 99214 OFFICE O/P EST MOD 30 MIN: CPT | Performed by: OTOLARYNGOLOGY

## 2025-04-28 NOTE — PATIENT INSTRUCTIONS
Dr. Mei evaluated you today.    Your care plan is outlined below:  -- Follow up with head and neck in September.   -- CT neck in September    General appointment line please call [phone]  For general questions or scheduling issues please call [phone] option #2   For medical questions or surgery scheduling please call [phone] on Mondays, Wednesdays and Thursdays or [phone] on Tuesdays and Fridays. Please be sure to leave a voice mail or your call will not be able to be returned.

## 2025-04-28 NOTE — LETTER
April 28, 2025     Patient: Adarsh De La Vega   YOB: 1970   Date of Visit: 4/28/2025       To Whom It May Concern:    Adarsh De L aVega was seen in my clinic on 4/28/2025 at 2:15 pm. His wife accompanied him to this appointment. Please excuse her from work to make it to this appointment with him.     If you have any questions or concerns, please don't hesitate to call.      Sincerely,   Nikole Park RN

## 2025-05-06 ENCOUNTER — OFFICE VISIT (OUTPATIENT)
Dept: ONCOLOGY | Age: 55
End: 2025-05-06
Payer: COMMERCIAL

## 2025-05-06 ENCOUNTER — CLINICAL DOCUMENTATION (OUTPATIENT)
Dept: INFUSION THERAPY | Age: 55
End: 2025-05-06

## 2025-05-06 ENCOUNTER — HOSPITAL ENCOUNTER (OUTPATIENT)
Dept: INFUSION THERAPY | Age: 55
Discharge: HOME | End: 2025-05-06
Payer: COMMERCIAL

## 2025-05-06 VITALS
HEIGHT: 76 IN | OXYGEN SATURATION: 99 % | WEIGHT: 205.1 LBS | DIASTOLIC BLOOD PRESSURE: 79 MMHG | BODY MASS INDEX: 24.98 KG/M2 | SYSTOLIC BLOOD PRESSURE: 129 MMHG | HEART RATE: 52 BPM | TEMPERATURE: 97.6 F

## 2025-05-06 DIAGNOSIS — C76.0 HEAD AND NECK CANCER (HCC): Primary | ICD-10-CM

## 2025-05-06 DIAGNOSIS — C44.321 SQUAMOUS CELL CARCINOMA OF NOSE: ICD-10-CM

## 2025-05-06 DIAGNOSIS — C76.0 HEAD AND NECK CANCER (HCC): ICD-10-CM

## 2025-05-06 DIAGNOSIS — R91.1 LUNG NODULE: ICD-10-CM

## 2025-05-06 LAB
ALBUMIN SERPL-MCNC: 4.6 G/DL (ref 3.5–5.2)
ALP SERPL-CCNC: 87 U/L (ref 40–129)
ALT SERPL-CCNC: 14 U/L (ref 0–40)
ANION GAP SERPL CALCULATED.3IONS-SCNC: 15 MMOL/L (ref 7–16)
AST SERPL-CCNC: 18 U/L (ref 0–39)
BASOPHILS # BLD: 0.04 K/UL (ref 0–0.2)
BASOPHILS NFR BLD: 1 % (ref 0–2)
BILIRUB SERPL-MCNC: 1.6 MG/DL (ref 0–1.2)
BUN SERPL-MCNC: 18 MG/DL (ref 6–20)
CALCIUM SERPL-MCNC: 9.7 MG/DL (ref 8.6–10.2)
CHLORIDE SERPL-SCNC: 104 MMOL/L (ref 98–107)
CO2 SERPL-SCNC: 19 MMOL/L (ref 22–29)
CREAT SERPL-MCNC: 1.2 MG/DL (ref 0.7–1.2)
EOSINOPHIL # BLD: 0.12 K/UL (ref 0.05–0.5)
EOSINOPHILS RELATIVE PERCENT: 3 % (ref 0–6)
ERYTHROCYTE [DISTWIDTH] IN BLOOD BY AUTOMATED COUNT: 13.3 % (ref 11.5–15)
GFR, ESTIMATED: 70 ML/MIN/1.73M2
GLUCOSE SERPL-MCNC: 71 MG/DL (ref 74–99)
HCT VFR BLD AUTO: 44.9 % (ref 37–54)
HGB BLD-MCNC: 15.7 G/DL (ref 12.5–16.5)
IMM GRANULOCYTES # BLD AUTO: <0.03 K/UL (ref 0–0.58)
IMM GRANULOCYTES NFR BLD: 1 % (ref 0–5)
LYMPHOCYTES NFR BLD: 0.64 K/UL (ref 1.5–4)
LYMPHOCYTES RELATIVE PERCENT: 15 % (ref 20–42)
MCH RBC QN AUTO: 31.2 PG (ref 26–35)
MCHC RBC AUTO-ENTMCNC: 35 G/DL (ref 32–34.5)
MCV RBC AUTO: 89.1 FL (ref 80–99.9)
MONOCYTES NFR BLD: 0.46 K/UL (ref 0.1–0.95)
MONOCYTES NFR BLD: 11 % (ref 2–12)
NEUTROPHILS NFR BLD: 70 % (ref 43–80)
NEUTS SEG NFR BLD: 2.96 K/UL (ref 1.8–7.3)
PLATELET # BLD AUTO: 197 K/UL (ref 130–450)
PMV BLD AUTO: 10.1 FL (ref 7–12)
POTASSIUM SERPL-SCNC: 4.4 MMOL/L (ref 3.5–5)
PROT SERPL-MCNC: 7.3 G/DL (ref 6.4–8.3)
RBC # BLD AUTO: 5.04 M/UL (ref 3.8–5.8)
SODIUM SERPL-SCNC: 138 MMOL/L (ref 132–146)
WBC OTHER # BLD: 4.2 K/UL (ref 4.5–11.5)

## 2025-05-06 PROCEDURE — 85025 COMPLETE CBC W/AUTO DIFF WBC: CPT

## 2025-05-06 PROCEDURE — 80053 COMPREHEN METABOLIC PANEL: CPT

## 2025-05-06 PROCEDURE — 99213 OFFICE O/P EST LOW 20 MIN: CPT

## 2025-05-06 PROCEDURE — 36415 COLL VENOUS BLD VENIPUNCTURE: CPT

## 2025-05-06 PROCEDURE — 99214 OFFICE O/P EST MOD 30 MIN: CPT | Performed by: INTERNAL MEDICINE

## 2025-05-06 NOTE — PROGRESS NOTES
Called Dr. White office regaring referral. Pt was seen by Dr. White 2/24/25 and will return to office in July. Office will fax notes.

## 2025-05-06 NOTE — PROGRESS NOTES
[facility] PHYSICIANS [facility] MED ONCOLOGY  [address]  [facility] [address]  Dept: [phone]  Loc: [phone]  Attending progress note       Reason for Visit: Squamous cell carcinoma of the nose/nasal septum.     Referring Physician:  Arnold Han MD     PCP:  Tarik Lemus MD     History of Present Illness:       Mr. Cantor is a pleasant 54-year-old gentleman, fairly healthy, who was diagnosed with squamous cell carcinoma of the nose/nasal septum.  The patient was hit by his dog's head on the left side of his nose in April 2022, in November 2022 he had noticed a lesion inside his nose, and she was diagnosed with staph infection, was treated with antibiotics, he then developed a pimple on his nose and eventually developed a hole in his nose, he was seen by ID, he was referred to the hospital due to concern about myelitis, he had a CT scan done revealing chronic sinusitis involving the left sphenoid sinus, he had a biopsy done on 2/21/2023, revealing invasive poorly differentiated squamous cell carcinoma, grade 3, the patient was referred Dr. Loredo, he underwent a total rhinectomy on 5/5/2023, the patient was found to have tumor growth to the glabella and the maxillary crest, path:   Case Summary Report   Procedure:    Rhinectomy, total   Tumor Site:   Nasal septal mucosa, cartilage, subcutaneous tissue, skin.     Tumor Laterality: Left and Right.   Left >> Right     Tumor Focality: Single focus     Tumor Size (Greatest dimension): At least 3.5 cm.     Histologic Type: Squamous cell carcinoma, keratinizing     Histologic Grade (squamous cell carcinomas only): Poorly differentiated     Specimen Margins (Main specimen; part B)     _X_ Involved by invasive carcinoma        Specify margin(s), per orientation, if possible: Superior and inferior   septal margins, superior right and left skin margins.     _X_ Uninvolved by high-grade dysplasia/in situ disease     Separately

## 2025-05-07 ENCOUNTER — TELEPHONE (OUTPATIENT)
Dept: INFUSION THERAPY | Age: 55
End: 2025-05-07

## 2025-05-07 ENCOUNTER — HOSPITAL ENCOUNTER (OUTPATIENT)
Dept: HYPERBARIC MEDICINE | Age: 55
Discharge: HOME | End: 2025-05-07
Payer: COMMERCIAL

## 2025-05-07 VITALS
TEMPERATURE: 96.1 F | DIASTOLIC BLOOD PRESSURE: 76 MMHG | RESPIRATION RATE: 18 BRPM | SYSTOLIC BLOOD PRESSURE: 132 MMHG | HEART RATE: 55 BPM
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
## 2025-05-07 DIAGNOSIS — C44.321 SQUAMOUS CELL CARCINOMA OF NOSE: ICD-10-CM

## 2025-05-07 DIAGNOSIS — L59.8 SOFT TISSUE RADIONECROSIS: Chronic | ICD-10-CM

## 2025-05-07 DIAGNOSIS — M87.30 OSTEORADIONECROSIS (HCC): Primary | ICD-10-CM
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
## 2025-05-07 PROCEDURE — 99183 HYPERBARIC OXYGEN THERAPY: CPT | Performed by: SURGERY

## 2025-05-07 PROCEDURE — G0277 HBOT, FULL BODY CHAMBER, 30M: HCPCS

## 2025-05-07 RX ORDER — PENTOXIFYLLINE 400 MG/1
400 TABLET, EXTENDED RELEASE ORAL 2 TIMES DAILY
Qty: 60 TABLET | Refills: 11 | Status: SHIPPED | OUTPATIENT
Start: 2025-05-07 | End: 2026-05-07

## 2025-05-07 RX ORDER — PNV NO.95/FERROUS FUM/FOLIC AC 28MG-0.8MG
1000 TABLET ORAL DAILY
Qty: 90 CAPSULE | Refills: 3 | Status: SHIPPED | OUTPATIENT
Start: 2025-05-07 | End: 2026-05-07

## 2025-05-07 RX ORDER — AZITHROMYCIN 250 MG/1
250 TABLET, FILM COATED ORAL DAILY
COMMUNITY

## 2025-05-07 NOTE — PROGRESS NOTES
[facility]  Hyperbaric Oxygen Therapy   Progress Note    NAME: Andrew Cantor  MEDICAL RECORD NUMBER:  [id number]  AGE: 54 y.o.   GENDER: male  : 1970  EPISODE DATE:  2025   Subjective   HBO Treatment Number: 21 out of Total Treatments: 30  HBO Diagnosis:   Problem List Items Addressed This Visit       Soft tissue radionecrosis (Chronic)    Osteoradionecrosis (HCC) - Primary     Safety checks performed prior to treatment.  See doc flowsheets for documentation.  Objective      Please see lab results for finger stick glucose results  Pre treatment Vital Signs       Temp: (!) 95.5 °F (35.3 °C)     Pulse: 87     Respirations: 17     BP: 112/81     Post treatment Vital Signs  Temp: (!) 95.5 °F (35.3 °C)  Pulse: 87  Respirations: 17  BP: 112/81  Assessment      Physical Exam:  General Appearance:  alert and oriented to person, place and time, well-developed and well-nourished, in no acute distress  ENT:  tympanic membranes intact bilaterally  Pulmonary/Chest:  clear to auscultation bilaterally- no wheezes, rales or rhonchi, normal air movement, no respiratory distress  Cardiovascular:  S1S2  Chamber #: [id number]  Treatment Start Time: 0955     Pressure Reached Time: 1012  RETA : 2.5  Number of Air Breaks:  Treatment Status: Back to oxygen (Simultaneous filing. User may not have seen previous data.)            Length of Treatment: 90 Minutes          Adverse Event: no    I was present on these premises and immediately available to furnish assistance & direction throughout the procedure.   Plan      Andrew Cantor is a 54 y.o. male  did successfully complete today's hyperbaric oxygen treatment at [facility].    In my clinical judgement, ongoing HBO therapy is  necessary at this time, given a threat to patient function, limb or life from the current condition.      Supervision and attendance of Hyperbaric Oxygen Therapy provided.  Continue HBO

## 2025-05-08 ENCOUNTER — HOSPITAL ENCOUNTER (OUTPATIENT)
Dept: HYPERBARIC MEDICINE | Age: 55
Discharge: HOME | End: 2025-05-08
Payer: COMMERCIAL

## 2025-05-08 VITALS
SYSTOLIC BLOOD PRESSURE: 142 MMHG | TEMPERATURE: 96.1 F | DIASTOLIC BLOOD PRESSURE: 80 MMHG | HEART RATE: 62 BPM | RESPIRATION RATE: 18 BRPM
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
## 2025-05-08 DIAGNOSIS — M87.30 OSTEORADIONECROSIS (HCC): Primary | ICD-10-CM

## 2025-05-08 DIAGNOSIS — L59.8 SOFT TISSUE RADIONECROSIS: ICD-10-CM

## 2025-05-08 PROCEDURE — G0277 HBOT, FULL BODY CHAMBER, 30M: HCPCS

## 2025-05-08 NOTE — PROGRESS NOTES
Andrew Cantor is a 54 y.o. male has been receiving hyperbaric oxygen treatment for management of:Indication  Indications: Late Effect of Radiation (Osteoradionecrosis; Soft tissue radionecrosis). Mr. Cantor has completed Treatment Number: 22 out of a treatment protocol of Total Treatments: 30.    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    Pt is tolerating hyperbaric oxygen therapy  well without complications.         Ashley Sims RN  5/8/2025  10:32 AM

## 2025-05-09 ENCOUNTER — HOSPITAL ENCOUNTER (OUTPATIENT)
Dept: HYPERBARIC MEDICINE | Age: 55
Discharge: HOME | End: 2025-05-09
Payer: COMMERCIAL

## 2025-05-09 VITALS
TEMPERATURE: 96.1 F | RESPIRATION RATE: 18 BRPM | HEART RATE: 66 BPM | DIASTOLIC BLOOD PRESSURE: 90 MMHG | SYSTOLIC BLOOD PRESSURE: 150 MMHG
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
## 2025-05-09 DIAGNOSIS — M87.30 OSTEORADIONECROSIS (HCC): Primary | ICD-10-CM

## 2025-05-09 DIAGNOSIS — L59.8 SOFT TISSUE RADIONECROSIS: ICD-10-CM
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
## 2025-05-09 PROCEDURE — G0277 HBOT, FULL BODY CHAMBER, 30M: HCPCS

## 2025-05-11 PROBLEM — L92.9 GRANULATION TISSUE OF SITE OF GASTROSTOMY: Status: RESOLVED | Noted: 2023-10-11 | Resolved: 2025-05-11

## 2025-05-12 ENCOUNTER — HOSPITAL ENCOUNTER (OUTPATIENT)
Dept: HYPERBARIC MEDICINE | Age: 55
Discharge: HOME | End: 2025-05-12
Payer: COMMERCIAL

## 2025-05-12 VITALS
TEMPERATURE: 96.3 F | SYSTOLIC BLOOD PRESSURE: 150 MMHG | DIASTOLIC BLOOD PRESSURE: 91 MMHG | HEART RATE: 47 BPM | RESPIRATION RATE: 18 BRPM
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
## 2025-05-12 DIAGNOSIS — L59.8 SOFT TISSUE RADIONECROSIS: ICD-10-CM

## 2025-05-12 DIAGNOSIS — M87.30 OSTEORADIONECROSIS (HCC): Primary | ICD-10-CM

## 2025-05-12 PROCEDURE — G0277 HBOT, FULL BODY CHAMBER, 30M: HCPCS

## 2025-05-12 PROCEDURE — 99183 HYPERBARIC OXYGEN THERAPY: CPT | Performed by: SURGERY

## 2025-05-12 NOTE — ASSESSMENT & PLAN NOTE
S/p total rhinectomy, s/p chemoradiation and nasal reconstruction  Chronic right nasal pain s/p CT scan which is negative  No evidence of disease on exam or nasal endoscopy today  Reassurance provided  Follow up in 3 months, discussed transition to new H&N surgeon

## 2025-05-12 NOTE — PROGRESS NOTES
[facility]  Hyperbaric Oxygen Therapy   Progress Note    NAME: Andrew Cantor  MEDICAL RECORD NUMBER:  [id number]  AGE: 54 y.o.   GENDER: male  : 1970  EPISODE DATE:  2025   Subjective   HBO Treatment Number: 24 out of Total Treatments: 30  HBO Diagnosis:   Problem List Items Addressed This Visit       Osteoradionecrosis (HCC) - Primary    Relevant Orders    Notify physician (specify)    Hyperbaric Oxygen Therapy    Soft tissue radionecrosis (Chronic)    Relevant Orders    Notify physician (specify)    Hyperbaric Oxygen Therapy     Safety checks performed prior to treatment.  See doc flowsheets for documentation.  Objective      Please see lab results for finger stick glucose results  Pre treatment Vital Signs       Temp: 97.1 °F (36.2 °C)     Pulse: 56     Respirations: 18     BP: 117/79     Post treatment Vital Signs  Temp: (!) 96.3 °F (35.7 °C)  Pulse: (!) 47  Respirations: 18  BP: (!) 150/91  Assessment      Physical Exam:  General Appearance:  alert and oriented to person, place and time, well-developed and well-nourished, in no acute distress  ENT:  tympanic membranes intact bilaterally  Pulmonary/Chest:  clear to auscultation bilaterally- no wheezes, rales or rhonchi, normal air movement, no respiratory distress  Cardiovascular:  regular rate and rhythm  Chamber #: [id number]  Treatment Start Time: 0955     Pressure Reached Time: 1009  RETA : 2  Number of Air Breaks:  Treatment Status: Back to oxygen     Decompression Time: 1152   Treatment End Time: 1204  Length of Treatment: 90 Minutes  Symptoms Noted During Treatment: None  Total Treatment Time (min): 129    Adverse Event: no    I was present on these premises and immediately available to furnish assistance & direction throughout the procedure.   Plan      Andrew Cantor is a 54 y.o. male  did successfully complete today's hyperbaric oxygen treatment at [facility].    In my

## 2025-05-13 ENCOUNTER — HOSPITAL ENCOUNTER (OUTPATIENT)
Dept: HYPERBARIC MEDICINE | Age: 55
Discharge: HOME | End: 2025-05-13
Payer: COMMERCIAL

## 2025-05-13 VITALS
SYSTOLIC BLOOD PRESSURE: 139 MMHG | RESPIRATION RATE: 16 BRPM | TEMPERATURE: 97.4 F | HEART RATE: 60 BPM | DIASTOLIC BLOOD PRESSURE: 88 MMHG
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
## 2025-05-13 DIAGNOSIS — M87.30 OSTEORADIONECROSIS (HCC): Primary | ICD-10-CM

## 2025-05-13 DIAGNOSIS — L59.8 SOFT TISSUE RADIONECROSIS: ICD-10-CM

## 2025-05-13 PROCEDURE — G0277 HBOT, FULL BODY CHAMBER, 30M: HCPCS

## 2025-05-13 PROCEDURE — 99183 HYPERBARIC OXYGEN THERAPY: CPT | Performed by: STUDENT IN AN ORGANIZED HEALTH CARE EDUCATION/TRAINING PROGRAM

## 2025-05-13 NOTE — PROGRESS NOTES
[facility]  Hyperbaric Oxygen Therapy   Progress Note    NAME: Andrew Cantor  MEDICAL RECORD NUMBER:  [id number]  AGE: 54 y.o.   GENDER: male  : 1970  EPISODE DATE:  2025   Subjective   HBO Treatment Number: 25 out of Total Treatments: 30  HBO Diagnosis:   Problem List Items Addressed This Visit          Musculoskeletal and Integument    Osteoradionecrosis (HCC) - Primary    Relevant Orders    Notify physician (specify)    Hyperbaric Oxygen Therapy       Other    Soft tissue radionecrosis (Chronic)    Relevant Orders    Notify physician (specify)    Hyperbaric Oxygen Therapy     Safety checks performed prior to treatment.  See doc flowsheets for documentation.  Objective      Please see lab results for finger stick glucose results  Pre treatment Vital Signs       Temp: (!) 96.5 °F (35.8 °C)     Pulse: 61     Respirations: 16     BP: (!) 117/56     Post treatment Vital Signs  Temp: 97.4 °F (36.3 °C)  Pulse: 60  Respirations: 16  BP: 139/88  Assessment      Physical Exam:  General Appearance:  alert and oriented to person, place and time, well-developed and well-nourished, in no acute distress  ENT:  tympanic membranes intact bilaterally  Pulmonary/Chest:  clear to auscultation bilaterally- no wheezes, rales or rhonchi, normal air movement, no respiratory distress  Cardiovascular:  regular rate and rhythm  Chamber #: [id number]  Treatment Start Time: 1013     Pressure Reached Time: 1025  RETA : 2.5  Number of Air Breaks:  Treatment Status: Air break X 2 for 5 minutes each     Decompression Time: 1208   Treatment End Time: 1220  Length of Treatment: 90 Minutes  Symptoms Noted During Treatment: None  Total Treatment Time (min): 127    Adverse Event: no    I was present on these premises and immediately available to furnish assistance & direction throughout the procedure.   Plan      Andrew Cantor is a 54 y.o. male  did successfully complete today's hyperbaric oxygen treatment at [facility]

## 2025-05-14 ENCOUNTER — HOSPITAL ENCOUNTER (OUTPATIENT)
Dept: HYPERBARIC MEDICINE | Age: 55
Discharge: HOME | End: 2025-05-14
Payer: COMMERCIAL

## 2025-05-14 VITALS
SYSTOLIC BLOOD PRESSURE: 134 MMHG | TEMPERATURE: 97.4 F | RESPIRATION RATE: 16 BRPM | HEART RATE: 58 BPM | DIASTOLIC BLOOD PRESSURE: 80 MMHG

## 2025-05-14 DIAGNOSIS — L59.8 SOFT TISSUE RADIONECROSIS: ICD-10-CM
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
## 2025-05-14 DIAGNOSIS — M87.30 OSTEORADIONECROSIS (HCC): Primary | ICD-10-CM

## 2025-05-14 PROCEDURE — 99183 HYPERBARIC OXYGEN THERAPY: CPT | Performed by: SURGERY

## 2025-05-14 PROCEDURE — G0277 HBOT, FULL BODY CHAMBER, 30M: HCPCS

## 2025-05-15 ENCOUNTER — HOSPITAL ENCOUNTER (OUTPATIENT)
Dept: HYPERBARIC MEDICINE | Age: 55
Discharge: HOME | End: 2025-05-15
Payer: COMMERCIAL

## 2025-05-15 VITALS
DIASTOLIC BLOOD PRESSURE: 89 MMHG | RESPIRATION RATE: 16 BRPM | TEMPERATURE: 97.1 F | SYSTOLIC BLOOD PRESSURE: 131 MMHG | HEART RATE: 78 BPM
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
## 2025-05-15 DIAGNOSIS — L59.8 SOFT TISSUE RADIONECROSIS: ICD-10-CM

## 2025-05-15 DIAGNOSIS — M87.30 OSTEORADIONECROSIS (HCC): Primary | ICD-10-CM

## 2025-05-15 PROCEDURE — G0277 HBOT, FULL BODY CHAMBER, 30M: HCPCS

## 2025-05-15 NOTE — PROGRESS NOTES
Andrew Cantor is a 54 y.o. male has been receiving hyperbaric oxygen treatment for management of:Indication  Indications: Late Effect of Radiation (Osteoradionecrosis;Soft tissue radionecrosis). Mr. Cantor has completed Treatment Number: 27 out of a treatment protocol of Total Treatments: 30.    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    Pt is tolerating hyperbaric oxygen therapy  well without complications.         Ashley Sims RN  5/15/2025  9:58 AM

## 2025-05-15 NOTE — PROGRESS NOTES
[facility]  Hyperbaric Oxygen Therapy   Progress Note    NAME: Andrew Cantor  MEDICAL RECORD NUMBER:  [id number]  AGE: 54 y.o.   GENDER: male  : 1970  EPISODE DATE:  2025   Subjective   HBO Treatment Number: 26 out of Total Treatments: 30  HBO Diagnosis:   Problem List Items Addressed This Visit       Soft tissue radionecrosis (Chronic)    Osteoradionecrosis (HCC) - Primary     Safety checks performed prior to treatment.  See doc flowsheets for documentation.  Objective      Please see lab results for finger stick glucose results  Pre treatment Vital Signs       Temp: 97.6 °F (36.4 °C)     Pulse: 60     Respirations: 16     BP: 112/74     Post treatment Vital Signs  Temp: 97.4 °F (36.3 °C)  Pulse: 58  Respirations: 16  BP: 134/80  Assessment      Physical Exam:  General Appearance:  alert and oriented to person, place and time, well-developed and well-nourished, in no acute distress  ENT:  tympanic membranes intact bilaterally  Pulmonary/Chest:  clear to auscultation bilaterally- no wheezes, rales or rhonchi, normal air movement, no respiratory distress  Cardiovascular:  S1S2  Chamber #: [id number]  Treatment Start Time: 0950     Pressure Reached Time: 1002  RETA : 2.5  Number of Air Breaks:  Treatment Status: Air break X 2 for 5 minutes each     Decompression Time: 1142   Treatment End Time: 1152  Length of Treatment: 90 Minutes  Symptoms Noted During Treatment: None  Total Treatment Time (min): 122    Adverse Event: no    I was present on these premises and immediately available to furnish assistance & direction throughout the procedure.   Plan      Andrew Cantor is a 54 y.o. male  did successfully complete today's hyperbaric oxygen treatment at [facility].    In my clinical judgement, ongoing HBO therapy is  necessary at this time, given a threat to patient function, limb or life from the current condition.      Supervision and

## 2025-05-16 ENCOUNTER — ANESTHESIA EVENT (OUTPATIENT)
Dept: GASTROENTEROLOGY | Facility: HOSPITAL | Age: 55
End: 2025-05-16
Payer: COMMERCIAL

## 2025-05-16 ENCOUNTER — HOSPITAL ENCOUNTER (OUTPATIENT)
Dept: HYPERBARIC MEDICINE | Age: 55
Discharge: HOME | End: 2025-05-16
Payer: COMMERCIAL

## 2025-05-16 VITALS
RESPIRATION RATE: 16 BRPM | HEART RATE: 57 BPM | DIASTOLIC BLOOD PRESSURE: 79 MMHG | TEMPERATURE: 97.2 F | SYSTOLIC BLOOD PRESSURE: 134 MMHG
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
## 2025-05-16 DIAGNOSIS — M87.30 OSTEORADIONECROSIS (HCC): Primary | ICD-10-CM

## 2025-05-16 DIAGNOSIS — L59.8 SOFT TISSUE RADIONECROSIS: ICD-10-CM

## 2025-05-16 PROCEDURE — G0277 HBOT, FULL BODY CHAMBER, 30M: HCPCS

## 2025-05-16 NOTE — ANESTHESIA PREPROCEDURE EVALUATION
Patient: Adarsh De La Vega    Procedure Information       Date/Time: 05/19/25 1000    Scheduled providers: Morro Ball MD; Eren Hernandez MD    Procedures:       EGD      COLONOSCOPY    Location: [facility] [place]            Relevant Problems   Anesthesia (within normal limits)      Neuro   (+) Carpal tunnel syndrome of left wrist   (+) Peripheral neuropathy      GI   (+) Gastroesophageal reflux disease   (+) Oropharyngeal dysphagia      Endocrine   (+) Obesity      Musculoskeletal  L hand nerve damage   (+) Carpal tunnel syndrome of left wrist      HEENT  Carcinoma of nasal cartilage      ID   (+) Skin infection      Skin   (+) Squamous cell cancer of skin of al[place] n[place]                                                         Pre-Anesthesia Evaluation                                         Adarsh De La Vega is a 54 y.o. male who presents for the above mentioned procedure due to Severe esophageal dysplasia [K22.89];Encounter for screening colonoscopy [Z12.11]    Medical History[1]  Surgical History[2]  Social History[3]  RX Allergies[4]  Current Medications[5]  Prior to Admission medications    Medication Sig Start Date End Date Taking? Authorizing Provider   acetaminophen (Tylenol) 325 mg tablet Take 3 tablets (975 mg) by mouth every 8 hours if needed for mild pain (1 - 3).   Yes Historical Provider, MD   alpha tocopherol (Vitamin E) 670 mg (1,000 unit) capsule Take 1 capsule (1,000 Units) by mouth once daily. 5/7/25 5/7/26 Yes Dena Mei MD   gabapentin (Neurontin) 300 mg capsule Take 1 capsule (300 mg) by mouth 2 times a day.   Yes Historical Provider, MD   ibuprofen 800 mg tablet Take 1 tablet (800 mg) by mouth every 8 hours if needed for pain. 2/28/25  Yes Historical Provider, MD   loperamide HCl (IMODIUM ORAL) Take by mouth if needed.   Yes Historical Provider, MD   pentoxifylline (Trental) 400 mg ER tablet Take 1 tablet (400 mg) by mouth 2 times a day. Do not crush, chew, or split. 5/7/25  "5/7/26 Yes Dena Mei MD   omeprazole OTC (PriLOSEC OTC) 20 mg EC tablet Take 1 tablet (20 mg) by mouth 2 times a day before meals. Do not crush, chew, or split.  Patient taking differently: Take 1 tablet (20 mg) by mouth 2 times a day before meals. Do not crush, chew, or split.    Takes two 20 mg tablets once daily in the morning 10/17/24 3/21/25  Scarlet Frank MD   sodium,potassium,mag sulfates (Suprep) 17.5-3.13-1.6 gram solution Take 1 bottle by mouth see administration instructions. 3/21/25   Ruben Montes,      No medication comments found.  Visit Vitals  Smoking Status Former                             3/21/2025    10:03 AM 1/27/2025     2:54 PM 1/20/2025     3:15 PM 1/20/2025     3:01 PM 1/20/2025     2:46 PM 1/20/2025     2:34 PM 1/20/2025     2:31 PM   BP REVIEW   BP (ultimate) 142/92 158/79 112/64 118/68 124/71 132/79 132/79     Recent Labs     02/23/24  0832 01/03/24  0700   WBC 3.3* 9.0   HGB 13.4* 10.4*   HCT 45.3 31.4*    202   MCV 95 92     Recent Labs     02/23/24  0832 01/03/24  0700   EGFR >90 >90  >90   ANIONGAP 12 15  13   BUN 13 14  14   CREATININE 0.96 0.93  0.96    139  139   K 3.9 4.0  3.9    104  104   CO2 26 24  26   GLUCOSE 73* 113*  110*   CALCIUM 9.4 9.6  9.6   PHOS 3.6 4.3  4.3     No results for input(s): \"HGBA1C\", \"TSH\", \"FREET4\", \"PTH\", \"BNP\", \"TROPHS\" in the last 13275 hours.    No lab exists for component: \"MAG\"  Recent Labs     02/23/24  0832   BILITOT 0.7   PROT 6.9   ALBUMIN 4.2   ALKPHOS 91   ALT 7*   AST 10     Recent Labs     02/23/24  0832   PROTIME 11.2   INR 1.0     No results found for: \"FERRITIN\", \"TIBC\", \"IRONSAT\"  Lab Results   Component Value Date    STAPHMRSASCR Staphylococcus aureus (A) 04/18/2023     No results for input(s): \"AMPHETAMINE\", \"MAMPHBLDS\", \"BARBITURATE\", \"BENZODIAZ\", \"BUPRENBLDS\", \"CANNABBLDS\", \"COCBLDS\", \"METHABLDS\", \"OXYBLDS\", \"PCPBLDS\", \"OPIATBLDS\", \"DRBLDCOMM\" in the last 02791 hours.  Recent Labs     " "12/08/23  1615 12/08/23  1232   PHART 7.40 7.39   NPH9KFT 41 40   PO2ART 213* 208*   AZU9XEB 25.4 24.2   BEART 0.5 -0.7         Lab Results   Component Value Date    ABO B 02/29/2024     EKG No results found for this or any previous visit (from the past 4464 hours).  Ejection Fractions:No results found for: \"EF\"  EchocardiogramNo results found for this or any previous visit from the past 62319 days.    Stress TestingNo results found for this or any previous visit from the past 36517 days.    Cardiac CatheterizationNo results found for this or any previous visit from the past 11158 days.    Cardiac Scoring No results found for this or any previous visit from the past 94734 days.    AAA screenNo results found for this or any previous visit from the past 98207 days.    Carotid DopplerNo results found for this or any previous visit from the past 29262 days.    X Ray === 12/08/23 ===    XR CHEST 1 VIEW    - Impression -  1.  No evidence of acute cardiopulmonary process.        MACRO:  None    Signed by: Enrique Cameron 12/9/2023 11:51 AM  Dictation workstation:   [id number]  Pulmonary Function Tests  No results found for: \"FEV1\", \"FVC\", \"LPS4WUX\", \"TLC\", \"DLCO\"   OTHER: No results found for this or any previous visit from the past 1825 days.        No results found for: \"PREGTESTUR\", \"PREGSERUM\", \"HCG\", \"HCGQUANT\"  The ASCVD Risk score (Kacey RODRIGUEZ, et al., 2019) failed to calculate for the following reasons:    Cannot find a previous HDL lab    Cannot find a previous total cholesterol lab    Code Status: Full Code                   Clinical information reviewed:                   NPO Detail:  No data recorded     Physical Exam    Airway  Mallampati: III     Cardiovascular    Dental   Comments: Missing front bottom teeth     Pulmonary    Abdominal            Anesthesia Plan    History of general anesthesia?: yes  History of complications of general anesthesia?: no    ASA 3     MAC     intravenous induction   Anesthetic plan " and risks discussed with patient.           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    [2]   Past Surgical History:  Procedure Laterality Date    AMPUTATION      nose    APPENDECTOMY      CHOLECYSTECTOMY      SINUS SURGERY     [3]   Social History  Tobacco Use    Smoking status: Former     Current packs/day: 0.00     Average packs/day: 1.5 packs/day for 30.0 years (45.0 ttl pk-yrs)     Types: Cigarettes     Start date: 1993     Quit date: 2023     Years since quittin.2    Smokeless tobacco: Never   Vaping Use    Vaping status: Never Used   Substance Use Topics    Alcohol use: Not Currently    Drug use: Never   [4] No Known Allergies  [5]   Current Outpatient Medications:     acetaminophen (Tylenol) 325 mg tablet, Take 3 tablets (975 mg) by mouth every 8 hours if needed for mild pain (1 - 3)., Disp: , Rfl:     alpha tocopherol (Vitamin E) 670 mg (1,000 unit) capsule, Take 1 capsule (1,000 Units) by mouth once daily., Disp: 90 capsule, Rfl: 3    gabapentin (Neurontin) 300 mg capsule, Take 1 capsule (300 mg) by mouth 2 times a day., Disp: , Rfl:     ibuprofen 800 mg tablet, Take 1 tablet (800 mg) by mouth every 8 hours if needed for pain., Disp: , Rfl:     loperamide HCl (IMODIUM ORAL), Take by mouth if needed., Disp: , Rfl:     pentoxifylline (Trental) 400 mg ER tablet, Take 1 tablet (400 mg) by mouth 2 times a day. Do not crush, chew, or split., Disp: 60 tablet, Rfl: 11    omeprazole OTC (PriLOSEC OTC) 20 mg EC tablet, Take 1 tablet (20 mg) by mouth 2 times a day before meals. Do not crush, chew, or split. (Patient taking differently: Take 1 tablet (20 mg) by mouth 2 times a day before meals. Do not crush, chew, or split.  Takes two 20 mg tablets once daily in the morning), Disp: 60 tablet, Rfl: 1     sodium,potassium,mag sulfates (Suprep) 17.5-3.13-1.6 gram solution, Take 1 bottle by mouth see administration instructions., Disp: 354 mL, Rfl: 0

## 2025-05-19 ENCOUNTER — HOSPITAL ENCOUNTER (OUTPATIENT)
Dept: GASTROENTEROLOGY | Facility: HOSPITAL | Age: 55
Discharge: HOME | End: 2025-05-19
Payer: COMMERCIAL

## 2025-05-19 ENCOUNTER — ANESTHESIA (OUTPATIENT)
Dept: GASTROENTEROLOGY | Facility: HOSPITAL | Age: 55
End: 2025-05-19
Payer: COMMERCIAL

## 2025-05-19 VITALS
SYSTOLIC BLOOD PRESSURE: 113 MMHG | HEIGHT: 76 IN | WEIGHT: 200.62 LBS | BODY MASS INDEX: 24.43 KG/M2 | DIASTOLIC BLOOD PRESSURE: 80 MMHG | HEART RATE: 54 BPM | OXYGEN SATURATION: 98 % | RESPIRATION RATE: 17 BRPM | TEMPERATURE: 97.7 F

## 2025-05-19 DIAGNOSIS — Z12.11 ENCOUNTER FOR SCREENING COLONOSCOPY: ICD-10-CM

## 2025-05-19 DIAGNOSIS — K22.719 BARRETT'S ESOPHAGUS WITH DYSPLASIA: ICD-10-CM

## 2025-05-19 DIAGNOSIS — K25.7 CHRONIC GASTRIC ULCER WITHOUT HEMORRHAGE AND WITHOUT PERFORATION: Primary | ICD-10-CM

## 2025-05-19 DIAGNOSIS — K22.89 SEVERE ESOPHAGEAL DYSPLASIA: ICD-10-CM

## 2025-05-19 PROBLEM — E66.9 OBESITY: Status: ACTIVE | Noted: 2025-05-19

## 2025-05-19 PROCEDURE — 7100000009 HC PHASE TWO TIME - INITIAL BASE CHARGE

## 2025-05-19 PROCEDURE — 7100000010 HC PHASE TWO TIME - EACH INCREMENTAL 1 MINUTE

## 2025-05-19 PROCEDURE — 2500000004 HC RX 250 GENERAL PHARMACY W/ HCPCS (ALT 636 FOR OP/ED): Performed by: NURSE ANESTHETIST, CERTIFIED REGISTERED

## 2025-05-19 PROCEDURE — A45385 PR COLONOSCOPY,REMV LESN,SNARE: Performed by: NURSE ANESTHETIST, CERTIFIED REGISTERED

## 2025-05-19 PROCEDURE — 3700000002 HC GENERAL ANESTHESIA TIME - EACH INCREMENTAL 1 MINUTE

## 2025-05-19 PROCEDURE — 43239 EGD BIOPSY SINGLE/MULTIPLE: CPT | Performed by: INTERNAL MEDICINE

## 2025-05-19 PROCEDURE — 45385 COLONOSCOPY W/LESION REMOVAL: CPT | Performed by: INTERNAL MEDICINE

## 2025-05-19 PROCEDURE — 3700000001 HC GENERAL ANESTHESIA TIME - INITIAL BASE CHARGE

## 2025-05-19 PROCEDURE — A45385 PR COLONOSCOPY,REMV LESN,SNARE: Performed by: ANESTHESIOLOGY

## 2025-05-19 RX ORDER — ESOMEPRAZOLE MAGNESIUM 40 MG/1
40 CAPSULE, DELAYED RELEASE ORAL
COMMUNITY
End: 2025-05-19

## 2025-05-19 RX ORDER — MIDAZOLAM HYDROCHLORIDE 1 MG/ML
INJECTION INTRAMUSCULAR; INTRAVENOUS AS NEEDED
Status: DISCONTINUED | OUTPATIENT
Start: 2025-05-19 | End: 2025-05-19

## 2025-05-19 RX ORDER — FENTANYL CITRATE 50 UG/ML
INJECTION, SOLUTION INTRAMUSCULAR; INTRAVENOUS AS NEEDED
Status: DISCONTINUED | OUTPATIENT
Start: 2025-05-19 | End: 2025-05-19

## 2025-05-19 RX ORDER — HYDROGEN PEROXIDE 3 %
40 SOLUTION, NON-ORAL MISCELLANEOUS
Qty: 60 CAPSULE | Refills: 11 | Status: SHIPPED | OUTPATIENT
Start: 2025-05-19 | End: 2026-05-19

## 2025-05-19 RX ORDER — PROPOFOL 10 MG/ML
INJECTION, EMULSION INTRAVENOUS AS NEEDED
Status: DISCONTINUED | OUTPATIENT
Start: 2025-05-19 | End: 2025-05-19

## 2025-05-19 RX ORDER — LIDOCAINE HYDROCHLORIDE 10 MG/ML
0.1 INJECTION, SOLUTION EPIDURAL; INFILTRATION; INTRACAUDAL; PERINEURAL ONCE
Status: DISCONTINUED | OUTPATIENT
Start: 2025-05-19 | End: 2025-05-20 | Stop reason: HOSPADM

## 2025-05-19 RX ORDER — LIDOCAINE HYDROCHLORIDE 20 MG/ML
INJECTION, SOLUTION EPIDURAL; INFILTRATION; INTRACAUDAL; PERINEURAL AS NEEDED
Status: DISCONTINUED | OUTPATIENT
Start: 2025-05-19 | End: 2025-05-19

## 2025-05-19 RX ORDER — ONDANSETRON HYDROCHLORIDE 2 MG/ML
4 INJECTION, SOLUTION INTRAVENOUS ONCE AS NEEDED
Status: DISCONTINUED | OUTPATIENT
Start: 2025-05-19 | End: 2025-05-20 | Stop reason: HOSPADM

## 2025-05-19 RX ADMIN — PROPOFOL 40 MG: 10 INJECTION, EMULSION INTRAVENOUS at 09:48

## 2025-05-19 RX ADMIN — FENTANYL CITRATE 25 MCG: 50 INJECTION, SOLUTION INTRAMUSCULAR; INTRAVENOUS at 10:00

## 2025-05-19 RX ADMIN — PROPOFOL 30 MG: 10 INJECTION, EMULSION INTRAVENOUS at 09:50

## 2025-05-19 RX ADMIN — MIDAZOLAM HYDROCHLORIDE 2 MG: 1 INJECTION, SOLUTION INTRAMUSCULAR; INTRAVENOUS at 09:43

## 2025-05-19 RX ADMIN — LIDOCAINE HYDROCHLORIDE 100 MG: 20 INJECTION, SOLUTION EPIDURAL; INFILTRATION; INTRACAUDAL; PERINEURAL at 09:48

## 2025-05-19 RX ADMIN — FENTANYL CITRATE 25 MCG: 50 INJECTION, SOLUTION INTRAMUSCULAR; INTRAVENOUS at 09:48

## 2025-05-19 RX ADMIN — GLYCOPYRROLATE 0.1 MCG: 0.2 INJECTION, SOLUTION INTRAMUSCULAR; INTRAVENOUS at 09:47

## 2025-05-19 RX ADMIN — PROPOFOL 125 MCG/KG/MIN: 10 INJECTION, EMULSION INTRAVENOUS at 09:49

## 2025-05-19 RX ADMIN — SODIUM CHLORIDE, SODIUM LACTATE, POTASSIUM CHLORIDE, AND CALCIUM CHLORIDE: .6; .31; .03; .02 INJECTION, SOLUTION INTRAVENOUS at 09:43

## 2025-05-19 ASSESSMENT — PAIN - FUNCTIONAL ASSESSMENT
PAIN_FUNCTIONAL_ASSESSMENT: 0-10

## 2025-05-19 ASSESSMENT — PAIN SCALES - GENERAL
PAINLEVEL_OUTOF10: 0 - NO PAIN
PAIN_LEVEL: 0
PAINLEVEL_OUTOF10: 0 - NO PAIN

## 2025-05-19 NOTE — ANESTHESIA POSTPROCEDURE EVALUATION
Patient: Adarsh De La Vega    Procedure Summary       Date: 05/19/25 Room / Location: [facility]    Anesthesia Start: 0943 Anesthesia Stop: 1025    Procedures:       EGD      COLONOSCOPY Diagnosis:       Severe esophageal dysplasia      Encounter for screening colonoscopy    Scheduled Providers: Morro Ball MD; Eren Hernandez MD; HIPOLITO Callaway; Lidia Shearer RN; Jose C Powers RN Responsible Provider: Eren Hernandez MD    Anesthesia Type: MAC ASA Status: 3            Anesthesia Type: MAC    Vitals Value Taken Time   BP 91/51 05/19/25 10:25   Temp 36.5 °C (97.7 °F) 05/19/25 10:25   Pulse 63 05/19/25 10:28   Resp 15 05/19/25 10:25   SpO2 97 % 05/19/25 10:28   Vitals shown include unfiled device data.    Anesthesia Post Evaluation    Patient location during evaluation: bedside  Patient participation: complete - patient cannot participate  Level of consciousness: awake  Pain score: 0  Pain management: adequate  Airway patency: patent  Cardiovascular status: acceptable and hemodynamically stable  Respiratory status: acceptable and nonlabored ventilation  Hydration status: acceptable  Postoperative Nausea and Vomiting: none        No notable events documented.

## 2025-05-19 NOTE — H&P
Subjective     History of Present Illness:   Adarsh De La Vega is a 54 y.o. male who presents to endoscopy    Physical Exam  General: not in acute distress  CV: regular rate and rhythm  Resp: non-labored breathing

## 2025-05-20 ENCOUNTER — HOSPITAL ENCOUNTER (OUTPATIENT)
Dept: HYPERBARIC MEDICINE | Age: 55
Discharge: HOME | End: 2025-05-20
Payer: COMMERCIAL

## 2025-05-20 VITALS
SYSTOLIC BLOOD PRESSURE: 138 MMHG | DIASTOLIC BLOOD PRESSURE: 64 MMHG | RESPIRATION RATE: 18 BRPM | TEMPERATURE: 96.8 F | HEART RATE: 70 BPM

## 2025-05-20 DIAGNOSIS — M87.30 OSTEORADIONECROSIS (HCC): Primary | ICD-10-CM

## 2025-05-20 DIAGNOSIS — L59.8 SOFT TISSUE RADIONECROSIS: ICD-10-CM
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
## 2025-05-20 PROCEDURE — G0277 HBOT, FULL BODY CHAMBER, 30M: HCPCS

## 2025-05-20 PROCEDURE — 99183 HYPERBARIC OXYGEN THERAPY: CPT | Performed by: SURGERY

## 2025-05-20 NOTE — PROGRESS NOTES
Andrew Cantor is a 54 y.o. male has been receiving hyperbaric oxygen treatment for management of:Indication  Indications: Late Effect of Radiation (Osteoradionecrosis;Soft tissue radionecrosis). Mr. Cantor has completed Treatment Number: 29 out of a treatment protocol of Total Treatments: 30.    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    Pt is tolerating hyperbaric oxygen therapy  well without complications.         Ashley Sims RN  5/20/2025  10:24 AM  
[facility].    In my clinical judgement, ongoing HBO therapy is  necessary at this time, given a threat to patient function, limb or life from the current condition.      Supervision and attendance of Hyperbaric Oxygen Therapy provided.  Continue HBO treatment as outlined in the treatment plan.    Hyperbaric Oxygen: Andrew Cantor tolerated Treatment Number: 29 well today without complications.    Discharge Instructions were explained and given to Mr. Cantor     Electronically signed by Mona Randolph MD on 5/20/2025 at 1:16 PM

## 2025-05-21 ENCOUNTER — HOSPITAL ENCOUNTER (OUTPATIENT)
Dept: HYPERBARIC MEDICINE | Age: 55
Discharge: HOME | End: 2025-05-21
Payer: COMMERCIAL

## 2025-05-21 VITALS
TEMPERATURE: 96.7 F | DIASTOLIC BLOOD PRESSURE: 84 MMHG | RESPIRATION RATE: 18 BRPM | HEART RATE: 51 BPM | SYSTOLIC BLOOD PRESSURE: 132 MMHG
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
## 2025-05-21 DIAGNOSIS — M87.30 OSTEORADIONECROSIS (HCC): Primary | ICD-10-CM

## 2025-05-21 DIAGNOSIS — L59.8 SOFT TISSUE RADIONECROSIS: Chronic | ICD-10-CM
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
## 2025-05-21 PROCEDURE — G0277 HBOT, FULL BODY CHAMBER, 30M: HCPCS

## 2025-05-21 PROCEDURE — 99183 HYPERBARIC OXYGEN THERAPY: CPT | Performed by: SURGERY

## 2025-05-22 NOTE — PROGRESS NOTES
[facility]  Hyperbaric Oxygen Therapy   Progress Note    NAME: Andrew Cantor  MEDICAL RECORD NUMBER:  [id number]  AGE: 54 y.o.   GENDER: male  : 1970  EPISODE DATE:  2025   Subjective   HBO Treatment Number: 30 out of Total Treatments: 30  HBO Diagnosis:   Problem List Items Addressed This Visit       Soft tissue radionecrosis (Chronic)    Osteoradionecrosis (HCC) - Primary     Safety checks performed prior to treatment.  See doc flowsheets for documentation.  Objective      Please see lab results for finger stick glucose results  Pre treatment Vital Signs       Temp: 97.3 °F (36.3 °C)     Pulse: 68     Respirations: 16     BP: 129/80     Post treatment Vital Signs  Temp: (!) 96.7 °F (35.9 °C)  Pulse: 51  Respirations: 18  BP: 132/84  Assessment      Physical Exam:  General Appearance:  alert and oriented to person, place and time, well-developed and well-nourished, in no acute distress  ENT:  tympanic membranes intact bilaterally  Pulmonary/Chest:  clear to auscultation bilaterally- no wheezes, rales or rhonchi, normal air movement, no respiratory distress  Cardiovascular:  S1S2  Chamber #: [id number]  Treatment Start Time: 0948     Pressure Reached Time: 1002  RETA : 2.5  Number of Air Breaks:  Treatment Status: Air break X 2 for 5 minutes each     Decompression Time: 1143   Treatment End Time: 1154  Length of Treatment: 90 Minutes  Symptoms Noted During Treatment: None  Total Treatment Time (min): 126    Adverse Event: no    I was present on these premises and immediately available to furnish assistance & direction throughout the procedure.   Plan      Andrew Cantor is a 54 y.o. male  did successfully complete today's hyperbaric oxygen treatment at [facility].    In my clinical judgement, ongoing HBO therapy is  necessary at this time, given a threat to patient function, limb or life from the current condition.      Supervision and

## 2025-05-23 ENCOUNTER — HOSPITAL ENCOUNTER (OUTPATIENT)
Dept: HYPERBARIC MEDICINE | Age: 55
Discharge: HOME | End: 2025-05-23
Payer: COMMERCIAL

## 2025-05-23 VITALS
TEMPERATURE: 95.7 F | RESPIRATION RATE: 16 BRPM | HEART RATE: 68 BPM | DIASTOLIC BLOOD PRESSURE: 86 MMHG | SYSTOLIC BLOOD PRESSURE: 135 MMHG
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
## 2025-05-23 DIAGNOSIS — L59.8 SOFT TISSUE RADIONECROSIS: ICD-10-CM

## 2025-05-23 DIAGNOSIS — M87.30 OSTEORADIONECROSIS (HCC): Primary | ICD-10-CM

## 2025-05-23 PROCEDURE — G0277 HBOT, FULL BODY CHAMBER, 30M: HCPCS

## 2025-05-23 RX ORDER — AMOXICILLIN 500 MG/1
TABLET, FILM COATED ORAL
COMMUNITY
Start: 2025-02-28

## 2025-05-27 ENCOUNTER — HOSPITAL ENCOUNTER (OUTPATIENT)
Dept: HYPERBARIC MEDICINE | Age: 55
Discharge: HOME | End: 2025-05-27
Payer: COMMERCIAL

## 2025-05-27 VITALS
RESPIRATION RATE: 16 BRPM | DIASTOLIC BLOOD PRESSURE: 89 MMHG | HEART RATE: 50 BPM | SYSTOLIC BLOOD PRESSURE: 136 MMHG | TEMPERATURE: 96.9 F
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
## 2025-05-27 DIAGNOSIS — L59.8 SOFT TISSUE RADIONECROSIS: ICD-10-CM
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
## 2025-05-27 DIAGNOSIS — C44.321 SQUAMOUS CELL CARCINOMA OF NOSE: ICD-10-CM

## 2025-05-27 DIAGNOSIS — M87.30 OSTEORADIONECROSIS (HCC): Primary | ICD-10-CM

## 2025-05-27 PROCEDURE — 99183 HYPERBARIC OXYGEN THERAPY: CPT | Performed by: STUDENT IN AN ORGANIZED HEALTH CARE EDUCATION/TRAINING PROGRAM

## 2025-05-27 PROCEDURE — G0277 HBOT, FULL BODY CHAMBER, 30M: HCPCS

## 2025-05-27 RX ORDER — IBUPROFEN 400 MG/1
400 TABLET, FILM COATED ORAL EVERY 6 HOURS PRN
COMMUNITY

## 2025-05-27 RX ORDER — PENTOXIFYLLINE 400 MG/1
400 TABLET, EXTENDED RELEASE ORAL 2 TIMES DAILY
Qty: 60 TABLET | Refills: 11 | Status: SHIPPED | OUTPATIENT
Start: 2025-05-27 | End: 2026-05-27

## 2025-05-27 NOTE — PROGRESS NOTES
[facility]  Hyperbaric Oxygen Therapy   Progress Note    NAME: Andrew Cantor  MEDICAL RECORD NUMBER:  [id number]  AGE: 54 y.o.   GENDER: male  : 1970  EPISODE DATE:  2025   Subjective   HBO Treatment Number: 32 out of Total Treatments: 40  HBO Diagnosis:   Problem List Items Addressed This Visit          Musculoskeletal and Integument    Osteoradionecrosis (HCC) - Primary    Relevant Orders    Notify physician (specify)    Hyperbaric Oxygen Therapy       Other    Soft tissue radionecrosis (Chronic)    Relevant Orders    Notify physician (specify)    Hyperbaric Oxygen Therapy     Safety checks performed prior to treatment.  See doc flowsheets for documentation.  Objective      Please see lab results for finger stick glucose results  Pre treatment Vital Signs       Temp: 97.1 °F (36.2 °C)     Pulse: 67     Respirations: 16     BP: 119/83     Post treatment Vital Signs  Temp: 96.9 °F (36.1 °C)  Pulse: 50  Respirations: 16  BP: 136/89  Assessment      Physical Exam:  General Appearance:  alert and oriented to person, place and time, well-developed and well-nourished, in no acute distress  ENT:  tympanic membranes intact bilaterally  Pulmonary/Chest:  clear to auscultation bilaterally- no wheezes, rales or rhonchi, normal air movement, no respiratory distress  Cardiovascular:  S1S2  Chamber #: [id number]  Treatment Start Time: 0951     Pressure Reached Time: 1004  RETA : 2.5  Number of Air Breaks:  Treatment Status: Air break X 2 for 5 minutes each     Decompression Time: 1146   Treatment End Time: 1157  Length of Treatment: 90 Minutes  Symptoms Noted During Treatment: None  Total Treatment Time (min): 126    Adverse Event: no    I was present on these premises and immediately available to furnish assistance & direction throughout the procedure.   Plan      Andrew Cantor is a 54 y.o. male  did successfully complete today's hyperbaric oxygen treatment at [facility]

## 2025-05-28 ENCOUNTER — HOSPITAL ENCOUNTER (OUTPATIENT)
Dept: HYPERBARIC MEDICINE | Age: 55
Discharge: HOME | End: 2025-05-28
Payer: COMMERCIAL

## 2025-05-28 VITALS
HEART RATE: 58 BPM | TEMPERATURE: 96.1 F | RESPIRATION RATE: 16 BRPM | SYSTOLIC BLOOD PRESSURE: 128 MMHG | DIASTOLIC BLOOD PRESSURE: 88 MMHG
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
## 2025-05-28 DIAGNOSIS — M87.30 OSTEORADIONECROSIS (HCC): Primary | ICD-10-CM

## 2025-05-28 DIAGNOSIS — L59.8 SOFT TISSUE RADIONECROSIS: ICD-10-CM
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
## 2025-05-28 PROCEDURE — 99183 HYPERBARIC OXYGEN THERAPY: CPT | Performed by: PHYSICIAN ASSISTANT

## 2025-05-28 PROCEDURE — G0277 HBOT, FULL BODY CHAMBER, 30M: HCPCS

## 2025-05-28 RX ORDER — MULTIVIT WITH MINERALS/LUTEIN
TABLET ORAL DAILY
COMMUNITY
End: 2025-05-30

## 2025-05-28 NOTE — PROGRESS NOTES
[facility]  Hyperbaric Oxygen Therapy   Progress Note    NAME: Andrew Cantor  MEDICAL RECORD NUMBER:  [id number]  AGE: 54 y.o.   GENDER: male  : 1970  EPISODE DATE:  2025   Subjective   HBO Treatment Number: 33 out of Total Treatments: 40  HBO Diagnosis:   Problem List Items Addressed This Visit          Musculoskeletal and Integument    Osteoradionecrosis (HCC) - Primary    Relevant Orders    Notify physician (specify)    Hyperbaric Oxygen Therapy       Other    Soft tissue radionecrosis (Chronic)    Relevant Orders    Notify physician (specify)    Hyperbaric Oxygen Therapy     Safety checks performed prior to treatment.  See doc flowsheets for documentation.  Objective      Please see lab results for finger stick glucose results  Pre treatment Vital Signs       Temp: (!) 95.9 °F (35.5 °C)     Pulse: 65     Respirations: 17     BP: 115/81     Post treatment Vital Signs  Temp: (!) 95.9 °F (35.5 °C)  Pulse: 65  Respirations: 17  BP: 115/81  Assessment      Physical Exam:  General Appearance:  alert and oriented to person, place and time, well-developed and well-nourished, in no acute distress  ENT:  tympanic membranes intact bilaterally  Pulmonary/Chest:  clear to auscultation bilaterally- no wheezes, rales or rhonchi, normal air movement, no respiratory distress  Cardiovascular:  S1S2  Chamber #: [id number]  Treatment Start Time: 0948     Pressure Reached Time: 1003  RETA : 2.5  Number of Air Breaks:  Treatment Status: Air break X 2 for 5 minutes each     Decompression Time: 1144   Treatment End Time: 1157  Length of Treatment: 90 Minutes  Symptoms Noted During Treatment: None  Total Treatment Time (min): 129    Adverse Event: no    I was present on these premises and immediately available to furnish assistance & direction throughout the procedure.   Plan      Andrew Cantor is a 54 y.o. male  did successfully complete today's hyperbaric oxygen treatment at [place]

## 2025-05-29 ENCOUNTER — HOSPITAL ENCOUNTER (OUTPATIENT)
Dept: HYPERBARIC MEDICINE | Age: 55
Discharge: HOME | End: 2025-05-29
Payer: COMMERCIAL

## 2025-05-29 VITALS
HEART RATE: 59 BPM | SYSTOLIC BLOOD PRESSURE: 138 MMHG | TEMPERATURE: 97 F | RESPIRATION RATE: 16 BRPM | DIASTOLIC BLOOD PRESSURE: 87 MMHG

## 2025-05-29 DIAGNOSIS — M87.30 OSTEORADIONECROSIS (HCC): Primary | ICD-10-CM
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
## 2025-05-29 DIAGNOSIS — L59.8 SOFT TISSUE RADIONECROSIS: ICD-10-CM

## 2025-05-29 PROCEDURE — G0277 HBOT, FULL BODY CHAMBER, 30M: HCPCS

## 2025-05-30 ENCOUNTER — HOSPITAL ENCOUNTER (OUTPATIENT)
Dept: HYPERBARIC MEDICINE | Age: 55
Discharge: HOME | End: 2025-05-30
Payer: COMMERCIAL

## 2025-05-30 VITALS
TEMPERATURE: 97 F | DIASTOLIC BLOOD PRESSURE: 86 MMHG | SYSTOLIC BLOOD PRESSURE: 130 MMHG | HEART RATE: 56 BPM | RESPIRATION RATE: 18 BRPM

## 2025-05-30 DIAGNOSIS — L59.8 SOFT TISSUE RADIONECROSIS: ICD-10-CM
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
## 2025-05-30 DIAGNOSIS — M87.30 OSTEORADIONECROSIS (HCC): Primary | ICD-10-CM
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
## 2025-05-30 LAB
LAB AP ASR DISCLAIMER: NORMAL
LABORATORY COMMENT REPORT: NORMAL
PATH REPORT.FINAL DX SPEC: NORMAL
PATH REPORT.GROSS SPEC: NORMAL
PATH REPORT.TOTAL CANCER: NORMAL

## 2025-05-30 PROCEDURE — G0277 HBOT, FULL BODY CHAMBER, 30M: HCPCS

## 2025-05-30 RX ORDER — MULTIVIT WITH MINERALS/LUTEIN
1000 TABLET ORAL DAILY
COMMUNITY

## 2025-05-31 NOTE — PROGRESS NOTES
[facility]  Hyperbaric Oxygen Therapy   Progress Note    NAME: Andrew Cantor  MEDICAL RECORD NUMBER:  [id number]  AGE: 54 y.o.   GENDER: male  : 1970  EPISODE DATE:  2025   Subjective   HBO Treatment Number: 35 out of    HBO Diagnosis:   Problem List Items Addressed This Visit       Soft tissue radionecrosis (Chronic)    Relevant Orders    Notify physician (specify)    Hyperbaric Oxygen Therapy    Osteoradionecrosis (HCC) - Primary    Relevant Orders    Notify physician (specify)    Hyperbaric Oxygen Therapy     Safety checks performed prior to treatment by [facility] staff.  See doc flowsheets for documentation.  Objective      Please see lab results for finger stick glucose results  Pre treatment Vital Signs       Temp: 97.4 °F (36.3 °C)     Pulse: 63     Respirations: 18     BP: 113/79     Post treatment Vital Signs  Temp: 97 °F (36.1 °C)  Pulse: 56  Respirations: 18  BP: 130/86  Assessment      Physical Exam:  General Appearance:  alert and oriented to person, place and time, well-developed and well-nourished, in no acute distress  ENT:  tympanic membranes intact bilaterally, normal color, normal light reflex bilaterally  Pulmonary/Chest:  clear to auscultation bilaterally- no wheezes, rales or rhonchi, normal air movement, no respiratory distress  Cardiovascular:  regular rate and rhythm     Treatment Start Time: 1002     Pressure Reached Time: 1015  RETA : 2.5  Number of Air Breaks:  2  Treatment Status: Air break         Treatment End Time: 1213  Length of Treatment: 90 Minutes  Symptoms Noted During Treatment: None  Total Treatment Time (min): 131    Adverse Event: no    I was present on these premises and immediately available to furnish assistance & direction throughout the procedure.   Plan      Andrew Cantor is a 54 y.o. male  did successfully complete today's hyperbaric oxygen treatment at [facility].    In my clinical

## 2025-05-31 NOTE — PROGRESS NOTES
[facility]  Hyperbaric Oxygen Therapy   Progress Note    NAME: Andrew Cantor  MEDICAL RECORD NUMBER:  [id number]  AGE: 54 y.o.   GENDER: male  : 1970  EPISODE DATE:  2025   Subjective   HBO Treatment Number: 34 out of Total Treatments: 40  HBO Diagnosis:   Problem List Items Addressed This Visit       Soft tissue radionecrosis (Chronic)    Relevant Orders    Notify physician (specify)    Hyperbaric Oxygen Therapy    Osteoradionecrosis (HCC) - Primary    Relevant Orders    Notify physician (specify)    Hyperbaric Oxygen Therapy     Safety checks performed prior to treatment by [facility] staff.  See doc flowsheets for documentation.  Objective      Please see lab results for finger stick glucose results  Pre treatment Vital Signs       Temp: 96.8 °F (36 °C)     Pulse: 79     Respirations: 18     BP: 113/85     Post treatment Vital Signs  Temp: 97 °F (36.1 °C)  Pulse: 59  Respirations: 16  BP: 138/87  Assessment      Physical Exam:  General Appearance:  alert and oriented to person, place and time, well-developed and well-nourished, in no acute distress  ENT:  tympanic membranes intact bilaterally, normal color, normal light reflex bilaterally  Pulmonary/Chest:  clear to auscultation bilaterally- no wheezes, rales or rhonchi, normal air movement, no respiratory distress  Cardiovascular:  regular rate and rhythm  Chamber #: [id number]  Treatment Start Time: 1004     Pressure Reached Time: 1019  RETA : 2.5  Number of Air Breaks:  2  Treatment Status: Air break X 2 for 5 minutes each     Decompression Time: 1155   Treatment End Time: 1212  Length of Treatment: 90 Minutes  Symptoms Noted During Treatment: None  Total Treatment Time (min): 128    Adverse Event: no    I was present on these premises and immediately available to furnish assistance & direction throughout the procedure.   Plan      Andrew Cantor is a 54 y.o. male  did successfully complete today's hyperbaric oxygen treatment at

## 2025-06-02 ENCOUNTER — HOSPITAL ENCOUNTER (OUTPATIENT)
Dept: HYPERBARIC MEDICINE | Age: 55
Discharge: HOME | End: 2025-06-02
Payer: COMMERCIAL

## 2025-06-02 VITALS
HEART RATE: 50 BPM | RESPIRATION RATE: 18 BRPM | TEMPERATURE: 97.4 F | DIASTOLIC BLOOD PRESSURE: 90 MMHG | SYSTOLIC BLOOD PRESSURE: 146 MMHG

## 2025-06-02 DIAGNOSIS — M87.30 OSTEORADIONECROSIS (HCC): Primary | ICD-10-CM
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
## 2025-06-02 DIAGNOSIS — L59.8 SOFT TISSUE RADIONECROSIS: ICD-10-CM

## 2025-06-02 PROCEDURE — 99183 HYPERBARIC OXYGEN THERAPY: CPT | Performed by: SURGERY

## 2025-06-02 PROCEDURE — G0277 HBOT, FULL BODY CHAMBER, 30M: HCPCS

## 2025-06-02 NOTE — PROGRESS NOTES
[facility]  Hyperbaric Oxygen Therapy   Progress Note    NAME: Andrew Cantor  MEDICAL RECORD NUMBER:  [id number]  AGE: 54 y.o.   GENDER: male  : 1970  EPISODE DATE:  2025   Subjective   HBO Treatment Number: 36 out of Total Treatments: 40  HBO Diagnosis:   Problem List Items Addressed This Visit       Osteoradionecrosis (HCC) - Primary    Relevant Orders    Notify physician (specify)    Hyperbaric Oxygen Therapy    Soft tissue radionecrosis (Chronic)    Relevant Orders    Notify physician (specify)    Hyperbaric Oxygen Therapy     Safety checks performed prior to treatment.  See doc flowsheets for documentation.  Objective      Please see lab results for finger stick glucose results  Pre treatment Vital Signs       Temp: 97.4 °F (36.3 °C)     Pulse: 68     Respirations: 16     BP: 123/80     Post treatment Vital Signs  Temp: 97.4 °F (36.3 °C)  Pulse: 50  Respirations: 18  BP: (!) 146/90  Assessment      Physical Exam:  General Appearance:  alert and oriented to person, place and time, well-developed and well-nourished, in no acute distress  ENT:  tympanic membranes intact bilaterally  Pulmonary/Chest:  clear to auscultation bilaterally- no wheezes, rales or rhonchi, normal air movement, no respiratory distress  Cardiovascular:  regular rate and rhythm  Chamber #: [id number]  Treatment Start Time: 0958     Pressure Reached Time: 1011  RETA : 2.5  Number of Air Breaks:  Treatment Status: Air break X 2 for 5 minutes each     Decompression Time: 1152   Treatment End Time: 1205  Length of Treatment: 90 Minutes  Symptoms Noted During Treatment: None  Total Treatment Time (min): 127    Adverse Event: no    I was present on these premises and immediately available to furnish assistance & direction throughout the procedure.   Plan      Andrew Cantor is a 54 y.o. male  did successfully complete today's hyperbaric oxygen treatment at [facility]

## 2025-06-03 ENCOUNTER — HOSPITAL ENCOUNTER (OUTPATIENT)
Dept: HYPERBARIC MEDICINE | Age: 55
Discharge: HOME | End: 2025-06-03
Payer: COMMERCIAL

## 2025-06-03 VITALS
RESPIRATION RATE: 16 BRPM | SYSTOLIC BLOOD PRESSURE: 142 MMHG | TEMPERATURE: 97 F | DIASTOLIC BLOOD PRESSURE: 88 MMHG | HEART RATE: 50 BPM

## 2025-06-03 DIAGNOSIS — L59.8 SOFT TISSUE RADIONECROSIS: ICD-10-CM
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
## 2025-06-03 DIAGNOSIS — M87.30 OSTEORADIONECROSIS (HCC): Primary | ICD-10-CM

## 2025-06-03 PROCEDURE — G0277 HBOT, FULL BODY CHAMBER, 30M: HCPCS

## 2025-06-03 PROCEDURE — 99183 HYPERBARIC OXYGEN THERAPY: CPT | Performed by: STUDENT IN AN ORGANIZED HEALTH CARE EDUCATION/TRAINING PROGRAM

## 2025-06-03 NOTE — PROGRESS NOTES
[facility]  Hyperbaric Oxygen Therapy   Progress Note    NAME: Andrew Cantor  MEDICAL RECORD NUMBER:  [id number]  AGE: 54 y.o.   GENDER: male  : 1970  EPISODE DATE:  6/3/2025   Subjective   HBO Treatment Number: 37 out of Total Treatments: 40  HBO Diagnosis:   Problem List Items Addressed This Visit          Musculoskeletal and Integument    Osteoradionecrosis (HCC) - Primary    Relevant Orders    Notify physician (specify)    Hyperbaric Oxygen Therapy       Other    Soft tissue radionecrosis (Chronic)    Relevant Orders    Notify physician (specify)    Hyperbaric Oxygen Therapy     Safety checks performed prior to treatment.  See doc flowsheets for documentation.  Objective      Please see lab results for finger stick glucose results  Pre treatment Vital Signs       Temp: (!) 96.6 °F (35.9 °C)     Pulse: 60     Respirations: 18     BP: 110/75     Post treatment Vital Signs  Temp: 97 °F (36.1 °C)  Pulse: 50  Respirations: 16  BP: (!) 142/88  Assessment      Physical Exam:  General Appearance:  alert and oriented to person, place and time, well-developed and well-nourished, in no acute distress  ENT:  tympanic membranes intact bilaterally  Pulmonary/Chest:  clear to auscultation bilaterally- no wheezes, rales or rhonchi, normal air movement, no respiratory distress  Cardiovascular:  regular rate and rhythm  Chamber #: [id number]  Treatment Start Time: 0955     Pressure Reached Time: 1010  RETA : 2.5  Number of Air Breaks:  Treatment Status: Air break X 2 for 5 minutes each     Decompression Time: 1151   Treatment End Time: 1204  Length of Treatment: 90 Minutes  Symptoms Noted During Treatment: None  Total Treatment Time (min): 129    Adverse Event: no    I was present on these premises and immediately available to furnish assistance & direction throughout the procedure.   Plan      Andrew Cantor is a 54 y.o. male  did successfully complete today's hyperbaric oxygen treatment at [facility]

## 2025-06-04 ENCOUNTER — HOSPITAL ENCOUNTER (OUTPATIENT)
Dept: HYPERBARIC MEDICINE | Age: 55
Discharge: HOME | End: 2025-06-04
Payer: COMMERCIAL

## 2025-06-04 VITALS
RESPIRATION RATE: 19 BRPM | DIASTOLIC BLOOD PRESSURE: 74 MMHG | TEMPERATURE: 96.7 F | SYSTOLIC BLOOD PRESSURE: 138 MMHG | HEART RATE: 50 BPM
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
## 2025-06-04 DIAGNOSIS — L59.8 SOFT TISSUE RADIONECROSIS: ICD-10-CM

## 2025-06-04 DIAGNOSIS — M87.30 OSTEORADIONECROSIS (HCC): Primary | ICD-10-CM
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
## 2025-06-04 PROCEDURE — G0277 HBOT, FULL BODY CHAMBER, 30M: HCPCS

## 2025-06-05 ENCOUNTER — HOSPITAL ENCOUNTER (OUTPATIENT)
Dept: HYPERBARIC MEDICINE | Age: 55
Discharge: HOME | End: 2025-06-05
Payer: COMMERCIAL

## 2025-06-05 VITALS
SYSTOLIC BLOOD PRESSURE: 142 MMHG | HEART RATE: 49 BPM | RESPIRATION RATE: 16 BRPM | DIASTOLIC BLOOD PRESSURE: 86 MMHG | TEMPERATURE: 97 F

## 2025-06-05 DIAGNOSIS — L59.8 SOFT TISSUE RADIONECROSIS: ICD-10-CM

## 2025-06-05 DIAGNOSIS — M87.30 OSTEORADIONECROSIS (HCC): Primary | ICD-10-CM
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
## 2025-06-05 PROCEDURE — G0277 HBOT, FULL BODY CHAMBER, 30M: HCPCS

## 2025-06-05 NOTE — PROGRESS NOTES
Andrew Cantor is a 54 y.o. male has been receiving hyperbaric oxygen treatment for management of:Indication  Indications: Late Effect of Radiation. (Osteoradionecrosis & Soft Tissue radionecrosis) Mr. Cantor has completed Treatment Number: 39 out of a treatment protocol of Total Treatments: 40.    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    Pt is tolerating hyperbaric oxygen therapy  well without complications.         Ashley Sims RN  6/5/2025  10:17 AM

## 2025-06-06 ENCOUNTER — HOSPITAL ENCOUNTER (OUTPATIENT)
Dept: HYPERBARIC MEDICINE | Age: 55
Discharge: HOME | End: 2025-06-06
Payer: COMMERCIAL

## 2025-06-06 VITALS
SYSTOLIC BLOOD PRESSURE: 121 MMHG | DIASTOLIC BLOOD PRESSURE: 86 MMHG | TEMPERATURE: 97.2 F | HEART RATE: 52 BPM | RESPIRATION RATE: 16 BRPM
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
## 2025-06-06 DIAGNOSIS — M87.30 OSTEORADIONECROSIS (HCC): Primary | ICD-10-CM

## 2025-06-06 DIAGNOSIS — L59.8 SOFT TISSUE RADIONECROSIS: ICD-10-CM

## 2025-06-06 PROCEDURE — G0277 HBOT, FULL BODY CHAMBER, 30M: HCPCS

## 2025-06-20 ENCOUNTER — TELEPHONE (OUTPATIENT)
Dept: GASTROENTEROLOGY | Facility: HOSPITAL | Age: 55
End: 2025-06-20
Payer: COMMERCIAL

## 2025-06-20 NOTE — TELEPHONE ENCOUNTER
Patient is scheduled for a follow up visit on 8/11 to review test results  Can this visit be virtual?  [phone]

## 2025-08-11 ENCOUNTER — APPOINTMENT (OUTPATIENT)
Dept: GASTROENTEROLOGY | Facility: CLINIC | Age: 55
End: 2025-08-11
Payer: COMMERCIAL

## 2025-08-11 DIAGNOSIS — K25.9 GASTRIC ULCER WITHOUT HEMORRHAGE OR PERFORATION, UNSPECIFIED CHRONICITY: ICD-10-CM

## 2025-08-11 DIAGNOSIS — K22.70 BARRETT'S ESOPHAGUS WITHOUT DYSPLASIA: Primary | ICD-10-CM

## 2025-08-11 DIAGNOSIS — D12.6 TUBULOVILLOUS ADENOMA OF COLON: ICD-10-CM

## 2025-08-11 PROCEDURE — 99213 OFFICE O/P EST LOW 20 MIN: CPT | Performed by: NURSE PRACTITIONER

## 2025-08-11 ASSESSMENT — ENCOUNTER SYMPTOMS
FEVER: 0
SHORTNESS OF BREATH: 0
FLANK PAIN: 0
CHILLS: 0
HEADACHES: 0
NERVOUS/ANXIOUS: 0
ARTHRALGIAS: 0
DIZZINESS: 0
NUMBNESS: 0
BACK PAIN: 0
PALPITATIONS: 0
MYALGIAS: 0
HALLUCINATIONS: 0
AGITATION: 0
HEMATURIA: 0
FATIGUE: 0
SORE THROAT: 0
LIGHT-HEADEDNESS: 0
PHOTOPHOBIA: 0
FREQUENCY: 0
ADENOPATHY: 0
COUGH: 0
WEAKNESS: 0
JOINT SWELLING: 0
EYE PAIN: 0
WHEEZING: 0
DYSURIA: 0
DIAPHORESIS: 0

## 2025-09-02 ENCOUNTER — APPOINTMENT (OUTPATIENT)
Dept: RADIOLOGY | Facility: HOSPITAL | Age: 55
End: 2025-09-02
Payer: MEDICARE

## 2025-09-03 ENCOUNTER — APPOINTMENT (OUTPATIENT)
Dept: RADIOLOGY | Facility: CLINIC | Age: 55
End: 2025-09-03
Payer: COMMERCIAL

## 2025-09-18 ENCOUNTER — APPOINTMENT (OUTPATIENT)
Dept: OTOLARYNGOLOGY | Facility: CLINIC | Age: 55
End: 2025-09-18
Payer: COMMERCIAL

## (undated) DEVICE — Device

## (undated) DEVICE — DRESSING, GAUZE, PETROLATUM, XEROFORM, 5 X 9 IN, STERILE

## (undated) DEVICE — COVER, CART, 45 X 27 X 48 IN, CLEAR

## (undated) DEVICE — BURR, OVAL MEDIUM, 4.0MM

## (undated) DEVICE — IMPLANTABLE DEVICE: Type: IMPLANTABLE DEVICE | Status: NON-FUNCTIONAL

## (undated) DEVICE — TUBING, ACTEON IRRIGATION LINE, PIEZOTOME M+, W/ CLIPS & PERFORATIORS

## (undated) DEVICE — DRESSING, ABDOMINAL, TENDERSORB, 8 X 10 IN, STERILE

## (undated) DEVICE — DRESSING, MEPILEX, BORDER, SACRUM, 8.7 X 9.8 IN

## (undated) DEVICE — DRESSING, MEPILEX, BORDER, HEEL, 8.7 X 9.1 IN

## (undated) DEVICE — BACKGROUND MATERIAL, MERCIAN, YELLOW, 1MM GRID, LF

## (undated) DEVICE — DRAIN, PENROSE, 0.25 X 18 IN, LATEX, STERILE

## (undated) DEVICE — DRILL, WIRE PASS MEDIUM, 1.1MM

## (undated) DEVICE — SPONGE, LAP, XRAY DECT, 12IN X 12IN, W/MASTER DMT, STERILE

## (undated) DEVICE — SPONGE, DISSECTOR, PEANUT, 3/8, STERILE 5 FOAM HOLDER"

## (undated) DEVICE — CATHETER, DRAINAGE, NASOGASTRIC, SUMP, SALEM, W/ANTI-REFLUX VALVE, 18 FR, 48 IN

## (undated) DEVICE — DRAPE, INCISE, ANTIMICROBIAL, IOBAN 2, LARGE, 17 X 23 IN, DISPOSABLE, STERILE

## (undated) DEVICE — STAPLER, SKIN, PLUS, REGULAR, 35

## (undated) DEVICE — PADDING, WEBRIL, UNDERCAST, STERILE, 4 IN

## (undated) DEVICE — TIP, SUCTION, FRAZIER, 8 FR

## (undated) DEVICE — SUTURE, VICRYL, 3-0, 27 IN, SH

## (undated) DEVICE — DRESSING, GAUZE, SPONGE, 12 PLY, 4 X 4 IN, PLASTIC POUCH, STRL 10PK

## (undated) DEVICE — STAY SET, SURGICAL, 5MM SHARP HOOK, 8PK

## (undated) DEVICE — SUTURE, MONOCRYLIC, 4-0, P3, MONO 18

## (undated) DEVICE — SAW TIP, RHINOPLASTY, PIEZOTOME M+, STRAIGHT, RHS5

## (undated) DEVICE — STRIP, SKIN CLOSURE, STERI STRIP, REINFORCED, 0.5 X 4 IN

## (undated) DEVICE — MARKER, SKIN, XFINE TIP, W/RULER AND LABELS

## (undated) DEVICE — SUTURE, VICRYL, 3-0,18 IN, SH, UNDYED

## (undated) DEVICE — GLOVE, SURGICAL, PROTEXIS PI MICRO, 7.5, PF, LF

## (undated) DEVICE — TRAY, MINOR, SINGLE BASIN, STERILE

## (undated) DEVICE — CASSETTE, SONOPET IQ IRRIGATION SUCTION

## (undated) DEVICE — WOUND SYSTEM, DEBRIDEMENT & CLEANING, O.R DUOPAK

## (undated) DEVICE — SYRINGE, 6 CC, REG LUER TIP

## (undated) DEVICE — HOLSTER, JET SAFETY

## (undated) DEVICE — PAD, GROUNDING, ELECTROSURGICAL, W/9 FT CABLE, POLYHESIVE II, ADULT, LF

## (undated) DEVICE — CLIP, LIGATING, W/ADHESIVE, WIDE SLOT, SMALL, TITANIUM

## (undated) DEVICE — SUTURE, PLAIN, 5-0, 18 IN, PC1, YELLOW

## (undated) DEVICE — MAJOR VASCULAR: Brand: MEDLINE INDUSTRIES, INC.

## (undated) DEVICE — SUTURE, SILK, 2-0, 18 IN, BLACK

## (undated) DEVICE — MANIFOLD, 4 PORT NEPTUNE STANDARD

## (undated) DEVICE — APPLICATOR, COTTON TIP, 6 IN, 2PK, STERILE

## (undated) DEVICE — CLEANER, WIPE, INSTRUMENT, 3.25 X 3.25 IN

## (undated) DEVICE — CLIP, LIGATING, W/ADHESIVE PAD, MEDIUM, TITANIUM

## (undated) DEVICE — SUTURE, SILK, 2-0, FSL, BR, 30 IN, BLACK

## (undated) DEVICE — DRAIN, WOUND, FLAT, HUBLESS, FULL LENGTH PERFORATION, 10 MM X 20 CM, SILICONE

## (undated) DEVICE — MARKER, SKIN, RULER AND LABEL PACK, CUSTOM

## (undated) DEVICE — 3M™ IOBAN™ 2 ANTIMICROBIAL INCISE DRAPE 6640EZ: Brand: IOBAN™ 2

## (undated) DEVICE — GLOVE, SURGICAL, PROTEXIS PI , 7.5, PF, LF

## (undated) DEVICE — SUTURE, MONOCRYL, 4-0, 18 IN, PS2, UNDYED

## (undated) DEVICE — DRILL BIT, 1.8MM W/DEPTH MARKER/QC/110MM

## (undated) DEVICE — SUTURE, PROLENE, 2-0, 30 IN, SH, BLUE

## (undated) DEVICE — COVER, BACK TABLE

## (undated) DEVICE — GLOVE, SURGICAL, SENSITIVE, GAMMEX, SZ-7.5 , PF, WHITE

## (undated) DEVICE — SPONGE GAUZE, XRAY SC+RFID, 4X4 16 PLY, STERILE

## (undated) DEVICE — SUTURE, SILK, 3-0, 18 IN, MULTIPACK, BLACK

## (undated) DEVICE — COUNTER, NEEDLE, FOAM BLOCK, W/MAGNET, W/BLADE GUARD, 10 COUNT, RED, LF

## (undated) DEVICE — DEPRESSOR, TONGUE, 6 IN, WOOD, STERILE, LF

## (undated) DEVICE — SUTURE, SURGIPRO, 5-0, 18 IN, P13, BLUE

## (undated) DEVICE — BLADE, DERMATOME, 1.25 X 4.25 IN, STERILE

## (undated) DEVICE — BANDAGE, ELASTIC, SELF-CLOSE, 4 IN, HONEYCOMB, STERILE

## (undated) DEVICE — SPEAR, EYE, SURGICAL, WECK-CEL, CELLULOSE

## (undated) DEVICE — WAX, BONE, 2.5 GM

## (undated) DEVICE — SPONGE, GAUZE, XRAY DECT, 16 PLY, 4 X 4, W/MASTER DMT,STERILE

## (undated) DEVICE — DRILL BIT, 2.5MM, QC, GOLD, 110MM, STERILE

## (undated) DEVICE — SUTURE, PERMA HAND 2-0, TAPER POINT, SH BLACK 8-18 INCH

## (undated) DEVICE — CATHETER TRAY, SURESTEP, 16FR, URINE METER W/STATLOCK

## (undated) DEVICE — REST, HEAD, BAGEL, 7 IN

## (undated) DEVICE — APPLICATOR, COTTON TIP, 6 IN, LF, STERILE

## (undated) DEVICE — SYRINGE, COLLECTION, ABG, 1CC, LUER LOCK

## (undated) DEVICE — ADHESIVE, SKIN, LIQUIBAND EXCEED

## (undated) DEVICE — CATHETER, IV, ANGIOCATH, 20 G X 1.16 IN, FEP POLYMER

## (undated) DEVICE — REST, HEAD, BAGEL, 9 IN

## (undated) DEVICE — GLOVE SURG 7.5 PF POLYMER WHT STRL SIGN LTX ESSENTIAL LTX

## (undated) DEVICE — SPONGE, LAP, XRAY DECT, 18IN X 18IN, W/MASTER DMT, STERILE

## (undated) DEVICE — DRESSING, TELFA, 3X4

## (undated) DEVICE — BINDER ABD UNISX 4 PNL PREM 6INX6INX12IN L XL 4

## (undated) DEVICE — GAUZE,SPONGE,4"X4",8PLY,STRL,LF,10/TRAY: Brand: MEDLINE

## (undated) DEVICE — DRAPE, MICROSCOPE, W/REMOVABLE LENS

## (undated) DEVICE — SYRINGE, MONOJECT, LUER LOCK, 3 CC, LF

## (undated) DEVICE — CARRIER, SKIN GRAFT, DERMACARRIER II, 3 X 8 IN, 1.5:1 RATIO, STERILE

## (undated) DEVICE — APPLICATOR, PREP, CHLORAPREP, W/ORANGE TINT, 10.5ML

## (undated) DEVICE — CATHETER, IV, INSYTE, AUTOGUARD, SHIELDED, 16 G X 1.75 IN, VIALON

## (undated) DEVICE — C-ARM: Brand: UNBRANDED

## (undated) DEVICE — EVACUATOR, WOUND, SUCTION, CLOSED, JACKSON-PRATT, 100 CC, SILICONE

## (undated) DEVICE — CORD, CAUTERY, BIOPOLAR FORCEP, 12FT

## (undated) DEVICE — DRAPE, SHEET, LAPAROTOMY, W/ISO-BAC, W/ARMBOARD COVERS, 98 X 122 IN, DISPOSABLE, LF, STERILE

## (undated) DEVICE — BITEBLOCK 54FR W/ DENT RIM BLOX

## (undated) DEVICE — GLOVE, SURGICAL, PROTEXIS PI BLUE W/NEUTHERA, 7.5, PF, LF

## (undated) DEVICE — APPLIER,  LIGACLIP MULTI CLIP, 30 MED 11 1/2

## (undated) DEVICE — CATHETER, IV, INSYTE, AUTOGUARD BC YEL, 24 GA X 0.75 IN

## (undated) DEVICE — DEFENDO AIR WATER SUCTION AND BIOPSY VALVE KIT FOR  OLYMPUS: Brand: DEFENDO AIR/WATER/SUCTION AND BIOPSY VALVE

## (undated) DEVICE — NEEDLE, MICRODISSECTION STR 4CM

## (undated) DEVICE — DRAPE, SHEET, EXTREMITY, W/ARM BOARD COVERS, 87 X 106 X 128 IN, DISPOSABLE, LF, STERILE

## (undated) DEVICE — BALL, COTTON, STANDARD, STERILE

## (undated) DEVICE — BANDAGE, ESMARK 4 IN X 9 FT, STERILE

## (undated) DEVICE — DRAPE PACK, UNIVERSAL II

## (undated) DEVICE — APPLIER, LIGACLIP, MULTIPLE, SMALL 9-3/8IN

## (undated) DEVICE — MARKER, SKIN, DUAL TIP, W/RULER

## (undated) DEVICE — NEEDLE, HYPODERMIC, REGULAR WALL, REGULAR BEVEL, 18 G X 1.5 IN

## (undated) DEVICE — BAG, DECANTER

## (undated) DEVICE — DRESSING KIT, V.A.C., W/DRAPE/TUBING, MEDIUM, FOAM 5PK

## (undated) DEVICE — DRESSING, NON-ADHERENT, TELFA, 3 X 8 IN, NS

## (undated) DEVICE — PACKING, VAGINAL, 2 IN X 2 YD

## (undated) DEVICE — SUTURE, CHROMIC GUT 5/0  18  P-13"

## (undated) DEVICE — GOWN, ASTOUND, L

## (undated) DEVICE — STOCKINETTE, DOUBLE PLY, 6 X 48 IN, STERILE

## (undated) DEVICE — MARKER, SKIN, REGULAR TIP, W/W/FLEXI RULER, LABEL

## (undated) DEVICE — STOCKINETTE, DOUBLE PLY, 4 X 48 IN, STERILE

## (undated) DEVICE — CUFF, TOURNIQUET, 24 X 4, DUAL PORT/DUAL BLADDER, YELLOW, STERILE

## (undated) DEVICE — SUTURE, PLAIN, 4-0, 18 IN, PS2, YELLOW

## (undated) DEVICE — ADHESIVE, SKIN, MASTISOL, 2/3 CC VIAL

## (undated) DEVICE — TOWEL, SURGICAL, NEURO, O/R, 16 X 26, BLUE, STERILE

## (undated) DEVICE — DRESSING, TRANSPARENT, TEGADERM, 8 X 12 IN

## (undated) DEVICE — APPLICATOR, COTTON TIP, 3 IN, WOOD, 10-PACK, STERILE

## (undated) DEVICE — SYRINGE, 10 CC, LUER LOCK

## (undated) DEVICE — SYRINGE, 20 CC, LUER LOCK, MONOJECT, W/O CAP, LF

## (undated) DEVICE — PACK, BASIC

## (undated) DEVICE — CONTAINER STERILE SPECIMEN 90ML, STERILE

## (undated) DEVICE — MICROCLIPS, GEM HEMOSTATIC TITANIUM

## (undated) DEVICE — SYRINGE, 60 CC, IRRIGATION, BULB, CONTRO-BULB, PAPER POUCH

## (undated) DEVICE — SUTURE, CHROMIC, 4-0, 18 IN, PS2, BROWN

## (undated) DEVICE — STANDARD HYPODERMIC NEEDLE,ALUMINUM HUB: Brand: MONOJECT